# Patient Record
Sex: MALE | Race: WHITE | NOT HISPANIC OR LATINO | Employment: OTHER | URBAN - METROPOLITAN AREA
[De-identification: names, ages, dates, MRNs, and addresses within clinical notes are randomized per-mention and may not be internally consistent; named-entity substitution may affect disease eponyms.]

---

## 2017-01-03 ENCOUNTER — HOSPITAL ENCOUNTER (OUTPATIENT)
Dept: NON INVASIVE DIAGNOSTICS | Facility: CLINIC | Age: 67
Discharge: HOME | End: 2017-01-03
Payer: MEDICARE

## 2017-01-03 DIAGNOSIS — Z13.6 ENCOUNTER FOR SCREENING FOR CARDIOVASCULAR DISORDERS: ICD-10-CM

## 2017-01-03 PROCEDURE — 76706 US ABDL AORTA SCREEN AAA: CPT

## 2017-01-11 ENCOUNTER — ALLSCRIPTS OFFICE VISIT (OUTPATIENT)
Dept: OTHER | Facility: OTHER | Age: 67
End: 2017-01-11

## 2017-02-02 ENCOUNTER — ALLSCRIPTS OFFICE VISIT (OUTPATIENT)
Dept: OTHER | Facility: OTHER | Age: 67
End: 2017-02-02

## 2017-02-02 DIAGNOSIS — J06.9 ACUTE UPPER RESPIRATORY INFECTION: ICD-10-CM

## 2017-02-03 ENCOUNTER — TRANSCRIBE ORDERS (OUTPATIENT)
Dept: ADMINISTRATIVE | Age: 67
End: 2017-02-03

## 2017-02-03 ENCOUNTER — HOSPITAL ENCOUNTER (OUTPATIENT)
Dept: RADIOLOGY | Age: 67
Discharge: HOME | End: 2017-02-03
Payer: MEDICARE

## 2017-02-03 DIAGNOSIS — J06.9 ACUTE UPPER RESPIRATORY INFECTION: ICD-10-CM

## 2017-02-03 PROCEDURE — 71020 HB CHEST X-RAY 2VW FRONTAL&LATL: CPT

## 2017-02-24 ENCOUNTER — ALLSCRIPTS OFFICE VISIT (OUTPATIENT)
Dept: OTHER | Facility: OTHER | Age: 67
End: 2017-02-24

## 2017-04-22 ENCOUNTER — APPOINTMENT (OUTPATIENT)
Dept: LAB | Age: 67
End: 2017-04-22
Payer: MEDICARE

## 2017-04-22 DIAGNOSIS — E78.5 HYPERLIPIDEMIA: ICD-10-CM

## 2017-04-22 DIAGNOSIS — R73.01 IMPAIRED FASTING GLUCOSE: ICD-10-CM

## 2017-04-22 LAB
ALBUMIN SERPL BCP-MCNC: 3.8 G/DL (ref 3.5–5)
ALP SERPL-CCNC: 68 U/L (ref 46–116)
ALT SERPL W P-5'-P-CCNC: 24 U/L (ref 12–78)
ANION GAP SERPL CALCULATED.3IONS-SCNC: 9 MMOL/L (ref 4–13)
AST SERPL W P-5'-P-CCNC: 16 U/L (ref 5–45)
BILIRUB SERPL-MCNC: 0.45 MG/DL (ref 0.2–1)
BUN SERPL-MCNC: 16 MG/DL (ref 5–25)
CALCIUM SERPL-MCNC: 8.6 MG/DL (ref 8.3–10.1)
CHLORIDE SERPL-SCNC: 106 MMOL/L (ref 100–108)
CHOLEST SERPL-MCNC: 255 MG/DL (ref 50–200)
CO2 SERPL-SCNC: 26 MMOL/L (ref 21–32)
CREAT SERPL-MCNC: 1.11 MG/DL (ref 0.6–1.3)
EST. AVERAGE GLUCOSE BLD GHB EST-MCNC: 117 MG/DL
GFR SERPL CREATININE-BSD FRML MDRD: >60 ML/MIN/1.73SQ M
GLUCOSE P FAST SERPL-MCNC: 110 MG/DL (ref 65–99)
HBA1C MFR BLD: 5.7 % (ref 4.2–6.3)
HDLC SERPL-MCNC: 51 MG/DL (ref 40–60)
LDLC SERPL CALC-MCNC: 180 MG/DL (ref 0–100)
POTASSIUM SERPL-SCNC: 3.9 MMOL/L (ref 3.5–5.3)
PROT SERPL-MCNC: 6.8 G/DL (ref 6.4–8.2)
SODIUM SERPL-SCNC: 141 MMOL/L (ref 136–145)
TRIGL SERPL-MCNC: 122 MG/DL

## 2017-04-22 PROCEDURE — 80053 COMPREHEN METABOLIC PANEL: CPT

## 2017-04-22 PROCEDURE — 36415 COLL VENOUS BLD VENIPUNCTURE: CPT

## 2017-04-22 PROCEDURE — 83036 HEMOGLOBIN GLYCOSYLATED A1C: CPT

## 2017-04-22 PROCEDURE — 80061 LIPID PANEL: CPT

## 2017-04-24 ENCOUNTER — ALLSCRIPTS OFFICE VISIT (OUTPATIENT)
Dept: OTHER | Facility: OTHER | Age: 67
End: 2017-04-24

## 2017-04-24 DIAGNOSIS — E78.5 HYPERLIPIDEMIA: ICD-10-CM

## 2017-04-24 DIAGNOSIS — R91.1 SOLITARY PULMONARY NODULE: ICD-10-CM

## 2017-04-24 DIAGNOSIS — J44.9 CHRONIC OBSTRUCTIVE PULMONARY DISEASE (HCC): ICD-10-CM

## 2017-08-11 ENCOUNTER — LAB REQUISITION (OUTPATIENT)
Dept: LAB | Facility: HOSPITAL | Age: 67
End: 2017-08-11
Payer: MEDICARE

## 2017-08-11 DIAGNOSIS — N40.1 ENLARGED PROSTATE WITH LOWER URINARY TRACT SYMPTOMS (LUTS): ICD-10-CM

## 2017-08-11 LAB — PSA SERPL-MCNC: 1 NG/ML (ref 0–4)

## 2017-08-11 PROCEDURE — G0103 PSA SCREENING: HCPCS | Performed by: UROLOGY

## 2017-08-25 ENCOUNTER — ALLSCRIPTS OFFICE VISIT (OUTPATIENT)
Dept: OTHER | Facility: OTHER | Age: 67
End: 2017-08-25

## 2017-08-25 ENCOUNTER — GENERIC CONVERSION - ENCOUNTER (OUTPATIENT)
Dept: OTHER | Facility: OTHER | Age: 67
End: 2017-08-25

## 2017-08-25 LAB
BILIRUB UR QL STRIP: NORMAL
CLARITY UR: NORMAL
COLOR UR: YELLOW
GLUCOSE (HISTORICAL): NORMAL
HGB UR QL STRIP.AUTO: NORMAL
KETONES UR STRIP-MCNC: NORMAL MG/DL
LEUKOCYTE ESTERASE UR QL STRIP: NORMAL
NITRITE UR QL STRIP: NORMAL
PH UR STRIP.AUTO: 6.5 [PH]
PROT UR STRIP-MCNC: NORMAL MG/DL
SP GR UR STRIP.AUTO: 1.01
UROBILINOGEN UR QL STRIP.AUTO: 0.2

## 2017-08-28 ENCOUNTER — HOSPITAL ENCOUNTER (OUTPATIENT)
Dept: RADIOLOGY | Age: 67
Discharge: HOME | End: 2017-08-28
Payer: MEDICARE

## 2017-08-28 DIAGNOSIS — F17.200 NICOTINE DEPENDENCE, UNCOMPLICATED: ICD-10-CM

## 2017-08-28 DIAGNOSIS — R91.1 SOLITARY PULMONARY NODULE: ICD-10-CM

## 2017-08-28 PROCEDURE — 71250 CT THORAX DX C-: CPT

## 2017-09-01 ENCOUNTER — GENERIC CONVERSION - ENCOUNTER (OUTPATIENT)
Dept: OTHER | Facility: OTHER | Age: 67
End: 2017-09-01

## 2017-10-19 ENCOUNTER — APPOINTMENT (OUTPATIENT)
Dept: LAB | Facility: HOSPITAL | Age: 67
End: 2017-10-19
Payer: MEDICARE

## 2017-10-19 DIAGNOSIS — J44.9 CHRONIC OBSTRUCTIVE PULMONARY DISEASE (HCC): ICD-10-CM

## 2017-10-19 DIAGNOSIS — R91.1 SOLITARY PULMONARY NODULE: ICD-10-CM

## 2017-10-19 DIAGNOSIS — E78.5 HYPERLIPIDEMIA: ICD-10-CM

## 2017-10-19 LAB
ALBUMIN SERPL BCP-MCNC: 4.1 G/DL (ref 3.5–5)
ALP SERPL-CCNC: 77 U/L (ref 46–116)
ALT SERPL W P-5'-P-CCNC: 24 U/L (ref 12–78)
ANION GAP SERPL CALCULATED.3IONS-SCNC: 4 MMOL/L (ref 4–13)
AST SERPL W P-5'-P-CCNC: 19 U/L (ref 5–45)
BILIRUB SERPL-MCNC: 0.52 MG/DL (ref 0.2–1)
BUN SERPL-MCNC: 14 MG/DL (ref 5–25)
CALCIUM SERPL-MCNC: 9.1 MG/DL (ref 8.3–10.1)
CHLORIDE SERPL-SCNC: 105 MMOL/L (ref 100–108)
CHOLEST SERPL-MCNC: 201 MG/DL (ref 50–200)
CO2 SERPL-SCNC: 31 MMOL/L (ref 21–32)
CREAT SERPL-MCNC: 1.21 MG/DL (ref 0.6–1.3)
GFR SERPL CREATININE-BSD FRML MDRD: 62 ML/MIN/1.73SQ M
GLUCOSE P FAST SERPL-MCNC: 109 MG/DL (ref 65–99)
HDLC SERPL-MCNC: 53 MG/DL (ref 40–60)
LDLC SERPL CALC-MCNC: 122 MG/DL (ref 0–100)
POTASSIUM SERPL-SCNC: 4.3 MMOL/L (ref 3.5–5.3)
PROT SERPL-MCNC: 7 G/DL (ref 6.4–8.2)
SODIUM SERPL-SCNC: 140 MMOL/L (ref 136–145)
TRIGL SERPL-MCNC: 128 MG/DL

## 2017-10-19 PROCEDURE — 36415 COLL VENOUS BLD VENIPUNCTURE: CPT

## 2017-10-19 PROCEDURE — 80061 LIPID PANEL: CPT

## 2017-10-19 PROCEDURE — 80053 COMPREHEN METABOLIC PANEL: CPT

## 2017-10-23 ENCOUNTER — GENERIC CONVERSION - ENCOUNTER (OUTPATIENT)
Dept: OTHER | Facility: OTHER | Age: 67
End: 2017-10-23

## 2017-10-23 DIAGNOSIS — I72.3 ANEURYSM OF ILIAC ARTERY (HCC): ICD-10-CM

## 2017-10-23 DIAGNOSIS — R73.01 IMPAIRED FASTING GLUCOSE: ICD-10-CM

## 2017-10-23 DIAGNOSIS — E78.5 HYPERLIPIDEMIA: ICD-10-CM

## 2017-12-29 ENCOUNTER — ALLSCRIPTS OFFICE VISIT (OUTPATIENT)
Dept: OTHER | Facility: OTHER | Age: 67
End: 2017-12-29

## 2017-12-29 DIAGNOSIS — Z00.00 ENCOUNTER FOR GENERAL ADULT MEDICAL EXAMINATION WITHOUT ABNORMAL FINDINGS: ICD-10-CM

## 2017-12-29 DIAGNOSIS — R73.01 IMPAIRED FASTING GLUCOSE: ICD-10-CM

## 2017-12-29 DIAGNOSIS — E78.5 HYPERLIPIDEMIA: ICD-10-CM

## 2017-12-29 DIAGNOSIS — Z11.59 ENCOUNTER FOR SCREENING FOR OTHER VIRAL DISEASES (CODE): ICD-10-CM

## 2018-01-11 DIAGNOSIS — I77.9 DISORDER OF ARTERY OR ARTERIOLE (HCC): ICD-10-CM

## 2018-01-11 DIAGNOSIS — I72.3 ANEURYSM OF ILIAC ARTERY (HCC): ICD-10-CM

## 2018-01-12 VITALS
BODY MASS INDEX: 24.22 KG/M2 | WEIGHT: 163.5 LBS | TEMPERATURE: 96.5 F | SYSTOLIC BLOOD PRESSURE: 118 MMHG | DIASTOLIC BLOOD PRESSURE: 74 MMHG | HEART RATE: 76 BPM | HEIGHT: 69 IN | OXYGEN SATURATION: 97 %

## 2018-01-12 VITALS
BODY MASS INDEX: 23.88 KG/M2 | SYSTOLIC BLOOD PRESSURE: 100 MMHG | HEART RATE: 104 BPM | DIASTOLIC BLOOD PRESSURE: 70 MMHG | WEIGHT: 161.25 LBS | HEIGHT: 69 IN | TEMPERATURE: 98.2 F | OXYGEN SATURATION: 94 %

## 2018-01-14 VITALS
HEART RATE: 96 BPM | DIASTOLIC BLOOD PRESSURE: 88 MMHG | BODY MASS INDEX: 24.29 KG/M2 | SYSTOLIC BLOOD PRESSURE: 130 MMHG | HEIGHT: 69 IN | WEIGHT: 164 LBS | RESPIRATION RATE: 16 BRPM

## 2018-01-14 VITALS
HEIGHT: 69 IN | SYSTOLIC BLOOD PRESSURE: 128 MMHG | BODY MASS INDEX: 23.99 KG/M2 | WEIGHT: 162 LBS | DIASTOLIC BLOOD PRESSURE: 86 MMHG

## 2018-01-15 VITALS
SYSTOLIC BLOOD PRESSURE: 126 MMHG | HEIGHT: 69 IN | HEART RATE: 100 BPM | WEIGHT: 160.5 LBS | TEMPERATURE: 98.1 F | BODY MASS INDEX: 23.77 KG/M2 | DIASTOLIC BLOOD PRESSURE: 88 MMHG | OXYGEN SATURATION: 95 %

## 2018-01-17 NOTE — RESULT NOTES
Verified Results  * CT CHEST WO CONTRAST [id number] 07:40AM Debbie Herrera   TW Order Number: [id number]    - Patient Instructions: To schedule this appointment, please contact Central Scheduling at [phone]  TW Order Number: [id number]    - Patient Instructions: N[address] Order Number: [id number]    - Patient Instructions: [facility]     Test Name Result Flag Reference   CT CHEST WO CONTRAST (Report)     CT CHEST WITHOUT IV CONTRAST     INDICATION: F17 200: Nicotine dependence, unspecified, uncomplicated   B20 3: Solitary pulmonary nodule  History taken directly from the electronic ordering system  COMPARISON: CT chest dated August 29, 2016  TECHNIQUE: CT examination of the chest was performed without intravenous contrast  Reformatted images were created in axial, sagittal, and coronal planes  Radiation dose length product (DLP) for this visit: 101 mGy-cm   This examination, like all CT scans performed in the [facility], was performed utilizing techniques to minimize radiation dose exposure, including the use of iterative    reconstruction and automated exposure control  FINDINGS:     LUNGS: There is no acute infiltrate or pleural effusion  Moderate diffuse emphysematous lung changes are again noted  There is a stable 2 mm left lower lobe pulmonary nodule (series 3, image 42)  There is a stable 1 to 2 mm left lower lobe pulmonary    nodule (series 602, image 92)  There is a stable 2 mm perifissural nodule at the right upper lobe (series 603, image 29)  No new pulmonary nodules are identified  There is no tracheal or endobronchial lesion  PLEURA: Unremarkable  HEART/GREAT VESSELS: Unremarkable for patient's age  MEDIASTINUM AND JOSE JUAN: Unremarkable  CHEST WALL AND LOWER NECK:    Normal      VISUALIZED STRUCTURES IN THE UPPER ABDOMEN: Unremarkable  OSSEOUS STRUCTURES: No acute fracture  No destructive osseous lesion  IMPRESSION:     Stable scattered tiny bilateral pulmonary nodules measuring up to 2 mm when compared to a CT chest dated August 29, 2016  No new lung nodules are identified  Based on current Fleischner society 2017 guidelines, no further follow-up of these tiny    pulmonary nodules is required  Stable moderate diffuse emphysematous lung changes         Workstation performed: [id number]     Signed by:   Shayy Dunaway MD   8/29/17

## 2018-01-22 ENCOUNTER — HOSPITAL ENCOUNTER (OUTPATIENT)
Dept: NON INVASIVE DIAGNOSTICS | Facility: CLINIC | Age: 68
Discharge: HOME | End: 2018-01-22
Payer: MEDICARE

## 2018-01-22 VITALS
BODY MASS INDEX: 24.03 KG/M2 | TEMPERATURE: 97.1 F | DIASTOLIC BLOOD PRESSURE: 80 MMHG | WEIGHT: 162.25 LBS | HEIGHT: 69 IN | HEART RATE: 95 BPM | OXYGEN SATURATION: 95 % | SYSTOLIC BLOOD PRESSURE: 122 MMHG

## 2018-01-22 DIAGNOSIS — I72.3 ANEURYSM OF ILIAC ARTERY (HCC): ICD-10-CM

## 2018-01-22 DIAGNOSIS — I77.9 DISORDER OF ARTERY OR ARTERIOLE (HCC): ICD-10-CM

## 2018-01-22 PROCEDURE — 93978 VASCULAR STUDY: CPT

## 2018-01-23 VITALS
WEIGHT: 164.38 LBS | HEART RATE: 94 BPM | HEIGHT: 68 IN | DIASTOLIC BLOOD PRESSURE: 74 MMHG | TEMPERATURE: 97.3 F | BODY MASS INDEX: 24.91 KG/M2 | OXYGEN SATURATION: 95 % | SYSTOLIC BLOOD PRESSURE: 126 MMHG

## 2018-01-23 NOTE — PROGRESS NOTES
Assessment    1  Medicare welcome visit (V70 0) (Z00 00)   2  Depression screen (V79 0) (Z13 89)   3  Screening for genitourinary condition (V81 6) (Z13 89)   4  Screening for neurological condition (V80 09) (Z13 89)   5  Need for hepatitis C screening test (V73 89) (Z11 59)    Plan  Depression screening    · *VB-Depression Screening; Status:Complete;   Done: 17GYU1945 10:01AM   · *VB-Depression Screening ; every 1 year; Last 97UTJ2755; Next 77GJG0771;  [address] Maintenance    · Medicare Annual Wellness Visit; Status:Complete;   Done: 46FLD7749 10:00AM   · Medicare Annual Wellness Visit ; every 1 year; Last 09QYK1763; Next 12KBL1285;  Status:Active  Hyperlipidemia    · (1) COMPREHENSIVE METABOLIC PANEL; Status:Active; Requested for:95Bvr5348;    · (1) LIPID PANEL, FASTING; Status:Active; Requested for:82Uwd3764;   Impaired fasting glucose    · (1) HEMOGLOBIN A1C; Status:Active; Requested for:57Erk2507;   Medicare welcome visit    · (1) CBC/ PLT (NO DIFF); Status:Active; Requested for:41Qpi8301;   Need for hepatitis C screening test    · (1) HEP C ANTIBODY; Status:Active; Requested for:44Nsl5031;   Screening for other and unspecified genitourinary condition    · *VB - Urinary Incontinence Screen (Dx Z13 89 Screen for UI); Status:Complete;   Done:  79NGU2742 09:59AM   · *VB - Urinary Incontinence Screen (Dx Z13 89 Screen for UI) ; every 1 year; Last  10LII1533; Next 50MKE7578; Status:Active  Special screening for other neurological conditions    · *VB - Fall Risk Assessment  (Dx Z13 89 Screen for Neurologic Disorder);  Status:Complete;   Done: 90VNI7779 09:58AM   · *VB - Fall Risk Assessment  (Dx Z13 89 Screen for Neurologic Disorder) ; every 1 year; Last 43MVQ7888; Next 53CJU3843; Status:Active    Discussion/Summary    Encouraged shingles shot  Continue inhalers  Yearly chest CT to follow lung nodules 8/2018  Vascular follow up u/s ordered  Labs before 5/2018 follow up   Keep vascular and urology follow up  Commended on being tobacco free  EKG recent, obtained tracing  Impression: Subsequent Annual Wellness Visit  Cardiovascular screening and counseling: screening is current, EKG recommended and EKG today  Diabetes screening and counseling: screening is current  Colorectal cancer screening and counseling: Dr Bull Uribe  Prostate cancer screening and counseling: the risks and benefits of screening were discussed and Follows with urology  Abdominal aortic aneurysm screening and counseling: Follows with vascular, stopped smoking 1 yr ago!     Glaucoma screening and counseling: the risks and benefits of screening were discussed, screening is current and yearly eye exam    Advance Directive Planning: paperwork and instructions were given to the patient, he was encouraged to follow-up with me to discuss his questions and/or decisions  Patient Discussion: plan discussed with the patient, chronic conditon follow up next year with labs prior as ordered  Addd hep C AB due to baby boomer  Possible side effects of new medications were reviewed with the patient/guardian today  The treatment plan was reviewed with the patient/guardian  The patient/guardian understands and agrees with the treatment plan      Chief Complaint  PT [place] Directives  Advance Directive [facility]:   The patient is in agreement to receive the Advance Care Planning service    YES - Patient has an advance health care directive  The patient has a living will located  a scanned copy is in the patient's chart  History of Present Illness  HPI: 80 yo male for followup up AWV  Notes he does not need refills of medications as directed  Breathing has been stable  Using inhalers as needed  Notes that cold weather is a trigger  Continues to be active, does work out at home gym in basement  Has vascular follow up  Has regular CT scan  No recent EKG  Welcome to [employer] and Wellness Visits:  The patient is being seen for the subsequent annual wellness visit  Medicare Screening and Risk Factors   Hospitalizations: no previous hospitalizations  Medicare Screening Tests Risk Questions   Abdominal aortic aneurysm risk assessment: tobacco use and over 72years of age  Osteoporosis risk assessment:  and over 48years of age  HIV risk assessment: none indicated  Drug and Alcohol Use: The patient is a former cigarette smoker and quit smoking 2016  He has smoked for 48 YEARS year(s)  He is quitting tobacco use  The patient reports rare alcohol use  Alcohol concern:   The patient has no concerns about alcohol abuse  He has declined alcohol treatment  He has never used illicit drugs  Diet and Physical Activity: Current diet includes well balanced meals, 1 servings of fruit per day, 1 servings of vegetables per day, 1 servings of meat per day, 1 servings of whole grains per day, 1 servings of dairy products per day, 4 cups of coffee per day, 0 cups of tea per day, 1 cans of regular soda per day and 0 cans of diet soda per day  He exercises infrequently and exercises 2 times per week  Exercise: walking, strength training 3 hours per week  Mood Disorder and Cognitive Impairment Screening:   Depression screening score was 0     negative for symptoms  He denies feeling down, depressed, or hopeless over the past two weeks  He denies feeling little interest or pleasure in doing things over the past two weeks  Cognitive impairment screening: denies difficulty learning/retaining new information, denies difficulty handling complex tasks, denies difficulty with reasoning, denies difficulty with spatial ability and orientation, denies difficulty with language and denies difficulty with behavior  Functional Ability/Level of Safety: Hearing is slightly decreased, normal in the right ear and slightly decreased in the left ear  The patient is currently able to do activities of daily living with limitations   Activities of daily living details: does not need help using the phone, no transportation help needed, does not need help shopping, no meal preparation help needed, does not need help doing housework, does not need help doing laundry and does not need help managing medications  Fall risk factors: The patient fell 0 times in the past 12 months  Injury History: no antidepressant use, no postural hypotension, no sedative use, no visual impairment, no urinary incontinence, no antihypertensive use, no cognitive impairment and no previous fall  Home safety risk factors:  no unfamiliar surroundings, no loose rugs, no poor household lighting, no uneven floors, no household clutter, grab bars in the bathroom and handrails on the stairs  Advance Directives: Advance directives: living will  Co-Managers and Medical Equipment/Suppliers: See Patient Care Team   Preventive Quality Program 65 and Older: Falls Risk: The patient fell 0 times in the past 12 months  The patient is currently asymptomatic Symptoms Include: The patient currently has no urinary incontinence symptoms  Patient Care Team    Care Team Member Role Specialty Office Number   Kay DE PAZ  Specialist Pulmonary Medicine [phone]   Yeimy Ribera MD Referring Internal Medicine [phone]   [address]  Nurse Practitioner [phone]     Review of Systems    Constitutional: no fever  Eyes: glasses sees eye doctor  Cardiovascular: EKG before prior surgery, but no chest pain and no palpitations  Respiratory: No recent flareup, but no shortness of breath and no dry cough    The patient presents with complaints of cough  Gastrointestinal: Had colonscopy, but no abdominal pain, no nausea, no vomiting and no diarrhea  Genitourinary: Following with urology  , but no dysuria  Over the past 2 weeks, how often have you been bothered by the following problems? 1 ) Little interest or pleasure in doing things?  Not at all    2 ) Feeling down, depressed or hopeless? Not at all    3 ) Trouble falling asleep or sleeping too much? Not at all    4 ) Feeling tired or having little energy? Not at all    5 ) Poor appetite or overeating? Not at all    6 ) Feeling bad about yourself, or that you are a failure, or have let yourself or your family down? Not at all    7 ) Trouble concentrating on things, such as reading a newspaper or watching television? Not at all    8 ) Moving or speaking so slowly that other people could have noticed, or the opposite, moving or speaking faster than usual? Not at all    9 ) Thoughts that you would be better off dead or of hurting yourself in some way? 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)   2  Arterial ectasia (447 8) (I77 9)   3  Bee sting allergy (V15 06) (Z91 038)   4  BPH without obstruction/lower urinary tract symptoms (600 00) (N40 0)   5  COPD (chronic obstructive pulmonary disease) (496) (J44 9)   6  Diverticulosis, sigmoid (562 10) (K57 30)   7  Dyspnea on exertion (786 09) (R06 09)   8  Enlarged prostate (600 00) (N40 0)   9  Hearing loss (389 9) (H91 90)   10  Hemorrhoids (455 6) (K64 9)   11  Hyperlipidemia (272 4) (E78 5)   12  Iliac artery aneurysm (442 2) (I72 3)   13  Impaired fasting glucose (790 21) (R73 01)   14  Lung nodule (793 11) (R91 1)   15  Skin disorder (709 9) (L98 9)   16   Urinary problem (V47 4) (R39 89)    Past Medical History    · History of COPD exacerbation (491 21) (J44 1)   · History of Cough (786 2) (R05)   · History of Depression screening (V79 0) (Z13 89)   · History of Diverticulosis (562 10) (K57 90)   · History of Enlarged prostate with lower urinary tract symptoms (LUTS) (600 01) (N40 1)   · History of abnormal weight loss (V13 89) (M83 180)   · History of acute bronchitis (V12 69) (Z87 09)   · History of backache (V13 59) (Z87 39)   · History of elevated prostate specific antigen (PSA) (V13 89) (J91 122)   · History of nicotine dependence (V15 82) (Z87 891)   · History of sebaceous cyst (V13 3) (Z87 2)   · History of sinusitis (V12 69) (Z87 09)   · History of upper respiratory infection (V12 09) (Z87 09)   · History of Lipid screening (V77 91) (Z13 220)   · History of Medicare annual wellness visit, initial (V70 0) (Z00 00)   · History of Medicare annual wellness visit, initial (V70 0) (Z00 00)   · History of Screening for genitourinary condition (V81 6) (Z13 89)   · History of Screening for neurological condition (V80 09) (Z13 89)   · History of Viral upper respiratory tract infection (465 9) (J06 9,B97 89)    The active problems and past medical history were reviewed and updated today  Surgical History    · History of Arthroscopy Knee   · History of Biopsy Of The Prostate Incisional   · History of Complete Colonoscopy   · History of Hand Surgery   · History of Hernia Repair   · History of Inguinal Hernia Repair   · History of Oral Surgery Tooth Extraction    The surgical history was reviewed and updated today  Family History  Mother    · Family history of hypertension (V17 49) (Z82 49)  Family History    · Denied: Family history of Aneurysm    The family history was reviewed and updated today  Social History    · Former smoker (O55 93) (P49 085)   · Quit 11/11/2016   ·    · No drug use   · Social alcohol use (Z78 9)  The social history was reviewed and updated today  Current Meds   1  Anoro Ellipta 62 5-25 MCG/INH Inhalation Aerosol Powder Breath Activated; INHALE 1   PUFFS Daily  Requested for: 94QCU0141; Last Rx:23Oct2017 Ordered   2  Atorvastatin Calcium 40 MG Oral Tablet; TAKE 1 TABLET DAILY; Therapy: 27Igl9031 to (Evaluate:21Apr2018); Last Rx:23Oct2017 Ordered   3  EpiPen 2-Damon 0 3 MG/0 3ML Injection Solution Auto-injector; INJECT 0 3ML   INTRAMUSCULARLY AS DIRECTED; Therapy: 84CZR0688 to (Last Rx:18Suk2458)  Requested for: 45Qqs9599 Ordered   4   Finasteride 5 MG Oral Tablet; TAKE 1 TABLET DAILY; Last Rx:46Rae6157 Ordered   5  Flovent  MCG/ACT Inhalation Aerosol; INHALE 2 PUFFS TWICE DAILY; Therapy: 80UXS3397 to (Last Rx:23Oct2017)  Requested for: 23Oct2017 Ordered   6  Ipratropium-Albuterol 0 5-2 5 (3) MG/3ML Inhalation Solution; use 1 vial 2-3 times per   day for COPD; Therapy: 92BUU6326 to (Last Rx:23Oct2017)  Requested for: 23Oct2017 Ordered   7  ProAir  (90 Base) MCG/ACT Inhalation Aerosol Solution; INHALE 1 TO 2 PUFFS   EVERY 4 TO 6 HOURS AS NEEDED; Therapy: 97GSQ7297 to (Last ZN:82ECI5832)  Requested for: 23Oct2017 Ordered   8  Tamsulosin HCl - 0 4 MG Oral Capsule; 1 CAPSULE PO 1/2 HOUR AFTER DINNER;   Last Rx:42Yra1138 Ordered   9  Tamsulosin HCl - 0 4 MG Oral Capsule; Therapy: (Ozzie Portillo) to Recorded    The medication list was reviewed and updated today  Allergies    1  Penicillins    2  Other   3  Bee sting    Immunizations   1 2    Influenza  01-Nov-2016 10-Oct-2017    PCV  11-Jul-2016     PPSV  10-Oct-2017      Vitals  Signs    Temperature: 97 3 F, Tympanic  Heart Rate: 94  Systolic: 197, LUE  Diastolic: 74, LUE  Height: 5 ft 8 in  Weight: 164 lb 6 oz  BMI Calculated: 24 99  BSA Calculated: 1 88  O2 Saturation: 95, RA    Physical Exam    Constitutional   General appearance: No acute distress, well appearing and well nourished  Alert, seated in NAD  Eyes   Pupils and irises: Equal, round, reactive to light  Glasses, reactive  Ears, Nose, Mouth, and Throat   Otoscopic examination: Tympanic membranes translucent with normal light reflex  Canals patent without erythema  TM normal    Oropharynx: Normal with no erythema, edema, exudate or lesions  Normal pharynx  Neck supple  Neck   Neck: Supple, symmetric, trachea midline, no masses  Pulmonary   Respiratory effort: Abnormal   decreased breath sounds in bases  No crackles, no wheeze, no resp distress  Auscultation of lungs: Abnormal   no rales or crackles were heard bilaterally   no rhonchi  no wheezing  diminished breath sounds over the right base and diminished breath sounds over the left base  no bronchial breath sounds  Cardiovascular   Auscultation of heart: Normal rate and rhythm, normal S1 and S2, no murmurs  RRR  No LE edema  Abdomen   Abdomen: Non-tender, no masses  Soft, NT/ND  +BS  Lymphatic   Palpation of lymph nodes in neck: No lymphadenopathy  Musculoskeletal   Gait and station: Normal   gait stable  Neurologic No gross focal neuro deficits     Psychiatric   Mood and affect: Normal        Results/Data  *VB-Depression Screening 17RYJ5886 10:01AM Martin Krabbe     Test Name Result Flag Reference   Depression Scale Result      Depression Screen - Negative For Symptoms     Medicare Annual Wellness Visit 33RWQ8461 10:00AM Sarah Krabbe     Test Name Result [facility] Reference   MEDICARE [place] VISIT [id number]       *VB - Urinary Incontinence Screen (Dx Z13 89 Screen for UI) 82OQT9229 09:59AM Sarah Krabbe     Test Name Result Flag Reference   Urinary Incontinence Assessment 42OJT7423       *VB - Fall Risk Assessment  (Dx Z13 89 Screen for Neurologic Disorder) 66ADU9444 09:58AM Martin Krabbe     Test Name Result Flag Reference   Falls Risk      No falls in the past year       Future Appointments    Date/Time Provider Specialty Site   05/29/2018 10:15 AM Edgardo Logan MD Internal Medicine [facility]   08/28/2018 09:15 AM Michelle Hernandez MD Urology [address]     Signatures   Electronically signed by : Joellen Luke [facility]; Dec 29 2017 12:40PM EST                       (Author)    Electronically signed by : Jeane Hinson DO; Dec 29 2017  4:44PM EST

## 2018-05-25 ENCOUNTER — CLINICAL SUPPORT (OUTPATIENT)
Dept: INTERNAL MEDICINE CLINIC | Facility: CLINIC | Age: 68
End: 2018-05-25
Payer: MEDICARE

## 2018-05-25 DIAGNOSIS — R73.01 IMPAIRED FASTING GLUCOSE: ICD-10-CM

## 2018-05-25 DIAGNOSIS — Z11.59 NEED FOR HEPATITIS C SCREENING TEST: Primary | ICD-10-CM

## 2018-05-25 DIAGNOSIS — Z00.00 GENERAL MEDICAL EXAM: ICD-10-CM

## 2018-05-25 DIAGNOSIS — E78.5 HYPERLIPIDEMIA, UNSPECIFIED HYPERLIPIDEMIA TYPE: ICD-10-CM

## 2018-05-25 LAB
ALBUMIN SERPL BCP-MCNC: 4.3 G/DL (ref 3.5–5)
ALP SERPL-CCNC: 87 U/L (ref 46–116)
ALT SERPL W P-5'-P-CCNC: 27 U/L (ref 12–78)
ANION GAP SERPL CALCULATED.3IONS-SCNC: 6 MMOL/L (ref 4–13)
AST SERPL W P-5'-P-CCNC: 25 U/L (ref 5–45)
BILIRUB SERPL-MCNC: 0.7 MG/DL (ref 0.2–1)
BUN SERPL-MCNC: 13 MG/DL (ref 5–25)
CALCIUM SERPL-MCNC: 9.3 MG/DL (ref 8.3–10.1)
CHLORIDE SERPL-SCNC: 104 MMOL/L (ref 100–108)
CHOLEST SERPL-MCNC: 207 MG/DL (ref 50–200)
CO2 SERPL-SCNC: 30 MMOL/L (ref 21–32)
CREAT SERPL-MCNC: 1.23 MG/DL (ref 0.6–1.3)
ERYTHROCYTE [DISTWIDTH] IN BLOOD BY AUTOMATED COUNT: 13.6 % (ref 11.6–15.1)
EST. AVERAGE GLUCOSE BLD GHB EST-MCNC: 120 MG/DL
GFR SERPL CREATININE-BSD FRML MDRD: 60 ML/MIN/1.73SQ M
GLUCOSE P FAST SERPL-MCNC: 106 MG/DL (ref 65–99)
HBA1C MFR BLD: 5.8 % (ref 4.2–6.3)
HCT VFR BLD AUTO: 48.7 % (ref 36.5–49.3)
HCV AB SER QL: NORMAL
HDLC SERPL-MCNC: 55 MG/DL (ref 40–60)
HGB BLD-MCNC: 16.1 G/DL (ref 12–17)
LDLC SERPL CALC-MCNC: 122 MG/DL (ref 0–100)
MCH RBC QN AUTO: 29.7 PG (ref 26.8–34.3)
MCHC RBC AUTO-ENTMCNC: 33.1 G/DL (ref 31.4–37.4)
MCV RBC AUTO: 90 FL (ref 82–98)
NONHDLC SERPL-MCNC: 152 MG/DL
PLATELET # BLD AUTO: 288 THOUSANDS/UL (ref 149–390)
PMV BLD AUTO: 11.3 FL (ref 8.9–12.7)
POTASSIUM SERPL-SCNC: 4.6 MMOL/L (ref 3.5–5.3)
PROT SERPL-MCNC: 7.2 G/DL (ref 6.4–8.2)
RBC # BLD AUTO: 5.42 MILLION/UL (ref 3.88–5.62)
SODIUM SERPL-SCNC: 140 MMOL/L (ref 136–145)
TRIGL SERPL-MCNC: 152 MG/DL
WBC # BLD AUTO: 8.72 THOUSAND/UL (ref 4.31–10.16)

## 2018-05-25 PROCEDURE — 86803 HEPATITIS C AB TEST: CPT | Performed by: PHYSICIAN ASSISTANT

## 2018-05-25 PROCEDURE — 80061 LIPID PANEL: CPT | Performed by: PHYSICIAN ASSISTANT

## 2018-05-25 PROCEDURE — 83036 HEMOGLOBIN GLYCOSYLATED A1C: CPT | Performed by: PHYSICIAN ASSISTANT

## 2018-05-25 PROCEDURE — 85027 COMPLETE CBC AUTOMATED: CPT | Performed by: PHYSICIAN ASSISTANT

## 2018-05-25 PROCEDURE — 36415 COLL VENOUS BLD VENIPUNCTURE: CPT

## 2018-05-25 PROCEDURE — 80053 COMPREHEN METABOLIC PANEL: CPT | Performed by: PHYSICIAN ASSISTANT

## 2018-06-01 ENCOUNTER — OFFICE VISIT (OUTPATIENT)
Dept: INTERNAL MEDICINE CLINIC | Age: 68
End: 2018-06-01
Payer: MEDICARE

## 2018-06-01 VITALS
HEART RATE: 94 BPM | OXYGEN SATURATION: 98 % | WEIGHT: 164 LBS | TEMPERATURE: 96.9 F | DIASTOLIC BLOOD PRESSURE: 78 MMHG | SYSTOLIC BLOOD PRESSURE: 122 MMHG | HEIGHT: 68 IN | BODY MASS INDEX: 24.86 KG/M2

## 2018-06-01 DIAGNOSIS — J43.9 PULMONARY EMPHYSEMA, UNSPECIFIED EMPHYSEMA TYPE (HCC): ICD-10-CM

## 2018-06-01 DIAGNOSIS — Z12.11 COLON CANCER SCREENING: Primary | ICD-10-CM

## 2018-06-01 DIAGNOSIS — E78.2 MIXED HYPERLIPIDEMIA: ICD-10-CM

## 2018-06-01 DIAGNOSIS — J44.9 CHRONIC OBSTRUCTIVE PULMONARY DISEASE, UNSPECIFIED COPD TYPE (HCC): ICD-10-CM

## 2018-06-01 PROCEDURE — 99214 OFFICE O/P EST MOD 30 MIN: CPT | Performed by: INTERNAL MEDICINE

## 2018-06-01 RX ORDER — SIMVASTATIN 20 MG
20 TABLET ORAL
Qty: 90 TABLET | Refills: 3 | Status: SHIPPED | OUTPATIENT
Start: 2018-06-01 | End: 2019-12-13 | Stop reason: ALTCHOICE

## 2018-06-01 RX ORDER — ALBUTEROL SULFATE 90 UG/1
2 AEROSOL, METERED RESPIRATORY (INHALATION) 2 TIMES DAILY PRN
COMMUNITY
Start: 2016-07-11 | End: 2018-06-01 | Stop reason: SDUPTHER

## 2018-06-01 RX ORDER — ALBUTEROL SULFATE 90 UG/1
2 AEROSOL, METERED RESPIRATORY (INHALATION) EVERY 6 HOURS PRN
Qty: 3 INHALER | Refills: 1 | Status: SHIPPED | OUTPATIENT
Start: 2018-06-01 | End: 2018-10-19 | Stop reason: SDUPTHER

## 2018-06-01 RX ORDER — ALBUTEROL SULFATE 90 UG/1
2 AEROSOL, METERED RESPIRATORY (INHALATION) 2 TIMES DAILY PRN
Qty: 3 INHALER | Refills: 1 | Status: SHIPPED | OUTPATIENT
Start: 2018-06-01 | End: 2018-08-27 | Stop reason: SDUPTHER

## 2018-06-01 NOTE — PROGRESS NOTES
Assessment/Plan:      1  Hyperlipidemia   LDL level is 122  Will increase simvastatin 20 mg daily along with diet and exercise      Will check lipid profile before next visit     2  COPD   continue with present inhalers  patient quit smoking about 2 years ago     3  Hyperglycemia   hemoglobin A1c is 5 8  Advised for low sugars/carbohydrate diet     Diagnoses and all orders for this visit:    Colon cancer screening  -     Ambulatory referral to Gastroenterology; Future    Pulmonary emphysema, unspecified emphysema type (HCC)    Chronic obstructive pulmonary disease, unspecified COPD type ([facility] 75 )  -     Umeclidinium-Vilanterol (ANORO ELLIPTA) 62 5-25 MCG/INH AEPB; Inhale 1 puff daily  -     albuterol (PROVENTIL HFA,VENTOLIN HFA) 90 mcg/act inhaler; Inhale 2 puffs every 6 (six) hours as needed for wheezing  -     albuterol (PROAIR HFA) 90 mcg/act inhaler; Inhale 2 puffs 2 (two) times a day as needed (prn)  -     simvastatin (ZOCOR) 20 mg tablet; Take 1 tablet (20 mg total) by mouth daily at bedtime    Mixed hyperlipidemia  -     Lipid Panel with Direct LDL reflex; Future    Other orders  -     Discontinue: albuterol (PROAIR HFA) 90 mcg/act inhaler; Inhale 2 puffs 2 (two) times a day as needed          Subjective:          Patient ID: Nicolas Koch is a 79 y o  male  Patient is here for regular follow-up to the office  Denied any new complaints  Shortness of breath is stable and improved with inhalers  The following portions of the patient's history were reviewed and updated as appropriate: allergies, current medications, past family history, past medical history, past social history, past surgical history and problem list     Review of Systems   Constitutional: Negative for fatigue and fever  HENT: Negative for congestion, ear discharge, ear pain, postnasal drip, sinus pressure, sore throat, tinnitus and trouble swallowing  Eyes: Negative for discharge, itching and visual disturbance     Respiratory: Negative for cough and shortness of breath  Cardiovascular: Negative for chest pain and palpitations  Gastrointestinal: Negative for abdominal pain, diarrhea, nausea and vomiting  Endocrine: Negative for cold intolerance and polyuria  Genitourinary: Negative for difficulty urinating, dysuria and urgency  Musculoskeletal: Negative for arthralgias and neck pain  Skin: Negative for rash  Allergic/Immunologic: Negative for environmental allergies  Neurological: Negative for dizziness, weakness and headaches  Psychiatric/Behavioral: Negative  The patient is not nervous/anxious  Past Medical History:   Diagnosis Date    BPH (benign prostatic hyperplasia)     COPD (chronic obstructive pulmonary disease) ([facility])     Last Assessed:11/14/2016    Diverticulosis     Elevated prostate specific antigen (PSA)     Emphysema (subcutaneous) (surgical) resulting from a procedure     Enlarged prostate with lower urinary tract symptoms (LUTS)     Resolved:8/22/2017    Hernia, inguinal, right          Current Outpatient Prescriptions:     albuterol (PROAIR HFA) 90 mcg/act inhaler, Inhale 2 puffs 2 (two) times a day as needed (prn), Disp: 3 Inhaler, Rfl: 1    albuterol (PROVENTIL HFA,VENTOLIN HFA) 90 mcg/act inhaler, Inhale 2 puffs every 6 (six) hours as needed for wheezing, Disp: 3 Inhaler, Rfl: 1    finasteride (PROSCAR) 5 mg tablet, Take 5 mg by mouth daily  , Disp: , Rfl:     loratadine (CLARITIN REDITABS) 10 MG dissolvable tablet, Take 10 mg by mouth daily as needed for allergies  , Disp: , Rfl:     Multiple Vitamins-Minerals (CENTRUM ADULTS PO), Take 1 tablet by mouth daily  , Disp: , Rfl:     simvastatin (ZOCOR) 20 mg tablet, Take 1 tablet (20 mg total) by mouth daily at bedtime, Disp: 90 tablet, Rfl: 3    tamsulosin (FLOMAX) 0 4 mg, Take 0 4 mg by mouth daily with dinner , Disp: , Rfl:     Umeclidinium-Vilanterol (ANORO ELLIPTA) 62 5-25 MCG/INH AEPB, Inhale 1 puff daily, Disp: 3 each, Rfl: 1    Allergies   Allergen Reactions    Bee Venom Facial Swelling    Penicillins Rash       Social History   Past Surgical History:   Procedure Laterality Date    COLONOSCOPY      HAND SURGERY      Last Assessed:7/11/2016    HERNIA REPAIR      Last Assessed:7/11/2016    KNEE ARTHROSCOPY      SD REPAIR ING HERNIA,5+Y/O,REDUCIBL Right 2/23/2016    Procedure: REPAIR HERNIA INGUINAL with mesh;  Surgeon: Arturo Harrell DO;  Location: [facility];  Service: General    PROSTATE BIOPSY      WISDOM TOOTH EXTRACTION       Family History   Problem Relation Age of Onset    Hypertension Mother        Objective:  /78 (BP Location: Left arm, Patient Position: Sitting, Cuff Size: Standard)   Pulse 94   Temp (!) 96 9 °F (36 1 °C) (Tympanic)   Ht 5' 7 82" (1 723 m)   Wt 74 4 kg (164 lb)   SpO2 98%   BMI 25 07 kg/m²   Body mass index is 25 07 kg/m²  Physical Exam   Constitutional: He appears well-developed  HENT:   Head: Normocephalic  Right Ear: External ear normal    Left Ear: External ear normal    Mouth/Throat: Oropharynx is clear and moist    Eyes: Pupils are equal, round, and reactive to light  No scleral icterus  Neck: Normal range of motion  Neck supple  No tracheal deviation present  No thyromegaly present  Cardiovascular: Normal rate, regular rhythm and normal heart sounds  No murmur heard  Pulmonary/Chest: Effort normal and breath sounds normal  No respiratory distress  He exhibits no tenderness  Decreased breath sounds throughout noted   Abdominal: Soft  Bowel sounds are normal  He exhibits no mass  There is no tenderness  Musculoskeletal: Normal range of motion  Lymphadenopathy:     He has no cervical adenopathy  Neurological: He is alert  No cranial nerve deficit  Skin: Skin is warm  Psychiatric: He has a normal mood and affect   His behavior is normal

## 2018-08-20 ENCOUNTER — CLINICAL SUPPORT (OUTPATIENT)
Dept: INTERNAL MEDICINE CLINIC | Facility: CLINIC | Age: 68
End: 2018-08-20
Payer: MEDICARE

## 2018-08-20 DIAGNOSIS — N40.0 ENLARGED PROSTATE: Primary | ICD-10-CM

## 2018-08-20 LAB — PSA SERPL-MCNC: 1.2 NG/ML (ref 0–4)

## 2018-08-20 PROCEDURE — 84153 ASSAY OF PSA TOTAL: CPT | Performed by: UROLOGY

## 2018-08-20 PROCEDURE — 36415 COLL VENOUS BLD VENIPUNCTURE: CPT

## 2018-08-25 DIAGNOSIS — N40.0 ENLARGED PROSTATE WITHOUT LOWER URINARY TRACT SYMPTOMS (LUTS): ICD-10-CM

## 2018-08-28 ENCOUNTER — OFFICE VISIT (OUTPATIENT)
Dept: UROLOGY | Facility: MEDICAL CENTER | Age: 68
End: 2018-08-28
Payer: MEDICARE

## 2018-08-28 VITALS
BODY MASS INDEX: 24.4 KG/M2 | DIASTOLIC BLOOD PRESSURE: 80 MMHG | SYSTOLIC BLOOD PRESSURE: 124 MMHG | WEIGHT: 161 LBS | HEIGHT: 68 IN

## 2018-08-28 DIAGNOSIS — N13.8 BPH WITH OBSTRUCTION/LOWER URINARY TRACT SYMPTOMS: Primary | ICD-10-CM

## 2018-08-28 DIAGNOSIS — N40.1 BPH WITH OBSTRUCTION/LOWER URINARY TRACT SYMPTOMS: Primary | ICD-10-CM

## 2018-08-28 PROBLEM — I77.89 ARTERIAL ECTASIA (HCC): Status: ACTIVE | Noted: 2017-01-11

## 2018-08-28 PROBLEM — I72.3 ANEURYSM OF RIGHT ILIAC ARTERY (HCC): Status: ACTIVE | Noted: 2017-01-11

## 2018-08-28 LAB
SL AMB  POCT GLUCOSE, UA: NORMAL
SL AMB LEUKOCYTE ESTERASE,UA: NORMAL
SL AMB POCT BILIRUBIN,UA: NORMAL
SL AMB POCT BLOOD,UA: NORMAL
SL AMB POCT CLARITY,UA: CLEAR
SL AMB POCT COLOR,UA: YELLOW
SL AMB POCT KETONES,UA: NORMAL
SL AMB POCT NITRITE,UA: NORMAL
SL AMB POCT PH,UA: 7
SL AMB POCT SPECIFIC GRAVITY,UA: 1.01
SL AMB POCT URINE PROTEIN: NORMAL
SL AMB POCT UROBILINOGEN: 0.2

## 2018-08-28 PROCEDURE — 99214 OFFICE O/P EST MOD 30 MIN: CPT | Performed by: UROLOGY

## 2018-08-28 PROCEDURE — 81003 URINALYSIS AUTO W/O SCOPE: CPT | Performed by: UROLOGY

## 2018-08-28 RX ORDER — FINASTERIDE 5 MG/1
5 TABLET, FILM COATED ORAL DAILY
Qty: 90 TABLET | Refills: 3 | Status: SHIPPED | OUTPATIENT
Start: 2018-08-28 | End: 2019-08-30 | Stop reason: SDUPTHER

## 2018-08-28 RX ORDER — TAMSULOSIN HYDROCHLORIDE 0.4 MG/1
0.4 CAPSULE ORAL
Qty: 90 CAPSULE | Refills: 3 | Status: SHIPPED | OUTPATIENT
Start: 2018-08-28 | End: 2019-08-30 | Stop reason: SDUPTHER

## 2018-08-28 NOTE — PATIENT INSTRUCTIONS
Benign Prostatic Hypertrophy   WHAT YOU NEED TO KNOW:   Benign prostatic hypertrophy (BPH) is a condition that causes your prostate gland to grow larger than normal  The prostate gland is the male sex gland that produces a fluid that is part of semen  It is about the size of a walnut and it is located under the bladder  As the prostate grows, it can squeeze the urethra  This can block urine flow and cause urinary problems  DISCHARGE INSTRUCTIONS:   Medicines:   · Alpha blockers: This medicine relaxes the muscles in your prostate and bladder  It may help you urinate more easily  · 5 alpha reductase inhibitors: These medicines block the production of a hormone that causes the prostate to get larger  It may help slow the growth of the prostate or shrink the prostate  · Take your medicine as directed  Contact your healthcare provider if you think your medicine is not helping or if you have side effects  Tell him or her if you are allergic to any medicine  Keep a list of the medicines, vitamins, and herbs you take  Include the amounts, and when and why you take them  Bring the list or the pill bottles to follow-up visits  Carry your medicine list with you in case of an emergency  Follow up with your healthcare provider as directed:  Write down your questions so you remember to ask them during your visits  Manage BPH:   · Do not let your bladder get too full before you empty it  Urinate when you feel the urge  · Limit alcohol  Do not drink large amounts of any liquid at one time  · Decrease the amount of salt you eat  Examples of salty foods are chips, cured meats, and canned soups  Do not use table salt  · Healthcare providers may tell you not to eat spicy foods such as chilli peppers  This may help you find out if spicy food makes your BPH symptoms worse  · You may have sex if you feel well  Contact your healthcare provider if:   · There is a large amount of blood in your urine  · Your signs and symptoms get worse  · You have a fever  · You have questions or concerns about your condition or care  Seek care immediately if:   · You are unable to urinate  · Your bladder feels very full and painful  © 2017 [employer] Information is for End User's use only and may not be sold, redistributed or otherwise used for commercial purposes  All illustrations and images included in CareNotes® are the copyrighted property of A D A M [employer] Inc  or [employer]  The above information is an  only  It is not intended as medical advice for individual conditions or treatments  Talk to your doctor, nurse or pharmacist before following any medical regimen to see if it is safe and effective for you

## 2018-08-28 NOTE — ASSESSMENT & PLAN NOTE
AUA symptom score is 1 on combined medical therapy  PSA was 1 2 on August 20, 2018  This is stable  Urinalysis today is negative  He is pleased with his voiding pattern  We will continue present therapy  His prescriptions were refilled  He will return in 1 year and we will recheck PSA prior to that visit

## 2018-08-28 NOTE — PROGRESS NOTES
Assessment/Plan:    BPH with obstruction/lower urinary tract symptoms  AUA symptom score is 1 on combined medical therapy  PSA was 1 2 on August 20, 2018  This is stable  Urinalysis today is negative  He is pleased with his voiding pattern  We will continue present therapy  His prescriptions were refilled  He will return in 1 year and we will recheck PSA prior to that visit  Diagnoses and all orders for this visit:    BPH with obstruction/lower urinary tract symptoms  -     POCT urine dip auto non-scope  -     finasteride (PROSCAR) 5 mg tablet; Take 1 tablet (5 mg total) by mouth daily  -     tamsulosin (FLOMAX) 0 4 mg; Take 1 capsule (0 4 mg total) by mouth daily with dinner  -     PSA Total, Diagnostic; Future          Subjective:      Patient ID: Joanna Zepeda is a 79 y o  male  CC: Lower urinary tract symptoms      Benign Prostatic Hypertrophy   This is a chronic problem  The current episode started more than 1 year ago  The problem is unchanged  Irritative symptoms do not include frequency, nocturia or urgency  Obstructive symptoms include an intermittent stream  Obstructive symptoms do not include dribbling, incomplete emptying, a slower stream, straining or a weak stream  Pertinent negatives include no chills, dysuria, genital pain, hematuria, hesitancy, nausea or vomiting  AUA score is 0-7  He is sexually active  Nothing aggravates the symptoms  Past treatments include finasteride and tamsulosin  The treatment provided significant relief  He has been using treatment for 2 or more years  The following portions of the patient's history were reviewed and updated as appropriate: allergies, current medications, past family history, past medical history, past social history, past surgical history and problem list     Review of Systems   Constitutional: Negative for chills, diaphoresis, fatigue and fever  HENT: Negative  Eyes: Negative  Respiratory: Positive for shortness of breath  Cardiovascular: Negative  Gastrointestinal: Negative  Negative for nausea and vomiting  Endocrine: Negative  Genitourinary: Negative for dysuria, frequency, hematuria, hesitancy, incomplete emptying, nocturia and urgency  Musculoskeletal: Negative  Skin: Negative  Allergic/Immunologic: Negative  Neurological: Negative  Hematological: Negative  Psychiatric/Behavioral: Negative  Objective:      /80 (BP Location: Left arm, Patient Position: Sitting)   Ht 5' 7 82" (1 723 m)   Wt 73 kg (161 lb)   BMI 24 61 kg/m²          Physical Exam   Constitutional: He is oriented to person, place, and time  He appears well-developed and well-nourished  HENT:   Head: Normocephalic and atraumatic  Eyes: Conjunctivae are normal    Neck: Neck supple  Cardiovascular: Normal rate  Pulmonary/Chest: Effort normal    Abdominal: Soft  Bowel sounds are normal  He exhibits no distension and no mass  There is no tenderness  There is no rebound, no guarding and no CVA tenderness  No hernia   Genitourinary: Rectum normal, testes normal and penis normal  Right testis shows no mass  Left testis shows no mass  No phimosis or hypospadias  Genitourinary Comments: Prostate 2 times enlarged  The prostate is firm, smooth, symmetric and palpably benign  No nodule was palpable  Musculoskeletal: He exhibits no edema  Neurological: He is alert and oriented to person, place, and time  Skin: Skin is warm and dry  Psychiatric: He has a normal mood and affect  His behavior is normal  Judgment and thought content normal    Vitals reviewed

## 2018-08-28 NOTE — LETTER
August 28, 2018     Ashly Patton MD  [address] [place] [address]    Patient: Sruthi Hawkins Held   YOB: 1950   Date of Visit: 8/28/2018       Dear Dr Adina Garcia: Thank you for referring Goran Whittington to me for evaluation  Below are my notes for this consultation  If you have questions, please do not hesitate to call me  I look forward to following your patient along with you  Sincerely,        Marina Goodman MD        CC: No Recipients  Marina Goodman MD  8/28/2018  9:29 AM  Sign at close encounter  Assessment/Plan:    BPH with obstruction/lower urinary tract symptoms  AUA symptom score is 1 on combined medical therapy  PSA was 1 2 on August 20, 2018  This is stable  Urinalysis today is negative  He is pleased with his voiding pattern  We will continue present therapy  His prescriptions were refilled  He will return in 1 year and we will recheck PSA prior to that visit  Diagnoses and all orders for this visit:    BPH with obstruction/lower urinary tract symptoms  -     POCT urine dip auto non-scope  -     finasteride (PROSCAR) 5 mg tablet; Take 1 tablet (5 mg total) by mouth daily  -     tamsulosin (FLOMAX) 0 4 mg; Take 1 capsule (0 4 mg total) by mouth daily with dinner  -     PSA Total, Diagnostic; Future          Subjective:      Patient ID: Nicholas Giullory is a 79 y o  male  CC: Lower urinary tract symptoms      Benign Prostatic Hypertrophy   This is a chronic problem  The current episode started more than 1 year ago  The problem is unchanged  Irritative symptoms do not include frequency, nocturia or urgency  Obstructive symptoms include an intermittent stream  Obstructive symptoms do not include dribbling, incomplete emptying, a slower stream, straining or a weak stream  Pertinent negatives include no chills, dysuria, genital pain, hematuria, hesitancy, nausea or vomiting  AUA score is 0-7  He is sexually active  Nothing aggravates the symptoms   Past treatments include finasteride and tamsulosin  The treatment provided significant relief  He has been using treatment for 2 or more years  The following portions of the patient's history were reviewed and updated as appropriate: allergies, current medications, past family history, past medical history, past social history, past surgical history and problem list     Review of Systems   Constitutional: Negative for chills, diaphoresis, fatigue and fever  HENT: Negative  Eyes: Negative  Respiratory: Positive for shortness of breath  Cardiovascular: Negative  Gastrointestinal: Negative  Negative for nausea and vomiting  Endocrine: Negative  Genitourinary: Negative for dysuria, frequency, hematuria, hesitancy, incomplete emptying, nocturia and urgency  Musculoskeletal: Negative  Skin: Negative  Allergic/Immunologic: Negative  Neurological: Negative  Hematological: Negative  Psychiatric/Behavioral: Negative  Objective:      /80 (BP Location: Left arm, Patient Position: Sitting)   Ht 5' 7 82" (1 723 m)   Wt 73 kg (161 lb)   BMI 24 61 kg/m²           Physical Exam   Constitutional: He is oriented to person, place, and time  He appears well-developed and well-nourished  HENT:   Head: Normocephalic and atraumatic  Eyes: Conjunctivae are normal    Neck: Neck supple  Cardiovascular: Normal rate  Pulmonary/Chest: Effort normal    Abdominal: Soft  Bowel sounds are normal  He exhibits no distension and no mass  There is no tenderness  There is no rebound, no guarding and no CVA tenderness  No hernia   Genitourinary: Rectum normal, testes normal and penis normal  Right testis shows no mass  Left testis shows no mass  No phimosis or hypospadias  Genitourinary Comments: Prostate 2 times enlarged  The prostate is firm, smooth, symmetric and palpably benign  No nodule was palpable  Musculoskeletal: He exhibits no edema     Neurological: He is alert and oriented to person, place, and time  Skin: Skin is warm and dry  Psychiatric: He has a normal mood and affect  His behavior is normal  Judgment and thought content normal    Vitals reviewed

## 2018-10-16 ENCOUNTER — CLINICAL SUPPORT (OUTPATIENT)
Dept: INTERNAL MEDICINE CLINIC | Facility: CLINIC | Age: 68
End: 2018-10-16
Payer: MEDICARE

## 2018-10-16 DIAGNOSIS — E78.2 MIXED HYPERLIPIDEMIA: Primary | ICD-10-CM

## 2018-10-16 LAB
CHOLEST SERPL-MCNC: 203 MG/DL (ref 50–200)
HDLC SERPL-MCNC: 44 MG/DL (ref 40–60)
LDLC SERPL CALC-MCNC: 117 MG/DL (ref 0–100)
TRIGL SERPL-MCNC: 209 MG/DL

## 2018-10-16 PROCEDURE — 80061 LIPID PANEL: CPT | Performed by: INTERNAL MEDICINE

## 2018-10-16 PROCEDURE — 36415 COLL VENOUS BLD VENIPUNCTURE: CPT

## 2018-10-19 ENCOUNTER — OFFICE VISIT (OUTPATIENT)
Dept: INTERNAL MEDICINE CLINIC | Facility: CLINIC | Age: 68
End: 2018-10-19
Payer: MEDICARE

## 2018-10-19 VITALS
TEMPERATURE: 97.7 F | HEART RATE: 83 BPM | BODY MASS INDEX: 24.77 KG/M2 | WEIGHT: 163.4 LBS | DIASTOLIC BLOOD PRESSURE: 80 MMHG | OXYGEN SATURATION: 97 % | HEIGHT: 68 IN | SYSTOLIC BLOOD PRESSURE: 128 MMHG

## 2018-10-19 DIAGNOSIS — E78.2 MIXED HYPERLIPIDEMIA: ICD-10-CM

## 2018-10-19 DIAGNOSIS — Z23 NEED FOR INFLUENZA VACCINATION: Primary | ICD-10-CM

## 2018-10-19 DIAGNOSIS — J44.9 CHRONIC OBSTRUCTIVE PULMONARY DISEASE, UNSPECIFIED COPD TYPE (HCC): ICD-10-CM

## 2018-10-19 DIAGNOSIS — J43.9 PULMONARY EMPHYSEMA, UNSPECIFIED EMPHYSEMA TYPE (HCC): ICD-10-CM

## 2018-10-19 PROCEDURE — 99214 OFFICE O/P EST MOD 30 MIN: CPT | Performed by: INTERNAL MEDICINE

## 2018-10-19 PROCEDURE — 90662 IIV NO PRSV INCREASED AG IM: CPT

## 2018-10-19 PROCEDURE — G0008 ADMIN INFLUENZA VIRUS VAC: HCPCS

## 2018-10-19 RX ORDER — ALBUTEROL SULFATE 90 UG/1
2 AEROSOL, METERED RESPIRATORY (INHALATION) EVERY 6 HOURS PRN
Qty: 3 INHALER | Refills: 1 | Status: SHIPPED | OUTPATIENT
Start: 2018-10-19 | End: 2019-06-28 | Stop reason: SDUPTHER

## 2018-10-19 NOTE — PATIENT INSTRUCTIONS

## 2018-10-19 NOTE — PROGRESS NOTES
Assessment/Plan:    Emphysema  - stable  - will refill albuterol inhaler and Umeclidimium-vilanterol inhalers  - patient needs a flu shot and will get 1 today   - follow-up in 4 months    Mixed hyperlipidemia  - lipid panel done on October 16th, 2018 showed improved total cholesterol and LDL  - continue with simvastatin and diet and exercise    Benign prostatic hypertrophy  - stable  - continue with finasteride and tamsulosin       Diagnoses and all orders for this visit:    Need for influenza vaccination  -     influenza vaccine, [id number], high-dose, PF 0 5 mL, for patients 72 yr+ (FLUZONE HIGH-DOSE)    Pulmonary emphysema, unspecified emphysema type ([facility] [address] )    Chronic [facility] pulmonary disease, [facility]  -     umeclidinium-vilanterol (ANORO ELLIPTA) 62 5-25 MCG/INH inhaler; Inhale 1 puff daily  -     albuterol (PROVENTIL HFA,VENTOLIN HFA) 90 mcg/act inhaler; Inhale 2 puffs every 6 (six) hours as needed for wheezing    Mixed hyperlipidemia            Subjective:      Patient ID: Bonny Canseco is a 79 y o  male  HPI  Pt is here for a follow up visit regarding his COPD, hyperlipidemia, and needs a refill of some of his medications  He has no compliants today but admits to SOB on exertion and occasional wheezing  He denies cough, chest pain, fever, chills, headache, nausea, vomiting, diarrhea, constipation, myalgias or arthralgias  He also wants to review the results of his lab work done on October 16th, 2018  The following portions of the patient's history were reviewed and updated as appropriate: allergies, current medications, past family history, past medical history, past social history, past surgical history and problem list     Review of Systems   Constitutional: Negative for activity change, chills, fatigue, fever and unexpected weight change  HENT: Negative for ear pain, postnasal drip, rhinorrhea, sinus pressure and sore throat  Eyes: Negative for pain     Respiratory: Positive for shortness of breath (on exertion)  Negative for cough, choking, chest tightness and wheezing  Cardiovascular: Negative for chest pain, palpitations and leg swelling  Gastrointestinal: Negative for abdominal pain, constipation, diarrhea, nausea and vomiting  Genitourinary: Negative for dysuria and hematuria  Musculoskeletal: Negative for arthralgias, back pain, gait problem, joint swelling, myalgias and neck stiffness  Skin: Negative for pallor and rash  Neurological: Negative for dizziness, tremors, seizures, syncope, light-headedness and headaches  Hematological: Negative for adenopathy  Psychiatric/Behavioral: Negative for behavioral problems  Past Medical History:   Diagnosis Date    BPH (benign prostatic hyperplasia)     COPD [facility]ch[facility] pulmonary disease) ([facility])     Last Assessed:11/14/2016    Diverticulosis     Elevated prostate specific antigen (PSA)     Emphysema (subcutaneous) (surgical) resulting from a procedure     Enlarged prostate with lower urinary tract symptoms (LUTS)     Resolved:8/22/2017    Hernia, inguinal, right          Current Outpatient Prescriptions:     albuterol (PROVENTIL HFA,VENTOLIN HFA) 90 mcg/act inhaler, Inhale 2 puffs every 6 (six) hours as needed for wheezing, Disp: 3 Inhaler, Rfl: 1    finasteride (PROSCAR) 5 mg tablet, Take 1 tablet (5 mg total) by mouth daily, Disp: 90 tablet, Rfl: 3    loratadine (CLARITIN REDITABS) 10 MG dissolvable tablet, Take 10 mg by mouth daily as needed for allergies  , Disp: , Rfl:     Multiple Vitamins-Minerals (CENTRUM ADULTS PO), Take 1 tablet by mouth daily  , Disp: , Rfl:     simvastatin (ZOCOR) 20 mg tablet, Take 1 tablet (20 mg total) by mouth daily at bedtime, Disp: 90 tablet, Rfl: 3    tamsulosin (FLOMAX) 0 4 mg, Take 1 capsule (0 4 mg total) by mouth daily with dinner, Disp: 90 capsule, Rfl: 3    umeclidinium-vilanterol (ANORO ELLIPTA) 62 5-25 MCG/INH inhaler, Inhale 1 puff daily, Disp: 3 each, Rfl: 1    Allergies   Allergen Reactions    Bee Venom Facial Swelling    Other Rash     [id number]: mushrooms    Penicillins Rash       Social History   Past Surgical History:   Procedure Laterality Date    COLONOSCOPY      HAND SURGERY      Last Assessed:7/11/2016    HERNIA REPAIR      Last Assessed:7/11/2016    KNEE ARTHROSCOPY      [place] REPAIR ING HERNIA,5+Y/O,REDUCIBL Right 2/23/2016    Procedure: REPAIR HERNIA INGUINAL with mesh;  Surgeon: Licha De La Cruz DO;  Location: [facility];  Service: General    PROSTATE BIOPSY      WISDOM TOOTH EXTRACTION       Family History   Problem Relation Age of Onset    Hypertension Mother        Objective:  /80 (BP Location: Left arm, Patient Position: Sitting, Cuff Size: Adult)   Pulse 83   Temp 97 7 °F (36 5 °C) (Oral)   Ht 5' 8" (1 727 m)   Wt 74 1 kg (163 lb 6 4 oz)   SpO2 97%   BMI 24 84 kg/m²        Physical Exam   Constitutional: He is oriented to person, place, and time  He appears well-developed and well-nourished  No distress  HENT:   Head: Normocephalic and atraumatic  Right Ear: External ear normal    Left Ear: External ear normal    Nose: Nose normal    Mouth/Throat: Oropharynx is clear and moist  No oropharyngeal exudate  Eyes: Pupils are equal, round, and reactive to light  Conjunctivae and EOM are normal  Right eye exhibits no discharge  Left eye exhibits no discharge  No scleral icterus  Neck: Normal range of motion  Neck supple  No JVD present  No tracheal deviation present  No thyromegaly present  Cardiovascular: Normal rate, regular rhythm, normal heart sounds and intact distal pulses  Exam reveals no gallop and no friction rub  No murmur heard  Pulmonary/Chest: Effort normal and breath sounds normal  No respiratory distress  He has no wheezes  He has no rales  He exhibits no tenderness  Abdominal: Soft  Bowel sounds are normal  He exhibits no distension and no mass  There is no tenderness   There is no rebound and no guarding  Musculoskeletal: Normal range of motion  He exhibits no edema, tenderness or deformity  Lymphadenopathy:     He has cervical adenopathy  Neurological: He is alert and oriented to person, place, and time  He has normal reflexes  No cranial nerve deficit  He exhibits normal muscle tone  Coordination normal    Skin: Skin is warm and dry  No rash noted  He is not diaphoretic  No erythema  No pallor  Psychiatric: He has a normal mood and affect   His behavior is normal

## 2018-11-09 ENCOUNTER — TELEPHONE (OUTPATIENT)
Dept: INTERNAL MEDICINE CLINIC | Facility: CLINIC | Age: 68
End: 2018-11-09

## 2018-11-09 DIAGNOSIS — J43.9 PULMONARY EMPHYSEMA, UNSPECIFIED EMPHYSEMA TYPE (HCC): Primary | ICD-10-CM

## 2018-11-09 RX ORDER — IPRATROPIUM BROMIDE AND ALBUTEROL SULFATE 2.5; .5 MG/3ML; MG/3ML
3 SOLUTION RESPIRATORY (INHALATION)
Qty: 1 ML | Refills: 0
Start: 2018-11-09 | End: 2020-02-14 | Stop reason: SDUPTHER

## 2018-11-09 RX ORDER — FLUTICASONE PROPIONATE 110 UG/1
2 AEROSOL, METERED RESPIRATORY (INHALATION) 2 TIMES DAILY
Qty: 1 INHALER | Refills: 3 | Status: SHIPPED | OUTPATIENT
Start: 2018-11-09 | End: 2019-01-03 | Stop reason: CLARIF

## 2018-11-09 NOTE — TELEPHONE ENCOUNTER
Patient's wife called and would like to discuss concerns on her 's medication list     She would like a call from [employer] directly today       Thank you!!

## 2018-11-09 NOTE — PROGRESS NOTES
Spoke with patient's wife  Patient did not get Flovent refilled at last visit and needs a refill sent to pharmacy  He also has duoneb solution which he has in case of a flareup and noted when he got home this was not on his med list   I updated his med list and sent Flovent to pharmacy  He will start flovent along with anoro which he had been using  He has pulmonary follow up scheduled January of 2019  He will keep his regular scheduled [facility] follow up

## 2019-01-03 ENCOUNTER — OFFICE VISIT (OUTPATIENT)
Dept: PULMONOLOGY | Facility: CLINIC | Age: 69
End: 2019-01-03
Payer: MEDICARE

## 2019-01-03 ENCOUNTER — DOCUMENTATION (OUTPATIENT)
Dept: PULMONOLOGY | Facility: CLINIC | Age: 69
End: 2019-01-03

## 2019-01-03 VITALS
WEIGHT: 166 LBS | TEMPERATURE: 97.7 F | BODY MASS INDEX: 24.59 KG/M2 | SYSTOLIC BLOOD PRESSURE: 124 MMHG | HEIGHT: 69 IN | OXYGEN SATURATION: 95 % | HEART RATE: 87 BPM | DIASTOLIC BLOOD PRESSURE: 82 MMHG | RESPIRATION RATE: 18 BRPM

## 2019-01-03 DIAGNOSIS — J43.9 PULMONARY EMPHYSEMA, UNSPECIFIED EMPHYSEMA TYPE (HCC): ICD-10-CM

## 2019-01-03 DIAGNOSIS — R06.00 DYSPNEA ON EXERTION: Primary | ICD-10-CM

## 2019-01-03 DIAGNOSIS — R91.1 LUNG NODULE: ICD-10-CM

## 2019-01-03 PROCEDURE — 99214 OFFICE O/P EST MOD 30 MIN: CPT | Performed by: INTERNAL MEDICINE

## 2019-01-03 PROCEDURE — 94010 BREATHING CAPACITY TEST: CPT | Performed by: INTERNAL MEDICINE

## 2019-01-03 RX ORDER — GUAIFENESIN 600 MG
1200 TABLET, EXTENDED RELEASE 12 HR ORAL 2 TIMES DAILY PRN
COMMUNITY

## 2019-01-03 NOTE — LETTER
January 3, 2019     Vanessa Luz MD  [address] [place] [address]    Patient: Maren Aguila Held   YOB: 1950   Date of Visit: 1/3/2019       Dear Dr Ron Rawls: Thank you for referring Taylor Duval to me for evaluation  Below are my notes for this consultation  If you have questions, please do not hesitate to call me  I look forward to following your patient along with you  Sincerely,        Sarmad Rojas MD        CC: No Recipients  Sarmad Rojas MD  1/3/2019  9:15 PM  Sign at close encounter    Progress note - Pulmonary Medicine   Maren Aguila Held 76 y o  male MRN: [id number]       Impression & Plan:     COPD with emphysema  · At this point he is on a very good pulmonary regimen  · We did decide to simplify to 1 inhaler using Trelegy Ellipta  At I am not convinced that the inhaled corticosteroid component is doing much for his symptoms  · I recommended pulmonary rehabilitation since he has been fairly inactive and has an element of cardiovascular deconditioning  He is agreeable  · Additionally, we did discuss oxygen therapy  We did not perform 6 minutes walk testing today since he will have evaluation prior to initiating pulmonary rehab  · He will continue with DuoNeb nebulizer use when he has worsening congestion and increased shortness of breath  Dyspnea on exertion  · Multifactorial due to severe emphysema and cardiovascular deconditioning      Lung nodule  · Two lung nodules stable from August 2016 to August 2017  Largest nodule 2 mm in size  One nodule seems to be more consistent with fissural lymph node  · Recommended CT lung screening given his age and risk factors with a significant tobacco abuse history and having only quit approximately 10 years ago    This will be done August 2019 which will be 2 years from the prior        ______________________________________________________________________    HPI:    Juen Perez presents today for follow-up of severe COPD/emphysema  He was previously seen by Dr Jennifer Felder but has not been seen in quite some time  He has had significant emphysema and has been on inhalers  He is using Anoro Ellipta which she does find to be helpful  He also has Flovent and is unclear whether he is using 1 puff or 2 puffs twice daily  More recently however he has noted some increasing symptoms  He does have some cough and mucus production and occasionally takes Mucinex to help clear this  He does have DuoNeb nebulizer solution which he uses when he is really congested  The phlegm is predominantly clear however  His exertional shortness of breath has slightly increased  He is more sedentary according to his wife as well as his own report  He has not had any cardiac symptoms  He last had a stress test many years ago in 2009  He has not had leg swelling, claudication, or other cardiovascular symptoms however  Weight has been stable at a healthy weight  No significant appetite changes  He denies any lightheadedness or dizziness  No syncopal symptoms  No headache or visual change  No sore throat  No abdominal pain  No arthralgias or myalgias    Review of Systems:  Review of Systems   Constitutional: Negative for fatigue  Gastrointestinal: Negative for abdominal distention and abdominal pain  Musculoskeletal: Positive for arthralgias ( he does get shoulder discomfort with prolonged exertion)  Allergic/Immunologic: Positive for environmental allergies (Seasonally takes Claritin and Flonase)  All other systems reviewed and are negative          Past medical history, surgical history, and family history were reviewed and updated as appropriate    Social history updates:  History   Smoking Status    Former Smoker    Packs/day: 1 50    Years: 49 00    Types: Cigarettes    Quit date: 11/11/2015   Smokeless Tobacco    Never Used     Comment: Former smoker as per [employer]       PhysicalExamination:  Vitals:   /82 (BP Location: Right arm, Patient Position: Sitting, Cuff Size: Standard)   Pulse 87   Temp 97 7 °F (36 5 °C) (Tympanic)   Resp 18   Ht 5' 9" (1 753 m)   Wt 75 3 kg (166 lb)   SpO2 95% Comment: room air  BMI 24 51 kg/m²      Gen:  Pleasant, no distress  HEENT: PERRL  Oropharynx is clear, moist  Neck: Supple  There is no JVD, lymphadenopathy or thyromegaly  Trachea is midline  Chest:  Chest excursion symmetric  Lungs are hyperinflated  Breath sounds are distant bilaterally but otherwise clear  Hyper-resonant to percussion  Cardiac:  Regular  There are no murmurs  Abdomen: Soft and nontender  Benign  Extremities: No clubbing, cyanosis or edema  Neurologic: No focal deficits  Normal affect  Skin: No appreciable rashes  Diagnostic Data:  Labs: I personally reviewed the most recent laboratory data pertinent to today's visit    Lab Results   Component Value Date    WBC 8 72 05/25/2018    HGB 16 1 05/25/2018    HCT 48 7 05/25/2018    MCV 90 05/25/2018     05/25/2018     Lab Results   Component Value Date    SODIUM 140 05/25/2018    K 4 6 05/25/2018    CO2 30 05/25/2018     05/25/2018    BUN 13 05/25/2018    CREATININE 1 23 05/25/2018    CALCIUM 9 3 05/25/2018     PFT results:  Spirometry was performed in the office today and shows very severe obstruction with an FEV1 of 0 72 L which is 23% predicted  This is consistent with prior pulmonary function testing in 2016 which also showed very severe obstruction, hyperinflation and air trapping with a normal total lung capacity and severe diffusing capacity impairment    Imaging:    Last CT scan August 28, 2017 was viewed on the [facility] system and shows severe emphysema which is slightly apical predominant    There are very tiny nodules, the largest 2 mm in size      Pedro Mancilla MD

## 2019-01-04 NOTE — PROGRESS NOTES
Progress note - Pulmonary Medicine   No Reyes Held 76 y o  male MRN: [id number]       Impression & Plan:     COPD with emphysema  · At this point he is on a very good pulmonary regimen  · We did decide to simplify to 1 inhaler using Trelegy Ellipta  At I am not convinced that the inhaled corticosteroid component is doing much for his symptoms  · I recommended pulmonary rehabilitation since he has been fairly inactive and has an element of cardiovascular deconditioning  He is agreeable  · Additionally, we did discuss oxygen therapy  We did not perform 6 minutes walk testing today since he will have evaluation prior to initiating pulmonary rehab  · He will continue with DuoNeb nebulizer use when he has worsening congestion and increased shortness of breath  Dyspnea on exertion  · Multifactorial due to severe emphysema and cardiovascular deconditioning      Lung nodule  · Two lung nodules stable from August 2016 to August 2017  Largest nodule 2 mm in size  One nodule seems to be more consistent with fissural lymph node  · Recommended CT lung screening given his age and risk factors with a significant tobacco abuse history and having only quit approximately 10 years ago  This will be done August 2019 which will be 2 years from the prior        ______________________________________________________________________    HPI:    Simón Horan presents today for follow-up of severe COPD/emphysema  He was previously seen by Dr Geri Fowler but has not been seen in quite some time  He has had significant emphysema and has been on inhalers  He is using Anoro Ellipta which she does find to be helpful  He also has Flovent and is unclear whether he is using 1 puff or 2 puffs twice daily  More recently however he has noted some increasing symptoms  He does have some cough and mucus production and occasionally takes Mucinex to help clear this  He does have DuoNeb nebulizer solution which he uses when he is really congested  The phlegm is predominantly clear however  His exertional shortness of breath has slightly increased  He is more sedentary according to his wife as well as his own report  He has not had any cardiac symptoms  He last had a stress test many years ago in 2009  He has not had leg swelling, claudication, or other cardiovascular symptoms however  Weight has been stable at a healthy weight  No significant appetite changes  He denies any lightheadedness or dizziness  No syncopal symptoms  No headache or visual change  No sore throat  No abdominal pain  No arthralgias or myalgias    Review of Systems:  Review of Systems   Constitutional: Negative for fatigue  Gastrointestinal: Negative for abdominal distention and abdominal pain  Musculoskeletal: Positive for arthralgias ( he does get shoulder discomfort with prolonged exertion)  Allergic/Immunologic: Positive for environmental allergies (Seasonally takes Claritin and Flonase)  All other systems reviewed and are negative  Past medical history, surgical history, and family history were reviewed and updated as appropriate    Social history updates:  History   Smoking Status    Former Smoker    Packs/day: 1 50    Years: 49 00    Types: Cigarettes    Quit date: 11/11/2015   Smokeless Tobacco    Never Used     Comment: Former smoker as per [employer]       PhysicalExamination:  Vitals:   /82 (BP Location: Right arm, Patient Position: Sitting, Cuff Size: Standard)   Pulse 87   Temp 97 7 °F (36 5 °C) (Tympanic)   Resp 18   Ht 5' 9" (1 753 m)   Wt 75 3 kg (166 lb)   SpO2 95% Comment: room air  BMI 24 51 kg/m²     Gen:  Pleasant, no distress  HEENT: PERRL  Oropharynx is clear, moist  Neck: Supple  There is no JVD, lymphadenopathy or thyromegaly  Trachea is midline  Chest:  Chest excursion symmetric  Lungs are hyperinflated  Breath sounds are distant bilaterally but otherwise clear  Hyper-resonant to percussion  Cardiac:  Regular  There are no murmurs  Abdomen: Soft and nontender  Benign  Extremities: No clubbing, cyanosis or edema  Neurologic: No focal deficits  Normal affect  Skin: No appreciable rashes  Diagnostic Data:  Labs: I personally reviewed the most recent laboratory data pertinent to today's visit    Lab Results   Component Value Date    WBC 8 72 05/25/2018    HGB 16 1 05/25/2018    HCT 48 7 05/25/2018    MCV 90 05/25/2018     05/25/2018     Lab Results   Component Value Date    SODIUM 140 05/25/2018    K 4 6 05/25/2018    CO2 30 05/25/2018     05/25/2018    BUN 13 05/25/2018    CREATININE 1 23 05/25/2018    CALCIUM 9 3 05/25/2018     PFT results:  Spirometry was performed in the office today and shows very severe obstruction with an FEV1 of 0 72 L which is 23% predicted  This is consistent with prior pulmonary function testing in 2016 which also showed very severe obstruction, hyperinflation and air trapping with a normal total lung capacity and severe diffusing capacity impairment    Imaging:    Last CT scan August 28, 2017 was viewed on the [facility] system and shows severe emphysema which is slightly apical predominant    There are very tiny nodules, the largest 2 mm in size      Angela Hills MD

## 2019-01-04 NOTE — ASSESSMENT & PLAN NOTE
· Two lung nodules stable from August 2016 to August 2017  Largest nodule 2 mm in size  One nodule seems to be more consistent with fissural lymph node  · Recommended CT lung screening given his age and risk factors with a significant tobacco abuse history and having only quit approximately 10 years ago    This will be done August 2019 which will be 2 years from the prior

## 2019-01-04 NOTE — ASSESSMENT & PLAN NOTE
· At this point he is on a very good pulmonary regimen  · We did decide to simplify to 1 inhaler using Trelegy Ellipta  At I am not convinced that the inhaled corticosteroid component is doing much for his symptoms  · I recommended pulmonary rehabilitation since he has been fairly inactive and has an element of cardiovascular deconditioning  He is agreeable  · Additionally, we did discuss oxygen therapy  We did not perform 6 minutes walk testing today since he will have evaluation prior to initiating pulmonary rehab  · He will continue with DuoNeb nebulizer use when he has worsening congestion and increased shortness of breath

## 2019-01-23 DIAGNOSIS — I72.3 ANEURYSM OF ILIAC ARTERY (HCC): Primary | ICD-10-CM

## 2019-01-24 ENCOUNTER — CLINICAL SUPPORT (OUTPATIENT)
Dept: PULMONOLOGY | Age: 69
End: 2019-01-24
Payer: MEDICARE

## 2019-01-24 VITALS — BODY MASS INDEX: 24.71 KG/M2 | WEIGHT: 163 LBS | HEIGHT: 68 IN

## 2019-01-24 DIAGNOSIS — J43.9 PULMONARY EMPHYSEMA, UNSPECIFIED EMPHYSEMA TYPE (HCC): ICD-10-CM

## 2019-01-24 NOTE — PROGRESS NOTES
Pulmonary Rehabilitation Plan of Care   Care Plan           Today's date: 2019   Total visits to date: first visit today for eval  Patient name: Farzana Gorman      : 1950  Age: 76 y o  MRN: [id number]  Referring Physician: Keyla Max MD  Provider: Cady Meehan  Clinician: Deb Palm    Dx:   Encounter Diagnosis   Name Primary?  Pulmonary emphysema, unspecified emphysema type [facility])      Date of onset:     COMMENTS:  Patient had a face to face appt with Dr Jon Burkitt on 1/3 which will qualify as his initial Pulm Rehab review  He will begin rehab on   He desires to feel healthy, lessen his SOB during activity and effectiviely manage his Pulmonary disease  His 6 min walk was stopped early due to dizziness  We will repeat the test in the near future  He quit smoking 2 years ago and currently does not need O2      Medication compliance: Yes   Comments: none  Fall Risk: Moderate   Comments: dizziness during 6 min Walk    EXERCISE/ACTIVITY    Cardiopulmonary Goals:   Min: 30-50   HR:    RPE: 5-7 moderate to somewhat hard exercise   O2 sat: >90%    Modalities: Treadmill, UBE, NuStep and Recumbent bike  Strength trainin-3 days / week, 12-15 repitations  and 1-2 sets per modality    Modalities: Leg press, Chest press, Lateral raise, Arm curl and Seated Row    Exercise Progression: will begin next week      RPE N/A  Home activity: walking  Goals: 10% improvement in functional capacity, home exercise days opposite [place] and >150 mins of exercise/wk  Education: Benefit of exercise, home exercise, pursed lip breathing and O2 saturation monitoring   Plan:home exercise target 30 mins, 2 days opposite [place] and exercise education video  Readiness to change: Preparation    NUTRITION    Weight control:    Starting weight: 163 lbs     Diabetes: N/A  Goals:Improved Rate Your Plate score  Education:More frequent meals, smaller portions  hydration  healthy choices while dining out  Plan: Education Video- Diet and Nutrition  Readiness to change: Action    PSYCHOSOCIAL    Emotional: depression and chronic disease  Social support: good  Goals: Physical Fitness in [facility] Score < 3, Daily Activity in [facility] Score < 3 and Overall Health in [facility] Score < 3  Education: Mental Health Education  Plan: Anxiety and Lung disease  Readiness to change: Preparation    OTHER CORE COMPONENTS     Tobacco:   History   Smoking Status    Former Smoker    Packs/day: 1 50    Years: 49 00    Types: Cigarettes    Quit date: 11/11/2015   Smokeless Tobacco    Never Used     Comment: Former smoker as per [employer]     Oxygen: N/A  Blood pressure:    Resting:     Exercise:    Goals: consistent exercise >150 mins/wk  Education: Pulmonary Disease, physiology, Exercise, strength, flexibility, Conservation of energy, oxygen, breathing techniques, Mental Health, Diet,nutrition, Medication and Avoiding Infections  Plan: Exercise benefits  Readiness to change: Preparation and Action

## 2019-01-24 NOTE — PROGRESS NOTES
Suzie Perez Held   1950     Risk: moderate     Pre % Change Goal   Date:    1/24/19     Physical      Sub Max ETT (mets)   10% increase   6MWT (feet)  incomplete  10% increase   UCSD Dyspnea Score  20 83  5 pt decrease   Supplemental O2 use (L)  No     [facility] (est peak O2)  CAT 19     Peak exercise CR/CT (mets)   40% increase   Emotional      PHQ9 (> 10 refer to MD) PHQ-9 Total Score: 1    4 pt decrease   [place] (lower score = improvement)      Total Total: 21  < 27   Feelings Feelings: Not at all  < 3   Physical Fitness Physical Fitness: Light  < 3   Social Support Social Support: Yes, as much as I needed  < 3   Daily Activities Daily Activity: Some difficulty  < 3   Social Activities Social Activities: Slightly  < 3   Pain Pain: Very mild pain  < 3   Overall Health Overall Health: Good  < 3   Quality of Life Quality of Life: Pretty good  < 3   Change in Health Change in Health: about the same  < 3   Dietary      Rate your plate    > 58   Measurements      Weight Weight - Scale: 73 9 kg (163 lb)    2 5 - 5%   BMI Body mass index is 24 78 kg/m²  19 - 25   Waist Circ      < 36 M / < 35 F   % Body fat    < 25 M / < 33 F   BP left arm               (systolic) 299  < 971   (diastolic) 90  < 90   Smoking #/day  (if applicable) N/A  0

## 2019-01-24 NOTE — PROGRESS NOTES
CARDIAC/PULMONARY REHAB ASSESSMENT    Today's date: 2019   Patient name: Usman Woodson      : 1950       MRN: [id number]  PCP: José Alcantar MD  Pulmonologist: Gilda Redmond  Dx:   Encounter Diagnosis   Name Primary?  Pulmonary emphysema, unspecified emphysema type ([facility] [address] )      Date of onset:   Cultural needs: none    Height: Height: 5' 8" (172 7 cm)   Weight:  Weight - Scale: 73 9 kg (163 lb)   Medical History:   Past Medical History:   Diagnosis Date    BPH (benign prostatic hyperplasia)     COPD (chronic obstructive pulmonary disease) (HCC)     Last Assessed:2016    Diverticulosis     Elevated prostate specific antigen (PSA)     Emphysema (subcutaneous) (surgical) resulting from a procedure     Enlarged prostate with lower urinary tract symptoms (LUTS)     Resolved:2017    Hernia, inguinal, right        Physical Limitations: shortness of breath due to COPD    Risk Factors   Smoking: N/A  HTN: yes today, not normally  DM: no  Obesity: no   Inactivity: no  Family History:   Family History   Problem Relation Age of Onset    Hypertension Mother      Allergies: Allergies   Allergen Reactions    Bee Venom Facial Swelling    Other Rash     Annotation - [id number]: mushrooms    Penicillins Rash     Other: N/A    Current Medications:   Current Outpatient Prescriptions   Medication Sig Dispense Refill    albuterol (PROVENTIL HFA,VENTOLIN HFA) 90 mcg/act inhaler Inhale 2 puffs every 6 (six) hours as needed for wheezing 3 Inhaler 1    finasteride (PROSCAR) 5 mg tablet Take 1 tablet (5 mg total) by mouth daily 90 tablet 3    fluticasone-umeclidinium-vilanterol (TRELEGY ELLIPTA) 100-62 5-25 MCG/INH inhaler Inhale 1 puff daily Rinse mouth after use   3 Inhaler 3    guaiFENesin (MUCINEX) 600 mg 12 hr tablet Take 1,200 mg by mouth 2 (two) times a day as needed      ipratropium-albuterol (DUO-NEB) 0 5-2 5 mg/3 mL nebulizer solution Take 1 vial (3 mL total) by nebulization every 6 (six) hours 1 mL 0    loratadine (CLARITIN REDITABS) 10 MG dissolvable tablet Take 10 mg by mouth daily as needed for allergies   Multiple Vitamins-Minerals (CENTRUM ADULTS PO) Take 1 tablet by mouth daily   simvastatin (ZOCOR) 20 mg tablet Take 1 tablet (20 mg total) by mouth daily at bedtime 90 tablet 3    tamsulosin (FLOMAX) 0 4 mg Take 1 capsule (0 4 mg total) by mouth daily with dinner 90 capsule 3     No current facility-administered medications for this visit  Functional Status Prior to Diagnosis for Treatment   Occupation: retired   Recreation: walking, teaching classes  ADLs: none  [place]: yes  Exercise: little  Other: none    Short Term Program Goals: Decrease shortness of breath, improve stamina, increase strength   Long Term Goals: Be able to participate in favorite activity, sport, hobby (golf, hunt, sew, play games, cook) and enjoy family, friends without breathing difficulties    Comments:Patient will begin Pulm rehab next week  He saw Dr Rahcel Jackson who recommended rehab for his increased SOB  Patient has very severe COPD due to cigarette smoking (quit 2 years)  His FEV1 is 34%, and no O2 required  His 6 MW was not completed today due to dizziness  Another attempt will be made in 2 weeks  Dr Candido Tim visit on 1/3 is acceptable for his initial ME face to face review  Ability to reach goals/rehabilitation potential: high    Projected return to function: Full    Emotional/Social    Marital status: , retired  Works as     Life Stressors: shortness of breath    Goals: Short term: to lessen SOB during activity   Long term: to lead an active, healthy lifestyle, continue activities with grandchildren, attend their graduations     Comments: N/A

## 2019-01-25 ENCOUNTER — HOSPITAL ENCOUNTER (OUTPATIENT)
Dept: NON INVASIVE DIAGNOSTICS | Facility: CLINIC | Age: 69
Discharge: HOME | End: 2019-01-25
Payer: MEDICARE

## 2019-01-25 ENCOUNTER — TELEPHONE (OUTPATIENT)
Dept: VASCULAR SURGERY | Facility: CLINIC | Age: 69
End: 2019-01-25

## 2019-01-25 DIAGNOSIS — I72.3 ANEURYSM OF ILIAC ARTERY (HCC): Primary | ICD-10-CM

## 2019-01-25 DIAGNOSIS — I72.3 ANEURYSM OF ILIAC ARTERY (HCC): ICD-10-CM

## 2019-01-25 PROCEDURE — 93978 VASCULAR STUDY: CPT

## 2019-01-28 PROCEDURE — 93978 VASCULAR STUDY: CPT | Performed by: SURGERY

## 2019-01-29 ENCOUNTER — CLINICAL SUPPORT (OUTPATIENT)
Dept: PULMONOLOGY | Age: 69
End: 2019-01-29
Payer: MEDICARE

## 2019-01-29 DIAGNOSIS — J43.9 PULMONARY EMPHYSEMA, UNSPECIFIED EMPHYSEMA TYPE (HCC): Primary | ICD-10-CM

## 2019-01-29 DIAGNOSIS — J43.9 PULMONARY EMPHYSEMA, UNSPECIFIED EMPHYSEMA TYPE (HCC): ICD-10-CM

## 2019-01-29 PROCEDURE — G0424 PULMONARY REHAB W EXER: HCPCS

## 2019-01-31 ENCOUNTER — APPOINTMENT (OUTPATIENT)
Dept: PULMONOLOGY | Age: 69
End: 2019-01-31
Payer: MEDICARE

## 2019-02-05 ENCOUNTER — CLINICAL SUPPORT (OUTPATIENT)
Dept: PULMONOLOGY | Age: 69
End: 2019-02-05
Payer: MEDICARE

## 2019-02-05 DIAGNOSIS — J44.9 CHRONIC OBSTRUCTIVE PULMONARY DISEASE, UNSPECIFIED COPD TYPE (HCC): ICD-10-CM

## 2019-02-05 PROCEDURE — G0424 PULMONARY REHAB W EXER: HCPCS

## 2019-02-07 ENCOUNTER — CLINICAL SUPPORT (OUTPATIENT)
Dept: PULMONOLOGY | Age: 69
End: 2019-02-07
Payer: MEDICARE

## 2019-02-07 DIAGNOSIS — J44.9 CHRONIC OBSTRUCTIVE PULMONARY DISEASE, UNSPECIFIED COPD TYPE (HCC): ICD-10-CM

## 2019-02-07 PROCEDURE — G0424 PULMONARY REHAB W EXER: HCPCS

## 2019-02-12 ENCOUNTER — APPOINTMENT (OUTPATIENT)
Dept: PULMONOLOGY | Age: 69
End: 2019-02-12
Payer: MEDICARE

## 2019-02-14 ENCOUNTER — APPOINTMENT (OUTPATIENT)
Dept: PULMONOLOGY | Age: 69
End: 2019-02-14
Payer: MEDICARE

## 2019-02-19 ENCOUNTER — CLINICAL SUPPORT (OUTPATIENT)
Dept: PULMONOLOGY | Age: 69
End: 2019-02-19
Payer: MEDICARE

## 2019-02-19 DIAGNOSIS — J44.9 CHRONIC OBSTRUCTIVE PULMONARY DISEASE, UNSPECIFIED COPD TYPE (HCC): ICD-10-CM

## 2019-02-19 PROCEDURE — G0424 PULMONARY REHAB W EXER: HCPCS

## 2019-02-21 ENCOUNTER — APPOINTMENT (OUTPATIENT)
Dept: PULMONOLOGY | Age: 69
End: 2019-02-21
Payer: MEDICARE

## 2019-02-21 NOTE — PROGRESS NOTES
Pulmonary Rehabilitation Plan of Care   30 day review          Today's date: 2019   Total visits to date: 4  Patient name: Saad Phan      : 1950  Age: 76 y o  MRN: [id number]  Referring Physician: Ayden Irwin MD  Provider: Cady Meehan  Clinician: Danelle Redmond    Dx:   Encounter Diagnosis   Name Primary?  Chronic obstructive pulmonary disease, unspecified COPD type ([facility] )      Date of onset:     COMMENTS:  Patient is doing well in [place] and making progress  He attended only for sessions to date due to conflicts and weather challenges  He is currently using the treadmill for 10 min at 1 3 speed and 0 5 incline  The arm ergometer at level 4 for 10 min and the Nustep for 10 min on level 5  He is using the [employer] machines for strengthening      Medication compliance: Yes   Comments: none  Fall Risk: Moderate   Comments: dizziness during 6 min Walk    EXERCISE/ACTIVITY    Cardiopulmonary Goals:   Min: 30-50   HR:    RPE: 5-7 moderate to somewhat hard exercise   O2 sat: >90%    Modalities: Treadmill, UBE, NuStep and Recumbent bike  Strength trainin-3 days / week, 12-15 repitations  and 1-2 sets per modality    Modalities: Leg press, Chest press, Lateral raise, Arm curl and Seated Row    Exercise Progression: will begin next week      RPE 5  Home activity: walking  Goals: 10% improvement in functional capacity, home exercise days opposite [place] and >150 mins of exercise/wk  Education: Benefit of exercise, home exercise, pursed lip breathing and O2 saturation monitoring   Plan:home exercise target 30 mins, 2 days opposite [place] and exercise education video  Readiness to change: Preparation    NUTRITION    Weight control:    Starting weight: 163 lbs     Diabetes: N/A  Goals:Improved Rate Your Plate score  Education:More frequent meals, smaller portions  hydration  healthy choices while dining out  Plan: Education Video- Diet and Nutrition  Readiness to change: Action    PSYCHOSOCIAL    Emotional: depression and chronic disease  Social support: good  Goals: Physical Fitness in [facility] Score < 3, Daily Activity in [facility] Score < 3 and Overall Health in [facility] Score < 3  Education: Mental Health Education  Plan: Anxiety and Lung disease  Readiness to change: Preparation    OTHER CORE COMPONENTS     Tobacco:   Social History     Tobacco Use   Smoking Status Former Smoker    Packs/day: 1 50    Years: 49 00    Pack years: 73 50    Types: Cigarettes    Last attempt to quit: 11/11/2015    Years since quitting: 3 2   Smokeless Tobacco Never Used   Tobacco Comment    Former smoker as per [employer]scripts     Oxygen: N/A  Blood pressure:    Resting: Resting BP: 147/94   Exercise: Recovery BP: 155/90  Goals: consistent exercise >150 mins/wk  Education: Pulmonary Disease, physiology, Exercise, strength, flexibility, Conservation of energy, oxygen, breathing techniques, Mental Health, Diet,nutrition, Medication and Avoiding Infections  Plan: Exercise benefits  Readiness to change: Preparation and Action

## 2019-02-22 ENCOUNTER — OFFICE VISIT (OUTPATIENT)
Dept: INTERNAL MEDICINE CLINIC | Facility: CLINIC | Age: 69
End: 2019-02-22
Payer: MEDICARE

## 2019-02-22 VITALS
OXYGEN SATURATION: 97 % | WEIGHT: 169 LBS | HEIGHT: 68 IN | TEMPERATURE: 97.9 F | BODY MASS INDEX: 25.61 KG/M2 | SYSTOLIC BLOOD PRESSURE: 130 MMHG | HEART RATE: 57 BPM | DIASTOLIC BLOOD PRESSURE: 88 MMHG

## 2019-02-22 DIAGNOSIS — I72.3 ANEURYSM OF RIGHT ILIAC ARTERY (HCC): ICD-10-CM

## 2019-02-22 DIAGNOSIS — R73.01 IMPAIRED FASTING GLUCOSE: ICD-10-CM

## 2019-02-22 DIAGNOSIS — E78.2 MIXED HYPERLIPIDEMIA: ICD-10-CM

## 2019-02-22 DIAGNOSIS — J44.9 CHRONIC OBSTRUCTIVE PULMONARY DISEASE, UNSPECIFIED COPD TYPE (HCC): Primary | ICD-10-CM

## 2019-02-22 PROCEDURE — 99214 OFFICE O/P EST MOD 30 MIN: CPT | Performed by: PHYSICIAN ASSISTANT

## 2019-02-22 NOTE — ASSESSMENT & PLAN NOTE
Currently doing pulm rehab  Following with pulmonology  Stable on telergy, doing well  Gave encouragement

## 2019-02-22 NOTE — PATIENT INSTRUCTIONS
A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 [facility])  Following with vascular, had follow up imaging in January  Has scheduled regular screenings  Impaired fasting glucose  Will check A1c with next labs set  A1c labs done in 56/2018 slightly elevated  COPD with emphysema  Currently doing pulm rehab  Following with pulmonology  Stable on telergy, doing well  Gave encouragement  Hyperlipidemia  Stable on lipid therapy  Will check CMP and lipids    Will continue to follow

## 2019-02-26 ENCOUNTER — CLINICAL SUPPORT (OUTPATIENT)
Dept: PULMONOLOGY | Age: 69
End: 2019-02-26
Payer: MEDICARE

## 2019-02-26 DIAGNOSIS — J44.9 CHRONIC OBSTRUCTIVE PULMONARY DISEASE, UNSPECIFIED COPD TYPE (HCC): ICD-10-CM

## 2019-02-26 PROCEDURE — G0424 PULMONARY REHAB W EXER: HCPCS

## 2019-02-28 ENCOUNTER — CLINICAL SUPPORT (OUTPATIENT)
Dept: PULMONOLOGY | Age: 69
End: 2019-02-28
Payer: MEDICARE

## 2019-02-28 ENCOUNTER — OFFICE VISIT (OUTPATIENT)
Dept: PULMONOLOGY | Facility: CLINIC | Age: 69
End: 2019-02-28

## 2019-02-28 DIAGNOSIS — J44.9 CHRONIC OBSTRUCTIVE PULMONARY DISEASE, UNSPECIFIED COPD TYPE (HCC): Primary | ICD-10-CM

## 2019-02-28 DIAGNOSIS — J44.9 CHRONIC OBSTRUCTIVE PULMONARY DISEASE, UNSPECIFIED COPD TYPE (HCC): ICD-10-CM

## 2019-02-28 PROCEDURE — G0424 PULMONARY REHAB W EXER: HCPCS

## 2019-02-28 PROCEDURE — RECHECK: Performed by: INTERNAL MEDICINE

## 2019-02-28 NOTE — PROGRESS NOTES
Medical Director 30 day Pulmonary Rehabilitation Review    I certify that I have met with Kadi marshall to provide a 30 day progress review of his/her pulmonary rehabilitation program at [facility]  I have reviewed the most recent individualized treatment plan (ITP), outcomes assessment, and provided opportunity for discussion with the patient    Comments: Patient is doing well, enjoying rehab and would like to continue as planned  Continue with current treatment plan yes    Please provide the following modifications to the current treatment plan: No modifications        Stu Lai MD

## 2019-03-05 ENCOUNTER — CLINICAL SUPPORT (OUTPATIENT)
Dept: PULMONOLOGY | Age: 69
End: 2019-03-05
Payer: MEDICARE

## 2019-03-05 DIAGNOSIS — J44.9 CHRONIC OBSTRUCTIVE PULMONARY DISEASE, UNSPECIFIED COPD TYPE (HCC): ICD-10-CM

## 2019-03-05 PROCEDURE — G0424 PULMONARY REHAB W EXER: HCPCS

## 2019-03-07 ENCOUNTER — CLINICAL SUPPORT (OUTPATIENT)
Dept: PULMONOLOGY | Age: 69
End: 2019-03-07
Payer: MEDICARE

## 2019-03-07 DIAGNOSIS — J44.9 CHRONIC OBSTRUCTIVE PULMONARY DISEASE, UNSPECIFIED COPD TYPE (HCC): ICD-10-CM

## 2019-03-07 PROCEDURE — G0424 PULMONARY REHAB W EXER: HCPCS

## 2019-03-12 ENCOUNTER — CLINICAL SUPPORT (OUTPATIENT)
Dept: PULMONOLOGY | Age: 69
End: 2019-03-12
Payer: MEDICARE

## 2019-03-12 DIAGNOSIS — J44.9 CHRONIC OBSTRUCTIVE PULMONARY DISEASE, UNSPECIFIED COPD TYPE (HCC): ICD-10-CM

## 2019-03-12 PROCEDURE — G0424 PULMONARY REHAB W EXER: HCPCS

## 2019-03-14 ENCOUNTER — CLINICAL SUPPORT (OUTPATIENT)
Dept: PULMONOLOGY | Age: 69
End: 2019-03-14
Payer: MEDICARE

## 2019-03-14 DIAGNOSIS — J44.9 CHRONIC OBSTRUCTIVE PULMONARY DISEASE, UNSPECIFIED COPD TYPE (HCC): ICD-10-CM

## 2019-03-14 PROCEDURE — G0424 PULMONARY REHAB W EXER: HCPCS

## 2019-03-15 ENCOUNTER — LAB (OUTPATIENT)
Dept: LAB | Facility: OTHER | Age: 69
End: 2019-03-15
Payer: MEDICARE

## 2019-03-15 ENCOUNTER — TRANSCRIBE ORDERS (OUTPATIENT)
Dept: LAB | Facility: OTHER | Age: 69
End: 2019-03-15

## 2019-03-15 DIAGNOSIS — J43.9 PULMONARY EMPHYSEMA, UNSPECIFIED EMPHYSEMA TYPE (HCC): ICD-10-CM

## 2019-03-15 PROCEDURE — 36415 COLL VENOUS BLD VENIPUNCTURE: CPT

## 2019-03-15 PROCEDURE — 82103 ALPHA-1-ANTITRYPSIN TOTAL: CPT

## 2019-03-15 PROCEDURE — 82104 ALPHA-1-ANTITRYPSIN PHENO: CPT

## 2019-03-18 LAB
A1AT PHENOTYP SERPL IFE: NORMAL
A1AT SERPL-MCNC: 160 MG/DL (ref 90–200)

## 2019-03-19 ENCOUNTER — CLINICAL SUPPORT (OUTPATIENT)
Dept: PULMONOLOGY | Age: 69
End: 2019-03-19
Payer: MEDICARE

## 2019-03-19 DIAGNOSIS — J44.9 CHRONIC OBSTRUCTIVE PULMONARY DISEASE, UNSPECIFIED COPD TYPE (HCC): ICD-10-CM

## 2019-03-19 PROCEDURE — G0424 PULMONARY REHAB W EXER: HCPCS

## 2019-03-21 ENCOUNTER — CLINICAL SUPPORT (OUTPATIENT)
Dept: PULMONOLOGY | Age: 69
End: 2019-03-21
Payer: MEDICARE

## 2019-03-21 DIAGNOSIS — J44.9 CHRONIC OBSTRUCTIVE PULMONARY DISEASE, UNSPECIFIED COPD TYPE (HCC): ICD-10-CM

## 2019-03-21 PROCEDURE — G0424 PULMONARY REHAB W EXER: HCPCS

## 2019-03-22 NOTE — PROGRESS NOTES
Pulmonary Rehabilitation Plan of Care   60 day review          Today's date: 3/22/2019   Total visits to date: 12  Patient name: Jermain Frank      : 1950  Age: 76 y o  MRN: [id number]  Referring Physician: Concepción Moon MD  Provider: Yosvany Mares  Clinician: Toñito Salinas    Dx:   Encounter Diagnosis   Name Primary?  Chronic obstructive pulmonary disease, unspecified [facility] )      Date of onset:     COMMENTS:  [place] is doing [place] well in [place] and making progress  He works independently and does not require O2  He is currently using the treadmill for 15 min at 1 3 speed and 1 5 incline  The arm ergometer at level 6 for 10 min and the Nustep for 15 min on level 6  He continues to increase weights on the [employer] machines for strengthening      Medication compliance: Yes   Comments: none  Fall Risk: Moderate   Comments: dizziness during 6 min Walk    EXERCISE/ACTIVITY    Cardiopulmonary Goals:   Min: 30-50   HR:    RPE: 5-7 moderate to somewhat hard exercise   O2 sat: >90%    Modalities: Treadmill, UBE, NuStep and Recumbent bike  Strength trainin-3 days / week, 12-15 repitations  and 1-2 sets per modality    Modalities: Leg press, Chest press, Lateral raise, Arm curl and Seated Row    Exercise Progression: will begin next week      RPE 5  Home activity: walking  Goals: 10% improvement in functional capacity, home exercise days opposite [place] and >150 mins of exercise/wk  Education: Benefit of exercise, home exercise, pursed lip breathing and O2 saturation monitoring   Plan:home exercise target 30 mins, 2 days opposite [place] and exercise education video  Readiness to change: Action    NUTRITION    Weight control:    Starting weight: 163 lbs     Diabetes: N/A  Goals:Improved Rate Your Plate score  Education:More frequent meals, smaller portions  hydration  healthy choices while dining out  Plan: Education Video- Diet and Nutrition  Readiness to change: Action    PSYCHOSOCIAL    Emotional: depression and chronic disease  Social support: good  Goals: Physical Fitness in [facility] Score < 3, Daily Activity in [facility] Score < 3 and Overall Health in [facility] Score < 3  Education: Mental Health Education  Plan: Anxiety and Lung disease  Readiness to change: Action    OTHER CORE COMPONENTS     Tobacco:   Social History     Tobacco Use   Smoking Status Former Smoker    Packs/day: 1 50    Years: 49 00    Pack years: 73 50    Types: Cigarettes    Last attempt to quit: 11/11/2015    Years since quitting: 3 3   Smokeless Tobacco Never Used   Tobacco Comment    Former smoker as per [employer]     Oxygen: N/A  Blood pressure:    Resting: Resting BP: 125/94   Exercise: Recovery BP: 130/86  Goals: consistent exercise >150 mins/wk  Education: Pulmonary Disease, physiology, Exercise, strength, flexibility, Conservation of energy, oxygen, breathing techniques, Mental Health, Diet,nutrition, Medication and Avoiding Infections  Plan: Exercise benefits  Readiness to change: Action

## 2019-03-26 ENCOUNTER — CLINICAL SUPPORT (OUTPATIENT)
Dept: PULMONOLOGY | Age: 69
End: 2019-03-26
Payer: MEDICARE

## 2019-03-26 DIAGNOSIS — J44.9 CHRONIC OBSTRUCTIVE PULMONARY DISEASE, UNSPECIFIED COPD TYPE (HCC): ICD-10-CM

## 2019-03-26 PROCEDURE — G0424 PULMONARY REHAB W EXER: HCPCS

## 2019-03-28 ENCOUNTER — CLINICAL SUPPORT (OUTPATIENT)
Dept: PULMONOLOGY | Age: 69
End: 2019-03-28
Payer: MEDICARE

## 2019-03-28 DIAGNOSIS — J44.9 CHRONIC OBSTRUCTIVE PULMONARY DISEASE, UNSPECIFIED COPD TYPE (HCC): ICD-10-CM

## 2019-03-28 PROCEDURE — G0424 PULMONARY REHAB W EXER: HCPCS

## 2019-04-02 ENCOUNTER — CLINICAL SUPPORT (OUTPATIENT)
Dept: PULMONOLOGY | Age: 69
End: 2019-04-02
Payer: MEDICARE

## 2019-04-02 DIAGNOSIS — J44.9 CHRONIC OBSTRUCTIVE PULMONARY DISEASE, UNSPECIFIED COPD TYPE (HCC): ICD-10-CM

## 2019-04-02 PROCEDURE — G0424 PULMONARY REHAB W EXER: HCPCS

## 2019-04-04 ENCOUNTER — CLINICAL SUPPORT (OUTPATIENT)
Dept: PULMONOLOGY | Age: 69
End: 2019-04-04
Payer: MEDICARE

## 2019-04-04 DIAGNOSIS — J44.9 CHRONIC OBSTRUCTIVE PULMONARY DISEASE, UNSPECIFIED COPD TYPE (HCC): ICD-10-CM

## 2019-04-04 PROCEDURE — G0424 PULMONARY REHAB W EXER: HCPCS

## 2019-04-09 ENCOUNTER — APPOINTMENT (OUTPATIENT)
Dept: PULMONOLOGY | Age: 69
End: 2019-04-09
Payer: MEDICARE

## 2019-04-09 ENCOUNTER — OFFICE VISIT (OUTPATIENT)
Dept: PULMONOLOGY | Facility: CLINIC | Age: 69
End: 2019-04-09
Payer: MEDICARE

## 2019-04-09 VITALS
HEART RATE: 85 BPM | BODY MASS INDEX: 25.13 KG/M2 | HEIGHT: 68 IN | WEIGHT: 165.8 LBS | DIASTOLIC BLOOD PRESSURE: 80 MMHG | OXYGEN SATURATION: 94 % | TEMPERATURE: 97.1 F | SYSTOLIC BLOOD PRESSURE: 126 MMHG

## 2019-04-09 DIAGNOSIS — J44.9 CHRONIC OBSTRUCTIVE PULMONARY DISEASE, UNSPECIFIED COPD TYPE (HCC): Primary | ICD-10-CM

## 2019-04-09 DIAGNOSIS — R91.1 LUNG NODULE: ICD-10-CM

## 2019-04-09 DIAGNOSIS — J43.9 PULMONARY EMPHYSEMA, UNSPECIFIED EMPHYSEMA TYPE (HCC): ICD-10-CM

## 2019-04-09 PROCEDURE — 99213 OFFICE O/P EST LOW 20 MIN: CPT | Performed by: INTERNAL MEDICINE

## 2019-04-11 ENCOUNTER — CLINICAL SUPPORT (OUTPATIENT)
Dept: PULMONOLOGY | Age: 69
End: 2019-04-11
Payer: MEDICARE

## 2019-04-11 DIAGNOSIS — J44.9 CHRONIC OBSTRUCTIVE PULMONARY DISEASE, UNSPECIFIED COPD TYPE (HCC): ICD-10-CM

## 2019-04-11 PROCEDURE — G0424 PULMONARY REHAB W EXER: HCPCS

## 2019-04-16 ENCOUNTER — CLINICAL SUPPORT (OUTPATIENT)
Dept: PULMONOLOGY | Age: 69
End: 2019-04-16
Payer: MEDICARE

## 2019-04-16 DIAGNOSIS — J44.9 CHRONIC OBSTRUCTIVE PULMONARY DISEASE, UNSPECIFIED COPD TYPE (HCC): ICD-10-CM

## 2019-04-16 PROCEDURE — G0424 PULMONARY REHAB W EXER: HCPCS

## 2019-04-18 ENCOUNTER — APPOINTMENT (OUTPATIENT)
Dept: PULMONOLOGY | Age: 69
End: 2019-04-18
Payer: MEDICARE

## 2019-04-23 ENCOUNTER — CLINICAL SUPPORT (OUTPATIENT)
Dept: PULMONOLOGY | Age: 69
End: 2019-04-23
Payer: MEDICARE

## 2019-04-23 DIAGNOSIS — J44.9 CHRONIC OBSTRUCTIVE PULMONARY DISEASE, UNSPECIFIED COPD TYPE (HCC): ICD-10-CM

## 2019-04-23 PROCEDURE — G0424 PULMONARY REHAB W EXER: HCPCS

## 2019-04-25 ENCOUNTER — CLINICAL SUPPORT (OUTPATIENT)
Dept: PULMONOLOGY | Age: 69
End: 2019-04-25
Payer: MEDICARE

## 2019-04-25 DIAGNOSIS — J44.9 CHRONIC OBSTRUCTIVE PULMONARY DISEASE, UNSPECIFIED COPD TYPE (HCC): ICD-10-CM

## 2019-04-25 PROCEDURE — G0424 PULMONARY REHAB W EXER: HCPCS

## 2019-04-30 ENCOUNTER — APPOINTMENT (OUTPATIENT)
Dept: PULMONOLOGY | Age: 69
End: 2019-04-30
Payer: MEDICARE

## 2019-05-02 ENCOUNTER — CLINICAL SUPPORT (OUTPATIENT)
Dept: PULMONOLOGY | Age: 69
End: 2019-05-02
Payer: MEDICARE

## 2019-05-02 DIAGNOSIS — J44.9 CHRONIC OBSTRUCTIVE PULMONARY DISEASE, UNSPECIFIED COPD TYPE (HCC): ICD-10-CM

## 2019-05-02 DIAGNOSIS — Z91.030 ALLERGY TO BEE STING: Primary | ICD-10-CM

## 2019-05-02 PROCEDURE — G0424 PULMONARY REHAB W EXER: HCPCS

## 2019-05-02 RX ORDER — EPINEPHRINE 0.3 MG/.3ML
0.3 INJECTION SUBCUTANEOUS ONCE
Qty: 0.6 ML | Refills: 0 | Status: ON HOLD | OUTPATIENT
Start: 2019-05-02 | End: 2021-03-29

## 2019-05-07 ENCOUNTER — CLINICAL SUPPORT (OUTPATIENT)
Dept: PULMONOLOGY | Age: 69
End: 2019-05-07
Payer: MEDICARE

## 2019-05-07 DIAGNOSIS — J44.9 CHRONIC OBSTRUCTIVE PULMONARY DISEASE, UNSPECIFIED COPD TYPE (HCC): ICD-10-CM

## 2019-05-07 PROCEDURE — G0424 PULMONARY REHAB W EXER: HCPCS

## 2019-05-09 ENCOUNTER — CLINICAL SUPPORT (OUTPATIENT)
Dept: PULMONOLOGY | Age: 69
End: 2019-05-09
Payer: MEDICARE

## 2019-05-09 DIAGNOSIS — J44.9 CHRONIC OBSTRUCTIVE PULMONARY DISEASE, UNSPECIFIED COPD TYPE (HCC): ICD-10-CM

## 2019-05-09 PROCEDURE — G0424 PULMONARY REHAB W EXER: HCPCS

## 2019-05-14 ENCOUNTER — CLINICAL SUPPORT (OUTPATIENT)
Dept: PULMONOLOGY | Age: 69
End: 2019-05-14
Payer: MEDICARE

## 2019-05-14 ENCOUNTER — OFFICE VISIT (OUTPATIENT)
Dept: PULMONOLOGY | Facility: CLINIC | Age: 69
End: 2019-05-14

## 2019-05-14 VITALS — HEIGHT: 68 IN | WEIGHT: 160 LBS | BODY MASS INDEX: 24.25 KG/M2

## 2019-05-14 DIAGNOSIS — J44.9 CHRONIC OBSTRUCTIVE PULMONARY DISEASE, UNSPECIFIED COPD TYPE (HCC): ICD-10-CM

## 2019-05-14 DIAGNOSIS — J44.9 COPD (CHRONIC OBSTRUCTIVE PULMONARY DISEASE) (HCC): Primary | ICD-10-CM

## 2019-05-14 PROCEDURE — G0424 PULMONARY REHAB W EXER: HCPCS

## 2019-05-14 PROCEDURE — RECHECK: Performed by: INTERNAL MEDICINE

## 2019-06-21 ENCOUNTER — CLINICAL SUPPORT (OUTPATIENT)
Dept: INTERNAL MEDICINE CLINIC | Facility: CLINIC | Age: 69
End: 2019-06-21
Payer: MEDICARE

## 2019-06-21 DIAGNOSIS — R73.01 IMPAIRED FASTING GLUCOSE: Primary | ICD-10-CM

## 2019-06-21 DIAGNOSIS — I72.3 ANEURYSM OF RIGHT ILIAC ARTERY (HCC): ICD-10-CM

## 2019-06-21 DIAGNOSIS — E78.2 MIXED HYPERLIPIDEMIA: ICD-10-CM

## 2019-06-21 DIAGNOSIS — J44.9 CHRONIC OBSTRUCTIVE PULMONARY DISEASE, UNSPECIFIED COPD TYPE (HCC): ICD-10-CM

## 2019-06-21 LAB
ALBUMIN SERPL BCP-MCNC: 4.2 G/DL (ref 3.5–5)
ALP SERPL-CCNC: 92 U/L (ref 46–116)
ALT SERPL W P-5'-P-CCNC: 27 U/L (ref 12–78)
ANION GAP SERPL CALCULATED.3IONS-SCNC: 6 MMOL/L (ref 4–13)
AST SERPL W P-5'-P-CCNC: 19 U/L (ref 5–45)
BASOPHILS # BLD AUTO: 0.06 THOUSANDS/ΜL (ref 0–0.1)
BASOPHILS NFR BLD AUTO: 1 % (ref 0–1)
BILIRUB SERPL-MCNC: 0.43 MG/DL (ref 0.2–1)
BUN SERPL-MCNC: 13 MG/DL (ref 5–25)
CALCIUM SERPL-MCNC: 8.9 MG/DL (ref 8.3–10.1)
CHLORIDE SERPL-SCNC: 109 MMOL/L (ref 100–108)
CHOLEST SERPL-MCNC: 167 MG/DL (ref 50–200)
CO2 SERPL-SCNC: 30 MMOL/L (ref 21–32)
CREAT SERPL-MCNC: 1.12 MG/DL (ref 0.6–1.3)
EOSINOPHIL # BLD AUTO: 0.24 THOUSAND/ΜL (ref 0–0.61)
EOSINOPHIL NFR BLD AUTO: 3 % (ref 0–6)
ERYTHROCYTE [DISTWIDTH] IN BLOOD BY AUTOMATED COUNT: 13.1 % (ref 11.6–15.1)
EST. AVERAGE GLUCOSE BLD GHB EST-MCNC: 123 MG/DL
GFR SERPL CREATININE-BSD FRML MDRD: 67 ML/MIN/1.73SQ M
GLUCOSE P FAST SERPL-MCNC: 90 MG/DL (ref 65–99)
HBA1C MFR BLD: 5.9 % (ref 4.2–6.3)
HCT VFR BLD AUTO: 48.1 % (ref 36.5–49.3)
HDLC SERPL-MCNC: 52 MG/DL (ref 40–60)
HGB BLD-MCNC: 14.9 G/DL (ref 12–17)
IMM GRANULOCYTES # BLD AUTO: 0.04 THOUSAND/UL (ref 0–0.2)
IMM GRANULOCYTES NFR BLD AUTO: 1 % (ref 0–2)
LDLC SERPL CALC-MCNC: 98 MG/DL (ref 0–100)
LYMPHOCYTES # BLD AUTO: 1.8 THOUSANDS/ΜL (ref 0.6–4.47)
LYMPHOCYTES NFR BLD AUTO: 22 % (ref 14–44)
MCH RBC QN AUTO: 28.8 PG (ref 26.8–34.3)
MCHC RBC AUTO-ENTMCNC: 31 G/DL (ref 31.4–37.4)
MCV RBC AUTO: 93 FL (ref 82–98)
MONOCYTES # BLD AUTO: 0.58 THOUSAND/ΜL (ref 0.17–1.22)
MONOCYTES NFR BLD AUTO: 7 % (ref 4–12)
NEUTROPHILS # BLD AUTO: 5.32 THOUSANDS/ΜL (ref 1.85–7.62)
NEUTS SEG NFR BLD AUTO: 66 % (ref 43–75)
NRBC BLD AUTO-RTO: 0 /100 WBCS
PLATELET # BLD AUTO: 313 THOUSANDS/UL (ref 149–390)
PMV BLD AUTO: 10.6 FL (ref 8.9–12.7)
POTASSIUM SERPL-SCNC: 4.7 MMOL/L (ref 3.5–5.3)
PROT SERPL-MCNC: 6.6 G/DL (ref 6.4–8.2)
RBC # BLD AUTO: 5.17 MILLION/UL (ref 3.88–5.62)
SODIUM SERPL-SCNC: 145 MMOL/L (ref 136–145)
TRIGL SERPL-MCNC: 87 MG/DL
WBC # BLD AUTO: 8.04 THOUSAND/UL (ref 4.31–10.16)

## 2019-06-21 PROCEDURE — 36415 COLL VENOUS BLD VENIPUNCTURE: CPT

## 2019-06-21 PROCEDURE — 80053 COMPREHEN METABOLIC PANEL: CPT | Performed by: PHYSICIAN ASSISTANT

## 2019-06-21 PROCEDURE — 85025 COMPLETE CBC W/AUTO DIFF WBC: CPT | Performed by: PHYSICIAN ASSISTANT

## 2019-06-21 PROCEDURE — 83036 HEMOGLOBIN GLYCOSYLATED A1C: CPT | Performed by: PHYSICIAN ASSISTANT

## 2019-06-21 PROCEDURE — 80061 LIPID PANEL: CPT | Performed by: PHYSICIAN ASSISTANT

## 2019-06-28 ENCOUNTER — OFFICE VISIT (OUTPATIENT)
Dept: INTERNAL MEDICINE CLINIC | Facility: CLINIC | Age: 69
End: 2019-06-28
Payer: MEDICARE

## 2019-06-28 VITALS
WEIGHT: 159 LBS | SYSTOLIC BLOOD PRESSURE: 136 MMHG | HEART RATE: 88 BPM | BODY MASS INDEX: 24.1 KG/M2 | DIASTOLIC BLOOD PRESSURE: 86 MMHG | TEMPERATURE: 97.6 F | HEIGHT: 68 IN | OXYGEN SATURATION: 96 %

## 2019-06-28 DIAGNOSIS — E78.2 MIXED HYPERLIPIDEMIA: ICD-10-CM

## 2019-06-28 DIAGNOSIS — J43.9 PULMONARY EMPHYSEMA, UNSPECIFIED EMPHYSEMA TYPE (HCC): ICD-10-CM

## 2019-06-28 DIAGNOSIS — R03.0 BLOOD PRESSURE ELEVATED WITHOUT HISTORY OF HTN: Primary | ICD-10-CM

## 2019-06-28 DIAGNOSIS — R73.01 IMPAIRED FASTING GLUCOSE: ICD-10-CM

## 2019-06-28 DIAGNOSIS — I72.3 ANEURYSM OF RIGHT ILIAC ARTERY (HCC): ICD-10-CM

## 2019-06-28 DIAGNOSIS — J44.9 CHRONIC OBSTRUCTIVE PULMONARY DISEASE, UNSPECIFIED COPD TYPE (HCC): ICD-10-CM

## 2019-06-28 DIAGNOSIS — J30.2 SEASONAL ALLERGIES: ICD-10-CM

## 2019-06-28 PROCEDURE — 99214 OFFICE O/P EST MOD 30 MIN: CPT | Performed by: PHYSICIAN ASSISTANT

## 2019-06-28 RX ORDER — ALBUTEROL SULFATE 90 UG/1
2 AEROSOL, METERED RESPIRATORY (INHALATION) EVERY 6 HOURS PRN
Qty: 3 INHALER | Refills: 5 | Status: SHIPPED | OUTPATIENT
Start: 2019-06-28 | End: 2019-10-07 | Stop reason: SDUPTHER

## 2019-08-07 ENCOUNTER — HOSPITAL ENCOUNTER (OUTPATIENT)
Dept: RADIOLOGY | Age: 69
Discharge: HOME | End: 2019-08-07
Payer: COMMERCIAL

## 2019-08-07 DIAGNOSIS — J43.9 PULMONARY EMPHYSEMA, UNSPECIFIED EMPHYSEMA TYPE (HCC): ICD-10-CM

## 2019-08-07 PROCEDURE — G0297 LDCT FOR LUNG CA SCREEN: HCPCS

## 2019-08-08 ENCOUNTER — TELEPHONE (OUTPATIENT)
Dept: INTERNAL MEDICINE CLINIC | Facility: CLINIC | Age: 69
End: 2019-08-08

## 2019-08-09 ENCOUNTER — OFFICE VISIT (OUTPATIENT)
Dept: INTERNAL MEDICINE CLINIC | Facility: CLINIC | Age: 69
End: 2019-08-09
Payer: MEDICARE

## 2019-08-09 VITALS
WEIGHT: 162.4 LBS | DIASTOLIC BLOOD PRESSURE: 90 MMHG | TEMPERATURE: 97.8 F | HEIGHT: 68 IN | SYSTOLIC BLOOD PRESSURE: 146 MMHG | OXYGEN SATURATION: 96 % | HEART RATE: 82 BPM | BODY MASS INDEX: 24.61 KG/M2

## 2019-08-09 DIAGNOSIS — N13.8 BPH WITH OBSTRUCTION/LOWER URINARY TRACT SYMPTOMS: ICD-10-CM

## 2019-08-09 DIAGNOSIS — J44.9 CHRONIC OBSTRUCTIVE PULMONARY DISEASE, UNSPECIFIED COPD TYPE (HCC): ICD-10-CM

## 2019-08-09 DIAGNOSIS — I10 HYPERTENSION, ESSENTIAL: Primary | ICD-10-CM

## 2019-08-09 DIAGNOSIS — R73.01 IMPAIRED FASTING GLUCOSE: ICD-10-CM

## 2019-08-09 DIAGNOSIS — N40.1 BPH WITH OBSTRUCTION/LOWER URINARY TRACT SYMPTOMS: ICD-10-CM

## 2019-08-09 DIAGNOSIS — R03.0 BLOOD PRESSURE ELEVATED WITHOUT HISTORY OF HTN: ICD-10-CM

## 2019-08-09 DIAGNOSIS — E78.2 MIXED HYPERLIPIDEMIA: ICD-10-CM

## 2019-08-09 DIAGNOSIS — Z00.00 MEDICARE ANNUAL WELLNESS VISIT, SUBSEQUENT: ICD-10-CM

## 2019-08-09 PROCEDURE — G0439 PPPS, SUBSEQ VISIT: HCPCS | Performed by: PHYSICIAN ASSISTANT

## 2019-08-09 PROCEDURE — 99213 OFFICE O/P EST LOW 20 MIN: CPT | Performed by: PHYSICIAN ASSISTANT

## 2019-08-09 RX ORDER — HYDROCHLOROTHIAZIDE 12.5 MG/1
12.5 TABLET ORAL DAILY
Qty: 30 TABLET | Refills: 2 | Status: SHIPPED | OUTPATIENT
Start: 2019-08-09 | End: 2019-08-23 | Stop reason: SDUPTHER

## 2019-08-09 NOTE — PROGRESS NOTES
[facility] [place]  Standard Office Visit  Patient ID: Polly Palencia Held    : 1950  Age/Gender: 76 y o  male     DATE: 2019      Assessment/Plan:    Essential hypertension  Home blood pressure mood readings as well as serial blood pressures in the office have been beyond goal   Due to underlying comorbidities will start hydrochlorothiazide 12 5mg PO daily  Continue low salt diet and daily activity  Follow-up in 6 weeks to review BMP and review home blood pressure logs    Hyperlipidemia  Good control of cholesterol on statin  Continue simvastatin 20 mg daily  Impaired fasting glucose  A1c 5 9  Do is low carbohydrate meal of avoiding pastas breads rice is starchy vegetables and of course sweaks    COPD with emphysema  Well controlled at this time  Following with pulm  Has not needed rescue inhaler  Continue to avoid extremes of temperature  Recent CT scan of chest with pulmonology  Results pending  Patient has pulmonology follow-up scheduled to discuss this test results and COPD    BPH with obstruction/lower urinary tract symptoms  Following regularly with urology  Continue proscar and flomax       Diagnoses and all orders for this visit:    Hypertension, essential  -     hydrochlorothiazide (HYDRODIURIL) 12 5 mg tablet; Take 1 tablet (12 5 mg total) by mouth daily  -     Basic metabolic panel; Future    Impaired fasting glucose    Blood pressure elevated without history of HTN    Mixed hyperlipidemia    Chronic obstructive pulmonary disease, unspecified COPD type ([address] )    BPH with obstruction/lower urinary tract symptoms          Subjective:   Chief Complaint   Patient presents with    Medicare Wellness Visit     Sub AWV    Follow-up     1mo f/u - [facility] 2019, no refills         Arcelia Kyle is a 76 y o  male who presents to the office on 2019 for     For follow up  Competed pulm rehab  Just had recent labs    Had CT Chest done for pulm  Has urology follow-up scheduled  Has vascular follow up scheduled (Has follow up ultrasounds scheduled)  Has been doing well stopped smoking  Has not needed rescue inhalers  Weight stable  Continues to be active, does exercise and weight training  No complaints or concerns at this time  He does follow his blood pressure at home  Systolic number runs 935M to 140s  Diastolic runs upper 37B to low 90s consistently with home readings  Patient does have healthy diet  He remains in side during hot days due to his COPD  No recent flare up  Stopped smoking >3 years ago  Hyperlipidemia   This is a chronic problem  The current episode started more than 1 month ago  The problem is controlled  Recent lipid tests were reviewed and are normal  He has no history of chronic renal disease, diabetes or obesity  Pertinent negatives include no chest pain or myalgias  Shortness of breath: Breathing sx improved with pulm rehab  There are no compliance problems  Risk factors for coronary artery disease include hypertension, obesity and dyslipidemia  Hypertension   This is a new problem  The current episode started more than 1 year ago  The problem has been waxing and waning since onset  The problem is uncontrolled  Pertinent negatives include no blurred vision (has eye doctor apt scheduled ), chest pain, malaise/fatigue, palpitations or peripheral edema  Shortness of breath: Breathing sx improved with pulm rehab  There are no associated agents to hypertension  Past treatments include nothing  The current treatment provides mild improvement  There are no compliance problems  There is no history of chronic renal disease         The following portions of the patient's history were reviewed and updated as appropriate: allergies, current medications, past family history, past medical history, past social history, past surgical history and problem list     Review of Systems   Constitutional: Negative for fever and malaise/fatigue  Eyes: Negative for blurred vision (has eye doctor apt scheduled  )  Respiratory: Negative for cough and wheezing  Shortness of breath: Breathing sx improved with pulm rehab  Cardiovascular: Negative for chest pain and palpitations  Gastrointestinal: Negative for abdominal pain, diarrhea, nausea and vomiting  Genitourinary: Negative for dysuria and frequency  Musculoskeletal: Negative for myalgias  Neurological: Negative for dizziness and light-headedness           Patient Active Problem List   Diagnosis    Hyperlipidemia    COPD with emphysema    A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 (HCC)    Arterial ectasia (HCC)    Diverticulosis, sigmoid    Hearing loss    Hemorrhoids    Lung nodule    Impaired fasting glucose    Skin disorder    BPH with obstruction/lower urinary tract symptoms    Essential hypertension    Seasonal allergies       Past Medical History:   Diagnosis Date    BPH (benign prostatic hyperplasia)     COPD (chronic obstructive pulmonary disease) (HCC)     Last Assessed:11/14/2016    Diverticulosis     Elevated prostate specific antigen (PSA)     Emphysema (subcutaneous) (surgical) resulting from a procedure     Enlarged prostate with lower urinary tract symptoms (LUTS)     Resolved:8/22/2017    Hernia, inguinal, right        Past Surgical History:   Procedure Laterality Date    BICEPS TENDON REPAIR  1999    COLONOSCOPY      HAND SURGERY      Last Assessed:7/11/2016    HERNIA REPAIR      Last Assessed:7/11/2016    KNEE ARTHROSCOPY      VT REPAIR ING HERNIA,5+Y/O,REDUCIBL Right 2/23/2016    Procedure: REPAIR HERNIA INGUINAL with mesh;  Surgeon: Susan Harris DO;  Location: [facility];  Service: General    PROSTATE BIOPSY      WISDOM TOOTH EXTRACTION           Current Outpatient Medications:     albuterol (PROVENTIL HFA,VENTOLIN HFA) 90 mcg/act inhaler, Inhale 2 puffs every 6 (six) hours as needed for wheezing, Disp: 3 Inhaler, Rfl: 5   EPINEPHrine (EPIPEN) 0 3 mg/0 3 mL SOAJ, Inject 0 3 mL (0 3 mg total) into a muscle once for 1 dose, Disp: 0 6 mL, Rfl: 0    finasteride (PROSCAR) 5 mg tablet, Take 1 tablet (5 mg total) by mouth daily, Disp: 90 tablet, Rfl: 3    fluticasone-umeclidinium-vilanterol (TRELEGY ELLIPTA) 100-62 5-25 MCG/INH inhaler, Inhale 1 puff daily Rinse mouth after use , Disp: 3 Inhaler, Rfl: 5    guaiFENesin (MUCINEX) 600 mg 12 hr tablet, Take 1,200 mg by mouth 2 (two) times a day as needed, Disp: , Rfl:     ipratropium-albuterol (DUO-NEB) 0 5-2 5 mg/3 mL nebulizer solution, Take 1 vial (3 mL total) by nebulization every 6 (six) hours, Disp: 1 mL, Rfl: 0    loratadine (CLARITIN REDITABS) 10 MG dissolvable tablet, Take 10 mg by mouth daily as needed for allergies  , Disp: , Rfl:     Multiple Vitamins-Minerals (CENTRUM ADULTS PO), Take 1 tablet by mouth daily  , Disp: , Rfl:     simvastatin (ZOCOR) 20 mg tablet, Take 1 tablet (20 mg total) by mouth daily at bedtime, Disp: 90 tablet, Rfl: 3    tamsulosin (FLOMAX) 0 4 mg, Take 1 capsule (0 4 mg total) by mouth daily with dinner, Disp: 90 capsule, Rfl: 3    hydrochlorothiazide (HYDRODIURIL) 12 5 mg tablet, Take 1 tablet (12 5 mg total) by mouth daily, Disp: 30 tablet, Rfl: 2    Allergies   Allergen Reactions    Bee Venom Facial Swelling    Other Rash     Annotation - [id number]: mushrooms    Penicillins Rash       Social History     Socioeconomic History    Marital status: /Civil Union     Spouse name: None    Number of children: None    Years of education: None    Highest education level: None   Occupational History    None   Social Needs    Financial resource strain: None    Food insecurity:     Worry: None     Inability: None    Transportation needs:     Medical: None     Non-medical: None   Tobacco Use    Smoking status: Former Smoker     Packs/day: 1 50     Years: 49 00     Pack years: 73 50     Types: Cigarettes     Last attempt to quit: 11/11/2015 Years since quitting: 3 7    Smokeless tobacco: Never Used   Substance and Sexual Activity    Alcohol use: Yes     Comment: rare    Drug use: No    Sexual activity: None   Lifestyle    Physical activity:     Days per week: None     Minutes per session: None    Stress: None   Relationships    Social connections:     Talks on phone: None     Gets together: None     Attends [employer] service: None     Active member of club or organization: None     Attends meetings of clubs or organizations: None     Relationship status: None    Intimate partner violence:     Fear of current or ex partner: None     Emotionally abused: None     Physically abused: None     Forced sexual activity: None   Other Topics Concern    None   Social History Narrative    Advance directive on file       Family History   Problem Relation Age of Onset    Hypertension Mother        PHQ-9 Depression Screening    PHQ-9:    Frequency of the following problems over the past two weeks:              Health Maintenance   Topic Date Due   [employer] Annual Wellness Visit (AWV)  1950    SLP PLAN OF CARE  1950    INFLUENZA VACCINE  09/08/2019 (Originally 7/1/2019)    Fall Risk  02/22/2020    Depression Screening PHQ  05/09/2020    BMI: Adult  06/28/2020    CRC Screening: Colonoscopy  07/25/2026    DTaP,Tdap,and Td Vaccines (2 - Td) 06/01/2028    Hepatitis C Screening  Completed    Pneumococcal Vaccine: 65+ Years  Completed    Pneumococcal Vaccine: Pediatrics (0 to 5 Years) and At-Risk Patients (6 to 59 Years)  Aged Out    HEPATITIS B VACCINES  Aged [employer] History   Administered Date(s) Administered    INFLUENZA 11/06/2006, 09/28/2007, 11/01/2016, 10/10/2017    Influenza Split High Dose Preservative Free IM 11/01/2016, 10/10/2017    Influenza, high dose seasonal 0 5 mL 10/19/2018    Pneumococcal Conjugate 13-Valent 07/11/2016, 06/12/2019    Pneumococcal Polysaccharide PPV23 10/10/2017    Zoster Vaccine Recombinant 03/28/2019, 06/12/2019        Objective:  Vitals:    08/09/19 1115 08/09/19 1151   BP: 144/82 146/90   BP Location: Left arm Left arm   Patient Position: Sitting Sitting   Cuff Size: Adult Standard   Pulse: 82    Temp: 97 8 °F (36 6 °C)    TempSrc: Oral    SpO2: 96%    Weight: 73 7 kg (162 lb 6 4 oz)    Height: 5' 8" (1 727 m)      Wt Readings from Last 3 Encounters:   08/09/19 73 7 kg (162 lb 6 4 oz)   06/28/19 72 1 kg (159 lb)   05/14/19 72 6 kg (160 lb)     Body mass index is 24 69 kg/m²  No exam data present       Physical Exam   Constitutional: He is oriented to person, place, and time  He appears well-developed and well-nourished  No distress  Alert pleasant cooperative  Seated in room in no acute distress   HENT:   Head: Normocephalic and atraumatic  Mouth/Throat: Oropharynx is clear and moist  No oropharyngeal exudate  Moist mucous membranes  Normal posterior pharynx   Eyes: Pupils are equal, round, and reactive to light  Right eye exhibits no discharge  Left eye exhibits no discharge  No scleral icterus  Neck: Neck supple  Cardiovascular: Normal rate, regular rhythm and normal heart sounds  No murmur heard  Regular rate and rhythm   Pulmonary/Chest: Effort normal and breath sounds normal  No respiratory distress  He has no wheezes  He has no rales  Decreased breath sounds however symmetric  No rhonchi  No wheeze   Abdominal: Soft  Bowel sounds are normal  He exhibits no distension  There is no tenderness  There is no guarding  Positive bowel sounds  Soft nontender nondistended   Musculoskeletal: He exhibits no edema  Neurological: He is alert and oriented to person, place, and time  Skin: Skin is warm and dry  He is not diaphoretic  Psychiatric: He has a normal mood and affect  His behavior is normal  Thought content normal    Nursing note and vitals reviewed            Future Appointments   Date Time Provider Isacc Capellan   8/30/2019  9:15 AM Joyce Perez MD [facility]   9/6/2019 10:30 AM Gerson Salgado PA-C [facility]   10/7/2019  9:00 AM [facility] Casillas [facility]       Gerson Salgado PA-C  [address] CARE [address] [place] [address]    Patient Care Team:  Gerson Salgado PA-C as PCP - General (Internal Medicine)  MD Marj Crow MD Rogerio Cooter, MD (Urology)

## 2019-08-09 NOTE — PATIENT INSTRUCTIONS
Obesity   AMBULATORY CARE:   Obesity  is when your body mass index (BMI) is greater than 30  Your healthcare provider will use your height and weight to measure your BMI  The risks of obesity include  many health problems, such as injuries or physical disability  You may need tests to check for the following:  · Diabetes     · High blood pressure or high cholesterol     · Heart disease     · Gallbladder or liver disease     · Cancer of the colon, breast, prostate, liver, or kidney     · Sleep apnea     · Arthritis or gout  Seek care immediately if:   · You have a severe headache, confusion, or difficulty speaking  · You have weakness on one side of your body  · You have chest pain, sweating, or shortness of breath  Contact your healthcare provider if:   · You have symptoms of gallbladder or liver disease, such as pain in your upper abdomen  · You have knee or hip pain and discomfort while walking  · You have symptoms of diabetes, such as intense hunger and thirst, and frequent urination  · You have symptoms of sleep apnea, such as snoring or daytime sleepiness  · You have questions or concerns about your condition or care  Treatment for obesity  focuses on helping you lose weight to improve your health  Even a small decrease in BMI can reduce the risk for many health problems  Your healthcare provider will help you set a weight-loss goal   · Lifestyle changes  are the first step in treating obesity  These include making healthy food choices and getting regular physical activity  Your healthcare provider may suggest a weight-loss program that involves coaching, education, and therapy  · Medicine  may help you lose weight when it is used with a healthy diet and physical activity  · Surgery  can help you lose weight if you are very obese and have other health problems  There are several types of weight-loss surgery  Ask your healthcare provider for more information    Be successful losing weight:   · Set small, realistic goals  An example of a small goal is to walk for 20 minutes 5 days a week  Anther goal is to lose 5% of your body weight  · Tell friends, family members, and coworkers about your goals  and ask for their support  Ask a friend to lose weight with you, or join a weight-loss support group  · Identify foods or triggers that may cause you to overeat , and find ways to avoid them  Remove tempting high-calorie foods from your home and workplace  Place a bowl of fresh fruit on your kitchen counter  If stress causes you to eat, then find other ways to cope with stress  · Keep a diary to track what you eat and drink  Also write down how many minutes of physical activity you do each day  Weigh yourself once a week and record it in your diary  Eating changes: You will need to eat 500 to 1,000 fewer calories each day than you currently eat to lose 1 to 2 pounds a week  The following changes will help you cut calories:  · Eat smaller portions  Use small plates, no larger than 9 inches in diameter  Fill your plate half full of fruits and vegetables  Measure your food using measuring cups until you know what a serving size looks like  · Eat 3 meals and 1 or 2 snacks each day  Plan your meals in advance  [place]ce [employer] and eat at home most of the time  Eat slowly  · Eat fruits and vegetables at every meal   They are low in calories and high in fiber, which makes you feel full  Do not add butter, margarine, or cream sauce to vegetables  Use herbs to season steamed vegetables  · Eat less fat and fewer fried foods  Eat more baked or grilled chicken and fish  These protein sources are lower in calories and fat than red meat  Limit fast food  Dress your salads with olive oil and vinegar instead of bottled dressing  · Limit the amount of sugar you eat  Do not drink sugary beverages  Limit alcohol  Activity changes:  Physical activity is good for your body in many ways   It helps you burn calories and build strong muscles  It decreases stress and depression, and improves your mood  It can also help you sleep better  Talk to your healthcare provider before you begin an exercise program   · Exercise for at least 30 minutes 5 days a week  Start slowly  Set aside time each day for physical activity that you enjoy and that is convenient for you  It is best to do both weight training and an activity that increases your heart rate, such as walking, bicycling, or swimming  · Find ways to be more active  Do yard work and housecleaning  Walk up the stairs instead of using elevators  Spend your leisure time going to events that require walking, such as outdoor festivals or fairs  This extra physical activity can help you lose weight and keep it off  Follow up with your healthcare provider as directed: You may need to meet with a dietitian  Write down your questions so you remember to ask them during your visits  © 2017 [facility] Information is for End User's use only and may not be sold, redistributed or otherwise used for commercial purposes  All illustrations and images included in CareNotes® are the copyrighted property of [employer]  or [employer]  The above information is an  only  It is not intended as medical advice for individual conditions or treatments  Talk to your doctor, nurse or pharmacist before following any medical regimen to see if it is safe and effective for you  Urinary Incontinence   WHAT YOU NEED TO KNOW:   What is urinary incontinence? Urinary incontinence (UI) is when you lose control of your bladder  What causes UI? UI occurs because your bladder cannot store or empty urine properly  The following are the most common types of UI:  · Stress incontinence  is when you leak urine due to increased bladder pressure  This may happen when you cough, sneeze, or exercise       · Urge incontinence  is when you feel the need to urinate right away and leak urine accidentally  · Mixed incontinence  is when you have both stress and urge UI  What are the signs and symptoms of UI?   · You feel like your bladder does not empty completely when you urinate  · You urinate often and need to urinate immediately  · You leak urine when you sleep, or you wake up with the urge to urinate  · You leak urine when you cough, sneeze, exercise, or laugh  How is UI diagnosed? Your healthcare provider will ask how often you leak urine and whether you have stress or urge symptoms  Tell him which medicines you take, how often you urinate, and how much liquid you drink each day  You may need any of the following tests:  · Urine tests  may show infection or kidney function  · A pelvic exam  may be done to check for blockages  A pelvic exam will also show if your bladder, uterus, or other organs have moved out of place  · An x-ray, ultrasound, or CT  may show problems with parts of your urinary system  You may be given contrast liquid to help your organs show up better in the pictures  Tell the healthcare provider if you have ever had an allergic reaction to contrast liquid  Do not enter the MRI room with anything metal  Metal can cause serious injury  Tell the healthcare provider if you have any metal in or on your body  · A bladder scan  will show how much urine is left in your bladder after you urinate  You will be asked to urinate and then healthcare providers will use a small ultrasound machine to check the urine left in your bladder  · Cystometry  is used to check the function of your urinary system  Your healthcare provider checks the pressure in your bladder while filling it with fluid  Your bladder pressure may also be tested when your bladder is full and while you urinate  How is UI treated? · Medicines  can help strengthen your bladder control      · Electrical stimulation  is used to send a small amount of electrical energy to your pelvic floor muscles  This helps control your bladder function  Electrodes may be placed outside your body or in your rectum  For women, the electrodes may be placed in the vagina  · A bulking agent  may be injected into the wall of your urethra to make it thicker  This helps keep your urethra closed and decreases urine leakage  · Devices  such as a clamp, pessary, or tampon may help stop urine leaks  Ask your healthcare provider for more information about these and other devices  · Surgery  may be needed if other treatments do not work  Several types of surgery can help improve your bladder control  Ask your healthcare provider for more information about the surgery you may need  How can I manage my symptoms? · Do pelvic muscle exercises often  Your pelvic muscles help you stop urinating  Squeeze these muscles tight for 5 seconds, then relax for 5 seconds  Gradually work up to squeezing for 10 seconds  Do 3 sets of 15 repetitions a day, or as directed  This will help strengthen your pelvic muscles and improve bladder control  · A catheter  may be used to help empty your bladder  A catheter is a tiny, plastic tube that is put into your bladder to drain your urine  Your healthcare provider may tell you to use a catheter to prevent your bladder from getting too full and leaking urine  · Keep a UI record  Write down how often you leak urine and how much you leak  Make a note of what you were doing when you leaked urine  · Train your bladder  Go to the bathroom at set times, such as every 2 hours, even if you do not feel the urge to go  You can also try to hold your urine when you feel the urge to go  For example, hold your urine for 5 minutes when you feel the urge to go  As that becomes easier, hold your urine for 10 minutes  · Drink liquids as directed  Ask your healthcare provider how much liquid to drink each day and which liquids are best for you   You may need to limit the amount of liquid you drink to help control your urine leakage  Limit or do not have drinks that contain caffeine or alcohol  Do not drink any liquid right before you go to bed  · Prevent constipation  Eat a variety of high-fiber foods  Good examples are high-fiber cereals, beans, vegetables, and whole-grain breads  Prune juice may help make your bowel movement softer  Walking is the best way to trigger your intestines to have a bowel movement  · Exercise regularly and maintain a healthy weight  Ask your healthcare provider how much you should weigh and about the best exercise plan for you  Weight loss and exercise will decrease pressure on your bladder and help you control your leakage  Ask him to help you create a weight loss plan if you are overweight  When should I seek immediate care? · You have severe pain  · You are confused or cannot think clearly  When should I contact my healthcare provider? · You have a fever  · You see blood in your urine  · You have pain when you urinate  · You have new or worse pain, even after treatment  · Your mouth feels dry or you have vision changes  · Your urine is cloudy or smells bad  · You have questions or concerns about your condition or care  CARE AGREEMENT:   You have the right to help plan your care  Learn about your health condition and how it may be treated  Discuss treatment options with your caregivers to decide what care you want to receive  You always have the right to refuse treatment  The above information is an  only  It is not intended as medical advice for individual conditions or treatments  Talk to your doctor, nurse or pharmacist before following any medical regimen to see if it is safe and effective for you  © 2017 [address] [employer] Information is for End User's use only and may not be sold, redistributed or otherwise used for commercial purposes   All illustrations and images included in CareNotes® are the copyrighted property of [employer]  or Emerson Stuart  Cigarette Smoking and Your Health   AMBULATORY CARE:   Risks to your health if you smoke:  Nicotine and other chemicals found in tobacco damage every cell in your body  Even if you are a light smoker, you have an increased risk for cancer, heart disease, and lung disease  If you are pregnant or have diabetes, smoking increases your risk for complications  Benefits to your health if you stop smoking:   · You decrease respiratory symptoms such as coughing, wheezing, and shortness of breath  · You reduce your risk for cancers of the lung, mouth, throat, kidney, bladder, pancreas, stomach, and cervix  If you already have cancer, you increase the benefits of chemotherapy  You also reduce your risk for cancer returning or a second cancer from developing  · You reduce your risk for heart disease, blood clots, heart attack, and stroke  · You reduce your risk for lung infections, and diseases such as pneumonia, asthma, chronic bronchitis, and emphysema  · Your circulation improves  More oxygen can be delivered to your body  If you have diabetes, you lower your risk for complications, such as kidney, artery, and eye diseases  You also lower your risk for nerve damage  Nerve damage can lead to amputations, poor vision, and blindness  · You improve your body's ability to heal and to fight infections  Benefits to the health of others if you stop smoking:  Tobacco is harmful to nonsmokers who breathe in your secondhand smoke  The following are ways the health of others around you may improve when you stop smoking:  · You lower the risks for lung cancer and heart disease in nonsmoking adults  · If you are pregnant, you lower the risk for miscarriage, early delivery, low birth weight, and stillbirth  You also lower your baby's risk for SIDS, obesity, developmental delay, and neurobehavioral problems, such as ADHD  · If you have children, you lower their risk for ear infections, colds, pneumonia, bronchitis, and asthma  For more information and support to stop smoking:   · Smokefree  gov  Phone: 7- 771 - 915-9189  Web Address: www smokefrPicturk  Follow up with your healthcare provider as directed:  Write down your questions so you remember to ask them during your visits  © 2017 [employer] Information is for End User's use only and may not be sold, redistributed or otherwise used for commercial purposes  All illustrations and images included in CareNotes® are the copyrighted property of [employer]  or Emerson Stuart  The above information is an  only  It is not intended as medical advice for individual conditions or treatments  Talk to your doctor, nurse or pharmacist before following any medical regimen to see if it is safe and effective for you  Fall Prevention   WHAT YOU NEED TO KNOW:   What is fall prevention? Fall prevention includes ways to make your home and other areas safer  It also includes ways you can move more carefully to prevent a fall  What increases my risk for falls? · Lack of vitamin D    · Not getting enough sleep each night    · Trouble walking or keeping your balance, or foot problems    · Health conditions that cause changes in your blood pressure, vision, or muscle strength and coordination    · Medicines that make you dizzy, weak, or sleepy    · Problems seeing clearly    · Shoes that have high heels or are not supportive    · Tripping hazards, such as items left on the floor, no handrails on the stairs, or broken steps  How can I help protect myself from falls? · Stand or sit up slowly  This may help you keep your balance and prevent falls  If you need to get up during the night, sit up first  Be sure you are fully awake before you stand  Turn on the light before you start walking  Go slowly in case you are still sleepy   Make sure you will not trip over any pets sleeping in the bedroom  · Use assistive devices as directed  Your healthcare provider may suggest that you use a cane or walker to help you keep your balance  You may need to have grab bars put in your bathroom near the toilet or in the shower  · Wear shoes that fit well and have soles that   Wear shoes both inside and outside  Use slippers with good   Do not wear shoes with high heels  · Wear a personal alarm  This is a device that allows you to call 911 if you fall and need help  Ask your healthcare provider for more information  · Stay active  Exercise can help strengthen your muscles and improve your balance  Your healthcare provider may recommend water aerobics or walking  He or she may also recommend physical therapy to improve your coordination  Never start an exercise program without talking to your healthcare provider first      · Manage medical conditions  Keep all appointments with your healthcare providers  Visit your eye doctor as directed  How can I make my home safer? · Add items to prevent falls in the bathroom  Put nonslip strips on your bath or shower floor to prevent you from slipping  Use a bath mat if you do not have carpet in the bathroom  This will prevent you from falling when you step out of the bath or shower  Use a shower seat so you do not need to stand while you shower  Sit on the toilet or a chair in your bathroom to dry yourself and put on clothing  This will prevent you from losing your balance from drying or dressing yourself while you are standing  · Keep paths clear  Remove books, shoes, and other objects from walkways and stairs  Place cords for telephones and lamps out of the way so that you do not need to walk over them  Tape them down if you cannot move them  Remove small rugs  If you cannot remove a rug, secure it with double-sided tape  This will prevent you from tripping  · Install bright lights in your home  Use night lights to help light paths to the bathroom or kitchen  Always turn on the light before you start walking  · Keep items you use often on shelves within reach  Do not use a step stool to help you reach an item  · Paint or place reflective tape on the edges of your stairs  This will help you see the stairs better  Call 911 or have someone else call if:   · You have fallen and are unconscious  · You have fallen and cannot move part of your body  Contact your healthcare provider if:   · You have fallen and have pain or a headache  · You have questions or concerns about your condition or care  CARE AGREEMENT:   You have the right to help plan your care  Learn about your health condition and how it may be treated  Discuss treatment options with your caregivers to decide what care you want to receive  You always have the right to refuse treatment  The above information is an  only  It is not intended as medical advice for individual conditions or treatments  Talk to your doctor, nurse or pharmacist before following any medical regimen to see if it is safe and effective for you  © 2017 [facility] [facility] Information is for End User's use only and may not be sold, redistributed or otherwise used for commercial purposes  All illustrations and images included in CareNotes® are the copyrighted property of [employer]  or Emerson Stuart  Advance Directives   WHAT YOU NEED TO KNOW:   What are advance directives? Advance directives are legal documents that state your wishes and plans for medical care  These plans are made ahead of time in case you lose your ability to make decisions for yourself  Advance directives can apply to any medical decision, such as the treatments you want, and if you want to donate organs  What are the types of advance directives? There are many types of advance directives, and each state has rules about how to use them   You may choose a combination of any of the following:  · Living will: This is a written record of the treatment you want  You can also choose which treatments you do not want, which to limit, and which to stop at a certain time  This includes surgery, medicine, IV fluid, and tube feedings  · Durable power of  for healthcare [facility]): This is a written record that states who you want to make healthcare choices for you when you are unable to make them for yourself  This person, called a proxy, is usually a family member or a friend  You may choose more than 1 proxy  · Do not resuscitate (DNR) order:  A DNR order is used in case your heart stops beating or you stop breathing  It is a request not to have certain forms of treatment, such as CPR  A DNR order may be included in other types of advance directives  · Medical directive: This covers the care that you want if you are in a coma, near death, or unable to make decisions for yourself  You can list the treatments you want for each condition  Treatment may include pain medicine, surgery, blood transfusions, dialysis, IV or tube feedings, and a ventilator (breathing machine)  · Values history: This document has questions about your views, beliefs, and how you feel and think about life  This information can help others choose the care that you would choose  Why are advance directives important? An advance directive helps you control your care  Although spoken wishes may be used, it is better to have your wishes written down  Spoken wishes can be misunderstood, or not followed  Treatments may be given even if you do not want them  An advance directive may make it easier for your family to make difficult choices about your care  How do I decide what to put in my advance directives? · Make informed decisions:  Make sure you fully understand treatments or care you may receive   Think about the benefits and problems your decisions could cause for you or your family  Talk to healthcare providers if you have concerns or questions before you write down your wishes  You may also want to talk with your Sabian[employer] or , or a   Check your state laws to make sure that what you put in your advance directive is legal      · Sign all forms:  Sign and date your advance directive when you have finished  You may also need 2 witnesses to sign the forms  Witnesses cannot be your doctor or his staff, your spouse, heirs or beneficiaries, people you owe money to, or your chosen proxy  Talk to your family, proxy, and healthcare providers about your advance directive  Give each person a copy, and keep one for yourself in a place you can get to easily  Do not keep it hidden or locked away  · Review and revise your plans: You can revise your advance directive at any time, as long as you are able to make decisions  Review your plan every year, and when there are changes in your life, or your health  When you make changes, let your family, proxy, and healthcare providers know  Give each a new copy  Where can I find more information? · [employer]  [address] , [address]  Phone: 7- 119 - [phone]  Phone: 4- 182 - 022-2686  Web Address: http://www  aafp org  · [address] [facility])  [address] [facility] [place], [address]  Phone: 7- 325 - [phone]  Phone: [phone]  Web Address: Justa huff  [facility] AGREEMENT:   You have the right to help plan your care  To help with this plan, you must learn about your health condition and treatment options  You must also learn about advance directives and how they are used  Work with your healthcare providers to decide what care will be used to treat you  You always have the right to refuse treatment  The above information is an  only   It is not intended as medical advice for individual conditions or treatments  Talk to your doctor, nurse or pharmacist before following any medical regimen to see if it is safe and effective for you  © 2017 [address] [employer]  Information is for End User's use only and may not be sold, redistributed or otherwise used for commercial purposes  All illustrations and images included in CareNotes® are the copyrighted property of [employer] M [employer]  or Emerson Stuart  Essential hypertension  Start hydrochlorothiazide 12 5mg PO daily  Continue low salt diet and daily activity  Hyperlipidemia  Good control of cholesterol on statin  Continue simvastatin 20 mg daily  Impaired fasting glucose  A1c 5 9  Do is low carbohydrate meal of avoiding pastas breads rice is starchy vegetables and of course sweaks    COPD with emphysema  Well controlled at this time  Following with pulm  Has not needed rescue inhaler  Continue to avoid extremes of temperature  BPH with obstruction/lower urinary tract symptoms  Following regularly with urology    Continue proscar and flomax

## 2019-08-09 NOTE — ASSESSMENT & PLAN NOTE
Well controlled at this time  Following with pulm  Has not needed rescue inhaler  Continue to avoid extremes of temperature  Recent CT scan of chest with pulmonology  Results pending    Patient has pulmonology follow-up scheduled to discuss this test results and COPD

## 2019-08-09 NOTE — ASSESSMENT & PLAN NOTE
A1c 5 9    Do is low carbohydrate meal of avoiding pastas breads rice is starchy vegetables and of course sweaks

## 2019-08-09 NOTE — PROGRESS NOTES
Assessment and Plan:     Problem List Items Addressed This Visit        Endocrine    Impaired fasting glucose     A1c 5 9  Do is low carbohydrate meal of avoiding pastas breads rice is starchy vegetables and of course sweaks            Respiratory    COPD with emphysema     Well controlled at this time  Following with pulm  Has not needed rescue inhaler  Continue to avoid extremes of temperature  Recent CT scan of chest with pulmonology  Results pending  Patient has pulmonology follow-up scheduled to discuss this test results and COPD            Cardiovascular and Mediastinum    Essential hypertension     Home blood pressure mood readings as well as serial blood pressures in the office have been beyond goal   Due to underlying comorbidities will start hydrochlorothiazide 12 5mg PO daily  Continue low salt diet and daily activity  Follow-up in 6 weeks to review BMP and review home blood pressure logs         Relevant Medications    hydrochlorothiazide (HYDRODIURIL) 12 5 mg tablet       Genitourinary    BPH with obstruction/lower urinary tract symptoms     Following regularly with urology  Continue proscar and flomax            Other    Hyperlipidemia     Good control of cholesterol on statin  Continue simvastatin 20 mg daily  Medicare annual wellness visit, subsequent     Medicare wellness questionnaire completed  See same day note for additional details  Other Visit Diagnoses     Hypertension, essential    -  Primary    Relevant Medications    hydrochlorothiazide (HYDRODIURIL) 12 5 mg tablet    Other Relevant Orders    Basic metabolic panel         History of Present Illness:     Patient presents for Medicare Annual Wellness visit  Patient also seen for same-day note to review lab studies from earlier this year as well as address chronic conditions    See same-day note for additional details other than Medicare screening questionnaire    Patient Care Team:  Nicole Scheuermann, PA-C as PCP - General (Internal Medicine)  MD Alyssa Retana MD Parnell Lurie, MD (Urology)     Problem List:     Patient Active Problem List   Diagnosis    Hyperlipidemia    COPD with emphysema    [facility]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 [facility]    Arterial ectasia ([facility] )    Diverticulosis, sigmoid    Hearing loss    Hemorrhoids    Lung nodule    Impaired fasting glucose    Skin disorder    BPH with obstruction/lower urinary tract symptoms    Essential hypertension    Seasonal allergies    Medicare annual wellness visit, subsequent      Past Medical and Surgical History:     Past Medical History:   Diagnosis Date    BPH (benign prostatic hyperplasia)     [facility]     Last Assessed:11/14/2016    Diverticulosis     Elevated prostate specific antigen (PSA)     Emphysema (subcutaneous) (surgical) resulting from a procedure     Enlarged prostate with lower urinary tract symptoms (LUTS)     Resolved:8/22/2017    Hernia, inguinal, right      Past Surgical History:   Procedure Laterality Date    BICEPS TENDON REPAIR  1999    COLONOSCOPY      HAND SURGERY      Last Assessed:7/11/2016    HERNIA REPAIR      Last Assessed:7/11/2016    KNEE ARTHROSCOPY      VA REPAIR ING HERNIA,5+Y/O,REDUCIBL Right 2/23/2016    Procedure: REPAIR HERNIA INGUINAL with mesh;  Surgeon: Jong Shaw DO;  Location: [facility];  Service: General    PROSTATE BIOPSY      WISDOM TOOTH EXTRACTION        Family History:     Family History   Problem Relation Age of Onset    Hypertension Mother       Social History:     Social History     Tobacco Use   Smoking Status Former Smoker    Packs/day: 1 50    Years: 49 00    Pack years: 73 50    Types: Cigarettes    Last attempt to quit: 11/11/2015    Years since quitting: 3 7   Smokeless Tobacco Never Used     Social History     Substance and Sexual Activity   Alcohol Use Yes    Comment: rare     Social History Substance and Sexual Activity   Drug Use No      Medications and Allergies:     Current Outpatient Medications   Medication Sig Dispense Refill    albuterol (PROVENTIL HFA,VENTOLIN HFA) 90 mcg/act inhaler Inhale 2 puffs every 6 (six) hours as needed for wheezing 3 Inhaler 5    EPINEPHrine (EPIPEN) 0 3 mg/0 3 mL SOAJ Inject 0 3 mL (0 3 mg total) into a muscle once for 1 dose 0 6 mL 0    finasteride (PROSCAR) 5 mg tablet Take 1 tablet (5 mg total) by mouth daily 90 tablet 3    fluticasone-umeclidinium-vilanterol (TRELEGY ELLIPTA) 100-62 5-25 MCG/INH inhaler Inhale 1 puff daily Rinse mouth after use  3 Inhaler 5    guaiFENesin (MUCINEX) 600 mg 12 hr tablet Take 1,200 mg by mouth 2 (two) times a day as needed      ipratropium-albuterol (DUO-NEB) 0 5-2 5 mg/3 mL nebulizer solution Take 1 vial (3 mL total) by nebulization every 6 (six) hours 1 mL 0    loratadine (CLARITIN REDITABS) 10 MG dissolvable tablet Take 10 mg by mouth daily as needed for allergies   Multiple Vitamins-Minerals (CENTRUM ADULTS PO) Take 1 tablet by mouth daily   simvastatin (ZOCOR) 20 mg tablet Take 1 tablet (20 mg total) by mouth daily at bedtime 90 tablet 3    tamsulosin (FLOMAX) 0 4 mg Take 1 capsule (0 4 mg total) by mouth daily with dinner 90 capsule 3    hydrochlorothiazide (HYDRODIURIL) 12 5 mg tablet Take 1 tablet (12 5 mg total) by mouth daily 30 tablet 2     No current facility-administered medications for this visit        Allergies   Allergen Reactions    Bee Venom Facial Swelling    Other Rash     [facility] - [id number]: mushrooms    Penicillins Rash      Immunizations:     Immunization History   Administered Date(s) Administered    INFLUENZA 11/06/2006, 09/28/2007, 11/01/2016, 10/10/2017    Influenza Split High Dose Preservative Free IM 11/01/2016, 10/10/2017    Influenza, high dose seasonal 0 5 mL 10/19/2018    Pneumococcal Conjugate 13-Valent 07/11/2016, 06/12/2019    Pneumococcal Polysaccharide PPV23 10/10/2017    Zoster Vaccine Recombinant 03/28/2019, 06/12/2019      Medicare Screening Tests and Risk Assessments:     Shira Pritchett is here for his Subsequent Wellness visit  Last Medicare Wellness visit information reviewed, patient interviewed, no change since last AWV  Health Risk Assessment:  Patient rates overall health as good  Patient feels that their physical health rating is Same  Eyesight was rated as Same  Hearing was rated as Same  Patient feels that their emotional and mental health rating is Same  Pain experienced by patient in the last 7 days has been None  Patient states that he has experienced no weight loss or gain in last 6 months  Emotional/Mental Health:  Patient has not been feeling nervous/anxious  PHQ-9 Depression Screening:    Frequency of the following problems over the past two weeks:      1  Little interest or pleasure in doing things: 0 - not at all      2  Feeling down, depressed, or hopeless: 0 - not at all  PHQ-2 Score: 0          Broken Bones/Falls: Fall Risk Assessment:    In the past year, patient has experienced: No history of falling in past year          Bladder/Bowel:  Patient has not leaked urine accidently in the last six months  Patient reports no loss of bowel control  Immunizations:  Patient has had a flu vaccination within the last year  Patient has received a pneumonia shot  Patient has received a shingles shot  Patient has received tetanus/diphtheria shot  Home Safety:  Patient does not have trouble with stairs inside or outside of their home  Patient currently reports that there are no safety hazards present in home, working smoke alarms, working carbon monoxide detectors  (Additional Comments: Got all new smoke detectors with CO2 monitoring in the last few days    All are hard-wired )    Preventative Screenings:   prostate cancer screen performed, colon cancer screen completed, cholesterol screen completed, glaucoma eye exam completed, Nutrition:  Current diet: Regular and No Added Salt with servings of the following:  (Additional Comments: He is working with his wife and daughter to eat healthier )    Medications:  Patient is not currently taking any over-the-counter supplements  Patient is able to manage medications  Lifestyle Choices:  Patient reports no tobacco use  Patient has smoked or used tobacco in the past   Patient has stopped his tobacco use  Tobacco use quit date: 11/11/2016  Patient reports alcohol use  Alcohol use per week: maybe 1 drink every 2months or so  Patient drives a vehicle  Patient wears seat belt  Current level of exercise of physical activity described by patient as: some walking, gym 2x per wk, some fishing  Activities of Daily Living:  Can get out of bed by his or her self, able to dress self, able to make own meals, able to do own shopping, able to bathe self, can do own laundry/housekeeping, can manage own money, pay bills and track expenses    Previous Hospitalizations:  No hospitalization or ED visit in past 12 months        Advanced Directives:  Patient has decided on a power of   Patient has spoken to designated power of   Patient has not completed advanced directive  Preventative Screening/Counseling:      Cardiovascular:      General: Screening Current and Risks and Benefits Discussed      Counseling: Healthy Diet, Improve Cholesterol, Healthy Weight and Improve Blood Pressure      Comments: Continue sandra        Diabetes:      General: Risks and Benefits Discussed and Screening Current      Comments: Ac1 noted        Colorectal Cancer:      General: Risks and Benefits Discussed and Screening Current      Comments: Continue with colorectal follow up Dr Thu Elder  Prostate Cancer:      General: Screening Current      Comments: Follows with urology          Osteoporosis:      Counseling: Regular Weightbearing Exercise and Calcium and Vitamin D Intake          AAA:      General: Screening Current      Comments: Follows with vascular  Glaucoma:      General: Risks and Benefits Discussed      Comments: Patient has apt scheduled with eye doctor  Hepatitis C:      General: Screening Current      Additional Comments: Completed 5/2018    Advanced Directives:   Patient has no living will for healthcare, does not have durable POA for healthcare, patient does not have an advanced directive  Information on ACP and/or AD provided  5 wishes given  Additional Comments: Recommended yearly flu shot in late September / early October  Immunizations:  Patient reviewed and up to date      Influenza: Risks & Benefits Discussed      Pneumococcal: Lifetime Vaccine Completed      Shingrix: Risks & Benefits Discussed      TDAP: Tdap Vaccine UTD        Referrals: Additional Comments: Continue with pulm, vascular, urology  Keep your eye doctor follow up  [facility] follow up 4-6 weeks to review BP and BMP after starting BP medication today

## 2019-08-09 NOTE — ASSESSMENT & PLAN NOTE
Home blood pressure mood readings as well as serial blood pressures in the office have been beyond goal   Due to underlying comorbidities will start hydrochlorothiazide 12 5mg PO daily  Continue low salt diet and daily activity    Follow-up in 6 weeks to review BMP and review home blood pressure logs

## 2019-08-12 ENCOUNTER — TELEPHONE (OUTPATIENT)
Dept: INTERNAL MEDICINE CLINIC | Facility: CLINIC | Age: 69
End: 2019-08-12

## 2019-08-12 PROBLEM — Z00.00 MEDICARE ANNUAL WELLNESS VISIT, SUBSEQUENT: Status: ACTIVE | Noted: 2019-08-12

## 2019-08-12 NOTE — TELEPHONE ENCOUNTER
Patient calling because [facility] will not fill the hydrochlorothiazide 12 5 mg without more information from us  [employer] can be reached at [phone]  Patient would like a call back when this has been taken care of  If they do not answer, they said you could leave a message

## 2019-08-23 DIAGNOSIS — I10 HYPERTENSION, ESSENTIAL: ICD-10-CM

## 2019-08-23 RX ORDER — HYDROCHLOROTHIAZIDE 12.5 MG/1
12.5 TABLET ORAL DAILY
Qty: 90 TABLET | Refills: 1 | Status: SHIPPED | OUTPATIENT
Start: 2019-08-23 | End: 2019-09-13 | Stop reason: SDUPTHER

## 2019-08-24 ENCOUNTER — APPOINTMENT (OUTPATIENT)
Dept: LAB | Age: 69
End: 2019-08-24
Payer: MEDICARE

## 2019-08-24 DIAGNOSIS — N13.8 BPH WITH OBSTRUCTION/LOWER URINARY TRACT SYMPTOMS: ICD-10-CM

## 2019-08-24 DIAGNOSIS — I10 HYPERTENSION, ESSENTIAL: ICD-10-CM

## 2019-08-24 DIAGNOSIS — N40.1 BPH WITH OBSTRUCTION/LOWER URINARY TRACT SYMPTOMS: ICD-10-CM

## 2019-08-24 LAB
ANION GAP SERPL CALCULATED.3IONS-SCNC: 5 MMOL/L (ref 4–13)
BUN SERPL-MCNC: 16 MG/DL (ref 5–25)
CALCIUM SERPL-MCNC: 9 MG/DL (ref 8.3–10.1)
CHLORIDE SERPL-SCNC: 102 MMOL/L (ref 100–108)
CO2 SERPL-SCNC: 30 MMOL/L (ref 21–32)
CREAT SERPL-MCNC: 1.2 MG/DL (ref 0.6–1.3)
GFR SERPL CREATININE-BSD FRML MDRD: 62 ML/MIN/1.73SQ M
GLUCOSE P FAST SERPL-MCNC: 114 MG/DL (ref 65–99)
POTASSIUM SERPL-SCNC: 3.7 MMOL/L (ref 3.5–5.3)
PSA SERPL-MCNC: 0.7 NG/ML (ref 0–4)
SODIUM SERPL-SCNC: 137 MMOL/L (ref 136–145)

## 2019-08-24 PROCEDURE — 36415 COLL VENOUS BLD VENIPUNCTURE: CPT

## 2019-08-24 PROCEDURE — 84153 ASSAY OF PSA TOTAL: CPT

## 2019-08-24 PROCEDURE — 80048 BASIC METABOLIC PNL TOTAL CA: CPT

## 2019-08-26 ENCOUNTER — CLINICAL SUPPORT (OUTPATIENT)
Dept: INTERNAL MEDICINE CLINIC | Facility: CLINIC | Age: 69
End: 2019-08-26
Payer: MEDICARE

## 2019-08-26 DIAGNOSIS — I10 ESSENTIAL HYPERTENSION: Primary | ICD-10-CM

## 2019-08-26 LAB
ANION GAP SERPL CALCULATED.3IONS-SCNC: 6 MMOL/L (ref 4–13)
BUN SERPL-MCNC: 15 MG/DL (ref 5–25)
CALCIUM SERPL-MCNC: 8.9 MG/DL (ref 8.3–10.1)
CHLORIDE SERPL-SCNC: 101 MMOL/L (ref 100–108)
CO2 SERPL-SCNC: 30 MMOL/L (ref 21–32)
CREAT SERPL-MCNC: 1.14 MG/DL (ref 0.6–1.3)
GFR SERPL CREATININE-BSD FRML MDRD: 66 ML/MIN/1.73SQ M
GLUCOSE P FAST SERPL-MCNC: 95 MG/DL (ref 65–99)
POTASSIUM SERPL-SCNC: 4.2 MMOL/L (ref 3.5–5.3)
SODIUM SERPL-SCNC: 137 MMOL/L (ref 136–145)

## 2019-08-26 PROCEDURE — 36415 COLL VENOUS BLD VENIPUNCTURE: CPT

## 2019-08-26 PROCEDURE — 80048 BASIC METABOLIC PNL TOTAL CA: CPT | Performed by: PHYSICIAN ASSISTANT

## 2019-08-30 ENCOUNTER — OFFICE VISIT (OUTPATIENT)
Dept: UROLOGY | Facility: MEDICAL CENTER | Age: 69
End: 2019-08-30
Payer: MEDICARE

## 2019-08-30 VITALS
HEART RATE: 92 BPM | DIASTOLIC BLOOD PRESSURE: 82 MMHG | HEIGHT: 68 IN | BODY MASS INDEX: 24.71 KG/M2 | SYSTOLIC BLOOD PRESSURE: 138 MMHG | WEIGHT: 163 LBS

## 2019-08-30 DIAGNOSIS — R31.29 OTHER MICROSCOPIC HEMATURIA: ICD-10-CM

## 2019-08-30 DIAGNOSIS — N40.1 BPH WITH OBSTRUCTION/LOWER URINARY TRACT SYMPTOMS: Primary | ICD-10-CM

## 2019-08-30 DIAGNOSIS — N13.8 BPH WITH OBSTRUCTION/LOWER URINARY TRACT SYMPTOMS: Primary | ICD-10-CM

## 2019-08-30 LAB
BACTERIA UR QL AUTO: NORMAL /HPF
BILIRUB UR QL STRIP: NEGATIVE
CLARITY UR: CLEAR
COLOR UR: YELLOW
GLUCOSE UR STRIP-MCNC: NEGATIVE MG/DL
HGB UR QL STRIP.AUTO: NEGATIVE
HYALINE CASTS #/AREA URNS LPF: NORMAL /LPF
KETONES UR STRIP-MCNC: NEGATIVE MG/DL
LEUKOCYTE ESTERASE UR QL STRIP: NEGATIVE
NITRITE UR QL STRIP: NEGATIVE
NON-SQ EPI CELLS URNS QL MICRO: NORMAL /HPF
PH UR STRIP.AUTO: 6 [PH]
PROT UR STRIP-MCNC: NEGATIVE MG/DL
RBC #/AREA URNS AUTO: NORMAL /HPF
SL AMB  POCT GLUCOSE, UA: ABNORMAL
SL AMB LEUKOCYTE ESTERASE,UA: ABNORMAL
SL AMB POCT BILIRUBIN,UA: ABNORMAL
SL AMB POCT BLOOD,UA: ABNORMAL
SL AMB POCT CLARITY,UA: CLEAR
SL AMB POCT COLOR,UA: YELLOW
SL AMB POCT KETONES,UA: ABNORMAL
SL AMB POCT NITRITE,UA: ABNORMAL
SL AMB POCT PH,UA: 6
SL AMB POCT SPECIFIC GRAVITY,UA: 1.02
SL AMB POCT URINE PROTEIN: ABNORMAL
SL AMB POCT UROBILINOGEN: 0.2
SP GR UR STRIP.AUTO: 1.02 (ref 1–1.03)
UROBILINOGEN UR QL STRIP.AUTO: 0.2 E.U./DL
WBC #/AREA URNS AUTO: NORMAL /HPF

## 2019-08-30 PROCEDURE — 81003 URINALYSIS AUTO W/O SCOPE: CPT | Performed by: UROLOGY

## 2019-08-30 PROCEDURE — 81001 URINALYSIS AUTO W/SCOPE: CPT | Performed by: UROLOGY

## 2019-08-30 PROCEDURE — 99214 OFFICE O/P EST MOD 30 MIN: CPT | Performed by: UROLOGY

## 2019-08-30 RX ORDER — TAMSULOSIN HYDROCHLORIDE 0.4 MG/1
0.4 CAPSULE ORAL
Qty: 90 CAPSULE | Refills: 3 | Status: SHIPPED | OUTPATIENT
Start: 2019-08-30 | End: 2020-09-04 | Stop reason: SDUPTHER

## 2019-08-30 RX ORDER — FINASTERIDE 5 MG/1
5 TABLET, FILM COATED ORAL DAILY
Qty: 90 TABLET | Refills: 3 | Status: SHIPPED | OUTPATIENT
Start: 2019-08-30 | End: 2020-09-04 | Stop reason: SDUPTHER

## 2019-08-30 NOTE — PROGRESS NOTES
Assessment/Plan:    BPH with obstruction/lower urinary tract symptoms  AUA symptom score is 1  He is pleased with his voiding pattern on combined therapy  PSA was 0 7 on August 24, 2019  His prescriptions were refilled  He will return in 1 year and we will recheck a PSA prior to that visit  Other microscopic hematuria  Trace blood is noted on dipstick  Urine will be sent for microscopic analysis to help assess significance  Workup will be undertaken depending on findings  Diagnoses and all orders for this visit:    BPH with obstruction/lower urinary tract symptoms  -     POCT urine dip auto non-scope  -     tamsulosin (FLOMAX) 0 4 mg; Take 1 capsule (0 4 mg total) by mouth daily with dinner  -     finasteride (PROSCAR) 5 mg tablet; Take 1 tablet (5 mg total) by mouth daily  -     PSA Total, Diagnostic; Future    Other microscopic hematuria  -     Urinalysis with microscopic          Subjective:      Patient ID: Onur Wise is a 76 y o  male  Benign Prostatic Hypertrophy   This is a chronic problem  The current episode started more than 1 year ago  The problem is unchanged  Irritative symptoms do not include frequency, nocturia or urgency  Obstructive symptoms include a slower stream  Obstructive symptoms do not include dribbling, incomplete emptying, an intermittent stream, straining or a weak stream  Pertinent negatives include no chills, dysuria, genital pain, hematuria, hesitancy, nausea or vomiting  AUA score is 0-7  His sexual activity is non-contributory to the current illness  The symptoms are aggravated by diuretics  Past treatments include finasteride and tamsulosin  The treatment provided moderate relief  He has been using treatment for 2 or more years         The following portions of the patient's history were reviewed and updated as appropriate: allergies, current medications, past family history, past medical history, past social history, past surgical history and problem list     Review of Systems   Constitutional: Negative for chills, diaphoresis, fatigue and fever  HENT: Negative  Eyes: Negative  Respiratory: Positive for cough  Cardiovascular: Negative  Gastrointestinal: Negative  Negative for nausea and vomiting  Endocrine: Negative  Genitourinary: Negative for dysuria, frequency, hematuria, hesitancy, incomplete emptying, nocturia and urgency  See HPI   Musculoskeletal: Negative  Skin: Negative  Allergic/Immunologic: Negative  Neurological: Negative  Hematological: Negative  Psychiatric/Behavioral: Negative  AUA SYMPTOM SCORE      Most Recent Value   AUA SYMPTOM SCORE   How often have you had a sensation of not emptying your bladder completely after you finished urinating? 0   How often have you had to urinate again less than two hours after you finished urinating? 0   How often have you found you stopped and started again several times when you urinate?  0   How often have you found it difficult to postpone urination? 0   How often have you had a weak urinary stream?  0   How often have you had to push or strain to begin urination? 0   How many times did you most typically get up to urinate from the time you went to bed at night until the time you got up in the morning? 1   Quality of Life: If you were to spend the rest of your life with your urinary condition just the way it is now, how would you feel about that?  1   AUA SYMPTOM SCORE  1        Objective:      /82 (BP Location: Left arm, Patient Position: Sitting, Cuff Size: Adult)   Pulse 92   Ht 5' 8" (1 727 m)   Wt 73 9 kg (163 lb)   BMI 24 78 kg/m²          Physical Exam   Constitutional: He is oriented to person, place, and time  He appears well-developed and well-nourished  HENT:   Head: Normocephalic and atraumatic  Eyes: Conjunctivae are normal    Neck: Neck supple  Cardiovascular: Normal rate  Pulmonary/Chest: Effort normal    Abdominal: Soft  Bowel sounds are normal  He exhibits no distension and no mass  There is no tenderness  There is no rebound, no guarding and no CVA tenderness  No hernia  Genitourinary: Rectum normal, testes normal and penis normal  Right testis shows no mass  Left testis shows no mass  No phimosis or hypospadias  Genitourinary Comments: Prostate 1 5 X enlarged  The prostate is firm, smooth, symmetric  Musculoskeletal: He exhibits no edema  Neurological: He is alert and oriented to person, place, and time  Skin: Skin is warm and dry  Psychiatric: He has a normal mood and affect  His behavior is normal  Judgment and thought content normal    Vitals reviewed

## 2019-08-30 NOTE — ASSESSMENT & PLAN NOTE
Trace blood is noted on dipstick  Urine will be sent for microscopic analysis to help assess significance  Workup will be undertaken depending on findings

## 2019-08-30 NOTE — ASSESSMENT & PLAN NOTE
AUA symptom score is 1  He is pleased with his voiding pattern on combined therapy  PSA was 0 7 on August 24, 2019  His prescriptions were refilled  He will return in 1 year and we will recheck a PSA prior to that visit

## 2019-08-30 NOTE — LETTER
August 30, 2019     Ebenezer Lovelace PA-C  [address] [place] [address]    Patient: Nory Hutchinson Held   YOB: 1950   Date of Visit: 8/30/2019       Dear Dr Bonilla Griffiths:    Thank you for referring Bull Reeves to me for evaluation  Below are my notes for this consultation  If you have questions, please do not hesitate to call me  I look forward to following your patient along with you  Sincerely,        Rock Ponce MD        CC: ALBAN Marshall MD  8/30/2019  9:40 AM  Sign at close encounter  Assessment/Plan:    BPH with obstruction/lower urinary tract symptoms  AUA symptom score is 1  He is pleased with his voiding pattern on combined therapy  PSA was 0 7 on August 24, 2019  His prescriptions were refilled  He will return in 1 year and we will recheck a PSA prior to that visit  Other microscopic hematuria  Trace blood is noted on dipstick  Urine will be sent for microscopic analysis to help assess significance  Workup will be undertaken depending on findings  Diagnoses and all orders for this visit:    BPH with obstruction/lower urinary tract symptoms  -     POCT urine dip auto non-scope  -     tamsulosin (FLOMAX) 0 4 mg; Take 1 capsule (0 4 mg total) by mouth daily with dinner  -     finasteride (PROSCAR) 5 mg tablet; Take 1 tablet (5 mg total) by mouth daily  -     PSA Total, Diagnostic; Future    Other microscopic hematuria  -     Urinalysis with microscopic          Subjective:      Patient ID: Nguyen Tanner is a 76 y o  male  Benign Prostatic Hypertrophy   This is a chronic problem  The current episode started more than 1 year ago  The problem is unchanged  Irritative symptoms do not include frequency, nocturia or urgency   Obstructive symptoms include a slower stream  Obstructive symptoms do not include dribbling, incomplete emptying, an intermittent stream, straining or a weak stream  Pertinent negatives include no chills, dysuria, genital pain, hematuria, hesitancy, nausea or vomiting  AUA score is 0-7  His sexual activity is non-contributory to the current illness  The symptoms are aggravated by diuretics  Past treatments include finasteride and tamsulosin  The treatment provided moderate relief  He has been using treatment for 2 or more years  The following portions of the patient's history were reviewed and updated as appropriate: allergies, current medications, past family history, past medical history, past social history, past surgical history and problem list     Review of Systems   Constitutional: Negative for chills, diaphoresis, fatigue and fever  HENT: Negative  Eyes: Negative  Respiratory: Positive for cough  Cardiovascular: Negative  Gastrointestinal: Negative  Negative for nausea and vomiting  Endocrine: Negative  Genitourinary: Negative for dysuria, frequency, hematuria, hesitancy, incomplete emptying, nocturia and urgency  See HPI   Musculoskeletal: Negative  Skin: Negative  Allergic/Immunologic: Negative  Neurological: Negative  Hematological: Negative  Psychiatric/Behavioral: Negative  AUA SYMPTOM SCORE      Most Recent Value   AUA SYMPTOM SCORE   How often have you had a sensation of not emptying your bladder completely after you finished urinating? 0   How often have you had to urinate again less than two hours after you finished urinating? 0   How often have you found you stopped and started again several times when you urinate?  0   How often have you found it difficult to postpone urination? 0   How often have you had a weak urinary stream?  0   How often have you had to push or strain to begin urination? 0   How many times did you most typically get up to urinate from the time you went to bed at night until the time you got up in the morning?   1   Quality of Life: If you were to spend the rest of your life with your urinary condition just the way it is now, how would you feel about that?  1   AUA SYMPTOM SCORE  1        Objective:      /82 (BP Location: Left arm, Patient Position: Sitting, Cuff Size: Adult)   Pulse 92   Ht 5' 8" (1 727 m)   Wt 73 9 kg (163 lb)   BMI 24 78 kg/m²           Physical Exam   Constitutional: He is oriented to person, place, and time  He appears well-developed and well-nourished  HENT:   Head: Normocephalic and atraumatic  Eyes: Conjunctivae are normal    Neck: Neck supple  Cardiovascular: Normal rate  Pulmonary/Chest: Effort normal    Abdominal: Soft  Bowel sounds are normal  He exhibits no distension and no mass  There is no tenderness  There is no rebound, no guarding and no CVA tenderness  No hernia  Genitourinary: Rectum normal, testes normal and penis normal  Right testis shows no mass  Left testis shows no mass  No phimosis or hypospadias  Genitourinary Comments: Prostate 1 5 X enlarged  The prostate is firm, smooth, symmetric  Musculoskeletal: He exhibits no edema  Neurological: He is alert and oriented to person, place, and time  Skin: Skin is warm and dry  Psychiatric: He has a normal mood and affect  His behavior is normal  Judgment and thought content normal    Vitals reviewed

## 2019-08-30 NOTE — PATIENT INSTRUCTIONS
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 [facility])        Relevant Orders    Vitamin D 25 hydroxy    Vitamin D deficiency         Relevant Orders    Vitamin D 25 hydroxy        Health Maintenance Due   Topic Date Due    Medicare Annual Wellness Visit (AWV)  1951    DM Eye Exam  01/10/1961         HPI:  Cyrena Merlin is a 79 y o  male here for his IPPE(Welcome to Medicare Visit )    Patient Active Problem List   Diagnosis    Swelling of joint, wrist, right    Rising PSA level    Prostate cancer ([facility] )    Neoplasm of prostate, distant metastasis staging category M1b: metastasis to bone ([facility] )    Glaucoma    Benign essential hypertension    Colon cancer screening    Type 2 diabetes mellitus without complication ([facility] )     Past Medical History:   Diagnosis Date    Diabetes mellitus ([facility] )     Hx of radiation therapy     Hypertension     Malignant neoplasm of prostate ([facility] )     Shingles 02/22/2018     Past Surgical History:   Procedure Laterality Date    COLONOSCOPY      9 years ago    SD COLONOSCOPY FLX DX W/COLLJ SPEC WHEN PFRMD N/A 6/25/2018    Procedure: COLONOSCOPY;  Surgeon: Yeyo Crandall MD;  Location: [facility];   Service: Gastroenterology     Family History   Problem Relation Age of Onset    Diabetes Mother     Hypertension Mother     Heart disease Father         cardiac disorder    Diabetes Father     Hypertension Father     Prostate cancer Father     Diabetes Sister     Hypertension Sister     Multiple myeloma Brother      History Benign Prostatic Hypertrophy   WHAT YOU NEED TO KNOW:   Benign prostatic hypertrophy (BPH) is a condition that causes your prostate gland to grow larger than normal  The prostate gland is the male sex gland that produces a fluid that is part of semen  It is about the size of a walnut and it is located under the bladder  As the prostate grows, it can squeeze the urethra  This can block urine flow and cause urinary problems  DISCHARGE INSTRUCTIONS:   Medicines:   · Alpha blockers: This medicine relaxes the muscles in your prostate and bladder  It may help you urinate more easily  · 5 alpha reductase inhibitors: These medicines block the production of a hormone that causes the prostate to get larger  It may help slow the growth of the prostate or shrink the prostate  · Take your medicine as directed  Contact your healthcare provider if you think your medicine is not helping or if you have side effects  Tell him or her if you are allergic to any medicine  Keep a list of the medicines, vitamins, and herbs you take  Include the amounts, and when and why you take them  Bring the list or the pill bottles to follow-up visits  Carry your medicine list with you in case of an emergency  Follow up with your healthcare provider as directed:  Write down your questions so you remember to ask them during your visits  Manage BPH:   · Do not let your bladder get too full before you empty it  Urinate when you feel the urge  · Limit alcohol  Do not drink large amounts of any liquid at one time  · Decrease the amount of salt you eat  Examples of salty foods are chips, cured meats, and canned soups  Do not use table salt  · Healthcare providers may tell you not to eat spicy foods such as chilli peppers  This may help you find out if spicy food makes your BPH symptoms worse  · You may have sex if you feel well  Contact your healthcare provider if:   · There is a large amount of blood in your urine  Smoking Status    Former Smoker    Quit date: 1969   Smokeless Tobacco    Never Used     History   Alcohol Use No     Comment: socially      History   Drug Use No       Current Outpatient Prescriptions   Medication Sig Dispense Refill    amLODIPine (NORVASC) 10 mg tablet TAKE 1 TABLET (10 MG TOTAL) BY MOUTH DAILY 90 tablet 1    Coenzyme Q10 (COQ10) 100 MG CAPS Take by mouth      latanoprost (XALATAN) 0 005 % ophthalmic solution INSTILL 1 DROP INTO BOTH EYES AT BEDTIME  FILL ON 01-09-18  0    MEGARED OMEGA-3 KRILL  MG CAPS Take by mouth      metFORMIN (GLUCOPHAGE) 850 mg tablet Take 1 tablet (850 mg total) by mouth 2 (two) times a day 180 tablet 1    Saw Palmetto, Serenoa repens, 450 MG CAPS Take by mouth      multivitamin (THERAGRAN) TABS Take 1 tablet by mouth daily      Na Sulfate-K Sulfate-Mg Sulf 17 5-3 13-1 6 [id number] SOLN Take 1 applicator by mouth once for 1 dose 1 Bottle 0     No current facility-administered medications for this visit  Allergies   Allergen Reactions    Lactate Diarrhea       There is no immunization history on file for this patient  Patient Care Team:  Raiza Goodwin MD as PCP - [address] (Nutrition)    Medicare Screening Tests and Risk Assessments:  Jose L Norman is here for his Welcome to [facility] and Initial Wellness visit  Health Risk Assessment:  Patient rates overall health as very good  Patient feels that their physical health rating is Same  Eyesight was rated as Same  Hearing was rated as Same  Patient feels that their emotional and mental health rating is Much better  Pain experienced by patient in the last 7 days has been Some  Patient's pain rating has been 2/10  Patient states that he has experienced no weight loss or gain in last 6 months  Emotional/Mental Health:  Patient has been feeling nervous/anxious  PHQ-9 Depression Screening:    Frequency of the following problems over the past two weeks:      1  · Your signs and symptoms get worse  · You have a fever  · You have questions or concerns about your condition or care  Seek care immediately if:   · You are unable to urinate  · Your bladder feels very full and painful  © 2017 [employer] Information is for End User's use only and may not be sold, redistributed or otherwise used for commercial purposes  All illustrations and images included in CareNotes® are the copyrighted property of A D A M , Inc  or [employer]  The above information is an  only  It is not intended as medical advice for individual conditions or treatments  Talk to your doctor, nurse or pharmacist before following any medical regimen to see if it is safe and effective for you  Little interest or pleasure in doing things: 0 - not at all      2  Feeling down, depressed, or hopeless: 0 - not at all  PHQ-2 Score: 0          Broken Bones/Falls: Fall Risk Assessment:    In the past year, patient has experienced: No history of falling in past year          Bladder/Bowel:  Patient has not leaked urine accidently in the last six months  Patient reports no loss of bowel control  Immunizations:  Patient has not had a flu vaccination within the last year  Patient has not received a pneumonia shot  Patient has not received a shingles shot  Patient has not received tetanus/diphtheria shot  Home Safety:  Patient does not have trouble with stairs inside or outside of their home  Patient currently reports that there are no safety hazards present in home, working smoke alarms, no working carbon monoxide detectors  Preventative Screenings:   prostate cancer screen performed, 11/8/2018  colon cancer screen completed, cholesterol screen completed, 11/8/2018  glaucoma eye exam completed,     Nutrition:  Current diet: Diabetic and No Added Salt with servings of the following:    Medications:  Patient is currently taking over-the-counter supplements  Patient is able to manage medications  Lifestyle Choices:  Patient reports no tobacco use  Patient has not smoked or used tobacco in the past   Patient reports no alcohol use  Patient drives a vehicle  Patient wears seat belt  Current level of exercise of physical activity described by patient as: very active  Activities of Daily Living:  Can get out of bed by his or her self, able to dress self, able to make own meals, able to do own shopping, able to bathe self, can do own laundry/housekeeping, can manage own money, pay bills and track expenses    Advanced Directives:  Patient has decided on a power of   Patient has spoken to designated power of   Patient has completed advanced directive          Preventative Screening/Counseling:      Cardiovascular:      General: Screening Not Indicated      Counseling: Healthy Diet          Diabetes:      General: Screening Current      Counseling: Healthy Diet          Colorectal Cancer:      General: Screening Current      Counseling: high fiber diet          Prostate Cancer:      General: Screening Current      Comments: He has prostate cancer with mets to the bones and he is stable        Osteoporosis:      General: Screening Not Indicated      Counseling: Regular Weightbearing Exercise          AAA:      General: Screening Not Indicated          Glaucoma:      General: Screening Current      Comments: He will see ophthalmologist this year again        HIV:      General: Screening Not Indicated          Hepatitis C:      General: Screening Not Indicated        Advanced Directives:   Patient has living will for healthcare, patient has an advanced directive  Information on ACP and/or AD provided  Immunizations:      Influenza: Patient Declines      Pneumococcal: Patient Declines      Other Preventative Counseling (Non-Medicare):   Fall Prevention, Nutrition Counseling and Dietary education for weight gain

## 2019-09-04 ENCOUNTER — TELEPHONE (OUTPATIENT)
Dept: UROLOGY | Facility: MEDICAL CENTER | Age: 69
End: 2019-09-04

## 2019-09-04 NOTE — TELEPHONE ENCOUNTER
Spoke with the patient's wife; His microscopic urinalysis came back normal  She will relay this news to the patient and has no questions or concerns to be addressed at this time

## 2019-09-04 NOTE — TELEPHONE ENCOUNTER
----- Message from Carrington Guadalupe MD sent at 9/3/2019  5:14 PM EDT -----  Inform pt that the  test was normal  Keep usual follow up appt

## 2019-09-13 ENCOUNTER — OFFICE VISIT (OUTPATIENT)
Dept: INTERNAL MEDICINE CLINIC | Age: 69
End: 2019-09-13
Payer: MEDICARE

## 2019-09-13 VITALS
BODY MASS INDEX: 24.88 KG/M2 | SYSTOLIC BLOOD PRESSURE: 132 MMHG | WEIGHT: 163.6 LBS | DIASTOLIC BLOOD PRESSURE: 80 MMHG | HEART RATE: 94 BPM | TEMPERATURE: 97.4 F | OXYGEN SATURATION: 98 %

## 2019-09-13 DIAGNOSIS — J44.9 CHRONIC OBSTRUCTIVE PULMONARY DISEASE, UNSPECIFIED COPD TYPE (HCC): Primary | ICD-10-CM

## 2019-09-13 DIAGNOSIS — E78.2 MIXED HYPERLIPIDEMIA: ICD-10-CM

## 2019-09-13 DIAGNOSIS — I10 ESSENTIAL HYPERTENSION: ICD-10-CM

## 2019-09-13 DIAGNOSIS — I10 HYPERTENSION, ESSENTIAL: ICD-10-CM

## 2019-09-13 DIAGNOSIS — J30.2 SEASONAL ALLERGIES: ICD-10-CM

## 2019-09-13 PROCEDURE — 99213 OFFICE O/P EST LOW 20 MIN: CPT | Performed by: PHYSICIAN ASSISTANT

## 2019-09-13 RX ORDER — FLUTICASONE PROPIONATE 50 MCG
1 SPRAY, SUSPENSION (ML) NASAL DAILY
Qty: 1 BOTTLE | Refills: 0 | Status: ON HOLD | OUTPATIENT
Start: 2019-09-13 | End: 2021-03-29

## 2019-09-13 RX ORDER — HYDROCHLOROTHIAZIDE 25 MG/1
25 TABLET ORAL DAILY
Qty: 90 TABLET | Refills: 1 | Status: SHIPPED | OUTPATIENT
Start: 2019-09-13 | End: 2020-02-11

## 2019-09-13 NOTE — ASSESSMENT & PLAN NOTE
Symptoms appear to be improving  Likely secondary to seasonal allergies  Continue with Claritin  Start Flonase 1 spray to each nostril once daily

## 2019-09-13 NOTE — ASSESSMENT & PLAN NOTE
Stable at this time on statin  Will obtain lipid panel with next lab set along with CMP to check liver function as well as renal function

## 2019-09-13 NOTE — ASSESSMENT & PLAN NOTE
Continue to follow with pulm  No change in pulm meds at this time  Continue with trilogy and albuterol as needed  Continue to monitor and report if any increased albuterol use  Patient has not recently needed his rescue inhaler or nebulizer treatments

## 2019-09-13 NOTE — PROGRESS NOTES
[facility]  Standard Office Visit  Patient ID: Osiris Santana Held    : 1950  Age/Gender: 76 y o  male     DATE: 2019      Assessment/Plan:    Essential hypertension  Blood pressure does show some improved response with the addition of low-dose hydrochlorothiazide however blood pressure is not at goal   Reviewed patient's blood pressure log  Recommend he increase hydrochlorothiazide to 25 mg daily  New scripts sent to pharmacy to indicate new dose change  Continue with diet lifestyle modifications patient has been doing to improve blood pressure  Continue to check BP and keep BP log  Discussed BP target goal     COPD with emphysema  Continue to follow with pulm  No change in pulm meds at this time  Continue with trilogy and albuterol as needed  Continue to monitor and report if any increased albuterol use  Patient has not recently needed his rescue inhaler or nebulizer treatments  Hyperlipidemia  Stable at this time on statin  Will obtain lipid panel with next lab set along with CMP to check liver function as well as renal function  Seasonal allergies  Symptoms appear to be improving  Likely secondary to seasonal allergies  Continue with Claritin  Start Flonase 1 spray to each nostril once daily  Diagnoses and all orders for this visit:    Chronic obstructive pulmonary disease, unspecified COPD type ([facility] )    Essential hypertension  -     Comprehensive metabolic panel; Future    Seasonal allergies  -     fluticasone (FLONASE) 50 mcg/act nasal spray; 1 spray into each nostril daily    Hypertension, essential  -     hydrochlorothiazide (HYDRODIURIL) 25 mg tablet; Take 1 tablet (25 mg total) by mouth daily    Mixed hyperlipidemia  -     Lipid Panel with Direct LDL reflex; Future          Subjective:   Chief Complaint   Patient presents with    Follow-up     pt  presents for follow up for HTN    Review labs 19         Osiris Santana Reshma is a 76 y o  male who presents to the office on 9/13/2019 for     COPD and HTN present to the office today for follow up for BP  No current complaints at this time other than chest congestion and night time cough  He is bring up sputum that is clear to yellow in color  He has a dry throat, using OTC med for dry mouth  Has helped  No fevers, no chills, No CP no SOB or chest tightness, no palpitations not lightheaded or dizzy  BP has been checked at home  Brought BP log  Urinary sx controlled  No longer with urinary frequency (slightlyincreased when he started HCTZ and not has returned to normal)  No leg swelling  No calf pain  Throat has been dry for last week  Normal congestion at night  Feels he is improving  Last 2 nights he has not woke with cough and is feeling better  No cough or wheeze at this time  + nasal congestion with clear nasal drainage and post nasal drip  Notes he is not on nasal spray  He does take antihistamine  He notes he often gets allergies this same time of year  No sinus pain or pressure  Sleeps with 2 pillows at night  Has not needed albuterol  Using his controller inhalers as directed  Serial blood pressure logs for the last few days:  132/82, 132/82, 136/82, 130/79, 132/82        Hypertension   This is a chronic problem  The current episode started more than 1 month ago  The problem has been gradually improving since onset  Pertinent negatives include no anxiety, chest pain, headaches, orthopnea (sleeps with 2 pillows and has done so for some time  No SOB laying flat), palpitations, peripheral edema or shortness of breath  There are no associated agents to hypertension  Risk factors for coronary artery disease include male gender  Past treatments include diuretics  The current treatment provides mild improvement  There are no compliance problems (completed pulm rehab and notes he continues to be active  )          The following portions of the patient's history were reviewed and updated as appropriate: allergies, current medications, past family history, past medical history, past social history, past surgical history and problem list     Review of Systems   Constitutional: Negative for chills, fever and unexpected weight change  Respiratory: Positive for cough  Negative for shortness of breath and wheezing  As noted in HPI   Cardiovascular: Negative for chest pain, palpitations and orthopnea (sleeps with 2 pillows and has done so for some time  No SOB laying flat)  Gastrointestinal: Negative for abdominal pain, diarrhea, nausea and vomiting  Genitourinary: Negative for dysuria  Skin: Negative for rash  Neurological: Negative for dizziness, light-headedness and headaches           Patient Active Problem List   Diagnosis    Hyperlipidemia    COPD with emphysema    A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 (HCC)    Arterial ectasia (HCC)    Diverticulosis, sigmoid    Hearing loss    Hemorrhoids    Lung nodule    Impaired fasting glucose    Skin disorder    BPH with obstruction/lower urinary tract symptoms    Essential hypertension    Seasonal allergies    Medicare annual wellness visit, subsequent    Other microscopic hematuria       Past Medical History:   Diagnosis Date    BPH (benign prostatic hyperplasia)     COPD (chronic obstructive pulmonary disease) (HCC)     Last Assessed:11/14/2016    Diverticulosis     Elevated prostate specific antigen (PSA)     Emphysema (subcutaneous) (surgical) resulting from a procedure     Enlarged prostate with lower urinary tract symptoms (LUTS)     Resolved:8/22/2017    Hernia, inguinal, right        Past Surgical History:   Procedure Laterality Date    BICEPS TENDON REPAIR  1999    COLONOSCOPY      HAND SURGERY      Last Assessed:7/11/2016    HERNIA REPAIR      Last Assessed:7/11/2016    KNEE ARTHROSCOPY      TX REPAIR ING HERNIA,5+Y/O,REDUCIBL Right 2/23/2016    Procedure: REPAIR HERNIA INGUINAL with mesh;  Surgeon: Humera Bridges DO;  Location: [facility];  Service: General    PROSTATE BIOPSY      WISDOM TOOTH EXTRACTION           Current Outpatient Medications:     albuterol (PROVENTIL HFA,VENTOLIN HFA) 90 mcg/act inhaler, Inhale 2 puffs every 6 (six) hours as needed for wheezing, Disp: 3 Inhaler, Rfl: 5    EPINEPHrine (EPIPEN) 0 3 mg/0 3 mL SOAJ, Inject 0 3 mL (0 3 mg total) into a muscle once for 1 dose, Disp: 0 6 mL, Rfl: 0    finasteride (PROSCAR) 5 mg tablet, Take 1 tablet (5 mg total) by mouth daily, Disp: 90 tablet, Rfl: 3    fluticasone-umeclidinium-vilanterol (TRELEGY ELLIPTA) 100-62 5-25 MCG/INH inhaler, Inhale 1 puff daily Rinse mouth after use , Disp: 3 Inhaler, Rfl: 5    guaiFENesin (MUCINEX) 600 mg 12 hr tablet, Take 1,200 mg by mouth 2 (two) times a day as needed, Disp: , Rfl:     hydrochlorothiazide (HYDRODIURIL) 25 mg tablet, Take 1 tablet (25 mg total) by mouth daily, Disp: 90 tablet, Rfl: 1    ipratropium-albuterol (DUO-NEB) 0 5-2 5 mg/3 mL nebulizer solution, Take 1 vial (3 mL total) by nebulization every 6 (six) hours, Disp: 1 mL, Rfl: 0    loratadine (CLARITIN REDITABS) 10 MG dissolvable tablet, Take 10 mg by mouth daily as needed for allergies  , Disp: , Rfl:     Multiple Vitamins-Minerals (CENTRUM ADULTS PO), Take 1 tablet by mouth daily  , Disp: , Rfl:     simvastatin (ZOCOR) 20 mg tablet, Take 1 tablet (20 mg total) by mouth daily at bedtime, Disp: 90 tablet, Rfl: 3    tamsulosin (FLOMAX) 0 4 mg, Take 1 capsule (0 4 mg total) by mouth daily with dinner, Disp: 90 capsule, Rfl: 3    fluticasone (FLONASE) 50 mcg/act nasal spray, 1 spray into each nostril daily, Disp: 1 Bottle, Rfl: 0    Allergies   Allergen Reactions    Bee Venom Facial Swelling    Other Rash     Annotation - [id number]: mushrooms    Penicillins Rash       Social History     Socioeconomic History    Marital status: /Civil Union     Spouse name: None    Number of children: None    Years of education: None    Highest education level: None   Occupational History    None   Social Needs    Financial resource strain: None    Food insecurity:     Worry: None     Inability: None    Transportation needs:     Medical: None     Non-medical: None   Tobacco Use    Smoking status: Former Smoker     Packs/day: 1 50     Years: 49 00     Pack years: 73 50     Types: Cigarettes     Last attempt to quit: 11/11/2015     Years since quitting: 3 8    Smokeless tobacco: Never Used   Substance and Sexual Activity    Alcohol use: Yes     Comment: rare    Drug use: No    Sexual activity: None   Lifestyle    Physical activity:     Days per week: None     Minutes per session: None    Stress: None   Relationships    Social connections:     Talks on phone: None     Gets together: None     Attends Anabaptism service: None     Active member of club or organization: None     Attends meetings of clubs or organizations: None     Relationship status: None    Intimate partner violence:     Fear of current or ex partner: None     Emotionally abused: None     Physically abused: None     Forced sexual activity: None   Other Topics Concern    None   Social History Narrative    Advance directive on file       Family History   Problem Relation Age of Onset    Hypertension Mother        PHQ-9 Depression Screening    PHQ-9:    Frequency of the following problems over the past two weeks:              Health Maintenance   Topic Date Due    SLP PLAN OF CARE  1950    INFLUENZA VACCINE  07/01/2019    Fall Risk  08/09/2020    Depression Screening PHQ  08/09/2020    Medicare Annual Wellness Visit (AWV)  08/09/2020    BMI: Adult  08/30/2020    CRC Screening: Colonoscopy  07/25/2026    DTaP,Tdap,and Td Vaccines (2 - Td) 06/01/2028    Hepatitis C Screening  Completed    Pneumococcal Vaccine: 65+ Years  Completed    Pneumococcal Vaccine: Pediatrics (0 to 5 Years) and At-Risk Patients (6 to 59 Years)  [place] HEPATITIS B VACCINES  Aged Out       Immunization History   Administered Date(s) Administered    INFLUENZA 11/06/2006, 09/28/2007, 11/01/2016, 10/10/2017    Influenza Split High Dose Preservative Free IM 11/01/2016, 10/10/2017    Influenza, high dose seasonal 0 5 mL 10/19/2018    Pneumococcal Conjugate 13-Valent 07/11/2016, 06/12/2019    Pneumococcal Polysaccharide PPV23 10/10/2017    Zoster Vaccine Recombinant 03/28/2019, 06/12/2019        Objective:  Vitals:    09/13/19 1141 09/13/19 1210   BP: 148/90 132/80   BP Location: Left arm    Patient Position: Sitting    Cuff Size: Standard    Pulse: 94    Temp: (!) 97 4 °F (36 3 °C)    TempSrc: Tympanic    SpO2: 98%    Weight: 74 2 kg (163 lb 9 6 oz)      Wt Readings from Last 3 Encounters:   09/13/19 74 2 kg (163 lb 9 6 oz)   08/30/19 73 9 kg (163 lb)   08/09/19 73 7 kg (162 lb 6 4 oz)     Body mass index is 24 88 kg/m²  No exam data present       Physical Exam   Constitutional: He appears well-developed and well-nourished  No distress  Alert pleasant cooperative  Seated in room in no acute distress   HENT:   Head: Normocephalic and atraumatic  Mouth/Throat: No oropharyngeal exudate  Moist mucous membranes  Normal posterior pharynx   Eyes: Pupils are equal, round, and reactive to light  Right eye exhibits no discharge  Left eye exhibits no discharge  No scleral icterus  Neck: Neck supple  Cardiovascular: Normal rate, regular rhythm and normal heart sounds  No murmur heard  Pulmonary/Chest: Effort normal  No accessory muscle usage or stridor  No tachypnea  No respiratory distress  He has decreased breath sounds  He has no wheezes  He has no rales  He exhibits no tenderness  Abdominal: Soft  Bowel sounds are normal  He exhibits no distension  There is no tenderness  There is no guarding  Positive bowel sounds  Soft nontender nondistended   Musculoskeletal: He exhibits no edema  Lymphadenopathy:     He has no cervical adenopathy  Neurological: He is alert  No gross focal neurologic deficits on exam   Skin: Skin is warm and dry  He is not diaphoretic  Psychiatric: He has a normal mood and affect  His behavior is normal  Thought content normal    Nursing note and vitals reviewed            Future Appointments   Date Time Provider Isacc Capellan   10/7/2019  9:00 AM Carlton Bowman MD PULM [facility]   12/13/2019  9:00 AM Harish Lopez PA-C [address] [place] [address]   9/4/2020 10:30 AM Daria Batista MD [facility]  [facility]       [facility]-C  [address] [facility]    Patient Care Team:  Harish Lopez PA-C as PCP - General (Internal Medicine)  MD Carlos Enrique Llamas MD Youlanda Curd, MD (Urology)

## 2019-10-03 ENCOUNTER — CLINICAL SUPPORT (OUTPATIENT)
Dept: INTERNAL MEDICINE CLINIC | Facility: CLINIC | Age: 69
End: 2019-10-03
Payer: MEDICARE

## 2019-10-03 DIAGNOSIS — Z23 NEED FOR INFLUENZA VACCINATION: Primary | ICD-10-CM

## 2019-10-03 PROCEDURE — 90662 IIV NO PRSV INCREASED AG IM: CPT

## 2019-10-03 PROCEDURE — G0008 ADMIN INFLUENZA VIRUS VAC: HCPCS

## 2019-10-07 ENCOUNTER — OFFICE VISIT (OUTPATIENT)
Dept: PULMONOLOGY | Facility: CLINIC | Age: 69
End: 2019-10-07
Payer: MEDICARE

## 2019-10-07 VITALS
WEIGHT: 164 LBS | TEMPERATURE: 98.3 F | SYSTOLIC BLOOD PRESSURE: 120 MMHG | DIASTOLIC BLOOD PRESSURE: 60 MMHG | HEART RATE: 108 BPM | BODY MASS INDEX: 24.29 KG/M2 | HEIGHT: 69 IN | OXYGEN SATURATION: 93 % | RESPIRATION RATE: 14 BRPM

## 2019-10-07 DIAGNOSIS — J44.9 CHRONIC OBSTRUCTIVE PULMONARY DISEASE, UNSPECIFIED COPD TYPE (HCC): Primary | ICD-10-CM

## 2019-10-07 DIAGNOSIS — Z12.2 ENCOUNTER FOR SCREENING FOR LUNG CANCER: ICD-10-CM

## 2019-10-07 DIAGNOSIS — R91.1 LUNG NODULE: ICD-10-CM

## 2019-10-07 PROCEDURE — 99214 OFFICE O/P EST MOD 30 MIN: CPT | Performed by: INTERNAL MEDICINE

## 2019-10-07 RX ORDER — ALBUTEROL SULFATE 90 UG/1
2 AEROSOL, METERED RESPIRATORY (INHALATION) EVERY 6 HOURS PRN
Qty: 3 INHALER | Refills: 5 | Status: ON HOLD | OUTPATIENT
Start: 2019-10-07 | End: 2021-03-29

## 2019-10-07 NOTE — ASSESSMENT & PLAN NOTE
- severe COPD  - PFT in 2016 showed severe obstruction, with hyperinflation and air trapping, as well as severe diffusion defect  - later spirometry in January 2019 again showed severe obstruction with FEV1 23% predicted  - patient's symptoms appear relatively well controlled  - Will c/w Treligy and prn albuterol (both refilled)  - completed pulmonary rehab and continues to remain active with exercise, going to [employer] 2-3x/week  - up to date with flu and pneumonia vaccines  - most recent CT lung cancer screening in August 2019, showed stable benign 2 mm left lower lobe pulmonary nodule with moderate to severe emphysema    Will have repeat CT for lung cancer screening in 1 year  - may return for follow up in 1 year, will call if any issues/symptoms prior to this

## 2019-10-07 NOTE — PROGRESS NOTES
Pulmonary Follow Up Note   Ellie Carter Held 76 y o  male MRN: [id number]  10/7/2019      Assessment:    COPD with emphysema  Assessment & Plan  - severe COPD  - PFT in 2016 showed severe obstruction, with hyperinflation and air trapping, as well as severe diffusion defect  - later spirometry in January 2019 again showed severe obstruction with FEV1 23% predicted  - patient's symptoms appear relatively well controlled  - Will c/w Treligy and prn albuterol (both refilled)  - completed pulmonary rehab and continues to remain active with exercise, going to [employer] 2-[facility]  - up to date with flu and pneumonia vaccines  - most recent CT lung cancer screening in August 2019, showed stable benign 2 mm left lower lobe pulmonary nodule with moderate to severe emphysema  Will have repeat CT for lung cancer screening in 1 year  - may return for follow up in 1 year, will call if any issues/symptoms prior to this     Lung nodule  Assessment & Plan  - most recent CT chest for lung cancer screening showed a stable benign 2 mm left lower lobe pulmonary nodule and benign nodule in the fissure  - repeat routine 1 year follow-up for lung cancer screening in August 2020    Plan:    Diagnoses and all orders for this visit:    Chronic obstructive pulmonary disease, unspecified COPD type (HCC)  -     albuterol (PROVENTIL HFA,VENTOLIN HFA) 90 mcg/act inhaler; Inhale 2 puffs every 6 (six) hours as needed for wheezing  -     fluticasone-umeclidinium-vilanterol (TRELEGY) 100-62 5-25 MCG/INH inhaler; Inhale 1 puff daily Rinse mouth after use  Lung nodule    Encounter for screening for lung cancer  -     CT lung screening program; Future        Return in about 6 months (around 4/7/2020) for Next scheduled follow up  History of Present Illness     HPI:  Cristobal Newman is a 76 y o  male with past medical history of hypertension, severe COPD, hyperlipidemia, seasonal allergies who presents for follow-up of COPD      Patient was last seen in the office in April 2019  He was completing pulmonary rehab  He was continued on Trelogy Ellipta, rarely requiring duoneb nebulizer p r n   Occasional use of rescue albuterol inhaler  Patient now states that he is feeling well  Does have rare, intermittent episodes of shortness of off, especially if he exerts himself too much  Finished pulmonary rehab over the summer  Is now able to go to [employer] with his family to keep up with his exercise  Was going three times a week and using same machines as rehab  Using rescue only if exerts himself too much, probably 2-3x/week  Last used nebulizer about 3 months ago  Does cough in the morning, clear  Has post nasal drip  Denies any recent illnesses, no fever, chills, nausea, vomiting, chest pain  Patient did have lung cancer screening exam in August 2019, which showed stable 2 mm benign pulmonary nodule and nodule in the fissure  Routine annual screening recommended  Received flu shot already this season  Review of Systems   Constitutional: Negative for chills, fever and unexpected weight change  HENT: Positive for postnasal drip  Negative for congestion, rhinorrhea, sneezing and sore throat  Respiratory: Positive for cough and shortness of breath  Negative for wheezing  Cardiovascular: Negative for chest pain, palpitations and leg swelling  Gastrointestinal: Negative for abdominal pain, constipation, diarrhea, nausea and vomiting  Endocrine: Negative for cold intolerance and heat intolerance  Genitourinary: Negative for dysuria  Musculoskeletal: Negative for arthralgias  Allergic/Immunologic: Negative for immunocompromised state  Neurological: Negative for dizziness and numbness         Historical Information   Past Medical History:   Diagnosis Date    BPH (benign prostatic hyperplasia)     COPD (chronic obstructive pulmonary disease) ([facility])     Last Assessed:11/14/2016    Diverticulosis     Elevated prostate specific antigen (PSA)     Emphysema (subcutaneous) (surgical) resulting from a procedure     Enlarged prostate with lower urinary tract symptoms (LUTS)     Resolved:8/22/2017    Hernia, inguinal, right      Past Surgical History:   Procedure Laterality Date    BICEPS TENDON REPAIR  1999    COLONOSCOPY      HAND SURGERY      Last Assessed:7/11/2016    HERNIA REPAIR      Last Assessed:7/11/2016    KNEE ARTHROSCOPY      WI REPAIR ING HERNIA,5+Y/O,REDUCIBL Right 2/23/2016    Procedure: REPAIR HERNIA INGUINAL with mesh;  Surgeon: Sobia France DO;  Location: [facility];  Service: General    PROSTATE BIOPSY      WISDOM TOOTH EXTRACTION       Family History   Problem Relation Age of Onset    Hypertension Mother          Meds/Allergies     Current Outpatient Medications:     albuterol (PROVENTIL HFA,VENTOLIN HFA) 90 mcg/act inhaler, Inhale 2 puffs every 6 (six) hours as needed for wheezing, Disp: 3 Inhaler, Rfl: 5    EPINEPHrine (EPIPEN) 0 3 mg/0 3 mL SOAJ, Inject 0 3 mL (0 3 mg total) into a muscle once for 1 dose, Disp: 0 6 mL, Rfl: 0    finasteride (PROSCAR) 5 mg tablet, Take 1 tablet (5 mg total) by mouth daily, Disp: 90 tablet, Rfl: 3    fluticasone (FLONASE) 50 mcg/act nasal spray, 1 spray into each nostril daily, Disp: 1 Bottle, Rfl: 0    fluticasone-umeclidinium-vilanterol (TRELEGY) 100-62 5-25 MCG/INH inhaler, Inhale 1 puff daily Rinse mouth after use , Disp: 3 Inhaler, Rfl: 5    guaiFENesin (MUCINEX) 600 mg 12 hr tablet, Take 1,200 mg by mouth 2 (two) times a day as needed, Disp: , Rfl:     hydrochlorothiazide (HYDRODIURIL) 25 mg tablet, Take 1 tablet (25 mg total) by mouth daily, Disp: 90 tablet, Rfl: 1    ipratropium-albuterol (DUO-NEB) 0 5-2 5 mg/3 mL nebulizer solution, Take 1 vial (3 mL total) by nebulization every 6 (six) hours, Disp: 1 mL, Rfl: 0    loratadine (CLARITIN REDITABS) 10 MG dissolvable tablet, Take 10 mg by mouth daily as needed for allergies  , Disp: , Rfl:     Multiple Vitamins-Minerals (CENTRUM ADULTS PO), Take 1 tablet by mouth daily  , Disp: , Rfl:     simvastatin (ZOCOR) 20 mg tablet, Take 1 tablet (20 mg total) by mouth daily at bedtime, Disp: 90 tablet, Rfl: 3    tamsulosin (FLOMAX) 0 4 mg, Take 1 capsule (0 4 mg total) by mouth daily with dinner, Disp: 90 capsule, Rfl: 3  Allergies   Allergen Reactions    Bee Venom Facial Swelling    Other Rash     [facility] - [id number]: mushrooms    Penicillins Rash       Vitals: Blood pressure 120/60, pulse (!) 108, temperature 98 3 °F (36 8 °C), resp  rate 14, height 5' 9" (1 753 m), weight 74 4 kg (164 lb), SpO2 93 %  Body mass index is 24 22 kg/m²  Oxygen Therapy  SpO2: 93 %  Oxygen Therapy: None (Room air)    Physical Exam   Constitutional: He is oriented to person, place, and time  He appears well-developed and well-nourished  No distress  HENT:   Head: Normocephalic and atraumatic  Mouth/Throat: Oropharynx is clear and moist  No oropharyngeal exudate  Eyes: EOM are normal  No scleral icterus  Cardiovascular: Normal rate, regular rhythm and normal heart sounds  No murmur heard  Pulmonary/Chest: Effort normal  No respiratory distress  He has no wheezes  Diminished breath sounds throughout    Abdominal: Soft  Bowel sounds are normal  He exhibits no distension  There is no tenderness  Musculoskeletal: He exhibits no edema  Neurological: He is alert and oriented to person, place, and time  Skin: He is not diaphoretic  Labs: I have personally reviewed pertinent lab results    Lab Results   Component Value Date    WBC 8 04 06/21/2019    HGB 14 9 06/21/2019    HCT 48 1 06/21/2019    MCV 93 06/21/2019     06/21/2019     Lab Results   Component Value Date    CALCIUM 8 9 08/26/2019    K 4 2 08/26/2019    CO2 30 08/26/2019     08/26/2019    BUN 15 08/26/2019    CREATININE 1 14 08/26/2019     No results found for: IGE  Lab Results   Component Value Date    ALT 27 06/21/2019    AST 19 06/21/2019    ALKPHOS 92 06/21/2019 Imaging and other studies: I have personally reviewed pertinent reports      CT lung cancer screening 8/7/19  Stable benign 2 mm left lower lobe pulmonary nodule  Benign fissural nodule  Moderate to severe emphysema  Recommend repeat annual screening    Pulmonary function testing:   Spirometry 01/03/2019  FEV1/FVC 34%  FEV1 23% predicted  FVC 49% predicted  Consistent with severe obstructive airflow limitation        Shakila Shea MD  Pulmonary & Critical Care Fellow, [facility]

## 2019-10-07 NOTE — ASSESSMENT & PLAN NOTE
- most recent CT chest for lung cancer screening showed a stable benign 2 mm left lower lobe pulmonary nodule and benign nodule in the fissure  - repeat routine 1 year follow-up for lung cancer screening in August 2020

## 2019-11-29 ENCOUNTER — LAB (OUTPATIENT)
Dept: LAB | Facility: OTHER | Age: 69
End: 2019-11-29
Payer: MEDICARE

## 2019-11-29 ENCOUNTER — TRANSCRIBE ORDERS (OUTPATIENT)
Dept: LAB | Facility: OTHER | Age: 69
End: 2019-11-29

## 2019-11-29 DIAGNOSIS — E78.2 MIXED HYPERLIPIDEMIA: ICD-10-CM

## 2019-11-29 DIAGNOSIS — I10 ESSENTIAL HYPERTENSION: ICD-10-CM

## 2019-11-29 DIAGNOSIS — N13.8 BPH WITH OBSTRUCTION/LOWER URINARY TRACT SYMPTOMS: ICD-10-CM

## 2019-11-29 DIAGNOSIS — N40.1 BPH WITH OBSTRUCTION/LOWER URINARY TRACT SYMPTOMS: ICD-10-CM

## 2019-11-29 LAB
ALBUMIN SERPL BCP-MCNC: 4.5 G/DL (ref 3.5–5)
ALP SERPL-CCNC: 86 U/L (ref 46–116)
ALT SERPL W P-5'-P-CCNC: 23 U/L (ref 12–78)
ANION GAP SERPL CALCULATED.3IONS-SCNC: 6 MMOL/L (ref 4–13)
AST SERPL W P-5'-P-CCNC: 20 U/L (ref 5–45)
BILIRUB SERPL-MCNC: 0.7 MG/DL (ref 0.2–1)
BUN SERPL-MCNC: 12 MG/DL (ref 5–25)
CALCIUM SERPL-MCNC: 9.6 MG/DL (ref 8.3–10.1)
CHLORIDE SERPL-SCNC: 103 MMOL/L (ref 100–108)
CHOLEST SERPL-MCNC: 213 MG/DL (ref 50–200)
CO2 SERPL-SCNC: 31 MMOL/L (ref 21–32)
CREAT SERPL-MCNC: 1.24 MG/DL (ref 0.6–1.3)
GFR SERPL CREATININE-BSD FRML MDRD: 59 ML/MIN/1.73SQ M
GLUCOSE P FAST SERPL-MCNC: 108 MG/DL (ref 65–99)
HDLC SERPL-MCNC: 56 MG/DL
LDLC SERPL CALC-MCNC: 133 MG/DL (ref 0–100)
POTASSIUM SERPL-SCNC: 4.1 MMOL/L (ref 3.5–5.3)
PROT SERPL-MCNC: 7.7 G/DL (ref 6.4–8.2)
PSA SERPL-MCNC: 0.9 NG/ML (ref 0–4)
SODIUM SERPL-SCNC: 140 MMOL/L (ref 136–145)
TRIGL SERPL-MCNC: 121 MG/DL

## 2019-11-29 PROCEDURE — 80053 COMPREHEN METABOLIC PANEL: CPT

## 2019-11-29 PROCEDURE — 80061 LIPID PANEL: CPT

## 2019-11-29 PROCEDURE — 36415 COLL VENOUS BLD VENIPUNCTURE: CPT

## 2019-11-29 PROCEDURE — 84153 ASSAY OF PSA TOTAL: CPT

## 2019-12-13 ENCOUNTER — TRANSCRIBE ORDERS (OUTPATIENT)
Dept: LAB | Facility: OTHER | Age: 69
End: 2019-12-13

## 2019-12-13 ENCOUNTER — APPOINTMENT (OUTPATIENT)
Dept: LAB | Facility: OTHER | Age: 69
End: 2019-12-13
Payer: MEDICARE

## 2019-12-13 ENCOUNTER — OFFICE VISIT (OUTPATIENT)
Dept: INTERNAL MEDICINE CLINIC | Facility: CLINIC | Age: 69
End: 2019-12-13
Payer: MEDICARE

## 2019-12-13 VITALS
HEIGHT: 68 IN | BODY MASS INDEX: 25.07 KG/M2 | OXYGEN SATURATION: 97 % | SYSTOLIC BLOOD PRESSURE: 124 MMHG | HEART RATE: 101 BPM | TEMPERATURE: 97.8 F | WEIGHT: 165.4 LBS | DIASTOLIC BLOOD PRESSURE: 86 MMHG

## 2019-12-13 DIAGNOSIS — R06.00 DOE (DYSPNEA ON EXERTION): ICD-10-CM

## 2019-12-13 DIAGNOSIS — R53.83 OTHER FATIGUE: ICD-10-CM

## 2019-12-13 DIAGNOSIS — J44.9 CHRONIC OBSTRUCTIVE PULMONARY DISEASE, UNSPECIFIED COPD TYPE (HCC): ICD-10-CM

## 2019-12-13 DIAGNOSIS — I10 ESSENTIAL HYPERTENSION: ICD-10-CM

## 2019-12-13 DIAGNOSIS — R73.01 IMPAIRED FASTING GLUCOSE: ICD-10-CM

## 2019-12-13 DIAGNOSIS — R00.0 INCREASED HEART RATE: ICD-10-CM

## 2019-12-13 DIAGNOSIS — E78.2 MIXED HYPERLIPIDEMIA: ICD-10-CM

## 2019-12-13 DIAGNOSIS — R73.01 IMPAIRED FASTING GLUCOSE: Primary | ICD-10-CM

## 2019-12-13 LAB
BASOPHILS # BLD AUTO: 0.06 THOUSANDS/ΜL (ref 0–0.1)
BASOPHILS NFR BLD AUTO: 1 % (ref 0–1)
EOSINOPHIL # BLD AUTO: 0.14 THOUSAND/ΜL (ref 0–0.61)
EOSINOPHIL NFR BLD AUTO: 2 % (ref 0–6)
ERYTHROCYTE [DISTWIDTH] IN BLOOD BY AUTOMATED COUNT: 13.1 % (ref 11.6–15.1)
EST. AVERAGE GLUCOSE BLD GHB EST-MCNC: 114 MG/DL
HBA1C MFR BLD: 5.6 % (ref 4.2–6.3)
HCT VFR BLD AUTO: 51.4 % (ref 36.5–49.3)
HGB BLD-MCNC: 16.3 G/DL (ref 12–17)
IMM GRANULOCYTES # BLD AUTO: 0.05 THOUSAND/UL (ref 0–0.2)
IMM GRANULOCYTES NFR BLD AUTO: 1 % (ref 0–2)
LYMPHOCYTES # BLD AUTO: 1.54 THOUSANDS/ΜL (ref 0.6–4.47)
LYMPHOCYTES NFR BLD AUTO: 17 % (ref 14–44)
MCH RBC QN AUTO: 28.8 PG (ref 26.8–34.3)
MCHC RBC AUTO-ENTMCNC: 31.7 G/DL (ref 31.4–37.4)
MCV RBC AUTO: 91 FL (ref 82–98)
MONOCYTES # BLD AUTO: 0.72 THOUSAND/ΜL (ref 0.17–1.22)
MONOCYTES NFR BLD AUTO: 8 % (ref 4–12)
NEUTROPHILS # BLD AUTO: 6.65 THOUSANDS/ΜL (ref 1.85–7.62)
NEUTS SEG NFR BLD AUTO: 71 % (ref 43–75)
NRBC BLD AUTO-RTO: 0 /100 WBCS
PLATELET # BLD AUTO: 293 THOUSANDS/UL (ref 149–390)
PMV BLD AUTO: 11 FL (ref 8.9–12.7)
RBC # BLD AUTO: 5.66 MILLION/UL (ref 3.88–5.62)
TSH SERPL DL<=0.05 MIU/L-ACNC: 3.25 UIU/ML (ref 0.36–3.74)
WBC # BLD AUTO: 9.16 THOUSAND/UL (ref 4.31–10.16)

## 2019-12-13 PROCEDURE — 83036 HEMOGLOBIN GLYCOSYLATED A1C: CPT

## 2019-12-13 PROCEDURE — 85025 COMPLETE CBC W/AUTO DIFF WBC: CPT

## 2019-12-13 PROCEDURE — 84443 ASSAY THYROID STIM HORMONE: CPT

## 2019-12-13 PROCEDURE — 99214 OFFICE O/P EST MOD 30 MIN: CPT | Performed by: PHYSICIAN ASSISTANT

## 2019-12-13 PROCEDURE — 93000 ELECTROCARDIOGRAM COMPLETE: CPT | Performed by: PHYSICIAN ASSISTANT

## 2019-12-13 PROCEDURE — 36415 COLL VENOUS BLD VENIPUNCTURE: CPT

## 2019-12-13 RX ORDER — ATORVASTATIN CALCIUM 20 MG/1
20 TABLET, FILM COATED ORAL DAILY
Qty: 90 TABLET | Refills: 3 | Status: SHIPPED | OUTPATIENT
Start: 2019-12-13 | End: 2020-02-12

## 2019-12-13 NOTE — ASSESSMENT & PLAN NOTE
EKG performed today in the office  EKG showing normal sinus rhythm with incomplete right bundle branch block  Flattened T-waves  Sent for exercise stress test to further evaluate

## 2019-12-13 NOTE — ASSESSMENT & PLAN NOTE
Patient with nonspecific fatigue that has been intermittent for the last several weeks  Will check CBC TSH A1c as these were not recently checked  Reviewed CBC and lipids  See plan as discussed above

## 2019-12-13 NOTE — ASSESSMENT & PLAN NOTE
Blood pressure well controlled at this time on hydrochlorothiazide  No dose change    Will continue to follow

## 2019-12-13 NOTE — PROGRESS NOTES
[facility] [place]  Patient ID: Branhamseda Alfred Held    : 1950  Age/Gender: 71 y o  male     DATE: 12/15/2019      Assessment/Plan:    Impaired fasting glucose  Reviewed patient's recent labs  Fasting glucose was at 1 0 weight  Mildly elevated  Will check hemoglobin A1c to further evaluate for any hyper glycemia versus diabetes    COPD with emphysema  Patient follows regularly with pulmonology  Last visit with pulmonologist was in October  He completed pulmonary rehab approximately 1 year ago  Not currently on any supple in all oxygen  Pulse ox stable today in office  Will start by checking exercise stress test   Discussed may need ambulatory oxygen study  Will hold on this at this time    Essential hypertension  Blood pressure well controlled at this time on hydrochlorothiazide  No dose change  Will continue to follow    Hyperlipidemia  Patient having some muscular aches in his shoulder  Unlikely attributed to statin due to symptoms history however due to current cholesterol levels will switch simvastatin 20 mg daily to Lipitor 20 mg daily  Discussed this can help with cholesterol levels  Continue with low-fat low-cholesterol diet  Patient is going to try to work on reducing soda intake    Other fatigue  Patient with nonspecific fatigue that has been intermittent for the last several weeks  Will check CBC TSH A1c as these were not recently checked  Reviewed CBC and lipids  See plan as discussed above  SANTIAGO (dyspnea on exertion)  EKG performed today in the office  EKG showing normal sinus rhythm with incomplete right bundle branch block  Flattened T-waves  Sent for exercise stress test to further evaluate  Diagnoses and all orders for this visit:    Impaired fasting glucose  -     HEMOGLOBIN A1C W/ EAG ESTIMATION;  Future    Chronic obstructive pulmonary disease, unspecified COPD type ([address] )    Essential hypertension    Other fatigue  -     TSH, 3rd generation with Free T4 reflex; Future  -     CBC and differential; Future  -     Creatine Kinase, Total; Future  -     POCT ECG  -     Stress test only, exercise; Future    Mixed hyperlipidemia  -     atorvastatin (LIPITOR) 20 mg tablet; Take 1 tablet (20 mg total) by mouth daily    Increased heart rate  -     POCT ECG    SANTIAGO (dyspnea on exertion)  -     Stress test only, exercise; Future          BMI Counseling: Body mass index is 25 15 kg/m²  The BMI is above normal  Nutrition recommendations include decreasing portion sizes, encouraging healthy choices of fruits and vegetables, limiting drinks that contain sugar and reducing intake of cholesterol  Exercise recommendations include exercising 3-5 times per week  No pharmacotherapy was ordered  Patient referred to PCP due to patient being overweight  Subjective:   Chief Complaint   Patient presents with    Follow-up     [facility] 11/29/2019, no refills    Muscle Pain     Pt c/o muscle pain across shoulders x2 5-3wks         Whitney Reed is a 71 y o  male who presents to the office on 12/15/2019 for     For follow up for chronic conditions  He notes with daylight savings time he notes he is trying to get used to the time change  He has been taking up at 7 AM and is having some problems getting motivated in the morning  He notes that he feels well but notes he is a little more tired with the change in time  He notes his breathing has not been too bad  With cool weather he has been trying to take his time more  He notes he had a cough and congestion which has since passed  Notes that he has been trying to drink more and feels his inhalers dry out his mouth  No production to cough  No fever or chills  Here to review labs  Notes he has some tightness in the muscles in his shoulders at his trapezius muscles  No trauma  No other muscles aches or pains  Muscle aches worse after a hockey game    Has to sick with his neck turned to one side based on the room he is in to watch the game  Has not done any new lifting or straining  Muscle aches in shoulders has been for 3 weeks  Has had discomfort on and off for the last few years in his shoulders and upper back  Now occurring more frequently  No recent med changes  Follows with pulm, had recent visit in October  Pulm rehab about 1 year ago  He notes fatigue effects him very little  Goes to dinners, hockey games enjoys doing things  Feels he does worry if he would get a coughing spell or COPD attack when he is out of the home  Has his inhalers which he knows how to use and uses when he needs them  He notes his shoulders and upper back are worse after attending his grandsons hockey games when he has to keep his head and neck turned to one side the entire time  Muscle Pain   This is a recurrent problem  The current episode started 1 to 4 weeks ago  The problem occurs every several days  The problem has been waxing and waning since onset  The pain is present in the neck (bilateral shoulder)  The pain is mild  Associated symptoms include fatigue, shortness of breath and wheezing  Pertinent negatives include no abdominal pain, chest pain, constipation, diarrhea, dysuria, fever, headaches, joint swelling, nausea, rash, stiffness, vomiting or weakness  Past treatments include nothing  The treatment provided mild relief  There is no swelling present  Breathing Problem   He complains of difficulty breathing, shortness of breath and wheezing  There is no chest tightness, cough, frequent throat clearing, hoarse voice or sputum production  This is a chronic problem  The problem occurs intermittently  The problem has been waxing and waning  Associated symptoms include dyspnea on exertion (chronic unchanged) and myalgias  Pertinent negatives include no chest pain, fever, headaches, heartburn, postnasal drip, sore throat or weight loss   His symptoms are aggravated by emotional stress, exercise, URI and strenuous activity (cold air)  His symptoms are not alleviated by ipratropium, steroid inhaler and beta-agonist  His past medical history is significant for COPD  Fatigue   The current episode started more than 1 month ago  The problem has been waxing and waning  Associated symptoms include fatigue, myalgias and neck pain  Pertinent negatives include no abdominal pain, chest pain, chills, coughing, fever, headaches, joint swelling, nausea, rash, sore throat, vomiting or weakness  He has tried nothing for the symptoms  The treatment provided mild relief  The following portions of the patient's history were reviewed and updated as appropriate: allergies, current medications, past family history, past medical history, past social history, past surgical history and problem list     Review of Systems   Constitutional: Positive for fatigue  Negative for chills, fever, unexpected weight change and weight loss  HENT: Negative for hoarse voice, postnasal drip, sinus pressure, sinus pain and sore throat  Eyes: Negative for visual disturbance  Respiratory: Positive for shortness of breath and wheezing  Negative for cough, sputum production and chest tightness  With exertion  He has been careful not to "over-do it"   Cardiovascular: Positive for dyspnea on exertion (chronic unchanged)  Negative for chest pain, palpitations and leg swelling  Gastrointestinal: Negative for abdominal pain, constipation, diarrhea, heartburn, nausea and vomiting  Genitourinary: Negative for dysuria and frequency  Musculoskeletal: Positive for myalgias, neck pain and neck stiffness  Negative for joint swelling and stiffness  Skin: Negative for rash  Neurological: Negative for dizziness, syncope, weakness, light-headedness and headaches  Psychiatric/Behavioral: Negative for agitation          Denies depression         Patient Active Problem List   Diagnosis    Hyperlipidemia    COPD with emphysema    A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 (HCC)    Arterial ectasia (HCC)    Diverticulosis, sigmoid    SANTIAGO (dyspnea on exertion)    Hearing loss    Hemorrhoids    Lung nodule    Impaired fasting glucose    Skin disorder    BPH with obstruction/lower urinary tract symptoms    Essential hypertension    Seasonal allergies    Medicare annual wellness visit, subsequent    Other microscopic hematuria    Encounter for screening for lung cancer    Other fatigue       Past Medical History:   Diagnosis Date    BPH (benign prostatic hyperplasia)     COPD (chronic obstructive pulmonary disease) (HCC)     Last Assessed:11/14/2016    Diverticulosis     Elevated prostate specific antigen (PSA)     Emphysema (subcutaneous) (surgical) resulting from a procedure     Enlarged prostate with lower urinary tract symptoms (LUTS)     Resolved:8/22/2017    Hernia, inguinal, right        Past Surgical History:   Procedure Laterality Date    BICEPS TENDON REPAIR  1999    COLONOSCOPY      HAND SURGERY      Last Assessed:7/11/2016    HERNIA REPAIR      Last Assessed:7/11/2016    KNEE ARTHROSCOPY      VA REPAIR ING HERNIA,5+Y/O,REDUCIBL Right 2/23/2016    Procedure: REPAIR HERNIA INGUINAL with mesh;  Surgeon: Regulo Plummer DO;  [facility]: [facility];  Service: General    PROSTATE BIOPSY      WISDOM TOOTH EXTRACTION           Current Outpatient Medications:     albuterol (PROVENTIL HFA,VENTOLIN HFA) 90 mcg/act inhaler, Inhale 2 puffs every 6 (six) hours as needed for wheezing, Disp: 3 Inhaler, Rfl: 5    EPINEPHrine (EPIPEN) 0 3 mg/0 3 mL SOAJ, Inject 0 3 mL (0 3 mg total) into a muscle once for 1 dose, Disp: 0 6 mL, Rfl: 0    finasteride (PROSCAR) 5 mg tablet, Take 1 tablet (5 mg total) by mouth daily, Disp: 90 tablet, Rfl: 3    fluticasone (FLONASE) 50 mcg/act nasal spray, 1 spray into each nostril daily, Disp: 1 Bottle, Rfl: 0    fluticasone-umeclidinium-vilanterol (TRELEGY) 100-62 5-25 MCG/INH inhaler, Inhale 1 puff daily Rinse mouth after use , Disp: 3 Inhaler, Rfl: 5    guaiFENesin (MUCINEX) 600 mg 12 hr tablet, Take 1,200 mg by mouth 2 (two) times a day as needed, Disp: , Rfl:     hydrochlorothiazide (HYDRODIURIL) 25 mg tablet, Take 1 tablet (25 mg total) by mouth daily, Disp: 90 tablet, Rfl: 1    ipratropium-albuterol (DUO-NEB) 0 5-2 5 mg/3 mL nebulizer solution, Take 1 vial (3 mL total) by nebulization every 6 (six) hours, Disp: 1 mL, Rfl: 0    loratadine (CLARITIN REDITABS) 10 MG dissolvable tablet, Take 10 mg by mouth daily as needed for allergies  , Disp: , Rfl:     Multiple Vitamins-Minerals (CENTRUM ADULTS PO), Take 1 tablet by mouth daily  , Disp: , Rfl:     tamsulosin (FLOMAX) 0 4 mg, Take 1 capsule (0 4 mg total) by mouth daily with dinner, Disp: 90 capsule, Rfl: 3    atorvastatin (LIPITOR) 20 mg tablet, Take 1 tablet (20 mg total) by mouth daily, Disp: 90 tablet, Rfl: 3    Allergies   Allergen Reactions    Bee Venom Facial Swelling    Other Rash     Annotation - [id number]: mushrooms    Penicillins Rash       Social History     Socioeconomic History    Marital status: /Civil Union     Spouse name: None    Number of children: None    Years of education: None    Highest education level: None   Occupational History    None   Social Needs    Financial resource strain: None    Food insecurity:     Worry: None     Inability: None    Transportation needs:     Medical: None     Non-medical: None   Tobacco Use    Smoking status: Former Smoker     Packs/day: 1 50     Years: 49 00     Pack years: 73 50     Types: Cigarettes     Last attempt to quit: 2015     Years since quittin 0    Smokeless tobacco: Never Used   Substance and Sexual Activity    Alcohol use: Yes     Frequency: Monthly or less     Drinks per session: 1 or 2     Binge frequency: Never     Comment: rare    Drug use: No    Sexual activity: Yes   Lifestyle    Physical activity:     Days per week: None Minutes per session: None    Stress: None   Relationships    Social connections:     Talks on phone: None     Gets together: None     Attends Protestant service: None     Active member of club or organization: None     Attends meetings of clubs or organizations: None     Relationship status: None    Intimate partner violence:     Fear of current or ex partner: None     Emotionally abused: None     Physically abused: None     Forced sexual activity: None   Other Topics Concern    None   Social History Narrative    Advance directive on file       Family History   Problem Relation Age of Onset    Hypertension Mother        PHQ-9 Depression Screening    PHQ-9:    Frequency of the following problems over the past two weeks:       Little interest or pleasure in doing things:  0 - not at all  Feeling down, depressed, or hopeless:  0 - not at all  PHQ-2 Score:  0         Health Maintenance   Topic Date Due    SLP PLAN OF CARE  1950    DTaP,Tdap,and Td Vaccines (1 - Tdap) 11/21/1961    Fall Risk  08/09/2020    Medicare Annual Wellness Visit (AWV)  08/09/2020    Depression Screening PHQ  12/13/2020    BMI: Followup Plan  12/13/2020    BMI: Adult  12/13/2020    CRC Screening: Colonoscopy  07/25/2026    Hepatitis C Screening  Completed    Influenza Vaccine  Completed    Pneumococcal Vaccine: 65+ Years  Completed    Pneumococcal Vaccine: Pediatrics (0 to 5 Years) and At-Risk Patients (6 to 59 Years)  Aged Out    HIB Vaccine  Aged Out    Hepatitis B Vaccine  Aged Out    IPV Vaccine  Aged Out    Hepatitis A Vaccine  Aged Out    Meningococcal ACWY Vaccine  Aged Out    HPV Vaccine  Aged [employer] History   Administered Date(s) Administered    INFLUENZA 11/06/2006, 09/28/2007, 11/01/2016, 10/10/2017    Influenza Split High Dose Preservative Free IM 11/01/2016, 10/10/2017    Influenza, high dose seasonal 0 5 mL 10/19/2018, 10/03/2019    Pneumococcal Conjugate 13-Valent 07/11/2016, 06/12/2019    Pneumococcal Polysaccharide PPV23 10/10/2017    Zoster Vaccine Recombinant 03/28/2019, 06/12/2019        Objective:  Vitals:    12/13/19 0855   BP: 124/86   BP Location: Left arm   Patient Position: Sitting   Cuff Size: Adult   Pulse: 101   Temp: 97 8 °F (36 6 °C)   TempSrc: Oral   SpO2: 97%   Weight: 75 kg (165 lb 6 4 oz)   Height: 5' 8" (1 727 m)     Wt Readings from Last 3 Encounters:   12/13/19 75 kg (165 lb 6 4 oz)   10/07/19 74 4 kg (164 lb)   09/13/19 74 2 kg (163 lb 9 6 oz)     Body mass index is 25 15 kg/m²  No exam data present       Physical Exam   Constitutional: He appears well-developed and well-nourished  No distress  Alert, pleasant cooperative  Seated in room in NAD   HENT:   Head: Normocephalic and atraumatic  Mouth/Throat: Oropharynx is clear and moist  No oropharyngeal exudate  MMM, normal pharynx  Eyes: Pupils are equal, round, and reactive to light  Right eye exhibits no discharge  Left eye exhibits no discharge  No scleral icterus  Neck: Neck supple  No JVD present  No tracheal deviation present  Cardiovascular: Normal rate, regular rhythm and normal heart sounds  No murmur heard  Pulmonary/Chest: Effort normal  No accessory muscle usage or stridor  No tachypnea  No respiratory distress  He has decreased breath sounds (throughout)  He has no wheezes  He has no rhonchi  He has no rales  Abdominal: Soft  Bowel sounds are normal  He exhibits no distension  There is no tenderness  There is no guarding  Soft, NT/ND   +BS   Musculoskeletal: He exhibits no edema  Cervical back: He exhibits tenderness  He exhibits normal range of motion, no bony tenderness, no swelling and no edema  Back:    Lymphadenopathy:     He has no cervical adenopathy  Neurological: He is alert  Alert and cooperative   Skin: Skin is warm and dry  He is not diaphoretic  Psychiatric: He has a normal mood and affect   His behavior is normal  Thought content normal    Nursing note and vitals reviewed  Future Appointments   Date Time Provider Isacc Capellan   12/19/2019 11:00 AM [facility] STRESS 2 BE [facility] Car [address]   1/10/2020  9:30 AM Uriel Posada PA-C [facility] [place]   8/17/2020 10:30 AM BE CT SLN 1 BE SLN [facility]   9/4/2020 10:30 AM Rajeev Ferguson MD [facility]       Uriel Posada PA-C  [address]    Patient Care Team:  Uriel Posada PA-C as PCP - General (Internal Medicine)  MD Brenda Oneal MD Jeoffrey Heather, MD (Urology)    This note was completed in part utilizing M-Modal Fluency Direct Software  Grammatical errors, random word insertions, spelling mistakes, and incomplete sentences can be an occasional consequence of this system secondary to software limitations, ambient noise, and hardware issues  If you have any questions or concerns about the content, text, or information contained within the body of this dictation, please contact the provider for clarification

## 2019-12-13 NOTE — ASSESSMENT & PLAN NOTE
Patient follows regularly with pulmonology  Last visit with pulmonologist was in October  He completed pulmonary rehab approximately 1 year ago  Not currently on any supple in all oxygen  Pulse ox stable today in office  Will start by checking exercise stress test   Discussed may need ambulatory oxygen study    Will hold on this at this time Principal Discharge DX:	Nausea and vomiting

## 2019-12-13 NOTE — ASSESSMENT & PLAN NOTE
Patient having some muscular aches in his shoulder  Unlikely attributed to statin due to symptoms history however due to current cholesterol levels will switch simvastatin 20 mg daily to Lipitor 20 mg daily  Discussed this can help with cholesterol levels  Continue with low-fat low-cholesterol diet    Patient is going to try to work on reducing soda intake

## 2019-12-13 NOTE — PATIENT INSTRUCTIONS
Impaired fasting glucose  Reviewed patient's recent labs  Fasting glucose was at 1 0 weight  Mildly elevated  Will check hemoglobin A1c to further evaluate for any hyper glycemia versus diabetes    COPD with emphysema  Patient follows regularly with pulmonology  Last visit with pulmonologist was in October  He completed pulmonary rehab approximately 1 year ago  Not currently on any supple in all oxygen  Pulse ox stable today in office  Will start by checking exercise stress test   Discussed may need ambulatory oxygen study  Will hold on this at this time    Essential hypertension  Blood pressure well controlled at this time on hydrochlorothiazide  No dose change  Will continue to follow    Hyperlipidemia  Patient having some muscular aches in his shoulder  Unlikely attributed to statin due to symptoms history however due to current cholesterol levels will switch simvastatin 20 mg daily to Lipitor 20 mg daily  Discussed this can help with cholesterol levels  Continue with low-fat low-cholesterol diet  Patient is going to try to work on reducing soda intake    Other fatigue  Patient with nonspecific fatigue that has been intermittent for the last several weeks  Will check CBC TSH A1c as these were not recently checked  Reviewed CBC and lipids  See plan as discussed above

## 2019-12-13 NOTE — ASSESSMENT & PLAN NOTE
Reviewed patient's recent labs  Fasting glucose was at 1 0 weight  Mildly elevated    Will check hemoglobin A1c to further evaluate for any hyper glycemia versus diabetes

## 2019-12-16 DIAGNOSIS — I71.4 ABDOMINAL AORTIC ANEURYSM WITHOUT RUPTURE (HCC): Primary | ICD-10-CM

## 2019-12-19 ENCOUNTER — HOSPITAL ENCOUNTER (OUTPATIENT)
Dept: NON INVASIVE DIAGNOSTICS | Facility: CLINIC | Age: 69
Discharge: HOME | End: 2019-12-19
Payer: MEDICARE

## 2019-12-19 DIAGNOSIS — R06.00 DOE (DYSPNEA ON EXERTION): ICD-10-CM

## 2019-12-19 DIAGNOSIS — R53.83 OTHER FATIGUE: ICD-10-CM

## 2019-12-19 LAB
CHEST PAIN STATEMENT: NORMAL
MAX DIASTOLIC BP: 108 MMHG
MAX HEART RATE: 117 BPM
MAX PREDICTED HEART RATE: 151 BPM
MAX. SYSTOLIC BP: 176 MMHG
PROTOCOL NAME: NORMAL
REASON FOR TERMINATION: NORMAL
TARGET HR FORMULA: NORMAL
TEST INDICATION: NORMAL
TIME IN EXERCISE PHASE: NORMAL

## 2019-12-19 PROCEDURE — 93017 CV STRESS TEST TRACING ONLY: CPT

## 2019-12-19 PROCEDURE — 93018 CV STRESS TEST I&R ONLY: CPT | Performed by: INTERNAL MEDICINE

## 2019-12-19 PROCEDURE — 93016 CV STRESS TEST SUPVJ ONLY: CPT | Performed by: INTERNAL MEDICINE

## 2019-12-20 DIAGNOSIS — J44.9 CHRONIC OBSTRUCTIVE PULMONARY DISEASE, UNSPECIFIED COPD TYPE (HCC): ICD-10-CM

## 2019-12-20 DIAGNOSIS — I47.2 NSVT (NONSUSTAINED VENTRICULAR TACHYCARDIA) (HCC): Primary | ICD-10-CM

## 2019-12-20 DIAGNOSIS — R94.31 ABNORMAL ECG DURING EXERCISE STRESS TEST: ICD-10-CM

## 2019-12-20 DIAGNOSIS — I77.89 ARTERIAL ECTASIA (HCC): ICD-10-CM

## 2019-12-20 DIAGNOSIS — I10 ESSENTIAL HYPERTENSION: ICD-10-CM

## 2019-12-20 PROBLEM — I47.29 NSVT (NONSUSTAINED VENTRICULAR TACHYCARDIA): Status: ACTIVE | Noted: 2019-12-20

## 2019-12-20 NOTE — RESULT ENCOUNTER NOTE
Called with patient and discussed stress test results  Exercise stress test equivocal study  Nondiagnostic EKG for ischemia however patient spent short amount of time on the treadmill  EKG during stress test did show 2 episodes of nonsustained V-tach of 4 beats each  Discussed with patient treatment options including nuclear stress test versus further evaluation with Cardiology before proceeding for additional cardiac imaging  Patient verbalized understanding and is interested in 1st meeting with Cardiology as his wife also currently follows with [facility]  Referral placed today for [facility]    Discussed red flag symptoms and ER precautions which need immediate evaluation and treatment should they develop

## 2019-12-27 ENCOUNTER — OFFICE VISIT (OUTPATIENT)
Dept: INTERNAL MEDICINE CLINIC | Facility: CLINIC | Age: 69
End: 2019-12-27
Payer: MEDICARE

## 2019-12-27 VITALS
HEART RATE: 98 BPM | WEIGHT: 164 LBS | BODY MASS INDEX: 24.86 KG/M2 | OXYGEN SATURATION: 94 % | SYSTOLIC BLOOD PRESSURE: 136 MMHG | DIASTOLIC BLOOD PRESSURE: 100 MMHG | TEMPERATURE: 98.4 F | HEIGHT: 68 IN

## 2019-12-27 DIAGNOSIS — J44.9 CHRONIC OBSTRUCTIVE PULMONARY DISEASE, UNSPECIFIED COPD TYPE (HCC): ICD-10-CM

## 2019-12-27 DIAGNOSIS — J00 COMMON COLD: Primary | ICD-10-CM

## 2019-12-27 PROCEDURE — 99213 OFFICE O/P EST LOW 20 MIN: CPT | Performed by: FAMILY MEDICINE

## 2019-12-27 NOTE — PROGRESS NOTES
Assessment/Plan:    No problem-specific Assessment & Plan notes found for this encounter  Problem List Items Addressed This Visit        Respiratory    COPD with emphysema    Common cold - Primary              Supportive care, no signs of bacterial infection  Okay to continue with Coricidin HBP and add Mucinex or antihistamine  Keep up with water intake  Call if any problems  Lungs are clear today  Subjective:      Patient ID: Vinod Mcgowan is a 71 y o  male  HPI  Nasal Congestion (Patient is here c/o nasal and chest congestion, cough with green discharge  SX are going on for 3 days )  No sick contacts and no flu contacts  Using Coricidin HBP over-the-counter and feels that he is getting better today  Eating and drinking okay  Previous smoking history of approximately 40-50 years at 1 and half packs per day  The following portions of the patient's history were reviewed and updated as appropriate: allergies, current medications, past family history, past medical history, past social history, past surgical history and problem list     Review of Systems      Constitutional:  Denies fever or chills   Eyes:  Denies double or blurry vision  HENT:  Denies  sore throat   Respiratory:  Denies  shortness of breath or wheezing  Cardiovascular:  Denies palpitations or chest pain  GI:  Denies abdominal pain, nausea, or vomiting  Integument:  Denies rash , no open areas  Neurologic:  Denies headache or focal weakness        Objective:      /100 (BP Location: Left arm, Patient Position: Sitting, Cuff Size: Standard)   Pulse 98   Temp 98 4 °F (36 9 °C) (Oral)   Ht 5' 8" (1 727 m)   Wt 74 4 kg (164 lb)   SpO2 94%   BMI 24 94 kg/m²          Physical Exam      Constitutional:  Well developed, well nourished, no acute distress, non-toxic appearance   Eyes:  PERRL, conjunctiva normal , non icteric sclera  HENT:  Atraumatic, oropharynx moist  Neck-  supple no sinus tenderness to palpation    Mild postnasal drip noted in posterior pharynx  Respiratory:  CTA b/l, normal breath sounds, no rales, no wheezing   Cardiovascular:  RRR, no murmurs, no LE edema b/l  GI:  Soft, nondistended, normal bowel sounds x 4, nontender, no organomegaly, no mass, no rebound, no guarding   Neurologic:  no focal deficits noted   Psychiatric:  Speech and behavior appropriate , AAO x 3

## 2020-01-10 ENCOUNTER — OFFICE VISIT (OUTPATIENT)
Dept: INTERNAL MEDICINE CLINIC | Age: 70
End: 2020-01-10
Payer: MEDICARE

## 2020-01-10 VITALS
SYSTOLIC BLOOD PRESSURE: 124 MMHG | HEART RATE: 77 BPM | TEMPERATURE: 97.6 F | BODY MASS INDEX: 25.46 KG/M2 | WEIGHT: 168 LBS | DIASTOLIC BLOOD PRESSURE: 80 MMHG | HEIGHT: 68 IN | OXYGEN SATURATION: 97 %

## 2020-01-10 DIAGNOSIS — R73.01 IMPAIRED FASTING GLUCOSE: Primary | ICD-10-CM

## 2020-01-10 DIAGNOSIS — R94.31 ABNORMAL ECG DURING EXERCISE STRESS TEST: ICD-10-CM

## 2020-01-10 DIAGNOSIS — I10 ESSENTIAL HYPERTENSION: ICD-10-CM

## 2020-01-10 DIAGNOSIS — E78.2 MIXED HYPERLIPIDEMIA: ICD-10-CM

## 2020-01-10 PROCEDURE — 99213 OFFICE O/P EST LOW 20 MIN: CPT | Performed by: PHYSICIAN ASSISTANT

## 2020-01-10 NOTE — ASSESSMENT & PLAN NOTE
Reviewed prior stress test results  Patient has cardiology ap scheduled (see prior phone message)  Discussed red flag sx and ER precautions which need immediate eval and tx

## 2020-01-10 NOTE — PATIENT INSTRUCTIONS
Impaired fasting glucose  A1c  was 5 6  Encouraged continued daily activate to hep with BS control      Essential hypertension  Pressure well controlled at this time on hydrochlorothiazide 25 mg daily  Continue to monitor blood pressure at home  Discussed with patient blood pressure goal of less than 120 over less than 80  Continue with low-salt diet and daily activity to help with blood pressure control  Report back if BP is not within target range  F/U 3 month  Abnormal ECG during exercise stress test  Reviewed prior stress test results  Patient has cardiology ap scheduled (see prior phone message)  Discussed red flag sx and ER precautions which need immediate eval and tx  Hyperlipidemia  Tolerating change to lipitor    Will check CMP and lipids with next lab set

## 2020-01-10 NOTE — PROGRESS NOTES
[facility] [place]  Standard Office Visit  Patient ID: Branham Aubrie Held    : 1950  Age/Gender: 71 y o  male     DATE: 1/10/2020      Assessment/Plan:    Impaired fasting glucose  A1c  was 5 6  Encouraged continued daily activate to hep with BS control      Essential hypertension  Pressure well controlled at this time on hydrochlorothiazide 25 mg daily  Continue to monitor blood pressure at home  Discussed with patient blood pressure goal of less than 120 over less than 80  Continue with low-salt diet and daily activity to help with blood pressure control  Report back if BP is not within target range  F/U 3 month  Abnormal ECG during exercise stress test  Reviewed prior stress test results  Patient has cardiology ap scheduled (see prior phone message)  Discussed red flag sx and ER precautions which need immediate eval and tx  Hyperlipidemia  Tolerating change to lipitor  Will check CMP and lipids with next lab set       Diagnoses and all orders for this visit:    Impaired fasting glucose  -     Comprehensive metabolic panel; Future  -     Lipid Panel with Direct LDL reflex; Future    Essential hypertension  -     Comprehensive metabolic panel; Future  -     Lipid Panel with Direct LDL reflex; Future    Abnormal ECG during exercise stress test    Mixed hyperlipidemia  -     Comprehensive metabolic panel; Future  -     Lipid Panel with Direct LDL reflex; Future          BMI Counseling: Body mass index is 25 54 kg/m²  The BMI is above normal  Nutrition recommendations include consuming healthier snacks and reducing intake of cholesterol  Exercise recommendations include exercising 3-5 times per week  Subjective:   Chief Complaint   Patient presents with    Follow-up     review labs 19         Whitney Reed is a 71 y o  male who presents to the office on 1/10/2020 for     For follow up    Seen in the last couple weeks for cold symptoms which have since fully resolved  No current complaints or concerns at this time  Since our last visit he did go for follow-up laboratory studies as well as had a stress test completed  We reviewed the results of the stress test any elected to follow with cardiology for for getting a chemical stress test   He notes that his pulmonary symptoms have been well controlled  No chest pain or palpitations with activity  His exertional shortness of breath is relatively unchanged but has been doing better with the weather recently  He notes that his recent cold was treated without antibiotics and he no longer is having cold symptoms  Feeling much better  He has cardiology appointment scheduled  He does follows blood pressure at home  Systolic blood pressure ranges 120s to 130s  Diastolic blood pressure is in the low 80s  Patient notes neck spasms resolved  He did switch to Lipitor and is tolerating this medication without any difficulty  Shortness of Breath   This is a chronic problem  The problem occurs intermittently  The problem has been gradually improving  Pertinent negatives include no abdominal pain, chest pain, fever, leg swelling, sputum production, vomiting or wheezing  His past medical history is significant for COPD  Hypertension   This is a chronic problem  The problem is unchanged  The problem is controlled  Associated symptoms include shortness of breath (Chronic unchanged)  Pertinent negatives include no chest pain or palpitations  Hyperlipidemia   This is a chronic problem  The problem is uncontrolled  Associated symptoms include shortness of breath (Chronic unchanged)  Pertinent negatives include no chest pain  There are no compliance problems          The following portions of the patient's history were reviewed and updated as appropriate: allergies, current medications, past family history, past medical history, past social history, past surgical history and problem list     Review of Systems   Constitutional: Negative for chills and fever  Eyes: Negative for visual disturbance  Respiratory: Positive for shortness of breath (Chronic unchanged)  Negative for cough, sputum production and wheezing  Cardiovascular: Negative for chest pain, palpitations and leg swelling  Gastrointestinal: Negative for abdominal pain, diarrhea, nausea and vomiting  Neurological: Negative for syncope, weakness and light-headedness  Psychiatric/Behavioral: Negative for agitation           Patient Active Problem List   Diagnosis    Hyperlipidemia    COPD with emphysema    A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 (HCC)    Arterial ectasia (HCC)    Diverticulosis, sigmoid    SANTIAGO (dyspnea on exertion)    Hearing loss    Hemorrhoids    Lung nodule    Impaired fasting glucose    Skin disorder    BPH with obstruction/lower urinary tract symptoms    Essential hypertension    Seasonal allergies    Medicare annual wellness visit, subsequent    Other microscopic hematuria    Encounter for screening for lung cancer    Other fatigue    NSVT (nonsustained ventricular tachycardia) (HCC)    Abnormal ECG during exercise stress test    Common cold       Past Medical History:   Diagnosis Date    BPH (benign prostatic hyperplasia)     COPD (chronic obstructive pulmonary disease) (HCC)     Last Assessed:11/14/2016    Diverticulosis     Elevated prostate specific antigen (PSA)     Emphysema (subcutaneous) (surgical) resulting from a procedure     Enlarged prostate with lower urinary tract symptoms (LUTS)     Resolved:8/22/2017    Hernia, inguinal, right        Past Surgical History:   Procedure Laterality Date    BICEPS TENDON REPAIR  1999    COLONOSCOPY      HAND SURGERY      Last Assessed:7/11/2016    HERNIA REPAIR      Last Assessed:7/11/2016    KNEE ARTHROSCOPY      ME REPAIR ING HERNIA,5+Y/O,REDUCIBL Right 2/23/2016    Procedure: REPAIR HERNIA INGUINAL with mesh;  Surgeon: Sharda Prakash DO Zaki;  Location: [facility];  Service: General    PROSTATE BIOPSY      WISDOM TOOTH EXTRACTION           Current Outpatient Medications:     albuterol (PROVENTIL HFA,VENTOLIN HFA) 90 mcg/act inhaler, Inhale 2 puffs every 6 (six) hours as needed for wheezing, Disp: 3 Inhaler, Rfl: 5    atorvastatin (LIPITOR) 20 mg tablet, Take 1 tablet (20 mg total) by mouth daily, Disp: 90 tablet, Rfl: 3    EPINEPHrine (EPIPEN) 0 3 mg/0 3 mL SOAJ, Inject 0 3 mL (0 3 mg total) into a muscle once for 1 dose, Disp: 0 6 mL, Rfl: 0    finasteride (PROSCAR) 5 mg tablet, Take 1 tablet (5 mg total) by mouth daily, Disp: 90 tablet, Rfl: 3    fluticasone (FLONASE) 50 mcg/act nasal spray, 1 spray into each nostril daily, Disp: 1 Bottle, Rfl: 0    fluticasone-umeclidinium-vilanterol (TRELEGY) 100-62 5-25 MCG/INH inhaler, Inhale 1 puff daily Rinse mouth after use , Disp: 3 Inhaler, Rfl: 5    guaiFENesin (MUCINEX) 600 mg 12 hr tablet, Take 1,200 mg by mouth 2 (two) times a day as needed, Disp: , Rfl:     hydrochlorothiazide (HYDRODIURIL) 25 mg tablet, Take 1 tablet (25 mg total) by mouth daily, Disp: 90 tablet, Rfl: 1    ipratropium-albuterol (DUO-NEB) 0 5-2 5 mg/3 mL nebulizer solution, Take 1 vial (3 mL total) by nebulization every 6 (six) hours, Disp: 1 mL, Rfl: 0    loratadine (CLARITIN REDITABS) 10 MG dissolvable tablet, Take 10 mg by mouth daily as needed for allergies  , Disp: , Rfl:     Multiple Vitamins-Minerals (CENTRUM ADULTS PO), Take 1 tablet by mouth daily  , Disp: , Rfl:     tamsulosin (FLOMAX) 0 4 mg, Take 1 capsule (0 4 mg total) by mouth daily with dinner, Disp: 90 capsule, Rfl: 3    Allergies   Allergen Reactions    Bee Venom Facial Swelling    Other Rash     Annotation - [id number]: mushrooms    Penicillins Rash       Social History     Socioeconomic History    Marital status: /Civil Union     Spouse name: None    Number of children: None    Years of education: None    Highest education level: None   Occupational History    None   Social Needs    Financial resource strain: None    Food insecurity:     Worry: None     Inability: None    Transportation needs:     Medical: None     Non-medical: None   Tobacco Use    Smoking status: Former Smoker     Packs/day: 1 50     Years: 49 00     Pack years: 73 50     Types: Cigarettes     Last attempt to quit: 2015     Years since quittin 1    Smokeless tobacco: Never Used   Substance and Sexual Activity    Alcohol use: Yes     Frequency: Monthly or less     Drinks per session: 1 or 2     Binge frequency: Never     Comment: rare    Drug use: No    Sexual activity: Yes   Lifestyle    Physical activity:     Days per week: None     Minutes per session: None    Stress: None   Relationships    Social connections:     Talks on phone: None     Gets together: None     Attends Faith service: None     Active member of club or organization: None     Attends meetings of clubs or organizations: None     Relationship status: None    Intimate partner violence:     Fear of current or ex partner: None     Emotionally abused: None     Physically abused: None     Forced sexual activity: None   Other Topics Concern    None   Social History Narrative    Advance directive on file       Family History   Problem Relation Age of Onset    Hypertension Mother        PHQ-9 Depression Screening    PHQ-9:    Frequency of the following problems over the past two weeks:              Health Maintenance   Topic Date Due    SLP PLAN OF CARE  1950    DTaP,Tdap,and Td Vaccines (1 - Tdap) 1961    Fall Risk  2020    Medicare Annual Wellness Visit (AWV)  2020    Depression Screening PHQ  2020    BMI: Followup Plan  2020    BMI: Adult  01/10/2021    CRC Screening: Colonoscopy  2026    Hepatitis C Screening  Completed    Influenza Vaccine  Completed    Pneumococcal Vaccine: 65+ Years  Completed    Pneumococcal Vaccine: Pediatrics (0 to 5 Years) and At-Risk Patients (6 to 59 Years)  Aged Out    HIB Vaccine  Aged Out    Hepatitis B Vaccine  Aged Out    IPV Vaccine  Aged Out    Hepatitis A Vaccine  Aged Out    Meningococcal ACWY Vaccine  Aged Out    HPV Vaccine  Aged [employer] History   Administered Date(s) Administered    INFLUENZA 11/06/2006, 09/28/2007, 11/01/2016, 10/10/2017    Influenza Split High Dose Preservative Free IM 11/01/2016, 10/10/2017    Influenza, high dose seasonal 0 5 mL 10/19/2018, 10/03/2019    Pneumococcal Conjugate 13-Valent 07/11/2016, 06/12/2019    Pneumococcal Polysaccharide PPV23 10/10/2017    Zoster Vaccine Recombinant 03/28/2019, 06/12/2019        Objective:  Vitals:    01/10/20 0939 01/10/20 1026   BP: 138/82 124/80   BP Location: Left arm    Patient Position: Sitting    Cuff Size: Adult Standard   Pulse: 77    Temp: 97 6 °F (36 4 °C)    TempSrc: Tympanic    SpO2: 97%    Weight: 76 2 kg (168 lb)    Height: 5' 8" (1 727 m)      Wt Readings from Last 3 Encounters:   01/10/20 76 2 kg (168 lb)   12/27/19 74 4 kg (164 lb)   12/13/19 75 kg (165 lb 6 4 oz)     Body mass index is 25 54 kg/m²  No exam data present       Physical Exam   Constitutional: He appears well-developed and well-nourished  No distress  Alert thin build 70-year-old male seated in room in no acute distress   HENT:   Head: Normocephalic and atraumatic  Mouth/Throat: Oropharynx is clear and moist  No oropharyngeal exudate  Moist mucous membranes normal posterior pharynx   Eyes: Pupils are equal, round, and reactive to light  Right eye exhibits no discharge  Left eye exhibits no discharge  No scleral icterus  Neck: Neck supple  Cardiovascular: Normal rate, regular rhythm and normal heart sounds  No murmur heard  RRR no murmurs   Pulmonary/Chest: Effort normal  No respiratory distress  He has no wheezes  He has no rales  He exhibits no tenderness  Decreased breath sounds heard throughout but symmetric  No crackles no rhonchi no wheeze  No respiratory distress   Abdominal: Soft  Bowel sounds are normal  He exhibits no distension  There is no tenderness  There is no guarding  Positive bowel sounds  Soft nontender nondistended   Musculoskeletal: He exhibits no edema  Neurological: He is alert  Skin: Skin is warm and dry  No rash noted  He is not diaphoretic  Psychiatric: He has a normal mood and affect  His behavior is normal  Thought content normal    Nursing note and vitals reviewed  Future Appointments   Date Time Provider Isacc Capellan   1/27/2020  9:00 AM AN HV VASCULAR 2 AN HV Car NI [facility]   2/10/2020 11:00 AM EVAN Dempsey VAS CTR E[facility]   2/20/2020  8:00 AM Ban Peters DO CARD [facility]   4/10/2020 10:30 AM Kei Brito PA-C [facility]   8/17/2020 10:30 AM BE CT SLN 1 BE SLN CT [facility] [place]   9/4/2020 10:30 AM Surinder Huston MD URO AL LV [facility]       Kei Brito PA-C  [address]    Patient Care Team:  Kei Brito PA-C as PCP - General (Internal Medicine)  MD Vanessa Taylor MD Cameron Register, MD (Urology)    This note was completed in part utilizing M-Modal Fluency Direct Software  Grammatical errors, random word insertions, spelling mistakes, and incomplete sentences can be an occasional consequence of this system secondary to software limitations, ambient noise, and hardware issues  If you have any questions or concerns about the content, text, or information contained within the body of this dictation, please contact the provider for clarification

## 2020-01-10 NOTE — ASSESSMENT & PLAN NOTE
Pressure well controlled at this time on hydrochlorothiazide 25 mg daily  Continue to monitor blood pressure at home  Discussed with patient blood pressure goal of less than 120 over less than 80  Continue with low-salt diet and daily activity to help with blood pressure control  Report back if BP is not within target range  F/U 3 month

## 2020-01-29 ENCOUNTER — HOSPITAL ENCOUNTER (OUTPATIENT)
Dept: NON INVASIVE DIAGNOSTICS | Facility: CLINIC | Age: 70
Discharge: HOME | End: 2020-01-29
Payer: MEDICARE

## 2020-01-29 DIAGNOSIS — I71.4 ABDOMINAL AORTIC ANEURYSM WITHOUT RUPTURE (HCC): ICD-10-CM

## 2020-01-29 PROCEDURE — 93978 VASCULAR STUDY: CPT | Performed by: SURGERY

## 2020-01-29 PROCEDURE — 93978 VASCULAR STUDY: CPT

## 2020-02-11 ENCOUNTER — APPOINTMENT (EMERGENCY)
Dept: RADIOLOGY | Facility: HOSPITAL | Age: 70
End: 2020-02-11
Payer: MEDICARE

## 2020-02-11 ENCOUNTER — HOSPITAL ENCOUNTER (OUTPATIENT)
Facility: HOSPITAL | Age: 70
Setting detail: OBSERVATION
Discharge: HOME | End: 2020-02-12
Attending: EMERGENCY MEDICINE | Admitting: INTERNAL MEDICINE
Payer: MEDICARE

## 2020-02-11 DIAGNOSIS — I10 HYPERTENSION, ESSENTIAL: ICD-10-CM

## 2020-02-11 DIAGNOSIS — J44.1 COPD EXACERBATION (HCC): Primary | ICD-10-CM

## 2020-02-11 DIAGNOSIS — J96.21 ACUTE ON CHRONIC RESPIRATORY FAILURE WITH HYPOXIA (HCC): ICD-10-CM

## 2020-02-11 DIAGNOSIS — R06.00 DYSPNEA ON EXERTION: ICD-10-CM

## 2020-02-11 LAB
ALBUMIN SERPL BCP-MCNC: 3.9 G/DL (ref 3.5–5)
ALP SERPL-CCNC: 94 U/L (ref 46–116)
ALT SERPL W P-5'-P-CCNC: 21 U/L (ref 12–78)
ANION GAP SERPL CALCULATED.3IONS-SCNC: 9 MMOL/L (ref 4–13)
AST SERPL W P-5'-P-CCNC: 18 U/L (ref 5–45)
BASOPHILS # BLD AUTO: 0.03 THOUSANDS/ΜL (ref 0–0.1)
BASOPHILS NFR BLD AUTO: 0 % (ref 0–1)
BILIRUB SERPL-MCNC: 0.71 MG/DL (ref 0.2–1)
BUN SERPL-MCNC: 15 MG/DL (ref 5–25)
CALCIUM SERPL-MCNC: 9.1 MG/DL (ref 8.3–10.1)
CHLORIDE SERPL-SCNC: 101 MMOL/L (ref 100–108)
CO2 SERPL-SCNC: 28 MMOL/L (ref 21–32)
CREAT SERPL-MCNC: 1.11 MG/DL (ref 0.6–1.3)
EOSINOPHIL # BLD AUTO: 0.16 THOUSAND/ΜL (ref 0–0.61)
EOSINOPHIL NFR BLD AUTO: 2 % (ref 0–6)
ERYTHROCYTE [DISTWIDTH] IN BLOOD BY AUTOMATED COUNT: 12.7 % (ref 11.6–15.1)
GFR SERPL CREATININE-BSD FRML MDRD: 67 ML/MIN/1.73SQ M
GLUCOSE SERPL-MCNC: 122 MG/DL (ref 65–140)
HCT VFR BLD AUTO: 44.9 % (ref 36.5–49.3)
HGB BLD-MCNC: 15 G/DL (ref 12–17)
IMM GRANULOCYTES # BLD AUTO: 0.02 THOUSAND/UL (ref 0–0.2)
IMM GRANULOCYTES NFR BLD AUTO: 0 % (ref 0–2)
LYMPHOCYTES # BLD AUTO: 0.63 THOUSANDS/ΜL (ref 0.6–4.47)
LYMPHOCYTES NFR BLD AUTO: 9 % (ref 14–44)
MCH RBC QN AUTO: 29.1 PG (ref 26.8–34.3)
MCHC RBC AUTO-ENTMCNC: 33.4 G/DL (ref 31.4–37.4)
MCV RBC AUTO: 87 FL (ref 82–98)
MONOCYTES # BLD AUTO: 0.94 THOUSAND/ΜL (ref 0.17–1.22)
MONOCYTES NFR BLD AUTO: 13 % (ref 4–12)
NEUTROPHILS # BLD AUTO: 5.54 THOUSANDS/ΜL (ref 1.85–7.62)
NEUTS SEG NFR BLD AUTO: 76 % (ref 43–75)
NRBC BLD AUTO-RTO: 0 /100 WBCS
PLATELET # BLD AUTO: 242 THOUSANDS/UL (ref 149–390)
PMV BLD AUTO: 10.3 FL (ref 8.9–12.7)
POTASSIUM SERPL-SCNC: 3.4 MMOL/L (ref 3.5–5.3)
PROT SERPL-MCNC: 7.7 G/DL (ref 6.4–8.2)
RBC # BLD AUTO: 5.16 MILLION/UL (ref 3.88–5.62)
SODIUM SERPL-SCNC: 138 MMOL/L (ref 136–145)
TROPONIN I SERPL-MCNC: <0.02 NG/ML
WBC # BLD AUTO: 7.32 THOUSAND/UL (ref 4.31–10.16)

## 2020-02-11 PROCEDURE — 85025 COMPLETE CBC W/AUTO DIFF WBC: CPT | Performed by: EMERGENCY MEDICINE

## 2020-02-11 PROCEDURE — 96361 HYDRATE IV INFUSION ADD-ON: CPT

## 2020-02-11 PROCEDURE — 71046 X-RAY EXAM CHEST 2 VIEWS: CPT

## 2020-02-11 PROCEDURE — 93005 ELECTROCARDIOGRAM TRACING: CPT

## 2020-02-11 PROCEDURE — 80053 COMPREHEN METABOLIC PANEL: CPT | Performed by: EMERGENCY MEDICINE

## 2020-02-11 PROCEDURE — 96374 THER/PROPH/DIAG INJ IV PUSH: CPT

## 2020-02-11 PROCEDURE — 36415 COLL VENOUS BLD VENIPUNCTURE: CPT

## 2020-02-11 PROCEDURE — 1124F ACP DISCUSS-NO DSCNMKR DOCD: CPT | Performed by: INTERNAL MEDICINE

## 2020-02-11 PROCEDURE — 99285 EMERGENCY DEPT VISIT HI MDM: CPT

## 2020-02-11 PROCEDURE — 99285 EMERGENCY DEPT VISIT HI MDM: CPT | Performed by: EMERGENCY MEDICINE

## 2020-02-11 PROCEDURE — 84484 ASSAY OF TROPONIN QUANT: CPT | Performed by: EMERGENCY MEDICINE

## 2020-02-11 PROCEDURE — 94640 AIRWAY INHALATION TREATMENT: CPT

## 2020-02-11 RX ORDER — METHYLPREDNISOLONE SODIUM SUCCINATE 125 MG/2ML
125 INJECTION, POWDER, LYOPHILIZED, FOR SOLUTION INTRAMUSCULAR; INTRAVENOUS ONCE
Status: COMPLETED | OUTPATIENT
Start: 2020-02-11 | End: 2020-02-11

## 2020-02-11 RX ORDER — ALBUTEROL SULFATE 2.5 MG/3ML
5 SOLUTION RESPIRATORY (INHALATION) ONCE
Status: COMPLETED | OUTPATIENT
Start: 2020-02-11 | End: 2020-02-11

## 2020-02-11 RX ORDER — PREDNISONE 20 MG/1
60 TABLET ORAL ONCE
Status: DISCONTINUED | OUTPATIENT
Start: 2020-02-11 | End: 2020-02-11

## 2020-02-11 RX ORDER — HYDROCHLOROTHIAZIDE 25 MG/1
TABLET ORAL
Qty: 90 TABLET | Refills: 4 | Status: SHIPPED | OUTPATIENT
Start: 2020-02-11 | End: 2021-04-16 | Stop reason: SDUPTHER

## 2020-02-11 RX ORDER — FLUTICASONE PROPIONATE 50 MCG
1 SPRAY, SUSPENSION (ML) NASAL ONCE
Status: COMPLETED | OUTPATIENT
Start: 2020-02-11 | End: 2020-02-11

## 2020-02-11 RX ADMIN — ALBUTEROL SULFATE 5 MG: 2.5 SOLUTION RESPIRATORY (INHALATION) at 21:18

## 2020-02-11 RX ADMIN — SODIUM CHLORIDE 1000 ML: 0.9 INJECTION, SOLUTION INTRAVENOUS at 22:28

## 2020-02-11 RX ADMIN — METHYLPREDNISOLONE SODIUM SUCCINATE 125 MG: 125 INJECTION, POWDER, FOR SOLUTION INTRAMUSCULAR; INTRAVENOUS at 21:20

## 2020-02-11 RX ADMIN — FLUTICASONE PROPIONATE 1 SPRAY: 50 SPRAY, METERED NASAL at 22:07

## 2020-02-11 RX ADMIN — IPRATROPIUM BROMIDE 0.5 MG: 0.5 SOLUTION RESPIRATORY (INHALATION) at 21:18

## 2020-02-12 VITALS
SYSTOLIC BLOOD PRESSURE: 136 MMHG | HEIGHT: 68 IN | DIASTOLIC BLOOD PRESSURE: 94 MMHG | HEART RATE: 96 BPM | TEMPERATURE: 97.5 F | RESPIRATION RATE: 20 BRPM | OXYGEN SATURATION: 91 % | BODY MASS INDEX: 24.31 KG/M2 | WEIGHT: 160.4 LBS

## 2020-02-12 LAB
ANION GAP SERPL CALCULATED.3IONS-SCNC: 8 MMOL/L (ref 4–13)
BUN SERPL-MCNC: 15 MG/DL (ref 5–25)
CALCIUM SERPL-MCNC: 8.4 MG/DL (ref 8.3–10.1)
CHLORIDE SERPL-SCNC: 102 MMOL/L (ref 100–108)
CO2 SERPL-SCNC: 28 MMOL/L (ref 21–32)
CREAT SERPL-MCNC: 0.98 MG/DL (ref 0.6–1.3)
ERYTHROCYTE [DISTWIDTH] IN BLOOD BY AUTOMATED COUNT: 12.8 % (ref 11.6–15.1)
GFR SERPL CREATININE-BSD FRML MDRD: 78 ML/MIN/1.73SQ M
GLUCOSE SERPL-MCNC: 216 MG/DL (ref 65–140)
HCT VFR BLD AUTO: 41.3 % (ref 36.5–49.3)
HGB BLD-MCNC: 13.8 G/DL (ref 12–17)
MCH RBC QN AUTO: 29.3 PG (ref 26.8–34.3)
MCHC RBC AUTO-ENTMCNC: 33.4 G/DL (ref 31.4–37.4)
MCV RBC AUTO: 88 FL (ref 82–98)
PLATELET # BLD AUTO: 220 THOUSANDS/UL (ref 149–390)
PMV BLD AUTO: 10.6 FL (ref 8.9–12.7)
POTASSIUM SERPL-SCNC: 3.5 MMOL/L (ref 3.5–5.3)
PROCALCITONIN SERPL-MCNC: 0.06 NG/ML
RBC # BLD AUTO: 4.71 MILLION/UL (ref 3.88–5.62)
SODIUM SERPL-SCNC: 138 MMOL/L (ref 136–145)
WBC # BLD AUTO: 4.16 THOUSAND/UL (ref 4.31–10.16)

## 2020-02-12 PROCEDURE — 80048 BASIC METABOLIC PNL TOTAL CA: CPT | Performed by: INTERNAL MEDICINE

## 2020-02-12 PROCEDURE — 94761 N-INVAS EAR/PLS OXIMETRY MLT: CPT

## 2020-02-12 PROCEDURE — NC001 PR NO CHARGE: Performed by: INTERNAL MEDICINE

## 2020-02-12 PROCEDURE — 94760 N-INVAS EAR/PLS OXIMETRY 1: CPT

## 2020-02-12 PROCEDURE — 36415 COLL VENOUS BLD VENIPUNCTURE: CPT | Performed by: INTERNAL MEDICINE

## 2020-02-12 PROCEDURE — 84145 PROCALCITONIN (PCT): CPT | Performed by: STUDENT IN AN ORGANIZED HEALTH CARE EDUCATION/TRAINING PROGRAM

## 2020-02-12 PROCEDURE — 85027 COMPLETE CBC AUTOMATED: CPT | Performed by: INTERNAL MEDICINE

## 2020-02-12 PROCEDURE — 94640 AIRWAY INHALATION TREATMENT: CPT

## 2020-02-12 PROCEDURE — 94664 DEMO&/EVAL PT USE INHALER: CPT

## 2020-02-12 PROCEDURE — 99220 PR INITIAL OBSERVATION CARE/DAY 70 MINUTES: CPT | Performed by: INTERNAL MEDICINE

## 2020-02-12 RX ORDER — PREDNISONE 20 MG/1
40 TABLET ORAL DAILY
Qty: 8 TABLET | Refills: 0 | Status: SHIPPED | OUTPATIENT
Start: 2020-02-13 | End: 2020-02-14 | Stop reason: ALTCHOICE

## 2020-02-12 RX ORDER — ALBUTEROL SULFATE 90 UG/1
2 AEROSOL, METERED RESPIRATORY (INHALATION) EVERY 6 HOURS PRN
Status: DISCONTINUED | OUTPATIENT
Start: 2020-02-12 | End: 2020-02-12 | Stop reason: HOSPADM

## 2020-02-12 RX ORDER — POTASSIUM CHLORIDE 20 MEQ/1
40 TABLET, EXTENDED RELEASE ORAL ONCE
Status: COMPLETED | OUTPATIENT
Start: 2020-02-12 | End: 2020-02-12

## 2020-02-12 RX ORDER — TAMSULOSIN HYDROCHLORIDE 0.4 MG/1
0.4 CAPSULE ORAL
Status: DISCONTINUED | OUTPATIENT
Start: 2020-02-12 | End: 2020-02-12 | Stop reason: HOSPADM

## 2020-02-12 RX ORDER — PRAVASTATIN SODIUM 40 MG
40 TABLET ORAL
Status: DISCONTINUED | OUTPATIENT
Start: 2020-02-12 | End: 2020-02-12 | Stop reason: HOSPADM

## 2020-02-12 RX ORDER — HYDROCHLOROTHIAZIDE 25 MG/1
25 TABLET ORAL DAILY
Status: DISCONTINUED | OUTPATIENT
Start: 2020-02-12 | End: 2020-02-12 | Stop reason: HOSPADM

## 2020-02-12 RX ORDER — GUAIFENESIN 600 MG
1200 TABLET, EXTENDED RELEASE 12 HR ORAL 2 TIMES DAILY PRN
Status: DISCONTINUED | OUTPATIENT
Start: 2020-02-12 | End: 2020-02-12 | Stop reason: HOSPADM

## 2020-02-12 RX ORDER — LORATADINE 10 MG/1
10 TABLET ORAL DAILY
Status: DISCONTINUED | OUTPATIENT
Start: 2020-02-12 | End: 2020-02-12 | Stop reason: HOSPADM

## 2020-02-12 RX ORDER — FLUTICASONE PROPIONATE 50 MCG
1 SPRAY, SUSPENSION (ML) NASAL DAILY
Status: DISCONTINUED | OUTPATIENT
Start: 2020-02-12 | End: 2020-02-12 | Stop reason: HOSPADM

## 2020-02-12 RX ORDER — SIMVASTATIN 20 MG
20 TABLET ORAL
COMMUNITY
End: 2020-04-02 | Stop reason: SDUPTHER

## 2020-02-12 RX ORDER — LEVALBUTEROL 1.25 MG/.5ML
1.25 SOLUTION, CONCENTRATE RESPIRATORY (INHALATION)
Status: DISCONTINUED | OUTPATIENT
Start: 2020-02-12 | End: 2020-02-12 | Stop reason: HOSPADM

## 2020-02-12 RX ORDER — FINASTERIDE 5 MG/1
5 TABLET, FILM COATED ORAL DAILY
Status: DISCONTINUED | OUTPATIENT
Start: 2020-02-12 | End: 2020-02-12 | Stop reason: HOSPADM

## 2020-02-12 RX ORDER — ALBUTEROL SULFATE 2.5 MG/3ML
2.5 SOLUTION RESPIRATORY (INHALATION)
Status: DISCONTINUED | OUTPATIENT
Start: 2020-02-12 | End: 2020-02-12

## 2020-02-12 RX ORDER — AZITHROMYCIN 250 MG/1
500 TABLET, FILM COATED ORAL EVERY 24 HOURS
Status: DISCONTINUED | OUTPATIENT
Start: 2020-02-12 | End: 2020-02-12

## 2020-02-12 RX ORDER — AZITHROMYCIN 500 MG/1
500 TABLET, FILM COATED ORAL EVERY 24 HOURS
Qty: 2 TABLET | Refills: 0 | Status: SHIPPED | OUTPATIENT
Start: 2020-02-13 | End: 2020-02-12

## 2020-02-12 RX ORDER — AZITHROMYCIN 500 MG/1
500 TABLET, FILM COATED ORAL EVERY 24 HOURS
Qty: 2 TABLET | Refills: 0 | Status: SHIPPED | OUTPATIENT
Start: 2020-02-13 | End: 2020-02-14 | Stop reason: ALTCHOICE

## 2020-02-12 RX ORDER — METHYLPREDNISOLONE SODIUM SUCCINATE 40 MG/ML
40 INJECTION, POWDER, LYOPHILIZED, FOR SOLUTION INTRAMUSCULAR; INTRAVENOUS EVERY 8 HOURS
Status: DISCONTINUED | OUTPATIENT
Start: 2020-02-12 | End: 2020-02-12

## 2020-02-12 RX ORDER — AZITHROMYCIN 250 MG/1
500 TABLET, FILM COATED ORAL EVERY 24 HOURS
Status: DISCONTINUED | OUTPATIENT
Start: 2020-02-12 | End: 2020-02-12 | Stop reason: HOSPADM

## 2020-02-12 RX ORDER — LEVALBUTEROL 1.25 MG/.5ML
SOLUTION, CONCENTRATE RESPIRATORY (INHALATION)
Status: COMPLETED
Start: 2020-02-12 | End: 2020-02-12

## 2020-02-12 RX ORDER — METHYLPREDNISOLONE SODIUM SUCCINATE 40 MG/ML
40 INJECTION, POWDER, LYOPHILIZED, FOR SOLUTION INTRAMUSCULAR; INTRAVENOUS EVERY 8 HOURS
Status: DISCONTINUED | OUTPATIENT
Start: 2020-02-12 | End: 2020-02-12 | Stop reason: HOSPADM

## 2020-02-12 RX ORDER — PREDNISONE 20 MG/1
40 TABLET ORAL DAILY
Qty: 4 TABLET | Refills: 0 | Status: SHIPPED | OUTPATIENT
Start: 2020-02-12 | End: 2020-02-14 | Stop reason: SDUPTHER

## 2020-02-12 RX ADMIN — IPRATROPIUM BROMIDE 0.5 MG: 0.5 SOLUTION RESPIRATORY (INHALATION) at 08:36

## 2020-02-12 RX ADMIN — LEVALBUTEROL HYDROCHLORIDE 1.25 MG: 1.25 SOLUTION, CONCENTRATE RESPIRATORY (INHALATION) at 08:36

## 2020-02-12 RX ADMIN — AZITHROMYCIN 500 MG: 250 TABLET, FILM COATED ORAL at 11:45

## 2020-02-12 RX ADMIN — HYDROCHLOROTHIAZIDE 25 MG: 25 TABLET ORAL at 08:33

## 2020-02-12 RX ADMIN — ENOXAPARIN SODIUM 40 MG: 40 INJECTION SUBCUTANEOUS at 08:35

## 2020-02-12 RX ADMIN — LORATADINE 10 MG: 10 TABLET ORAL at 08:33

## 2020-02-12 RX ADMIN — POTASSIUM CHLORIDE 40 MEQ: 1500 TABLET, EXTENDED RELEASE ORAL at 01:30

## 2020-02-12 RX ADMIN — METHYLPREDNISOLONE SODIUM SUCCINATE 40 MG: 40 INJECTION, POWDER, FOR SOLUTION INTRAMUSCULAR; INTRAVENOUS at 13:56

## 2020-02-12 RX ADMIN — METHYLPREDNISOLONE SODIUM SUCCINATE 40 MG: 40 INJECTION, POWDER, FOR SOLUTION INTRAMUSCULAR; INTRAVENOUS at 04:50

## 2020-02-12 RX ADMIN — IPRATROPIUM BROMIDE 0.5 MG: 0.5 SOLUTION RESPIRATORY (INHALATION) at 13:48

## 2020-02-12 RX ADMIN — Medication 1 TABLET: at 08:33

## 2020-02-12 RX ADMIN — FLUTICASONE PROPIONATE 1 SPRAY: 50 SPRAY, METERED NASAL at 08:34

## 2020-02-12 RX ADMIN — FINASTERIDE 5 MG: 5 TABLET, FILM COATED ORAL at 08:33

## 2020-02-12 RX ADMIN — LEVALBUTEROL HYDROCHLORIDE 1.25 MG: 1.25 SOLUTION, CONCENTRATE RESPIRATORY (INHALATION) at 13:48

## 2020-02-12 NOTE — RESPIRATORY THERAPY NOTE
RT Protocol Note  Blanca Sanchez Held 71 y o  male MRN: [id number]  Unit/Bed#: ED 13 Encounter: [id number]    Assessment    Principal Problem:    COPD (chronic obstructive pulmonary disease) ([address] )  Active Problems:    Hyperlipidemia    BPH with obstruction/lower urinary tract symptoms    Essential hypertension    Seasonal allergies      Home Pulmonary Medications:  Trelegy inhaler  Home Devices/Therapy: (None)    Past Medical History:   Diagnosis Date    BPH (benign prostatic hyperplasia)     COPD (chronic obstructive pulmonary disease) ([facility])     Last Assessed:2016    Diverticulosis     Elevated prostate specific antigen (PSA)     Emphysema (subcutaneous) (surgical) resulting from a procedure     Enlarged prostate with lower urinary tract symptoms (LUTS)     Resolved:2017    Hernia, inguinal, right      Social History     Socioeconomic History    Marital status: /Civil Union     Spouse name: None    Number of children: None    Years of education: None    Highest education level: None   Occupational History    None   Social Needs    Financial resource strain: None    Food insecurity:     Worry: None     Inability: None    Transportation needs:     Medical: None     Non-medical: None   Tobacco Use    Smoking status: Former Smoker     Packs/day: 1 50     Years: 49 00     Pack years: 73 50     Types: Cigarettes     Last attempt to quit: 2015     Years since quittin 2    Smokeless tobacco: Never Used   Substance and Sexual Activity    Alcohol use: Yes     Frequency: Monthly or less     Drinks per session: 1 or 2     Binge frequency: Never     Comment: rare    Drug use: No    Sexual activity: Yes   Lifestyle    Physical activity:     Days per week: None     Minutes per session: None    Stress: None   Relationships    Social connections:     Talks on phone: None     Gets together: None     Attends [employer] service: None     Active member of club or organization: None     Attends meetings of clubs or organizations: None     Relationship status: None    Intimate partner violence:     Fear of current or ex partner: None     Emotionally abused: None     Physically abused: None     Forced sexual activity: None   Other Topics Concern    None   Social History Narrative    Advance directive on file       Subjective         Objective    Physical Exam:   Assessment Type: Assess only  General Appearance: Alert, Awake  Respiratory Pattern: Normal, Symmetrical, Spontaneous  Chest Assessment: Chest expansion symmetrical, Trachea midline  Bilateral Breath Sounds: Clear  Cough: None  O2 Device: Room Air    Vitals:  Blood pressure 122/76, pulse 70, temperature 98 4 °F (36 9 °C), temperature source Oral, resp  rate 18, SpO2 93 %  Imaging and other studies: I have personally reviewed pertinent reports  O2 Device: Room Air     Plan    Respiratory Plan: No distress/Pulmonary history        Resp Comments: pt came into ED for increased SOB  Pt has HX of COPD  only stated he takes Trelegy inhaler at home  No distress or SOB at this time  No cough stated pt  No 02 or Cpap use at home  BS=clear

## 2020-02-12 NOTE — ED NOTES
Per Dr Sherron Macedo, after pt recieves fluids, pt to be ambulated with walking pulse ox        Artist Led, RN  02/11/20 [id number]

## 2020-02-12 NOTE — PROGRESS NOTES
Received a call from radiology reading room, patient's chest xray is positive for significant findings  SOD paged to make aware, also TT to make aware as well  Will await response

## 2020-02-12 NOTE — RESPIRATORY THERAPY NOTE
Home Oxygen Qualifying Test       Patient name: Kaylin Monzon        : 1950   Date of Test:  2020  Diagnosis:      Home Oxygen Test:    **Medicare Guidelines require item(s) 1-5 on all ambulatory patients or 1 and 2 on non-ambulatory patients  1   Baseline SPO2 on Room Air at rest 92 %  2   SPO2 during exercise on Room Air 87 %  During exercise monitor SpO2  If SPO2 increases >=89% with ambulation do not add supplemental             oxygen  If <= 88% on room air add O2 via NC and titrate patient  Patient must be ambulated with O2 and titrated to > 88% with exertion  3   SPO2 on Oxygen at rest 94 % 2 lpm     4   SPO2 during exercise on Oxygen  91% a liter flow of 2 lpm     5   Exercise performed:          walking, duration 3 5 (min), distance 140 (feet)          [x]  Supplemental Home Oxygen is indicated  []  Client does not qualify for home oxygen  Respiratory Additional Notes- Pt only able to walk for 3 5 minutes before he became sob and tired   Pt qualifies for Home O2  Edward Singh, RT

## 2020-02-12 NOTE — H&P
INTERNAL MEDICINE HISTORY AND PHYSICAL  ED 13 SOD Team C     NAME: Mildred Justice  AGE: 71 y o  SEX: male  : 1950   MRN: [id number]  ENCOUNTER: [id number]    DATE: 2020  TIME: 12:50 AM    Primary Care Physician: Natalie Marr PA-C  Admitting Provider: Valeriano Beach MD    Chief complaint: Cough    History of Present illness  Christie Mirza is a 71 y o  male with PMH significant for HLD, nonsustained ventricular tachycardia noted on stress test, HTN, seasonal allergies, arterial ectasia, BPH who presented on 2019 secondary to a cough and upper respiratory congestion  Per patient approximately 2 days ago he started noticing that he was having congestion of his upper respiratory system, associated with rhinorrhea, he also had a associated cough that he describes as similar to his normal cough productive of yellow sputum not any worse or any change in sputum  He also has dyspnea on exertion which he states is similar to his baseline dyspnea on exertion  He denies any recent fevers or chills  Denies any chest pain  Denies any hemoptysis  No history of blood clots  No current SOB  No leg swelling  Denies any orthopnea or paroxysmal nocturnal dyspnea  No known sick contacts    On presentation to the emergency department patient Had a CBC CMP and troponin drawn which were essentially unremarkable  A chest x-ray was performed which was negative for any acute cardiopulmonary disease  Patient's vitals on presentation were pulse 106, respirations 22, blood pressure 132/84 and O2 92% on room air  Patient was ambulated after receiving treatments and his oxygen saturation was noted to drop to 82% with ambulation around the emergency department  Although patient has never had a formal eval for oxygen, he does tell me that shortness of breath he experienced on exertion today was similar to his baseline shortness of breath      Review of Systems        Review of Systems   Constitutional: Negative for activity change  HENT: Positive for congestion  Eyes: Negative for discharge  Respiratory: Negative for apnea  Cardiovascular: Negative for chest pain, palpitations and leg swelling  Gastrointestinal: Negative for abdominal distention  Endocrine: Negative for cold intolerance  Genitourinary: Negative for difficulty urinating  Musculoskeletal: Negative for arthralgias  Allergic/Immunologic: Positive for environmental allergies  Neurological: Negative for dizziness and headaches  Hematological: Negative for adenopathy  Psychiatric/Behavioral: Negative for agitation         Past Medical History     Past Medical History:   Diagnosis Date    BPH (benign prostatic hyperplasia)     COPD (chronic obstructive pulmonary disease) (HCC)     Last Assessed:11/14/2016    Diverticulosis     Elevated prostate specific antigen (PSA)     Emphysema (subcutaneous) (surgical) resulting from a procedure     Enlarged prostate with lower urinary tract symptoms (LUTS)     Resolved:8/22/2017    Hernia, inguinal, right        Past Surgical History     Past Surgical History:   Procedure Laterality Date    BICEPS TENDON REPAIR  1999    COLONOSCOPY      HAND SURGERY      Last Assessed:7/11/2016    HERNIA REPAIR      Last Assessed:7/11/2016    KNEE ARTHROSCOPY      WA REPAIR ING HERNIA,5+Y/O,REDUCIBL Right 2/23/2016    Procedure: REPAIR HERNIA INGUINAL with mesh;  Surgeon: Clee Guaman DO;  Location: [facility];  Service: General    PROSTATE BIOPSY      WISDOM TOOTH EXTRACTION         Social History     Social History     Substance and Sexual Activity   Alcohol Use Yes    Frequency: Monthly or less    Drinks per session: 1 or 2    Binge frequency: Never    Comment: rare     Social History     Substance and Sexual Activity   Drug Use No     Social History     Tobacco Use   Smoking Status Former Smoker    Packs/day: 1 50    Years: 49 00    Pack years: 73 50    Types: Cigarettes    Last attempt to quit: 2015    Years since quittin 2   Smokeless Tobacco Never Used       Family History     Family History   Problem Relation Age of Onset    Hypertension Mother        Medications Prior to Admission     Prior to Admission medications    Medication Sig Start Date End Date Taking? Authorizing Provider   albuterol (PROVENTIL HFA,VENTOLIN HFA) 90 mcg/act inhaler Inhale 2 puffs every 6 (six) hours as needed for wheezing 10/7/19  Yes Sally Bradford MD   finasteride (PROSCAR) 5 mg tablet Take 1 tablet (5 mg total) by mouth daily 19  Yes Latisha Hernandez MD   fluticasone [facility]) 50 mcg/act nasal spray 1 spray into each nostril daily 19  Yes Ayla Lara PA-C   fluticasone-umeclidinium-vilanterol (TRELEGY) 100-62 5-25 MCG/INH inhaler Inhale 1 puff daily Rinse mouth after use  10/7/19  Yes Sally Bradford MD   guaiFENesin (MUCINEX) 600 mg 12 hr tablet Take 1,200 mg by mouth 2 (two) times a day as needed   Yes Historical Provider, MD   hydrochlorothiazide (HYDRODIURIL) 25 mg tablet TAKE 1 TABLET DAILY 20  Yes Ayla Lara PA-C   ipratropium-albuterol (DUO-NEB) 0 5-2 5 mg/3 mL nebulizer solution Take 1 vial (3 mL total) by nebulization every 6 (six) hours 18  Yes Ayla Lara PA-C   loratadine (CLARITIN REDITABS) 10 MG dissolvable tablet Take 10 mg by mouth daily as needed for allergies  Yes Historical Provider, MD   Multiple Vitamins-Minerals (CENTRUM ADULTS PO) Take 1 tablet by mouth daily     Yes Historical Provider, MD   simvastatin (ZOCOR) 20 mg tablet Take 20 mg by mouth daily at bedtime   Yes Historical Provider, MD   tamsulosin (FLOMAX) 0 4 mg Take 1 capsule (0 4 mg total) by mouth daily with dinner 19  Yes Latisha Hernandez MD   EPINEPHrine (EPIPEN) 0 3 mg/0 3 mL SOAJ Inject 0 3 mL (0 3 mg total) into a muscle once for 1 dose 19   Melania Mendieta MD   atorvastatin (LIPITOR) 20 mg tablet Take 1 tablet (20 mg total) by mouth daily 19  Ben Kim James Patel PA-C       Allergies     Allergies   Allergen Reactions    Bee Venom Facial Swelling    Other Rash     Annotation - [id number]: mushrooms    Penicillins Rash       Objective     Vitals:    02/11/20 2130 02/11/20 2200 02/11/20 2230 02/12/20 0020   BP: 148/96 148/85 129/63 138/79   BP Location: Right arm Right arm Right arm Right arm   Pulse: 104 (!) 118 (!) 116 91   Resp: 22 20 20 18   Temp:       TempSrc:       SpO2: 98% 95% 92% 92%     There is no height or weight on file to calculate BMI  No intake or output data in the 24 hours ending 02/12/20 0050  Invasive Devices     Peripheral Intravenous Line            Peripheral IV 02/11/20 Left less than 1 day                Physical Exam  GENERAL: Appears well-developed and well-nourished  Appears in no acute distress   HEENT: Normocephalic and atraumatic  No scleral icterus  EOMI B/L  No oropharyngeal edema  MM moist    NECK: Neck supple with no lymphadenopathy  Trachea midline  No JVD  CARDIOVASCULAR: S1 and S2 are present  Regular rate and rhythm  No murmurs, rubs, or gallops  RESPIRATORY: Diminished lung sounds bilaterally, no rhonchi or rales noted   ABDOMINAL: Abdomen soft, non tender, non distended   EXTREMITIES: ROM intact  No edema noted  MUSCULOSKELETAL: No joint tenderness, deformity or swelling, full range of motion without pain  NEUROLOGIC: Patient is alert and oriented to person, place, and time  No sensory or motor deficits  CN 2-12 intact  Speech fluent  SKIN: Skin is warm and dry  No rashes noted on exposed skin      PSYCHIATRIC: Normal mood and affect     Lab Results:   Results from last 7 days   Lab Units 02/11/20 2029   WBC Thousand/uL 7 32   HEMOGLOBIN g/dL 15 0   HEMATOCRIT % 44 9   PLATELETS Thousands/uL 242   NEUTROS PCT % 76*   MONOS PCT % 13*      Results from last 7 days   Lab Units 02/11/20 2029   SODIUM mmol/L 138   POTASSIUM mmol/L 3 4*   CHLORIDE mmol/L 101   CO2 mmol/L 28   BUN mg/dL 15   CREATININE mg/dL 1 11 CALCIUM mg/dL 9 1   ALK PHOS U/L 94   ALT U/L 21   AST U/L 18   TROPONIN I ng/mL <0 02   EGFR ml/min/1 73sq m 67             Results from last 7 days   Lab Units 02/11/20 2029   TROPONIN I ng/mL <0 02     No results found for: PHART, AHP1HVN, PO2ART, JBX7UIK, R6XCHBGV, BEART, SOURCE  No components found for: HIV1X2  Lab Results   Component Value Date    HEPCAB Non-reactive 05/25/2018     No results found for: SPEP, UPEP   Lab Results   Component Value Date    HGBA1C 5 6 12/13/2019    HGBA1C 5 9 06/21/2019    HGBA1C 5 8 05/25/2018     No results found for: CHOL   Lab Results   Component Value Date    HDL 56 11/29/2019    HDL 52 06/21/2019    HDL 44 10/16/2018      Lab Results   Component Value Date    LDLCALC 133 (H) 11/29/2019    LDLCALC 98 06/21/2019    LDLCALC 117 (H) 10/16/2018      Lab Results   Component Value Date    TRIG 121 11/29/2019    TRIG 87 06/21/2019    TRIG 209 (H) 10/16/2018     Lab Results   Component Value Date    TEA6JCOZUXYJ 3 250 12/13/2019    JBV5ONHNXLVH 2 180 07/13/2016       Imaging:   XR chest 2 views   ED Interpretation by Rachel Abreu MD (02/11 2230)   Copd  No acute findings          Microbiology:      Urinalysis:      No results found for: AMPHETUR, BDZUR, COCAINEUR, OPIATEUR, PCPUR, THCUR, ETOH, ACTMNPHEN, SALICYLATE    Urine Micro:        EKG, Pathology, and Other Studies: I have personally reviewed pertinent reports        Medications Given in Emergency Department     Medication Administration - last 24 hours from 02/11/2020 0050 to 02/12/2020 0050       Date/Time Order Dose Route Action Action by     02/11/2020 2207 fluticasone (FLONASE) 50 mcg/act nasal spray 1 spray 1 spray Each Nare Given Eva Mitchell RN     02/11/2020 2118 albuterol inhalation solution 5 mg 5 mg Nebulization Given Eva Mitchell RN     02/11/2020 2118 ipratropium (ATROVENT) 0 02 % inhalation solution 0 5 mg 0 5 mg Nebulization Given Eva Mitchell RN     02/11/2020 2120 methylPREDNISolone sodium succinate (Solu-MEDROL) injection 125 mg 125 mg Intravenous Given Castro Jones RN     02/11/2020 2228 sodium chloride 0 9 % bolus 1,000 mL 1,000 mL Intravenous New Bag Castro Jones RN          Assessment and Plan     Problem List     * (Principal) Chronic obstructive pulmonary disease with acute exacerbation [facility])    Overview Addendum 1/3/2019  9:06 PM by Ash Romero MD     Complete pulmonary function testing from 2016 shows severe obstruction with a post bronchodilator FEV1 of 0 71 L  There is hyperinflation and air trapping and is severely reduced diffusing capacity    Spirometry performed 01/03/2019 shows very severe obstruction with FEV1 of 0 72 L which is 23% predicted  Hyperlipidemia    A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 (HCC)    Overview Signed 8/28/2018  9:21 AM by Chaya Munroe MD     Transitioned From: Screening for AAA (abdominal aortic aneurysm)         Arterial ectasia ([facility] [address] )    Overview Signed 8/28/2018  9:21 AM by Chaya Munroe MD     Description: L ALHAJI         Diverticulosis, sigmoid    SANTIAGO (dyspnea on exertion)    Hearing loss    Hemorrhoids    Lung nodule    Overview Addendum 1/3/2019  9:10 PM by Ash Romero MD     · Two lung nodules stable from August 2016 to August 2017  Largest nodule 2 mm in size  One nodule seems to be more consistent with fissural lymph node         Impaired fasting glucose    Skin disorder    BPH with obstruction/lower urinary tract symptoms    Essential hypertension    Seasonal allergies    Medicare annual wellness visit, subsequent    Other microscopic hematuria    Encounter for screening for lung cancer    Other fatigue    NSVT (nonsustained ventricular tachycardia) ([facility])    Abnormal ECG during exercise stress test    Common cold            1   COPD - patient presents with a 2 day history of upper respiratory infection associated with rhinorrhea    Patient received treatment the emergency department and was going to be discharged for presumptive upper respiratory infection when he was noted to desat on pulse ox  Unclear if this was new or is simply his baseline as patient states that he does not feel any shortness of breath more than his baseline currently  No fevers no chills  No sick contacts  No myalgias  Unlikely to be influenza  Lungs are diminished lung sounds bilaterally  No orthopnea or paroxysmal nocturnal dyspnea  No leg swelling noted  No hemoptysis, no history of blood clots  Patient's heart rate stabilized 91, feel pulmonary embolism is unlikely given the fact patient has no chest pain, no shortness of breath and vital signs are stabilized now  Patient does follow with pulmonology outpatient, last spirometry done January 2019:Fev1/FVC:  34% FEV1:  0 72 L which is 23% predicted FVC:  2 13 L which is 49% predicted  · Will provide methylprednisolone 40 mg q 8 hours  · DuoNeb  · Incentive spirometry  · Will hold off on azithromycin as patient has no change in his character or quantity of his sputum  · Continue trelegy  2  HLD   ·  Continue home statin  3  BPH  ·  Cont  Proscar + Flomax  4  Seasonal allergies  ·  Cont  Clairtin  5  HTN  ·  Cont  HCTZ  ·   Code Status: Level 1 - Full Code  VTE Pharmacologic Prophylaxis: Enoxaparin (Lovenox)   VTE Mechanical Prophylaxis: sequential compression device  Admission Status: OBSERVATION  Expected length of stay: less than 2 midnights  Admission Time  I spent 30 minutes admitting the patient  This involved direct patient contact where I performed a full history and physical, reviewing previous records, and reviewing laboratory and other diagnostic studies  PLEASE NOTE:  This encounter was completed utilizing the [employer] Direct Speech Voice Recognition Software   Grammatical errors, random word insertions, pronoun errors and incomplete sentences are occasional consequences of the system due to software limitations, ambient noise and hardware issues  These may be missed by proof reading prior to affixing electronic signature  Any questions or concerns about the content, text or information contained within the body of this dictation should be directly addressed to the physician for clarification  Please do not hesitate to call me directly if you have any any questions or concerns    Barbara Smoker, DO

## 2020-02-12 NOTE — ED ATTENDING ATTESTATION
2/11/2020  IMaxwell MD, saw and evaluated the patient  I have discussed the patient with the resident/non-physician practitioner and agree with the resident's/non-physician practitioner's findings, Plan of Care, and MDM as documented in the resident's/non-physician practitioner's note, except where noted  All available labs and Radiology studies were reviewed  I was present for key portions of any procedure(s) performed by the resident/non-physician practitioner and I was immediately available to provide assistance  At this point I agree with the current assessment done in the Emergency Department  I have conducted an independent evaluation of this patient a history and physical is as follows:  Pt has 2 days of congestion and sob + SANTIAGO no chest pain   + copd Pt used nebs at home with some limited relief PE: alert wheeze noted on r no retractions ext nad MDM: will treat copd check labs and cxr   ED Course         Critical Care Time  Procedures

## 2020-02-12 NOTE — ED PROVIDER NOTES
History  Chief Complaint   Patient presents with    Shortness of Breath     pt [facility]/ URI s/s and sent to ED for eval  denies CP     HPI    71 y o  M hx of COPD, two day of congestion and cold-like symptoms, now having SOB  Last steroid use unknown, several years possibly  No prior hospitalizations or intubations for COPD  No chest pain  Tried nebs at home with some relief  No sob at rest, only with exertion  Denying any fevers  Denying any other symptoms on ROS including abdominal pain nausea vomiting chest pain weight gain  Prior to Admission Medications   Prescriptions Last Dose Informant Patient Reported? Taking? EPINEPHrine (EPIPEN) 0 3 mg/0 3 mL SOAJ Unknown at Unknown time Self No No   Sig: Inject 0 3 mL (0 3 mg total) into a muscle once for 1 dose   Multiple Vitamins-Minerals (CENTRUM ADULTS PO) 2020 at Unknown time Self Yes Yes   Sig: Take 1 tablet by mouth daily  albuterol (PROVENTIL HFA,VENTOLIN HFA) 90 mcg/act inhaler 2020 at Unknown time Self No Yes   Sig: Inhale 2 puffs every 6 (six) hours as needed for wheezing   finasteride (PROSCAR) 5 mg tablet 2020 at Unknown time Self No Yes   Sig: Take 1 tablet (5 mg total) by mouth daily   fluticasone (FLONASE) 50 mcg/act nasal spray 2020 at Unknown time Self No Yes   Si spray into each nostril daily   fluticasone-umeclidinium-vilanterol (TRELEGY) 100-62 5-25 MCG/INH inhaler 2020 at Unknown time Self No Yes   Sig: Inhale 1 puff daily Rinse mouth after use     guaiFENesin (MUCINEX) 600 mg 12 hr tablet 2020 at Unknown time Self Yes Yes   Sig: Take 1,200 mg by mouth 2 (two) times a day as needed   hydrochlorothiazide (HYDRODIURIL) 25 mg tablet 2020 at Unknown time  No Yes   Sig: TAKE 1 TABLET DAILY   ipratropium-albuterol (DUO-NEB) 0 5-2 5 mg/3 mL nebulizer solution Past Week at Unknown time Self No Yes   Sig: Take 1 vial (3 mL total) by nebulization every 6 (six) hours   loratadine (CLARITIN REDITABS) 10 MG dissolvable tablet Past Month at Unknown time Self Yes Yes   Sig: Take 10 mg by mouth daily as needed for allergies  simvastatin (ZOCOR) 20 mg tablet 2020 at Unknown time  Yes Yes   Sig: Take 20 mg by mouth daily at bedtime   tamsulosin (FLOMAX) 0 4 mg 2020 at Unknown time Self No Yes   Sig: Take 1 capsule (0 4 mg total) by mouth daily with dinner      Facility-Administered Medications: None       Past Medical History:   Diagnosis Date    BPH (benign prostatic hyperplasia)     COPD (chronic obstructive pulmonary disease) (HCC)     Last Assessed:2016    Diverticulosis     Elevated prostate specific antigen (PSA)     Emphysema (subcutaneous) (surgical) resulting from a procedure     Enlarged prostate with lower urinary tract symptoms (LUTS)     Resolved:2017    Hernia, inguinal, right        Past Surgical History:   Procedure Laterality Date    BICEPS TENDON REPAIR      COLONOSCOPY      HAND SURGERY      Last Assessed:2016    HERNIA REPAIR      Last Assessed:2016    KNEE ARTHROSCOPY      NJ REPAIR ING HERNIA,5+Y/O,REDUCIBL Right 2016    Procedure: REPAIR HERNIA INGUINAL with mesh;  Surgeon: Regulo Plummer DO;  Location: [facility];  Service: General    PROSTATE BIOPSY      WISDOM TOOTH EXTRACTION         Family History   Problem Relation Age of Onset    Hypertension Mother      I have reviewed and agree with the history as documented      Social History     Tobacco Use    Smoking status: Former Smoker     Packs/day: 1 50     Years: 49 00     Pack years: 73 50     Types: Cigarettes     Last attempt to quit: 2015     Years since quittin 2    Smokeless tobacco: Never Used   Substance Use Topics    Alcohol use: Yes     Frequency: Monthly or less     Drinks per session: 1 or 2     Binge frequency: Never     Comment: rare    Drug use: No        Review of Systems   Constitutional: Negative for chills, fatigue and fever  HENT: Negative for nosebleeds and sore throat  Eyes: Negative for redness and visual disturbance  Respiratory: Positive for shortness of breath  Negative for wheezing  Cardiovascular: Negative for chest pain and palpitations  Gastrointestinal: Negative for abdominal pain and diarrhea  Endocrine: Negative for polyuria  Genitourinary: Negative for difficulty urinating and testicular pain  Musculoskeletal: Negative for back pain and neck stiffness  Skin: Negative for rash and wound  Neurological: Negative for seizures, speech difficulty and headaches  Psychiatric/Behavioral: Negative for dysphoric mood and hallucinations  All other systems reviewed and are negative  Physical Exam  ED Triage Vitals   Temperature Pulse Respirations Blood Pressure SpO2   02/11/20 2024 02/11/20 2100 02/11/20 2024 02/11/20 2024 02/11/20 2024   98 4 °F (36 9 °C) (!) 106 16 163/95 92 %      Temp Source Heart Rate Source Patient Position - Orthostatic VS BP Location FiO2 (%)   02/11/20 2024 02/11/20 2024 02/11/20 2024 02/11/20 2024 --   Oral Monitor Lying Right arm       Pain Score       02/11/20 2024       No Pain             Orthostatic Vital Signs  Vitals:    02/11/20 2200 02/11/20 2230 02/12/20 0020 02/12/20 0130   BP: 148/85 129/63 138/79 125/79   Pulse: (!) 118 (!) 116 91 90   Patient Position - Orthostatic VS: Lying Lying Sitting Lying       Physical Exam   Constitutional: He is oriented to person, place, and time  He appears well-developed and well-nourished  HENT:   Head: Normocephalic and atraumatic  Right Ear: External ear normal    Left Ear: External ear normal    Mouth/Throat: Oropharynx is clear and moist    Eyes: Conjunctivae and EOM are normal    Neck: Normal range of motion  Cardiovascular: Normal rate, regular rhythm, normal heart sounds and intact distal pulses  Pulmonary/Chest: Effort normal  He has wheezes  He exhibits no tenderness     Expiratory Wheezing noted right worse than left   Abdominal: Soft  Bowel sounds are normal  There is no tenderness  There is no guarding  Musculoskeletal: Normal range of motion  Neurological: He is alert and oriented to person, place, and time  No cranial nerve deficit or sensory deficit  He exhibits normal muscle tone  Coordination normal    Skin: Skin is warm and dry  No rash noted  Psychiatric: He has a normal mood and affect  Nursing note and vitals reviewed        ED Medications  Medications   ipratropium (ATROVENT) 0 02 % inhalation solution 0 5 mg (has no administration in time range)   albuterol inhalation solution 2 5 mg (has no administration in time range)   enoxaparin (LOVENOX) subcutaneous injection 40 mg (has no administration in time range)   albuterol (PROVENTIL HFA,VENTOLIN HFA) inhaler 2 puff (has no administration in time range)   finasteride (PROSCAR) tablet 5 mg (has no administration in time range)   fluticasone (FLONASE) 50 mcg/act nasal spray 1 spray (has no administration in time range)   fluticasone-umeclidinium-vilanterol (TRELEGY) 100-62 5-25 MCG/INH inhaler 1 puff (has no administration in time range)   guaiFENesin (MUCINEX) 12 hr tablet 1,200 mg (has no administration in time range)   hydrochlorothiazide (HYDRODIURIL) tablet 25 mg (has no administration in time range)   loratadine (CLARITIN) tablet 10 mg (has no administration in time range)   multivitamin-minerals (CENTRUM) tablet 1 tablet (has no administration in time range)   pravastatin (PRAVACHOL) tablet 40 mg (has no administration in time range)   tamsulosin (FLOMAX) capsule 0 4 mg (has no administration in time range)   methylPREDNISolone sodium succinate (Solu-MEDROL) injection 40 mg (has no administration in time range)   potassium chloride (K-DUR,KLOR-CON) CR tablet 40 mEq (has no administration in time range)   fluticasone (FLONASE) 50 mcg/act nasal spray 1 spray (1 spray Each Nare Given 2/11/20 2207)   albuterol inhalation solution 5 mg (5 mg Nebulization Given 2/11/20 2118)   ipratropium (ATROVENT) 0 02 % inhalation solution 0 5 mg (0 5 mg Nebulization Given 2/11/20 2118)   methylPREDNISolone sodium succinate (Solu-MEDROL) injection 125 mg (125 mg Intravenous Given 2/11/20 2120)   sodium chloride 0 9 % bolus 1,000 mL (0 mL Intravenous Stopped 2/12/20 0108)       Diagnostic Studies  Results Reviewed     Procedure Component Value Units Date/Time    Comprehensive metabolic panel [[id number]]  (Abnormal) Collected:  02/11/20 2029    Lab Status:  Final result Specimen:  Blood from Arm, Left Updated:  02/11/20 2055     Sodium 138 mmol/L      Potassium 3 4 mmol/L      Chloride 101 mmol/L      CO2 28 mmol/L      ANION GAP 9 mmol/L      BUN 15 mg/dL      Creatinine 1 11 mg/dL      Glucose 122 mg/dL      Calcium 9 1 mg/dL      AST 18 U/L      ALT 21 U/L      Alkaline Phosphatase 94 U/L      Total Protein 7 7 g/dL      Albumin 3 9 g/dL      Total Bilirubin 0 71 mg/dL      eGFR 67 ml/min/1 73sq m     Narrative:       [place] guidelines for Chronic Kidney Disease (CKD):     Stage 1 with normal or high GFR (GFR > 90 mL/min/1 73 square meters)    Stage 2 Mild CKD (GFR = 60-89 mL/min/1 73 square meters)    Stage 3A Moderate CKD (GFR = 45-59 mL/min/1 73 square meters)    Stage 3B Moderate CKD (GFR = 30-44 mL/min/1 73 square meters)    Stage 4 Severe CKD (GFR = 15-29 mL/min/1 73 square meters)    Stage 5 End Stage CKD (GFR <15 mL/min/1 73 square meters)  Note: GFR calculation is accurate only with a steady state creatinine    Troponin I [[id number]]  (Normal) Collected:  02/11/20 2029    Lab Status:  Final result Specimen:  Blood from Arm, Left Updated:  02/11/20 2055     Troponin I <0 02 ng/mL     CBC and differential [[id number]]  (Abnormal) Collected:  02/11/20 2029    Lab Status:  Final result Specimen:  Blood from Arm, Left Updated:  02/11/20 2038     WBC 7 32 Thousand/uL      RBC 5 16 Million/uL      Hemoglobin 15 0 g/dL Hematocrit 44 9 %      MCV 87 fL      MCH 29 1 pg      MCHC 33 4 g/dL      RDW 12 7 %      MPV 10 3 fL      Platelets 114 Thousands/uL      nRBC 0 /100 WBCs      Neutrophils Relative 76 %      Immat GRANS % 0 %      Lymphocytes Relative 9 %      Monocytes Relative 13 %      Eosinophils Relative 2 %      Basophils Relative 0 %      Neutrophils Absolute 5 54 Thousands/µL      Immature Grans Absolute 0 02 Thousand/uL      Lymphocytes Absolute 0 63 Thousands/µL      Monocytes Absolute 0 94 Thousand/µL      Eosinophils Absolute 0 16 Thousand/µL      Basophils Absolute 0 03 Thousands/µL                  XR chest 2 views   ED Interpretation by Wilfred Anders MD (02/11 2230)   Copd  No acute findings            Procedures  Procedures      ED Course  ED Course as of Feb 12 0207   Tue Feb 11, 2020   2251 Nurse to ambulate patient              Identification of Seniors at Risk      Most Recent Value   (ISAR) Identification of Seniors at Risk   Before the illness or injury that brought you to the Emergency, did you need someone to help you on a regular basis? 0 Filed at: 02/11/2020 2026   In the last 24 hours, have you needed more help than usual?  0 Filed at: 02/11/2020 2026   Have you been hospitalized for one or more nights during the past 6 months? 0 Filed at: 02/11/2020 2026   In general, do you see well?  0 Filed at: 02/11/2020 2026   In general, do you have serious problems with your memory? 0 Filed at: 02/11/2020 2026   Do you take more than three different medications every day?  0 Filed at: 02/11/2020 2026   ISAR Score  0 Filed at: 02/11/2020 2026              MDM  58-year-old male who presents to the ED for evaluation shortness of breath  Likely a COPD exacerbation  The patient was given a DuoNeb here, and re-evaluated  The patient did feel better, with ambulation, his pulse ox showed that the patient drop in O2 sat to the low 80s  The patient had increased work of breathing with exertion    The patient was admitted for COPD exacerbate to medicine  Disposition  Final diagnoses:   Dyspnea on exertion   COPD exacerbation ([facility] [address] )     Time reflects when diagnosis was documented in both MDM as applicable and the Disposition within this note     Time User Action Codes Description Comment    2/12/2020 12:08 AM Yadira Prasad Add [R06 09] Dyspnea on exertion     2/12/2020 12:08 AM Yadira Prasad Add [J44 1] COPD exacerbation ([facility] [address] )     2/12/2020 12:08 AM Yadiravangie Prasad Modify [R06 09] Dyspnea on exertion     2/12/2020 12:08 AM Yadira Prasad Modify [J44 1] COPD exacerbation [facility])       ED Disposition     ED Disposition Condition Date/Time Comment    Admit Stable Wed Feb 12, 2020 12:08 AM Case was discussed with SOD and the patient's admission status was agreed to be Admission Status: observation status to the service of Dr Almaraz Eye  Follow-up Information    None         Patient's Medications   Discharge Prescriptions    No medications on file     No discharge procedures on file  ED Provider  Attending physically available and evaluated Chavez MALONE managed the patient along with the ED Attending      Electronically Signed by         Magy Freed MD  02/12/20 3522

## 2020-02-12 NOTE — UTILIZATION REVIEW
Initial Clinical Review    Admission: Date/Time/Statement: Admission Orders (From admission, onward)     Ordered        02/12/20 0010  Place in Observation  Once                   Orders Placed This Encounter   Procedures    Place in Observation     Standing Status:   Standing     Number of Occurrences:   1     Order Specific Question:   Admitting Physician     Answer:   Gino Hardwick     Order Specific Question:   Level of Care     Answer:   Med Surg [16]     ED Arrival Information     Expected Arrival Acuity Means of Arrival Escorted By Service Admission Type    2/11/2020 2/11/2020 20:20 [facility] Medicine Emergency    Arrival Complaint    hypoxemia, dyspnea, abnormal ekg        Chief Complaint   Patient presents with    Shortness of Breath     [facility]/ URI s/s and sent to ED for eval  denies CP     Assessment/Plan:   Mr Nia Camp is a 70 yo male who presents to the ED via EMS from home with c/o cough and upper respiratory congestion x 2 days with rhinorrhea, SANTIAGO  CXR unremarkable  Drop in oxygen sat to 82% with ambulation post neb treatment  He is admitted to OBSERVATION status with COPD - IV steroids, duonebs, continue Trelegy, HTN - HCTZ, PMH:  HLD, nonsustained ventricular tachycardia noted on stress test, HTN, seasonal allergies, arterial ectasia, BPH, COPD        ED Triage Vitals   Temperature Pulse Respirations Blood Pressure SpO2   02/11/20 2024 02/11/20 2100 02/11/20 2024 02/11/20 2024 02/11/20 2024   98 4 °F (36 9 °C) (!) 106 16 163/95 92 %      Temp Source Heart Rate Source Patient Position - Orthostatic VS BP Location FiO2 (%)   02/11/20 2024 02/11/20 2024 02/11/20 2024 02/11/20 2024 --   Oral Monitor Lying Right arm       Pain Score       02/11/20 2024       No Pain        Wt Readings from Last 1 Encounters:   01/10/20 76 2 kg (168 lb)     Additional Vital Signs:   02/12/20 0730  --  88  17  151/74  106 93 %  --  Sitting   02/12/20 0600  --  70  18  122/76  94  93 %  None (Room air)  Lying   02/12/20 0500  --  76  18  126/76  96  92 %  None (Room air)  Lying   02/12/20 0400  --  76  18  133/80  101  92 %  None (Room air)  Lying   02/12/20 0300  --  90  18  127/69  92  92 %  None (Room air)  Lying   02/12/20 0200  --  94  18  131/87  103  93 %  None (Room air)  Lying   02/12/20 0130  --  90  18  125/79  96  93 %  None (Room air)  Lying   02/12/20 0020  --  91  18  138/79  --  92 %  --  Sitting   02/11/20 2230  --  116Abnormal   20  129/63  88  92 %  None (Room air)  Lying   02/11/20 2200  --  118Abnormal   20  148/85  108  95 %  None (Room air)  Lying   02/11/20 2130  --  104  22  148/96  112  98 %  None (Room air)  Lying   02/11/20 2100  --  106Abnormal   22  132/84  103  92 %  None (Room air)  --     Pertinent Labs/Diagnostic Test Results:     2/11 CXR - 1  No focal consolidation to suggest pneumonia  8 mm nodular density at the left lung base may represent a true nodule versus nipple shadow  Recommend repeat frontal views with nipple markers for confirmation  2  Emphysematous changes are noted  No focal consolidation, pleural effusion, or pneumothorax      Results from last 7 days   Lab Units 02/12/20  0555 02/11/20 2029   WBC Thousand/uL 4 16* 7 32   HEMOGLOBIN g/dL 13 8 15 0   HEMATOCRIT % 41 3 44 9   PLATELETS Thousands/uL 220 242   NEUTROS ABS Thousands/µL  --  5 54     Results from last 7 days   Lab Units 02/12/20  0607 02/11/20 2029   SODIUM mmol/L 138 138   POTASSIUM mmol/L 3 5 3 4*   CHLORIDE mmol/L 102 101   CO2 mmol/L 28 28   ANION GAP mmol/L 8 9   BUN mg/dL 15 15   CREATININE mg/dL 0 98 1 11   EGFR ml/min/1 73sq m 78 67   CALCIUM mg/dL 8 4 9 1     Results from last 7 days   Lab Units 02/11/20 2029   AST U/L 18   ALT U/L 21   ALK PHOS U/L 94   TOTAL PROTEIN g/dL 7 7   ALBUMIN g/dL 3 9   TOTAL BILIRUBIN mg/dL 0 71     Results from last 7 days   Lab Units 02/12/20  0607 02/11/20 2029   GLUCOSE RANDOM mg/dL 216* 122     Results from last 7 days   Lab Units 02/11/20  2029   TROPONIN I ng/mL <0 02     Results from last 7 days   Lab Units 02/12/20  0615   PROCALCITONIN ng/ml 0 06     ED Treatment:   Medication Administration from 02/11/2020 2006 to 02/12/2020 0855    Date/Time Order Dose Route Action   02/11/2020 2207 fluticasone (FLONASE) 50 mcg/act nasal spray 1 spray 1 spray Each Nare Given   02/11/2020 2118 albuterol inhalation solution 5 mg 5 mg Nebulization Given   02/11/2020 2118 ipratropium (ATROVENT) 0 02 % inhalation solution 0 5 mg 0 5 mg Nebulization Given   02/11/2020 2120 methylPREDNISolone sodium succinate (Solu-MEDROL) injection 125 mg 125 mg Intravenous Given   02/11/2020 2228 sodium chloride 0 9 % bolus 1,000 mL 1,000 mL Intravenous New Bag   02/12/2020 0836 ipratropium (ATROVENT) 0 02 % inhalation solution 0 5 mg 0 5 mg Nebulization Given   02/12/2020 0835 enoxaparin (LOVENOX) subcutaneous injection 40 mg 40 mg Subcutaneous Given   02/12/2020 0833 finasteride (PROSCAR) tablet 5 mg 5 mg Oral Given   02/12/2020 0834 fluticasone (FLONASE) 50 mcg/act nasal spray 1 spray 1 spray Nasal Given   02/12/2020 0833 hydrochlorothiazide (HYDRODIURIL) tablet 25 mg 25 mg Oral Given   02/12/2020 0833 loratadine (CLARITIN) tablet 10 mg 10 mg Oral Given   02/12/2020 4907 multivitamin-minerals (CENTRUM) tablet 1 tablet 1 tablet Oral Given   02/12/2020 0450 methylPREDNISolone sodium succinate (Solu-MEDROL) injection 40 mg 40 mg Intravenous Given During Downtime   02/12/2020 0130 potassium chloride (K-DUR,KLOR-CON) CR tablet 40 mEq 40 mEq Oral Given   02/12/2020 0836 levalbuterol (XOPENEX) inhalation solution 1 25 mg 1 25 mg Nebulization Given        Past Medical History:   Diagnosis Date    BPH (benign prostatic hyperplasia)     COPD (chronic obstructive pulmonary disease) (HCC)     Last Assessed:11/14/2016    Diverticulosis     Elevated prostate specific antigen (PSA)     Emphysema (subcutaneous) (surgical) resulting from a procedure     Enlarged prostate with lower urinary tract symptoms (LUTS)     Resolved:8/22/2017    Hernia, inguinal, right      Present on Admission:   Hyperlipidemia   Essential hypertension   Seasonal allergies   BPH with obstruction/lower urinary tract symptoms    Admitting Diagnosis: Shortness of breath [R06 02]  Dyspnea on exertion [R06 09]  COPD exacerbation (HCC) [J44 1]     Age/Sex: 71 y o  male     Admission Orders:  Scheduled Medications:    Medications:  enoxaparin 40 mg Subcutaneous Daily   finasteride 5 mg Oral Daily   fluticasone 1 spray Nasal Daily   fluticasone-umeclidinium-vilanterol 1 puff Inhalation Daily   hydrochlorothiazide 25 mg Oral Daily   ipratropium 0 5 mg Nebulization TID   levalbuterol 1 25 mg Nebulization TID   loratadine 10 mg Oral Daily   methylPREDNISolone sodium succinate 40 mg Intravenous Q8H   multivitamin-minerals 1 tablet Oral Daily   pravastatin 40 mg Oral Daily With Dinner   tamsulosin 0 4 mg Oral Daily With Dinner     Continuous IV Infusions:     PRN Meds:    albuterol 2 puff Inhalation Q6H PRN   guaiFENesin 1,200 mg Oral BID PRN     SCDs  oob as marques   HOURL[employer] INCENTIVE SPIROMETRY  Regular diet   IP CONSULT TO CASE MANAGEMENT    Network Utilization Review Department  [email or web address]  ATTENTION: Please call with any questions or concerns to [phone] and carefully listen to the prompts so that you are directed to the right person  All voicemails are confidential   Iza Sanchez all requests for admission clinical reviews, approved or denied determinations and any other requests to dedicated fax number below belonging to the campus where the patient is receiving treatment   List of dedicated fax numbers for the Facilities:  [address] [place] [address] DENIALS (Administrative/Medical Necessity) [phone]   [address] [place] [address] (Maternity/NICU/Pediatrics) [phone]   [facility] [phone] [facility] [phone]   Bhavna Tucker [phone]   [address] [phone]   [facility]  [phone]   [address] [facility] [address]  [address] [phone]

## 2020-02-12 NOTE — DISCHARGE INSTRUCTIONS
COPD Exacerbation, Ambulatory Care   GENERAL INFORMATION:   A COPD (chronic obstructive pulmonary disease ) exacerbation  is a flare up or worsening of COPD  Common symptoms include the following:   · Shortness of breath     · A dry cough     · Coughing fits that bring up mucus from your lungs     · Wheezing and chest tightness  Seek immediate care for the following symptoms:   · Confusion, dizziness, or lightheadedness    · Red, swollen, warm arm or leg    · Shortness of breath or chest pain    · Coughing up blood  Treatment for a COPD exacerbation  may include medicines to help decrease swelling and inflammation in your lungs  Medicines may also help open your airways or treat and infection  You may need pulmonary rehabilitation to help you manage your symptoms and improve your quality of life  You may need extra oxygen to help you breathe easier  Prevent another exacerbation:   · Do not smoke, and avoid others who smoke  If you smoke, it is never too late to quit  You may have fewer exacerbations  Ask for information about medicines and support programs that can help you quit  · Avoid triggers that make your symptoms worse  Cold weather and sudden temperature changes can trigger an exacerbation  Fumes from cars and chemicals, air pollution, and perfume can also increase your symptoms  · Use pursed-lip breathing when you feel short of breath  Take a deep breath in through your nose  Slowly breathe out through your mouth with your lips pursed for twice as long as you inhaled  You can also practice this breathing pattern while you bend, lift, climb stairs, or exercise  Pursed-lip breathing slows down your breathing and helps move more air in and out of your lungs  · Exercise for at least 20 minutes each day  Exercise can help increase your energy and decrease shortness of breath  Ask about the best exercise plan for you  · Prevent infections that can be dangerous when you have COPD    Get a flu vaccine every year as soon as it becomes available  Ask if you should also get other vaccines, such as those given to prevent pneumonia and tetanus  Avoid people who are sick, and wash your hands often  Follow up with your healthcare provider as directed:  Write down your questions so you remember to ask them during your visits  CARE AGREEMENT:   You have the right to help plan your care  Learn about your health condition and how it may be treated  Discuss treatment options with your caregivers to decide what care you want to receive  You always have the right to refuse treatment  The above information is an  only  It is not intended as medical advice for individual conditions or treatments  Talk to your doctor, nurse or pharmacist before following any medical regimen to see if it is safe and effective for you  © 2014 [address] is for End User's use only and may not be sold, redistributed or otherwise used for commercial purposes  All illustrations and images included in CareNotes® are the copyrighted property of [employer]  or Emerson Stuart

## 2020-02-12 NOTE — SOCIAL WORK
CM had respiratory paged  [address] for O2 from Dr Walls Signs    0079  Script received  Pt wants [facility]  Script, facesheet & Home O2 Protocol information uploaded to [employer]  6174  Informed [facility]  Uploaded H & P to [employer]    3483  Spoke to [facility] Rep  who is delivering home O2 portable now   Informed Thom Boles RN CC

## 2020-02-12 NOTE — DISCHARGE SUMMARY
INTERNAL MEDICINE RESIDENCY DISCHARGE SUMMARY     Kole Justice   71 y o  male  MRN: [id number]  Room/Bed: 2 215/2 215-02     [address] [address]   Encounter: [id number]    Principal Problem:    COPD (chronic obstructive pulmonary disease) ([address] )  Active Problems:    Hyperlipidemia    BPH with obstruction/lower urinary tract symptoms    Essential hypertension    Seasonal allergies      No new Assessment & Plan notes have been filed under this hospital service since the last note was generated  Service: Internal Medicine      [address] COURSE     Patient is a 43-year-old male with a past medical history of COPD, hyperlipidemia, hypertension, BPH who presented to the hospital on 02/12/2019 with worsening cough, purulent sputum production, rhinorrhea, and dyspnea on exertion  On arrival to the ED, the patient was afebrile and hemodynamically stable, with adequate O2 saturation on room air  It was felt that his symptoms were secondary to mild COPD exacerbation in the setting of likely viral URI  Patient was given breathing treatment with albuterol, Atrovent, and IV Solu-Medrol  Patient was noted to have O2 saturation to the mid to the low 80s with ambulation in the ED  He was admitted for further treatment of acute COPD exacerbation with ambulatory O2 desaturation  The patient remained afebrile and hemodynamically stable throughout his stay  He improved symptomatic Devi with breathing treatments and steroids  He had a home O2 evaluation and was noted to desaturate to the mid 80s and qualified for home O2  He was discharged home on 02/12/2020 with instructions to complete a 3 day course of azithromycin and five-day course of prednisone and to continue his home inhaler regimen as prescribed  It was recommended that he follow-up with his PCP and pulmonology    DISCHARGE INFORMATION     PCP at Discharge: Sujtaha Townsend PA-C    Admitting Provider:  Romy Aparicio MD  Admission Date: 2/11/2020    Discharge Provider: Mariana Holter, MD  Discharge Date:  02/12/2020    Discharge Disposition: Home/Self Care  Discharge Condition: stable  Discharge with Lines: no    Discharge Diet: regular diet  Activity Restrictions: none  Test Results Pending at Discharge: None    Discharge Diagnoses:  Principal Problem:    COPD (chronic obstructive pulmonary disease) ([address] [place] [address] )  Active Problems:    Hyperlipidemia    BPH with obstruction/lower urinary tract symptoms    Essential hypertension    Seasonal allergies  Resolved Problems:    * No resolved hospital problems  *      Consulting Providers:      Diagnostic & Therapeutic Procedures Performed:  Xr Chest 2 Views    Result Date: 2/12/2020  Impression: 1  No focal consolidation to suggest pneumonia  8 mm nodular density at the left lung base may represent a true nodule versus nipple shadow  Recommend repeat frontal views with nipple markers for confirmation  2  Emphysematous changes are noted  No focal consolidation, pleural effusion, or pneumothorax  The study was marked in EPIC for significant notification   Workstation performed: [id number]       Code Status: Level 1 - Full Code  Advance Directive & Living Will: Received  Power of :    POLST:      Medications:  Current Discharge Medication List        Current Discharge Medication List      START taking these medications    Details   azithromycin (ZITHROMAX) 500 MG tablet Take 1 tablet (500 mg total) by mouth every 24 hours for 2 doses  Qty: 2 tablet, Refills: 0    Associated Diagnoses: COPD exacerbation ([facility])      predniSONE 20 mg tablet Take 2 tablets (40 mg total) by mouth daily for 4 days  Qty: 4 tablet, Refills: 0    Associated Diagnoses: COPD exacerbation ([address] [address] )           Current Discharge Medication List      CONTINUE these medications which have NOT CHANGED    Details   albuterol (PROVENTIL HFA,VENTOLIN HFA) 90 mcg/act inhaler Inhale 2 puffs every 6 (six) hours as needed for wheezing  Qty: 3 Inhaler, Refills: 5    Comments: Substitution to a formulary equivalent within the same pharmaceutical class is authorized  Associated Diagnoses: Chronic obstructive pulmonary disease, unspecified COPD type ([facility])      finasteride (PROSCAR) 5 mg tablet Take 1 tablet (5 mg total) by mouth daily  Qty: 90 tablet, Refills: 3    Associated Diagnoses: BPH with obstruction/lower urinary tract symptoms      fluticasone (FLONASE) 50 mcg/act nasal spray 1 spray into each nostril daily  Qty: 1 Bottle, Refills: 0    Associated Diagnoses: Seasonal allergies      fluticasone-umeclidinium-vilanterol (TRELEGY) 100-62 5-25 MCG/INH inhaler Inhale 1 puff daily Rinse mouth after use  Qty: 3 Inhaler, Refills: 5    Associated Diagnoses: Chronic obstructive pulmonary disease, unspecified COPD type ([facility])      guaiFENesin (MUCINEX) 600 mg 12 hr tablet Take 1,200 mg by mouth 2 (two) times a day as needed      hydrochlorothiazide (HYDRODIURIL) 25 mg tablet TAKE 1 TABLET DAILY  Qty: 90 tablet, Refills: 4    Associated Diagnoses: Hypertension, essential      ipratropium-albuterol (DUO-NEB) 0 5-2 5 mg/3 mL nebulizer solution Take 1 vial (3 mL total) by nebulization every 6 (six) hours  Qty: 1 mL, Refills: 0    Associated Diagnoses: Pulmonary emphysema, unspecified emphysema type ([facility])      loratadine (CLARITIN REDITABS) 10 MG dissolvable tablet Take 10 mg by mouth daily as needed for allergies  Multiple Vitamins-Minerals (CENTRUM ADULTS PO) Take 1 tablet by mouth daily  simvastatin (ZOCOR) 20 mg tablet Take 20 mg by mouth daily at bedtime      tamsulosin (FLOMAX) 0 4 mg Take 1 capsule (0 4 mg total) by mouth daily with dinner  Qty: 90 capsule, Refills: 3    Associated Diagnoses: BPH with obstruction/lower urinary tract symptoms      EPINEPHrine (EPIPEN) 0 3 mg/0 3 mL SOAJ Inject 0 3 mL (0 3 mg total) into a muscle once for 1 dose  Qty: 0 6 mL, Refills: 0    Associated Diagnoses:  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 sting Allergies: Allergies   Allergen Reactions    Bee Venom Facial Swelling    Other Rash     Annotation - [id number]: mushrooms    Penicillins Rash       FOLLOW-UP     PCP Outpatient Follow-up:    Dejan Araujo PA-C    Consulting Providers Follow-up:    Dr Bridger Morocho Requiring Follow-up:   none    Discharge Statement:   I spent 30 minutes minutes discharging the patient  This time was spent on the day of discharge  I had direct contact with the patient on the day of discharge  Additional documentation is required if more than 30 minutes were spent on discharge  Portions of the record may have been created with voice recognition software  Occasional wrong word or "sound a like" substitutions may have occurred due to the inherent limitations of voice recognition software    Read the chart carefully and recognize, using context, where substitutions have occurred     ==  Melvin Leon, [facility]  Internal Medicine Resident PGY-2

## 2020-02-12 NOTE — SOCIAL WORK
CM discussed pt in care coordination rounds & spoke to dr Janie Michelle who states pt needs Home oxygen protocol to eval for O2 at home    CM met w/ pt for d/c planning assessment & explained CM role  PTA Pt lives w/ wife Carter Wynn [phone] in 2 story home/ w Powder room on 1st floor and 1 step to enter home  Pt is independent w/ all ADLS & Ambulation without assistive device  Pt has [facility] and [facility] in [place], [place] is PCP  Rayshawn Guerrero No POA  Retired  Drives  No VNA  No short term rehab, psych or D & A Admission history  CM reviewed d/c planning process including the following: identifying help at home, patient preference for d/c planning needs, Discharge Lounge, [facility] Meds to Bed program, availability of treatment team to discuss questions or concerns patient and/or family may have regarding understanding medications and recognizing signs and symptoms once discharged  CM also encouraged patient to follow up with all recommended appointments after discharge  Patient advised of importance for patient and family to participate in managing patients medical well being

## 2020-02-13 ENCOUNTER — TRANSITIONAL CARE MANAGEMENT (OUTPATIENT)
Dept: INTERNAL MEDICINE CLINIC | Facility: CLINIC | Age: 70
End: 2020-02-13

## 2020-02-13 LAB
ATRIAL RATE: 110 BPM
P AXIS: 81 DEGREES
PR INTERVAL: 132 MS
QRS AXIS: 65 DEGREES
QRSD INTERVAL: 92 MS
QT INTERVAL: 320 MS
QTC INTERVAL: 433 MS
T WAVE AXIS: 82 DEGREES
VENTRICULAR RATE: 110 BPM

## 2020-02-13 PROCEDURE — 93010 ELECTROCARDIOGRAM REPORT: CPT | Performed by: INTERNAL MEDICINE

## 2020-02-14 ENCOUNTER — TELEPHONE (OUTPATIENT)
Dept: INTERNAL MEDICINE CLINIC | Facility: CLINIC | Age: 70
End: 2020-02-14

## 2020-02-14 ENCOUNTER — OFFICE VISIT (OUTPATIENT)
Dept: INTERNAL MEDICINE CLINIC | Facility: CLINIC | Age: 70
End: 2020-02-14
Payer: MEDICARE

## 2020-02-14 VITALS
SYSTOLIC BLOOD PRESSURE: 124 MMHG | OXYGEN SATURATION: 96 % | WEIGHT: 159.2 LBS | BODY MASS INDEX: 24.13 KG/M2 | HEART RATE: 103 BPM | TEMPERATURE: 97.8 F | DIASTOLIC BLOOD PRESSURE: 80 MMHG | HEIGHT: 68 IN

## 2020-02-14 DIAGNOSIS — R94.31 ABNORMAL ECG DURING EXERCISE STRESS TEST: ICD-10-CM

## 2020-02-14 DIAGNOSIS — J44.1 ACUTE EXACERBATION OF CHRONIC OBSTRUCTIVE PULMONARY DISEASE (COPD) (HCC): ICD-10-CM

## 2020-02-14 DIAGNOSIS — IMO0001 TRANSITION OF CARE PERFORMED WITH SHARING OF CLINICAL SUMMARY: Primary | ICD-10-CM

## 2020-02-14 DIAGNOSIS — E87.6 HYPOKALEMIA: ICD-10-CM

## 2020-02-14 DIAGNOSIS — F43.20 ADJUSTMENT DISORDER, UNSPECIFIED TYPE: ICD-10-CM

## 2020-02-14 DIAGNOSIS — N40.1 BPH WITH OBSTRUCTION/LOWER URINARY TRACT SYMPTOMS: ICD-10-CM

## 2020-02-14 DIAGNOSIS — R06.00 DOE (DYSPNEA ON EXERTION): ICD-10-CM

## 2020-02-14 DIAGNOSIS — I47.2 NSVT (NONSUSTAINED VENTRICULAR TACHYCARDIA) (HCC): ICD-10-CM

## 2020-02-14 DIAGNOSIS — E78.2 MIXED HYPERLIPIDEMIA: ICD-10-CM

## 2020-02-14 DIAGNOSIS — J43.9 PULMONARY EMPHYSEMA, UNSPECIFIED EMPHYSEMA TYPE (HCC): ICD-10-CM

## 2020-02-14 DIAGNOSIS — N13.8 BPH WITH OBSTRUCTION/LOWER URINARY TRACT SYMPTOMS: ICD-10-CM

## 2020-02-14 DIAGNOSIS — I10 ESSENTIAL HYPERTENSION: ICD-10-CM

## 2020-02-14 PROBLEM — Z78.9 TRANSITION OF CARE PERFORMED WITH SHARING OF CLINICAL SUMMARY: Status: ACTIVE | Noted: 2020-02-14

## 2020-02-14 PROBLEM — J00 COMMON COLD: Status: RESOLVED | Noted: 2019-12-27 | Resolved: 2020-02-14

## 2020-02-14 PROCEDURE — 99495 TRANSJ CARE MGMT MOD F2F 14D: CPT | Performed by: PHYSICIAN ASSISTANT

## 2020-02-14 RX ORDER — IPRATROPIUM BROMIDE AND ALBUTEROL SULFATE 2.5; .5 MG/3ML; MG/3ML
3 SOLUTION RESPIRATORY (INHALATION)
Qty: 50 VIAL | Refills: 1
Start: 2020-02-14 | End: 2020-02-28 | Stop reason: SDUPTHER

## 2020-02-14 NOTE — ASSESSMENT & PLAN NOTE
Patient doing well since hospital   Our office staff called and reached out directly to pulmonologist's office  Patient schedule for pulmonology follow-up  Continue Mucinex  Continue inhalers as directed    No further medications changes at this time

## 2020-02-14 NOTE — ASSESSMENT & PLAN NOTE
Discussed treatment options with patient and wife going forward  At this time patient feels he is coping and does not he medications however I discussed treatment options  I also recommended support groups and therapy which can be beneficial to patient's with similar conditions  Patient verbalized understanding and is willing and will consider  Close office follow-up scheduled in our office

## 2020-02-14 NOTE — TELEPHONE ENCOUNTER
I spoke with [facility] who made me aware that patient is supplemental oxygen would likely be denied by Medicare because it was given during hospitalization with the acute diagnosis of acute COPD exacerbation  They assured me that this is common and following a hospital more documentation may need to be submitted in order to see that this is covered  They assured me that his O2 will not be taken from him  He has pulm follow up scheduled to further discuss O2 therapy as well as follow up with myself in the office  Patient's wife was notified

## 2020-02-14 NOTE — TELEPHONE ENCOUNTER
Patients wife called in regards to her husbands oxygen that was given to him when he left the hospital   There were concerns that his insurance was not going to cover it because of a comment made by [facility] when it was delivered to their house  I contacted [facility] and they stated that yes it will most likely be denied by medicare because of the diagnosis of acute COPD exacerbation  I spoke with [facility] for a little and then Wang Buitrago got on the phone with them to clarify what they need in order to get it approved  Basically it is going to be denied as submitted by the hospital, but as we go forward and the patient is seen by our office and pulmonology, we will be able to resubmit everything to Medicare and hopefully be able to get it approved    This information was passed along to the wife

## 2020-02-14 NOTE — PATIENT INSTRUCTIONS
Acute exacerbation of chronic obstructive pulmonary disease (COPD) ([facility] [address] )  Patient doing well today following his hospitalization  Patient completed a Zithromax and and prednisone upon returning to home  Recommended he use regular Mucinex 2 tablets by mouth twice daily  Use Ocean nasal spray to help with nasal congestion  Ocean nasal spray can be used 3 times daily  Continue with Flonase 1 spray to each nostril daily  Continue with inhaled medications as directed  Continue with supplemental oxygen 2 L  Currently patient seated comfortably on 2 L during exam   Follows with pulmonology  Will have our office check with oxygen supplier with regards to coverage for patient's portable oxygen  At this time I feel oxygen is warranted for his acute on chronic COPD exacerbation  Will give pulse ox for patient/wife to monitor PO2 at home  Discussed red flag sx and ER precautions which need immediate eval and treat

## 2020-02-14 NOTE — ASSESSMENT & PLAN NOTE
Improving since hospitalization  Patient was started on supplemental oxygen on the hospital which she has been using 2 L daily  Since starting oxygen feeling much better  Cough and cold symptoms improving  Patient is pulmonology follow-up  Prescription for shower chair and rolling walker printed today to make it easier for patient now that he is currently on oxygen

## 2020-02-14 NOTE — ASSESSMENT & PLAN NOTE
Patient doing well today following his hospitalization  Patient completed a Zithromax and and prednisone upon returning to home  Recommended he use regular Mucinex 2 tablets by mouth twice daily  Use Ocean nasal spray to help with nasal congestion  Ocean nasal spray can be used 3 times daily  Continue with Flonase 1 spray to each nostril daily  Continue with inhaled medications as directed  Continue with supplemental oxygen 2 L  Currently patient seated comfortably on 2 L during exam   Follows with pulmonology  Will have our office check with oxygen supplier with regards to coverage for patient's portable oxygen  At this time I feel oxygen is warranted for his acute on chronic COPD exacerbation  Will give pulse ox for patient/wife to monitor PO2 at home  Discussed red flag sx and ER precautions which need immediate eval and treat

## 2020-02-14 NOTE — PROGRESS NOTES
[facility] - [facility] PRIMARY CARE [facility]  Transition of Care Visit  Patient ID: Wendy Santos Held    : 1950  Age/Gender: 71 y o  male     DATE: 2020      Assessment/Plan:    Acute exacerbation of chronic obstructive pulmonary disease (COPD) ([address] )  Patient doing well today following his hospitalization  Patient completed a Zithromax and and prednisone upon returning to home  Recommended he use regular Mucinex 2 tablets by mouth twice daily  Use Ocean nasal spray to help with nasal congestion  Ocean nasal spray can be used 3 times daily  Continue with Flonase 1 spray to each nostril daily  Continue with inhaled medications as directed  Continue with supplemental oxygen 2 L  Currently patient seated comfortably on 2 L during exam   Follows with pulmonology  Will have our office check with oxygen supplier with regards to coverage for patient's portable oxygen  At this time I feel oxygen is warranted for his acute on chronic COPD exacerbation  Will give pulse ox for patient/wife to monitor PO2 at home  Discussed red flag sx and ER precautions which need immediate eval and treat  COPD (chronic obstructive pulmonary disease) [facility])  Patient doing well since hospital   Our office staff called and reached out directly to pulmonologist's office  Patient schedule for pulmonology follow-up  Continue Mucinex  Continue inhalers as directed  No further medications changes at this time    Essential hypertension  Stable at this time on hydrochlorothiazide  No dose change  Will continue to follow    NSVT (nonsustained ventricular tachycardia) [facility])  Patient has cardiology appointment scheduled next week due to previous stress test     BPH with obstruction/lower urinary tract symptoms  Patient follows with Urology  No urinary symptoms at this time    Taking medications as directed    Abnormal ECG during exercise stress test  Cardiology appointment scheduled    Adjustment reaction  Discussed treatment options with patient and wife going forward  At this time patient feels he is coping and does not he medications however I discussed treatment options  I also recommended support groups and therapy which can be beneficial to patient's with similar conditions  Patient verbalized understanding and is willing and will consider  Close office follow-up scheduled in our office  SANTIAGO (dyspnea on exertion)  Improving since hospitalization  Patient was started on supplemental oxygen on the hospital which she has been using 2 L daily  Since starting oxygen feeling much better  Cough and cold symptoms improving  Patient is pulmonology follow-up  Prescription for shower chair and rolling walker printed today to make it easier for patient now that he is currently on oxygen  Hyperlipidemia  Stable at this time on Zocor  No dose change  Will continue to follow    Transition of care performed with sharing of clinical summary  Reviewed hospital summary  Patient clinically improving since hospital and currently stable on supplemental oxygen  Recommended patient follow with pulmonology  Our office called and scheduled pulmonologist follow-up  Our office additionally reached out to [facility] to see what documentation as needed for patient supplemental oxygen therapy  We were advised that patient's oxygen may not be covered because he was coded as an acute exacerbation however if pulmonology feels that oxygen will be needed long-term following acute exacerbation this could be continued  Will follow pulmonology is recommendations           Diagnoses and all orders for this visit:    Transition of care performed with sharing of clinical summary  -     Pulse Oximeter    Mixed hyperlipidemia    SANTIAGO (dyspnea on exertion)    Abnormal ECG during exercise stress test    BPH with obstruction/lower urinary tract symptoms    Acute exacerbation of chronic obstructive pulmonary disease (COPD) ([facility] )  -     Pulse Oximeter  -     Shower chair  -     Walker    Pulmonary emphysema, unspecified emphysema type (HCC)  -     ipratropium-albuterol (DUO-NEB) 0 5-2 5 mg/3 mL nebulizer solution; Take 1 vial (3 mL total) by nebulization every 6 (six) hours  -     Shower chair  -     Walker    NSVT (nonsustained ventricular tachycardia) (HCC)    Adjustment disorder, unspecified type    Hypokalemia  -     Basic metabolic panel; Future    Essential hypertension          Subjective:       Audrey Frey is a 71 y o  male who is here for TCM follow up on 2/14/2020  During the TCM phone call the patient stated:    TCM Call (since 1/14/2020)     Date and time call was made  2/13/2020  8:11 9 Shanda Fairchild [facility] reviewed  Records reviewed    Patient was hospitialized at  [facility]    Date of Admission  02/11/20    Date of discharge  02/12/20    Diagnosis  COPD    Disposition  Home      TCM Call (since 1/14/2020)     Should patient be enrolled in anticoag monitoring? No    Scheduled for follow up? Yes    Patients specialists  Pulmonlolgist; Cardiologist; Other (comment)    Cardiologist name  Kunal Nazario DO 2/20/20    Cardiologist contact #  [phone]    Pulmonologist name  Melania Mendieta MD needs f/u    Pulmonologist contact #  [phone]    Endocrinologist name  [phone]    Other specialists names  Kamila GORDON 2/21/20 [address]    Other specialists contcat #  [phone]    I have advised the patient to call PCP with any new or worsening symptoms  Fang Grace MA          Patient presents today for hospital follow-up  He notes that on 2/11/2020 he developed a worsening of cough and congestion with postnasal drip  He went to an urgent care and was evaluated    He had an x-ray which was unremarkable but he notes that because his heart rate was elevated and he was having frequent abnormal beats he was recommended to go to the emergency room for further evaluation  He went [place] [address] [place] [facility] [place] he was admitted to the hospital for acute COPD exacerbation  He was started on steroids and antibiotics  He was discharged with prednisone and a Zithromax and  He additionally was discharged with home oxygen which she has been using on 2 L since the time of his discharge  He feels much better with continuous home oxygen but notes that he was told insurance may not cover this medication secondary to coding which he asked me to look into  Since leaving the hospital he has coughing much less frequently  Cough very little last night but continues to have nasal congestion and postnasal drip which is his predominant symptom at the time  Since hospital feeling "a lot better "  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 and pressure on him  He does not have a shower chair at home or rolling walker and is interested in possibly having once a that he could take a seat when he walks long distances  He somewhat gets anxious when he is out in public due to the time restraint that is on his current oxygen tank and if he has to walk long distances without having a place to sit if needed  He notes that his shortness of breath on exertion has been chronic but had an acute worsening prior to his hospitalization  It has improved since the hospital but still persists with activity  He has some increased stress as 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 do the majority of the work  He feels that he is a perfectionist and left a little things bother him which she is trying to work on  He and his wife note that he no longer will be teaching at the school and is going to focus more on his own health  Needs refill of his nebulizer medication    He notes that he did not experience any palpitations or chest pain when in the hospital   He previously had a stress test and has a cardiology appointment scheduled next week  He has not yet reschedule his pulmonology appointment  Hyperlipidemia   This is a chronic problem  The problem is controlled  He has no history of obesity  Associated symptoms include shortness of breath ( improving since hospital)  Pertinent negatives include no chest pain or leg pain  Current antihyperlipidemic treatment includes statins  There are no compliance problems  Shortness of Breath   This is a chronic problem  The current episode started more than 1 month ago  The problem occurs daily  The problem has been gradually worsening  Pertinent negatives include no abdominal pain, chest pain, claudication, fever, leg pain, leg swelling, rhinorrhea, sore throat, vomiting or wheezing  The symptoms are aggravated by emotional upset, exercise, weather changes and URIs  He has tried steroid inhalers, ipratropium inhalers and beta agonist inhalers for the symptoms  The treatment provided moderate relief  His past medical history is significant for COPD  Hypertension   This is a chronic problem  The current episode started more than 1 year ago  The problem is unchanged  The problem is controlled  Associated symptoms include shortness of breath ( improving since hospital)  Pertinent negatives include no chest pain, palpitations or peripheral edema  The following portions of the patient's history were reviewed and updated as appropriate: allergies, current medications, past family history, past medical history, past social history, past surgical history and problem list     Review of Systems   Constitutional: Negative for chills and fever  HENT: Negative for rhinorrhea and sore throat  Respiratory: Positive for cough (Improving since hospital) and shortness of breath ( improving since hospital)  Negative for wheezing  Chest tightness:  improved since hospital     Cardiovascular: Negative for chest pain, palpitations, claudication and leg swelling     Gastrointestinal: Negative for abdominal pain, constipation, diarrhea, nausea and vomiting  Musculoskeletal:        Feels he would benefit from a shower chair and rolling walker   Neurological: Negative for dizziness and light-headedness  Psychiatric/Behavioral: Negative for agitation  Somewhat anxious when he is in public and feels his oxygen tank may be running low  Notes that he is always concerned that he might developed pulmonary symptoms and pelvic which has affected his social life           Patient Active Problem List   Diagnosis    Hyperlipidemia    [facility]) [facility]    A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 (HCC)    [facility]terial ectasia ([facility])    Diverticulosis, sigmoid    SANTIAGO (dyspnea on exertion)    Hearing loss    Hemorrhoids    Lung nodule    Impaired fasting glucose    Skin disorder    BPH with obstruction/lower urinary tract symptoms    Essential hypertension    Seasonal allergies    Medicare annual wellness visit, subsequent    Other microscopic hematuria    Encounter for screening for lung cancer    Other fatigue    NSVT (nonsustained ventricular tachycardia) ([facility])    Abnormal ECG during exercise stress test    Transition of care performed with sharing of clinical summary    Acute exacerbation of chronic obstructive pulmonary disease (COPD) ([facility] )    Adjustment reaction       Past Medical History:   Diagnosis Date    BPH (benign prostatic hyperplasia)     COPD (chronic obstructive pulmonary disease) ([facility])     Last Assessed:11/14/2016    Diverticulosis     Elevated prostate specific antigen (PSA)     Emphysema (subcutaneous) (surgical) resulting from a procedure     Enlarged prostate with lower urinary tract symptoms (LUTS)     Resolved:8/22/2017    Hernia, inguinal, right        Past Surgical History:   Procedure Laterality Date    BICEPS TENDON REPAIR  1999    COLONOSCOPY      HAND SURGERY      Last Assessed:7/11/2016    HERNIA REPAIR      Last Assessed:7/11/2016    KNEE ARTHROSCOPY      NH REPAIR ING HERNIA,5+Y/O,REDUCIBL Right 2/23/2016    Procedure: REPAIR HERNIA INGUINAL with mesh;  Surgeon: Kenneth Valente DO;  Location: [facility];  Service: General    PROSTATE BIOPSY      WISDOM TOOTH EXTRACTION           Current Outpatient Medications:     albuterol (PROVENTIL HFA,VENTOLIN HFA) 90 mcg/act inhaler, Inhale 2 puffs every 6 (six) hours as needed for wheezing, Disp: 3 Inhaler, Rfl: 5    EPINEPHrine (EPIPEN) 0 3 mg/0 3 mL SOAJ, Inject 0 3 mL (0 3 mg total) into a muscle once for 1 dose, Disp: 0 6 mL, Rfl: 0    finasteride (PROSCAR) 5 mg tablet, Take 1 tablet (5 mg total) by mouth daily, Disp: 90 tablet, Rfl: 3    fluticasone (FLONASE) 50 mcg/act nasal spray, 1 spray into each nostril daily, Disp: 1 Bottle, Rfl: 0    fluticasone-umeclidinium-vilanterol (TRELEGY) 100-62 5-25 MCG/INH inhaler, Inhale 1 puff daily Rinse mouth after use , Disp: 3 Inhaler, Rfl: 5    guaiFENesin (MUCINEX) 600 mg 12 hr tablet, Take 1,200 mg by mouth 2 (two) times a day as needed, Disp: , Rfl:     hydrochlorothiazide (HYDRODIURIL) 25 mg tablet, TAKE 1 TABLET DAILY, Disp: 90 tablet, Rfl: 4    ipratropium-albuterol (DUO-NEB) 0 5-2 5 mg/3 mL nebulizer solution, Take 1 vial (3 mL total) by nebulization every 6 (six) hours, Disp: 50 vial, Rfl: 1    loratadine (CLARITIN REDITABS) 10 MG dissolvable tablet, Take 10 mg by mouth daily as needed for allergies  , Disp: , Rfl:     Multiple Vitamins-Minerals (CENTRUM ADULTS PO), Take 1 tablet by mouth daily  , Disp: , Rfl:     simvastatin (ZOCOR) 20 mg tablet, Take 20 mg by mouth daily at bedtime, Disp: , Rfl:     tamsulosin (FLOMAX) 0 4 mg, Take 1 capsule (0 4 mg total) by mouth daily with dinner, Disp: 90 capsule, Rfl: 3    Allergies   Allergen Reactions    Bee Venom Facial Swelling    Other Rash     Annotation - [id number]: mushrooms    Penicillins Rash       Social History     Socioeconomic History    Marital status: /Civil Union     Spouse name: None    Number of children: None    Years of education: None    Highest education level: None   Occupational History    None   Social Needs    Financial resource strain: None    Food insecurity:     Worry: None     Inability: None    Transportation needs:     Medical: None     Non-medical: None   Tobacco Use    Smoking status: Former Smoker     Packs/day: 1 50     Years: 49 00     Pack years: 73 50     Types: Cigarettes     Last attempt to quit: 2015     Years since quittin 2    Smokeless tobacco: Never Used   Substance and Sexual Activity    Alcohol use: Yes     Frequency: Monthly or less     Drinks per session: 1 or 2     Binge frequency: Never     Comment: rare    Drug use: No    Sexual activity: Yes   Lifestyle    Physical activity:     Days per week: None     Minutes per session: None    Stress: None   Relationships    Social connections:     Talks on phone: None     Gets together: None     Attends Yazdanism service: None     Active member of club or organization: None     Attends meetings of clubs or organizations: None     Relationship status: None    Intimate partner violence:     Fear of current or ex partner: None     Emotionally abused: None     Physically abused: None     Forced sexual activity: None   Other Topics Concern    None   Social History Narrative    Advance directive on file       Family History   Problem Relation Age of Onset    Hypertension Mother        PHQ-9 Depression Screening    PHQ-9:    Frequency of the following problems over the past two weeks:       Little interest or pleasure in doing things:  0 - not at all  Feeling down, depressed, or hopeless:  0 - not at all  PHQ-2 Score:  0         Health Maintenance   Topic Date Due    SLP PLAN OF CARE  1950    DTaP,Tdap,and Td Vaccines (1 - Tdap) 1961    Fall Risk  2020    Medicare Annual Wellness Visit (AWV)  2020    Depression Screening PHQ  2021    BMI: Adult 02/14/2021    CRC Screening: Colonoscopy  07/25/2026    Hepatitis C Screening  Completed    Influenza Vaccine  Completed    Pneumococcal Vaccine: 65+ Years  Completed    Pneumococcal Vaccine: Pediatrics (0 to 5 Years) and At-Risk Patients (6 to 59 Years)  Aged Out    HIB Vaccine  Aged Out    Hepatitis B Vaccine  Aged Out    IPV Vaccine  Aged Out    Hepatitis A Vaccine  Aged Out    Meningococcal ACWY Vaccine  Aged Out    HPV Vaccine  Aged [employer] History   Administered Date(s) Administered    INFLUENZA 11/06/2006, 09/28/2007, 11/01/2016, 10/10/2017    Influenza Split High Dose Preservative Free IM 11/01/2016, 10/10/2017    Influenza, high dose seasonal 0 5 mL 10/19/2018, 10/03/2019    Pneumococcal Conjugate 13-Valent 07/11/2016, 06/12/2019    Pneumococcal Polysaccharide PPV23 10/10/2017    Zoster Vaccine Recombinant 03/28/2019, 06/12/2019        Objective:  Vitals:    02/14/20 0937   BP: 124/80   BP Location: Left arm   Patient Position: Sitting   Cuff Size: Adult   Pulse: 103   Temp: 97 8 °F (36 6 °C)   TempSrc: Oral   SpO2: 96%   Weight: 72 2 kg (159 lb 3 2 oz)   Height: 5' 8" (1 727 m)     Wt Readings from Last 3 Encounters:   02/14/20 72 2 kg (159 lb 3 2 oz)   02/12/20 72 8 kg (160 lb 6 4 oz)   01/10/20 76 2 kg (168 lb)     Body mass index is 24 21 kg/m²  No exam data present       Physical Exam   Constitutional: He appears well-developed and well-nourished  No distress  Alert thin build male seated in room in no acute distress on supplemental oxygen   HENT:   Head: Normocephalic and atraumatic  Right Ear: External ear normal    Left Ear: External ear normal    Mouth/Throat: Oropharynx is clear and moist  No oropharyngeal exudate  Oxygen by nasal cannula   Eyes: Pupils are equal, round, and reactive to light  Right eye exhibits no discharge  Left eye exhibits no discharge  No scleral icterus  Neck: Neck supple     Cardiovascular: Normal rate, regular rhythm and normal heart sounds  No murmur heard  Regular rate and rhythm  No murmurs   Pulmonary/Chest: Effort normal  No respiratory distress  He has no wheezes  He has no rales  He exhibits no tenderness  Abdominal: Soft  Bowel sounds are normal  He exhibits no distension  There is no tenderness  There is no guarding  Positive bowel sounds soft nontender   Musculoskeletal: He exhibits no edema  Neurological: He is alert  No gross focal neurologic deficit   Skin: Skin is warm and dry  He is not diaphoretic  Psychiatric: His behavior is normal  Thought content normal  His mood appears anxious  His affect is not angry and not blunt  His speech is not rapid and/or pressured  He is not agitated and not aggressive  He does not exhibit a depressed mood  Nursing note and vitals reviewed  Future Appointments   Date Time Provider Isacc Capellan   2/21/2020  1:00 PM EVAN Vargas VAS [address]   2/27/2020 11:20 AM Elkin Andino MD PULJUNAID RENEE [facility]   3/4/2020  2:40 PM DO NAKUL Hardin [facility]   3/6/2020 11:00 AM Shay Wick PA-C [address] [place] [address]   4/10/2020 10:30 AM Shay Wick PA-C [address] [place] [address]   8/17/2020 10:30 AM BE CT SLN 1 BE SLN CT BE [place]   9/4/2020 10:30 AM Jasmina Perez MD URO AL LV [facility]       Shay Wick PA-C  [address]    Patient Care Team:  Shay Wick PA-C as PCP - General (Internal Medicine)  Glori Coon, MD Therisa Dubin, MD Maida Philips, MD (Urology)     This note was completed in part utilizing M-Modal Fluency Direct Software  Grammatical errors, random word insertions, spelling mistakes, and incomplete sentences can be an occasional consequence of this system secondary to software limitations, ambient noise, and hardware issues    If you have any questions or concerns about the content, text, or information contained within the body of this dictation, please contact the provider for clarification

## 2020-02-14 NOTE — ASSESSMENT & PLAN NOTE
Reviewed hospital summary  Patient clinically improving since hospital and currently stable on supplemental oxygen  Recommended patient follow with pulmonology  Our office called and scheduled pulmonologist follow-up  Our office additionally reached out to [address]  to see what documentation as needed for patient supplemental oxygen therapy  We were advised that patient's oxygen may not be covered because he was coded as an acute exacerbation however if pulmonology feels that oxygen will be needed long-term following acute exacerbation this could be continued  Will follow pulmonology is recommendations

## 2020-02-21 ENCOUNTER — OFFICE VISIT (OUTPATIENT)
Dept: VASCULAR SURGERY | Facility: CLINIC | Age: 70
End: 2020-02-21
Payer: MEDICARE

## 2020-02-21 VITALS
SYSTOLIC BLOOD PRESSURE: 136 MMHG | HEIGHT: 64 IN | RESPIRATION RATE: 18 BRPM | BODY MASS INDEX: 27.33 KG/M2 | TEMPERATURE: 97.9 F | DIASTOLIC BLOOD PRESSURE: 86 MMHG | HEART RATE: 118 BPM

## 2020-02-21 DIAGNOSIS — I10 ESSENTIAL HYPERTENSION: ICD-10-CM

## 2020-02-21 DIAGNOSIS — I72.3 BILATERAL ILIAC ARTERY ANEURYSM (HCC): Primary | ICD-10-CM

## 2020-02-21 DIAGNOSIS — R09.89 PROMINENT POPLITEAL PULSE: ICD-10-CM

## 2020-02-21 PROCEDURE — 99204 OFFICE O/P NEW MOD 45 MIN: CPT | Performed by: NURSE PRACTITIONER

## 2020-02-21 PROCEDURE — 3079F DIAST BP 80-89 MM HG: CPT | Performed by: NURSE PRACTITIONER

## 2020-02-21 PROCEDURE — 4040F PNEUMOC VAC/ADMIN/RCVD: CPT | Performed by: NURSE PRACTITIONER

## 2020-02-21 PROCEDURE — 1160F RVW MEDS BY RX/DR IN RCRD: CPT | Performed by: NURSE PRACTITIONER

## 2020-02-21 PROCEDURE — 1036F TOBACCO NON-USER: CPT | Performed by: NURSE PRACTITIONER

## 2020-02-21 PROCEDURE — 3075F SYST BP GE 130 - 139MM HG: CPT | Performed by: NURSE PRACTITIONER

## 2020-02-21 NOTE — ASSESSMENT & PLAN NOTE
-AOIL- aorta normal 2 28cm, bilateral ALHAJI aneurysms at 1 9cm  -Non-smoker, quit 3yrs ago  Long 40+ year smoking hx  -Blood pressure is well controlled  Recently initiated on HCTZ  -No family hx aneurysmal disease  Discussed the hereditary component of aneurysmal disease and advised that his children be screened after age 54  -He has prominent popliteal pulses  Will screen for popliteal aneurysm  -Followup in 1 year    Notify the office prior with any questions/concerns

## 2020-02-21 NOTE — PATIENT INSTRUCTIONS
Bilateral iliac artery aneurysms  -I reviewed the results of your recent aortoiliac duplex  Your aorta is normal   You have bilateral iliac artery aneurysms measuring 1 9 cm on both sides  -we will continue to monitor for any aneurysm growth with an aortoiliac duplex once per year  -you have minimized your risk factors for aneurysm growth by maintaining your nonsmoking status and having good blood pressure control  -there is about a 20% hereditary component to aneurysmal disease  Let your children know that they should be screened for aneurysm after age 54  -prominent popliteal pulses  We will screen for popliteal aneurysm with your next aortoiliac duplex  -followup in office in 1 year after repeat testing    Notify the office prior with any questions/concerns  -you can have your duplex testing done at the [address] [place] [address] location and can follow up at the [facility], the office is on the third floor

## 2020-02-21 NOTE — ASSESSMENT & PLAN NOTE
-Newly dx HTN, recently started on HCTZ  -BP within normal limits at today's visit  -Continue current antihypertensive regimen  -Management/adjustments per PCP

## 2020-02-21 NOTE — PROGRESS NOTES
Assessment/Plan:    70 y/o M with HTN, HLD, COPD with new oxygen dependence, and bilateral iliac artery aneurysms, seen for annual risk factor modification visit  Bilateral iliac artery aneurysm (HCC)  -AOIL- aorta normal 2 28cm, bilateral ALHAJI aneurysms at 1 9cm  -Non-smoker, quit 3yrs ago  Long 40+ year smoking hx  -Blood pressure is well controlled  Recently initiated on HCTZ  -No family hx aneurysmal disease  Discussed the hereditary component of aneurysmal disease and advised that his children be screened after age 54  -He has prominent popliteal pulses  Will screen for popliteal aneurysm  -Followup in 1 year  Notify the office prior with any questions/concerns    Essential hypertension  -Newly dx HTN, recently started on HCTZ  -BP within normal limits at today's visit  -Continue current antihypertensive regimen  -Management/adjustments per PCP    Prominent popliteal pulse  -Prominent pulses to bilateral popliteal arteries  -Will screen for popliteal aneurysm       Diagnoses and all orders for this visit:    Bilateral iliac artery aneurysm (HCC)  -     VAS abdominal aorta/iliacs; complete study; Future    Prominent popliteal pulse  -     VAS lower limb arterial duplex, complete bilateral; Future    Essential hypertension          Subjective:      Patient ID: Oanh Greenwood is a 71 y o  male  Patient seen for risk factor modification visit  He has known bilateral iliac artery aneurysms  He had an aortoiliac duplex done 1/29/2020  He denies any abdominal pain or back pain  He denies any family history of aneurysmal disease  Prominent bilateral popliteal pulses  Denies claudication symptoms  His blood pressure is within normal limits today  He states that it had been running high and he was recently diagnosed with hypertension; so he was initiated on hydrochlorothiazide a few months ago by his PCP  He also has a new oxygen requirement  He is on 2 liters nasal cannula at today's visit    He denies chest pain or resting shortness of breath  Reports dyspnea on exertion relieved with rest and improved since being on oxygen  Pt denies change in appettite, pain afyer eating or lower back pain  Pt had done AOIL on 01/29/20  Pt taking Simvastatin  The following portions of the patient's history were reviewed and updated as appropriate: allergies, current medications, past family history, past medical history, past social history, past surgical history and problem list     Review of Systems   Constitutional: Positive for activity change and fatigue  HENT: Positive for congestion and postnasal drip  Eyes: Negative  Respiratory: Positive for shortness of breath  Cardiovascular: Negative  Gastrointestinal: Negative  Endocrine: Negative  Genitourinary: Negative  Musculoskeletal: Negative  Skin: Negative  Allergic/Immunologic: Negative  Neurological: Negative  Hematological: Negative  Psychiatric/Behavioral: Negative  Objective:       Physical Exam   Constitutional: He is oriented to person, place, and time  He appears well-nourished  No distress  HENT:   Head: Normocephalic and atraumatic  Eyes: No scleral icterus  Neck: Neck supple  No JVD present  Cardiovascular: Normal rate and regular rhythm  Pulses:       Popliteal pulses are 3+ on the right side, and 3+ on the left side  Dorsalis pedis pulses are 1+ on the right side, and 1+ on the left side  Pulmonary/Chest: Effort normal  No respiratory distress  Clear, diminished at bilateral bases, no rhonchi or wheezing   Abdominal: Soft  He exhibits no distension and no mass  There is no tenderness  No abdominal bruits   Musculoskeletal: He exhibits no edema  Neurological: He is alert and oriented to person, place, and time  Skin: Skin is warm and dry  Psychiatric: He has a normal mood and affect           I have reviewed and made appropriate changes to the review of systems input by the medical assistant      Vitals:    02/21/20 1255 02/21/20 1256   BP: 132/84 136/86   BP Location: Left arm Right arm   Patient Position: Sitting Sitting   Pulse: (!) 118    Resp: 18    Temp: 97 9 °F (36 6 °C)    Height: 5' 4" (1 626 m)        Patient Active Problem List   Diagnosis    Hyperlipidemia    COPD (chronic obstructive pulmonary disease) ([facility] [address] [facility]    Bilateral iliac artery aneurysm (HCC)    Arterial ectasia (HCC)    Diverticulosis, sigmoid    SANTIAGO (dyspnea on exertion)    Hearing loss    Hemorrhoids    Lung nodule    Impaired fasting glucose    Skin disorder    BPH with obstruction/lower urinary tract symptoms    Essential hypertension    Seasonal allergies    Medicare annual wellness visit, subsequent    Other microscopic hematuria    Encounter for screening for lung cancer    Other fatigue    [facility]onsustained [facility]cardia) [facility]    Abnormal ECG during exercise stress test    Transition of care performed with sharing of clinical summary    [facility] of [facility]    Adjustment reaction    Prominent popliteal pulse       Past Surgical History:   Procedure Laterality Date    BICEPS TENDON REPAIR  1999    COLONOSCOPY      HAND SURGERY      Last Assessed:7/11/2016    HERNIA REPAIR      Last Assessed:7/11/2016    KNEE ARTHROSCOPY      AR REPAIR ING HERNIA,5+Y/O,REDUCIBL Right 2/23/2016    Procedure: REPAIR HERNIA INGUINAL with mesh;  Surgeon: Jia Lowe DO;  Location: [facility];  Service: General    PROSTATE BIOPSY      WISDOM TOOTH EXTRACTION         Family History   Problem Relation Age of Onset    Hypertension Mother        Social History     Socioeconomic History    Marital status: /Civil Union     Spouse name: Not on file    Number of children: Not on file    Years of education: Not on file    Highest education level: Not on file   Occupational History    Not on file   Social Needs    Financial resource strain: Not on file    Food insecurity:     Worry: Not on file     Inability: Not on file    Transportation needs:     Medical: Not on file     Non-medical: Not on file   Tobacco Use    Smoking status: Former Smoker     Packs/day: 1 50     Years: 49 00     Pack years: 73 50     Types: Cigarettes     Last attempt to quit: 2015     Years since quittin 2    Smokeless tobacco: Never Used   Substance and Sexual Activity    Alcohol use: Yes     Frequency: Monthly or less     Drinks per session: 1 or 2     Binge frequency: Never     Comment: rare    Drug use: No    Sexual activity: Yes   Lifestyle    Physical activity:     Days per week: Not on file     Minutes per session: Not on file    Stress: Not on file   Relationships    Social connections:     Talks on phone: Not on file     Gets together: Not on file     Attends Yazidi service: Not on file     Active member of club or organization: Not on file     Attends meetings of clubs or organizations: Not on file     Relationship status: Not on file    Intimate partner violence:     Fear of current or ex partner: Not on file     Emotionally abused: Not on file     Physically abused: Not on file     Forced sexual activity: Not on file   Other Topics Concern    Not on file   Social History Narrative    Advance directive on file       Allergies   Allergen Reactions    Bee Venom Facial Swelling    Other Rash     Annotation - [id number]: mushrooms    Penicillins Rash         Current Outpatient Medications:     albuterol (PROVENTIL HFA,VENTOLIN HFA) 90 mcg/act inhaler, Inhale 2 puffs every 6 (six) hours as needed for wheezing, Disp: 3 Inhaler, Rfl: 5    EPINEPHrine (EPIPEN) 0 3 mg/0 3 mL SOAJ, Inject 0 3 mL (0 3 mg total) into a muscle once for 1 dose, Disp: 0 6 mL, Rfl: 0    finasteride (PROSCAR) 5 mg tablet, Take 1 tablet (5 mg total) by mouth daily, Disp: 90 tablet, Rfl: 3    fluticasone (FLONASE) 50 mcg/act nasal spray, 1 spray into each nostril daily, Disp: 1 Bottle, Rfl: 0    fluticasone-umeclidinium-vilanterol (TRELEGY) 100-62 5-25 MCG/INH inhaler, Inhale 1 puff daily Rinse mouth after use , Disp: 3 Inhaler, Rfl: 5    guaiFENesin (MUCINEX) 600 mg 12 hr tablet, Take 1,200 mg by mouth 2 (two) times a day as needed, Disp: , Rfl:     hydrochlorothiazide (HYDRODIURIL) 25 mg tablet, TAKE 1 TABLET DAILY, Disp: 90 tablet, Rfl: 4    ipratropium-albuterol (DUO-NEB) 0 5-2 5 mg/3 mL nebulizer solution, Take 1 vial (3 mL total) by nebulization every 6 (six) hours, Disp: 50 vial, Rfl: 1    loratadine (CLARITIN REDITABS) 10 MG dissolvable tablet, Take 10 mg by mouth daily as needed for allergies  , Disp: , Rfl:     Multiple Vitamins-Minerals (CENTRUM ADULTS PO), Take 1 tablet by mouth daily  , Disp: , Rfl:     simvastatin (ZOCOR) 20 mg tablet, Take 20 mg by mouth daily at bedtime, Disp: , Rfl:     tamsulosin (FLOMAX) 0 4 mg, Take 1 capsule (0 4 mg total) by mouth daily with dinner, Disp: 90 capsule, Rfl: 3

## 2020-02-27 ENCOUNTER — OFFICE VISIT (OUTPATIENT)
Dept: PULMONOLOGY | Facility: CLINIC | Age: 70
End: 2020-02-27
Payer: MEDICARE

## 2020-02-27 VITALS
DIASTOLIC BLOOD PRESSURE: 84 MMHG | SYSTOLIC BLOOD PRESSURE: 132 MMHG | OXYGEN SATURATION: 97 % | RESPIRATION RATE: 18 BRPM | TEMPERATURE: 97.5 F | HEART RATE: 108 BPM | BODY MASS INDEX: 27.26 KG/M2 | WEIGHT: 158.8 LBS

## 2020-02-27 DIAGNOSIS — R91.1 LUNG NODULE: ICD-10-CM

## 2020-02-27 DIAGNOSIS — J44.9 CHRONIC OBSTRUCTIVE PULMONARY DISEASE, UNSPECIFIED COPD TYPE (HCC): ICD-10-CM

## 2020-02-27 DIAGNOSIS — J43.2 CENTRILOBULAR EMPHYSEMA (HCC): ICD-10-CM

## 2020-02-27 DIAGNOSIS — J96.11 CHRONIC HYPOXEMIC RESPIRATORY FAILURE (HCC): Primary | ICD-10-CM

## 2020-02-27 PROBLEM — J44.1 ACUTE EXACERBATION OF CHRONIC OBSTRUCTIVE PULMONARY DISEASE (COPD) (HCC): Status: RESOLVED | Noted: 2020-02-14 | Resolved: 2020-02-27

## 2020-02-27 PROCEDURE — 3079F DIAST BP 80-89 MM HG: CPT | Performed by: INTERNAL MEDICINE

## 2020-02-27 PROCEDURE — 94618 PULMONARY STRESS TESTING: CPT | Performed by: INTERNAL MEDICINE

## 2020-02-27 PROCEDURE — 1160F RVW MEDS BY RX/DR IN RCRD: CPT | Performed by: INTERNAL MEDICINE

## 2020-02-27 PROCEDURE — 1036F TOBACCO NON-USER: CPT | Performed by: INTERNAL MEDICINE

## 2020-02-27 PROCEDURE — 3075F SYST BP GE 130 - 139MM HG: CPT | Performed by: INTERNAL MEDICINE

## 2020-02-27 PROCEDURE — 99215 OFFICE O/P EST HI 40 MIN: CPT | Performed by: INTERNAL MEDICINE

## 2020-02-27 PROCEDURE — 4040F PNEUMOC VAC/ADMIN/RCVD: CPT | Performed by: INTERNAL MEDICINE

## 2020-02-28 ENCOUNTER — DOCUMENTATION (OUTPATIENT)
Dept: PULMONOLOGY | Facility: CLINIC | Age: 70
End: 2020-02-28

## 2020-02-28 ENCOUNTER — TELEPHONE (OUTPATIENT)
Dept: INTERNAL MEDICINE CLINIC | Facility: CLINIC | Age: 70
End: 2020-02-28

## 2020-02-28 ENCOUNTER — TELEPHONE (OUTPATIENT)
Dept: PULMONOLOGY | Facility: CLINIC | Age: 70
End: 2020-02-28

## 2020-02-28 DIAGNOSIS — J96.21 ACUTE ON CHRONIC RESPIRATORY FAILURE WITH HYPOXIA (HCC): Primary | ICD-10-CM

## 2020-02-28 DIAGNOSIS — J43.9 PULMONARY EMPHYSEMA, UNSPECIFIED EMPHYSEMA TYPE (HCC): ICD-10-CM

## 2020-02-28 RX ORDER — IPRATROPIUM BROMIDE AND ALBUTEROL SULFATE 2.5; .5 MG/3ML; MG/3ML
3 SOLUTION RESPIRATORY (INHALATION)
Qty: 180 VIAL | Refills: 1 | Status: SHIPPED | OUTPATIENT
Start: 2020-02-28 | End: 2021-06-08 | Stop reason: SDUPTHER

## 2020-02-28 NOTE — ASSESSMENT & PLAN NOTE
· Continue current bronchodilators  · He feels that Rosie Gaudier works well for him  He does have nebulized bronchodilators which he is using a few times a day  · Seems to have improved after the current hospital stay but still has a lot of anxiety, exertional shortness of breath  · We discussed returning to pulmonary rehab  He last completed pulmonary rehab in January 2019  · I did also discussed with him potentially re-evaluating and considering workup for possible endobronchial valve therapy given his emphysema    Information given to him and his wife and they will discuss we will revisit at the next office follow-up in 2 months

## 2020-02-28 NOTE — TELEPHONE ENCOUNTER
Spoke with patient  He has plenty of Duoneb  Refill sent to mail order pharmacy  Patient recently was seen and evaluated by pulm and is being sent for additional oxygen testing  Patient will keep our office informed of any change in tx plan

## 2020-02-28 NOTE — PROGRESS NOTES
Faxed overnight pulse ox order along with office notes to [facility] [phone] on 2/28/20

## 2020-02-28 NOTE — TELEPHONE ENCOUNTER
Spoke with patients wife manjit explained we should wait for overnight results to see what will be done next  Received information from Jeff Barksdale at [facility] she had spoken with wife told her that we would need an evaluation for conserving device to see if patient would qualify but to also wait for the results of overnight  They agreed if he does not qualify they will pay out of pocket  I told manjit that once results are sent to us I will directly contact her myself

## 2020-02-28 NOTE — ASSESSMENT & PLAN NOTE
· Nodules are tiny  · Needs lung cancer screening criteria and should have repeat low-dose CT scan in August 2020

## 2020-02-28 NOTE — TELEPHONE ENCOUNTER
Patient did not qualify with walk test done in office  Dr Shaquille Wang ordered an overnight pulse oximetry that was sent this morning to [facility]  He will be evaluated and upon results decision for oxygen will be made

## 2020-02-28 NOTE — PROGRESS NOTES
Progress note - Pulmonary Medicine   Jeannette Curran Held 71 y o  male MRN: [id number]       Impression & Plan:     Centrilobular emphysema ([facility] [address] ) - severe  · Continue current bronchodilators  · He feels that Orvel Dieter works well for him  He does have nebulized bronchodilators which he is using a few times a day  · Seems to have improved after the current hospital stay but still has a lot of anxiety, exertional shortness of breath  · We discussed returning to pulmonary rehab  He last completed pulmonary rehab in January 2019  · I did also discussed with him potentially re-evaluating and considering workup for possible endobronchial valve therapy given his emphysema  Information given to him and his wife and they will discuss we will revisit at the next office follow-up in 2 months    Chronic hypoxemic respiratory failure ([facility] [address] )  · Patient qualified for supplemental oxygen at the time of hospital discharge with oxygen saturation of 87%  · Due to incorrect ordering, repeat testing was required in the pulmonary office  · Patient has extremely poor exercise tolerance and was only able to ambulate for 3 minutes  In that time he became quite short of breath and felt like he had to stop  His heart rate increased to 130 beats per minute but his oxygen level did not go below 92%  · I have recommended that we obtain a nocturnal oximetry  The  · The patient feels that he cannot survive without supplemental oxygen but also feels he may not be able to afford to continue to pay out of pocket to rent oxygen long-term  · We did discuss potentially purchasing a portable oxygen concentrator himself  Lung nodule  · Nodules are tiny  · Needs lung cancer screening criteria and should have repeat low-dose CT scan in August 2020    Follow-up in 2 months   ______________________________________________________________________    HPI:    Piper Dawson presents today for follow-up of emphysema  He was recently hospitalized    He had significant exacerbation of COPD  He was treated with steroids and had some improvement in his breathing  He did meet criteria for supplemental oxygen and was discharged with home oxygen  He has felt the oxygen has been very helpful  He feels that he cannot do most activities without the oxygen  He does admit that he gets anxious with certain activities and situations  His anxiety definitely worsened his symptoms  He does not have chest pain or pleurisy  He denies palpitations or cardiac complaints  No abdominal pain or GERD symptoms  He is fairly thin but denies any change in appetite or weight  He has previously participated in pulmonary rehab  He completed the therapy in January 2019  He did have some benefit from participating in the program   He is currently using trilogy Ellipta which she has found helpful  He also has a nebulizer which he has been using intermittently and a rescue inhaler as well      Current Medications:    Current Outpatient Medications:     albuterol (PROVENTIL HFA,VENTOLIN HFA) 90 mcg/act inhaler, Inhale 2 puffs every 6 (six) hours as needed for wheezing, Disp: 3 Inhaler, Rfl: 5    EPINEPHrine (EPIPEN) 0 3 mg/0 3 mL SOAJ, Inject 0 3 mL (0 3 mg total) into a muscle once for 1 dose, Disp: 0 6 mL, Rfl: 0    finasteride (PROSCAR) 5 mg tablet, Take 1 tablet (5 mg total) by mouth daily, Disp: 90 tablet, Rfl: 3    fluticasone (FLONASE) 50 mcg/act nasal spray, 1 spray into each nostril daily, Disp: 1 Bottle, Rfl: 0    fluticasone-umeclidinium-vilanterol (TRELEGY) 100-62 5-25 MCG/INH inhaler, Inhale 1 puff daily Rinse mouth after use , Disp: 3 Inhaler, Rfl: 5    guaiFENesin (MUCINEX) 600 mg 12 hr tablet, Take 1,200 mg by mouth 2 (two) times a day as needed, Disp: , Rfl:     hydrochlorothiazide (HYDRODIURIL) 25 mg tablet, TAKE 1 TABLET DAILY, Disp: 90 tablet, Rfl: 4    ipratropium-albuterol (DUO-NEB) 0 5-2 5 mg/3 mL nebulizer solution, Take 1 vial (3 mL total) by nebulization every 6 (six) hours, Disp: 50 vial, Rfl: 1    loratadine (CLARITIN REDITABS) 10 MG dissolvable tablet, Take 10 mg by mouth daily as needed for allergies  , Disp: , Rfl:     Multiple Vitamins-Minerals (CENTRUM ADULTS PO), Take 1 tablet by mouth daily  , Disp: , Rfl:     simvastatin (ZOCOR) 20 mg tablet, Take 20 mg by mouth daily at bedtime, Disp: , Rfl:     tamsulosin (FLOMAX) 0 4 mg, Take 1 capsule (0 4 mg total) by mouth daily with dinner, Disp: 90 capsule, Rfl: 3    Review of Systems:  Review of Systems   Constitutional: Positive for activity change (Activity has declined particularly since the hospital stay)  Negative for appetite change, fatigue, fever and unexpected weight change  HENT: Negative  Respiratory: Positive for cough, chest tightness and shortness of breath  Cardiovascular: Negative  Gastrointestinal: Negative  Musculoskeletal: Negative  Allergic/Immunologic: Negative for environmental allergies  Neurological: Negative for dizziness and weakness  Psychiatric/Behavioral: The patient is nervous/anxious  All other systems reviewed and are negative  Past medical history, surgical history, and family history were reviewed and updated as appropriate    Social history updates:  Social History     Tobacco Use   Smoking Status Former Smoker    Packs/day: 1 50    Years: 49 00    Pack years: 73 50    Types: Cigarettes    Last attempt to quit: 2015    Years since quittin 2   Smokeless Tobacco Never Used       PhysicalExamination:  Vitals:   /84   Pulse (!) 108   Temp 97 5 °F (36 4 °C)   Resp 18   Wt 72 kg (158 lb 12 8 oz)   SpO2 97%   BMI 27 26 kg/m²     Gen:  Mildly anxious today  Very concerned about supplemental oxygen  HEENT: PERRL  Oropharynx is clear, moist  Neck: Supple  There is no JVD, lymphadenopathy or thyromegaly  Trachea is midline  Chest:  Chest excursion symmetric  Lungs are hyperinflated   Breath sounds are distant bilaterally but otherwise clear  Hyper-resonant to percussion  Cardiac:  Distant heart tones, tachycardic but regular  There are no murmurs  Abdomen: Soft and nontender  Benign  Extremities: No clubbing, cyanosis or edema  Neurologic: No focal deficits  Normal affect  Skin: No appreciable rashes  Diagnostic Data:  Labs: I personally reviewed the most recent laboratory data pertinent to today's visit    Lab Results   Component Value Date    WBC 4 16 (L) 02/12/2020    HGB 13 8 02/12/2020    HCT 41 3 02/12/2020    MCV 88 02/12/2020     02/12/2020     Lab Results   Component Value Date    SODIUM 138 02/12/2020    K 3 5 02/12/2020    CO2 28 02/12/2020     02/12/2020    BUN 15 02/12/2020    CREATININE 0 98 02/12/2020    CALCIUM 8 4 02/12/2020       PFT results: The most recent pulmonary function tests were reviewed  Most recent spirogram was January 2019  FEV1 severely reduced at 0 72 L  This is severe obstruction  Previous complete pulmonary function test in September 2016 showed significant hyperinflation and air trapping as well as severe impairment in diffusing capacity  6 minutes walk test was attempted in the office today  He was only able to walk for 3 minutes  Resting oxygen saturation is normal but he also has a resting mild tachycardia  With ambulation heart rate did increase quite substantially up to 130 beats per minute  Lowest oxygen saturation was 92%  He had to stop due to dyspnea      Imaging:  I personally reviewed the images on the [facility] system pertinent to today's visit  Lung cancer screening CT scan from August shows 2 tiny pulmonary nodules of 2 mm in size    He has diffuse severe emphysema change    Eulalia Maria MD

## 2020-02-28 NOTE — TELEPHONE ENCOUNTER
Per yesterday's note, patient does not qualify for oxygen-placed order, although patient will have to pay out of pocket

## 2020-02-28 NOTE — ASSESSMENT & PLAN NOTE
· Patient qualified for supplemental oxygen at the time of hospital discharge with oxygen saturation of 87%  · Due to incorrect ordering, repeat testing was required in the pulmonary office  · Patient has extremely poor exercise tolerance and was only able to ambulate for 3 minutes  In that time he became quite short of breath and felt like he had to stop  His heart rate increased to 130 beats per minute but his oxygen level did not go below 92%  · I have recommended that we obtain a nocturnal oximetry  The  · The patient feels that he cannot survive without supplemental oxygen but also feels he may not be able to afford to continue to pay out of pocket to rent oxygen long-term  · We did discuss potentially purchasing a portable oxygen concentrator himself

## 2020-02-28 NOTE — TELEPHONE ENCOUNTER
Needs Duo-Neb 0 5-2 5 mg/3ml nebulizer solution sent to [facility] for a 90 day supply  Patient never picked up the medication that was sent to [facility] in [place]  She is also requesting for Jay Hutchinson to give her a call at the home number at [phone] when he gets a chance

## 2020-02-28 NOTE — TELEPHONE ENCOUNTER
Wife LM saying Kingsley Nayak is on oxygen at home  She spoke with [facility]  Patient is looking for the [employer]  Also needs a script to evaluate for a conserving device

## 2020-03-03 ENCOUNTER — CLINICAL SUPPORT (OUTPATIENT)
Dept: INTERNAL MEDICINE CLINIC | Facility: CLINIC | Age: 70
End: 2020-03-03
Payer: MEDICARE

## 2020-03-03 DIAGNOSIS — E87.6 HYPOKALEMIA: Primary | ICD-10-CM

## 2020-03-03 LAB
ANION GAP SERPL CALCULATED.3IONS-SCNC: 4 MMOL/L (ref 4–13)
BUN SERPL-MCNC: 15 MG/DL (ref 5–25)
CALCIUM SERPL-MCNC: 9 MG/DL (ref 8.3–10.1)
CHLORIDE SERPL-SCNC: 104 MMOL/L (ref 100–108)
CO2 SERPL-SCNC: 33 MMOL/L (ref 21–32)
CREAT SERPL-MCNC: 1.18 MG/DL (ref 0.6–1.3)
GFR SERPL CREATININE-BSD FRML MDRD: 63 ML/MIN/1.73SQ M
GLUCOSE P FAST SERPL-MCNC: 113 MG/DL (ref 65–99)
POTASSIUM SERPL-SCNC: 3.9 MMOL/L (ref 3.5–5.3)
SODIUM SERPL-SCNC: 141 MMOL/L (ref 136–145)

## 2020-03-03 PROCEDURE — 80048 BASIC METABOLIC PNL TOTAL CA: CPT | Performed by: PHYSICIAN ASSISTANT

## 2020-03-03 PROCEDURE — 36415 COLL VENOUS BLD VENIPUNCTURE: CPT

## 2020-03-04 ENCOUNTER — TELEPHONE (OUTPATIENT)
Dept: CARDIOLOGY CLINIC | Facility: CLINIC | Age: 70
End: 2020-03-04

## 2020-03-04 ENCOUNTER — CONSULT (OUTPATIENT)
Dept: CARDIOLOGY CLINIC | Facility: CLINIC | Age: 70
End: 2020-03-04
Payer: MEDICARE

## 2020-03-04 VITALS
DIASTOLIC BLOOD PRESSURE: 88 MMHG | OXYGEN SATURATION: 99 % | SYSTOLIC BLOOD PRESSURE: 146 MMHG | BODY MASS INDEX: 27.49 KG/M2 | HEART RATE: 115 BPM | HEIGHT: 64 IN | WEIGHT: 161 LBS

## 2020-03-04 DIAGNOSIS — I10 ESSENTIAL HYPERTENSION: ICD-10-CM

## 2020-03-04 DIAGNOSIS — R94.31 ABNORMAL ECG DURING EXERCISE STRESS TEST: ICD-10-CM

## 2020-03-04 DIAGNOSIS — I47.2 NSVT (NONSUSTAINED VENTRICULAR TACHYCARDIA) (HCC): ICD-10-CM

## 2020-03-04 DIAGNOSIS — J44.9 CHRONIC OBSTRUCTIVE PULMONARY DISEASE, UNSPECIFIED COPD TYPE (HCC): ICD-10-CM

## 2020-03-04 DIAGNOSIS — I77.89 ARTERIAL ECTASIA (HCC): ICD-10-CM

## 2020-03-04 PROCEDURE — 99204 OFFICE O/P NEW MOD 45 MIN: CPT | Performed by: INTERNAL MEDICINE

## 2020-03-04 PROCEDURE — 93000 ELECTROCARDIOGRAM COMPLETE: CPT | Performed by: INTERNAL MEDICINE

## 2020-03-04 NOTE — PROGRESS NOTES
Cardiology Follow Up Visit    Marisol Tamez  1950  [id number]  [facility] [address] [id number]  [phone]    1  NSVT (nonsustained ventricular tachycardia) ([place] [address] )  Ambulatory referral to Cardiology   2  Essential hypertension  Ambulatory referral to Cardiology   3  Chronic obstructive pulmonary disease, unspecified COPD type ([place] [address] )  Ambulatory referral to Cardiology   4  Arterial ectasia ([place] [address] )  Ambulatory referral to Cardiology   5  Abnormal ECG during exercise stress test  Ambulatory referral to Cardiology         Discussion/Summary:    1  No shortness of breath, NYHA 3, chronic with severe O2 dependent COPD, recent inpatient exacerbations COPD  With poor exercise tolerance due to advanced COPD    2  Frequent PVCs, short 4 beat NSVT with exercise, asymptomatic    3  Essential hypertension    3  Hyperlipidemia on simva 20    4  Resting sinus tachycardia, lung/copd related    Plan: patient with essentially O2 dependent COPD, asymptomatic PVCs  Unfortunately, with his advanced copd/emphysema  further cardiac testing for  Assessment of atherosclerotic epicardial coronary artery disease is not possible  A regular stress test was ordered by his family doctor he was only  Able to exercise min and a half on a treadmill due lung disease/  Shortness of breath  Pharmacologic stress testing with adenosine poses small risk to his lung status in terms of not being able to tolerate a short adenosine infusion  CTA of  Of his coronary arteries not possible due to elevated heart rate  Will perform cardiac catheterization as a result, I anticipate him to have low risk findings but given his smoking history is add risk for vascular disease  Will perform right left heart catheterization coronary angiography, discussed with patient  Further recs to follow           Interval History:     77-year-old male with advanced COPD/emphysema, O2 dependent  Recent inpatient hospitalization for same  Referred after family doctor ordered a stress test for his only able to exercise a minute and a half test was terminated due to excessive shortness of breath had frequent PVCs and a short run of nonsustained VT 4 beats  As result, was referred here  Patient has no prior cardiac history  He has a history of essential hypertension which is been overall controlled per his report  He is on primary prevention simvastatin for hyperlipidemia, simvastatin 20 mg      due to the above, patient asked to see cardiology  Patient has longstanding smoker,  He quit a few years ago  Smoked pack and half a day for close to 50 years            Patient Active Problem List   Diagnosis    Hyperlipidemia    Centrilobular emphysema (HCC) - severe    Bilateral iliac artery aneurysm (HCC)    Arterial ectasia (HCC)    Diverticulosis, sigmoid    Hearing loss    Hemorrhoids    Lung nodule    Impaired fasting glucose    Skin disorder    BPH with obstruction/lower urinary tract symptoms    Essential hypertension    Seasonal allergies    Medicare annual wellness visit, subsequent    Other microscopic hematuria    Encounter for screening for lung cancer    Other fatigue    [facility]ust[facility]    Abnormal ECG during exercise stress test    Transition of care performed with sharing of clinical summary    Adjustment reaction    Prominent popliteal pulse    Chronic hypoxemic respiratory failure (HCC)     Past Medical History:   Diagnosis Date    BPH (benign prostatic hyperplasia)     COPD (chronic obstructive pulmonary disease) (HCC)     Last Assessed:11/14/2016    Diverticulosis     Elevated prostate specific antigen (PSA)     Emphysema (subcutaneous) (surgical) resulting from a procedure     Enlarged prostate with lower urinary tract symptoms (LUTS)     Resolved:8/22/2017    Hernia, inguinal, right      Social History     Socioeconomic History    Marital status: /Civil Union     Spouse name: Not on file    Number of children: Not on file    Years of education: Not on file    Highest education level: Not on file   Occupational History    Not on file   Social Needs    Financial resource strain: Not on file    Food insecurity:     Worry: Not on file     Inability: Not on file    Transportation needs:     Medical: Not on file     Non-medical: Not on file   Tobacco Use    Smoking status: Former Smoker     Packs/day: 1 50     Years: 49 00     Pack years: 73 50     Types: Cigarettes     Last attempt to quit: 2015     Years since quittin 3    Smokeless tobacco: Never Used   Substance and Sexual Activity    Alcohol use: Yes     Frequency: Monthly or less     Drinks per session: 1 or 2     Binge frequency: Never     Comment: rare    Drug use: No    Sexual activity: Yes   Lifestyle    Physical activity:     Days per week: Not on file     Minutes per session: Not on file    Stress: Not on file   Relationships    Social connections:     Talks on phone: Not on file     Gets together: Not on file     Attends [employer]ism service: Not on file     Active member of club or organization: Not on file     Attends meetings of clubs or organizations: Not on file     Relationship status: Not on file    Intimate partner violence:     Fear of current or ex partner: Not on file     Emotionally abused: Not on file     Physically abused: Not on file     Forced sexual activity: Not on file   Other Topics Concern    Not on file   Social History Narrative    Advance directive on file      Family History   Problem Relation Age of Onset    Hypertension Mother      Past Surgical History:   Procedure Laterality Date    BICEPS TENDON REPAIR      COLONOSCOPY      HAND SURGERY      Last Assessed:2016    HERNIA REPAIR      Last Assessed:2016    KNEE ARTHROSCOPY      NM REPAIR ING HERNIA,5+Y/O,REDUCIBL Right 2/23/2016    Procedure: REPAIR HERNIA INGUINAL with mesh;  Surgeon: Monika Fuentes DO;  Location: [facility];  Service: General    PROSTATE BIOPSY      WISDOM TOOTH EXTRACTION         Current Outpatient Medications:     albuterol (PROVENTIL HFA,VENTOLIN HFA) 90 mcg/act inhaler, Inhale 2 puffs every 6 (six) hours as needed for wheezing, Disp: 3 Inhaler, Rfl: 5    EPINEPHrine (EPIPEN) 0 3 mg/0 3 mL SOAJ, Inject 0 3 mL (0 3 mg total) into a muscle once for 1 dose, Disp: 0 6 mL, Rfl: 0    finasteride (PROSCAR) 5 mg tablet, Take 1 tablet (5 mg total) by mouth daily, Disp: 90 tablet, Rfl: 3    fluticasone (FLONASE) 50 mcg/act nasal spray, 1 spray into each nostril daily, Disp: 1 Bottle, Rfl: 0    fluticasone-umeclidinium-vilanterol (TRELEGY) 100-62 5-25 MCG/INH inhaler, Inhale 1 puff daily Rinse mouth after use , Disp: 3 Inhaler, Rfl: 5    guaiFENesin (MUCINEX) 600 mg 12 hr tablet, Take 1,200 mg by mouth 2 (two) times a day as needed, Disp: , Rfl:     hydrochlorothiazide (HYDRODIURIL) 25 mg tablet, TAKE 1 TABLET DAILY, Disp: 90 tablet, Rfl: 4    ipratropium-albuterol (DUO-NEB) 0 5-2 5 mg/3 mL nebulizer solution, Take 1 vial (3 mL total) by nebulization every 6 (six) hours, Disp: 180 vial, Rfl: 1    loratadine (CLARITIN REDITABS) 10 MG dissolvable tablet, Take 10 mg by mouth daily as needed for allergies  , Disp: , Rfl:     Multiple Vitamins-Minerals (CENTRUM ADULTS PO), Take 1 tablet by mouth daily  , Disp: , Rfl:     simvastatin (ZOCOR) 20 mg tablet, Take 20 mg by mouth daily at bedtime, Disp: , Rfl:     tamsulosin (FLOMAX) 0 4 mg, Take 1 capsule (0 4 mg total) by mouth daily with dinner, Disp: 90 capsule, Rfl: 3  Allergies   Allergen Reactions    Bee Venom Facial Swelling    Other Rash     [facility] - [id number]: mushrooms    Penicillins Rash         Social, Family, Medication history reviewed and updated as necessary      Labs:     Lab Results   Component Value Date    K 3 9 03/03/2020     03/03/2020    CO2 33 (H) 03/03/2020    BUN 15 03/03/2020    CREATININE 1 18 03/03/2020    CREATININE 0 98 02/12/2020    CALCIUM 9 0 03/03/2020       Lab Results   Component Value Date    WBC 4 16 (L) 02/12/2020    HGB 13 8 02/12/2020    HGB 15 0 02/11/2020    HCT 41 3 02/12/2020    HCT 44 9 02/11/2020     02/12/2020     02/11/2020       No results found for: CHOL  Lab Results   Component Value Date    HDL 56 11/29/2019    HDL 52 06/21/2019     Lab Results   Component Value Date    LDLCALC 133 (H) 11/29/2019    LDLCALC 98 06/21/2019     No results found for: LDLDIRECT          Lab Results   Component Value Date    TRIG 121 11/29/2019    TRIG 87 06/21/2019       Lab Results   Component Value Date    ALT 21 02/11/2020    ALT 23 11/29/2019    AST 18 02/11/2020    AST 20 11/29/2019       No results found for: INR    No results found for: NTBNP    Lab Results   Component Value Date    HGBA1C 5 6 12/13/2019    HGBA1C 5 9 06/21/2019    HGBA1C 5 8 05/25/2018           Imaging: Reviewed in epic        Review of Systems:  14 systems reviewed and negative with exception of the above        PHYSICAL EXAM:      Vitals:    03/04/20 1419   BP: 146/88   Pulse: (!) 115   SpO2: 99%     Body mass index is 27 64 kg/m²  Weight (last 2 days)     Date/Time   Weight    03/04/20 1419   73 (161)                Gen: No acute distress  HEENT: anicteric, mucous membranes moist  Neck: supple, no jugular venous distention, or carotid bruit  Heart: regular, normal s1 and s2, no murmur/rub or gallop  Lungs :clear to auscultation bilaterally, no rales/rhonchi or wheeze  Abdomen: soft nontender, normoactive bowel sounds, no organomegaly  Ext: warm and perfused, normal femoral pulses, no edema, or clubbing  Skin: warm, no rashes  Neuro: AAO x 3, no focal findings  Psychiatric: normal affect  Musculoskeletal: no obvious joint deformities

## 2020-03-04 NOTE — TELEPHONE ENCOUNTER
Patient schedule for LHC at [facility] on 03/19/20 will be perform by Dr Uma Quinn  Patient aware of general instructions and medications holds (HCTZ)      Patient confirmed to be cover under medicare A+B /

## 2020-03-04 NOTE — H&P (VIEW-ONLY)
Cardiology Follow Up Visit    Jovanni Damian  1950  [id number]  [place] [address] [id number]  [phone]    1  NSVT (nonsustained ventricular tachycardia) ([place] [address] )  Ambulatory referral to Cardiology   2  Essential hypertension  Ambulatory referral to Cardiology   3  Chronic obstructive pulmonary disease, unspecified COPD type ([facility] )  Ambulatory referral to Cardiology   4  Arterial ectasia ([place] [address] )  Ambulatory referral to Cardiology   5  Abnormal ECG during exercise stress test  Ambulatory referral to Cardiology         Discussion/Summary:    1  No shortness of breath, NYHA 3, chronic with severe O2 dependent COPD, recent inpatient exacerbations COPD  With poor exercise tolerance due to advanced COPD    2  Frequent PVCs, short 4 beat NSVT with exercise, asymptomatic    3  Essential hypertension    3  Hyperlipidemia on simva 20    4  Resting sinus tachycardia, lung/copd related    Plan: patient with essentially O2 dependent COPD, asymptomatic PVCs  Unfortunately, with his advanced copd/emphysema  further cardiac testing for  Assessment of atherosclerotic epicardial coronary artery disease is not possible  A regular stress test was ordered by his family doctor he was only  Able to exercise min and a half on a treadmill due lung disease/  Shortness of breath  Pharmacologic stress testing with adenosine poses small risk to his lung status in terms of not being able to tolerate a short adenosine infusion  CTA of  Of his coronary arteries not possible due to elevated heart rate  Will perform cardiac catheterization as a result, I anticipate him to have low risk findings but given his smoking history is add risk for vascular disease  Will perform right left heart catheterization coronary angiography, discussed with patient  Further recs to follow           Interval History:     66-year-old male with advanced COPD/emphysema, O2 dependent  Recent inpatient hospitalization for same  Referred after family doctor ordered a stress test for his only able to exercise a minute and a half test was terminated due to excessive shortness of breath had frequent PVCs and a short run of nonsustained VT 4 beats  As result, was referred here  Patient has no prior cardiac history  He has a history of essential hypertension which is been overall controlled per his report  He is on primary prevention simvastatin for hyperlipidemia, simvastatin 20 mg      due to the above, patient asked to see cardiology  Patient has longstanding smoker,  He quit a few years ago  Smoked pack and half a day for close to 50 years            Patient Active Problem List   Diagnosis    Hyperlipidemia    Centrilobular emphysema (HCC) - severe    Bilateral iliac artery aneurysm (HCC)    Arterial ectasia (HCC)    Diverticulosis, sigmoid    Hearing loss    Hemorrhoids    Lung nodule    Impaired fasting glucose    Skin disorder    BPH with obstruction/lower urinary tract symptoms    Essential hypertension    Seasonal allergies    Medicare annual wellness visit, subsequent    Other microscopic hematuria    Encounter for screening for lung cancer    Other fatigue    [facility] ([facility])    Abnormal ECG during exercise stress test    Transition of care performed with sharing of clinical summary    Adjustment reaction    Prominent popliteal pulse    Chronic hypoxemic respiratory failure (HCC)     Past Medical History:   Diagnosis Date    BPH (benign prostatic hyperplasia)     COPD (chronic obstructive pulmonary disease) (HCC)     Last Assessed:11/14/2016    Diverticulosis     Elevated prostate specific antigen (PSA)     Emphysema (subcutaneous) (surgical) resulting from a procedure     Enlarged prostate with lower urinary tract symptoms (LUTS)     Resolved:8/22/2017    Hernia, inguinal, right      Social History     Socioeconomic History    Marital status: /Civil Union     Spouse name: Not on file    Number of children: Not on file    Years of education: Not on file    Highest education level: Not on file   Occupational History    Not on file   Social Needs    Financial resource strain: Not on file    Food insecurity:     Worry: Not on file     Inability: Not on file    Transportation needs:     Medical: Not on file     Non-medical: Not on file   Tobacco Use    Smoking status: Former Smoker     Packs/day: 1 50     Years: 49 00     Pack years: 73 50     Types: Cigarettes     Last attempt to quit: 2015     Years since quittin 3    Smokeless tobacco: Never Used   Substance and Sexual Activity    Alcohol use: Yes     Frequency: Monthly or less     Drinks per session: 1 or 2     Binge frequency: Never     Comment: rare    Drug use: No    Sexual activity: Yes   Lifestyle    Physical activity:     Days per week: Not on file     Minutes per session: Not on file    Stress: Not on file   Relationships    Social connections:     Talks on phone: Not on file     Gets together: Not on file     Attends Sabianism service: Not on file     Active member of club or organization: Not on file     Attends meetings of clubs or organizations: Not on file     Relationship status: Not on file    Intimate partner violence:     Fear of current or ex partner: Not on file     Emotionally abused: Not on file     Physically abused: Not on file     Forced sexual activity: Not on file   Other Topics Concern    Not on file   Social History Narrative    Advance directive on file      Family History   Problem Relation Age of Onset    Hypertension Mother      Past Surgical History:   Procedure Laterality Date    BICEPS TENDON REPAIR      COLONOSCOPY      HAND SURGERY      Last Assessed:2016    HERNIA REPAIR      Last Assessed:2016    KNEE ARTHROSCOPY      [place] REPAIR ING HERNIA,5+Y/O,REDUCIBL Right 2/23/2016    Procedure: REPAIR HERNIA INGUINAL with mesh;  Surgeon: Shelton Dominguez DO;  Location: [facility];  Service: General    PROSTATE BIOPSY      WISDOM TOOTH EXTRACTION         Current Outpatient Medications:     albuterol (PROVENTIL HFA,VENTOLIN HFA) 90 mcg/act inhaler, Inhale 2 puffs every 6 (six) hours as needed for wheezing, Disp: 3 Inhaler, Rfl: 5    EPINEPHrine (EPIPEN) 0 3 mg/0 3 mL SOAJ, Inject 0 3 mL (0 3 mg total) into a muscle once for 1 dose, Disp: 0 6 mL, Rfl: 0    finasteride (PROSCAR) 5 mg tablet, Take 1 tablet (5 mg total) by mouth daily, Disp: 90 tablet, Rfl: 3    fluticasone (FLONASE) 50 mcg/act nasal spray, 1 spray into each nostril daily, Disp: 1 Bottle, Rfl: 0    fluticasone-umeclidinium-vilanterol (TRELEGY) 100-62 5-25 MCG/INH inhaler, Inhale 1 puff daily Rinse mouth after use , Disp: 3 Inhaler, Rfl: 5    guaiFENesin (MUCINEX) 600 mg 12 hr tablet, Take 1,200 mg by mouth 2 (two) times a day as needed, Disp: , Rfl:     hydrochlorothiazide (HYDRODIURIL) 25 mg tablet, TAKE 1 TABLET DAILY, Disp: 90 tablet, Rfl: 4    ipratropium-albuterol (DUO-NEB) 0 5-2 5 mg/3 mL nebulizer solution, Take 1 vial (3 mL total) by nebulization every 6 (six) hours, Disp: 180 vial, Rfl: 1    loratadine (CLARITIN REDITABS) 10 MG dissolvable tablet, Take 10 mg by mouth daily as needed for allergies  , Disp: , Rfl:     Multiple Vitamins-Minerals (CENTRUM ADULTS PO), Take 1 tablet by mouth daily  , Disp: , Rfl:     simvastatin (ZOCOR) 20 mg tablet, Take 20 mg by mouth daily at bedtime, Disp: , Rfl:     tamsulosin (FLOMAX) 0 4 mg, Take 1 capsule (0 4 mg total) by mouth daily with dinner, Disp: 90 capsule, Rfl: 3  Allergies   Allergen Reactions    Bee Venom Facial Swelling    Other Rash     [facility] - [id number]: mushrooms    Penicillins Rash         Social, Family, Medication history reviewed and updated as necessary      Labs:     Lab Results   Component Value Date    K 3 9 03/03/2020     03/03/2020    CO2 33 (H) 03/03/2020    BUN 15 03/03/2020    CREATININE 1 18 03/03/2020    CREATININE 0 98 02/12/2020    CALCIUM 9 0 03/03/2020       Lab Results   Component Value Date    WBC 4 16 (L) 02/12/2020    HGB 13 8 02/12/2020    HGB 15 0 02/11/2020    HCT 41 3 02/12/2020    HCT 44 9 02/11/2020     02/12/2020     02/11/2020       No results found for: CHOL  Lab Results   Component Value Date    HDL 56 11/29/2019    HDL 52 06/21/2019     Lab Results   Component Value Date    LDLCALC 133 (H) 11/29/2019    LDLCALC 98 06/21/2019     No results found for: LDLDIRECT          Lab Results   Component Value Date    TRIG 121 11/29/2019    TRIG 87 06/21/2019       Lab Results   Component Value Date    ALT 21 02/11/2020    ALT 23 11/29/2019    AST 18 02/11/2020    AST 20 11/29/2019       No results found for: INR    No results found for: NTBNP    Lab Results   Component Value Date    HGBA1C 5 6 12/13/2019    HGBA1C 5 9 06/21/2019    HGBA1C 5 8 05/25/2018           Imaging: Reviewed in epic        Review of Systems:  14 systems reviewed and negative with exception of the above        PHYSICAL EXAM:      Vitals:    03/04/20 1419   BP: 146/88   Pulse: (!) 115   SpO2: 99%     Body mass index is 27 64 kg/m²  Weight (last 2 days)     Date/Time   Weight    03/04/20 1419   73 (161)                Gen: No acute distress  HEENT: anicteric, mucous membranes moist  Neck: supple, no jugular venous distention, or carotid bruit  Heart: regular, normal s1 and s2, no murmur/rub or gallop  Lungs :clear to auscultation bilaterally, no rales/rhonchi or wheeze  Abdomen: soft nontender, normoactive bowel sounds, no organomegaly  Ext: warm and perfused, normal femoral pulses, no edema, or clubbing  Skin: warm, no rashes  Neuro: AAO x 3, no focal findings  Psychiatric: normal affect  Musculoskeletal: no obvious joint deformities

## 2020-03-06 ENCOUNTER — OFFICE VISIT (OUTPATIENT)
Dept: INTERNAL MEDICINE CLINIC | Facility: CLINIC | Age: 70
End: 2020-03-06
Payer: MEDICARE

## 2020-03-06 VITALS
HEART RATE: 96 BPM | TEMPERATURE: 97.5 F | SYSTOLIC BLOOD PRESSURE: 132 MMHG | OXYGEN SATURATION: 95 % | DIASTOLIC BLOOD PRESSURE: 74 MMHG | WEIGHT: 162 LBS | HEIGHT: 68 IN | BODY MASS INDEX: 24.55 KG/M2 | RESPIRATION RATE: 20 BRPM

## 2020-03-06 DIAGNOSIS — I10 ESSENTIAL HYPERTENSION: ICD-10-CM

## 2020-03-06 DIAGNOSIS — J96.11 CHRONIC HYPOXEMIC RESPIRATORY FAILURE (HCC): Primary | ICD-10-CM

## 2020-03-06 DIAGNOSIS — I47.2 NSVT (NONSUSTAINED VENTRICULAR TACHYCARDIA) (HCC): ICD-10-CM

## 2020-03-06 DIAGNOSIS — F43.20 ADJUSTMENT DISORDER, UNSPECIFIED TYPE: ICD-10-CM

## 2020-03-06 PROCEDURE — 3008F BODY MASS INDEX DOCD: CPT | Performed by: PHYSICIAN ASSISTANT

## 2020-03-06 PROCEDURE — 1160F RVW MEDS BY RX/DR IN RCRD: CPT | Performed by: PHYSICIAN ASSISTANT

## 2020-03-06 PROCEDURE — 3078F DIAST BP <80 MM HG: CPT | Performed by: PHYSICIAN ASSISTANT

## 2020-03-06 PROCEDURE — 1036F TOBACCO NON-USER: CPT | Performed by: PHYSICIAN ASSISTANT

## 2020-03-06 PROCEDURE — 4040F PNEUMOC VAC/ADMIN/RCVD: CPT | Performed by: PHYSICIAN ASSISTANT

## 2020-03-06 PROCEDURE — 99213 OFFICE O/P EST LOW 20 MIN: CPT | Performed by: PHYSICIAN ASSISTANT

## 2020-03-06 PROCEDURE — 3075F SYST BP GE 130 - 139MM HG: CPT | Performed by: PHYSICIAN ASSISTANT

## 2020-03-06 NOTE — ASSESSMENT & PLAN NOTE
Reviewed no with Cardiology   Patient is scheduled for echocardiogram as well as cardiac catheterization    Will continue to follow-up

## 2020-03-06 NOTE — PROGRESS NOTES
[address] [facility]  Standard Office Visit  Patient ID: Sherlyn Espinosa Held    : 1950  Age/Gender: 71 y o  male     DATE: 3/6/2020      Assessment/Plan:    Chronic hypoxemic respiratory failure ([facility] )  Stable at this time following with pulmonology  No change in medications at this time  Continue to keep our office informed of any change in treatment plan    Essential hypertension  Improve blood pressure control with the addition of hydrochlorothiazide  Continue to monitor blood pressure at home  Discussed systolic goal of less than 364 diastolic goal of less than 80    NSVT (nonsustained ventricular tachycardia) ([facility] )  Reviewed no with Cardiology   Patient is scheduled for echocardiogram as well as cardiac catheterization  Will continue to follow-up    Adjustment reaction  Sx much improved  At this time will d/c this issue  Diagnoses and all orders for this visit:    Chronic hypoxemic respiratory failure ([facility] )    Essential hypertension    NSVT (nonsustained ventricular tachycardia) ([facility])    Adjustment disorder, unspecified type                Subjective:   Chief Complaint   Patient presents with    Follow-up     2 week had blood work done 3/3         Jovanni Damian is a 71 y o  male who presents to the office on 3/6/2020 for     For follow up  Has not needed O2  He went for overnight O2 test and notes he does not feel he has needed his O2  He is working with his pulmonologist regarding possible O2 therapy  Feels he has not needed O2 as frequently  He julio cardio and is scheduled for echo and cardiac cath  He notes that his cardiologist wanted to do the cath jsut to be sure  He overall feels much better  Uses O2 when he is active  He notes 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    Wife notes that once he stopped this he is feeling much much better  Feeling less stressed  Feels he is sleeping better  Does not feel that he needs any medication  No new complaints or concerns at this time  Feels like he is getting better since his hospitalization  Shortness of Breath   This is a recurrent problem  The problem has been waxing and waning  Pertinent negatives include no abdominal pain, chest pain, fever, rhinorrhea or vomiting  Exacerbated by: Activity  The treatment provided mild relief  His past medical history is significant for COPD  Heart Problem   The problem has been resolved  Associated symptoms include coughing (Intermittent cough)  Pertinent negatives include no abdominal pain, chest pain, congestion, fever, myalgias, nausea or vomiting  Hypertension   This is a recurrent problem  The problem has been gradually improving since onset  The problem is controlled  Associated symptoms include shortness of breath  Pertinent negatives include no chest pain or palpitations  Malaise/fatigue:  improving since hospital  Risk factors for coronary artery disease include male gender, obesity and dyslipidemia  Past treatments include diuretics  The current treatment provides significant improvement  The following portions of the patient's history were reviewed and updated as appropriate: allergies, current medications, past family history, past medical history, past social history, past surgical history and problem list     Review of Systems   Constitutional: Negative for fever  Malaise/fatigue:  improving since hospital    HENT: Negative for congestion and rhinorrhea  Respiratory: Positive for cough (Intermittent cough) and shortness of breath  Cardiovascular: Negative for chest pain and palpitations  Gastrointestinal: Negative for abdominal pain, diarrhea, nausea and vomiting  Musculoskeletal: Negative for myalgias  Neurological: Negative for dizziness and light-headedness           Patient Active Problem List   Diagnosis    Hyperlipidemia    Centrilobular emphysema (HCC) - severe    Bilateral iliac artery aneurysm (HCC)    Arterial ectasia (HCC)    Diverticulosis, sigmoid    Hearing loss    Hemorrhoids    Lung nodule    Impaired fasting glucose    Skin disorder    BPH with obstruction/lower urinary tract symptoms    Essential hypertension    Seasonal allergies    Medicare annual wellness visit, subsequent    Other microscopic hematuria    Encounter for screening for lung cancer    Other fatigue    [facility]onsustained [facility]) ([facility])    Abnormal ECG during exercise stress test    Transition of care performed with sharing of clinical summary    Prominent popliteal pulse    Chronic hypoxemic respiratory failure (HCC)       Past Medical History:   Diagnosis Date    BPH (benign prostatic hyperplasia)     COPD (chronic obstructive pulmonary disease) (HCC)     Last Assessed:11/14/2016    Diverticulosis     Elevated prostate specific antigen (PSA)     Emphysema (subcutaneous) (surgical) resulting from a procedure     Enlarged prostate with lower urinary tract symptoms (LUTS)     Resolved:8/22/2017    Hernia, inguinal, right        Past Surgical History:   Procedure Laterality Date    BICEPS TENDON REPAIR  1999    COLONOSCOPY      HAND SURGERY      Last Assessed:7/11/2016    HERNIA REPAIR      Last Assessed:7/11/2016    KNEE ARTHROSCOPY      NJ REPAIR ING HERNIA,5+Y/O,REDUCIBL Right 2/23/2016    Procedure: REPAIR HERNIA INGUINAL with mesh;  Surgeon: Judith Munoz DO;  [facility]: [facility];  Service: General    PROSTATE BIOPSY      WISDOM TOOTH EXTRACTION           Current Outpatient Medications:     albuterol (PROVENTIL HFA,VENTOLIN HFA) 90 mcg/act inhaler, Inhale 2 puffs every 6 (six) hours as needed for wheezing, Disp: 3 Inhaler, Rfl: 5    EPINEPHrine (EPIPEN) 0 3 mg/0 3 mL SOAJ, Inject 0 3 mL (0 3 mg total) into a muscle once for 1 dose, Disp: 0 6 mL, Rfl: 0   finasteride (PROSCAR) 5 mg tablet, Take 1 tablet (5 mg total) by mouth daily, Disp: 90 tablet, Rfl: 3    fluticasone (FLONASE) 50 mcg/act nasal spray, 1 spray into each nostril daily, Disp: 1 Bottle, Rfl: 0    fluticasone-umeclidinium-vilanterol (TRELEGY) 100-62 5-25 MCG/INH inhaler, Inhale 1 puff daily Rinse mouth after use , Disp: 3 Inhaler, Rfl: 5    guaiFENesin (MUCINEX) 600 mg 12 hr tablet, Take 1,200 mg by mouth 2 (two) times a day as needed, Disp: , Rfl:     hydrochlorothiazide (HYDRODIURIL) 25 mg tablet, TAKE 1 TABLET DAILY, Disp: 90 tablet, Rfl: 4    ipratropium-albuterol (DUO-NEB) 0 5-2 5 mg/3 mL nebulizer solution, Take 1 vial (3 mL total) by nebulization every 6 (six) hours, Disp: 180 vial, Rfl: 1    loratadine (CLARITIN REDITABS) 10 MG dissolvable tablet, Take 10 mg by mouth daily as needed for allergies  , Disp: , Rfl:     simvastatin (ZOCOR) 20 mg tablet, Take 20 mg by mouth daily at bedtime, Disp: , Rfl:     tamsulosin (FLOMAX) 0 4 mg, Take 1 capsule (0 4 mg total) by mouth daily with dinner, Disp: 90 capsule, Rfl: 3    Multiple Vitamins-Minerals (CENTRUM ADULTS PO), Take 1 tablet by mouth daily  , Disp: , Rfl:     Allergies   Allergen Reactions    Bee Venom Facial Swelling    Other Rash     Annotation - [id number]: mushrooms    Penicillins Rash       Social History     Socioeconomic History    Marital status: /Civil Union     Spouse name: None    Number of children: None    Years of education: None    Highest education level: None   Occupational History    None   Social Needs    Financial resource strain: None    Food insecurity:     Worry: None     Inability: None    Transportation needs:     Medical: None     Non-medical: None   Tobacco Use    Smoking status: Former Smoker     Packs/day: 1 50     Years: 49 00     Pack years: 73 50     Types: Cigarettes     Last attempt to quit: 2015     Years since quittin 3    Smokeless tobacco: Never Used   Substance and Sexual Activity    Alcohol use: Yes     Frequency: Monthly or less     Drinks per session: 1 or 2     Binge frequency: Never     Comment: rare    Drug use: No    Sexual activity: Yes   Lifestyle    Physical activity:     Days per week: None     Minutes per session: None    Stress: None   Relationships    Social connections:     Talks on phone: None     Gets together: None     Attends Hinduism service: None     Active member of club or organization: None     Attends meetings of clubs or organizations: None     Relationship status: None    Intimate partner violence:     Fear of current or ex partner: None     Emotionally abused: None     Physically abused: None     Forced sexual activity: None   Other Topics Concern    None   Social History Narrative    Advance directive on file       Family History   Problem Relation Age of Onset    Hypertension Mother        PHQ-9 Depression Screening    PHQ-9:    Frequency of the following problems over the past two weeks:       Little interest or pleasure in doing things:  0 - not at all  Feeling down, depressed, or hopeless:  0 - not at all  PHQ-2 Score:  0         Health Maintenance   Topic Date Due    SLP PLAN OF CARE  1950    DTaP,Tdap,and Td Vaccines (1 - Tdap) 11/21/1961    Medicare Annual Wellness Visit (AWV)  08/09/2020    Fall Risk  03/06/2021    Depression Screening PHQ  03/06/2021    BMI: Adult  03/06/2021    CRC Screening: Colonoscopy  07/25/2026    Hepatitis C Screening  Completed    Influenza Vaccine  Completed    Pneumococcal Vaccine: 65+ Years  Completed    Pneumococcal Vaccine: Pediatrics (0 to 5 Years) and At-Risk Patients (6 to 59 Years)  Aged Out    HIB Vaccine  Aged Out    Hepatitis B Vaccine  Aged Out    IPV Vaccine  Aged Out    Hepatitis A Vaccine  Aged Out    Meningococcal ACWY Vaccine  Aged Out    HPV Vaccine  Aged [employer] History   Administered Date(s) Administered    INFLUENZA 11/06/2006, 09/28/2007, 11/01/2016, 10/10/2017    Influenza Split High Dose Preservative Free IM 11/01/2016, 10/10/2017    Influenza, high dose seasonal 0 5 mL 10/19/2018, 10/03/2019    Pneumococcal Conjugate 13-Valent 07/11/2016, 06/12/2019    Pneumococcal Polysaccharide PPV23 10/10/2017    Zoster Vaccine Recombinant 03/28/2019, 06/12/2019        Objective:  Vitals:    03/06/20 1107   BP: 132/74   BP Location: Left arm   Patient Position: Sitting   Cuff Size: Standard   Pulse: 96   Resp: 20   Temp: 97 5 °F (36 4 °C)   TempSrc: Oral   SpO2: 95%   Weight: 73 5 kg (162 lb)   Height: 5' 8" (1 727 m)     Wt Readings from Last 3 Encounters:   03/06/20 73 5 kg (162 lb)   03/04/20 73 kg (161 lb)   02/27/20 72 kg (158 lb 12 8 oz)     Body mass index is 24 63 kg/m²  No exam data present       Physical Exam   Constitutional: He is oriented to person, place, and time  He appears well-developed and well-nourished  No distress  Alert pleasant cooperative  40-year-old male seated in room in no acute distress  Wife present   HENT:   Head: Normocephalic and atraumatic  Mouth/Throat: Oropharynx is clear and moist  No oropharyngeal exudate  Moist mucous membranes  Normal posterior pharynx   Eyes: Pupils are equal, round, and reactive to light  Right eye exhibits no discharge  Left eye exhibits no discharge  No scleral icterus  Neck: Neck supple  Cardiovascular: Normal rate, regular rhythm and normal heart sounds  No murmur heard  Regular rate and rhythm   Pulmonary/Chest: Effort normal  No respiratory distress  He has no wheezes  He has no rales  Decreased breath sounds bilaterally  Symmetric but decreased throughout  No crackles no rhonchi no wheeze   Abdominal: Soft  Bowel sounds are normal  He exhibits no distension  There is no tenderness  There is no guarding  Soft nontender nondistended positive bowel sounds   Musculoskeletal: He exhibits no edema  Neurological: He is alert and oriented to person, place, and time     Skin: Skin is warm and dry  He is not diaphoretic  Psychiatric: He has a normal mood and affect  His behavior is normal  Thought content normal    Nursing note and vitals reviewed  Future Appointments   Date Time Provider Isacc Capellan   3/16/2020 11:00 AM AL HV ECHO 1 AL HV Car NI AL HV [facility]   5/5/2020 11:20 AM Freddy Hashimoto, MD PULM [facility]   6/12/2020 11:00 AM Emely Mike PA-C [address] [place] [address]   8/17/2020 10:30 AM BE CT SLN 1 BE SLN CT [facility]   9/4/2020 10:30 AM Katherin Shone, MD URO AL [facility]   2/22/2021  1:00 PM BE HV VASCULAR 1 BE HV Car NI [address]   2/22/2021  2:30 PM BE HV VASCULAR 2 BE HV Car NI [address]       Emely Mike PA-C   [address]    Patient Care Team:  Emely Mike PA-C as PCP - General (Internal Medicine)  Epifania Emperor, MD Clydia Seller, MD Katherin Shone, MD (Urology)    This note was completed in part utilizing M-Modal Fluency Direct Software  Grammatical errors, random word insertions, spelling mistakes, and incomplete sentences can be an occasional consequence of this system secondary to software limitations, ambient noise, and hardware issues  If you have any questions or concerns about the content, text, or information contained within the body of this dictation, please contact the provider for clarification

## 2020-03-06 NOTE — ASSESSMENT & PLAN NOTE
Stable at this time following with pulmonology  No change in medications at this time    Continue to keep our office informed of any change in treatment plan

## 2020-03-06 NOTE — ASSESSMENT & PLAN NOTE
Improve blood pressure control with the addition of hydrochlorothiazide  Continue to monitor blood pressure at home    Discussed systolic goal of less than 795 diastolic goal of less than 80

## 2020-03-06 NOTE — PATIENT INSTRUCTIONS
Chronic hypoxemic respiratory failure (HCC)  Stable at this time following with pulmonology  No change in medications at this time  Continue to keep our office informed of any change in treatment plan    Essential hypertension  Improve blood pressure control with the addition of hydrochlorothiazide  Continue to monitor blood pressure at home  Discussed systolic goal of less than 591 diastolic goal of less than 80    NSVT (nonsustained ventricular tachycardia) ([facility] )  Reviewed no with Cardiology   Patient is scheduled for echocardiogram as well as cardiac catheterization  Will continue to follow-up    Adjustment reaction  Sx much improved  At this time will d/c this issue

## 2020-03-16 ENCOUNTER — HOSPITAL ENCOUNTER (OUTPATIENT)
Dept: NON INVASIVE DIAGNOSTICS | Facility: CLINIC | Age: 70
Discharge: HOME | End: 2020-03-16
Payer: MEDICARE

## 2020-03-16 ENCOUNTER — TELEPHONE (OUTPATIENT)
Dept: CARDIOLOGY CLINIC | Facility: CLINIC | Age: 70
End: 2020-03-16

## 2020-03-16 DIAGNOSIS — I47.2 NSVT (NONSUSTAINED VENTRICULAR TACHYCARDIA) (HCC): ICD-10-CM

## 2020-03-16 DIAGNOSIS — I10 ESSENTIAL HYPERTENSION: ICD-10-CM

## 2020-03-16 PROCEDURE — 93306 TTE W/DOPPLER COMPLETE: CPT | Performed by: INTERNAL MEDICINE

## 2020-03-16 PROCEDURE — 93306 TTE W/DOPPLER COMPLETE: CPT

## 2020-03-16 NOTE — TELEPHONE ENCOUNTER
Delphine Matamoros, DO  [facility]             Please let patient know echo looked fine  Normal heart function, no valve problems        I called & kimberly/Chuck & his wife, advised of results

## 2020-03-18 ENCOUNTER — TELEPHONE (OUTPATIENT)
Dept: DISCHARGE UNIT | Facility: HOSPITAL | Age: 70
End: 2020-03-18

## 2020-03-18 ENCOUNTER — DOCUMENTATION (OUTPATIENT)
Dept: PULMONOLOGY | Facility: CLINIC | Age: 70
End: 2020-03-18

## 2020-03-18 DIAGNOSIS — J43.2 CENTRILOBULAR EMPHYSEMA (HCC): Primary | ICD-10-CM

## 2020-03-18 RX ORDER — SODIUM CHLORIDE 9 MG/ML
125 INJECTION, SOLUTION INTRAVENOUS CONTINUOUS
Status: CANCELLED | OUTPATIENT
Start: 2020-03-18

## 2020-03-18 RX ORDER — ASPIRIN 81 MG/1
324 TABLET, CHEWABLE ORAL ONCE
Status: CANCELLED | OUTPATIENT
Start: 2020-03-18 | End: 2020-03-18

## 2020-03-18 NOTE — PROGRESS NOTES
Order to discontinue oxygen faxed to [facility] [phone] on 3/18/20  Patient will contact [employer] for self pay tanks  If anyhting is needed from provider they will contact us  Patient is aware

## 2020-03-18 NOTE — PROGRESS NOTES
Order to discontinue oxygen faxed to [facility] [phone] on 3/18/20  Patient will contact [employer] for self pay tanks  If anyhting is needed from provider they will contact us  Patient is aware

## 2020-03-19 ENCOUNTER — HOSPITAL ENCOUNTER (OUTPATIENT)
Dept: NON INVASIVE DIAGNOSTICS | Facility: HOSPITAL | Age: 70
Discharge: HOME | End: 2020-03-19
Attending: INTERNAL MEDICINE | Admitting: INTERNAL MEDICINE
Payer: MEDICARE

## 2020-03-19 VITALS
DIASTOLIC BLOOD PRESSURE: 61 MMHG | HEIGHT: 68 IN | BODY MASS INDEX: 24.25 KG/M2 | WEIGHT: 160 LBS | HEART RATE: 84 BPM | SYSTOLIC BLOOD PRESSURE: 119 MMHG | RESPIRATION RATE: 18 BRPM | TEMPERATURE: 97.9 F | OXYGEN SATURATION: 96 %

## 2020-03-19 DIAGNOSIS — I47.2 NSVT (NONSUSTAINED VENTRICULAR TACHYCARDIA) (HCC): ICD-10-CM

## 2020-03-19 DIAGNOSIS — R94.31 ABNORMAL ECG DURING EXERCISE STRESS TEST: ICD-10-CM

## 2020-03-19 LAB
ANION GAP SERPL CALCULATED.3IONS-SCNC: 3 MMOL/L (ref 4–13)
ATRIAL RATE: 89 BPM
BASOPHILS # BLD AUTO: 0.06 THOUSANDS/ΜL (ref 0–0.1)
BASOPHILS NFR BLD AUTO: 1 % (ref 0–1)
BUN SERPL-MCNC: 11 MG/DL (ref 5–25)
CALCIUM SERPL-MCNC: 9.2 MG/DL (ref 8.3–10.1)
CHLORIDE SERPL-SCNC: 104 MMOL/L (ref 100–108)
CO2 SERPL-SCNC: 33 MMOL/L (ref 21–32)
CREAT SERPL-MCNC: 1.18 MG/DL (ref 0.6–1.3)
EOSINOPHIL # BLD AUTO: 0.16 THOUSAND/ΜL (ref 0–0.61)
EOSINOPHIL NFR BLD AUTO: 2 % (ref 0–6)
ERYTHROCYTE [DISTWIDTH] IN BLOOD BY AUTOMATED COUNT: 13.3 % (ref 11.6–15.1)
GFR SERPL CREATININE-BSD FRML MDRD: 63 ML/MIN/1.73SQ M
GLUCOSE P FAST SERPL-MCNC: 115 MG/DL (ref 65–99)
GLUCOSE SERPL-MCNC: 115 MG/DL (ref 65–140)
HCT VFR BLD AUTO: 48.7 % (ref 36.5–49.3)
HGB BLD-MCNC: 16.1 G/DL (ref 12–17)
IMM GRANULOCYTES # BLD AUTO: 0.06 THOUSAND/UL (ref 0–0.2)
IMM GRANULOCYTES NFR BLD AUTO: 1 % (ref 0–2)
LYMPHOCYTES # BLD AUTO: 1.76 THOUSANDS/ΜL (ref 0.6–4.47)
LYMPHOCYTES NFR BLD AUTO: 19 % (ref 14–44)
MCH RBC QN AUTO: 29.5 PG (ref 26.8–34.3)
MCHC RBC AUTO-ENTMCNC: 33.1 G/DL (ref 31.4–37.4)
MCV RBC AUTO: 89 FL (ref 82–98)
MONOCYTES # BLD AUTO: 0.74 THOUSAND/ΜL (ref 0.17–1.22)
MONOCYTES NFR BLD AUTO: 8 % (ref 4–12)
NEUTROPHILS # BLD AUTO: 6.49 THOUSANDS/ΜL (ref 1.85–7.62)
NEUTS SEG NFR BLD AUTO: 69 % (ref 43–75)
NRBC BLD AUTO-RTO: 0 /100 WBCS
P AXIS: 82 DEGREES
PLATELET # BLD AUTO: 319 THOUSANDS/UL (ref 149–390)
PMV BLD AUTO: 10.3 FL (ref 8.9–12.7)
POTASSIUM SERPL-SCNC: 3.2 MMOL/L (ref 3.5–5.3)
PR INTERVAL: 140 MS
QRS AXIS: 47 DEGREES
QRSD INTERVAL: 94 MS
QT INTERVAL: 378 MS
QTC INTERVAL: 459 MS
RBC # BLD AUTO: 5.45 MILLION/UL (ref 3.88–5.62)
SODIUM SERPL-SCNC: 140 MMOL/L (ref 136–145)
T WAVE AXIS: 79 DEGREES
VENTRICULAR RATE: 89 BPM
WBC # BLD AUTO: 9.27 THOUSAND/UL (ref 4.31–10.16)

## 2020-03-19 PROCEDURE — 93005 ELECTROCARDIOGRAM TRACING: CPT

## 2020-03-19 PROCEDURE — 93460 R&L HRT ART/VENTRICLE ANGIO: CPT | Performed by: INTERNAL MEDICINE

## 2020-03-19 PROCEDURE — 82810 BLOOD GASES O2 SAT ONLY: CPT | Performed by: INTERNAL MEDICINE

## 2020-03-19 PROCEDURE — C1769 GUIDE WIRE: HCPCS | Performed by: INTERNAL MEDICINE

## 2020-03-19 PROCEDURE — 85025 COMPLETE CBC W/AUTO DIFF WBC: CPT | Performed by: INTERNAL MEDICINE

## 2020-03-19 PROCEDURE — C1894 INTRO/SHEATH, NON-LASER: HCPCS | Performed by: INTERNAL MEDICINE

## 2020-03-19 PROCEDURE — 80048 BASIC METABOLIC PNL TOTAL CA: CPT | Performed by: INTERNAL MEDICINE

## 2020-03-19 PROCEDURE — 93010 ELECTROCARDIOGRAM REPORT: CPT | Performed by: INTERNAL MEDICINE

## 2020-03-19 RX ORDER — LIDOCAINE HYDROCHLORIDE 10 MG/ML
INJECTION, SOLUTION EPIDURAL; INFILTRATION; INTRACAUDAL; PERINEURAL CODE/TRAUMA/SEDATION MEDICATION
Status: COMPLETED | OUTPATIENT
Start: 2020-03-19 | End: 2020-03-19

## 2020-03-19 RX ORDER — VERAPAMIL HYDROCHLORIDE 2.5 MG/ML
INJECTION, SOLUTION INTRAVENOUS CODE/TRAUMA/SEDATION MEDICATION
Status: COMPLETED | OUTPATIENT
Start: 2020-03-19 | End: 2020-03-19

## 2020-03-19 RX ORDER — ASPIRIN 81 MG/1
324 TABLET, CHEWABLE ORAL ONCE
Status: COMPLETED | OUTPATIENT
Start: 2020-03-19 | End: 2020-03-19

## 2020-03-19 RX ORDER — SODIUM CHLORIDE 9 MG/ML
125 INJECTION, SOLUTION INTRAVENOUS CONTINUOUS
Status: DISCONTINUED | OUTPATIENT
Start: 2020-03-19 | End: 2020-03-19 | Stop reason: HOSPADM

## 2020-03-19 RX ORDER — FENTANYL CITRATE 50 UG/ML
INJECTION, SOLUTION INTRAMUSCULAR; INTRAVENOUS CODE/TRAUMA/SEDATION MEDICATION
Status: COMPLETED | OUTPATIENT
Start: 2020-03-19 | End: 2020-03-19

## 2020-03-19 RX ORDER — NITROGLYCERIN 20 MG/100ML
INJECTION INTRAVENOUS CODE/TRAUMA/SEDATION MEDICATION
Status: COMPLETED | OUTPATIENT
Start: 2020-03-19 | End: 2020-03-19

## 2020-03-19 RX ORDER — MIDAZOLAM HYDROCHLORIDE 2 MG/2ML
INJECTION, SOLUTION INTRAMUSCULAR; INTRAVENOUS CODE/TRAUMA/SEDATION MEDICATION
Status: COMPLETED | OUTPATIENT
Start: 2020-03-19 | End: 2020-03-19

## 2020-03-19 RX ORDER — HEPARIN SODIUM 1000 [USP'U]/ML
INJECTION, SOLUTION INTRAVENOUS; SUBCUTANEOUS CODE/TRAUMA/SEDATION MEDICATION
Status: COMPLETED | OUTPATIENT
Start: 2020-03-19 | End: 2020-03-19

## 2020-03-19 RX ORDER — SODIUM CHLORIDE 9 MG/ML
125 INJECTION, SOLUTION INTRAVENOUS CONTINUOUS
Status: DISPENSED | OUTPATIENT
Start: 2020-03-19 | End: 2020-03-19

## 2020-03-19 RX ADMIN — Medication 200 MCG: at 10:05

## 2020-03-19 RX ADMIN — IOHEXOL 45 ML: 350 INJECTION, SOLUTION INTRAVENOUS at 10:19

## 2020-03-19 RX ADMIN — MIDAZOLAM 2 MG: 1 INJECTION INTRAMUSCULAR; INTRAVENOUS at 09:49

## 2020-03-19 RX ADMIN — HEPARIN SODIUM 4000 UNITS: 1000 INJECTION INTRAVENOUS; SUBCUTANEOUS at 10:05

## 2020-03-19 RX ADMIN — SODIUM CHLORIDE 125 ML/HR: 0.9 INJECTION, SOLUTION INTRAVENOUS at 09:06

## 2020-03-19 RX ADMIN — LIDOCAINE HYDROCHLORIDE 2 ML: 10 INJECTION, SOLUTION EPIDURAL; INFILTRATION; INTRACAUDAL; PERINEURAL at 09:56

## 2020-03-19 RX ADMIN — FENTANYL CITRATE 50 MCG: 50 INJECTION, SOLUTION INTRAMUSCULAR; INTRAVENOUS at 09:49

## 2020-03-19 RX ADMIN — VERAPAMIL HYDROCHLORIDE 2.5 MG: 2.5 INJECTION INTRAVENOUS at 10:05

## 2020-03-19 RX ADMIN — ASPIRIN 81 MG 324 MG: 81 TABLET ORAL at 09:05

## 2020-03-19 NOTE — DISCHARGE INSTRUCTIONS
1  Please see the post cardiac catheterization dishcarge instructions  No heavy lifting, greater than 10 lbs  or strenuous  activity for 48 hrs  2 Remove band aid tomorrow  Shower and wash area- wrist gently with soap and water- beginning tomorrow  Rinse and pat dry  Apply new water seal band aid  Repeat this process for 5 days  No powders, creams lotions or antibiotic ointments  for 5 days  No tub baths, hot tubs or swimming for 5 days  3  Please call our office ([phone]) if you have any fever, redness, swelling, discharge from your wrist access site  4 No driving for 1 day      Left Heart Catheterization   WHAT YOU NEED TO KNOW:   A left heart catheterization is a procedure to look at your heart and its arteries  You may need this procedure if you have chest pain, heart disease, or your heart is not working as it should  DISCHARGE INSTRUCTIONS:   Follow up with your healthcare provider as directed:  Write down your questions so you remember to ask them during your visits  Limit activity as directed:   · Avoid unnecessary stair climbing for 48 hours, if a catheter was put in your groin  · Do not place pressure on your arm, hand, or wrist, if the catheter was placed in your wrist  Avoid pushing, pulling, or heavy lifting with that arm  · If you need to cough, support the area where the catheter was inserted with your hand  · Ask your healthcare provider how long you need to limit movement and avoid certain activities  · You may feel like resting more after your procedure  Slowly start to do more each day  Rest when you feel it is needed  Drink liquids as directed:  Liquids help flush the dye used for your procedure out of your body  Ask your healthcare provider how much liquid to drink each day, and which liquids to drink  Some foods, such as soup and fruit, also provide liquid  Wound care:  Ask your healthcare provider about how to care for your incision wound   Ask when you can get into a tub, shower, or pool  Contact your healthcare provider if:   · You have a fever  · The skin around your wound is red, swollen, or has pus coming from it  · You have trouble breathing, or your skin is itchy, swollen, or has a rash  · You have questions or concerns about your condition or care  Seek care immediately or call 911 if:   · The area where the catheter was placed is swollen and filled with blood or is bleeding  · The leg or arm used for the procedure becomes numb or turns white or blue  · You feel lightheaded, short of breath, and have chest pain  · You cough up blood  · You have any of the following signs of a heart attack:      ¨ Squeezing, pressure, or pain in your chest that lasts longer than 5 minutes or returns    ¨ Discomfort or pain in your back, neck, jaw, stomach, or arm     ¨ Trouble breathing    ¨ Nausea or vomiting    ¨ Lightheadedness or a sudden cold sweat, especially with chest pain or trouble breathing    · Your arm or leg feels warm, tender, and painful  It may look swollen and red  · You have any of the following signs of a stroke:     ¨ Part of your face droops or is numb    ¨ Weakness in an arm or leg    ¨ Confusion or difficulty speaking    ¨ Dizziness, a severe headache, or vision loss  © 2017 [employer] Information is for End User's use only and may not be sold, redistributed or otherwise used for commercial purposes  All illustrations and images included in CareNotes® are the copyrighted property of A D [employer] M [employer]  or [employer]  The above information is an  only  It is not intended as medical advice for individual conditions or treatments  Talk to your doctor, nurse or pharmacist before following any medical regimen to see if it is safe and effective for you

## 2020-03-19 NOTE — INTERVAL H&P NOTE
Update: (This section must be completed if the H&P was completed greater than 24 hrs to procedure or admission)    H&P reviewed  After examining the patient, I find no changed to the H&P since it had been written  /88 (BP Location: Right arm)   Pulse 93   Temp 97 9 °F (36 6 °C) (Oral)   Resp 18   Ht 5' 8" (1 727 m)   Wt 72 6 kg (160 lb)   SpO2 96%   BMI 24 33 kg/m²     Patient re-evaluated   Accept as history and physical     Millie Bryan, DO/March 19, 2020/9:13 AM

## 2020-03-23 ENCOUNTER — TELEPHONE (OUTPATIENT)
Dept: PULMONOLOGY | Facility: CLINIC | Age: 70
End: 2020-03-23

## 2020-03-23 DIAGNOSIS — J44.9 CHRONIC OBSTRUCTIVE PULMONARY DISEASE, UNSPECIFIED COPD TYPE (HCC): Primary | ICD-10-CM

## 2020-04-02 DIAGNOSIS — E78.2 MIXED HYPERLIPIDEMIA: Primary | ICD-10-CM

## 2020-04-03 RX ORDER — SIMVASTATIN 20 MG
20 TABLET ORAL
Qty: 90 TABLET | Refills: 1 | Status: SHIPPED | OUTPATIENT
Start: 2020-04-03 | End: 2020-06-12 | Stop reason: SDUPTHER

## 2020-04-20 ENCOUNTER — TELEPHONE (OUTPATIENT)
Dept: PULMONOLOGY | Facility: CLINIC | Age: 70
End: 2020-04-20

## 2020-04-23 ENCOUNTER — TELEMEDICINE (OUTPATIENT)
Dept: PULMONOLOGY | Facility: CLINIC | Age: 70
End: 2020-04-23
Payer: MEDICARE

## 2020-04-23 VITALS
BODY MASS INDEX: 24.25 KG/M2 | HEIGHT: 68 IN | TEMPERATURE: 98.2 F | SYSTOLIC BLOOD PRESSURE: 132 MMHG | WEIGHT: 160 LBS | DIASTOLIC BLOOD PRESSURE: 82 MMHG | HEART RATE: 84 BPM | OXYGEN SATURATION: 96 %

## 2020-04-23 DIAGNOSIS — J43.2 CENTRILOBULAR EMPHYSEMA (HCC): ICD-10-CM

## 2020-04-23 DIAGNOSIS — R91.1 LUNG NODULE: ICD-10-CM

## 2020-04-23 DIAGNOSIS — J96.11 CHRONIC HYPOXEMIC RESPIRATORY FAILURE (HCC): Primary | ICD-10-CM

## 2020-04-23 PROCEDURE — 99214 OFFICE O/P EST MOD 30 MIN: CPT | Performed by: NURSE PRACTITIONER

## 2020-06-08 ENCOUNTER — CLINICAL SUPPORT (OUTPATIENT)
Dept: INTERNAL MEDICINE CLINIC | Facility: CLINIC | Age: 70
End: 2020-06-08
Payer: MEDICARE

## 2020-06-08 DIAGNOSIS — E78.2 MIXED HYPERLIPIDEMIA: ICD-10-CM

## 2020-06-08 DIAGNOSIS — R73.01 IMPAIRED FASTING GLUCOSE: Primary | ICD-10-CM

## 2020-06-08 DIAGNOSIS — I10 ESSENTIAL HYPERTENSION: ICD-10-CM

## 2020-06-08 LAB
ALBUMIN SERPL BCP-MCNC: 4.3 G/DL (ref 3.5–5)
ALP SERPL-CCNC: 83 U/L (ref 46–116)
ALT SERPL W P-5'-P-CCNC: 29 U/L (ref 12–78)
ANION GAP SERPL CALCULATED.3IONS-SCNC: 8 MMOL/L (ref 4–13)
AST SERPL W P-5'-P-CCNC: 21 U/L (ref 5–45)
BILIRUB SERPL-MCNC: 0.41 MG/DL (ref 0.2–1)
BUN SERPL-MCNC: 12 MG/DL (ref 5–25)
CALCIUM SERPL-MCNC: 9.4 MG/DL (ref 8.3–10.1)
CHLORIDE SERPL-SCNC: 101 MMOL/L (ref 100–108)
CHOLEST SERPL-MCNC: 199 MG/DL (ref 50–200)
CO2 SERPL-SCNC: 31 MMOL/L (ref 21–32)
CREAT SERPL-MCNC: 1.14 MG/DL (ref 0.6–1.3)
GFR SERPL CREATININE-BSD FRML MDRD: 65 ML/MIN/1.73SQ M
GLUCOSE P FAST SERPL-MCNC: 112 MG/DL (ref 65–99)
HDLC SERPL-MCNC: 61 MG/DL
LDLC SERPL CALC-MCNC: 119 MG/DL (ref 0–100)
POTASSIUM SERPL-SCNC: 3.9 MMOL/L (ref 3.5–5.3)
PROT SERPL-MCNC: 7.3 G/DL (ref 6.4–8.2)
SODIUM SERPL-SCNC: 140 MMOL/L (ref 136–145)
TRIGL SERPL-MCNC: 93 MG/DL

## 2020-06-08 PROCEDURE — 80061 LIPID PANEL: CPT | Performed by: PHYSICIAN ASSISTANT

## 2020-06-08 PROCEDURE — 80053 COMPREHEN METABOLIC PANEL: CPT | Performed by: PHYSICIAN ASSISTANT

## 2020-06-08 PROCEDURE — 36415 COLL VENOUS BLD VENIPUNCTURE: CPT

## 2020-06-12 ENCOUNTER — OFFICE VISIT (OUTPATIENT)
Dept: INTERNAL MEDICINE CLINIC | Facility: CLINIC | Age: 70
End: 2020-06-12
Payer: MEDICARE

## 2020-06-12 VITALS
DIASTOLIC BLOOD PRESSURE: 84 MMHG | TEMPERATURE: 97.7 F | HEART RATE: 92 BPM | SYSTOLIC BLOOD PRESSURE: 126 MMHG | BODY MASS INDEX: 24.92 KG/M2 | WEIGHT: 164.4 LBS | HEIGHT: 68 IN | OXYGEN SATURATION: 96 %

## 2020-06-12 DIAGNOSIS — I72.3 BILATERAL ILIAC ARTERY ANEURYSM (HCC): ICD-10-CM

## 2020-06-12 DIAGNOSIS — E78.2 MIXED HYPERLIPIDEMIA: ICD-10-CM

## 2020-06-12 DIAGNOSIS — N13.8 BPH WITH OBSTRUCTION/LOWER URINARY TRACT SYMPTOMS: ICD-10-CM

## 2020-06-12 DIAGNOSIS — N40.1 BPH WITH OBSTRUCTION/LOWER URINARY TRACT SYMPTOMS: ICD-10-CM

## 2020-06-12 DIAGNOSIS — J96.11 CHRONIC HYPOXEMIC RESPIRATORY FAILURE (HCC): ICD-10-CM

## 2020-06-12 DIAGNOSIS — I10 ESSENTIAL HYPERTENSION: ICD-10-CM

## 2020-06-12 DIAGNOSIS — R73.01 IMPAIRED FASTING GLUCOSE: Primary | ICD-10-CM

## 2020-06-12 PROCEDURE — 1036F TOBACCO NON-USER: CPT | Performed by: PHYSICIAN ASSISTANT

## 2020-06-12 PROCEDURE — 3079F DIAST BP 80-89 MM HG: CPT | Performed by: PHYSICIAN ASSISTANT

## 2020-06-12 PROCEDURE — 99214 OFFICE O/P EST MOD 30 MIN: CPT | Performed by: PHYSICIAN ASSISTANT

## 2020-06-12 PROCEDURE — 1160F RVW MEDS BY RX/DR IN RCRD: CPT | Performed by: PHYSICIAN ASSISTANT

## 2020-06-12 PROCEDURE — 4040F PNEUMOC VAC/ADMIN/RCVD: CPT | Performed by: PHYSICIAN ASSISTANT

## 2020-06-12 PROCEDURE — 3074F SYST BP LT 130 MM HG: CPT | Performed by: PHYSICIAN ASSISTANT

## 2020-06-12 PROCEDURE — 3008F BODY MASS INDEX DOCD: CPT | Performed by: PHYSICIAN ASSISTANT

## 2020-06-12 RX ORDER — SIMVASTATIN 20 MG
20 TABLET ORAL
Qty: 90 TABLET | Refills: 1 | Status: SHIPPED | OUTPATIENT
Start: 2020-06-12 | End: 2020-11-13 | Stop reason: ALTCHOICE

## 2020-08-17 ENCOUNTER — HOSPITAL ENCOUNTER (OUTPATIENT)
Dept: RADIOLOGY | Age: 70
Discharge: HOME | End: 2020-08-17
Payer: COMMERCIAL

## 2020-08-17 DIAGNOSIS — Z12.2 ENCOUNTER FOR SCREENING FOR LUNG CANCER: ICD-10-CM

## 2020-08-17 PROCEDURE — G0297 LDCT FOR LUNG CA SCREEN: HCPCS

## 2020-08-25 ENCOUNTER — APPOINTMENT (OUTPATIENT)
Dept: LAB | Age: 70
End: 2020-08-25
Payer: MEDICARE

## 2020-08-25 ENCOUNTER — TRANSCRIBE ORDERS (OUTPATIENT)
Dept: ADMINISTRATIVE | Facility: HOSPITAL | Age: 70
End: 2020-08-25

## 2020-08-25 DIAGNOSIS — N40.1 BPH WITH OBSTRUCTION/LOWER URINARY TRACT SYMPTOMS: ICD-10-CM

## 2020-08-25 DIAGNOSIS — N13.8 BPH WITH OBSTRUCTION/LOWER URINARY TRACT SYMPTOMS: Primary | ICD-10-CM

## 2020-08-25 DIAGNOSIS — N40.1 BPH WITH OBSTRUCTION/LOWER URINARY TRACT SYMPTOMS: Primary | ICD-10-CM

## 2020-08-25 DIAGNOSIS — N13.8 BPH WITH OBSTRUCTION/LOWER URINARY TRACT SYMPTOMS: ICD-10-CM

## 2020-08-25 LAB — PSA SERPL-MCNC: 0.6 NG/ML (ref 0–4)

## 2020-08-25 PROCEDURE — 84153 ASSAY OF PSA TOTAL: CPT

## 2020-09-04 ENCOUNTER — OFFICE VISIT (OUTPATIENT)
Dept: UROLOGY | Facility: MEDICAL CENTER | Age: 70
End: 2020-09-04
Payer: MEDICARE

## 2020-09-04 VITALS
BODY MASS INDEX: 24.4 KG/M2 | SYSTOLIC BLOOD PRESSURE: 124 MMHG | DIASTOLIC BLOOD PRESSURE: 80 MMHG | HEIGHT: 68 IN | WEIGHT: 161 LBS | TEMPERATURE: 97.3 F

## 2020-09-04 DIAGNOSIS — N40.1 BPH WITH OBSTRUCTION/LOWER URINARY TRACT SYMPTOMS: ICD-10-CM

## 2020-09-04 DIAGNOSIS — N13.8 BPH WITH OBSTRUCTION/LOWER URINARY TRACT SYMPTOMS: ICD-10-CM

## 2020-09-04 DIAGNOSIS — R31.29 OTHER MICROSCOPIC HEMATURIA: Primary | ICD-10-CM

## 2020-09-04 PROCEDURE — 99214 OFFICE O/P EST MOD 30 MIN: CPT | Performed by: UROLOGY

## 2020-09-04 RX ORDER — FINASTERIDE 5 MG/1
5 TABLET, FILM COATED ORAL DAILY
Qty: 90 TABLET | Refills: 3 | Status: ON HOLD | OUTPATIENT
Start: 2020-09-04 | End: 2021-03-29

## 2020-09-04 RX ORDER — TAMSULOSIN HYDROCHLORIDE 0.4 MG/1
0.4 CAPSULE ORAL
Qty: 90 CAPSULE | Refills: 3 | Status: SHIPPED | OUTPATIENT
Start: 2020-09-04 | End: 2021-09-09 | Stop reason: SDUPTHER

## 2020-09-04 NOTE — PROGRESS NOTES
Assessment/Plan:    BPH with obstruction/lower urinary tract symptoms  AUA symptom score is 3  PSA was 0 6 on August 25, 2020  He is pleased with his voiding pattern at this time on combined medical therapy  We will continue present therapy  His prescriptions were refilled  Should his nighttime symptoms worsen he will call and we will consider further evaluation  He will otherwise return in 1 year and we will plan to recheck a PSA prior to that visit  Other microscopic hematuria  Urinalysis last year did not reveal significant blood on dipstick  We will plan to recheck a urinalysis with microscopic in 1 year  Diagnoses and all orders for this visit:    Other microscopic hematuria  -     Urinalysis with microscopic; Future    BPH with obstruction/lower urinary tract symptoms  -     tamsulosin (Flomax) 0 4 mg; Take 1 capsule (0 4 mg total) by mouth daily with dinner  -     finasteride (PROSCAR) 5 mg tablet; Take 1 tablet (5 mg total) by mouth daily  -     PSA Total, Diagnostic; Future          Subjective:      Patient ID: Sonu Reyna is a 71 y o  male  Benign Prostatic Hypertrophy   This is a chronic problem  The current episode started more than 1 year ago  The problem is unchanged  Irritative symptoms do not include frequency, nocturia or urgency  Obstructive symptoms include a slower stream  Obstructive symptoms do not include dribbling, incomplete emptying, an intermittent stream, straining or a weak stream  Pertinent negatives include no chills, dysuria, genital pain, hematuria, hesitancy, nausea or vomiting  AUA score is 0-7  His sexual activity is non-contributory to the current illness  The symptoms are aggravated by diuretics  Past treatments include finasteride and tamsulosin  The treatment provided moderate relief  He has been using treatment for 2 or more years  He notes occasional dampness in his underwear at night when he is sleeping deeply      The following portions of the patient's history were reviewed and updated as appropriate: allergies, current medications, past family history, past medical history, past social history, past surgical history and problem list     Review of Systems   Constitutional: Negative for chills, diaphoresis, fatigue and fever  HENT: Negative  Eyes: Negative  Respiratory: Positive for cough and shortness of breath (with long distance walking)  Cardiovascular: Negative  Gastrointestinal: Negative  Negative for nausea and vomiting  Endocrine: Negative  Genitourinary: Negative for dysuria, frequency, hematuria, hesitancy, incomplete emptying, nocturia and urgency  See HPI   Musculoskeletal: Negative  Skin: Negative  Allergic/Immunologic: Negative  Neurological: Negative  Hematological: Negative  Psychiatric/Behavioral: Negative  AUA SYMPTOM SCORE      Most Recent Value   AUA SYMPTOM SCORE   How often have you had a sensation of not emptying your bladder completely after you finished urinating? 1   How often have you had to urinate again less than two hours after you finished urinating? 0   How often have you found you stopped and started again several times when you urinate?  0   How often have you found it difficult to postpone urination? 0   How often have you had a weak urinary stream?  1   How often have you had to push or strain to begin urination? 0   How many times did you most typically get up to urinate from the time you went to bed at night until the time you got up in the morning? 1   Quality of Life: If you were to spend the rest of your life with your urinary condition just the way it is now, how would you feel about that?  1   AUA SYMPTOM SCORE  3          Objective:      /80 (BP Location: Left arm, Patient Position: Sitting, Cuff Size: Adult)   Temp (!) 97 3 °F (36 3 °C)   Ht 5' 8" (1 727 m)   Wt 73 kg (161 lb)   BMI 24 48 kg/m²          Physical Exam  Vitals signs reviewed  Constitutional:       Appearance: He is well-developed  HENT:      Head: Normocephalic and atraumatic  Eyes:      Conjunctiva/sclera: Conjunctivae normal    Neck:      Musculoskeletal: Neck supple  Cardiovascular:      Rate and Rhythm: Normal rate  Pulmonary:      Effort: Pulmonary effort is normal    Abdominal:      General: Bowel sounds are normal  There is no distension  Palpations: Abdomen is soft  There is no mass  Tenderness: There is no abdominal tenderness  There is no right CVA tenderness, left CVA tenderness, guarding or rebound  Hernia: No hernia is present  Genitourinary:     Penis: Normal  No phimosis or hypospadias  Scrotum/Testes: Normal          Right: Mass not present  Left: Mass not present  Rectum: Normal       Comments: Prostate 1 5 X enlarged  The prostate is firm, smooth, symmetric  Skin:     General: Skin is warm and dry  Neurological:      Mental Status: He is alert and oriented to person, place, and time  Psychiatric:         Behavior: Behavior normal          Thought Content:  Thought content normal          Judgment: Judgment normal

## 2020-09-04 NOTE — ASSESSMENT & PLAN NOTE
AUA symptom score is 3  PSA was 0 6 on August 25, 2020  He is pleased with his voiding pattern at this time on combined medical therapy  We will continue present therapy  His prescriptions were refilled  Should his nighttime symptoms worsen he will call and we will consider further evaluation  He will otherwise return in 1 year and we will plan to recheck a PSA prior to that visit

## 2020-09-04 NOTE — ASSESSMENT & PLAN NOTE
Urinalysis last year did not reveal significant blood on dipstick  We will plan to recheck a urinalysis with microscopic in 1 year

## 2020-09-10 ENCOUNTER — OFFICE VISIT (OUTPATIENT)
Dept: PULMONOLOGY | Facility: CLINIC | Age: 70
End: 2020-09-10
Payer: MEDICARE

## 2020-09-10 VITALS
WEIGHT: 161.8 LBS | DIASTOLIC BLOOD PRESSURE: 70 MMHG | HEIGHT: 68 IN | OXYGEN SATURATION: 97 % | HEART RATE: 96 BPM | BODY MASS INDEX: 24.52 KG/M2 | SYSTOLIC BLOOD PRESSURE: 120 MMHG | RESPIRATION RATE: 18 BRPM | TEMPERATURE: 96.3 F

## 2020-09-10 DIAGNOSIS — J43.2 CENTRILOBULAR EMPHYSEMA (HCC): Primary | ICD-10-CM

## 2020-09-10 DIAGNOSIS — J96.11 CHRONIC HYPOXEMIC RESPIRATORY FAILURE (HCC): ICD-10-CM

## 2020-09-10 DIAGNOSIS — J44.9 CHRONIC OBSTRUCTIVE PULMONARY DISEASE, UNSPECIFIED COPD TYPE (HCC): ICD-10-CM

## 2020-09-10 DIAGNOSIS — R91.1 LUNG NODULE: ICD-10-CM

## 2020-09-10 PROCEDURE — 99213 OFFICE O/P EST LOW 20 MIN: CPT | Performed by: INTERNAL MEDICINE

## 2020-09-10 NOTE — ASSESSMENT & PLAN NOTE
· Did not meet criteria for supplemental oxygen  · Currently rents his own portable supply which he does use on occasion  This is fairly infrequent however

## 2020-09-10 NOTE — ASSESSMENT & PLAN NOTE
· Tiny nodules and intrapulmonary lymph node    Unchanged on most recent CT scan  · Continue lung cancer screening  · Approaching 5 years of tobacco cessation

## 2020-09-10 NOTE — ASSESSMENT & PLAN NOTE
· Doing well onTrelegy Ellipta, prescription renewed  · Has rescue albuterol which she uses very infrequently  · Also has nebulizer which he has not needed to use  · Once again discussed the possibility of bronchoscopic lung volume reduction    At this time he is not interested in pursuing further

## 2020-09-10 NOTE — PROGRESS NOTES
Progress note - Pulmonary Medicine   Karime Canales Held 71 y o  male MRN: [id number]       Impression & Plan:     Centrilobular emphysema ([facility] [address] ) - severe  · Doing well onTrelegy Ellipta, prescription renewed  · Has rescue albuterol which she uses very infrequently  · Also has nebulizer which he has not needed to use  · Once again discussed the possibility of bronchoscopic lung volume reduction  At this time he is not interested in pursuing further    Chronic hypoxemic respiratory failure ([facility] [address] )  · Did not meet criteria for supplemental oxygen  · Currently rents his own portable supply which he does use on occasion  This is fairly infrequent however  Lung nodule  · Tiny nodules and intrapulmonary lymph node  Unchanged on most recent CT scan  · Continue lung cancer screening  · Approaching 5 years of tobacco cessation      I will see him back in the office in 6 months  He will call if any new or worsening symptoms in the interim  ______________________________________________________________________    HPI:    Bonny Harleen presents today for follow-up of emphysema  He has been doing well  He has not felt that he has needed the supplemental oxygen as much as on the prior visit  He did decide to rent his own equipment  He currently rents tanks and regulator  He is using pulse oxygen on occasion  He has not needed it frequently but previously was using oxygen continuously  He does try to maintain his activity level and only notices need for the oxygen and extremes of humidity or with more strenuous exertion  He has been on trilogy Ellipta with good response to therapy  He does have an albuterol nebulizer and albuterol rescue inhaler  He has needed these only very sporadically  He did last use the nebulizer yesterday but has not needed it today and had not used it for several weeks prior  He denies cough or phlegm production  No chest pain  He does occasionally wheeze    No sore throat or upper respiratory complaints  No weight or appetite changes  No headache or visual change  Denies chest pain or cardiac complaints but did have a cardiac catheterization  He was unable to complete a stress test successfully with EKG changes and therefore was sent for cardiac catheterization  This did not reveal any obstructive coronary lesions  Current Medications:    Current Outpatient Medications:     albuterol (PROVENTIL HFA,VENTOLIN HFA) 90 mcg/act inhaler, Inhale 2 puffs every 6 (six) hours as needed for wheezing, Disp: 3 Inhaler, Rfl: 5    EPINEPHrine (EPIPEN) 0 3 mg/0 3 mL SOAJ, Inject 0 3 mL (0 3 mg total) into a muscle once for 1 dose, Disp: 0 6 mL, Rfl: 0    finasteride (PROSCAR) 5 mg tablet, Take 1 tablet (5 mg total) by mouth daily, Disp: 90 tablet, Rfl: 3    fluticasone (FLONASE) 50 mcg/act nasal spray, 1 spray into each nostril daily, Disp: 1 Bottle, Rfl: 0    fluticasone-umeclidinium-vilanterol (TRELEGY) 100-62 5-25 MCG/INH inhaler, Inhale 1 puff daily Rinse mouth after use , Disp: 3 Inhaler, Rfl: 3    guaiFENesin (MUCINEX) 600 mg 12 hr tablet, Take 1,200 mg by mouth 2 (two) times a day as needed, Disp: , Rfl:     hydrochlorothiazide (HYDRODIURIL) 25 mg tablet, TAKE 1 TABLET DAILY, Disp: 90 tablet, Rfl: 4    ipratropium-albuterol (DUO-NEB) 0 5-2 5 mg/3 mL nebulizer solution, Take 1 vial (3 mL total) by nebulization every 6 (six) hours, Disp: 180 vial, Rfl: 1    loratadine (CLARITIN REDITABS) 10 MG dissolvable tablet, Take 10 mg by mouth daily as needed for allergies  , Disp: , Rfl:     Multiple Vitamins-Minerals (CENTRUM ADULTS PO), Take 1 tablet by mouth daily  , Disp: , Rfl:     simvastatin (ZOCOR) 20 mg tablet, Take 1 tablet (20 mg total) by mouth daily at bedtime, Disp: 90 tablet, Rfl: 1    tamsulosin (Flomax) 0 4 mg, Take 1 capsule (0 4 mg total) by mouth daily with dinner, Disp: 90 capsule, Rfl: 3    Review of Systems:  Aside from what is mentioned in the HPI, the review of systems is otherwise negative    Past medical history, surgical history, and family history were reviewed and updated as appropriate    Social history updates:  Social History     Tobacco Use   Smoking Status Former Smoker    Packs/day: 1 50    Years: 48 00    Pack years: 72 00    Types: Cigarettes    Start date: 5    Last attempt to quit: 2015    Years since quittin 8   Smokeless Tobacco Never Used       PhysicalExamination:  Vitals:   /70   Pulse 96   Temp (!) 96 3 °F (35 7 °C)   Resp 18   Ht 5' 8" (1 727 m)   Wt 73 4 kg (161 lb 12 8 oz)   SpO2 97%   BMI 24 60 kg/m²     Gen:  Appears well  No respiratory distress  Comfortable on room air  HEENT: PERRL  Oropharynx is clear, moist  Neck: Supple  There is no JVD, lymphadenopathy or thyromegaly  Trachea is midline  Chest:  Chest excursion symmetric  Lungs are hyperinflated  Breath sounds are distant bilaterally but otherwise clear  Hyper-resonant to percussion  Cardiac:  Distant heart tones, regular  There are no murmurs  Abdomen: Soft and nontender  Benign  Extremities: No clubbing, cyanosis or edema  Diagnostic Data:    Imaging:  I personally reviewed the images on the [facility] system pertinent to today's visit  CT scan of the chest from  shows stable tiny nodules    He has extensive diffuse bilateral emphysema change        Pedro Mancilla MD

## 2020-11-10 ENCOUNTER — CLINICAL SUPPORT (OUTPATIENT)
Dept: INTERNAL MEDICINE CLINIC | Facility: CLINIC | Age: 70
End: 2020-11-10
Payer: MEDICARE

## 2020-11-10 DIAGNOSIS — I10 ESSENTIAL HYPERTENSION: ICD-10-CM

## 2020-11-10 DIAGNOSIS — E78.2 MIXED HYPERLIPIDEMIA: ICD-10-CM

## 2020-11-10 DIAGNOSIS — R31.29 OTHER MICROSCOPIC HEMATURIA: Primary | ICD-10-CM

## 2020-11-10 DIAGNOSIS — J96.11 CHRONIC HYPOXEMIC RESPIRATORY FAILURE (HCC): ICD-10-CM

## 2020-11-10 LAB
ALBUMIN SERPL BCP-MCNC: 4.2 G/DL (ref 3.5–5)
ALP SERPL-CCNC: 83 U/L (ref 46–116)
ALT SERPL W P-5'-P-CCNC: 26 U/L (ref 12–78)
ANION GAP SERPL CALCULATED.3IONS-SCNC: 4 MMOL/L (ref 4–13)
AST SERPL W P-5'-P-CCNC: 19 U/L (ref 5–45)
BASOPHILS # BLD AUTO: 0.07 THOUSANDS/ΜL (ref 0–0.1)
BASOPHILS NFR BLD AUTO: 1 % (ref 0–1)
BILIRUB SERPL-MCNC: 0.39 MG/DL (ref 0.2–1)
BUN SERPL-MCNC: 11 MG/DL (ref 5–25)
CALCIUM SERPL-MCNC: 9.5 MG/DL (ref 8.3–10.1)
CHLORIDE SERPL-SCNC: 105 MMOL/L (ref 100–108)
CHOLEST SERPL-MCNC: 189 MG/DL (ref 50–200)
CO2 SERPL-SCNC: 32 MMOL/L (ref 21–32)
CREAT SERPL-MCNC: 1.13 MG/DL (ref 0.6–1.3)
EOSINOPHIL # BLD AUTO: 0.24 THOUSAND/ΜL (ref 0–0.61)
EOSINOPHIL NFR BLD AUTO: 3 % (ref 0–6)
ERYTHROCYTE [DISTWIDTH] IN BLOOD BY AUTOMATED COUNT: 13.3 % (ref 11.6–15.1)
GFR SERPL CREATININE-BSD FRML MDRD: 66 ML/MIN/1.73SQ M
GLUCOSE P FAST SERPL-MCNC: 103 MG/DL (ref 65–99)
HCT VFR BLD AUTO: 50.1 % (ref 36.5–49.3)
HDLC SERPL-MCNC: 56 MG/DL
HGB BLD-MCNC: 15.3 G/DL (ref 12–17)
IMM GRANULOCYTES # BLD AUTO: 0.04 THOUSAND/UL (ref 0–0.2)
IMM GRANULOCYTES NFR BLD AUTO: 1 % (ref 0–2)
LDLC SERPL CALC-MCNC: 114 MG/DL (ref 0–100)
LYMPHOCYTES # BLD AUTO: 1.93 THOUSANDS/ΜL (ref 0.6–4.47)
LYMPHOCYTES NFR BLD AUTO: 23 % (ref 14–44)
MCH RBC QN AUTO: 28.5 PG (ref 26.8–34.3)
MCHC RBC AUTO-ENTMCNC: 30.5 G/DL (ref 31.4–37.4)
MCV RBC AUTO: 93 FL (ref 82–98)
MONOCYTES # BLD AUTO: 0.69 THOUSAND/ΜL (ref 0.17–1.22)
MONOCYTES NFR BLD AUTO: 8 % (ref 4–12)
NEUTROPHILS # BLD AUTO: 5.55 THOUSANDS/ΜL (ref 1.85–7.62)
NEUTS SEG NFR BLD AUTO: 64 % (ref 43–75)
NRBC BLD AUTO-RTO: 0 /100 WBCS
PLATELET # BLD AUTO: 325 THOUSANDS/UL (ref 149–390)
PMV BLD AUTO: 11.2 FL (ref 8.9–12.7)
POTASSIUM SERPL-SCNC: 4.2 MMOL/L (ref 3.5–5.3)
PROT SERPL-MCNC: 6.9 G/DL (ref 6.4–8.2)
RBC # BLD AUTO: 5.37 MILLION/UL (ref 3.88–5.62)
SODIUM SERPL-SCNC: 141 MMOL/L (ref 136–145)
TRIGL SERPL-MCNC: 93 MG/DL
WBC # BLD AUTO: 8.52 THOUSAND/UL (ref 4.31–10.16)

## 2020-11-10 PROCEDURE — 80053 COMPREHEN METABOLIC PANEL: CPT | Performed by: PHYSICIAN ASSISTANT

## 2020-11-10 PROCEDURE — 36415 COLL VENOUS BLD VENIPUNCTURE: CPT

## 2020-11-10 PROCEDURE — 85025 COMPLETE CBC W/AUTO DIFF WBC: CPT | Performed by: PHYSICIAN ASSISTANT

## 2020-11-10 PROCEDURE — 80061 LIPID PANEL: CPT | Performed by: PHYSICIAN ASSISTANT

## 2020-11-13 ENCOUNTER — OFFICE VISIT (OUTPATIENT)
Dept: INTERNAL MEDICINE CLINIC | Facility: CLINIC | Age: 70
End: 2020-11-13
Payer: MEDICARE

## 2020-11-13 VITALS
WEIGHT: 162 LBS | SYSTOLIC BLOOD PRESSURE: 118 MMHG | DIASTOLIC BLOOD PRESSURE: 80 MMHG | HEIGHT: 68 IN | BODY MASS INDEX: 24.55 KG/M2 | TEMPERATURE: 96.9 F | HEART RATE: 89 BPM | OXYGEN SATURATION: 99 %

## 2020-11-13 DIAGNOSIS — R73.03 PREDIABETES: ICD-10-CM

## 2020-11-13 DIAGNOSIS — J43.2 CENTRILOBULAR EMPHYSEMA (HCC): ICD-10-CM

## 2020-11-13 DIAGNOSIS — Z00.00 MEDICARE ANNUAL WELLNESS VISIT, SUBSEQUENT: ICD-10-CM

## 2020-11-13 DIAGNOSIS — I10 ESSENTIAL HYPERTENSION: ICD-10-CM

## 2020-11-13 DIAGNOSIS — E78.2 MIXED HYPERLIPIDEMIA: Primary | ICD-10-CM

## 2020-11-13 DIAGNOSIS — J96.11 CHRONIC HYPOXEMIC RESPIRATORY FAILURE (HCC): ICD-10-CM

## 2020-11-13 PROCEDURE — 99213 OFFICE O/P EST LOW 20 MIN: CPT | Performed by: PHYSICIAN ASSISTANT

## 2020-11-13 PROCEDURE — G0438 PPPS, INITIAL VISIT: HCPCS | Performed by: PHYSICIAN ASSISTANT

## 2020-11-13 RX ORDER — ATORVASTATIN CALCIUM 20 MG/1
20 TABLET, FILM COATED ORAL DAILY
Qty: 90 TABLET | Refills: 3 | Status: SHIPPED | OUTPATIENT
Start: 2020-11-13 | End: 2020-11-20 | Stop reason: SDUPTHER

## 2020-11-20 DIAGNOSIS — E78.2 MIXED HYPERLIPIDEMIA: ICD-10-CM

## 2020-11-20 RX ORDER — ATORVASTATIN CALCIUM 20 MG/1
20 TABLET, FILM COATED ORAL DAILY
Qty: 90 TABLET | Refills: 3 | Status: SHIPPED | OUTPATIENT
Start: 2020-11-20 | End: 2020-11-20 | Stop reason: SDUPTHER

## 2020-11-20 RX ORDER — ATORVASTATIN CALCIUM 20 MG/1
20 TABLET, FILM COATED ORAL DAILY
Qty: 90 TABLET | Refills: 3 | Status: ON HOLD | OUTPATIENT
Start: 2020-11-20 | End: 2021-03-29

## 2021-02-22 ENCOUNTER — HOSPITAL ENCOUNTER (OUTPATIENT)
Dept: NON INVASIVE DIAGNOSTICS | Facility: CLINIC | Age: 71
Discharge: HOME | End: 2021-02-22
Payer: MEDICARE

## 2021-02-22 DIAGNOSIS — I72.3 BILATERAL ILIAC ARTERY ANEURYSM (HCC): ICD-10-CM

## 2021-02-22 DIAGNOSIS — R09.89 PROMINENT POPLITEAL PULSE: ICD-10-CM

## 2021-02-22 PROCEDURE — 93978 VASCULAR STUDY: CPT

## 2021-02-22 PROCEDURE — 93925 LOWER EXTREMITY STUDY: CPT | Performed by: SURGERY

## 2021-02-22 PROCEDURE — 93978 VASCULAR STUDY: CPT | Performed by: SURGERY

## 2021-02-22 PROCEDURE — 93923 UPR/LXTR ART STDY 3+ LVLS: CPT

## 2021-02-22 PROCEDURE — 93925 LOWER EXTREMITY STUDY: CPT

## 2021-02-22 PROCEDURE — 93922 UPR/L XTREMITY ART 2 LEVELS: CPT | Performed by: SURGERY

## 2021-02-26 DIAGNOSIS — Z23 ENCOUNTER FOR IMMUNIZATION: ICD-10-CM

## 2021-03-02 ENCOUNTER — OFFICE VISIT (OUTPATIENT)
Dept: VASCULAR SURGERY | Facility: CLINIC | Age: 71
End: 2021-03-02
Payer: MEDICARE

## 2021-03-02 VITALS
WEIGHT: 164 LBS | BODY MASS INDEX: 24.86 KG/M2 | SYSTOLIC BLOOD PRESSURE: 118 MMHG | TEMPERATURE: 98.1 F | HEART RATE: 90 BPM | HEIGHT: 68 IN | DIASTOLIC BLOOD PRESSURE: 82 MMHG | RESPIRATION RATE: 18 BRPM

## 2021-03-02 DIAGNOSIS — I72.3 BILATERAL ILIAC ARTERY ANEURYSM (HCC): Primary | ICD-10-CM

## 2021-03-02 PROCEDURE — 99213 OFFICE O/P EST LOW 20 MIN: CPT | Performed by: NURSE PRACTITIONER

## 2021-03-02 NOTE — PROGRESS NOTES
Assessment/Plan:    Bilateral iliac artery aneurysm [facility])    77-year-old male with HTN, HLD, COPD, abdominal aortic ectasia, bilateral common iliac artery aneurysms who returns to the office for yearly visit to review aortoiliac and lower extremity arterial studies 2/22/21  -AOIL Duplex was limited due to bowel gas  Prior iliac artery measured 1 point 9 cm bilaterally  Aortic ectasia 2 7 cm  -LEAD showed  Diffuse disease with no significant stenosis ROMINA 1 13/1 1 (R/L)  - blood pressure well controlled  -Will repeat it duplex in 1 year  Fasting  Limit gassy foods to 3 days prior to study  Take Beano prior to study  -Follow up in the office in 1 year       Diagnoses and all orders for this visit:    Bilateral iliac artery aneurysm ([address] )  -     VAS abdominal aorta/iliacs; complete study; Future          Subjective:      Patient ID: Christie Mirza is a 79 y o  male  Patient presents in office to review the results of his MARY and AOIL done on 2/22/2021  Patient denies any side, back or abdominal pain  Patient denies any change in appetite or discomfort after eating  Patient denies any leg pain with resting or walking  Patient denies any wounds or ulcers  Patient is taking atorvastatin  HPI    The following portions of the patient's history were reviewed and updated as appropriate: allergies, current medications, past family history, past medical history, past social history, past surgical history and problem list   ROS reviewed     Review of Systems   Constitutional: Negative  Negative for appetite change, chills and fever  HENT: Negative  Negative for sinus pain, sneezing and sore throat  Eyes: Negative  Respiratory: Positive for shortness of breath (COPD)  Negative for cough and chest tightness  Cardiovascular: Negative  Negative for leg swelling  Gastrointestinal: Negative  Negative for abdominal pain, diarrhea, nausea and vomiting  Endocrine: Negative  Genitourinary: Negative  Negative for flank pain  Musculoskeletal: Negative  Negative for back pain  Skin: Negative  Negative for wound  Allergic/Immunologic: Negative  Neurological: Negative  Negative for dizziness, speech difficulty, weakness, numbness and headaches  Hematological: Bruises/bleeds easily  Psychiatric/Behavioral: Negative  Negative for self-injury and suicidal ideas  Objective:  I have reviewed and made appropriate changes to the review of systems input by the medical assistant      Vitals:    03/02/21 1355   BP: 118/82   BP Location: Left arm   Patient Position: Sitting   Cuff Size: Adult   Pulse: 90   Resp: 18   Temp: 98 1 °F (36 7 °C)   TempSrc: Tympanic   Weight: 74 4 kg (164 lb)   Height: 5' 8" (1 727 m)       Patient Active Problem List   Diagnosis    Hyperlipidemia    Centrilobular emphysema (HCC) - severe    Bilateral iliac artery aneurysm (HCC)    Arterial ectasia (HCC)    Diverticulosis, sigmoid    Hearing loss    Hemorrhoids    Lung nodule    Prediabetes    Skin disorder    BPH with obstruction/lower urinary tract symptoms    Essential hypertension    Seasonal allergies    Medicare annual wellness visit, subsequent    Other microscopic hematuria    Encounter for screening for lung cancer    Other fatigue    NSVT (nonsustained ventricular tachycardia) (HCC)    Abnormal ECG during exercise stress test    Transition of care performed with sharing of clinical summary    Prominent popliteal pulse    Chronic hypoxemic respiratory failure (HCC)       Past Surgical History:   Procedure Laterality Date    BICEPS TENDON REPAIR  1999    COLONOSCOPY      HAND SURGERY      Last Assessed:7/11/2016    HERNIA REPAIR      Last Assessed:7/11/2016    KNEE ARTHROSCOPY      VA REPAIR ING HERNIA,5+Y/O,REDUCIBL Right 2/23/2016    Procedure: REPAIR HERNIA INGUINAL with mesh;  Surgeon: Sylvia Morales DO;  Location: [facility];  Service: General    PROSTATE BIOPSY      WISDOM TOOTH EXTRACTION         Family History   Problem Relation Age of Onset    Hypertension Mother        Social History     Socioeconomic History    Marital status: /Civil Union     Spouse name: Not on file    Number of children: Not on file    Years of education: Not on file    Highest education level: Not on file   Occupational History    Not on file   Social Needs    Financial resource strain: Not on file    Food insecurity     Worry: Not on file     Inability: Not on file   [employer] needs     Medical: Not on file     Non-medical: Not on file   Tobacco Use    Smoking status: Former Smoker     Packs/day: 1 50     Years: 48 00     Pack years: 72 00     Types: Cigarettes     Start date: 5     Quit date: 2015     Years since quittin 3    Smokeless tobacco: Never Used   Substance and Sexual Activity    Alcohol use: Yes     Frequency: Monthly or less     Drinks per session: 1 or 2     Binge frequency: Never     Comment: rare    Drug use: No    Sexual activity: Yes   Lifestyle    Physical activity     Days per week: Not on file     Minutes per session: Not on file    Stress: Not on file   Relationships    Social connections     Talks on phone: Not on file     Gets together: Not on file     Attends Shinto service: Not on file     Active member of club or organization: Not on file     Attends meetings of clubs or organizations: Not on file     Relationship status: Not on file    Intimate partner violence     Fear of current or ex partner: Not on file     Emotionally abused: Not on file     Physically abused: Not on file     Forced sexual activity: Not on file   Other Topics Concern    Not on file   Social History Narrative    Advance directive on file       Allergies   Allergen Reactions    Bee Venom Facial Swelling    Other Rash     Annotation - [id number]: mushrooms    Penicillins Rash         Current Outpatient Medications:     albuterol (PROVENTIL HFA,VENTOLIN HFA) 90 mcg/act inhaler, Inhale 2 puffs every 6 (six) hours as needed for wheezing, Disp: 3 Inhaler, Rfl: 5    atorvastatin (LIPITOR) 20 mg tablet, Take 1 tablet (20 mg total) by mouth daily, Disp: 90 tablet, Rfl: 3    EPINEPHrine (EPIPEN) 0 3 mg/0 3 mL SOAJ, Inject 0 3 mL (0 3 mg total) into a muscle once for 1 dose, Disp: 0 6 mL, Rfl: 0    finasteride (PROSCAR) 5 mg tablet, Take 1 tablet (5 mg total) by mouth daily, Disp: 90 tablet, Rfl: 3    fluticasone (FLONASE) 50 mcg/act nasal spray, 1 spray into each nostril daily, Disp: 1 Bottle, Rfl: 0    fluticasone-umeclidinium-vilanterol (TRELEGY) 100-62 5-25 MCG/INH inhaler, Inhale 1 puff daily Rinse mouth after use , Disp: 3 Inhaler, Rfl: 3    guaiFENesin (MUCINEX) 600 mg 12 hr tablet, Take 1,200 mg by mouth 2 (two) times a day as needed, Disp: , Rfl:     hydrochlorothiazide (HYDRODIURIL) 25 mg tablet, TAKE 1 TABLET DAILY, Disp: 90 tablet, Rfl: 4    ipratropium-albuterol (DUO-NEB) 0 5-2 5 mg/3 mL nebulizer solution, Take 1 vial (3 mL total) by nebulization every 6 (six) hours, Disp: 180 vial, Rfl: 1    loratadine (CLARITIN REDITABS) 10 MG dissolvable tablet, Take 10 mg by mouth daily as needed for allergies  , Disp: , Rfl:     Multiple Vitamins-Minerals (CENTRUM ADULTS PO), Take 1 tablet by mouth daily  , Disp: , Rfl:     tamsulosin (Flomax) 0 4 mg, Take 1 capsule (0 4 mg total) by mouth daily with dinner, Disp: 90 capsule, Rfl: 3      /82 (BP Location: Left arm, Patient Position: Sitting, Cuff Size: Adult)   Pulse 90   Temp 98 1 °F (36 7 °C) (Tympanic)   Resp 18   Ht 5' 8" (1 727 m)   Wt 74 4 kg (164 lb)   BMI 24 94 kg/m²          Physical Exam  Vitals signs and nursing note reviewed  Constitutional:       Appearance: Normal appearance  HENT:      Head: Normocephalic and atraumatic  Eyes:      Extraocular Movements: Extraocular movements intact  Cardiovascular:      Pulses: Normal pulses             Radial pulses are 2+ on the right side and 2+ on the left side         Femoral pulses are 2+ on the right side and 2+ on the left side  Popliteal pulses are 2+ on the right side and 2+ on the left side  Posterior tibial pulses are 2+ on the right side and 2+ on the left side  Heart sounds: Normal heart sounds  Pulmonary:      Effort: Pulmonary effort is normal       Breath sounds: Normal breath sounds  Abdominal:      General: Bowel sounds are normal       Palpations: Abdomen is soft  Musculoskeletal: Normal range of motion  General: No swelling  Skin:     General: Skin is warm  Neurological:      General: No focal deficit present  Mental Status: He is alert and oriented to person, place, and time

## 2021-03-02 NOTE — ASSESSMENT & PLAN NOTE
80-year-old male with HTN, HLD, COPD, abdominal aortic ectasia, bilateral common iliac artery aneurysms who returns to the office for yearly visit to review aortoiliac and lower extremity arterial studies 2/22/21  -AOIL Duplex was limited due to bowel gas  Prior iliac artery measured 1 point 9 cm bilaterally  Aortic ectasia 2 7 cm  -LEAD showed  Diffuse disease with no significant stenosis ROMINA 1 13/1 1 (R/L)  - blood pressure well controlled  -Will repeat it duplex in 1 year  Fasting  Limit gassy foods to 3 days prior to study    Take Beano prior to study  -Follow up in the office in 1 year

## 2021-03-02 NOTE — PATIENT INSTRUCTIONS
Nonruptured Abdominal Aortic Aneurysm   WHAT YOU NEED TO KNOW:   What is an abdominal aortic aneurysm (AAA)? An AAA is a bulging or weak area in your abdominal aorta  Over time, the bulge may grow and is at risk for tearing or rupturing  The aorta is a large blood vessel that extends from your heart to your abdomen  The part of the aorta that extends into your abdomen is called your abdominal aorta  Your abdominal aorta brings blood to your stomach, pelvis, and legs  An AAA that ruptures is a life-threatening emergency  What increases my risk for an AAA? · Smoking    · High blood pressure or atherosclerosis (the buildup of fat and cholesterol in your arteries)    · Being overweight or obese    · A family or personal history of a AAA    · Age older than 61    · Being male    · A connective tissue disease such as Marfan syndrome    What are the signs and symptoms of an AAA? An AAA usually does not have signs or symptoms if it has not ruptured  If the AAA starts to leak or ruptures, you may have any of the following:  · Sudden pain in your abdomen, groin, back, legs, or buttocks    · Nausea and vomiting    · A lump or swelling in your abdomen    · Stiff abdominal muscles    · Numbness or tingling in your legs    · Pale, sweaty, or clammy skin    · Dizziness, fainting or loss of consciousness    What do I need to know about screening for a nonruptured AAA? Screening means you are checked 1 time based on AAA risk factors  Ultrasound pictures are commonly used  Screening is offered to adults 72to 76years old  · If you are a man:      ? Screening is recommended if you are a current or past smoker  ? Screening may be  recommended if you never smoked but have AAA risk factors  · If you are a woman:      ? Screening may be  recommended if you are a current or past smoker or have AAA risk factors  Your healthcare provider will recommend screening only if the benefits outweigh the risks for you     ?  Screening is not recommended if you never smoked  · Your provider will help you understand the benefits and risks of screening:     ? The benefit  is that your provider can monitor a small AAA or treat a large AAA sooner  ? The risk  is that surgery may be used to treat an AAA that would not have ruptured  Surgery for a nonruptured AAA has risks, but it is less risky than an AAA rupture  How is a nonruptured AAA diagnosed? An AAA is often found when you have a test or exam for another condition  Ultrasound, CT scan, MRI, or angiography pictures may be used to measure the size and location of your AAA  How is an AAA treated? Your AAA may not need treatment  Your healthcare provider may monitor the size of your AAA with tests, such as an ultrasound  You may be given medicines to prevent the AAA from growing  Examples include medicines to lower your blood pressure or cholesterol level  If your AAA gets bigger, starts to leak, or ruptures, you may need any of the following:  · Endovascular repair  is a procedure that uses a graft to repair your AAA  The graft stops blood flow to the aneurysm and protects your abdominal aorta  You may need to have more than 1 endovascular repair  · Open repair  is surgery to repair or remove an AAA  What can I do to manage a nonruptured AAA? You can help prevent your AAA from growing or rupturing by doing the following:  · Do not smoke  Nicotine and other chemicals in cigarettes and cigars can increase your blood pressure  It can also damage your aorta and increase the size of your AAA  Ask your healthcare provider for information if you currently smoke and need help to quit  E-cigarettes or smokeless tobacco still contain nicotine  Talk to your healthcare provider before you use these products  · Exercise as directed  Exercise can help control your blood pressure and cholesterol level   Ask your healthcare provider how much exercise you need each day and which exercises are best for you  · Follow the meal plan recommended by your healthcare provider  Talk to your dietitian about a heart-healthy or low-sodium eating plan  Meal plans will help you lower your cholesterol and blood pressure  They will also help you reach a healthy weight  · Do not lift anything heavier than 10 pounds  Heavy lifting can increase pressure in your abdominal aorta  This can increase your risk for a ruptured AAA  Call your local emergency number (911 in the [facility] [facility]), or have someone else call if:   · You faint or lose consciousness  · You cannot be woken  When should I seek immediate care? The following signs or symptoms may mean the AAA is at risk of rupturing:  · You have sudden sharp pain in your abdomen, groin, back, legs, or buttocks  · You have nausea and are vomiting  · You feel dizzy  · You have stiffness or swelling in your abdomen, or a lump in your abdomen  · You have numbness or tingling in your legs  · Your skin is pale, sweaty, or clammy  When should I call my doctor? · You have questions or concerns about your condition or care  CARE AGREEMENT:   You have the right to help plan your care  Learn about your health condition and how it may be treated  Discuss treatment options with your healthcare providers to decide what care you want to receive  You always have the right to refuse treatment  The above information is an  only  It is not intended as medical advice for individual conditions or treatments  Talk to your doctor, nurse or pharmacist before following any medical regimen to see if it is safe and effective for you  © Copyright [address] Information is for End User's use only and may not be sold, redistributed or otherwise used for commercial purposes   All illustrations and images included in CareNotes® are the copyrighted property of [employer]  or [facility] [employer]

## 2021-03-12 ENCOUNTER — TELEMEDICINE (OUTPATIENT)
Dept: INTERNAL MEDICINE CLINIC | Facility: CLINIC | Age: 71
End: 2021-03-12
Payer: MEDICARE

## 2021-03-12 DIAGNOSIS — Z03.818 ENCOUNTER FOR OBSERVATION FOR SUSPECTED EXPOSURE TO OTHER BIOLOGICAL AGENTS RULED OUT: ICD-10-CM

## 2021-03-12 DIAGNOSIS — B34.9 VIRAL INFECTION, UNSPECIFIED: ICD-10-CM

## 2021-03-12 PROCEDURE — U0003 INFECTIOUS AGENT DETECTION BY NUCLEIC ACID (DNA OR RNA); SEVERE ACUTE RESPIRATORY SYNDROME CORONAVIRUS 2 (SARS-COV-2) (CORONAVIRUS DISEASE [COVID-19]), AMPLIFIED PROBE TECHNIQUE, MAKING USE OF HIGH THROUGHPUT TECHNOLOGIES AS DESCRIBED BY CMS-2020-01-R: HCPCS | Performed by: PHYSICIAN ASSISTANT

## 2021-03-12 PROCEDURE — U0005 INFEC AGEN DETEC AMPLI PROBE: HCPCS | Performed by: PHYSICIAN ASSISTANT

## 2021-03-12 PROCEDURE — 99442 PR PHYS/QHP TELEPHONE EVALUATION 11-20 MIN: CPT | Performed by: PHYSICIAN ASSISTANT

## 2021-03-12 NOTE — PROGRESS NOTES
COVID-19 Virtual Visit     Assessment/Plan:    Problem List Items Addressed This Visit        Other    Viral infection, unspecified    Relevant Orders    Novel Coronavirus (Covid-19),PCR [facility] - Collected at Mobile Vans or [facility]    Encounter for observation for suspected exposure to other biological agents ruled out     Due to patient's symptoms I recommend she go for COVID testing at one of our central test sites  Order for COVID testing place  Discussed importance of rest fluids hydration and supportive care  Okay to use Tylenol as needed for pain  Discussed importance of avoiding exposure to others while we are waiting on your test results  Okay to start vitamin-C 500 mg by mouth twice daily  Zinc 50 mg by mouth once daily x 7 days  Continue with vitamin-D supplementation  Patient with underlying lung conditions  Discussed importance of continued monitoring of pulse ox  Report back immediately if pulse ox <94%  Discussed red flag sx and ER precautions which need immediate eval and tx should they develop  Follow up next week with our office for test results  Relevant Orders    Novel Coronavirus (Covid-19),PCR [facility] - Collected at Mobile Vans or [facility]         Disposition:     I referred patient to one of our centralized sites for a COVID-19 swab  Due to patient's symptoms I recommend she go for COVID testing at one of our central test sites  Order for COVID testing place  Discussed importance of rest fluids hydration and supportive care  Okay to use Tylenol as needed for pain  Discussed importance of avoiding exposure to others while we are waiting on your test results  Okay to start vitamin-C 500 mg by mouth twice daily  Zinc 50 mg by mouth once daily x 7 days  Continue with vitamin-D supplementation  Patient with underlying lung conditions  Discussed importance of continued monitoring of pulse ox  Continue mucinex PRN  Report back immediately if pulse ox <94%    Discussed red flag sx and ER precautions which need immediate eval and tx should they develop  Follow up next week with our office for test results  I have spent 15 minutes directly with the patient  Greater than 50% of this time was spent in counseling/coordination of care regarding: prognosis, patient and family education, importance of treatment compliance, risk factor reductions and impressions  Encounter provider Gauri Whitten PA-C    Provider located at [address] [place] [address] [address] [facility] [id number]    Recent Visits  No visits were found meeting these conditions  Showing recent visits within past 7 days and meeting all other requirements     Today's Visits  Date Type Provider Dept   03/12/21 Telemedicine Gauri Whitten PA-C [facility]   Showing today's visits and meeting all other requirements     Future Appointments  No visits were found meeting these conditions  Showing future appointments within next 150 days and meeting all other requirements        Patient agrees to participate in a virtual check in via telephone or video visit instead of presenting to the office to address urgent/immediate medical needs  Patient is aware this is a billable service  After connecting through Telephone, the patient was identified by name and date of birth  Reba Alegria was informed that this was a telemedicine visit and that the exam was being conducted confidentially over secure lines  My office door was closed  No one else was in the room  Reba Alegria acknowledged consent and understanding of privacy and security of the telemedicine visit  I informed the patient that I have reviewed his record in Epic and presented the opportunity for him to ask any questions regarding the visit today  The patient agreed to participate      It was my intent to perform this visit via video technology but the patient was not able to do a video connection so the visit was completed via audio telephone only  Subjective:   Ed Grajeda is a 79 y o  male who is concerned about COVID-19  Patient's symptoms include fever, chills, rhinorrhea, sore throat, cough (from scratchy throat ) and shortness of breath (chronic, unchanged, not worsened at all since cold sx   )  Patient denies fatigue, malaise, congestion, anosmia, loss of taste, chest tightness, abdominal pain, nausea, vomiting, diarrhea, myalgias and headaches  Exposure:   Contact with a person who is under investigation (PUI) for or who is positive for COVID-19 within the last 14 days?: No    Hospitalized recently for fever and/or lower respiratory symptoms?: No      Currently a healthcare worker that is involved in direct patient care?: No      Works in a special setting where the risk of COVID-19 transmission may be high? (this may include long-term care, correctional and [facility] facilities; homeless shelters; assisted-living facilities and group homes ): No      Resident in a special setting where the risk of COVID-19 transmission may be high? (this may include long-term care, correctional and [facility] facilities; homeless shelters; assisted-living facilities and group homes ): No      For eval of cold sx   2 days of feeling warm and elevated temps  Using mucinex PRN for congestion in his throat which improved  + nasal drainage  He notes his throat was scratchy and he had a hoarse voice  He notes that "he does not feel too bad "  He is monitoring his temps at home  Notes he uses motrin PRN and his temps are staying down  Breathing has been "ok "  He has chronic, unchanged exertional SOB  Monitor pulse ox at home, 97% on RA, pulse 84  Has not had COVID vaccine        No results found for: [place] Gene, SARSCORONAVI, [address]  Past Medical History:   Diagnosis Date    BPH (benign prostatic hyperplasia)     COPD (chronic obstructive pulmonary disease) (HCC)     Last Assessed:11/14/2016    Diverticulosis     Elevated prostate specific antigen (PSA)     Emphysema (subcutaneous) (surgical) resulting from a procedure     Enlarged prostate with lower urinary tract symptoms (LUTS)     Resolved:8/22/2017    Hernia, inguinal, right      Past Surgical History:   Procedure Laterality Date    BICEPS TENDON REPAIR  1999    COLONOSCOPY      HAND SURGERY      Last Assessed:7/11/2016    HERNIA REPAIR      Last Assessed:7/11/2016    KNEE ARTHROSCOPY      RI REPAIR ING HERNIA,5+Y/O,REDUCIBL Right 2/23/2016    Procedure: REPAIR HERNIA INGUINAL with mesh;  Surgeon: Gigi Fonseca DO;  Location: [facility];  Service: General    PROSTATE BIOPSY      WISDOM TOOTH EXTRACTION       Current Outpatient Medications   Medication Sig Dispense Refill    albuterol (PROVENTIL HFA,VENTOLIN HFA) 90 mcg/act inhaler Inhale 2 puffs every 6 (six) hours as needed for wheezing 3 Inhaler 5    atorvastatin (LIPITOR) 20 mg tablet Take 1 tablet (20 mg total) by mouth daily 90 tablet 3    EPINEPHrine (EPIPEN) 0 3 mg/0 3 mL SOAJ Inject 0 3 mL (0 3 mg total) into a muscle once for 1 dose 0 6 mL 0    finasteride (PROSCAR) 5 mg tablet Take 1 tablet (5 mg total) by mouth daily 90 tablet 3    fluticasone (FLONASE) 50 mcg/act nasal spray 1 spray into each nostril daily 1 Bottle 0    fluticasone-umeclidinium-vilanterol (TRELEGY) 100-62 5-25 MCG/INH inhaler Inhale 1 puff daily Rinse mouth after use  3 Inhaler 3    guaiFENesin (MUCINEX) 600 mg 12 hr tablet Take 1,200 mg by mouth 2 (two) times a day as needed      hydrochlorothiazide (HYDRODIURIL) 25 mg tablet TAKE 1 TABLET DAILY 90 tablet 4    ipratropium-albuterol (DUO-NEB) 0 5-2 5 mg/3 mL nebulizer solution Take 1 vial (3 mL total) by nebulization every 6 (six) hours 180 vial 1    loratadine (CLARITIN REDITABS) 10 MG dissolvable tablet Take 10 mg by mouth daily as needed for allergies        Multiple Vitamins-Minerals (CENTRUM ADULTS PO) Take 1 tablet by mouth daily   tamsulosin (Flomax) 0 4 mg Take 1 capsule (0 4 mg total) by mouth daily with dinner 90 capsule 3     No current facility-administered medications for this visit  Allergies   Allergen Reactions    Bee Venom Facial Swelling    Other Rash     Annotation - [id number]: mushrooms    Penicillins Rash       Review of Systems   Constitutional: Positive for chills and fever  Negative for fatigue  HENT: Positive for rhinorrhea and sore throat  Negative for congestion  Respiratory: Positive for cough (from scratchy throat ) and shortness of breath (chronic, unchanged, not worsened at all since cold sx   )  Negative for chest tightness and wheezing  Cardiovascular: Negative for chest pain, palpitations and leg swelling  Gastrointestinal: Negative for abdominal pain, diarrhea, nausea and vomiting  Musculoskeletal: Negative for myalgias  Skin: Negative for rash  Neurological: Negative for syncope and headaches  Objective: There were no vitals filed for this visit  Physical Exam  PE unable to be completed  Patient does not have virtual capabilities    VIRTUAL VISIT DISCLAIMER    Janes Borjas acknowledges that he has consented to an online visit or consultation  He understands that the online visit is based solely on information provided by him, and that, in the absence of a face-to-face physical evaluation by the physician, the diagnosis he receives is both limited and provisional in terms of accuracy and completeness  This is not intended to replace a full medical face-to-face evaluation by the physician  Janes Borjas understands and accepts these terms

## 2021-03-12 NOTE — PATIENT INSTRUCTIONS
Encounter for observation for suspected exposure to other biological agents ruled out  Due to patient's symptoms I recommend she go for COVID testing at one of our central test sites  Order for COVID testing place  Discussed importance of rest fluids hydration and supportive care  Okay to use Tylenol as needed for pain  Discussed importance of avoiding exposure to others while we are waiting on your test results  Okay to start vitamin-C 500 mg by mouth twice daily  Zinc 50 mg by mouth once daily x 7 days  Continue with vitamin-D supplementation  Patient with underlying lung conditions  Discussed importance of continued monitoring of pulse ox  Report back immediately if pulse ox <94%  Discussed red flag sx and ER precautions which need immediate eval and tx should they develop  Follow up next week with our office for test results

## 2021-03-14 LAB — SARS-COV-2 RNA RESP QL NAA+PROBE: POSITIVE

## 2021-03-15 ENCOUNTER — HOSPITAL ENCOUNTER (INPATIENT)
Facility: HOSPITAL | Age: 71
LOS: 7 days | Discharge: HOME | DRG: 177 | End: 2021-03-22
Attending: EMERGENCY MEDICINE | Admitting: INTERNAL MEDICINE
Payer: MEDICARE

## 2021-03-15 ENCOUNTER — TELEPHONE (OUTPATIENT)
Dept: INTERNAL MEDICINE CLINIC | Facility: CLINIC | Age: 71
End: 2021-03-15

## 2021-03-15 ENCOUNTER — APPOINTMENT (EMERGENCY)
Dept: RADIOLOGY | Facility: HOSPITAL | Age: 71
DRG: 177 | End: 2021-03-15
Payer: MEDICARE

## 2021-03-15 DIAGNOSIS — J44.9 COPD (CHRONIC OBSTRUCTIVE PULMONARY DISEASE) (HCC): ICD-10-CM

## 2021-03-15 DIAGNOSIS — R09.02 HYPOXIA: Primary | ICD-10-CM

## 2021-03-15 DIAGNOSIS — U07.1 COVID-19: ICD-10-CM

## 2021-03-15 DIAGNOSIS — R19.7 DIARRHEA, UNSPECIFIED TYPE: ICD-10-CM

## 2021-03-15 PROBLEM — E87.6 HYPOKALEMIA: Status: ACTIVE | Noted: 2021-03-15

## 2021-03-15 PROBLEM — N17.9 AKI (ACUTE KIDNEY INJURY) (HCC): Status: ACTIVE | Noted: 2021-03-15

## 2021-03-15 LAB
ABO GROUP BLD: NORMAL
ALBUMIN SERPL BCP-MCNC: 3.4 G/DL (ref 3.5–5)
ALP SERPL-CCNC: 69 U/L (ref 46–116)
ALT SERPL W P-5'-P-CCNC: 26 U/L (ref 12–78)
ANION GAP SERPL CALCULATED.3IONS-SCNC: 5 MMOL/L (ref 4–13)
ANION GAP SERPL CALCULATED.3IONS-SCNC: 8 MMOL/L (ref 4–13)
AST SERPL W P-5'-P-CCNC: 40 U/L (ref 5–45)
BASOPHILS # BLD AUTO: 0.01 THOUSANDS/ΜL (ref 0–0.1)
BASOPHILS NFR BLD AUTO: 0 % (ref 0–1)
BILIRUB SERPL-MCNC: 0.57 MG/DL (ref 0.2–1)
BLD GP AB SCN SERPL QL: NEGATIVE
BUN SERPL-MCNC: 30 MG/DL (ref 5–25)
BUN SERPL-MCNC: 35 MG/DL (ref 5–25)
CALCIUM ALBUM COR SERPL-MCNC: 8.7 MG/DL (ref 8.3–10.1)
CALCIUM SERPL-MCNC: 8.2 MG/DL (ref 8.3–10.1)
CALCIUM SERPL-MCNC: 8.2 MG/DL (ref 8.3–10.1)
CHLORIDE SERPL-SCNC: 100 MMOL/L (ref 100–108)
CHLORIDE SERPL-SCNC: 101 MMOL/L (ref 100–108)
CK MB SERPL-MCNC: 1.7 NG/ML (ref 0–5)
CK MB SERPL-MCNC: <1 % (ref 0–2.5)
CK SERPL-CCNC: 386 U/L (ref 39–308)
CO2 SERPL-SCNC: 30 MMOL/L (ref 21–32)
CO2 SERPL-SCNC: 30 MMOL/L (ref 21–32)
CREAT SERPL-MCNC: 1.61 MG/DL (ref 0.6–1.3)
CREAT SERPL-MCNC: 1.79 MG/DL (ref 0.6–1.3)
CRP SERPL QL: 43.3 MG/L
D DIMER PPP FEU-MCNC: 0.72 UG/ML FEU
EOSINOPHIL # BLD AUTO: 0 THOUSAND/ΜL (ref 0–0.61)
EOSINOPHIL NFR BLD AUTO: 0 % (ref 0–6)
ERYTHROCYTE [DISTWIDTH] IN BLOOD BY AUTOMATED COUNT: 13.4 % (ref 11.6–15.1)
FERRITIN SERPL-MCNC: 829 NG/ML (ref 8–388)
GFR SERPL CREATININE-BSD FRML MDRD: 38 ML/MIN/1.73SQ M
GFR SERPL CREATININE-BSD FRML MDRD: 43 ML/MIN/1.73SQ M
GLUCOSE SERPL-MCNC: 113 MG/DL (ref 65–140)
GLUCOSE SERPL-MCNC: 118 MG/DL (ref 65–140)
HCT VFR BLD AUTO: 43.8 % (ref 36.5–49.3)
HGB BLD-MCNC: 14.4 G/DL (ref 12–17)
IMM GRANULOCYTES # BLD AUTO: 0.04 THOUSAND/UL (ref 0–0.2)
IMM GRANULOCYTES NFR BLD AUTO: 1 % (ref 0–2)
LYMPHOCYTES # BLD AUTO: 0.54 THOUSANDS/ΜL (ref 0.6–4.47)
LYMPHOCYTES NFR BLD AUTO: 8 % (ref 14–44)
MAGNESIUM SERPL-MCNC: 2.3 MG/DL (ref 1.6–2.6)
MCH RBC QN AUTO: 29.1 PG (ref 26.8–34.3)
MCHC RBC AUTO-ENTMCNC: 32.9 G/DL (ref 31.4–37.4)
MCV RBC AUTO: 89 FL (ref 82–98)
MONOCYTES # BLD AUTO: 0.66 THOUSAND/ΜL (ref 0.17–1.22)
MONOCYTES NFR BLD AUTO: 10 % (ref 4–12)
NEUTROPHILS # BLD AUTO: 5.34 THOUSANDS/ΜL (ref 1.85–7.62)
NEUTS SEG NFR BLD AUTO: 81 % (ref 43–75)
NRBC BLD AUTO-RTO: 0 /100 WBCS
NT-PROBNP SERPL-MCNC: 117 PG/ML
PLATELET # BLD AUTO: 177 THOUSANDS/UL (ref 149–390)
PMV BLD AUTO: 10.4 FL (ref 8.9–12.7)
POTASSIUM SERPL-SCNC: 3.1 MMOL/L (ref 3.5–5.3)
POTASSIUM SERPL-SCNC: 3.5 MMOL/L (ref 3.5–5.3)
PROT SERPL-MCNC: 6.6 G/DL (ref 6.4–8.2)
RBC # BLD AUTO: 4.94 MILLION/UL (ref 3.88–5.62)
RH BLD: NEGATIVE
SODIUM SERPL-SCNC: 136 MMOL/L (ref 136–145)
SODIUM SERPL-SCNC: 138 MMOL/L (ref 136–145)
SPECIMEN EXPIRATION DATE: NORMAL
TROPONIN I SERPL-MCNC: 0.03 NG/ML
WBC # BLD AUTO: 6.59 THOUSAND/UL (ref 4.31–10.16)

## 2021-03-15 PROCEDURE — 99285 EMERGENCY DEPT VISIT HI MDM: CPT

## 2021-03-15 PROCEDURE — 82728 ASSAY OF FERRITIN: CPT | Performed by: EMERGENCY MEDICINE

## 2021-03-15 PROCEDURE — 82553 CREATINE MB FRACTION: CPT | Performed by: EMERGENCY MEDICINE

## 2021-03-15 PROCEDURE — 80048 BASIC METABOLIC PNL TOTAL CA: CPT | Performed by: STUDENT IN AN ORGANIZED HEALTH CARE EDUCATION/TRAINING PROGRAM

## 2021-03-15 PROCEDURE — 83735 ASSAY OF MAGNESIUM: CPT | Performed by: STUDENT IN AN ORGANIZED HEALTH CARE EDUCATION/TRAINING PROGRAM

## 2021-03-15 PROCEDURE — 36415 COLL VENOUS BLD VENIPUNCTURE: CPT | Performed by: EMERGENCY MEDICINE

## 2021-03-15 PROCEDURE — 82550 ASSAY OF CK (CPK): CPT | Performed by: EMERGENCY MEDICINE

## 2021-03-15 PROCEDURE — 85025 COMPLETE CBC W/AUTO DIFF WBC: CPT | Performed by: EMERGENCY MEDICINE

## 2021-03-15 PROCEDURE — 94760 N-INVAS EAR/PLS OXIMETRY 1: CPT

## 2021-03-15 PROCEDURE — 86140 C-REACTIVE PROTEIN: CPT | Performed by: EMERGENCY MEDICINE

## 2021-03-15 PROCEDURE — 80053 COMPREHEN METABOLIC PANEL: CPT | Performed by: EMERGENCY MEDICINE

## 2021-03-15 PROCEDURE — 85379 FIBRIN DEGRADATION QUANT: CPT | Performed by: EMERGENCY MEDICINE

## 2021-03-15 PROCEDURE — 99285 EMERGENCY DEPT VISIT HI MDM: CPT | Performed by: EMERGENCY MEDICINE

## 2021-03-15 PROCEDURE — 71045 X-RAY EXAM CHEST 1 VIEW: CPT

## 2021-03-15 PROCEDURE — 93005 ELECTROCARDIOGRAM TRACING: CPT

## 2021-03-15 PROCEDURE — XW033E5 INTRODUCTION OF REMDESIVIR ANTI-INFECTIVE INTO PERIPHERAL VEIN, PERCUTANEOUS APPROACH, NEW TECHNOLOGY GROUP 5: ICD-10-PCS | Performed by: STUDENT IN AN ORGANIZED HEALTH CARE EDUCATION/TRAINING PROGRAM

## 2021-03-15 PROCEDURE — 99223 1ST HOSP IP/OBS HIGH 75: CPT | Performed by: INTERNAL MEDICINE

## 2021-03-15 PROCEDURE — 86901 BLOOD TYPING SEROLOGIC RH(D): CPT | Performed by: EMERGENCY MEDICINE

## 2021-03-15 PROCEDURE — 86900 BLOOD TYPING SEROLOGIC ABO: CPT | Performed by: EMERGENCY MEDICINE

## 2021-03-15 PROCEDURE — 94640 AIRWAY INHALATION TREATMENT: CPT

## 2021-03-15 PROCEDURE — 84484 ASSAY OF TROPONIN QUANT: CPT | Performed by: EMERGENCY MEDICINE

## 2021-03-15 PROCEDURE — 83880 ASSAY OF NATRIURETIC PEPTIDE: CPT | Performed by: EMERGENCY MEDICINE

## 2021-03-15 PROCEDURE — 86850 RBC ANTIBODY SCREEN: CPT | Performed by: EMERGENCY MEDICINE

## 2021-03-15 PROCEDURE — 1124F ACP DISCUSS-NO DSCNMKR DOCD: CPT | Performed by: EMERGENCY MEDICINE

## 2021-03-15 RX ORDER — LEVALBUTEROL 1.25 MG/.5ML
1.25 SOLUTION, CONCENTRATE RESPIRATORY (INHALATION) EVERY 6 HOURS PRN
Status: DISCONTINUED | OUTPATIENT
Start: 2021-03-15 | End: 2021-03-15

## 2021-03-15 RX ORDER — POTASSIUM CHLORIDE 20 MEQ/1
40 TABLET, EXTENDED RELEASE ORAL EVERY 4 HOURS
Status: COMPLETED | OUTPATIENT
Start: 2021-03-15 | End: 2021-03-15

## 2021-03-15 RX ORDER — DEXTROSE, SODIUM CHLORIDE, AND POTASSIUM CHLORIDE 5; .45; .15 G/100ML; G/100ML; G/100ML
100 INJECTION INTRAVENOUS CONTINUOUS
Status: DISCONTINUED | OUTPATIENT
Start: 2021-03-15 | End: 2021-03-15

## 2021-03-15 RX ORDER — MELATONIN
2000 DAILY
Status: DISCONTINUED | OUTPATIENT
Start: 2021-03-15 | End: 2021-03-22 | Stop reason: HOSPADM

## 2021-03-15 RX ORDER — GUAIFENESIN 600 MG
1200 TABLET, EXTENDED RELEASE 12 HR ORAL EVERY 12 HOURS SCHEDULED
Status: DISCONTINUED | OUTPATIENT
Start: 2021-03-15 | End: 2021-03-22 | Stop reason: HOSPADM

## 2021-03-15 RX ORDER — LEVALBUTEROL 1.25 MG/.5ML
1.25 SOLUTION, CONCENTRATE RESPIRATORY (INHALATION)
Status: DISCONTINUED | OUTPATIENT
Start: 2021-03-15 | End: 2021-03-16

## 2021-03-15 RX ORDER — FLUTICASONE FUROATE AND VILANTEROL 100; 25 UG/1; UG/1
1 POWDER RESPIRATORY (INHALATION) DAILY
Status: DISCONTINUED | OUTPATIENT
Start: 2021-03-15 | End: 2021-03-22 | Stop reason: HOSPADM

## 2021-03-15 RX ORDER — FINASTERIDE 5 MG/1
5 TABLET, FILM COATED ORAL DAILY
Status: DISCONTINUED | OUTPATIENT
Start: 2021-03-15 | End: 2021-03-22 | Stop reason: HOSPADM

## 2021-03-15 RX ORDER — TAMSULOSIN HYDROCHLORIDE 0.4 MG/1
0.4 CAPSULE ORAL
Status: DISCONTINUED | OUTPATIENT
Start: 2021-03-15 | End: 2021-03-22 | Stop reason: HOSPADM

## 2021-03-15 RX ORDER — DEXTROSE MONOHYDRATE, SODIUM CHLORIDE, SODIUM LACTATE, POTASSIUM CHLORIDE, CALCIUM CHLORIDE 5; 600; 310; 179; 20 G/100ML; MG/100ML; MG/100ML; MG/100ML; MG/100ML
100 INJECTION, SOLUTION INTRAVENOUS CONTINUOUS
Status: DISCONTINUED | OUTPATIENT
Start: 2021-03-15 | End: 2021-03-15 | Stop reason: CLARIF

## 2021-03-15 RX ORDER — ASCORBIC ACID 500 MG
1000 TABLET ORAL 2 TIMES DAILY
Status: DISCONTINUED | OUTPATIENT
Start: 2021-03-15 | End: 2021-03-22 | Stop reason: HOSPADM

## 2021-03-15 RX ORDER — POTASSIUM CHLORIDE 20 MEQ/1
40 TABLET, EXTENDED RELEASE ORAL ONCE
Status: DISCONTINUED | OUTPATIENT
Start: 2021-03-15 | End: 2021-03-15

## 2021-03-15 RX ORDER — ZINC SULFATE 50(220)MG
220 CAPSULE ORAL DAILY
Status: DISCONTINUED | OUTPATIENT
Start: 2021-03-15 | End: 2021-03-22 | Stop reason: HOSPADM

## 2021-03-15 RX ORDER — ATORVASTATIN CALCIUM 20 MG/1
20 TABLET, FILM COATED ORAL
Status: DISCONTINUED | OUTPATIENT
Start: 2021-03-15 | End: 2021-03-22 | Stop reason: HOSPADM

## 2021-03-15 RX ORDER — ALBUTEROL SULFATE 90 UG/1
2 AEROSOL, METERED RESPIRATORY (INHALATION) EVERY 6 HOURS PRN
Status: DISCONTINUED | OUTPATIENT
Start: 2021-03-15 | End: 2021-03-22 | Stop reason: HOSPADM

## 2021-03-15 RX ORDER — IPRATROPIUM BROMIDE AND ALBUTEROL SULFATE 2.5; .5 MG/3ML; MG/3ML
3 SOLUTION RESPIRATORY (INHALATION)
Status: DISCONTINUED | OUTPATIENT
Start: 2021-03-15 | End: 2021-03-15

## 2021-03-15 RX ADMIN — LEVALBUTEROL HYDROCHLORIDE 1.25 MG: 1.25 SOLUTION, CONCENTRATE RESPIRATORY (INHALATION) at 20:23

## 2021-03-15 RX ADMIN — POTASSIUM CHLORIDE: 2 INJECTION, SOLUTION, CONCENTRATE INTRAVENOUS at 18:38

## 2021-03-15 RX ADMIN — ENOXAPARIN SODIUM 30 MG: 30 INJECTION SUBCUTANEOUS at 17:48

## 2021-03-15 RX ADMIN — GUAIFENESIN 1200 MG: 600 TABLET, EXTENDED RELEASE ORAL at 22:46

## 2021-03-15 RX ADMIN — OXYCODONE HYDROCHLORIDE AND ACETAMINOPHEN 1000 MG: 500 TABLET ORAL at 15:23

## 2021-03-15 RX ADMIN — IPRATROPIUM BROMIDE AND ALBUTEROL SULFATE 3 ML: 2.5; .5 SOLUTION RESPIRATORY (INHALATION) at 14:33

## 2021-03-15 RX ADMIN — REMDESIVIR 200 MG: 100 INJECTION, POWDER, LYOPHILIZED, FOR SOLUTION INTRAVENOUS at 17:47

## 2021-03-15 RX ADMIN — Medication 2000 UNITS: at 15:25

## 2021-03-15 RX ADMIN — TAMSULOSIN HYDROCHLORIDE 0.4 MG: 0.4 CAPSULE ORAL at 17:47

## 2021-03-15 RX ADMIN — POTASSIUM CHLORIDE 40 MEQ: 1500 TABLET, EXTENDED RELEASE ORAL at 15:26

## 2021-03-15 RX ADMIN — FINASTERIDE 5 MG: 5 TABLET, FILM COATED ORAL at 15:22

## 2021-03-15 RX ADMIN — ATORVASTATIN CALCIUM 20 MG: 20 TABLET, FILM COATED ORAL at 17:47

## 2021-03-15 RX ADMIN — POTASSIUM CHLORIDE 40 MEQ: 1500 TABLET, EXTENDED RELEASE ORAL at 18:24

## 2021-03-15 RX ADMIN — IPRATROPIUM BROMIDE 0.5 MG: 0.5 SOLUTION RESPIRATORY (INHALATION) at 20:23

## 2021-03-15 RX ADMIN — GUAIFENESIN 1200 MG: 600 TABLET, EXTENDED RELEASE ORAL at 15:23

## 2021-03-15 RX ADMIN — OXYCODONE HYDROCHLORIDE AND ACETAMINOPHEN 1000 MG: 500 TABLET ORAL at 17:48

## 2021-03-15 RX ADMIN — ZINC SULFATE 220 MG (50 MG) CAPSULE 220 MG: CAPSULE at 15:22

## 2021-03-15 NOTE — TELEPHONE ENCOUNTER
Wife called patient has covid his pulse is 103, temp is 102 and oxygen is 90%  I did put on to do virtual but wanted to know recommendation  No shortness of breath, not feeling well since last Tuesday

## 2021-03-15 NOTE — CASE MANAGEMENT
Called pt in room covid +, explained cm program/cm role: Info obtained from patient  [address] [facility]    LOS: 4 hours  Bundle:  no           Unplanned Readmission Color: Green  30 Day Readmission:   No                        Resides with: Wife                    Type Home: 2SH                   ASTON: 1 15 to second fl    Do they have rails? yes  Bedroom Located:  Second fl                  Do you have a bathroom set up on the same floor  yes  Primary Caregiver: Self  ADLs/Ambulations: independent no devices  Drive:  Yes     PCP: Polo Shoemaker       Preferred Pharmacy: [address] for [place] meds: Yes  Afford Medication: yes  HHC: no hx  DME: none  IP Rehab: no hx  Did you ever receive Dialysis: denies       MH Illness: Denies meds?  n/a  IP/OP Care: denies  Drug Abuse:Denies  Alcohol Abuse: Denies  Employed: Retired  Primary Contact: [address] spouse [phone]  POA: No   If No would you like paperwork?  no  LW thinks he has one advised to bring to pcp office and upload in to [address]: family to transport home    CM reviewed d/c planning process including the following: identifying help at home, patient preference for d/c planning needs, Discharge Lounge, [facility] Meds to Bed program, availability of treatment team to discuss questions or concerns patient and/or family may have regarding understanding medications and recognizing signs and symptoms once discharged  CM also encouraged patient to follow up with all recommended appointments after discharge  Patient advised of importance for patient and family to participate in managing patients medical well being  Patient/caregiver received discharge checklist   Content reviewed  Patient/caregiver encouraged to participate in discharge plan of care prior to discharge home

## 2021-03-15 NOTE — ED PROVIDER NOTES
History  Chief Complaint   Patient presents with    Decreased Oxygen Level     pt stated that he tested COVID + on friday, today he woke and had a low O2 stat of 90, called doctor and they sent him to ED for eval  pt has O2 at home but does not use it  his normal O2 stat is between 93-97%, pt is currently 92-94% on RA     78 yo M with h/o COPD, with p r n  Oxygen at home he never wears presenting for evaluation of hypoxia at home to 90% on room air  Patient tested positive for COVID-19 on 2021, has been experiencing symptoms now for 7 days  States that initially his symptoms were a dry cough, some SANTIAGO, no SOB at rest  He has not had any chest pain  Has had some myalgias and some diarrhea  He called his family doctor today who told him to come in and be evaluated due to the hypoxia  Has not had any syncope or near syncope  His wife is also sick at home  Is not covid-19 vaccinated at this time  ROS otherwise neg as below  History provided by:  Patient   used: No        Prior to Admission Medications   Prescriptions Last Dose Informant Patient Reported? Taking? EPINEPHrine (EPIPEN) 0 3 mg/0 3 mL SOAJ  Self No No   Sig: Inject 0 3 mL (0 3 mg total) into a muscle once for 1 dose   Multiple Vitamins-Minerals (CENTRUM ADULTS PO)  Self Yes No   Sig: Take 1 tablet by mouth daily  albuterol (PROVENTIL HFA,VENTOLIN HFA) 90 mcg/act inhaler  Self No No   Sig: Inhale 2 puffs every 6 (six) hours as needed for wheezing   atorvastatin (LIPITOR) 20 mg tablet  Self No No   Sig: Take 1 tablet (20 mg total) by mouth daily   finasteride (PROSCAR) 5 mg tablet  Self No No   Sig: Take 1 tablet (5 mg total) by mouth daily   fluticasone (FLONASE) 50 mcg/act nasal spray  Self No No   Si spray into each nostril daily   fluticasone-umeclidinium-vilanterol (TRELEGY) 100-62 5-25 MCG/INH inhaler 3/15/2021 at Unknown time Self No Yes   Sig: Inhale 1 puff daily Rinse mouth after use     guaiFENesin ([place] [address]) 600 mg 12 hr tablet  Self Yes No   Sig: Take 1,200 mg by mouth 2 (two) times a day as needed   hydrochlorothiazide (HYDRODIURIL) 25 mg tablet  Self No No   Sig: TAKE 1 TABLET DAILY   ipratropium-albuterol (DUO-NEB) 0 5-2 5 mg/3 mL nebulizer solution  Self No No   Sig: Take 1 vial (3 mL total) by nebulization every 6 (six) hours   loratadine (CLARITIN REDITABS) 10 MG dissolvable tablet  Self Yes No   Sig: Take 10 mg by mouth daily as needed for allergies  tamsulosin (Flomax) 0 4 mg  Self No No   Sig: Take 1 capsule (0 4 mg total) by mouth daily with dinner      Facility-Administered Medications: None       Past Medical History:   Diagnosis Date    BPH (benign prostatic hyperplasia)     COPD [facility]chronic obstructive pulmonary disease) ([facility])     Last Assessed:11/14/2016    Diverticulosis     Elevated prostate specific antigen (PSA)     Emphysema (subcutaneous) (surgical) resulting from a procedure     Enlarged prostate with lower urinary tract symptoms (LUTS)     Resolved:8/22/2017    Hernia, inguinal, right        Past Surgical History:   Procedure Laterality Date    BICEPS TENDON REPAIR  1999    COLONOSCOPY      HAND SURGERY      Last Assessed:7/11/2016    HERNIA REPAIR      Last Assessed:7/11/2016    KNEE ARTHROSCOPY      MN REPAIR ING HERNIA,5+Y/O,REDUCIBL Right 2/23/2016    Procedure: REPAIR HERNIA INGUINAL with mesh;  Surgeon: Monika Fuentes DO;  Location: [facility];  Service: General    PROSTATE BIOPSY      WISDOM TOOTH EXTRACTION         Family History   Problem Relation Age of Onset    Hypertension Mother      I have reviewed and agree with the history as documented      E-Cigarette/Vaping    E-Cigarette Use Never User      E-Cigarette/Vaping Substances    Nicotine No     THC No     CBD No     Flavoring No     Other No     Unknown No      Social History     Tobacco Use    Smoking status: Former Smoker     Packs/day: 1 50     Years: 48 00     Pack years: 72 00     Types: Cigarettes Start date: 5     Quit date: 2015     Years since quittin 3    Smokeless tobacco: Never Used   Substance Use Topics    Alcohol use: Yes     Frequency: Monthly or less     Drinks per session: 1 or 2     Binge frequency: Never     Comment: rare    Drug use: No        Review of Systems   Constitutional: Positive for chills, fatigue and fever  HENT: Negative for ear pain and sore throat  Eyes: Negative for pain and visual disturbance  Respiratory: Positive for shortness of breath  Negative for cough and wheezing  Cardiovascular: Negative for chest pain and palpitations  Gastrointestinal: Positive for diarrhea and nausea  Negative for abdominal pain and vomiting  Genitourinary: Negative for dysuria and hematuria  Musculoskeletal: Positive for myalgias  Negative for arthralgias and back pain  Skin: Negative for color change and rash  Neurological: Negative for dizziness, seizures, syncope and light-headedness  Psychiatric/Behavioral: Negative for confusion  The patient is not nervous/anxious  All other systems reviewed and are negative  Physical Exam  ED Triage Vitals   Temperature Pulse Respirations Blood Pressure SpO2   03/15/21 1130 03/15/21 1052 03/15/21 1052 03/15/21 1052 03/15/21 1052   98 7 °F (37 1 °C) 92 18 119/66 93 %      Temp Source Heart Rate Source Patient Position - Orthostatic VS BP Location FiO2 (%)   21 0738 -- 21 0738 21 0738 --   Oral  Lying Left arm       Pain Score       03/15/21 1330       No Pain             Orthostatic Vital Signs  Vitals:    03/15/21 1535 03/15/21 2157 21 0738 21 0738   BP: 131/64 124/66 121/68 121/68   Pulse: 89  (!) 106 104   Patient Position - Orthostatic VS:    Lying       Physical Exam  Vitals signs and nursing note reviewed  Constitutional:       Appearance: Normal appearance  He is well-developed  He is not ill-appearing or diaphoretic  HENT:      Head: Normocephalic and atraumatic        Nose: Nose normal       Mouth/Throat:      Mouth: Mucous membranes are moist       Pharynx: Oropharynx is clear  Eyes:      Conjunctiva/sclera: Conjunctivae normal       Pupils: Pupils are equal, round, and reactive to light  Neck:      Musculoskeletal: Neck supple  Cardiovascular:      Rate and Rhythm: Normal rate and regular rhythm  Heart sounds: No murmur  Pulmonary:      Effort: No respiratory distress  Breath sounds: Rales (b/l ) present  No wheezing  Abdominal:      General: Abdomen is flat  There is no distension  Palpations: Abdomen is soft  Tenderness: There is no abdominal tenderness  Musculoskeletal:      Right lower leg: No edema  Left lower leg: No edema  Skin:     General: Skin is warm and dry  Findings: No rash  Neurological:      General: No focal deficit present  Mental Status: He is alert     Psychiatric:         Mood and Affect: Mood normal          ED Medications  Medications   remdesivir (Veklury) 200 mg in sodium chloride 0 9 % 250 mL IVPB (0 mg Intravenous Stopped 3/16/21 0510)     Followed by   remdesivir Chiquis Matt) 100 mg in sodium chloride 0 9 % 250 mL IVPB (100 mg Intravenous New Bag 3/16/21 1450)   cholecalciferol (VITAMIN D3) tablet 2,000 Units (2,000 Units Oral Given 3/16/21 0940)   ascorbic acid (VITAMIN C) tablet 1,000 mg (1,000 mg Oral Given 3/16/21 0940)   zinc sulfate (ZINCATE) capsule 220 mg (220 mg Oral Given 3/16/21 0939)   albuterol (PROVENTIL HFA,VENTOLIN HFA) inhaler 2 puff (has no administration in time range)   fluticasone-vilanterol (BREO ELLIPTA) 100-25 mcg/inh inhaler 1 puff (1 puff Inhalation Given 3/16/21 0941)   atorvastatin (LIPITOR) tablet 20 mg (20 mg Oral Given 3/15/21 1747)   guaiFENesin (MUCINEX) 12 hr tablet 1,200 mg (1,200 mg Oral Given 3/16/21 0940)   finasteride (PROSCAR) tablet 5 mg (5 mg Oral Given 3/16/21 0939)   tamsulosin (FLOMAX) capsule 0 4 mg (0 4 mg Oral Given 3/15/21 1747)   enoxaparin (LOVENOX) subcutaneous injection 30 mg (30 mg Subcutaneous Given 3/16/21 0509)   potassium chloride 20 mEq in dextrose 5% lactated ringer's 1,000 mL infusion ( Intravenous New Bag 3/16/21 0509)   ipratropium (ATROVENT) 0 02 % inhalation solution 0 5 mg (0 5 mg Nebulization Given 3/16/21 1425)   levalbuterol (XOPENEX) inhalation solution 1 25 mg (1 25 mg Nebulization Given 3/16/21 1425)   loperamide (IMODIUM) capsule 2 mg (2 mg Oral Given 3/16/21 1449)   potassium chloride (K-DUR,KLOR-CON) CR tablet 40 mEq (40 mEq Oral Given 3/15/21 1824)   potassium chloride (K-DUR,KLOR-CON) CR tablet 20 mEq (20 mEq Oral Given 3/16/21 0941)       Diagnostic Studies  Results Reviewed     Procedure Component Value Units Date/Time    Procalcitonin with AM Reflex [[id number]]  (Normal) Collected: 03/16/21 0509    Lab Status: Final result Specimen: Blood from Arm, Right Updated: 03/16/21 0838     Procalcitonin 0 09 ng/ml     Hepatic function panel [[id number]]  (Abnormal) Collected: 03/16/21 0509    Lab Status: Final result Specimen: Blood from Arm, Right Updated: 03/16/21 0720     Total Bilirubin 0 55 mg/dL      Bilirubin, Direct 0 12 mg/dL      Alkaline Phosphatase 68 U/L      AST 61 U/L      ALT 28 U/L      Total Protein 6 7 g/dL      Albumin 3 3 g/dL     CK (with reflex to MB) [[id number]]  (Abnormal) Collected: 03/16/21 0509    Lab Status: Final result Specimen: Blood from Arm, Right Updated: 03/16/21 0720     Total CK 1,119 U/L     NT-BNP PRO [[id number]]  (Normal) Collected: 03/16/21 0509    Lab Status: Final result Specimen: Blood from Arm, Right Updated: 03/16/21 0720     NT-proBNP 111 pg/mL     Basic metabolic panel [[id number]]  (Abnormal) Collected: 03/16/21 0509    Lab Status: Final result Specimen: Blood from Arm, Right Updated: 03/16/21 0700     Sodium 137 mmol/L      Potassium 3 7 mmol/L      Chloride 102 mmol/L      CO2 27 mmol/L      ANION GAP 8 mmol/L      BUN 31 mg/dL      Creatinine 1 41 mg/dL      Glucose 151 mg/dL      Calcium 8 4 mg/dL      eGFR 50 ml/min/1 73sq m     Narrative:      National Kid[employer] Disease Foundation guidelines for Chronic Kidney Disease (CKD):     Stage 1 with normal or high GFR (GFR > 90 mL/min/1 73 square meters)    Stage 2 Mild CKD (GFR = 60-89 mL/min/1 73 square meters)    Stage 3A Moderate CKD (GFR = 45-59 mL/min/1 73 square meters)    Stage 3B Moderate CKD (GFR = 30-44 mL/min/1 73 square meters)    Stage 4 Severe CKD (GFR = 15-29 mL/min/1 73 square meters)    Stage 5 End Stage CKD (GFR <15 mL/min/1 73 square meters)  Note: GFR calculation is accurate only with a steady state creatinine    Ferritin [[id number]]  (Abnormal) Collected: 03/16/21 0509    Lab Status: Final result Specimen: Blood from Arm, Right Updated: 03/16/21 0700     Ferritin 1,112 ng/mL     C-reactive protein [[id number]]  (Abnormal) Collected: 03/16/21 0509    Lab Status: Final result Specimen: Blood from Arm, Right Updated: 03/16/21 0700     CRP 65 7 mg/L     Troponin I [[id number]]  (Abnormal) Collected: 03/16/21 0509    Lab Status: Final result Specimen: Blood from Arm, Right Updated: 03/16/21 0700     Troponin I 0 05 ng/mL     D-dimer, quantitative [[id number]]  (Abnormal) Collected: 03/16/21 0509    Lab Status: Final result Specimen: Blood from Arm, Right Updated: 03/16/21 0629     D-Dimer, Quant 0 77 ug/ml FEU     CBC and differential [[id number]]  (Abnormal) Collected: 03/16/21 0509    Lab Status: Final result Specimen: Blood from Arm, Right Updated: 03/16/21 0613     WBC 7 98 Thousand/uL      RBC 4 86 Million/uL      Hemoglobin 13 9 g/dL      Hematocrit 43 5 %      MCV 90 fL      MCH 28 6 pg      MCHC 32 0 g/dL      RDW 13 6 %      MPV 11 2 fL      Platelets 670 Thousands/uL      nRBC 0 /100 WBCs      Neutrophils Relative 81 %      Immat GRANS % 1 %      Lymphocytes Relative 8 %      Monocytes Relative 10 %      Eosinophils Relative 0 %      Basophils Relative 0 %      Neutrophils Absolute 6 47 Thousands/µL      Immature Grans Absolute 0 05 Thousand/uL      Lymphocytes Absolute 0 67 Thousands/µL      Monocytes Absolute 0 78 Thousand/µL      Eosinophils Absolute 0 00 Thousand/µL      Basophils Absolute 0 01 Thousands/µL     Magnesium [[id number]]  (Normal) Collected: 03/15/21 1142    Lab Status: Final result Specimen: Blood from Arm, Left Updated: 03/15/21 1635     Magnesium 2 3 mg/dL     D-dimer, quantitative [[id number]]  (Abnormal) Collected: 03/15/21 1142    Lab Status: Final result Specimen: Blood from Arm, Left Updated: 03/15/21 1236     D-Dimer, Quant 0 72 ug/ml FEU     CKMB [[id number]]  (Normal) Collected: 03/15/21 1142    Lab Status: Final result Specimen: Blood from Arm, Left Updated: 03/15/21 1231     CK-MB Index <1 0 %      CK-MB 1 7 ng/mL     Comprehensive metabolic panel [[id number]]  (Abnormal) Collected: 03/15/21 1142    Lab Status: Final result Specimen: Blood from Arm, Left Updated: 03/15/21 1216     Sodium 136 mmol/L      Potassium 3 1 mmol/L      Chloride 101 mmol/L      CO2 30 mmol/L      ANION GAP 5 mmol/L      BUN 30 mg/dL      Creatinine 1 61 mg/dL      Glucose 113 mg/dL      Calcium 8 2 mg/dL      Corrected Calcium 8 7 mg/dL      AST 40 U/L      ALT 26 U/L      Alkaline Phosphatase 69 U/L      Total Protein 6 6 g/dL      Albumin 3 4 g/dL      Total Bilirubin 0 57 mg/dL      eGFR 43 ml/min/1 73sq m     Narrative:      Joseph guidelines for Chronic Kidney Disease (CKD):     Stage 1 with normal or high GFR (GFR > 90 mL/min/1 73 square meters)    Stage 2 Mild CKD (GFR = 60-89 mL/min/1 73 square meters)    Stage 3A Moderate CKD (GFR = 45-59 mL/min/1 73 square meters)    Stage 3B Moderate CKD (GFR = 30-44 mL/min/1 73 square meters)    Stage 4 Severe CKD (GFR = 15-29 mL/min/1 73 square meters)    Stage 5 End Stage CKD (GFR <15 mL/min/1 73 square meters)  Note: GFR calculation is accurate only with a steady state creatinine    NT-BNP PRO [[id number]]  (Normal) Collected: 03/15/21 1142    Lab Status: Final result Specimen: Blood from Arm, Left Updated: 03/15/21 1216     NT-proBNP 117 pg/mL     Ferritin [[id number]]  (Abnormal) Collected: 03/15/21 1142    Lab Status: Final result Specimen: Blood from Arm, Left Updated: 03/15/21 1216     Ferritin 829 ng/mL     CK (with reflex to MB) [[id number]]  (Abnormal) Collected: 03/15/21 1142    Lab Status: Final result Specimen: Blood from Arm, Left Updated: 03/15/21 1214     Total  U/L     C-reactive protein [[id number]]  (Abnormal) Collected: 03/15/21 1142    Lab Status: Final result Specimen: Blood from Arm, Left Updated: 03/15/21 1214     CRP 43 3 mg/L     Troponin I [[id number]]  (Normal) Collected: 03/15/21 1142    Lab Status: Final result Specimen: Blood from Arm, Left Updated: 03/15/21 1214     Troponin I 0 03 ng/mL     CBC and differential [[id number]]  (Abnormal) Collected: 03/15/21 1142    Lab Status: Final result Specimen: Blood from Arm, Left Updated: 03/15/21 1153     WBC 6 59 Thousand/uL      RBC 4 94 Million/uL      Hemoglobin 14 4 g/dL      Hematocrit 43 8 %      MCV 89 fL      MCH 29 1 pg      MCHC 32 9 g/dL      RDW 13 4 %      MPV 10 4 fL      Platelets 929 Thousands/uL      nRBC 0 /100 WBCs      Neutrophils Relative 81 %      Immat GRANS % 1 %      Lymphocytes Relative 8 %      Monocytes Relative 10 %      Eosinophils Relative 0 %      Basophils Relative 0 %      Neutrophils Absolute 5 34 Thousands/µL      Immature Grans Absolute 0 04 Thousand/uL      Lymphocytes Absolute 0 54 Thousands/µL      Monocytes Absolute 0 66 Thousand/µL      Eosinophils Absolute 0 00 Thousand/µL      Basophils Absolute 0 01 Thousands/µL                  CT chest wo contrast   Final Result by Yung Dey MD (03/16 4604)      Small subpleural consolidations and groundglass opacities in the bilateral lower lobes and right middle lobe    In the setting of clinically suspected/proven COVID-19, the above lung parenchymal findings on CT indicate intermediate confidence level for COVID-19  Unchanged severe emphysema  Workstation performed: [id number]         XR chest portable   Final Result by Janelle Felix MD (03/15 1232)   No acute cardiopulmonary disease  Findings are stable            Workstation performed: [id number]               Procedures  Procedures      ED Course  ED Course as of Mar 16 1623   Mon Mar 15, 2021   1233 KIRK   Creatinine(!): 1 61   1237 D-Dimer, Quant(!): 0 72   1256 Texted SOD for admission                  Identification of Seniors at Risk      Most Recent Value   (ISAR) Identification of Seniors at Risk   Before the illness or injury that brought you to the Emergency, did you need someone to help you on a regular basis? 0 Filed at: 03/15/2021 1054   In the last 24 hours, have you needed more help than usual?  0 Filed at: 03/15/2021 1054   Have you been hospitalized for one or more nights during the past 6 months? 0 Filed at: 03/15/2021 1054   In general, do you see well?  0 Filed at: 03/15/2021 1054   In general, do you have serious problems with your memory? 0 Filed at: 03/15/2021 1054   Do you take more than three different medications every day? 1 Filed at: 03/15/2021 1054   ISAR Score  1 Filed at: 03/15/2021 1054                              MDM  Number of Diagnoses or Management Options  [facility] ):   COVID-19:   Hypoxia:   Diagnosis management comments: 78 yo M with h/o COPD presenting, covid+ with mild hypoxia to 90% at home  Here saturating from 91-93% on RA and then dropping to 89% with exertion  Patient is high risk and therefore will be admitted  Ordered for remdesevir  Will follow mild covid pathway  Admitted in stable condition on RA         Disposition  Final diagnoses:   Hypoxia   COVID-19   COPD (chronic obstructive pulmonary disease) ([facility] )     Time reflects when diagnosis was documented in both MDM as applicable and the Disposition within this note     Time User Action Codes Description Comment    3/15/2021  1:00 PM JayantSage roman Haily Add [R09 02] Hypoxia     3/15/2021  1:00 PM JayantCrissy romanmychal Johansen Add [U07 1] COVID-19     3/15/2021  1:00 PM Sage Saba Haily Add [J44 9] COPD (chronic obstructive pulmonary disease) [facility])       ED Disposition     ED Disposition Condition Date/Time Comment    Admit Stable Mon Mar 15, 2021  1:00 PM Case was discussed with Dr Get Charles and the patient's admission status was agreed to be Admission Status: inpatient status to the service of Dr Get Charles   Follow-up Information    None         Current Discharge Medication List      CONTINUE these medications which have NOT CHANGED    Details   fluticasone-umeclidinium-vilanterol (TRELEGY) 100-62 5-25 MCG/INH inhaler Inhale 1 puff daily Rinse mouth after use  Qty: 3 Inhaler, Refills: 3    Associated Diagnoses: Chronic obstructive pulmonary disease, unspecified COPD type ([facility])      albuterol (PROVENTIL HFA,VENTOLIN HFA) 90 mcg/act inhaler Inhale 2 puffs every 6 (six) hours as needed for wheezing  Qty: 3 Inhaler, Refills: 5    Comments: Substitution to a formulary equivalent within the same pharmaceutical class is authorized  Associated Diagnoses: Chronic obstructive pulmonary disease, unspecified COPD type ([facility] )      atorvastatin (LIPITOR) 20 mg tablet Take 1 tablet (20 mg total) by mouth daily  Qty: 90 tablet, Refills: 3    Associated Diagnoses: Mixed hyperlipidemia      EPINEPHrine (EPIPEN) 0 3 mg/0 3 mL SOAJ Inject 0 3 mL (0 3 mg total) into a muscle once for 1 dose  Qty: 0 6 mL, Refills: 0    Associated Diagnoses:  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 sting      finasteride (PROSCAR) 5 mg tablet Take 1 tablet (5 mg total) by mouth daily  Qty: 90 tablet, Refills: 3    Associated Diagnoses: BPH with obstruction/lower urinary tract symptoms      fluticasone (FLONASE) 50 mcg/act nasal spray 1 spray into each nostril daily  Qty: 1 Bottle, Refills: 0    Associated Diagnoses: Seasonal allergies      guaiFENesin (MUCINEX) 600 mg 12 hr tablet Take 1,200 mg by mouth 2 (two) times a day as needed      hydrochlorothiazide (HYDRODIURIL) 25 mg tablet TAKE 1 TABLET DAILY  Qty: 90 tablet, Refills: 4    Associated Diagnoses: Hypertension, essential      ipratropium-albuterol (DUO-NEB) 0 5-2 5 mg/3 mL nebulizer solution Take 1 vial (3 mL total) by nebulization every 6 (six) hours  Qty: 180 vial, Refills: 1    Associated Diagnoses: Pulmonary emphysema, unspecified emphysema type ([facility])      loratadine (CLARITIN REDITABS) 10 MG dissolvable tablet Take 10 mg by mouth daily as needed for allergies  Multiple Vitamins-Minerals (CENTRUM ADULTS PO) Take 1 tablet by mouth daily  tamsulosin (Flomax) 0 4 mg Take 1 capsule (0 4 mg total) by mouth daily with dinner  Qty: 90 capsule, Refills: 3    Associated Diagnoses: BPH with obstruction/lower urinary tract symptoms           No discharge procedures on file  PDMP Review     None           ED Provider  Attending physically available and evaluated Chavez  KIRA managed the patient along with the ED Attending      Electronically Signed by         Essie Ortega MD  03/16/21 6588

## 2021-03-15 NOTE — ASSESSMENT & PLAN NOTE
Likely secondary to COVID-19 and contributing to volume depletion/low blood pressure, hypokalemia, KIRK  Normal electrolytes this a mirna Medina Denies diarrhea      Plan:  · Continue Imodium prn  · Continue IV fluids   · Will monitor respiratory status closely given COVID and fluids

## 2021-03-15 NOTE — RESULT ENCOUNTER NOTE
Reviewed chart  Patient went to ER for COVID positive result with SOB  Please see that he has ROSIE scheduled upon discharge    Thanks Milton Briceno

## 2021-03-15 NOTE — ASSESSMENT & PLAN NOTE
Noted on outpatient testing 3/12/2021  Symptoms began approximately 03/09/2021 with diarrhea, dyspnea on exertion, cough, fever, and nausea  Mild COVID pathway  Remained on 2 L nasal cannula  CT chest: Small subpleural consolidations and groundglass opacities in the bilateral lower lobes and right middle lobe consistent with COVID pneumonia  Patient had elevated trop and CK on covid protocol labs  Expect both are 2/2 covid and dehydration       Plan:  · Troponin plateaued at 2 62    · Stop trending  · CK downtrending at  257 < 688 < 1437 < 1568 < 1746 < 1860 < 1400 <1900    · Resolved  · D-dimer 0 49 < 0 69 < 0 71 < 0 87 < 0 77  · Ferritin  536 <498 < 570 < 826 < 1070 < 1112  · CRP 23 4 < 58 5 < 94 8 < 82 9 < 65 7  · Procalcitonin 0 17 < 0 39 < 0 43 <0 09  · Continue ceftriaxone 1 g Q 24 hours (day 6)  · Continue doxycycline 100 mg Q 12 hours (day 6)  · Completed remdesivir  · Continue dexamethasone 6 mg daily (day 5)

## 2021-03-15 NOTE — ED ATTENDING ATTESTATION
3/15/2021  IAshley MD, saw and evaluated the patient  I have discussed the patient with the resident/non-physician practitioner and agree with the resident's/non-physician practitioner's findings, Plan of Care, and MDM as documented in the resident's/non-physician practitioner's note, except where noted  All available labs and Radiology studies were reviewed  I was present for key portions of any procedure(s) performed by the resident/non-physician practitioner and I was immediately available to provide assistance  At this point I agree with the current assessment done in the Emergency Department  I have conducted an independent evaluation of this patient a history and physical is as follows:    Symptoms for 1 week + covid Friday Pt has prn O2 at home today checked oxygen at home and sat was 90  Pt sats lower + dry cough some padilla  Ambulatory sat was as low as 89%   PE: heart reg lungs coarse bs bilat abd soft nontneder ext nad MDM: will do cxr and devlin covid  ED Course         Critical Care Time  Procedures

## 2021-03-15 NOTE — ASSESSMENT & PLAN NOTE
Plan:  -Will place on [facility] in place of [facility] at this time   -Patient did not qualify for home oxygen after completing outpatient testing  However, he does by supplemental oxygen from an outside company  He states that he rarely uses this   -Continue albuterol and Atrovent as needed   -Continue Mucinex 1200 mg b i d   -Continue Xopenex as needed

## 2021-03-15 NOTE — ASSESSMENT & PLAN NOTE
Likely secondary to volume depletion from diarrhea  KIRK resolved on 03/17  Will replete with IV fluids at this time  Continue to monitor vital signs and BMP  Avoid nephrotoxic agents  Avoid large fluctuations in blood pressure

## 2021-03-15 NOTE — ASSESSMENT & PLAN NOTE
Likely secondary to diarrhea  Improved after starting Imodium  K 4 2 this morning  Repleted while in the ED  Continue to monitor

## 2021-03-15 NOTE — H&P
INTERNAL MEDICINE RESIDENCY ADMISSION H&P     Name: Ezra Wu   Age & Sex: 79 y o  male   MRN: [id number]  Unit/Bed#: JING   Encounter: [id number]  Primary Care Provider: Wang Buitrago PA-C    Code Status: Level 1 - Full Code  Admission Status: INPATIENT   Disposition: Patient requires Med/Surg    Admit to team: SOD Team C     ASSESSMENT/PLAN     Principal Problem:    COVID-19  Active Problems:    Hyperlipidemia    Centrilobular emphysema ([facility] [address] ) - severe    Bilateral iliac artery aneurysm (HCC)    Prediabetes    BPH with obstruction/lower urinary tract symptoms    Essential hypertension    Diarrhea    Hypokalemia    KIRK (acute kidney injury) ([facility] [address] )      KIRK (acute kidney injury) ([facility] [address] )  Assessment & Plan  Likely secondary to volume depletion from diarrhea  Will replete with IV fluids at this time  Continue to monitor vital signs and BMP  Avoid nephrotoxic agents  Avoid large fluctuations in blood pressure  Hypokalemia  Assessment & Plan  Likely secondary to diarrhea  Repleted while in the ED  Continue to monitor  Diarrhea  Assessment & Plan  Likely secondary to COVID-19 and contributing to volume depletion/low blood pressure, hypokalemia, KIRK  Will start on IV fluids while patient is having diarrhea and is unable to maintain oral hydration  Essential hypertension  Assessment & Plan  Will hold on hydrochlorothiazide 25 mg daily secondary to relatively low blood pressure from volume depletion  SBP noted to be  while in the ED  Continue to monitor vital signs  BPH with obstruction/lower urinary tract symptoms  Assessment & Plan  Continue finasteride and tamsulosin  Centrilobular emphysema (HCC) - severe  Assessment & Plan  Will place on [place] in place of trilogy at this time  Patient did not qualify for home oxygen after completing outpatient testing  However, he does by supplemental oxygen from an outside company  He states that he rarely uses this    Continue albuterol and Atrovent as needed  Continue Mucinex 1200 mg b i d  Continue Xopenex as needed  Hyperlipidemia  Assessment & Plan  Continue atorvastatin 20 mg daily  * COVID-19  Assessment & Plan  Noted on outpatient testing 3/12/2021  Symptoms began approximately 03/09/2021 with diarrhea, dyspnea on exertion, cough, fever, and nausea  Will start on mild COVID pathway  Will not start on steroids secondary to not requiring oxygen at this time  Remdisivir started as patient is considered high risk  VTE Pharmacologic Prophylaxis: Enoxaparin (Lovenox)  VTE Mechanical Prophylaxis: sequential compression device    CHIEF COMPLAINT     Chief Complaint   Patient presents with    Decreased Oxygen Level     pt stated that he tested COVID + on friday, today he woke and had a low O2 stat of 90, called doctor and they sent him to ED for eval  pt has O2 at home but does not use it  his normal O2 stat is between 93-97%, pt is currently 92-94% on RA      HISTORY OF PRESENT ILLNESS     Patient is a 66-year-old male with past medical history significant for prediabetes, COPD/centrilobular emphysema not on oxygen, hyperlipidemia, hypertension, BPH, bilateral iliac artery aneurysm, arterial ectasia who presented to [facility] secondary to fever of 103 at home  On 03/09/2021 patient began experiencing nausea, cough, and diarrhea  He was later tested on 03/12/2021 for COVID-19 and was found to be positive  Patient also states that he has mild sore throat secondary to coughing  Patient states he has some dyspnea on exertion, however he is not short of breath with conversation or at rest  Diarrhea occurs about 2-3 times a day - noted to be brown in color with no blood  In the ED, SpO2 noted to be 92-94% at rest while I was in the room  It was reported that he had has been to of 89% with ambulation    Otherwise, vital signs are within normal limits with a temperature of 98 7°, pulse is 92, respiratory rate 18, blood pressure 119/66  REVIEW OF SYSTEMS     Review of Systems   Constitutional: Positive for fever  Negative for appetite change  HENT: Negative  Eyes: Negative  Respiratory: Positive for cough and shortness of breath  Negative for wheezing and stridor  Cardiovascular: Negative  Gastrointestinal: Positive for diarrhea and nausea  Negative for abdominal pain, blood in stool and vomiting  Genitourinary: Negative  Musculoskeletal: Negative  Skin: Negative  Neurological: Negative  Psychiatric/Behavioral: Negative  OBJECTIVE     Vitals:    03/15/21 1052 03/15/21 1130 03/15/21 1300   BP: 119/66 (!) 86/58 97/56   Pulse: 92 86 84   Resp: 18 18 13   Temp:  98 7 °F (37 1 °C)    SpO2: 93% 92% 92%   Weight: 72 6 kg (160 lb)     Height: 5' 8" (1 727 m)        Temperature:   Temp (24hrs), Av 7 °F (37 1 °C), Min:98 7 °F (37 1 °C), Max:98 7 °F (37 1 °C)    Temperature: 98 7 °F (37 1 °C)  Intake & Output:  I/O     None        Weights:   IBW: 68 4 kg    Body mass index is 24 33 kg/m²  Weight (last 2 days)     Date/Time   Weight    03/15/21 1052   72 6 (160)            Physical Exam  Constitutional:       General: He is not in acute distress  Appearance: Normal appearance  He is not ill-appearing, toxic-appearing or diaphoretic  HENT:      Head: Normocephalic and atraumatic  Nose: Nose normal       Mouth/Throat:      Mouth: Mucous membranes are moist       Pharynx: Oropharynx is clear  Eyes:      Extraocular Movements: Extraocular movements intact  Conjunctiva/sclera: Conjunctivae normal    Neck:      Musculoskeletal: Normal range of motion  Cardiovascular:      Rate and Rhythm: Normal rate and regular rhythm  Heart sounds: No murmur  No friction rub  No gallop  Pulmonary:      Effort: Pulmonary effort is normal  No respiratory distress  Breath sounds: Normal breath sounds  No stridor  No wheezing, rhonchi or rales  Chest:      Chest wall: No tenderness  Abdominal:      General: Bowel sounds are normal  There is no distension  Palpations: Abdomen is soft  There is no mass  Tenderness: There is no abdominal tenderness  There is no guarding or rebound  Hernia: No hernia is present  Skin:     General: Skin is warm and dry  Neurological:      General: No focal deficit present  Mental Status: He is alert and oriented to person, place, and time     Psychiatric:         Mood and Affect: Mood normal        PAST MEDICAL HISTORY     Past Medical History:   Diagnosis Date    BPH (benign prostatic hyperplasia)     COPD (chronic obstructive pulmonary disease) ([facility])     Last Assessed:2016    Diverticulosis     Elevated prostate specific antigen (PSA)     Emphysema (subcutaneous) (surgical) resulting from a procedure     Enlarged prostate with lower urinary tract symptoms (LUTS)     Resolved:2017    Hernia, inguinal, right      PAST SURGICAL HISTORY     Past Surgical History:   Procedure Laterality Date    BICEPS TENDON REPAIR      COLONOSCOPY      HAND SURGERY      Last Assessed:2016    HERNIA REPAIR      Last Assessed:2016    KNEE ARTHROSCOPY      AK REPAIR ING HERNIA,5+Y/O,REDUCIBL Right 2016    Procedure: REPAIR HERNIA INGUINAL with mesh;  Surgeon: Jia Lowe DO;  Location: [facility];  Service: General    PROSTATE BIOPSY      WISDOM TOOTH EXTRACTION       SOCIAL & FAMILY HISTORY     Social History     Substance and Sexual Activity   Alcohol Use Yes    Frequency: Monthly or less    Drinks per session: 1 or 2    Binge frequency: Never    Comment: rare     Social History     Substance and Sexual Activity   Drug Use No     Social History     Tobacco Use   Smoking Status Former Smoker    Packs/day: 1 50    Years: 48 00    Pack years: 72 00    Types: Cigarettes    Start date: 5    Quit date: 2015    Years since quittin 3   Smokeless Tobacco Never Used     Family History   Problem Relation Age of Onset    Hypertension Mother      LABORATORY DATA     Labs: I have personally reviewed pertinent reports  Results from last 7 days   Lab Units 03/15/21  1142   WBC Thousand/uL 6 59   HEMOGLOBIN g/dL 14 4   HEMATOCRIT % 43 8   PLATELETS Thousands/uL 177   NEUTROS PCT % 81*   MONOS PCT % 10      Results from last 7 days   Lab Units 03/15/21  1142   POTASSIUM mmol/L 3 1*   CHLORIDE mmol/L 101   CO2 mmol/L 30   BUN mg/dL 30*   CREATININE mg/dL 1 61*   CALCIUM mg/dL 8 2*   ALK PHOS U/L 69   ALT U/L 26   AST U/L 40                      Results from last 7 days   Lab Units 03/15/21  1142   TROPONIN I ng/mL 0 03     Micro:  No results found for: [place], WOUNDCULT, SPUTUMCULTUR  IMAGING & DIAGNOSTIC TESTS     Imaging: I have personally reviewed pertinent reports  Xr Chest Portable    Result Date: 3/15/2021  Impression: No acute cardiopulmonary disease  Findings are stable Workstation performed: [id number]     EKG, Pathology, and Other Studies: I have personally reviewed pertinent reports  ALLERGIES     Allergies   Allergen Reactions    Bee Venom Facial Swelling    Other Rash     Annotation - [id number]: mushrooms    Penicillins Rash     MEDICATIONS PRIOR TO ARRIVAL     Prior to Admission medications    Medication Sig Start Date End Date Taking?  Authorizing Provider   albuterol (PROVENTIL HFA,VENTOLIN HFA) 90 mcg/act inhaler Inhale 2 puffs every 6 (six) hours as needed for wheezing 10/7/19   Josh Boykin MD   atorvastatin (LIPITOR) 20 mg tablet Take 1 tablet (20 mg total) by mouth daily 11/20/20   Erick Zhang PA-C   EPINEPHrine (EPIPEN) 0 3 mg/0 3 mL SOAJ Inject 0 3 mL (0 3 mg total) into a muscle once for 1 dose 5/2/19   Deonte Reese MD   finasteride (PROSCAR) 5 mg tablet Take 1 tablet (5 mg total) by mouth daily 9/4/20   Mehran Gamez MD   fluticasone [facility]) 50 mcg/act nasal spray 1 spray into each nostril daily 9/13/19   Erick Zhang PA-C fluticasone-umeclidinium-vilanterol (TRELEGY) 100-62 5-25 MCG/INH inhaler Inhale 1 puff daily Rinse mouth after use  9/10/20   Freddy Hashimoto, MD   guaiFENesin (MUCINEX) 600 mg 12 hr tablet Take 1,200 mg by mouth 2 (two) times a day as needed    Historical Provider, MD   hydrochlorothiazide (HYDRODIURIL) 25 mg tablet TAKE 1 TABLET DAILY 2/11/20   Emely Mike PA-C   ipratropium-albuterol (DUO-NEB) 0 5-2 5 mg/3 mL nebulizer solution Take 1 vial (3 mL total) by nebulization every 6 (six) hours 2/28/20   Emely Mike PA-C   loratadine (CLARITIN REDITABS) 10 MG dissolvable tablet Take 10 mg by mouth daily as needed for allergies  Historical Provider, MD   Multiple Vitamins-Minerals (CENTRUM ADULTS PO) Take 1 tablet by mouth daily      Historical Provider, MD   tamsulosin (Flomax) 0 4 mg Take 1 capsule (0 4 mg total) by mouth daily with dinner 9/4/20   Katherin Shone, MD     MEDICATIONS ADMINISTERED IN LAST 24 HOURS     CURRENT MEDICATIONS     Current Facility-Administered Medications   Medication Dose Route Frequency Provider Last Rate    albuterol  2 puff Inhalation Q6H PRN Andi Kaur MD      ascorbic acid  1,000 mg Oral BID Andi Kaur MD      atorvastatin  20 mg Oral Daily Andi Kaur MD      cholecalciferol  2,000 Units Oral Daily Andi Kaur MD      dextrose 5% lactated Ringer's with KCl 20 mEq/L  100 mL/hr Intravenous Continuous Andi Kaur MD      enoxaparin  40 mg Subcutaneous Daily Andi Kaur MD      finasteride  5 mg Oral Daily Andi Kaur MD      fluticasone-vilanterol  1 puff Inhalation Daily Andi Kaur MD      guaiFENesin  1,200 mg Oral Q12H Jesse Gorman MD      ipratropium-albuterol  3 mL Nebulization Q6H Andi Kaur MD      potassium chloride  40 mEq Oral Once Andi Kaur MD      remdesivir  200 mg Intravenous Q24H Andi Kaur MD      Followed by   Brennan Ugarte ON 3/16/2021] remdesivir  100 mg Intravenous Q24H Andi Kaur MD      tamsulosin  0 4 mg Oral Daily With Daisy Rodríguez MD      zinc sulfate  220 mg Oral Daily Baldemar Martinez MD       dextrose 5% lactated Ringer's with KCl 20 mEq/L, 100 mL/hr      albuterol, 2 puff, Q6H PRN        Admission Time  I spent 30 minutes admitting the patient  This involved direct patient contact where I performed a full history and physical, reviewing previous records, and reviewing laboratory and other diagnostic studies  Portions of the record may have been created with voice recognition software  Occasional wrong word or "sound a like" substitutions may have occurred due to the inherent limitations of voice recognition software    Read the chart carefully and recognize, using context, where substitutions have occurred     ==  Baldemar Martinez MD  [address]  Internal Medicine Residency PGY-2

## 2021-03-16 ENCOUNTER — APPOINTMENT (INPATIENT)
Dept: RADIOLOGY | Facility: HOSPITAL | Age: 71
DRG: 177 | End: 2021-03-16
Payer: MEDICARE

## 2021-03-16 PROBLEM — J12.82 PNEUMONIA DUE TO COVID-19 VIRUS: Status: ACTIVE | Noted: 2021-03-16

## 2021-03-16 PROBLEM — U07.1 PNEUMONIA DUE TO COVID-19 VIRUS: Status: ACTIVE | Noted: 2021-03-16

## 2021-03-16 LAB
ALBUMIN SERPL BCP-MCNC: 3.3 G/DL (ref 3.5–5)
ALP SERPL-CCNC: 68 U/L (ref 46–116)
ALT SERPL W P-5'-P-CCNC: 28 U/L (ref 12–78)
ANION GAP SERPL CALCULATED.3IONS-SCNC: 8 MMOL/L (ref 4–13)
AST SERPL W P-5'-P-CCNC: 61 U/L (ref 5–45)
ATRIAL RATE: 81 BPM
ATRIAL RATE: 91 BPM
BASOPHILS # BLD AUTO: 0.01 THOUSANDS/ΜL (ref 0–0.1)
BASOPHILS NFR BLD AUTO: 0 % (ref 0–1)
BILIRUB DIRECT SERPL-MCNC: 0.12 MG/DL (ref 0–0.2)
BILIRUB SERPL-MCNC: 0.55 MG/DL (ref 0.2–1)
BUN SERPL-MCNC: 31 MG/DL (ref 5–25)
CALCIUM SERPL-MCNC: 8.4 MG/DL (ref 8.3–10.1)
CHLORIDE SERPL-SCNC: 102 MMOL/L (ref 100–108)
CK MB SERPL-MCNC: 4.6 NG/ML (ref 0–5)
CK MB SERPL-MCNC: 7.9 NG/ML (ref 0–5)
CK MB SERPL-MCNC: 8 NG/ML (ref 0–5)
CK MB SERPL-MCNC: <1 % (ref 0–2.5)
CK SERPL-CCNC: 1119 U/L (ref 39–308)
CK SERPL-CCNC: 1906 U/L (ref 39–308)
CK SERPL-CCNC: 1932 U/L (ref 39–308)
CO2 SERPL-SCNC: 27 MMOL/L (ref 21–32)
CREAT SERPL-MCNC: 1.41 MG/DL (ref 0.6–1.3)
CRP SERPL QL: 65.7 MG/L
D DIMER PPP FEU-MCNC: 0.77 UG/ML FEU
EOSINOPHIL # BLD AUTO: 0 THOUSAND/ΜL (ref 0–0.61)
EOSINOPHIL NFR BLD AUTO: 0 % (ref 0–6)
ERYTHROCYTE [DISTWIDTH] IN BLOOD BY AUTOMATED COUNT: 13.6 % (ref 11.6–15.1)
FERRITIN SERPL-MCNC: 1112 NG/ML (ref 8–388)
GFR SERPL CREATININE-BSD FRML MDRD: 50 ML/MIN/1.73SQ M
GLUCOSE SERPL-MCNC: 151 MG/DL (ref 65–140)
HCT VFR BLD AUTO: 43.5 % (ref 36.5–49.3)
HGB BLD-MCNC: 13.9 G/DL (ref 12–17)
IMM GRANULOCYTES # BLD AUTO: 0.05 THOUSAND/UL (ref 0–0.2)
IMM GRANULOCYTES NFR BLD AUTO: 1 % (ref 0–2)
LYMPHOCYTES # BLD AUTO: 0.67 THOUSANDS/ΜL (ref 0.6–4.47)
LYMPHOCYTES NFR BLD AUTO: 8 % (ref 14–44)
MCH RBC QN AUTO: 28.6 PG (ref 26.8–34.3)
MCHC RBC AUTO-ENTMCNC: 32 G/DL (ref 31.4–37.4)
MCV RBC AUTO: 90 FL (ref 82–98)
MONOCYTES # BLD AUTO: 0.78 THOUSAND/ΜL (ref 0.17–1.22)
MONOCYTES NFR BLD AUTO: 10 % (ref 4–12)
NEUTROPHILS # BLD AUTO: 6.47 THOUSANDS/ΜL (ref 1.85–7.62)
NEUTS SEG NFR BLD AUTO: 81 % (ref 43–75)
NRBC BLD AUTO-RTO: 0 /100 WBCS
NT-PROBNP SERPL-MCNC: 111 PG/ML
P AXIS: 78 DEGREES
P AXIS: 79 DEGREES
PLATELET # BLD AUTO: 196 THOUSANDS/UL (ref 149–390)
PMV BLD AUTO: 11.2 FL (ref 8.9–12.7)
POTASSIUM SERPL-SCNC: 3.7 MMOL/L (ref 3.5–5.3)
PR INTERVAL: 130 MS
PR INTERVAL: 138 MS
PROCALCITONIN SERPL-MCNC: 0.09 NG/ML
PROT SERPL-MCNC: 6.7 G/DL (ref 6.4–8.2)
QRS AXIS: 56 DEGREES
QRS AXIS: 66 DEGREES
QRSD INTERVAL: 82 MS
QRSD INTERVAL: 96 MS
QT INTERVAL: 342 MS
QT INTERVAL: 346 MS
QTC INTERVAL: 401 MS
QTC INTERVAL: 420 MS
RBC # BLD AUTO: 4.86 MILLION/UL (ref 3.88–5.62)
SODIUM SERPL-SCNC: 137 MMOL/L (ref 136–145)
T WAVE AXIS: 76 DEGREES
T WAVE AXIS: 89 DEGREES
TROPONIN I SERPL-MCNC: 0.05 NG/ML
TROPONIN I SERPL-MCNC: 0.1 NG/ML
TROPONIN I SERPL-MCNC: 0.1 NG/ML
TROPONIN I SERPL-MCNC: 0.11 NG/ML
VENTRICULAR RATE: 81 BPM
VENTRICULAR RATE: 91 BPM
WBC # BLD AUTO: 7.98 THOUSAND/UL (ref 4.31–10.16)

## 2021-03-16 PROCEDURE — 83880 ASSAY OF NATRIURETIC PEPTIDE: CPT | Performed by: STUDENT IN AN ORGANIZED HEALTH CARE EDUCATION/TRAINING PROGRAM

## 2021-03-16 PROCEDURE — 82550 ASSAY OF CK (CPK): CPT | Performed by: STUDENT IN AN ORGANIZED HEALTH CARE EDUCATION/TRAINING PROGRAM

## 2021-03-16 PROCEDURE — 99233 SBSQ HOSP IP/OBS HIGH 50: CPT | Performed by: INTERNAL MEDICINE

## 2021-03-16 PROCEDURE — 93005 ELECTROCARDIOGRAM TRACING: CPT

## 2021-03-16 PROCEDURE — 71250 CT THORAX DX C-: CPT

## 2021-03-16 PROCEDURE — 80076 HEPATIC FUNCTION PANEL: CPT | Performed by: STUDENT IN AN ORGANIZED HEALTH CARE EDUCATION/TRAINING PROGRAM

## 2021-03-16 PROCEDURE — 85379 FIBRIN DEGRADATION QUANT: CPT | Performed by: STUDENT IN AN ORGANIZED HEALTH CARE EDUCATION/TRAINING PROGRAM

## 2021-03-16 PROCEDURE — 82553 CREATINE MB FRACTION: CPT | Performed by: STUDENT IN AN ORGANIZED HEALTH CARE EDUCATION/TRAINING PROGRAM

## 2021-03-16 PROCEDURE — 82728 ASSAY OF FERRITIN: CPT | Performed by: STUDENT IN AN ORGANIZED HEALTH CARE EDUCATION/TRAINING PROGRAM

## 2021-03-16 PROCEDURE — 94640 AIRWAY INHALATION TREATMENT: CPT

## 2021-03-16 PROCEDURE — 84145 PROCALCITONIN (PCT): CPT | Performed by: STUDENT IN AN ORGANIZED HEALTH CARE EDUCATION/TRAINING PROGRAM

## 2021-03-16 PROCEDURE — 85025 COMPLETE CBC W/AUTO DIFF WBC: CPT | Performed by: STUDENT IN AN ORGANIZED HEALTH CARE EDUCATION/TRAINING PROGRAM

## 2021-03-16 PROCEDURE — 80048 BASIC METABOLIC PNL TOTAL CA: CPT | Performed by: STUDENT IN AN ORGANIZED HEALTH CARE EDUCATION/TRAINING PROGRAM

## 2021-03-16 PROCEDURE — 93010 ELECTROCARDIOGRAM REPORT: CPT | Performed by: INTERNAL MEDICINE

## 2021-03-16 PROCEDURE — 94760 N-INVAS EAR/PLS OXIMETRY 1: CPT

## 2021-03-16 PROCEDURE — 84484 ASSAY OF TROPONIN QUANT: CPT | Performed by: STUDENT IN AN ORGANIZED HEALTH CARE EDUCATION/TRAINING PROGRAM

## 2021-03-16 PROCEDURE — G1004 CDSM NDSC: HCPCS

## 2021-03-16 PROCEDURE — 86140 C-REACTIVE PROTEIN: CPT | Performed by: STUDENT IN AN ORGANIZED HEALTH CARE EDUCATION/TRAINING PROGRAM

## 2021-03-16 RX ORDER — LOPERAMIDE HYDROCHLORIDE 2 MG/1
2 CAPSULE ORAL 3 TIMES DAILY PRN
Status: DISCONTINUED | OUTPATIENT
Start: 2021-03-16 | End: 2021-03-22 | Stop reason: HOSPADM

## 2021-03-16 RX ORDER — POTASSIUM CHLORIDE 20 MEQ/1
20 TABLET, EXTENDED RELEASE ORAL ONCE
Status: COMPLETED | OUTPATIENT
Start: 2021-03-16 | End: 2021-03-16

## 2021-03-16 RX ADMIN — GUAIFENESIN 1200 MG: 600 TABLET, EXTENDED RELEASE ORAL at 21:50

## 2021-03-16 RX ADMIN — Medication 2000 UNITS: at 09:40

## 2021-03-16 RX ADMIN — OXYCODONE HYDROCHLORIDE AND ACETAMINOPHEN 1000 MG: 500 TABLET ORAL at 17:00

## 2021-03-16 RX ADMIN — FLUTICASONE FUROATE AND VILANTEROL TRIFENATATE 1 PUFF: 100; 25 POWDER RESPIRATORY (INHALATION) at 09:41

## 2021-03-16 RX ADMIN — ATORVASTATIN CALCIUM 20 MG: 20 TABLET, FILM COATED ORAL at 16:53

## 2021-03-16 RX ADMIN — POTASSIUM CHLORIDE: 2 INJECTION, SOLUTION, CONCENTRATE INTRAVENOUS at 05:09

## 2021-03-16 RX ADMIN — LEVALBUTEROL HYDROCHLORIDE 1.25 MG: 1.25 SOLUTION, CONCENTRATE RESPIRATORY (INHALATION) at 01:54

## 2021-03-16 RX ADMIN — LEVALBUTEROL HYDROCHLORIDE 1.25 MG: 1.25 SOLUTION, CONCENTRATE RESPIRATORY (INHALATION) at 14:25

## 2021-03-16 RX ADMIN — ENOXAPARIN SODIUM 30 MG: 30 INJECTION SUBCUTANEOUS at 17:00

## 2021-03-16 RX ADMIN — ENOXAPARIN SODIUM 30 MG: 30 INJECTION SUBCUTANEOUS at 05:09

## 2021-03-16 RX ADMIN — REMDESIVIR 100 MG: 100 INJECTION, POWDER, LYOPHILIZED, FOR SOLUTION INTRAVENOUS at 17:00

## 2021-03-16 RX ADMIN — POTASSIUM CHLORIDE: 2 INJECTION, SOLUTION, CONCENTRATE INTRAVENOUS at 18:09

## 2021-03-16 RX ADMIN — FINASTERIDE 5 MG: 5 TABLET, FILM COATED ORAL at 09:39

## 2021-03-16 RX ADMIN — GUAIFENESIN 1200 MG: 600 TABLET, EXTENDED RELEASE ORAL at 09:40

## 2021-03-16 RX ADMIN — IPRATROPIUM BROMIDE 0.5 MG: 0.5 SOLUTION RESPIRATORY (INHALATION) at 01:54

## 2021-03-16 RX ADMIN — LOPERAMIDE HYDROCHLORIDE 2 MG: 2 CAPSULE ORAL at 14:49

## 2021-03-16 RX ADMIN — IPRATROPIUM BROMIDE 0.5 MG: 0.5 SOLUTION RESPIRATORY (INHALATION) at 14:25

## 2021-03-16 RX ADMIN — ZINC SULFATE 220 MG (50 MG) CAPSULE 220 MG: CAPSULE at 09:39

## 2021-03-16 RX ADMIN — TAMSULOSIN HYDROCHLORIDE 0.4 MG: 0.4 CAPSULE ORAL at 16:53

## 2021-03-16 RX ADMIN — POTASSIUM CHLORIDE 20 MEQ: 1500 TABLET, EXTENDED RELEASE ORAL at 09:41

## 2021-03-16 RX ADMIN — OXYCODONE HYDROCHLORIDE AND ACETAMINOPHEN 1000 MG: 500 TABLET ORAL at 09:40

## 2021-03-16 NOTE — PLAN OF CARE
Problem: PAIN - ADULT  Goal: Verbalizes/displays adequate comfort level or baseline comfort level  Description: Interventions:  - Encourage patient to monitor pain and request assistance  - Assess pain using appropriate pain scale  - Administer analgesics based on type and severity of pain and evaluate response  - Implement non-pharmacological measures as appropriate and evaluate response  - Consider cultural and social influences on pain and pain management  - Notify physician/advanced practitioner if interventions unsuccessful or patient reports new pain  Outcome: Progressing     Problem: INFECTION - ADULT  Goal: Absence or prevention of progression during hospitalization  Description: INTERVENTIONS:  - Assess and monitor for signs and symptoms of infection  - Monitor lab/diagnostic results  - Monitor all insertion sites, i e  indwelling lines, tubes, and drains  - Monitor endotracheal if appropriate and nasal secretions for changes in amount and color  - [place] appropriate cooling/warming therapies per order  - Administer medications as ordered  - Instruct and encourage patient and family to use good hand hygiene technique  - Identify and instruct in appropriate isolation precautions for identified infection/condition  Outcome: Progressing  Goal: Absence of fever/infection during neutropenic period  Description: INTERVENTIONS:  - Monitor WBC    Outcome: Progressing     Problem: SAFETY ADULT  Goal: Patient will remain free of falls  Description: INTERVENTIONS:  - Assess patient frequently for physical needs  -  Identify cognitive and physical deficits and behaviors that affect risk of falls    -  [place] fall precautions as indicated by assessment   - Educate patient/family on patient safety including physical limitations  - Instruct patient to call for assistance with activity based on assessment  - Modify environment to reduce risk of injury  - Consider OT/PT consult to assist with strengthening/mobility  Outcome: Progressing  Goal: Maintain or return to baseline ADL function  Description: INTERVENTIONS:  -  Assess patient's ability to carry out ADLs; assess patient's baseline for ADL function and identify physical deficits which impact ability to perform ADLs (bathing, care of mouth/teeth, toileting, grooming, dressing, etc )  - Assess/evaluate cause of self-care deficits   - Assess range of motion  - Assess patient's mobility; develop plan if impaired  - Assess patient's need for assistive devices and provide as appropriate  - Encourage maximum independence but intervene and supervise when necessary  - Involve family in performance of ADLs  - Assess for home care needs following discharge   - Consider OT consult to assist with ADL evaluation and planning for discharge  - Provide patient education as appropriate  Outcome: Progressing  Goal: Maintain or return mobility status to optimal level  Description: INTERVENTIONS:  - Assess patient's baseline mobility status (ambulation, transfers, stairs, etc )    - Identify cognitive and physical deficits and behaviors that affect mobility  - Identify mobility aids required to assist with transfers and/or ambulation (gait belt, sit-to-stand, lift, walker, cane, etc )  - [place] fall precautions as indicated by assessment  - Record patient progress and toleration of activity level on Mobility SBAR; progress patient to next Phase/Stage  - Instruct patient to call for assistance with activity based on assessment  - Consider rehabilitation consult to assist with strengthening/weightbearing, etc   Outcome: Progressing     Problem: DISCHARGE PLANNING  Goal: Discharge to home or other facility with appropriate resources  Description: INTERVENTIONS:  - Identify barriers to discharge w/patient and caregiver  - Arrange for needed discharge resources and transportation as appropriate  - Identify discharge learning needs (meds, wound care, etc )  - Arrange for interpretive services to assist at discharge as needed  - Refer to Case Management Department for coordinating discharge planning if the patient needs post-hospital services based on physician/advanced practitioner order or complex needs related to functional status, cognitive ability, or social support system  Outcome: Progressing     Problem: Knowledge Deficit  Goal: Patient/family/caregiver demonstrates understanding of disease process, treatment plan, medications, and discharge instructions  Description: Complete learning assessment and assess knowledge base    Interventions:  - Provide teaching at level of understanding  - Provide teaching via preferred learning methods  Outcome: Progressing

## 2021-03-16 NOTE — PROGRESS NOTES
INTERNAL MEDICINE RESIDENCY PROGRESS NOTE     Name: Janes Borjas   Age & Sex: 79 y o  male   MRN: [id number]  Unit/Bed#: -01    Encounter: [id number]  Team: SOD Team C     PATIENT INFORMATION     Name: Michelle Justice   Age & Sex: 79 y o  male   MRN: [id number]  Hospital Stay Days: 1    ASSESSMENT/PLAN     Principal Problem:    COVID-19  Active Problems:    Hyperlipidemia    Centrilobular emphysema ([facility] [address] ) - severe    Bilateral iliac artery aneurysm (HCC)    Prediabetes    BPH with obstruction/lower urinary tract symptoms    Essential hypertension    Diarrhea    Hypokalemia    KIRK (acute kidney injury) ([facility] [address] )      KIRK (acute kidney injury) ([facility] [address] )  Assessment & Plan  Likely secondary to volume depletion from diarrhea  Will replete with IV fluids at this time  Continue to monitor vital signs and BMP  Avoid nephrotoxic agents  Avoid large fluctuations in blood pressure  Hypokalemia  Assessment & Plan  Likely secondary to diarrhea  Repleted while in the ED  Continue to monitor  Diarrhea  Assessment & Plan  Likely secondary to COVID-19 and contributing to volume depletion/low blood pressure, hypokalemia, KIRK  Will start on IV fluids while patient is having diarrhea and is unable to maintain oral hydration  Will monitor respiratory status closely given COVID and fluids    Essential hypertension  Assessment & Plan  Will hold on hydrochlorothiazide 25 mg daily secondary to relatively low blood pressure from volume depletion  SBP noted to be  while in the ED  Continue to monitor vital signs  BPH with obstruction/lower urinary tract symptoms  Assessment & Plan  Continue finasteride and tamsulosin  Centrilobular emphysema (HCC) - severe  Assessment & Plan  Will place on Paulina Cardwell in place of trilogy at this time  Patient did not qualify for home oxygen after completing outpatient testing  However, he does by supplemental oxygen from an outside company  He states that he rarely uses this    Continue albuterol and Atrovent as needed  Continue Mucinex 1200 mg b i d  Continue Xopenex as needed  Hyperlipidemia  Assessment & Plan  Continue atorvastatin 20 mg daily  * COVID-19  Assessment & Plan  Noted on outpatient testing 3/12/2021  Symptoms began approximately 2021 with diarrhea, dyspnea on exertion, cough, fever, and nausea  Will start on mild COVID pathway  Will get CT w/o contrast given negative CXR since patient is high risk for progression  May start dexamethasone if patient has evidence of pneumonia on CT  Patient had elevated trop and CK on covid protocol labs  Expect both are 2/2 covid and dehydration  Will repeat trop to confirm it peaked  Will plan on doing q8h CK until elevation resolves  He is receiving fluids for dehydration and CK  Will monitor very closely in setting of COVID      Disposition: Continue on mild covid pathway  Currently not on oxygen, but patient is high risk given co-morbidities      SUBJECTIVE     Patient seen and examined  No acute events overnight  Patient says he feels fairly well this morning  He does still have a productive cough  He says he does not have any SOB when lying in bed, but does complain of SOB when he ambulates to the bathroom  He is still having watery diarrhea, which he said he had about 3x yesterday  Denies chest pain, N/V, abdominal pain, leg pain  Denies fevers, says he has occasional mild chills  Encouraged good PO intake       OBJECTIVE     Vitals:    03/15/21 2245 21 0154 21 0738 21 0738   BP:   121/68 121/68   BP Location:    Left arm   Pulse:   (!) 106 104   Resp:    18   Temp:   100 3 °F (37 9 °C) 100 3 °F (37 9 °C)   TempSrc:    Oral   SpO2: 92% 94% 92% 91%   Weight:       Height:          Temperature:   Temp (24hrs), Av 7 °F (37 6 °C), Min:98 1 °F (36 7 °C), Max:100 3 °F (37 9 °C)    Temperature: 100 3 °F (37 9 °C)  Intake & Output:  I/O        07 - 03/15 0700 03/15 07 -  0700  07 -  0700           Unmeasured Urine Occurrence  4 x     Unmeasured Stool Occurrence  4 x         Weights:   IBW: 68 4 kg    Body mass index is 24 33 kg/m²  Weight (last 2 days)     Date/Time   Weight    03/15/21 1052   72 6 (160)            Physical Exam  Constitutional:       Appearance: Normal appearance  He is obese  He is not ill-appearing  HENT:      Head: Normocephalic and atraumatic  Nose: Nose normal       Mouth/Throat:      Mouth: Mucous membranes are dry  Eyes:      Conjunctiva/sclera: Conjunctivae normal    Cardiovascular:      Rate and Rhythm: Regular rhythm  Tachycardia present  Pulses: Normal pulses  Heart sounds: Normal heart sounds  No murmur  No friction rub  No gallop  Pulmonary:      Effort: No respiratory distress  Breath sounds: Normal breath sounds  No wheezing, rhonchi or rales  Comments: Breathing looked just slightly more labored than normal   Abdominal:      General: Abdomen is flat  There is no distension  Palpations: Abdomen is soft  Tenderness: There is no abdominal tenderness  There is no guarding or rebound  Musculoskeletal:      Right lower leg: No edema  Left lower leg: No edema  Skin:     General: Skin is warm  Neurological:      General: No focal deficit present  Mental Status: He is alert and oriented to person, place, and time  Mental status is at baseline  Psychiatric:         Mood and Affect: Mood normal        LABORATORY DATA     Labs: I have personally reviewed pertinent reports    Results from last 7 days   Lab Units 03/16/21  0509 03/15/21  1142   WBC Thousand/uL 7 98 6 59   HEMOGLOBIN g/dL 13 9 14 4   HEMATOCRIT % 43 5 43 8   PLATELETS Thousands/uL 196 177   NEUTROS PCT % 81* 81*   MONOS PCT % 10 10      Results from last 7 days   Lab Units 03/16/21  0509 03/15/21  1955 03/15/21  1142   POTASSIUM mmol/L 3 7 3 5 3 1*   CHLORIDE mmol/L 102 100 101   CO2 mmol/L 27 30 30   BUN mg/dL 31* 35* 30*   CREATININE mg/dL 1 41* 1 79* 1 61*   CALCIUM mg/dL 8 4 8 2* 8 2*   ALK PHOS U/L 68  --  69   ALT U/L 28  --  26   AST U/L 61*  --  40     Results from last 7 days   Lab Units 03/15/21  1142   MAGNESIUM mg/dL 2 3                  Results from last 7 days   Lab Units 03/16/21  0509 03/15/21  1142   TROPONIN I ng/mL 0 05* 0 03       IMAGING & DIAGNOSTIC TESTING     Radiology Results: I have personally reviewed pertinent reports  Xr Chest Portable    Result Date: 3/15/2021  Impression: No acute cardiopulmonary disease  Findings are stable Workstation performed: [id number]     Other Diagnostic Testing: I have personally reviewed pertinent reports      ACTIVE MEDICATIONS     Current Facility-Administered Medications   Medication Dose Route Frequency    albuterol (PROVENTIL HFA,VENTOLIN HFA) inhaler 2 puff  2 puff Inhalation Q6H PRN    ascorbic acid (VITAMIN C) tablet 1,000 mg  1,000 mg Oral BID    atorvastatin (LIPITOR) tablet 20 mg  20 mg Oral Daily With Dinner    cholecalciferol (VITAMIN D3) tablet 2,000 Units  2,000 Units Oral Daily    enoxaparin (LOVENOX) subcutaneous injection 30 mg  30 mg Subcutaneous Q12H    finasteride (PROSCAR) tablet 5 mg  5 mg Oral Daily    fluticasone-vilanterol (BREO ELLIPTA) 100-25 mcg/inh inhaler 1 puff  1 puff Inhalation Daily    guaiFENesin (MUCINEX) 12 hr tablet 1,200 mg  1,200 mg Oral Q12H CHRISTEN    ipratropium (ATROVENT) 0 02 % inhalation solution 0 5 mg  0 5 mg Nebulization Q6H    levalbuterol (XOPENEX) inhalation solution 1 25 mg  1 25 mg Nebulization Q6H    loperamide (IMODIUM) capsule 2 mg  2 mg Oral TID PRN    potassium chloride 20 mEq in dextrose 5% lactated ringer's 1,000 mL infusion   Intravenous Continuous    remdesivir (Veklury) 100 mg in sodium chloride 0 9 % 250 mL IVPB  100 mg Intravenous Q24H    tamsulosin (FLOMAX) capsule 0 4 mg  0 4 mg Oral Daily With Dinner    zinc sulfate (ZINCATE) capsule 220 mg  220 mg Oral Daily       VTE Pharmacologic Prophylaxis: Enoxaparin (Lovenox)  VTE Mechanical Prophylaxis: sequential compression device    Portions of the record may have been created with voice recognition software  Occasional wrong word or "sound a like" substitutions may have occurred due to the inherent limitations of voice recognition software    Read the chart carefully and recognize, using context, where substitutions have occurred   ==  Madelin Castillo MD  [address]  Internal Medicine Residency PGY-1

## 2021-03-16 NOTE — PLAN OF CARE
Problem: PAIN - ADULT  Goal: Verbalizes/displays adequate comfort level or baseline comfort level  Description: Interventions:  - Encourage patient to monitor pain and request assistance  - Assess pain using appropriate pain scale  - Administer analgesics based on type and severity of pain and evaluate response  - Implement non-pharmacological measures as appropriate and evaluate response  - Consider cultural and social influences on pain and pain management  - Notify physician/advanced practitioner if interventions unsuccessful or patient reports new pain  Outcome: Progressing     Problem: INFECTION - ADULT  Goal: Absence or prevention of progression during hospitalization  Description: INTERVENTIONS:  - Assess and monitor for signs and symptoms of infection  - Monitor lab/diagnostic results  - Monitor all insertion sites, i e  indwelling lines, tubes, and drains  - Monitor endotracheal if appropriate and nasal secretions for changes in amount and color  - [place] appropriate cooling/warming therapies per order  - Administer medications as ordered  - Instruct and encourage patient and family to use good hand hygiene technique  - Identify and instruct in appropriate isolation precautions for identified infection/condition  Outcome: Progressing  Goal: Absence of fever/infection during neutropenic period  Description: INTERVENTIONS:  - Monitor WBC    Outcome: Progressing     Problem: SAFETY ADULT  Goal: Patient will remain free of falls  Description: INTERVENTIONS:  - Assess patient frequently for physical needs  -  Identify cognitive and physical deficits and behaviors that affect risk of falls    -  [place] fall precautions as indicated by assessment   - Educate patient/family on patient safety including physical limitations  - Instruct patient to call for assistance with activity based on assessment  - Modify environment to reduce risk of injury  - Consider OT/PT consult to assist with strengthening/mobility  Outcome: Progressing  Goal: Maintain or return to baseline ADL function  Description: INTERVENTIONS:  -  Assess patient's ability to carry out ADLs; assess patient's baseline for ADL function and identify physical deficits which impact ability to perform ADLs (bathing, care of mouth/teeth, toileting, grooming, dressing, etc )  - Assess/evaluate cause of self-care deficits   - Assess range of motion  - Assess patient's mobility; develop plan if impaired  - Assess patient's need for assistive devices and provide as appropriate  - Encourage maximum independence but intervene and supervise when necessary  - Involve family in performance of ADLs  - Assess for home care needs following discharge   - Consider OT consult to assist with ADL evaluation and planning for discharge  - Provide patient education as appropriate  Outcome: Progressing  Goal: Maintain or return mobility status to optimal level  Description: INTERVENTIONS:  - Assess patient's baseline mobility status (ambulation, transfers, stairs, etc )    - Identify cognitive and physical deficits and behaviors that affect mobility  - Identify mobility aids required to assist with transfers and/or ambulation (gait belt, sit-to-stand, lift, walker, cane, etc )  - [place] fall precautions as indicated by assessment  - Record patient progress and toleration of activity level on Mobility SBAR; progress patient to next Phase/Stage  - Instruct patient to call for assistance with activity based on assessment  - Consider rehabilitation consult to assist with strengthening/weightbearing, etc   Outcome: Progressing     Problem: DISCHARGE PLANNING  Goal: Discharge to home or other facility with appropriate resources  Description: INTERVENTIONS:  - Identify barriers to discharge w/patient and caregiver  - Arrange for needed discharge resources and transportation as appropriate  - Identify discharge learning needs (meds, wound care, etc )  - Arrange for interpretive services to assist at discharge as needed  - Refer to Case Management Department for coordinating discharge planning if the patient needs post-hospital services based on physician/advanced practitioner order or complex needs related to functional status, cognitive ability, or social support system  Outcome: Progressing     Problem: Knowledge Deficit  Goal: Patient/family/caregiver demonstrates understanding of disease process, treatment plan, medications, and discharge instructions  Description: Complete learning assessment and assess knowledge base    Interventions:  - Provide teaching at level of understanding  - Provide teaching via preferred learning methods  Outcome: Progressing

## 2021-03-16 NOTE — RESPIRATORY THERAPY NOTE
RT Protocol Note  Neri Cope Held 79 y o  male MRN: [id number]  Unit/Bed#: -01 Encounter: [id number]    Assessment    Principal Problem:    COVID-19  Active Problems:    Hyperlipidemia    Centrilobular emphysema ([facility] [address] ) - severe    Bilateral iliac artery aneurysm (HCC)    Prediabetes    BPH with obstruction/lower urinary tract symptoms    Essential hypertension    Diarrhea    Hypokalemia    KIRK (acute kidney injury) ([facility] [address] )      Home Pulmonary Medications:  Albuterol  Duoneb  Trelegy       Past Medical History:   Diagnosis Date    BPH (benign prostatic hyperplasia)     COPD (chronic obstructive pulmonary disease) (HCC)     Last Assessed:2016    Diverticulosis     Elevated prostate specific antigen (PSA)     Emphysema (subcutaneous) (surgical) resulting from a procedure     Enlarged prostate with lower urinary tract symptoms (LUTS)     Resolved:2017    Hernia, inguinal, right      Social History     Socioeconomic History    Marital status: /Civil Union     Spouse name: None    Number of children: None    Years of education: None    Highest education level: None   Occupational History    None   Social Needs    Financial resource strain: None    Food insecurity     Worry: None     Inability: None    Transportation needs     Medical: None     Non-medical: None   Tobacco Use    Smoking status: Former Smoker     Packs/day: 1 50     Years: 48 00     Pack years: 72 00     Types: Cigarettes     Start date: 5     Quit date: 2015     Years since quittin 3    Smokeless tobacco: Never Used   Substance and Sexual Activity    Alcohol use: Yes     Frequency: Monthly or less     Drinks per session: 1 or 2     Binge frequency: Never     Comment: rare    Drug use: No    Sexual activity: Yes   Lifestyle    Physical activity     Days per week: None     Minutes per session: None    Stress: None   Relationships    Social connections     Talks on phone: None     Gets together: None Attends Mandaeism service: None     Active member of club or organization: None     Attends meetings of clubs or organizations: None     Relationship status: None    Intimate partner violence     Fear of current or ex partner: None     Emotionally abused: None     Physically abused: None     Forced sexual activity: None   Other Topics Concern    None   Social History Narrative    Advance directive on file       Subjective         Objective    Physical Exam:   Assessment Type: (P) Assess only  General Appearance: (P) Alert, Awake  Respiratory Pattern: (P) Normal  Chest Assessment: (P) Chest expansion symmetrical  Bilateral Breath Sounds: (P) Diminished    Vitals:  Blood pressure 115/68, pulse 104, temperature 98 °F (36 7 °C), resp  rate 18, height 5' 8" (1 727 m), weight 72 6 kg (160 lb), SpO2 91 %  Imaging and other studies: I have personally reviewed pertinent reports  Plan    Respiratory Plan: (P) Discontinue Protocol        Resp Comments: (P) Pt admited with covid-19 and PMX of COPD  Pt takes inhalers at home and has already albuterl [facility] and [place] daily  I will d/c udn aerosol bronchodilatos per protocol

## 2021-03-17 PROBLEM — E87.6 HYPOKALEMIA: Status: RESOLVED | Noted: 2021-03-15 | Resolved: 2021-03-17

## 2021-03-17 PROBLEM — N17.9 AKI (ACUTE KIDNEY INJURY) (HCC): Status: RESOLVED | Noted: 2021-03-15 | Resolved: 2021-03-17

## 2021-03-17 LAB
ALBUMIN SERPL BCP-MCNC: 2.6 G/DL (ref 3.5–5)
ALP SERPL-CCNC: 52 U/L (ref 46–116)
ALT SERPL W P-5'-P-CCNC: 28 U/L (ref 12–78)
ANION GAP SERPL CALCULATED.3IONS-SCNC: 4 MMOL/L (ref 4–13)
AST SERPL W P-5'-P-CCNC: 72 U/L (ref 5–45)
BASOPHILS # BLD AUTO: 0.01 THOUSANDS/ΜL (ref 0–0.1)
BASOPHILS NFR BLD AUTO: 0 % (ref 0–1)
BILIRUB SERPL-MCNC: 0.5 MG/DL (ref 0.2–1)
BUN SERPL-MCNC: 31 MG/DL (ref 5–25)
CALCIUM ALBUM COR SERPL-MCNC: 8.9 MG/DL (ref 8.3–10.1)
CALCIUM SERPL-MCNC: 7.8 MG/DL (ref 8.3–10.1)
CHLORIDE SERPL-SCNC: 110 MMOL/L (ref 100–108)
CK MB SERPL-MCNC: 11.2 NG/ML (ref 0–5)
CK MB SERPL-MCNC: 4.8 NG/ML (ref 0–5)
CK MB SERPL-MCNC: 9.9 NG/ML (ref 0–5)
CK MB SERPL-MCNC: <1 % (ref 0–2.5)
CK SERPL-CCNC: 1398 U/L (ref 39–308)
CK SERPL-CCNC: 1746 U/L (ref 39–308)
CK SERPL-CCNC: 1860 U/L (ref 39–308)
CO2 SERPL-SCNC: 28 MMOL/L (ref 21–32)
CREAT SERPL-MCNC: 1.16 MG/DL (ref 0.6–1.3)
CRP SERPL QL: 82.9 MG/L
D DIMER PPP FEU-MCNC: 0.87 UG/ML FEU
EOSINOPHIL # BLD AUTO: 0 THOUSAND/ΜL (ref 0–0.61)
EOSINOPHIL NFR BLD AUTO: 0 % (ref 0–6)
ERYTHROCYTE [DISTWIDTH] IN BLOOD BY AUTOMATED COUNT: 13.8 % (ref 11.6–15.1)
FERRITIN SERPL-MCNC: 1070 NG/ML (ref 8–388)
GFR SERPL CREATININE-BSD FRML MDRD: 63 ML/MIN/1.73SQ M
GLUCOSE SERPL-MCNC: 259 MG/DL (ref 65–140)
HCT VFR BLD AUTO: 39.6 % (ref 36.5–49.3)
HGB BLD-MCNC: 12.7 G/DL (ref 12–17)
IMM GRANULOCYTES # BLD AUTO: 0.07 THOUSAND/UL (ref 0–0.2)
IMM GRANULOCYTES NFR BLD AUTO: 1 % (ref 0–2)
LYMPHOCYTES # BLD AUTO: 1.27 THOUSANDS/ΜL (ref 0.6–4.47)
LYMPHOCYTES NFR BLD AUTO: 12 % (ref 14–44)
MCH RBC QN AUTO: 28.9 PG (ref 26.8–34.3)
MCHC RBC AUTO-ENTMCNC: 32.1 G/DL (ref 31.4–37.4)
MCV RBC AUTO: 90 FL (ref 82–98)
MONOCYTES # BLD AUTO: 0.95 THOUSAND/ΜL (ref 0.17–1.22)
MONOCYTES NFR BLD AUTO: 9 % (ref 4–12)
NEUTROPHILS # BLD AUTO: 8.41 THOUSANDS/ΜL (ref 1.85–7.62)
NEUTS SEG NFR BLD AUTO: 78 % (ref 43–75)
NRBC BLD AUTO-RTO: 0 /100 WBCS
PLATELET # BLD AUTO: 204 THOUSANDS/UL (ref 149–390)
PMV BLD AUTO: 11.4 FL (ref 8.9–12.7)
POTASSIUM SERPL-SCNC: 4.6 MMOL/L (ref 3.5–5.3)
PROCALCITONIN SERPL-MCNC: 0.43 NG/ML
PROT SERPL-MCNC: 5.4 G/DL (ref 6.4–8.2)
RBC # BLD AUTO: 4.39 MILLION/UL (ref 3.88–5.62)
SODIUM SERPL-SCNC: 142 MMOL/L (ref 136–145)
TROPONIN I SERPL-MCNC: 0.11 NG/ML
WBC # BLD AUTO: 10.71 THOUSAND/UL (ref 4.31–10.16)

## 2021-03-17 PROCEDURE — 84145 PROCALCITONIN (PCT): CPT | Performed by: STUDENT IN AN ORGANIZED HEALTH CARE EDUCATION/TRAINING PROGRAM

## 2021-03-17 PROCEDURE — 82550 ASSAY OF CK (CPK): CPT | Performed by: STUDENT IN AN ORGANIZED HEALTH CARE EDUCATION/TRAINING PROGRAM

## 2021-03-17 PROCEDURE — 80053 COMPREHEN METABOLIC PANEL: CPT | Performed by: STUDENT IN AN ORGANIZED HEALTH CARE EDUCATION/TRAINING PROGRAM

## 2021-03-17 PROCEDURE — 85025 COMPLETE CBC W/AUTO DIFF WBC: CPT | Performed by: STUDENT IN AN ORGANIZED HEALTH CARE EDUCATION/TRAINING PROGRAM

## 2021-03-17 PROCEDURE — 82553 CREATINE MB FRACTION: CPT | Performed by: STUDENT IN AN ORGANIZED HEALTH CARE EDUCATION/TRAINING PROGRAM

## 2021-03-17 PROCEDURE — 84484 ASSAY OF TROPONIN QUANT: CPT | Performed by: STUDENT IN AN ORGANIZED HEALTH CARE EDUCATION/TRAINING PROGRAM

## 2021-03-17 PROCEDURE — 85379 FIBRIN DEGRADATION QUANT: CPT | Performed by: STUDENT IN AN ORGANIZED HEALTH CARE EDUCATION/TRAINING PROGRAM

## 2021-03-17 PROCEDURE — 82728 ASSAY OF FERRITIN: CPT | Performed by: STUDENT IN AN ORGANIZED HEALTH CARE EDUCATION/TRAINING PROGRAM

## 2021-03-17 PROCEDURE — 86140 C-REACTIVE PROTEIN: CPT | Performed by: STUDENT IN AN ORGANIZED HEALTH CARE EDUCATION/TRAINING PROGRAM

## 2021-03-17 PROCEDURE — 99233 SBSQ HOSP IP/OBS HIGH 50: CPT | Performed by: INTERNAL MEDICINE

## 2021-03-17 RX ORDER — DOXYCYCLINE HYCLATE 100 MG/1
100 CAPSULE ORAL EVERY 12 HOURS
Status: DISCONTINUED | OUTPATIENT
Start: 2021-03-17 | End: 2021-03-22 | Stop reason: HOSPADM

## 2021-03-17 RX ADMIN — Medication 2000 UNITS: at 09:19

## 2021-03-17 RX ADMIN — REMDESIVIR 100 MG: 100 INJECTION, POWDER, LYOPHILIZED, FOR SOLUTION INTRAVENOUS at 15:30

## 2021-03-17 RX ADMIN — ZINC SULFATE 220 MG (50 MG) CAPSULE 220 MG: CAPSULE at 09:19

## 2021-03-17 RX ADMIN — ENOXAPARIN SODIUM 30 MG: 30 INJECTION SUBCUTANEOUS at 05:27

## 2021-03-17 RX ADMIN — TAMSULOSIN HYDROCHLORIDE 0.4 MG: 0.4 CAPSULE ORAL at 17:08

## 2021-03-17 RX ADMIN — DOXYCYCLINE 100 MG: 100 CAPSULE ORAL at 22:39

## 2021-03-17 RX ADMIN — DOXYCYCLINE 100 MG: 100 CAPSULE ORAL at 10:59

## 2021-03-17 RX ADMIN — POTASSIUM CHLORIDE: 2 INJECTION, SOLUTION, CONCENTRATE INTRAVENOUS at 19:16

## 2021-03-17 RX ADMIN — GUAIFENESIN 1200 MG: 600 TABLET, EXTENDED RELEASE ORAL at 20:23

## 2021-03-17 RX ADMIN — ATORVASTATIN CALCIUM 20 MG: 20 TABLET, FILM COATED ORAL at 17:08

## 2021-03-17 RX ADMIN — ENOXAPARIN SODIUM 30 MG: 30 INJECTION SUBCUTANEOUS at 17:09

## 2021-03-17 RX ADMIN — GUAIFENESIN 1200 MG: 600 TABLET, EXTENDED RELEASE ORAL at 09:19

## 2021-03-17 RX ADMIN — CEFTRIAXONE 1000 MG: 2 INJECTION, POWDER, FOR SOLUTION INTRAMUSCULAR; INTRAVENOUS at 11:25

## 2021-03-17 RX ADMIN — OXYCODONE HYDROCHLORIDE AND ACETAMINOPHEN 1000 MG: 500 TABLET ORAL at 17:09

## 2021-03-17 RX ADMIN — OXYCODONE HYDROCHLORIDE AND ACETAMINOPHEN 1000 MG: 500 TABLET ORAL at 09:19

## 2021-03-17 RX ADMIN — LOPERAMIDE HYDROCHLORIDE 2 MG: 2 CAPSULE ORAL at 09:19

## 2021-03-17 RX ADMIN — FINASTERIDE 5 MG: 5 TABLET, FILM COATED ORAL at 09:19

## 2021-03-17 RX ADMIN — FLUTICASONE FUROATE AND VILANTEROL TRIFENATATE 1 PUFF: 100; 25 POWDER RESPIRATORY (INHALATION) at 09:23

## 2021-03-17 RX ADMIN — DEXAMETHASONE 6 MG: 2 TABLET ORAL at 09:24

## 2021-03-17 RX ADMIN — POTASSIUM CHLORIDE: 2 INJECTION, SOLUTION, CONCENTRATE INTRAVENOUS at 04:07

## 2021-03-17 NOTE — PLAN OF CARE
Problem: PAIN - ADULT  Goal: Verbalizes/displays adequate comfort level or baseline comfort level  Description: Interventions:  - Encourage patient to monitor pain and request assistance  - Assess pain using appropriate pain scale  - Administer analgesics based on type and severity of pain and evaluate response  - Implement non-pharmacological measures as appropriate and evaluate response  - Consider cultural and social influences on pain and pain management  - Notify physician/advanced practitioner if interventions unsuccessful or patient reports new pain  Outcome: Progressing     Problem: INFECTION - ADULT  Goal: Absence or prevention of progression during hospitalization  Description: INTERVENTIONS:  - Assess and monitor for signs and symptoms of infection  - Monitor lab/diagnostic results  - Monitor all insertion sites, i e  indwelling lines, tubes, and drains  - Monitor endotracheal if appropriate and nasal secretions for changes in amount and color  - [place] appropriate cooling/warming therapies per order  - Administer medications as ordered  - Instruct and encourage patient and family to use good hand hygiene technique  - Identify and instruct in appropriate isolation precautions for identified infection/condition  Outcome: Progressing  Goal: Absence of fever/infection during neutropenic period  Description: INTERVENTIONS:  - Monitor WBC    Outcome: Progressing     Problem: SAFETY ADULT  Goal: Patient will remain free of falls  Description: INTERVENTIONS:  - Assess patient frequently for physical needs  -  Identify cognitive and physical deficits and behaviors that affect risk of falls    -  [place] fall precautions as indicated by assessment   - Educate patient/family on patient safety including physical limitations  - Instruct patient to call for assistance with activity based on assessment  - Modify environment to reduce risk of injury  - Consider OT/PT consult to assist with strengthening/mobility  Outcome: Progressing  Goal: Maintain or return to baseline ADL function  Description: INTERVENTIONS:  -  Assess patient's ability to carry out ADLs; assess patient's baseline for ADL function and identify physical deficits which impact ability to perform ADLs (bathing, care of mouth/teeth, toileting, grooming, dressing, etc )  - Assess/evaluate cause of self-care deficits   - Assess range of motion  - Assess patient's mobility; develop plan if impaired  - Assess patient's need for assistive devices and provide as appropriate  - Encourage maximum independence but intervene and supervise when necessary  - Involve family in performance of ADLs  - Assess for home care needs following discharge   - Consider OT consult to assist with ADL evaluation and planning for discharge  - Provide patient education as appropriate  Outcome: Progressing  Goal: Maintain or return mobility status to optimal level  Description: INTERVENTIONS:  - Assess patient's baseline mobility status (ambulation, transfers, stairs, etc )    - Identify cognitive and physical deficits and behaviors that affect mobility  - Identify mobility aids required to assist with transfers and/or ambulation (gait belt, sit-to-stand, lift, walker, cane, etc )  - [place] fall precautions as indicated by assessment  - Record patient progress and toleration of activity level on Mobility SBAR; progress patient to next Phase/Stage  - Instruct patient to call for assistance with activity based on assessment  - Consider rehabilitation consult to assist with strengthening/weightbearing, etc   Outcome: Progressing     Problem: DISCHARGE PLANNING  Goal: Discharge to home or other facility with appropriate resources  Description: INTERVENTIONS:  - Identify barriers to discharge w/patient and caregiver  - Arrange for needed discharge resources and transportation as appropriate  - Identify discharge learning needs (meds, wound care, etc )  - Arrange for interpretive services to assist at discharge as needed  - Refer to Case Management Department for coordinating discharge planning if the patient needs post-hospital services based on physician/advanced practitioner order or complex needs related to functional status, cognitive ability, or social support system  Outcome: Progressing     Problem: Knowledge Deficit  Goal: Patient/family/caregiver demonstrates understanding of disease process, treatment plan, medications, and discharge instructions  Description: Complete learning assessment and assess knowledge base    Interventions:  - Provide teaching at level of understanding  - Provide teaching via preferred learning methods  Outcome: Progressing

## 2021-03-17 NOTE — PROGRESS NOTES
INTERNAL MEDICINE RESIDENCY PROGRESS NOTE     Name: Enrique Amezcua   Age & Sex: 79 y o  male   MRN: [id number]  Unit/Bed#: -01   Encounter: [id number]  Team: SOD Team C     PATIENT INFORMATION     Name: Rhys Gitelman Held   Age & Sex: 79 y o  male   MRN: [id number]  Hospital Stay Days: 2    ASSESSMENT/PLAN     Principal Problem:    COVID-19  Active Problems:    Hyperlipidemia    Centrilobular emphysema ([address] ) - severe    Bilateral iliac artery aneurysm ([address] )    Prediabetes    BPH with obstruction/lower urinary tract symptoms    Essential hypertension    Diarrhea      * COVID-19  Assessment & Plan  Noted on outpatient testing 3/12/2021  Symptoms began approximately 03/09/2021 with diarrhea, dyspnea on exertion, cough, fever, and nausea  Mild COVID pathway  On 2L NC overnight  CT chest: Small subpleural consolidations and groundglass opacities in the bilateral lower lobes and right middle lobe consistent with COVID pneumonia  Patient had elevated trop and CK on covid protocol labs  Expect both are 2/2 covid and dehydration  Plan:  · Troponin plateaued at 2 61    · Stop trending  · CK downtrending at 1400 <1900   · q8h CK until elevation resolves  · Continue IV hydration  · D-dimer 0 87 < 0 77  · Ferritin 1070 < 1112  · CRP 82 9 < 65 7  · Procalcitonin 0 43 <0 09  · Start ceftriaxone  · Start doxycycline  · PT/OT    Diarrhea  Assessment & Plan  Likely secondary to COVID-19 and contributing to volume depletion/low blood pressure, hypokalemia, KIRK  Normal electrolytes this a m     3/17:  No diarrhea this morning after starting Imodium    Plan:  · Continue Imodium prn  · Continue IV fluids   · Will monitor respiratory status closely given COVID and fluids    Essential hypertension  Assessment & Plan  Will hold on hydrochlorothiazide 25 mg daily secondary to relatively low blood pressure from volume depletion  SBP noted to be  while in the ED  Continue to monitor vital signs      BPH with obstruction/lower urinary tract symptoms  Assessment & Plan  Continue finasteride and tamsulosin  Centrilobular emphysema (HCC) - severe  Assessment & Plan  Will place on [facility] in place of [facility] at this time  Patient did not qualify for home oxygen after completing outpatient testing  However, he does by supplemental oxygen from an outside company  He states that he rarely uses this  Continue albuterol and Atrovent as needed  Continue Mucinex 1200 mg b i d  Continue Xopenex as needed  Hyperlipidemia  Assessment & Plan  Continue atorvastatin 20 mg daily  KIRK (acute kidney injury) (HCC)-resolved as of 3/17/2021  Assessment & Plan  Likely secondary to volume depletion from diarrhea  KIRK resolved on 03/17  Will replete with IV fluids at this time  Continue to monitor vital signs and BMP  Avoid nephrotoxic agents  Avoid large fluctuations in blood pressure  Hypokalemia-resolved as of 3/17/2021  Assessment & Plan  Likely secondary to diarrhea  Improved after starting Imodium  K 4 6 this morning  Repleted while in the ED  Continue to monitor  Disposition:  Currently inpatient  Placed on 2 L overnight as patient desaturated into 83%  SUBJECTIVE     Patient seen and examined  Rounded with nurse  He was placed on 2 L nasal cannula overnight as he desatted into the 83%  No specific complaints this morning other than decreased appetite  Reports productive cough this morning which is not new  Denies any shortness of breath while lying in bed  Diarrhea improved after a receiving Imodium  No diarrhea episodes this morning  Denies any chest pain, nausea, vomiting, abdominal pain, leg pain, lower extremity swelling  No fevers, chills      OBJECTIVE     Vitals:    03/16/21 0738 03/16/21 1630 03/16/21 2142 03/16/21 2152   BP: 121/68 115/68 112/67    BP Location: Left arm      Pulse: 104  92    Resp: 18      Temp: 100 3 °F (37 9 °C) 98 °F (36 7 °C) 99 7 °F (37 6 °C)    TempSrc: Oral      SpO2: 91% 93% 93%   Weight:       Height:          Temperature:   Temp (24hrs), Av 9 °F (37 2 °C), Min:98 °F (36 7 °C), Max:99 7 °F (37 6 °C)    Temperature: 99 7 °F (37 6 °C)  Intake & Output:  I/O       03/15 0701 - /16 0700  0701 -  0700  07 -  0700    P  O   0     Total Intake(mL/kg)  0 (0)     Net  0            Unmeasured Urine Occurrence 4 x 1 x     Unmeasured Stool Occurrence 4 x 0 x         Weights:   IBW: 68 4 kg    Body mass index is 24 33 kg/m²  Weight (last 2 days)     Date/Time   Weight    03/15/21 1052   72 6 (160)            Physical Exam  Vitals signs reviewed  Constitutional:       General: He is not in acute distress  Appearance: Normal appearance  He is normal weight  He is not ill-appearing, toxic-appearing or diaphoretic  HENT:      Head: Normocephalic and atraumatic  Mouth/Throat:      Mouth: Mucous membranes are moist       Pharynx: Oropharynx is clear  No oropharyngeal exudate  Eyes:      General: No scleral icterus  Conjunctiva/sclera: Conjunctivae normal    Neck:      Musculoskeletal: Normal range of motion  Cardiovascular:      Rate and Rhythm: Normal rate and regular rhythm  Pulses: Normal pulses  Heart sounds: Normal heart sounds  No murmur  No friction rub  No gallop  Pulmonary:      Effort: Pulmonary effort is normal  No respiratory distress  Breath sounds: No stridor  Rales (bibasilar) present  No wheezing or rhonchi  Abdominal:      General: Abdomen is flat  Bowel sounds are normal  There is no distension  Palpations: Abdomen is soft  Tenderness: There is no abdominal tenderness  There is no guarding  Musculoskeletal: Normal range of motion  Right lower leg: No edema  Left lower leg: No edema  Skin:     General: Skin is warm and dry  Capillary Refill: Capillary refill takes less than 2 seconds  Neurological:      Mental Status: He is alert and oriented to person, place, and time        Motor: No weakness  Gait: Gait normal    Psychiatric:         Mood and Affect: Mood normal          Thought Content: Thought content normal        LABORATORY DATA     Labs: I have personally reviewed pertinent reports  Results from last 7 days   Lab Units 03/17/21  0551 03/16/21  0509 03/15/21  1142   WBC Thousand/uL 10 71* 7 98 6 59   HEMOGLOBIN g/dL 12 7 13 9 14 4   HEMATOCRIT % 39 6 43 5 43 8   PLATELETS Thousands/uL 204 196 177   NEUTROS PCT % 78* 81* 81*   MONOS PCT % 9 10 10      Results from last 7 days   Lab Units 03/17/21  0551 03/16/21  0509 03/15/21  1955 03/15/21  1142   POTASSIUM mmol/L 4 6 3 7 3 5 3 1*   CHLORIDE mmol/L 110* 102 100 101   CO2 mmol/L 28 27 30 30   BUN mg/dL 31* 31* 35* 30*   CREATININE mg/dL 1 16 1 41* 1 79* 1 61*   CALCIUM mg/dL 7 8* 8 4 8 2* 8 2*   ALK PHOS U/L 52 68  --  69   ALT U/L 28 28  --  26   AST U/L 72* 61*  --  40     Results from last 7 days   Lab Units 03/15/21  1142   MAGNESIUM mg/dL 2 3                  Results from last 7 days   Lab Units 03/17/21  0028 03/16/21  2145 03/16/21  1837   TROPONIN I ng/mL 0 11* 0 11* 0 10*       IMAGING & DIAGNOSTIC TESTING     Radiology Results: I have personally reviewed pertinent reports  Xr Chest Portable    Result Date: 3/15/2021  Impression: No acute cardiopulmonary disease  Findings are stable Workstation performed: [id number]     Ct Chest Wo Contrast    Result Date: 3/16/2021  Impression: Small subpleural consolidations and groundglass opacities in the bilateral lower lobes and right middle lobe  In the setting of clinically suspected/proven COVID-19, the above lung parenchymal findings on CT indicate intermediate confidence level for COVID-19  Unchanged severe emphysema  Workstation performed: [id number]     Other Diagnostic Testing: I have personally reviewed pertinent reports      ACTIVE MEDICATIONS     Current Facility-Administered Medications   Medication Dose Route Frequency    albuterol (PROVENTIL HFA,VENTOLIN HFA) inhaler 2 puff  2 puff Inhalation Q6H PRN    ascorbic acid (VITAMIN C) tablet 1,000 mg  1,000 mg Oral BID    atorvastatin (LIPITOR) tablet 20 mg  20 mg Oral Daily With Dinner    cefTRIAXone (ROCEPHIN) 1,000 mg in dextrose 5 % 50 mL IVPB  1,000 mg Intravenous Q24H    cholecalciferol (VITAMIN D3) tablet 2,000 Units  2,000 Units Oral Daily    dexamethasone (DECADRON) tablet 6 mg  6 mg Oral Daily    doxycycline hyclate (VIBRAMYCIN) capsule 100 mg  100 mg Oral Q12H    enoxaparin (LOVENOX) subcutaneous injection 30 mg  30 mg Subcutaneous Q12H    finasteride (PROSCAR) tablet 5 mg  5 mg Oral Daily    fluticasone-vilanterol (BREO ELLIPTA) 100-25 mcg/inh inhaler 1 puff  1 puff Inhalation Daily    guaiFENesin (MUCINEX) 12 hr tablet 1,200 mg  1,200 mg Oral Q12H CHRISTEN    loperamide (IMODIUM) capsule 2 mg  2 mg Oral TID PRN    potassium chloride 20 mEq in dextrose 5% lactated ringer's 1,000 mL infusion   Intravenous Continuous    remdesivir (Veklury) 100 mg in sodium chloride 0 9 % 250 mL IVPB  100 mg Intravenous Q24H    tamsulosin (FLOMAX) capsule 0 4 mg  0 4 mg Oral Daily With Dinner    zinc sulfate (ZINCATE) capsule 220 mg  220 mg Oral Daily       VTE Pharmacologic Prophylaxis: Enoxaparin (Lovenox)  VTE Mechanical Prophylaxis: sequential compression device    Portions of the record may have been created with voice recognition software  Occasional wrong word or "sound a like" substitutions may have occurred due to the inherent limitations of voice recognition software    Read the chart carefully and recognize, using context, where substitutions have occurred   ==  Kahlil Vaughn, [facility] [employer]  Internal Medicine Residency PGY-1

## 2021-03-17 NOTE — PLAN OF CARE
Problem: PAIN - ADULT  Goal: Verbalizes/displays adequate comfort level or baseline comfort level  Description: Interventions:  - Encourage patient to monitor pain and request assistance  - Assess pain using appropriate pain scale  - Administer analgesics based on type and severity of pain and evaluate response  - Implement non-pharmacological measures as appropriate and evaluate response  - Consider cultural and social influences on pain and pain management  - Notify physician/advanced practitioner if interventions unsuccessful or patient reports new pain  Outcome: Progressing     Problem: INFECTION - ADULT  Goal: Absence or prevention of progression during hospitalization  Description: INTERVENTIONS:  - Assess and monitor for signs and symptoms of infection  - Monitor lab/diagnostic results  - Monitor all insertion sites, i e  indwelling lines, tubes, and drains  - Monitor endotracheal if appropriate and nasal secretions for changes in amount and color  - [place] appropriate cooling/warming therapies per order  - Administer medications as ordered  - Instruct and encourage patient and family to use good hand hygiene technique  - Identify and instruct in appropriate isolation precautions for identified infection/condition  Outcome: Progressing  Goal: Absence of fever/infection during neutropenic period  Description: INTERVENTIONS:  - Monitor WBC    Outcome: Progressing     Problem: SAFETY ADULT  Goal: Patient will remain free of falls  Description: INTERVENTIONS:  - Assess patient frequently for physical needs  -  Identify cognitive and physical deficits and behaviors that affect risk of falls    -  [place] fall precautions as indicated by assessment   - Educate patient/family on patient safety including physical limitations  - Instruct patient to call for assistance with activity based on assessment  - Modify environment to reduce risk of injury  - Consider OT/PT consult to assist with strengthening/mobility  Outcome: Progressing  Goal: Maintain or return to baseline ADL function  Description: INTERVENTIONS:  -  Assess patient's ability to carry out ADLs; assess patient's baseline for ADL function and identify physical deficits which impact ability to perform ADLs (bathing, care of mouth/teeth, toileting, grooming, dressing, etc )  - Assess/evaluate cause of self-care deficits   - Assess range of motion  - Assess patient's mobility; develop plan if impaired  - Assess patient's need for assistive devices and provide as appropriate  - Encourage maximum independence but intervene and supervise when necessary  - Involve family in performance of ADLs  - Assess for home care needs following discharge   - Consider OT consult to assist with ADL evaluation and planning for discharge  - Provide patient education as appropriate  Outcome: Progressing  Goal: Maintain or return mobility status to optimal level  Description: INTERVENTIONS:  - Assess patient's baseline mobility status (ambulation, transfers, stairs, etc )    - Identify cognitive and physical deficits and behaviors that affect mobility  - Identify mobility aids required to assist with transfers and/or ambulation (gait belt, sit-to-stand, lift, walker, cane, etc )  - [place] fall precautions as indicated by assessment  - Record patient progress and toleration of activity level on Mobility SBAR; progress patient to next Phase/Stage  - Instruct patient to call for assistance with activity based on assessment  - Consider rehabilitation consult to assist with strengthening/weightbearing, etc   Outcome: Progressing     Problem: DISCHARGE PLANNING  Goal: Discharge to home or other facility with appropriate resources  Description: INTERVENTIONS:  - Identify barriers to discharge w/patient and caregiver  - Arrange for needed discharge resources and transportation as appropriate  - Identify discharge learning needs (meds, wound care, etc )  - Arrange for interpretive services to assist at discharge as needed  - Refer to Case Management Department for coordinating discharge planning if the patient needs post-hospital services based on physician/advanced practitioner order or complex needs related to functional status, cognitive ability, or social support system  Outcome: Progressing     Problem: Knowledge Deficit  Goal: Patient/family/caregiver demonstrates understanding of disease process, treatment plan, medications, and discharge instructions  Description: Complete learning assessment and assess knowledge base  Interventions:  - Provide teaching at level of understanding  - Provide teaching via preferred learning methods  Outcome: Progressing     Problem: Potential for Falls  Goal: Patient will remain free of falls  Description: INTERVENTIONS:  - Assess patient frequently for physical needs  -  Identify cognitive and physical deficits and behaviors that affect risk of falls    -  [place] fall precautions as indicated by assessment   - Educate patient/family on patient safety including physical limitations  - Instruct patient to call for assistance with activity based on assessment  - Modify environment to reduce risk of injury  - Consider OT/PT consult to assist with strengthening/mobility  Outcome: Progressing

## 2021-03-18 LAB
ALBUMIN SERPL BCP-MCNC: 2.9 G/DL (ref 3.5–5)
ALP SERPL-CCNC: 58 U/L (ref 46–116)
ALT SERPL W P-5'-P-CCNC: 34 U/L (ref 12–78)
ANION GAP SERPL CALCULATED.3IONS-SCNC: 3 MMOL/L (ref 4–13)
AST SERPL W P-5'-P-CCNC: 84 U/L (ref 5–45)
BASOPHILS # BLD AUTO: 0.01 THOUSANDS/ΜL (ref 0–0.1)
BASOPHILS NFR BLD AUTO: 0 % (ref 0–1)
BILIRUB SERPL-MCNC: 0.5 MG/DL (ref 0.2–1)
BUN SERPL-MCNC: 29 MG/DL (ref 5–25)
CALCIUM ALBUM COR SERPL-MCNC: 8.9 MG/DL (ref 8.3–10.1)
CALCIUM SERPL-MCNC: 8 MG/DL (ref 8.3–10.1)
CHLORIDE SERPL-SCNC: 111 MMOL/L (ref 100–108)
CK MB SERPL-MCNC: 1 % (ref 0–2.5)
CK MB SERPL-MCNC: 1.1 % (ref 0–2.5)
CK MB SERPL-MCNC: 10 NG/ML (ref 0–5)
CK MB SERPL-MCNC: 11.4 NG/ML (ref 0–5)
CK MB SERPL-MCNC: 11.6 NG/ML (ref 0–5)
CK MB SERPL-MCNC: 9.5 NG/ML (ref 0–5)
CK MB SERPL-MCNC: <1 % (ref 0–2.5)
CK MB SERPL-MCNC: <1 % (ref 0–2.5)
CK SERPL-CCNC: 1437 U/L (ref 39–308)
CK SERPL-CCNC: 1568 U/L (ref 39–308)
CK SERPL-CCNC: 941 U/L (ref 39–308)
CK SERPL-CCNC: 951 U/L (ref 39–308)
CO2 SERPL-SCNC: 29 MMOL/L (ref 21–32)
CREAT SERPL-MCNC: 1.03 MG/DL (ref 0.6–1.3)
CRP SERPL QL: 94.8 MG/L
D DIMER PPP FEU-MCNC: 0.71 UG/ML FEU
EOSINOPHIL # BLD AUTO: 0 THOUSAND/ΜL (ref 0–0.61)
EOSINOPHIL NFR BLD AUTO: 0 % (ref 0–6)
ERYTHROCYTE [DISTWIDTH] IN BLOOD BY AUTOMATED COUNT: 13.7 % (ref 11.6–15.1)
FERRITIN SERPL-MCNC: 826 NG/ML (ref 8–388)
GFR SERPL CREATININE-BSD FRML MDRD: 73 ML/MIN/1.73SQ M
GLUCOSE SERPL-MCNC: 156 MG/DL (ref 65–140)
HCT VFR BLD AUTO: 41.8 % (ref 36.5–49.3)
HGB BLD-MCNC: 13.3 G/DL (ref 12–17)
IMM GRANULOCYTES # BLD AUTO: 0.05 THOUSAND/UL (ref 0–0.2)
IMM GRANULOCYTES NFR BLD AUTO: 1 % (ref 0–2)
LYMPHOCYTES # BLD AUTO: 0.65 THOUSANDS/ΜL (ref 0.6–4.47)
LYMPHOCYTES NFR BLD AUTO: 7 % (ref 14–44)
MCH RBC QN AUTO: 29.4 PG (ref 26.8–34.3)
MCHC RBC AUTO-ENTMCNC: 31.8 G/DL (ref 31.4–37.4)
MCV RBC AUTO: 93 FL (ref 82–98)
MONOCYTES # BLD AUTO: 0.68 THOUSAND/ΜL (ref 0.17–1.22)
MONOCYTES NFR BLD AUTO: 8 % (ref 4–12)
NEUTROPHILS # BLD AUTO: 7.53 THOUSANDS/ΜL (ref 1.85–7.62)
NEUTS SEG NFR BLD AUTO: 84 % (ref 43–75)
NRBC BLD AUTO-RTO: 0 /100 WBCS
PLATELET # BLD AUTO: 245 THOUSANDS/UL (ref 149–390)
PMV BLD AUTO: 11.1 FL (ref 8.9–12.7)
POTASSIUM SERPL-SCNC: 3.9 MMOL/L (ref 3.5–5.3)
PROCALCITONIN SERPL-MCNC: 0.39 NG/ML
PROT SERPL-MCNC: 5.9 G/DL (ref 6.4–8.2)
RBC # BLD AUTO: 4.52 MILLION/UL (ref 3.88–5.62)
SODIUM SERPL-SCNC: 143 MMOL/L (ref 136–145)
WBC # BLD AUTO: 8.92 THOUSAND/UL (ref 4.31–10.16)

## 2021-03-18 PROCEDURE — 82550 ASSAY OF CK (CPK): CPT | Performed by: STUDENT IN AN ORGANIZED HEALTH CARE EDUCATION/TRAINING PROGRAM

## 2021-03-18 PROCEDURE — 82553 CREATINE MB FRACTION: CPT | Performed by: STUDENT IN AN ORGANIZED HEALTH CARE EDUCATION/TRAINING PROGRAM

## 2021-03-18 PROCEDURE — 86140 C-REACTIVE PROTEIN: CPT | Performed by: STUDENT IN AN ORGANIZED HEALTH CARE EDUCATION/TRAINING PROGRAM

## 2021-03-18 PROCEDURE — 85025 COMPLETE CBC W/AUTO DIFF WBC: CPT | Performed by: STUDENT IN AN ORGANIZED HEALTH CARE EDUCATION/TRAINING PROGRAM

## 2021-03-18 PROCEDURE — 80053 COMPREHEN METABOLIC PANEL: CPT | Performed by: STUDENT IN AN ORGANIZED HEALTH CARE EDUCATION/TRAINING PROGRAM

## 2021-03-18 PROCEDURE — 85379 FIBRIN DEGRADATION QUANT: CPT | Performed by: STUDENT IN AN ORGANIZED HEALTH CARE EDUCATION/TRAINING PROGRAM

## 2021-03-18 PROCEDURE — 99233 SBSQ HOSP IP/OBS HIGH 50: CPT | Performed by: INTERNAL MEDICINE

## 2021-03-18 PROCEDURE — 84145 PROCALCITONIN (PCT): CPT | Performed by: STUDENT IN AN ORGANIZED HEALTH CARE EDUCATION/TRAINING PROGRAM

## 2021-03-18 PROCEDURE — 82728 ASSAY OF FERRITIN: CPT | Performed by: STUDENT IN AN ORGANIZED HEALTH CARE EDUCATION/TRAINING PROGRAM

## 2021-03-18 RX ADMIN — GUAIFENESIN 1200 MG: 600 TABLET, EXTENDED RELEASE ORAL at 20:33

## 2021-03-18 RX ADMIN — CEFTRIAXONE 1000 MG: 2 INJECTION, POWDER, FOR SOLUTION INTRAMUSCULAR; INTRAVENOUS at 10:51

## 2021-03-18 RX ADMIN — FLUTICASONE FUROATE AND VILANTEROL TRIFENATATE 1 PUFF: 100; 25 POWDER RESPIRATORY (INHALATION) at 09:04

## 2021-03-18 RX ADMIN — DOXYCYCLINE 100 MG: 100 CAPSULE ORAL at 11:00

## 2021-03-18 RX ADMIN — GUAIFENESIN 1200 MG: 600 TABLET, EXTENDED RELEASE ORAL at 09:04

## 2021-03-18 RX ADMIN — ZINC SULFATE 220 MG (50 MG) CAPSULE 220 MG: CAPSULE at 09:04

## 2021-03-18 RX ADMIN — POTASSIUM CHLORIDE: 2 INJECTION, SOLUTION, CONCENTRATE INTRAVENOUS at 06:17

## 2021-03-18 RX ADMIN — ATORVASTATIN CALCIUM 20 MG: 20 TABLET, FILM COATED ORAL at 17:03

## 2021-03-18 RX ADMIN — OXYCODONE HYDROCHLORIDE AND ACETAMINOPHEN 1000 MG: 500 TABLET ORAL at 17:03

## 2021-03-18 RX ADMIN — FINASTERIDE 5 MG: 5 TABLET, FILM COATED ORAL at 09:03

## 2021-03-18 RX ADMIN — REMDESIVIR 100 MG: 100 INJECTION, POWDER, LYOPHILIZED, FOR SOLUTION INTRAVENOUS at 14:55

## 2021-03-18 RX ADMIN — DEXAMETHASONE 6 MG: 2 TABLET ORAL at 09:03

## 2021-03-18 RX ADMIN — ENOXAPARIN SODIUM 30 MG: 30 INJECTION SUBCUTANEOUS at 05:27

## 2021-03-18 RX ADMIN — Medication 2000 UNITS: at 09:04

## 2021-03-18 RX ADMIN — OXYCODONE HYDROCHLORIDE AND ACETAMINOPHEN 1000 MG: 500 TABLET ORAL at 09:03

## 2021-03-18 RX ADMIN — POTASSIUM CHLORIDE: 2 INJECTION, SOLUTION, CONCENTRATE INTRAVENOUS at 17:47

## 2021-03-18 RX ADMIN — ENOXAPARIN SODIUM 30 MG: 30 INJECTION SUBCUTANEOUS at 17:03

## 2021-03-18 RX ADMIN — DOXYCYCLINE 100 MG: 100 CAPSULE ORAL at 22:14

## 2021-03-18 RX ADMIN — TAMSULOSIN HYDROCHLORIDE 0.4 MG: 0.4 CAPSULE ORAL at 17:03

## 2021-03-18 NOTE — PROGRESS NOTES
INTERNAL MEDICINE RESIDENCY PROGRESS NOTE     Name: Migue Smith   Age & Sex: 79 y o  male   MRN: [id number]  Unit/Bed#: -01   Encounter: [id number]  Team: SOD Team C     PATIENT INFORMATION     Name: Myra Justice   Age & Sex: 79 y o  male   MRN: [id number]  Hospital Stay Days: 3    ASSESSMENT/PLAN     Principal Problem:    COVID-19  Active Problems:    Hyperlipidemia    Centrilobular emphysema ([address] ) - severe    Bilateral iliac artery aneurysm (HCC)    Prediabetes    BPH with obstruction/lower urinary tract symptoms    Essential hypertension    Diarrhea      * COVID-19  Assessment & Plan  Noted on outpatient testing 3/12/2021  Symptoms began approximately 03/09/2021 with diarrhea, dyspnea on exertion, cough, fever, and nausea  Mild COVID pathway  Remained on 2 L nasal cannula  CT chest: Small subpleural consolidations and groundglass opacities in the bilateral lower lobes and right middle lobe consistent with COVID pneumonia  Patient had elevated trop and CK on covid protocol labs  Expect both are 2/2 covid and dehydration  Plan:  · Troponin plateaued at 9 19    · Stop trending  · CK downtrending at 1437 < 1568 < 1746 < 1860 < 1400 <1900    · q8h CK until elevation resolves  · Continue IV hydration  · D-dimer 0 71 < 0 87 < 0 77  · Ferritin 826 < 1070 < 1112  · CRP 94 8 < 82 9 < 65 7  · Procalcitonin  < 0 43 <0 09  · Continue ceftriaxone 1 g Q 24 hours  · Continue doxycycline 100 mg Q 12 hours  · Continue remdesivir 100 mg Q 24 hours (3/5 complete)  · Continue dexamethasone 6 mg daily (day 2)    Diarrhea  Assessment & Plan  Likely secondary to COVID-19 and contributing to volume depletion/low blood pressure, hypokalemia, KIRK  Normal electrolytes this maryam Bautista Reports somewhat formed stools now      Plan:  · Continue Imodium prn  · Continue IV fluids   · Will monitor respiratory status closely given COVID and fluids    Essential hypertension  Assessment & Plan  Will hold on hydrochlorothiazide 25 mg daily secondary to relatively low blood pressure from volume depletion  SBP noted to be  while in the ED  Continue to monitor vital signs  BPH with obstruction/lower urinary tract symptoms  Assessment & Plan  Continue finasteride and tamsulosin  Centrilobular emphysema (HCC) - severe  Assessment & Plan  Will place on [place] in place of [facility] at this time  Patient did not qualify for home oxygen after completing outpatient testing  However, he does by supplemental oxygen from an outside company  He states that he rarely uses this  Continue albuterol and Atrovent as needed  Continue Mucinex 1200 mg b i d  Continue Xopenex as needed  Hyperlipidemia  Assessment & Plan  Continue atorvastatin 20 mg daily  KIRK (acute kidney injury) (HCC)-resolved as of 3/17/2021  Assessment & Plan  Likely secondary to volume depletion from diarrhea  KIRK resolved on 03/17  Will replete with IV fluids at this time  Continue to monitor vital signs and BMP  Avoid nephrotoxic agents  Avoid large fluctuations in blood pressure  Hypokalemia-resolved as of 3/17/2021  Assessment & Plan  Likely secondary to diarrhea  Improved after starting Imodium  K 4 6 this morning  Repleted while in the ED  Continue to monitor  Disposition:  Continue inpatient, continue IV antibiotics for COVID-19 pneumonia  SUBJECTIVE     Patient seen and examined  No acute events overnight  Rounded with nurse  Patient remained on 2 L nasal cannula  No specific complaints this morning  Patient denies any fevers, chills, chest pain, shortness of breath, abdominal pain, nausea, vomiting, constipation, diarrhea, numbness/tingling  Denies productive cough this morning  Reports improved appetite  Reports stools are more formed now  Patient was started on steroids and antibiotics yesterday  Wants to go home      OBJECTIVE     Vitals:    03/16/21 1630 03/16/21 2142 03/16/21 2152 03/17/21 2216   BP: 115/68 112/67  103/69   BP Location:       Pulse:  92  77   Resp:       Temp: 98 °F (36 7 °C) 99 7 °F (37 6 °C)  97 8 °F (36 6 °C)   TempSrc:       SpO2:  93% 93% 96%   Weight:       Height:          Temperature:   Temp (24hrs), Av 8 °F (36 6 °C), Min:97 8 °F (36 6 °C), Max:97 8 °F (36 6 °C)    Temperature: 97 8 °F (36 6 °C)  Intake & Output:  I/O        0701 - / 0700  07 -  0700  07 -  0700    P  O  0      I V  (mL/kg)  1101 7 (15 2)     Total Intake(mL/kg) 0 (0) 1101 7 (15 2)     Net 0 +1101 7            Unmeasured Urine Occurrence 1 x 3 x     Unmeasured Stool Occurrence 0 x 2 x         Weights:   IBW: 68 4 kg    Body mass index is 24 33 kg/m²  Weight (last 2 days)     None        Physical Exam  Constitutional:       General: He is not in acute distress  Appearance: Normal appearance  He is not ill-appearing, toxic-appearing or diaphoretic  HENT:      Head: Normocephalic and atraumatic  Eyes:      General: No scleral icterus  Extraocular Movements: Extraocular movements intact  Conjunctiva/sclera: Conjunctivae normal    Neck:      Musculoskeletal: Normal range of motion  Cardiovascular:      Rate and Rhythm: Normal rate and regular rhythm  Pulses: Normal pulses  Heart sounds: Normal heart sounds  No murmur  No friction rub  Pulmonary:      Effort: Pulmonary effort is normal  No respiratory distress  Breath sounds: No stridor  Rales (R > L basilar crackles) present  No wheezing or rhonchi  Abdominal:      General: Abdomen is flat  Bowel sounds are normal  There is no distension  Palpations: Abdomen is soft  Tenderness: There is no abdominal tenderness  There is no guarding  Musculoskeletal:      Right lower leg: No edema  Left lower leg: No edema  Skin:     General: Skin is warm and dry  Capillary Refill: Capillary refill takes less than 2 seconds  Neurological:      General: No focal deficit present        Mental Status: He is alert and oriented to person, place, and time  Psychiatric:         Mood and Affect: Mood normal          Thought Content: Thought content normal        LABORATORY DATA     Labs: I have personally reviewed pertinent reports  Results from last 7 days   Lab Units 03/18/21  0557 03/17/21  0551 03/16/21  0509   WBC Thousand/uL 8 92 10 71* 7 98   HEMOGLOBIN g/dL 13 3 12 7 13 9   HEMATOCRIT % 41 8 39 6 43 5   PLATELETS Thousands/uL 245 204 196   NEUTROS PCT % 84* 78* 81*   MONOS PCT % 8 9 10      Results from last 7 days   Lab Units 03/18/21  0557 03/17/21  0551 03/16/21  0509   POTASSIUM mmol/L 3 9 4 6 3 7   CHLORIDE mmol/L 111* 110* 102   CO2 mmol/L 29 28 27   BUN mg/dL 29* 31* 31*   CREATININE mg/dL 1 03 1 16 1 41*   CALCIUM mg/dL 8 0* 7 8* 8 4   ALK PHOS U/L 58 52 68   ALT U/L 34 28 28   AST U/L 84* 72* 61*     Results from last 7 days   Lab Units 03/15/21  1142   MAGNESIUM mg/dL 2 3                  Results from last 7 days   Lab Units 03/17/21  0028 03/16/21  2145 03/16/21  1837   TROPONIN I ng/mL 0 11* 0 11* 0 10*       IMAGING & DIAGNOSTIC TESTING     Radiology Results: I have personally reviewed pertinent reports  Xr Chest Portable    Result Date: 3/15/2021  Impression: No acute cardiopulmonary disease  Findings are stable Workstation performed: [id number]     Ct Chest Wo Contrast    Result Date: 3/16/2021  Impression: Small subpleural consolidations and groundglass opacities in the bilateral lower lobes and right middle lobe  In the setting of clinically suspected/proven COVID-19, the above lung parenchymal findings on CT indicate intermediate confidence level for COVID-19  Unchanged severe emphysema  Workstation performed: [id number]     Other Diagnostic Testing: I have personally reviewed pertinent reports      ACTIVE MEDICATIONS     Current Facility-Administered Medications   Medication Dose Route Frequency    albuterol (PROVENTIL HFA,VENTOLIN HFA) inhaler 2 puff  2 puff Inhalation Q6H PRN    ascorbic acid (VITAMIN C) tablet 1,000 mg  1,000 mg Oral BID    atorvastatin (LIPITOR) tablet 20 mg  20 mg Oral Daily With Dinner    cefTRIAXone (ROCEPHIN) 1,000 mg in dextrose 5 % 50 mL IVPB  1,000 mg Intravenous Q24H    cholecalciferol (VITAMIN D3) tablet 2,000 Units  2,000 Units Oral Daily    dexamethasone (DECADRON) tablet 6 mg  6 mg Oral Daily    doxycycline hyclate (VIBRAMYCIN) capsule 100 mg  100 mg Oral Q12H    enoxaparin (LOVENOX) subcutaneous injection 30 mg  30 mg Subcutaneous Q12H    finasteride (PROSCAR) tablet 5 mg  5 mg Oral Daily    fluticasone-vilanterol (BREO ELLIPTA) 100-25 mcg/inh inhaler 1 puff  1 puff Inhalation Daily    guaiFENesin (MUCINEX) 12 hr tablet 1,200 mg  1,200 mg Oral Q12H CHRISTEN    loperamide (IMODIUM) capsule 2 mg  2 mg Oral TID PRN    potassium chloride 20 mEq in dextrose 5% lactated ringer's 1,000 mL infusion   Intravenous Continuous    remdesivir (Veklury) 100 mg in sodium chloride 0 9 % 250 mL IVPB  100 mg Intravenous Q24H    tamsulosin (FLOMAX) capsule 0 4 mg  0 4 mg Oral Daily With Dinner    zinc sulfate (ZINCATE) capsule 220 mg  220 mg Oral Daily       VTE Pharmacologic Prophylaxis: Enoxaparin (Lovenox)  VTE Mechanical Prophylaxis: sequential compression device    Portions of the record may have been created with voice recognition software  Occasional wrong word or "sound a like" substitutions may have occurred due to the inherent limitations of voice recognition software    Read the chart carefully and recognize, using context, where substitutions have occurred   ==  Zenobia Myrick, [address] [place] [address]  Internal Medicine Residency PGY-1

## 2021-03-18 NOTE — PLAN OF CARE
Problem: PAIN - ADULT  Goal: Verbalizes/displays adequate comfort level or baseline comfort level  Description: Interventions:  - Encourage patient to monitor pain and request assistance  - Assess pain using appropriate pain scale  - Administer analgesics based on type and severity of pain and evaluate response  - Implement non-pharmacological measures as appropriate and evaluate response  - Consider cultural and social influences on pain and pain management  - Notify physician/advanced practitioner if interventions unsuccessful or patient reports new pain  Outcome: Progressing     Problem: INFECTION - ADULT  Goal: Absence or prevention of progression during hospitalization  Description: INTERVENTIONS:  - Assess and monitor for signs and symptoms of infection  - Monitor lab/diagnostic results  - Monitor all insertion sites, i e  indwelling lines, tubes, and drains  - Monitor endotracheal if appropriate and nasal secretions for changes in amount and color  - [place] appropriate cooling/warming therapies per order  - Administer medications as ordered  - Instruct and encourage patient and family to use good hand hygiene technique  - Identify and instruct in appropriate isolation precautions for identified infection/condition  Outcome: Progressing  Goal: Absence of fever/infection during neutropenic period  Description: INTERVENTIONS:  - Monitor WBC    Outcome: Progressing     Problem: SAFETY ADULT  Goal: Patient will remain free of falls  Description: INTERVENTIONS:  - Assess patient frequently for physical needs  -  Identify cognitive and physical deficits and behaviors that affect risk of falls    -  [place] fall precautions as indicated by assessment   - Educate patient/family on patient safety including physical limitations  - Instruct patient to call for assistance with activity based on assessment  - Modify environment to reduce risk of injury  - Consider OT/PT consult to assist with strengthening/mobility  Outcome: Progressing  Goal: Maintain or return to baseline ADL function  Description: INTERVENTIONS:  -  Assess patient's ability to carry out ADLs; assess patient's baseline for ADL function and identify physical deficits which impact ability to perform ADLs (bathing, care of mouth/teeth, toileting, grooming, dressing, etc )  - Assess/evaluate cause of self-care deficits   - Assess range of motion  - Assess patient's mobility; develop plan if impaired  - Assess patient's need for assistive devices and provide as appropriate  - Encourage maximum independence but intervene and supervise when necessary  - Involve family in performance of ADLs  - Assess for home care needs following discharge   - Consider OT consult to assist with ADL evaluation and planning for discharge  - Provide patient education as appropriate  Outcome: Progressing  Goal: Maintain or return mobility status to optimal level  Description: INTERVENTIONS:  - Assess patient's baseline mobility status (ambulation, transfers, stairs, etc )    - Identify cognitive and physical deficits and behaviors that affect mobility  - Identify mobility aids required to assist with transfers and/or ambulation (gait belt, sit-to-stand, lift, walker, cane, etc )  - [place] fall precautions as indicated by assessment  - Record patient progress and toleration of activity level on Mobility SBAR; progress patient to next Phase/Stage  - Instruct patient to call for assistance with activity based on assessment  - Consider rehabilitation consult to assist with strengthening/weightbearing, etc   Outcome: Progressing     Problem: DISCHARGE PLANNING  Goal: Discharge to home or other facility with appropriate resources  Description: INTERVENTIONS:  - Identify barriers to discharge w/patient and caregiver  - Arrange for needed discharge resources and transportation as appropriate  - Identify discharge learning needs (meds, wound care, etc )  - Arrange for interpretive services to assist at discharge as needed  - Refer to Case Management Department for coordinating discharge planning if the patient needs post-hospital services based on physician/advanced practitioner order or complex needs related to functional status, cognitive ability, or social support system  Outcome: Progressing     Problem: Knowledge Deficit  Goal: Patient/family/caregiver demonstrates understanding of disease process, treatment plan, medications, and discharge instructions  Description: Complete learning assessment and assess knowledge base  Interventions:  - Provide teaching at level of understanding  - Provide teaching via preferred learning methods  Outcome: Progressing     Problem: Potential for Falls  Goal: Patient will remain free of falls  Description: INTERVENTIONS:  - Assess patient frequently for physical needs  -  Identify cognitive and physical deficits and behaviors that affect risk of falls    -  [place] fall precautions as indicated by assessment   - Educate patient/family on patient safety including physical limitations  - Instruct patient to call for assistance with activity based on assessment  - Modify environment to reduce risk of injury  - Consider OT/PT consult to assist with strengthening/mobility  Outcome: Progressing

## 2021-03-19 LAB
ALBUMIN SERPL BCP-MCNC: 3 G/DL (ref 3.5–5)
ALP SERPL-CCNC: 62 U/L (ref 46–116)
ALT SERPL W P-5'-P-CCNC: 35 U/L (ref 12–78)
ANION GAP SERPL CALCULATED.3IONS-SCNC: 6 MMOL/L (ref 4–13)
AST SERPL W P-5'-P-CCNC: 63 U/L (ref 5–45)
BASOPHILS # BLD AUTO: 0.01 THOUSANDS/ΜL (ref 0–0.1)
BASOPHILS NFR BLD AUTO: 0 % (ref 0–1)
BILIRUB SERPL-MCNC: 0.7 MG/DL (ref 0.2–1)
BUN SERPL-MCNC: 23 MG/DL (ref 5–25)
CALCIUM ALBUM COR SERPL-MCNC: 8.9 MG/DL (ref 8.3–10.1)
CALCIUM SERPL-MCNC: 8.1 MG/DL (ref 8.3–10.1)
CHLORIDE SERPL-SCNC: 111 MMOL/L (ref 100–108)
CK MB SERPL-MCNC: 1.6 % (ref 0–2.5)
CK MB SERPL-MCNC: 1.8 % (ref 0–2.5)
CK MB SERPL-MCNC: 10.3 NG/ML (ref 0–5)
CK MB SERPL-MCNC: 8.9 NG/ML (ref 0–5)
CK SERPL-CCNC: 550 U/L (ref 39–308)
CK SERPL-CCNC: 567 U/L (ref 39–308)
CK SERPL-CCNC: 688 U/L (ref 39–308)
CO2 SERPL-SCNC: 27 MMOL/L (ref 21–32)
CREAT SERPL-MCNC: 0.93 MG/DL (ref 0.6–1.3)
CRP SERPL QL: 58.5 MG/L
D DIMER PPP FEU-MCNC: 0.69 UG/ML FEU
EOSINOPHIL # BLD AUTO: 0 THOUSAND/ΜL (ref 0–0.61)
EOSINOPHIL NFR BLD AUTO: 0 % (ref 0–6)
ERYTHROCYTE [DISTWIDTH] IN BLOOD BY AUTOMATED COUNT: 13.5 % (ref 11.6–15.1)
FERRITIN SERPL-MCNC: 570 NG/ML (ref 8–388)
GFR SERPL CREATININE-BSD FRML MDRD: 83 ML/MIN/1.73SQ M
GLUCOSE SERPL-MCNC: 181 MG/DL (ref 65–140)
HCT VFR BLD AUTO: 40.3 % (ref 36.5–49.3)
HGB BLD-MCNC: 12.9 G/DL (ref 12–17)
IMM GRANULOCYTES # BLD AUTO: 0.15 THOUSAND/UL (ref 0–0.2)
IMM GRANULOCYTES NFR BLD AUTO: 1 % (ref 0–2)
LYMPHOCYTES # BLD AUTO: 0.47 THOUSANDS/ΜL (ref 0.6–4.47)
LYMPHOCYTES NFR BLD AUTO: 4 % (ref 14–44)
MCH RBC QN AUTO: 28.8 PG (ref 26.8–34.3)
MCHC RBC AUTO-ENTMCNC: 32 G/DL (ref 31.4–37.4)
MCV RBC AUTO: 90 FL (ref 82–98)
MONOCYTES # BLD AUTO: 0.81 THOUSAND/ΜL (ref 0.17–1.22)
MONOCYTES NFR BLD AUTO: 7 % (ref 4–12)
NEUTROPHILS # BLD AUTO: 9.54 THOUSANDS/ΜL (ref 1.85–7.62)
NEUTS SEG NFR BLD AUTO: 88 % (ref 43–75)
NRBC BLD AUTO-RTO: 0 /100 WBCS
PLATELET # BLD AUTO: 274 THOUSANDS/UL (ref 149–390)
PMV BLD AUTO: 10.3 FL (ref 8.9–12.7)
POTASSIUM SERPL-SCNC: 4.2 MMOL/L (ref 3.5–5.3)
PROCALCITONIN SERPL-MCNC: 0.17 NG/ML
PROT SERPL-MCNC: 5.9 G/DL (ref 6.4–8.2)
RBC # BLD AUTO: 4.48 MILLION/UL (ref 3.88–5.62)
SODIUM SERPL-SCNC: 144 MMOL/L (ref 136–145)
WBC # BLD AUTO: 10.98 THOUSAND/UL (ref 4.31–10.16)

## 2021-03-19 PROCEDURE — 82550 ASSAY OF CK (CPK): CPT | Performed by: STUDENT IN AN ORGANIZED HEALTH CARE EDUCATION/TRAINING PROGRAM

## 2021-03-19 PROCEDURE — 84145 PROCALCITONIN (PCT): CPT | Performed by: STUDENT IN AN ORGANIZED HEALTH CARE EDUCATION/TRAINING PROGRAM

## 2021-03-19 PROCEDURE — 99233 SBSQ HOSP IP/OBS HIGH 50: CPT | Performed by: INTERNAL MEDICINE

## 2021-03-19 PROCEDURE — 80053 COMPREHEN METABOLIC PANEL: CPT | Performed by: STUDENT IN AN ORGANIZED HEALTH CARE EDUCATION/TRAINING PROGRAM

## 2021-03-19 PROCEDURE — 86140 C-REACTIVE PROTEIN: CPT | Performed by: STUDENT IN AN ORGANIZED HEALTH CARE EDUCATION/TRAINING PROGRAM

## 2021-03-19 PROCEDURE — 82553 CREATINE MB FRACTION: CPT | Performed by: STUDENT IN AN ORGANIZED HEALTH CARE EDUCATION/TRAINING PROGRAM

## 2021-03-19 PROCEDURE — 82728 ASSAY OF FERRITIN: CPT | Performed by: STUDENT IN AN ORGANIZED HEALTH CARE EDUCATION/TRAINING PROGRAM

## 2021-03-19 PROCEDURE — 85025 COMPLETE CBC W/AUTO DIFF WBC: CPT | Performed by: STUDENT IN AN ORGANIZED HEALTH CARE EDUCATION/TRAINING PROGRAM

## 2021-03-19 PROCEDURE — 85379 FIBRIN DEGRADATION QUANT: CPT | Performed by: STUDENT IN AN ORGANIZED HEALTH CARE EDUCATION/TRAINING PROGRAM

## 2021-03-19 RX ADMIN — CEFTRIAXONE 1000 MG: 2 INJECTION, POWDER, FOR SOLUTION INTRAMUSCULAR; INTRAVENOUS at 11:58

## 2021-03-19 RX ADMIN — FINASTERIDE 5 MG: 5 TABLET, FILM COATED ORAL at 09:24

## 2021-03-19 RX ADMIN — POTASSIUM CHLORIDE: 2 INJECTION, SOLUTION, CONCENTRATE INTRAVENOUS at 14:32

## 2021-03-19 RX ADMIN — DOXYCYCLINE 100 MG: 100 CAPSULE ORAL at 11:58

## 2021-03-19 RX ADMIN — Medication 2000 UNITS: at 09:24

## 2021-03-19 RX ADMIN — LOPERAMIDE HYDROCHLORIDE 2 MG: 2 CAPSULE ORAL at 17:18

## 2021-03-19 RX ADMIN — DEXAMETHASONE 6 MG: 2 TABLET ORAL at 09:25

## 2021-03-19 RX ADMIN — ENOXAPARIN SODIUM 30 MG: 30 INJECTION SUBCUTANEOUS at 17:18

## 2021-03-19 RX ADMIN — OXYCODONE HYDROCHLORIDE AND ACETAMINOPHEN 1000 MG: 500 TABLET ORAL at 09:25

## 2021-03-19 RX ADMIN — OXYCODONE HYDROCHLORIDE AND ACETAMINOPHEN 1000 MG: 500 TABLET ORAL at 17:18

## 2021-03-19 RX ADMIN — GUAIFENESIN 1200 MG: 600 TABLET, EXTENDED RELEASE ORAL at 09:25

## 2021-03-19 RX ADMIN — POTASSIUM CHLORIDE: 2 INJECTION, SOLUTION, CONCENTRATE INTRAVENOUS at 03:40

## 2021-03-19 RX ADMIN — GUAIFENESIN 1200 MG: 600 TABLET, EXTENDED RELEASE ORAL at 21:09

## 2021-03-19 RX ADMIN — DOXYCYCLINE 100 MG: 100 CAPSULE ORAL at 22:09

## 2021-03-19 RX ADMIN — FLUTICASONE FUROATE AND VILANTEROL TRIFENATATE 1 PUFF: 100; 25 POWDER RESPIRATORY (INHALATION) at 09:26

## 2021-03-19 RX ADMIN — ATORVASTATIN CALCIUM 20 MG: 20 TABLET, FILM COATED ORAL at 17:18

## 2021-03-19 RX ADMIN — ZINC SULFATE 220 MG (50 MG) CAPSULE 220 MG: CAPSULE at 09:25

## 2021-03-19 RX ADMIN — TAMSULOSIN HYDROCHLORIDE 0.4 MG: 0.4 CAPSULE ORAL at 17:18

## 2021-03-19 RX ADMIN — REMDESIVIR 100 MG: 100 INJECTION, POWDER, LYOPHILIZED, FOR SOLUTION INTRAVENOUS at 14:29

## 2021-03-19 RX ADMIN — ENOXAPARIN SODIUM 30 MG: 30 INJECTION SUBCUTANEOUS at 05:58

## 2021-03-19 NOTE — PROGRESS NOTES
INTERNAL MEDICINE RESIDENCY PROGRESS NOTE     Name: Contreras Almazan   Age & Sex: 79 y o  male   MRN: [id number]  Unit/Bed#: -01   Encounter: [id number]  Team: SOD Team C     PATIENT INFORMATION     Name: Nery Justice   Age & Sex: 79 y o  male   MRN: [id number]  Hospital Stay Days: 4    ASSESSMENT/PLAN     Principal Problem:    COVID-19  Active Problems:    Hyperlipidemia    Centrilobular emphysema ([address] ) - severe    Bilateral iliac artery aneurysm ([facility] [address] [address] )    Prediabetes    BPH with obstruction/lower urinary tract symptoms    Essential hypertension    Diarrhea      * COVID-19  Assessment & Plan  Noted on outpatient testing 3/12/2021  Symptoms began approximately 03/09/2021 with diarrhea, dyspnea on exertion, cough, fever, and nausea  Mild COVID pathway  Remained on 2 L nasal cannula  CT chest: Small subpleural consolidations and groundglass opacities in the bilateral lower lobes and right middle lobe consistent with COVID pneumonia  Patient had elevated trop and CK on covid protocol labs  Expect both are 2/2 covid and dehydration  Plan:  · Troponin plateaued at 7 88    · Stop trending  · CK downtrending at 688 < 1437 < 1568 < 1746 < 1860 < 1400 <1900    · q8h CK until elevation resolves  · Continue IV hydration  · D-dimer 0 69 < 0 71 < 0 87 < 0 77  · Ferritin 570 < 826 < 1070 < 1112  · CRP 58 5 < 94 8 < 82 9 < 65 7  · Procalcitonin 0 39 < 0 43 <0 09  · Continue ceftriaxone 1 g Q 24 hours  · Continue doxycycline 100 mg Q 12 hours  · Continue remdesivir 100 mg Q 24 hours (4/5 complete)  · Continue dexamethasone 6 mg daily (day 3)    Diarrhea  Assessment & Plan  Likely secondary to COVID-19 and contributing to volume depletion/low blood pressure, hypokalemia, KIRK  Normal electrolytes this maryam Corbett Reports somewhat formed stools now      Plan:  · Continue Imodium prn  · Continue IV fluids   · Will monitor respiratory status closely given COVID and fluids    Essential hypertension  Assessment & Plan  Will hold on hydrochlorothiazide 25 mg daily secondary to relatively low blood pressure from volume depletion  SBP noted to be  while in the ED  Continue to monitor vital signs  BPH with obstruction/lower urinary tract symptoms  Assessment & Plan  Continue finasteride and tamsulosin  Centrilobular emphysema (HCC) - severe  Assessment & Plan  Will place on Ranjana Boncleo in place of trilogy at this time  Patient did not qualify for home oxygen after completing outpatient testing  However, he does by supplemental oxygen from an outside company  He states that he rarely uses this  Continue albuterol and Atrovent as needed  Continue Mucinex 1200 mg b i d  Continue Xopenex as needed  Hyperlipidemia  Assessment & Plan  Continue atorvastatin 20 mg daily  KIRK (acute kidney injury) (HCC)-resolved as of 3/17/2021  Assessment & Plan  Likely secondary to volume depletion from diarrhea  KIRK resolved on   Will replete with IV fluids at this time  Continue to monitor vital signs and BMP  Avoid nephrotoxic agents  Avoid large fluctuations in blood pressure  Hypokalemia-resolved as of 3/17/2021  Assessment & Plan  Likely secondary to diarrhea  Improved after starting Imodium  K 4 2 this morning  Repleted while in the ED  Continue to monitor  Disposition: continue inpatient care    SUBJECTIVE     Patient seen and examined  No acute events overnight  Was placed on 2L NC overnight  No specific complaints  Denies fevers, chills, CP, SOB, N/V, abd pain, constipation, diarrhea  States stools are starting to form  Tolerating po intake well       OBJECTIVE     Vitals:    21 2216 21 0911 21 2044 21 0944   BP: 103/69 113/70 111/71 140/91   Pulse: 77 87 81 100   Resp:       Temp: 97 8 °F (36 6 °C)  97 5 °F (36 4 °C)    TempSrc:       SpO2: 96% 96% 92% 95%   Weight:       Height:          Temperature:   Temp (24hrs), Av 5 °F (36 4 °C), Min:97 5 °F (36 4 °C), Max:97 5 °F (36 4 °C)    Temperature: 97 5 °F (36 4 °C)  Intake & Output:  I/O       03/17 0701 - 03/18 0700 03/18 0701 - 03/19 0700 03/19 0701 - 03/20 0700    P  O        I V  (mL/kg) 1101 7 (15 2) 1000 (13 8)     Total Intake(mL/kg) 1101 7 (15 2) 1000 (13 8)     Net +1101 7 +1000            Unmeasured Urine Occurrence 3 x      Unmeasured Stool Occurrence 2 x          Weights:   IBW: 68 4 kg    Body mass index is 24 33 kg/m²  Weight (last 2 days)     None        Physical Exam  Vitals signs reviewed  Constitutional:       General: He is not in acute distress  Appearance: Normal appearance  He is not ill-appearing, toxic-appearing or diaphoretic  HENT:      Head: Normocephalic and atraumatic  Mouth/Throat:      Mouth: Mucous membranes are moist       Pharynx: Oropharynx is clear  Eyes:      General: No scleral icterus  Extraocular Movements: Extraocular movements intact  Conjunctiva/sclera: Conjunctivae normal    Neck:      Musculoskeletal: Normal range of motion  Cardiovascular:      Rate and Rhythm: Normal rate and regular rhythm  Pulses: Normal pulses  Heart sounds: Normal heart sounds  No murmur  No friction rub  No gallop  Pulmonary:      Effort: Pulmonary effort is normal  No respiratory distress  Breath sounds: No stridor  Rales (bibasilar) present  No wheezing or rhonchi  Abdominal:      General: Abdomen is flat  Bowel sounds are normal  There is no distension  Palpations: Abdomen is soft  Tenderness: There is no abdominal tenderness  There is no guarding  Musculoskeletal: Normal range of motion  Right lower leg: No edema  Left lower leg: No edema  Skin:     General: Skin is warm and dry  Capillary Refill: Capillary refill takes less than 2 seconds  Neurological:      General: No focal deficit present  Mental Status: He is alert and oriented to person, place, and time     Psychiatric:         Mood and Affect: Mood normal          Thought Content: Thought content normal        LABORATORY DATA     Labs: I have personally reviewed pertinent reports  Results from last 7 days   Lab Units 03/19/21  0945 03/18/21  0557 03/17/21  0551   WBC Thousand/uL 10 98* 8 92 10 71*   HEMOGLOBIN g/dL 12 9 13 3 12 7   HEMATOCRIT % 40 3 41 8 39 6   PLATELETS Thousands/uL 274 245 204   NEUTROS PCT % 88* 84* 78*   MONOS PCT % 7 8 9      Results from last 7 days   Lab Units 03/19/21  0945 03/18/21  0557 03/17/21  0551   POTASSIUM mmol/L 4 2 3 9 4 6   CHLORIDE mmol/L 111* 111* 110*   CO2 mmol/L 27 29 28   BUN mg/dL 23 29* 31*   CREATININE mg/dL 0 93 1 03 1 16   CALCIUM mg/dL 8 1* 8 0* 7 8*   ALK PHOS U/L 62 58 52   ALT U/L 35 34 28   AST U/L 63* 84* 72*     Results from last 7 days   Lab Units 03/15/21  1142   MAGNESIUM mg/dL 2 3                  Results from last 7 days   Lab Units 03/17/21  0028 03/16/21  2145 03/16/21  1837   TROPONIN I ng/mL 0 11* 0 11* 0 10*       IMAGING & DIAGNOSTIC TESTING     Radiology Results: I have personally reviewed pertinent reports  Xr Chest Portable    Result Date: 3/15/2021  Impression: No acute cardiopulmonary disease  Findings are stable Workstation performed: [id number]     Ct Chest Wo Contrast    Result Date: 3/16/2021  Impression: Small subpleural consolidations and groundglass opacities in the bilateral lower lobes and right middle lobe  In the setting of clinically suspected/proven COVID-19, the above lung parenchymal findings on CT indicate intermediate confidence level for COVID-19  Unchanged severe emphysema  Workstation performed: [id number]     Other Diagnostic Testing: I have personally reviewed pertinent reports      ACTIVE MEDICATIONS     Current Facility-Administered Medications   Medication Dose Route Frequency    albuterol (PROVENTIL HFA,VENTOLIN HFA) inhaler 2 puff  2 puff Inhalation Q6H PRN    ascorbic acid (VITAMIN C) tablet 1,000 mg  1,000 mg Oral BID    atorvastatin (LIPITOR) tablet 20 mg  20 mg Oral Daily With [employer] cefTRIAXone (ROCEPHIN) 1,000 mg in dextrose 5 % 50 mL IVPB  1,000 mg Intravenous Q24H    cholecalciferol (VITAMIN D3) tablet 2,000 Units  2,000 Units Oral Daily    dexamethasone (DECADRON) tablet 6 mg  6 mg Oral Daily    doxycycline hyclate (VIBRAMYCIN) capsule 100 mg  100 mg Oral Q12H    enoxaparin (LOVENOX) subcutaneous injection 30 mg  30 mg Subcutaneous Q12H    finasteride (PROSCAR) tablet 5 mg  5 mg Oral Daily    fluticasone-vilanterol (BREO ELLIPTA) 100-25 mcg/inh inhaler 1 puff  1 puff Inhalation Daily    guaiFENesin (MUCINEX) 12 hr tablet 1,200 mg  1,200 mg Oral Q12H CHRISTEN    loperamide (IMODIUM) capsule 2 mg  2 mg Oral TID PRN    potassium chloride 20 mEq in dextrose 5% lactated ringer's 1,000 mL infusion   Intravenous Continuous    remdesivir (Veklury) 100 mg in sodium chloride 0 9 % 250 mL IVPB  100 mg Intravenous Q24H    tamsulosin (FLOMAX) capsule 0 4 mg  0 4 mg Oral Daily With Dinner    zinc sulfate (ZINCATE) capsule 220 mg  220 mg Oral Daily       VTE Pharmacologic Prophylaxis: Enoxaparin (Lovenox)  VTE Mechanical Prophylaxis: sequential compression device    Portions of the record may have been created with voice recognition software  Occasional wrong word or "sound a like" substitutions may have occurred due to the inherent limitations of voice recognition software    Read the chart carefully and recognize, using context, where substitutions have occurred   ==  Sandra Kidd, [address] [facility]  Internal Medicine Residency PGY-1

## 2021-03-19 NOTE — PLAN OF CARE
Problem: PAIN - ADULT  Goal: Verbalizes/displays adequate comfort level or baseline comfort level  Description: Interventions:  - Encourage patient to monitor pain and request assistance  - Assess pain using appropriate pain scale  - Administer analgesics based on type and severity of pain and evaluate response  - Implement non-pharmacological measures as appropriate and evaluate response  - Consider cultural and social influences on pain and pain management  - Notify physician/advanced practitioner if interventions unsuccessful or patient reports new pain  Outcome: Progressing     Problem: INFECTION - ADULT  Goal: Absence or prevention of progression during hospitalization  Description: INTERVENTIONS:  - Assess and monitor for signs and symptoms of infection  - Monitor lab/diagnostic results  - Monitor all insertion sites, i e  indwelling lines, tubes, and drains  - Monitor endotracheal if appropriate and nasal secretions for changes in amount and color  - [place] appropriate cooling/warming therapies per order  - Administer medications as ordered  - Instruct and encourage patient and family to use good hand hygiene technique  - Identify and instruct in appropriate isolation precautions for identified infection/condition  Outcome: Progressing     Problem: SAFETY ADULT  Goal: Patient will remain free of falls  Description: INTERVENTIONS:  - Assess patient frequently for physical needs  -  Identify cognitive and physical deficits and behaviors that affect risk of falls    -  [place] fall precautions as indicated by assessment   - Educate patient/family on patient safety including physical limitations  - Instruct patient to call for assistance with activity based on assessment  - Modify environment to reduce risk of injury  - Consider OT/PT consult to assist with strengthening/mobility  Outcome: Progressing  Goal: Maintain or return to baseline ADL function  Description: INTERVENTIONS:  -  Assess patient's ability to carry out ADLs; assess patient's baseline for ADL function and identify physical deficits which impact ability to perform ADLs (bathing, care of mouth/teeth, toileting, grooming, dressing, etc )  - Assess/evaluate cause of self-care deficits   - Assess range of motion  - Assess patient's mobility; develop plan if impaired  - Assess patient's need for assistive devices and provide as appropriate  - Encourage maximum independence but intervene and supervise when necessary  - Involve family in performance of ADLs  - Assess for home care needs following discharge   - Consider OT consult to assist with ADL evaluation and planning for discharge  - Provide patient education as appropriate  Outcome: Progressing  Goal: Maintain or return mobility status to optimal level  Description: INTERVENTIONS:  - Assess patient's baseline mobility status (ambulation, transfers, stairs, etc )    - Identify cognitive and physical deficits and behaviors that affect mobility  - Identify mobility aids required to assist with transfers and/or ambulation (gait belt, sit-to-stand, lift, walker, cane, etc )  - [place] fall precautions as indicated by assessment  - Record patient progress and toleration of activity level on Mobility SBAR; progress patient to next Phase/Stage  - Instruct patient to call for assistance with activity based on assessment  - Consider rehabilitation consult to assist with strengthening/weightbearing, etc   Outcome: Progressing     Problem: DISCHARGE PLANNING  Goal: Discharge to home or other facility with appropriate resources  Description: INTERVENTIONS:  - Identify barriers to discharge w/patient and caregiver  - Arrange for needed discharge resources and transportation as appropriate  - Identify discharge learning needs (meds, wound care, etc )  - Arrange for interpretive services to assist at discharge as needed  - Refer to Case Management Department for coordinating discharge planning if the patient needs post-hospital services based on physician/advanced practitioner order or complex needs related to functional status, cognitive ability, or social support system  Outcome: Progressing     Problem: Knowledge Deficit  Goal: Patient/family/caregiver demonstrates understanding of disease process, treatment plan, medications, and discharge instructions  Description: Complete learning assessment and assess knowledge base  Interventions:  - Provide teaching at level of understanding  - Provide teaching via preferred learning methods  Outcome: Progressing     Problem: Potential for Falls  Goal: Patient will remain free of falls  Description: INTERVENTIONS:  - Assess patient frequently for physical needs  -  Identify cognitive and physical deficits and behaviors that affect risk of falls    -  [place] fall precautions as indicated by assessment   - Educate patient/family on patient safety including physical limitations  - Instruct patient to call for assistance with activity based on assessment  - Modify environment to reduce risk of injury  - Consider OT/PT consult to assist with strengthening/mobility  Outcome: Progressing     Problem: RESPIRATORY - ADULT  Goal: Achieves optimal ventilation and oxygenation  Description: INTERVENTIONS:  - Assess for changes in respiratory status  - Assess for changes in mentation and behavior  - Position to facilitate oxygenation and minimize respiratory effort  - Oxygen administered by appropriate delivery if ordered  - Initiate smoking cessation education as indicated  - Encourage broncho-pulmonary hygiene including cough, deep breathe, Incentive Spirometry  - Assess the need for suctioning and aspirate as needed  - Assess and instruct to report SOB or any respiratory difficulty  - Respiratory Therapy support as indicated  Outcome: Progressing

## 2021-03-20 PROBLEM — R19.7 DIARRHEA: Status: RESOLVED | Noted: 2021-03-15 | Resolved: 2021-03-20

## 2021-03-20 LAB
ALBUMIN SERPL BCP-MCNC: 2.9 G/DL (ref 3.5–5)
ALP SERPL-CCNC: 58 U/L (ref 46–116)
ALT SERPL W P-5'-P-CCNC: 36 U/L (ref 12–78)
ANION GAP SERPL CALCULATED.3IONS-SCNC: 4 MMOL/L (ref 4–13)
AST SERPL W P-5'-P-CCNC: 49 U/L (ref 5–45)
BASOPHILS # BLD AUTO: 0.01 THOUSANDS/ΜL (ref 0–0.1)
BASOPHILS NFR BLD AUTO: 0 % (ref 0–1)
BILIRUB SERPL-MCNC: 0.53 MG/DL (ref 0.2–1)
BUN SERPL-MCNC: 18 MG/DL (ref 5–25)
CALCIUM ALBUM COR SERPL-MCNC: 9.1 MG/DL (ref 8.3–10.1)
CALCIUM SERPL-MCNC: 8.2 MG/DL (ref 8.3–10.1)
CHLORIDE SERPL-SCNC: 109 MMOL/L (ref 100–108)
CK MB SERPL-MCNC: 2.1 % (ref 0–2.5)
CK MB SERPL-MCNC: 2.6 % (ref 0–2.5)
CK MB SERPL-MCNC: 2.8 % (ref 0–2.5)
CK MB SERPL-MCNC: 8.3 NG/ML (ref 0–5)
CK MB SERPL-MCNC: 8.9 NG/ML (ref 0–5)
CK MB SERPL-MCNC: 9.8 NG/ML (ref 0–5)
CK SERPL-CCNC: 292 U/L (ref 39–308)
CK SERPL-CCNC: 370 U/L (ref 39–308)
CK SERPL-CCNC: 417 U/L (ref 39–308)
CO2 SERPL-SCNC: 28 MMOL/L (ref 21–32)
CREAT SERPL-MCNC: 0.77 MG/DL (ref 0.6–1.3)
CRP SERPL QL: 34 MG/L
D DIMER PPP FEU-MCNC: 0.49 UG/ML FEU
EOSINOPHIL # BLD AUTO: 0 THOUSAND/ΜL (ref 0–0.61)
EOSINOPHIL NFR BLD AUTO: 0 % (ref 0–6)
ERYTHROCYTE [DISTWIDTH] IN BLOOD BY AUTOMATED COUNT: 13.5 % (ref 11.6–15.1)
FERRITIN SERPL-MCNC: 498 NG/ML (ref 8–388)
GFR SERPL CREATININE-BSD FRML MDRD: 92 ML/MIN/1.73SQ M
GLUCOSE SERPL-MCNC: 178 MG/DL (ref 65–140)
HCT VFR BLD AUTO: 38.6 % (ref 36.5–49.3)
HGB BLD-MCNC: 12.5 G/DL (ref 12–17)
IMM GRANULOCYTES # BLD AUTO: 0.11 THOUSAND/UL (ref 0–0.2)
IMM GRANULOCYTES NFR BLD AUTO: 1 % (ref 0–2)
LYMPHOCYTES # BLD AUTO: 0.45 THOUSANDS/ΜL (ref 0.6–4.47)
LYMPHOCYTES NFR BLD AUTO: 5 % (ref 14–44)
MCH RBC QN AUTO: 29.2 PG (ref 26.8–34.3)
MCHC RBC AUTO-ENTMCNC: 32.4 G/DL (ref 31.4–37.4)
MCV RBC AUTO: 90 FL (ref 82–98)
MONOCYTES # BLD AUTO: 0.81 THOUSAND/ΜL (ref 0.17–1.22)
MONOCYTES NFR BLD AUTO: 10 % (ref 4–12)
NEUTROPHILS # BLD AUTO: 7.16 THOUSANDS/ΜL (ref 1.85–7.62)
NEUTS SEG NFR BLD AUTO: 84 % (ref 43–75)
NRBC BLD AUTO-RTO: 0 /100 WBCS
PLATELET # BLD AUTO: 294 THOUSANDS/UL (ref 149–390)
PMV BLD AUTO: 10.4 FL (ref 8.9–12.7)
POTASSIUM SERPL-SCNC: 4.4 MMOL/L (ref 3.5–5.3)
PROT SERPL-MCNC: 5.6 G/DL (ref 6.4–8.2)
RBC # BLD AUTO: 4.28 MILLION/UL (ref 3.88–5.62)
SODIUM SERPL-SCNC: 141 MMOL/L (ref 136–145)
WBC # BLD AUTO: 8.54 THOUSAND/UL (ref 4.31–10.16)

## 2021-03-20 PROCEDURE — 85025 COMPLETE CBC W/AUTO DIFF WBC: CPT | Performed by: STUDENT IN AN ORGANIZED HEALTH CARE EDUCATION/TRAINING PROGRAM

## 2021-03-20 PROCEDURE — 80053 COMPREHEN METABOLIC PANEL: CPT | Performed by: STUDENT IN AN ORGANIZED HEALTH CARE EDUCATION/TRAINING PROGRAM

## 2021-03-20 PROCEDURE — 82550 ASSAY OF CK (CPK): CPT | Performed by: STUDENT IN AN ORGANIZED HEALTH CARE EDUCATION/TRAINING PROGRAM

## 2021-03-20 PROCEDURE — 99233 SBSQ HOSP IP/OBS HIGH 50: CPT | Performed by: INTERNAL MEDICINE

## 2021-03-20 PROCEDURE — 82553 CREATINE MB FRACTION: CPT | Performed by: STUDENT IN AN ORGANIZED HEALTH CARE EDUCATION/TRAINING PROGRAM

## 2021-03-20 PROCEDURE — 86140 C-REACTIVE PROTEIN: CPT | Performed by: STUDENT IN AN ORGANIZED HEALTH CARE EDUCATION/TRAINING PROGRAM

## 2021-03-20 PROCEDURE — 85379 FIBRIN DEGRADATION QUANT: CPT | Performed by: STUDENT IN AN ORGANIZED HEALTH CARE EDUCATION/TRAINING PROGRAM

## 2021-03-20 PROCEDURE — 82728 ASSAY OF FERRITIN: CPT | Performed by: STUDENT IN AN ORGANIZED HEALTH CARE EDUCATION/TRAINING PROGRAM

## 2021-03-20 RX ADMIN — POTASSIUM CHLORIDE: 2 INJECTION, SOLUTION, CONCENTRATE INTRAVENOUS at 01:25

## 2021-03-20 RX ADMIN — CEFTRIAXONE 1000 MG: 2 INJECTION, POWDER, FOR SOLUTION INTRAMUSCULAR; INTRAVENOUS at 11:18

## 2021-03-20 RX ADMIN — ATORVASTATIN CALCIUM 20 MG: 20 TABLET, FILM COATED ORAL at 16:34

## 2021-03-20 RX ADMIN — POTASSIUM CHLORIDE: 2 INJECTION, SOLUTION, CONCENTRATE INTRAVENOUS at 11:10

## 2021-03-20 RX ADMIN — GUAIFENESIN 1200 MG: 600 TABLET, EXTENDED RELEASE ORAL at 22:04

## 2021-03-20 RX ADMIN — ENOXAPARIN SODIUM 30 MG: 30 INJECTION SUBCUTANEOUS at 16:35

## 2021-03-20 RX ADMIN — ENOXAPARIN SODIUM 30 MG: 30 INJECTION SUBCUTANEOUS at 05:51

## 2021-03-20 RX ADMIN — DOXYCYCLINE 100 MG: 100 CAPSULE ORAL at 22:04

## 2021-03-20 RX ADMIN — OXYCODONE HYDROCHLORIDE AND ACETAMINOPHEN 1000 MG: 500 TABLET ORAL at 16:34

## 2021-03-20 RX ADMIN — GUAIFENESIN 1200 MG: 600 TABLET, EXTENDED RELEASE ORAL at 07:52

## 2021-03-20 RX ADMIN — FINASTERIDE 5 MG: 5 TABLET, FILM COATED ORAL at 07:52

## 2021-03-20 RX ADMIN — Medication 2000 UNITS: at 07:53

## 2021-03-20 RX ADMIN — ZINC SULFATE 220 MG (50 MG) CAPSULE 220 MG: CAPSULE at 07:51

## 2021-03-20 RX ADMIN — FLUTICASONE FUROATE AND VILANTEROL TRIFENATATE 1 PUFF: 100; 25 POWDER RESPIRATORY (INHALATION) at 07:55

## 2021-03-20 RX ADMIN — DOXYCYCLINE 100 MG: 100 CAPSULE ORAL at 07:56

## 2021-03-20 RX ADMIN — TAMSULOSIN HYDROCHLORIDE 0.4 MG: 0.4 CAPSULE ORAL at 16:34

## 2021-03-20 RX ADMIN — POTASSIUM CHLORIDE: 2 INJECTION, SOLUTION, CONCENTRATE INTRAVENOUS at 21:27

## 2021-03-20 RX ADMIN — DEXAMETHASONE 6 MG: 2 TABLET ORAL at 07:53

## 2021-03-20 RX ADMIN — OXYCODONE HYDROCHLORIDE AND ACETAMINOPHEN 1000 MG: 500 TABLET ORAL at 07:54

## 2021-03-20 NOTE — DISCHARGE INSTRUCTIONS
Follow-up with PCP within 1 week  Continue steroids and antibiotics outpatient  Discharge Anticoagulation Plan:     While hospitalized, patient did not have confirmed or highly suspected VTE/PE, and D-dimer was < 2 5    Patient without confirmed VTE and low risk of developing VTE  Patient will need follow up with primary care provider (or specialist) within 72 hours of hospital discharge, and frequently thereafter to monitor for signs of worsening respiratory function, VTE and/or bleeding  COVID-19 and Chronic Health Conditions   WHAT YOU NEED TO KNOW:   Your chronic condition may increase your risk for COVID-19 or serious problems it can cause, such as pneumonia  Healthcare providers might need to make changes that affect how you usually manage your chronic health condition  Providers may change hours of operation or not have patients come in to be seen  You may not be able to make appointments to get blood drawn or to have tests or procedures  This may continue until the virus that causes COVID-19 is controlled  Until then, you can take steps to manage your condition  The steps will also lower your risk for COVID-19 or the serious problems it causes  DISCHARGE INSTRUCTIONS:   If you think you may be infected with the new coronavirus,  do the following to protect others:  · If emergency care is needed,  tell the  about the possible infection, or call ahead and tell the emergency department  · Call a healthcare provider  for instructions if symptoms are mild  Do not  arrive without calling first  Your provider will need to protect staff members and other patients  · Cover your mouth and nose  while you are getting medical care  This will help lower the risk of infecting others  Call your local emergency number [phone] in the [address] [place] [address],[address]) or emergency department if:   · You have trouble breathing or shortness of breath  · You have chest pain or pressure that lasts longer than 5 minutes      · You become confused or hard to wake  · Your lips or face are blue  · You have a fever of 104°F (40°C) or higher  Call your doctor or healthcare provider if:   · You do not  have symptoms of COVID-19 but had close physical contact within 14 days with someone who tested positive  · You have questions or concerns about your COVID-19 or your chronic condition  The following may increase your risk for serious effects of COVID-19:   · Age 72 years or older    · Obesity, diabetes, cancer, or a liver disease    · Kidney failure, chronic kidney disease, or sickle cell disease    · Lung disease, COPD, moderate-to-severe asthma, cystic fibrosis, or pulmonary fibrosis (scarring in your lungs)    · A severe heart disease, such as coronary artery disease or heart failure    · A brain blood vessel disorder or disease, or a condition such as dementia    · Living in a nursing home or long-term care facility    · Smoking cigarettes    · Hypertension (high blood pressure) or thalassemia    · An immune system problem, HIV or AIDS, or a blood or bone marrow transplant    · Long-term use of certain medicines, such as corticosteroids    · Pregnancy    Manage your chronic health condition during this time:  If you do not have a regular healthcare provider, experts recommend you contact a local [facility] health center or health department  The following can help you manage your condition and prevent COVID-19:  · Get emergency care for your condition if needed  Talk to your healthcare providers about symptoms of your chronic condition that need immediate care  Your providers can help you create a plan or add exacerbation management to your plan  The plan will include when to go to an emergency department and when to call your local emergency number  This will depend on where you live and the services that are available during this time  · Go to dialysis appointments as scheduled  It is important to stay on schedule   You will need to have enough food to be able to follow the emergency diet plan if you must miss a session  The emergency diet needs to be part of the management plan for your condition  · Reschedule any upcoming appointments as needed  Medical facilities may be closed until the new coronavirus is better controlled  This means you may need to reschedule a surgery, procedure, or check-up appointment  If you cannot have a phone or video appointment, you will need to make a new appointment  Some providers may be scheduling appointments several months in advance  Some surgeries and procedures will happen as scheduled  This depends on the medical condition and the reason for the surgery or procedure  You may need to have extra testing for COVID-19 several days before  · Follow any regular management plan you use  Your healthcare provider will tell you if you need to make any changes to your regular management plan  For example, if you have asthma, continue to follow your asthma action plan  If you have diabetes, you may need to check your blood sugar level more often  Stress and illness can make blood sugar levels go up  You may need to adjust medicine such as insulin  If you have a heart condition or high blood pressure, you may need to check your blood pressure more often  Stress and illness can also raise your blood pressure  · Talk to your healthcare providers about your medicines  You may be able to get more than 1 month of medicine at a time  This will lower the number of times you need to go to a pharmacy to get your medicines  Make sure you have enough medicine if you have a condition that can lead to an emergency  Examples include asthma medicines, insulin, or an epinephrine pen  Check the expiration dates on the medicines you currently have  Ask for refills as soon as possible, if needed  If it is not time to refill prescriptions, you may be able to get an emergency supply of some medicines   Medicine plans vary, so ask your healthcare provider or pharmacist for options  · Have supplies available in your home  If possible, get extra supplies you use regularly  Examples include absorbent pads, syringes, and wound cleaning solutions  This will limit the number of trips out of your home to get supplies  · Know the signs and symptoms of COVID-19  Signs and symptoms usually start about 5 days after infection but can take 2 to 14 days  Signs and symptoms range from mild to severe  You may feel like you have the flu or a bad cold  Your chronic health condition may cause some of the same symptoms COVID-19 causes  This can make it hard to know if a symptom is from COVID-19 or your chronic condition  Keep a record of any new or worsening symptom you have  This is especially important if you have a condition that often causes shortness of breath  Your provider can tell you if you should be tested for COVID-19  Information is still being learned  Tell your healthcare provider if you think you were infected but develop signs or symptoms not listed below:    ? A cough    ? Shortness of breath or trouble breathing that may become severe    ? A fever of at least 100 4°F, or 38°C (may be lower in adults 65 or older)    ? Chills that might include shaking    ? Muscle pain, body aches, or a headache    ? A sore throat    ? Suddenly not being able to taste or smell anything    ? Feeling very tired (fatigue)    ? Congestion (stuffy head and nose), or a runny nose    ? Diarrhea, nausea, or vomiting    What you can do to prevent having to go out of your home during this time:   · Ask your healthcare provider for other ways to have appointments  You may be able to have appointments without having to go into the provider's office  Some providers offer phone, video, or other types of appointments  · Have food, medicines, and other supplies delivered  Some pharmacies can send certain medicines to you through the mail   Grocery stores and restaurants may be able to deliver food and other items  If possible, have delivered items placed somewhere  Try not to have someone hand you an item  You will be so close to the person that the virus can spread between you  Wash your hands after you put the delivered items away  · Ask someone to get items you need  The person can get groceries, medicines, or other needed items for you  Choose a person who does not have symptoms of COVID-19 or has tested negative for it  The person should not be waiting for test results  He or she should not have a condition that increases the risk for COVID-19 or serious problems it causes  Lower your risk for COVID-19:  The virus that causes COVID-19 spreads quickly and easily  It mainly spreads through droplets that form when a person talks, coughs, or sneezes  An infected person may also be able to leave the virus on objects and surfaces  Another person can get the virus on his or her hands by touching the object or surface  Infection happens if the person then touches his or her eyes or mouth with unwashed hands  The best way to prevent infection is to avoid anyone who is infected, but this can be hard to do  An infected person can spread the virus before signs or symptoms begin, or even if signs or symptoms never develop  The following are ways to prevent the spread of the virus and lower your risk for COVID-19:      · Wash your hands often throughout the day  Use soap and water  Rub your soapy hands together, lacing your fingers  Wash the front and back of each hand, and in between your fingers  Use the fingers of one hand to scrub under the fingernails of the other hand  Wash for at least 20 seconds  Rinse with warm, running water for several seconds  Then dry your hands with a clean towel or paper towel  Use hand  that contains alcohol if soap and water are not available   Do not touch your eyes, nose, or mouth without washing your hands first  Teach children how to wash their hands  · Cover sneezes and coughs  Turn your face away and cover your mouth and nose with a tissue  Throw the tissue away  Use the bend of your arm if a tissue is not available  Then wash your hands well with soap and water or use hand   Turn your head and cover your face if someone near you is coughing or sneezing  Teach children how to cover a cough or sneeze  Remind them to wash their hands  · Make a habit of not touching your face  If you get the virus on your hands, you can transfer it to your eyes, nose, or mouth and become infected  · Clean and disinfect high-touch surfaces and objects in your home often  Do this regularly, even if you think no one living in or coming to your home is infected with the virus  Surfaces include countertops, cupboard doors, desks, handles, handrails, doorknobs, toilet handles, faucets, chairs, and light switches  Objects include keys, phones, computer keyboards and mice, video games, remote controls, and children's toys  Some surfaces and objects may need to be cleaned several times each day, depending on how often they are used  ? Use disinfecting wipes or a disinfecting solution  You can make a solution by mixing 4 teaspoons of bleach with 1 quart (4 cups) of water  Clean with a sponge or cloth that can be thrown away or washed in hot, soapy water and reused  Clean used dishes and utensils in hot, soapy water or in the   ? Be careful with   Do not use any cleaning or disinfecting products that can trigger an asthma attack or other breathing problems  Open windows or have circulating air as you clean  Do not  mix ammonia with bleach  This will create toxic fumes  Read the labels of all products you use  · Ask about vaccines you may need  No vaccine is available yet for the new coronavirus  It is still a good idea to get other vaccines to help protect your immune system   Get the influenza (flu) vaccine as soon as recommended each year, usually starting in September or October  A flu infection can make COVID-19 worse if you have them at the same time  The flu can also cause signs and symptoms that are similar to COVID-19  Get the pneumonia vaccine if recommended  Your healthcare provider can tell you if you also need other vaccines, and when to get them  Follow social distancing guidelines if you must go out of your home during this time:  Social distancing means people stay far enough apart physically that the virus cannot spread from one person to another  National and local social distancing rules vary  Rules may change over time as restrictions are lifted, but they may return if an outbreak happens where you live  It is important to know and follow all current social distancing rules in your area  The following are general guidelines:  · Limit trips out of your home  Most local guidelines allow leaving the home to buy food or supplies, or to seek medical care if necessary  · Stay at least 6 feet (2 meters) away from anyone who does not live in your home  Do not shake hands with, hug, or kiss a person as a greeting  Stand or walk as far from others as possible, especially around anyone who is sneezing or coughing  If you must use public transportation (such as a bus or subway), try to sit or stand away from others  You can stay safely connected with others through phone calls, e-mail messages, social media websites, and video chats  Check in on anyone who may be having a hard time socially distancing, or who lives alone  Ask administrators at nursing homes or long-term care facilities how you can safely communicate with someone living there  · Wear a cloth face covering (mask) when you must go out  Only do this if your chronic condition does not cause breathing problems  You can make a face covering out of a thick cloth  This will save medical masks for healthcare workers and first responders  Choose fabric that holds its shape after it is washed and dried, such as cotton  Put the top half of a paper coffee filter in the middle of the fabric to create an extra filter for you  Check that you can breathe through the fabric when it is folded into several layers  Fold the fabric into a long band  Put each end through a rubber band or hair tie to create ear loops  Fold the ends of the fabric toward the middle  Hold the covering to your face  Adjust it so it covers your nose and mouth completely  Hook the loops onto your ears to keep the covering in place  The following are important points to remember:     ? Do not use a plastic face shield instead of a cloth covering  A face shield will not protect others from droplets  If a face shield must be used, choose one that wraps around both sides of the face and goes below the chin  Do not use disposable shields more than 1 time  Cruz Rides that can be used again need to be cleaned and disinfected between uses  Do not put a face shield or a cloth covering on a  or infant  These increase the risk for sudden infant death syndrome (SIDS)  ? Continue social distancing while you are out  A face covering does not mean you can have close physical contact with others  You still need to stay at least 6 feet (2 meters) away from others  ? Do not touch your face covering or eyes while you are wearing the covering  Your eyes will not be covered by the cloth  You can be infected if the virus is on your hand and you touch your eyes  Wash your hands with soap and water for 20 seconds or use hand  before you remove your face covering  ? Do not remove your face covering to talk, sneeze, or cough  Your face covering will help protect you and others around you  ? Wash face coverings often  Wash them in a washing machine in the warmest water the fabric allows  Make sure the fabric is completely dry before you use the face covering again   Only wear clean coverings when you go out  Use a new coffee filter each time  ? Certain individuals should not use face coverings  Do not put a face covering on anyone who is younger than 2 years  Dareen Cordial children may not be able to breathe through the covering  Do not put a face covering on anyone who cannot remove it by himself or herself  ? You can use a clear face covering if others need to read your lips  A clear covering may be helpful if you communicate with someone who is deaf or hard of hearing  A clear covering is made of cloth but has plastic over the mouth area  This will help the person see your lips more easily  Do not use a plastic face shield instead  ? Do not use face coverings that have breathing valves or vents  Valves or vents on the sides are designed to allow breathed air to go out  This makes breathing easier, but the virus can travel out of the valve or vent and be spread to others  · Do not have anyone over to your home unless it is necessary  It is okay to have medical workers in to provide care  Wear your face covering, and remind medical workers to wear a mask or face covering  Remind them to wash their hands when they arrive and before they leave  Do not  have family members, friends, or neighbors over, even if they are not sick  A person can pass the new virus to others before symptoms of COVID-19 begin  Some people never even develop symptoms  Children commonly have mild symptoms or no symptoms  It is important that you continue social distancing with everyone, including children  It may be hard to tell a child not to hug or kiss you  Explain that this is how he or she can help you stay healthy  · Do not go to someone else's home unless it is necessary  Do not go over to visit, even if the person is lonely  Only go if you need to help him or her  · Avoid large gatherings and crowds  Gatherings or crowds of 10 or more individuals can cause the virus to spread   Examples of gatherings include parties, sporting events, [employer] services, and conferences  Crowds may form at beaches, spears, and tourist attractions  You can continue to protect yourself by staying away from large gatherings and crowds  · Stay safe if you must go out to work  You may have a job only done outside your home that is considered essential  Keep physical distance between you and other workers as much as possible  Follow your employer's rules so everyone stays safe  Help strengthen your immune system:   · Do not smoke  Nicotine and other chemicals in cigarettes and cigars can cause lung damage and increase your risk for infections such as bronchitis or pneumonia  Ask your healthcare provider for information if you currently smoke and need help to quit  E-cigarettes or smokeless tobacco still contain nicotine  Talk to your healthcare provider before you use these products  · Eat a variety of healthy foods  Examples include vegetables, fruits, whole-grain breads and cereals, lean meats and poultry, fish, low-fat dairy products, and cooked beans  Healthy foods contain nutrients that help keep your immune system strong  · Find ways to manage stress  You may be feeling more stressed than usual because of the COVID-19 outbreak  The situation is very stressful to many people  Talk to your healthcare providers about ways to manage stress during this time  Stress can lead to breathing problems or make the problems worse  Stress can trigger an attack or exacerbation of many health conditions  It is important to do things that help you feel more relaxed, such as the following:     ? Pick 1 or 2 times a day to watch the news  Constant news watching can increase your stress levels  ? Talk to a friend on the phone or through a video chat  ? Take a warm, soothing bath  ? Listen to music  ? Exercise can also help relieve stress   This may be hard if your regular gym or outdoor exercise area is closed  If you do not have exercise equipment at home, try walking inside your home  You can walk quickly or turn on music and dance  Follow up with your doctor or healthcare provider as directed: Your providers will tell you when you can come in for tests, procedures, or check-ups  Bring your symptom record with you to all appointments  Write down your questions so you remember to ask them during your visits  For more information:   · Centers for Disease Control and Prevention  [address] , [address]  Phone: 5- 670 - 2416199  Phone: 0- 595 - 5276182  Web Address: [employer] com br    © Copyright [address] [place] [address] Drive Information is for End User's use only and may not be sold, redistributed or otherwise used for commercial purposes  All illustrations and images included in CareNotes® are the copyrighted property of [employer]  or [facility] [employer]   The above information is an  only  It is not intended as medical advice for individual conditions or treatments  Talk to your doctor, nurse or pharmacist before following any medical regimen to see if it is safe and effective for you

## 2021-03-20 NOTE — PROGRESS NOTES
INTERNAL MEDICINE RESIDENCY PROGRESS NOTE     Name: Whitney Reed   Age & Sex: 79 y o  male   MRN: [id number]  Unit/Bed#: -01   Encounter: [id number]  Team: SOD Team C     PATIENT INFORMATION     Name: Yonas Justice   Age & Sex: 79 y o  male   MRN: [id number]  Hospital Stay Days: 5    ASSESSMENT/PLAN     Principal Problem:    COVID-19  Active Problems:    Hyperlipidemia    Centrilobular emphysema ([address] ) - severe    Bilateral iliac artery aneurysm ([address] )    Prediabetes    BPH with obstruction/lower urinary tract symptoms    Essential hypertension      * COVID-19  Assessment & Plan  Noted on outpatient testing 3/12/2021  Symptoms began approximately 03/09/2021 with diarrhea, dyspnea on exertion, cough, fever, and nausea  Mild COVID pathway  Remained on 2 L nasal cannula  CT chest: Small subpleural consolidations and groundglass opacities in the bilateral lower lobes and right middle lobe consistent with COVID pneumonia  Patient had elevated trop and CK on covid protocol labs  Expect both are 2/2 covid and dehydration  Plan:  · Troponin plateaued at 0 83    · Stop trending  · CK downtrending at 688 < 1437 < 1568 < 1746 < 1860 < 1400 <1900    · q8h CK until elevation resolves  · Continue IV hydration  · D-dimer 0 49 < 0 69 < 0 71 < 0 87 < 0 77  · Ferritin 498 < 570 < 826 < 1070 < 1112  · CRP 58 5 < 94 8 < 82 9 < 65 7  · Procalcitonin 0 17 < 0 39 < 0 43 <0 09  · Continue ceftriaxone 1 g Q 24 hours  · Continue doxycycline 100 mg Q 12 hours  · Completed remdesivir  · Continue dexamethasone 6 mg daily (day 4)    Essential hypertension  Assessment & Plan  Will hold on hydrochlorothiazide 25 mg daily secondary to relatively low blood pressure from volume depletion  SBP noted to be  while in the ED  Continue to monitor vital signs  BPH with obstruction/lower urinary tract symptoms  Assessment & Plan  Continue finasteride and tamsulosin      Centrilobular emphysema (HCC) - severe  Assessment & Plan  Will place on Breo Ellipta in place of trilogy at this time  Patient did not qualify for home oxygen after completing outpatient testing  However, he does by supplemental oxygen from an outside company  He states that he rarely uses this  Continue albuterol and Atrovent as needed  Continue Mucinex 1200 mg b i d  Continue Xopenex as needed  Hyperlipidemia  Assessment & Plan  Continue atorvastatin 20 mg daily  KIRK (acute kidney injury) (HCC)-resolved as of 3/17/2021  Assessment & Plan  Likely secondary to volume depletion from diarrhea  KIRK resolved on 03/17  Will replete with IV fluids at this time  Continue to monitor vital signs and BMP  Avoid nephrotoxic agents  Avoid large fluctuations in blood pressure  Hypokalemia-resolved as of 3/17/2021  Assessment & Plan  Likely secondary to diarrhea  Improved after starting Imodium  K 4 2 this morning  Repleted while in the ED  Continue to monitor  Diarrhea-resolved as of 3/20/2021  Assessment & Plan  Likely secondary to COVID-19 and contributing to volume depletion/low blood pressure, hypokalemia, KIRK  Normal electrolytes this a mirna [place] Denies diarrhea  Plan:  · Continue Imodium prn  · Continue IV fluids   · Will monitor respiratory status closely given COVID and fluids      Disposition:  Continue inpatient care     SUBJECTIVE     Patient seen and examined  No acute events overnight  Rounded with nurse who reports no acute events  Patient remains on 2 L overnight  Vital signs stable  No specific complaints this morning  Patient denies any fevers, chills, chest pain, shortness of breath, abdominal pain, nausea, vomiting, constipation, diarrhea  Reports decreased p o  Intake however states this is chronic issue      OBJECTIVE     Vitals:    03/19/21 0944 03/19/21 2000 03/19/21 2118 03/20/21 0804   BP: 140/91  141/94 145/92   Pulse: 100  (!) 111 97   Resp:       Temp:   (!) 97 4 °F (36 3 °C) 97 8 °F (36 6 °C)   TempSrc:       SpO2: 95% 94% 94% 95% Weight:       Height:          Temperature:   Temp (24hrs), Av 6 °F (36 4 °C), Min:97 4 °F (36 3 °C), Max:97 8 °F (36 6 °C)    Temperature: 97 8 °F (36 6 °C)  Intake & Output:  I/O        07 - / 0700  07 -  0700  07 -  0700    P  O   480     I V  (mL/kg) 1000 (13 8) 2328 3 (32 1)     IV Piggyback  50     Total Intake(mL/kg) 1000 (13 8) 2858 3 (39 4)     Net +1000 +2858  3            Unmeasured Urine Occurrence  2 x     Unmeasured Stool Occurrence  3 x         Weights:   IBW: 68 4 kg    Body mass index is 24 33 kg/m²  Weight (last 2 days)     None        Physical Exam  Constitutional:       General: He is not in acute distress  Appearance: Normal appearance  He is normal weight  He is not ill-appearing, toxic-appearing or diaphoretic  Comments: Sitting upright eating breakfast   HENT:      Head: Normocephalic and atraumatic  Eyes:      General: No scleral icterus  Extraocular Movements: Extraocular movements intact  Conjunctiva/sclera: Conjunctivae normal    Neck:      Musculoskeletal: Normal range of motion  Cardiovascular:      Rate and Rhythm: Normal rate and regular rhythm  Pulses: Normal pulses  Heart sounds: Normal heart sounds  No murmur  No friction rub  No gallop  Pulmonary:      Effort: Pulmonary effort is normal  No respiratory distress  Breath sounds: Normal breath sounds  No stridor  No wheezing, rhonchi or rales  Comments: Decreased breath sounds bilaterally; 2 L nasal cannula  Abdominal:      General: Abdomen is flat  Bowel sounds are normal  There is no distension  Palpations: Abdomen is soft  Tenderness: There is no abdominal tenderness  There is no guarding  Musculoskeletal: Normal range of motion  Right lower leg: No edema  Left lower leg: No edema  Skin:     General: Skin is warm and dry  Capillary Refill: Capillary refill takes less than 2 seconds     Neurological:      General: No focal deficit present  Mental Status: He is alert and oriented to person, place, and time  Psychiatric:         Mood and Affect: Mood normal          Thought Content: Thought content normal        LABORATORY DATA     Labs: I have personally reviewed pertinent reports  Results from last 7 days   Lab Units 03/20/21  0617 03/19/21  0945 03/18/21  0557   WBC Thousand/uL 8 54 10 98* 8 92   HEMOGLOBIN g/dL 12 5 12 9 13 3   HEMATOCRIT % 38 6 40 3 41 8   PLATELETS Thousands/uL 294 274 245   NEUTROS PCT % 84* 88* 84*   MONOS PCT % 10 7 8      Results from last 7 days   Lab Units 03/20/21  0617 03/19/21  0945 03/18/21  0557   POTASSIUM mmol/L 4 4 4 2 3 9   CHLORIDE mmol/L 109* 111* 111*   CO2 mmol/L 28 27 29   BUN mg/dL 18 23 29*   CREATININE mg/dL 0 77 0 93 1 03   CALCIUM mg/dL 8 2* 8 1* 8 0*   ALK PHOS U/L 58 62 58   ALT U/L 36 35 34   AST U/L 49* 63* 84*     Results from last 7 days   Lab Units 03/15/21  1142   MAGNESIUM mg/dL 2 3                  Results from last 7 days   Lab Units 03/17/21  0028 03/16/21  2145 03/16/21  1837   TROPONIN I ng/mL 0 11* 0 11* 0 10*       IMAGING & DIAGNOSTIC TESTING     Radiology Results: I have personally reviewed pertinent reports  Xr Chest Portable    Result Date: 3/15/2021  Impression: No acute cardiopulmonary disease  Findings are stable Workstation performed: [id number]     Ct Chest Wo Contrast    Result Date: 3/16/2021  Impression: Small subpleural consolidations and groundglass opacities in the bilateral lower lobes and right middle lobe  In the setting of clinically suspected/proven COVID-19, the above lung parenchymal findings on CT indicate intermediate confidence level for COVID-19  Unchanged severe emphysema  Workstation performed: [id number]     Other Diagnostic Testing: I have personally reviewed pertinent reports      ACTIVE MEDICATIONS     Current Facility-Administered Medications   Medication Dose Route Frequency    albuterol (PROVENTIL HFA,VENTOLIN HFA) inhaler 2 puff  2 puff Inhalation Q6H PRN    ascorbic acid (VITAMIN C) tablet 1,000 mg  1,000 mg Oral BID    atorvastatin (LIPITOR) tablet 20 mg  20 mg Oral Daily With Dinner    cefTRIAXone (ROCEPHIN) 1,000 mg in dextrose 5 % 50 mL IVPB  1,000 mg Intravenous Q24H    cholecalciferol (VITAMIN D3) tablet 2,000 Units  2,000 Units Oral Daily    dexamethasone (DECADRON) tablet 6 mg  6 mg Oral Daily    doxycycline hyclate (VIBRAMYCIN) capsule 100 mg  100 mg Oral Q12H    enoxaparin (LOVENOX) subcutaneous injection 30 mg  30 mg Subcutaneous Q12H    finasteride (PROSCAR) tablet 5 mg  5 mg Oral Daily    fluticasone-vilanterol (BREO ELLIPTA) 100-25 mcg/inh inhaler 1 puff  1 puff Inhalation Daily    guaiFENesin (MUCINEX) 12 hr tablet 1,200 mg  1,200 mg Oral Q12H CHRISTEN    loperamide (IMODIUM) capsule 2 mg  2 mg Oral TID PRN    potassium chloride 20 mEq in dextrose 5% lactated ringer's 1,000 mL infusion   Intravenous Continuous    tamsulosin (FLOMAX) capsule 0 4 mg  0 4 mg Oral Daily With Dinner    zinc sulfate (ZINCATE) capsule 220 mg  220 mg Oral Daily       VTE Pharmacologic Prophylaxis: Enoxaparin (Lovenox)  VTE Mechanical Prophylaxis: sequential compression device    Portions of the record may have been created with voice recognition software  Occasional wrong word or "sound a like" substitutions may have occurred due to the inherent limitations of voice recognition software    Read the chart carefully and recognize, using context, where substitutions have occurred   ==  Stephy Chavarria, [id number] [facility]  Internal Medicine Residency PGY-1

## 2021-03-21 LAB
ANION GAP SERPL CALCULATED.3IONS-SCNC: 7 MMOL/L (ref 4–13)
BASOPHILS # BLD MANUAL: 0 THOUSAND/UL (ref 0–0.1)
BASOPHILS NFR MAR MANUAL: 0 % (ref 0–1)
BUN SERPL-MCNC: 16 MG/DL (ref 5–25)
CALCIUM SERPL-MCNC: 8.4 MG/DL (ref 8.3–10.1)
CHLORIDE SERPL-SCNC: 106 MMOL/L (ref 100–108)
CK MB SERPL-MCNC: 3.3 % (ref 0–2.5)
CK MB SERPL-MCNC: 8.5 NG/ML (ref 0–5)
CK SERPL-CCNC: 257 U/L (ref 39–308)
CO2 SERPL-SCNC: 28 MMOL/L (ref 21–32)
CREAT SERPL-MCNC: 0.88 MG/DL (ref 0.6–1.3)
CRP SERPL QL: 23.4 MG/L
EOSINOPHIL # BLD MANUAL: 0 THOUSAND/UL (ref 0–0.4)
EOSINOPHIL NFR BLD MANUAL: 0 % (ref 0–6)
ERYTHROCYTE [DISTWIDTH] IN BLOOD BY AUTOMATED COUNT: 13.2 % (ref 11.6–15.1)
FERRITIN SERPL-MCNC: 536 NG/ML (ref 8–388)
GFR SERPL CREATININE-BSD FRML MDRD: 87 ML/MIN/1.73SQ M
GLUCOSE SERPL-MCNC: 147 MG/DL (ref 65–140)
HCT VFR BLD AUTO: 43.7 % (ref 36.5–49.3)
HGB BLD-MCNC: 13.8 G/DL (ref 12–17)
LYMPHOCYTES # BLD AUTO: 0.67 THOUSAND/UL (ref 0.6–4.47)
LYMPHOCYTES # BLD AUTO: 6 % (ref 14–44)
MCH RBC QN AUTO: 28.6 PG (ref 26.8–34.3)
MCHC RBC AUTO-ENTMCNC: 31.6 G/DL (ref 31.4–37.4)
MCV RBC AUTO: 91 FL (ref 82–98)
MONOCYTES # BLD AUTO: 1.23 THOUSAND/UL (ref 0–1.22)
MONOCYTES NFR BLD: 11 % (ref 4–12)
NEUTROPHILS # BLD MANUAL: 9.27 THOUSAND/UL (ref 1.85–7.62)
NEUTS SEG NFR BLD AUTO: 83 % (ref 43–75)
NRBC BLD AUTO-RTO: 0 /100 WBCS
PLATELET # BLD AUTO: 334 THOUSANDS/UL (ref 149–390)
PLATELET BLD QL SMEAR: ADEQUATE
PMV BLD AUTO: 10.3 FL (ref 8.9–12.7)
POTASSIUM SERPL-SCNC: 4.5 MMOL/L (ref 3.5–5.3)
RBC # BLD AUTO: 4.83 MILLION/UL (ref 3.88–5.62)
SODIUM SERPL-SCNC: 141 MMOL/L (ref 136–145)
WBC # BLD AUTO: 11.17 THOUSAND/UL (ref 4.31–10.16)

## 2021-03-21 PROCEDURE — 82550 ASSAY OF CK (CPK): CPT | Performed by: STUDENT IN AN ORGANIZED HEALTH CARE EDUCATION/TRAINING PROGRAM

## 2021-03-21 PROCEDURE — 85027 COMPLETE CBC AUTOMATED: CPT | Performed by: STUDENT IN AN ORGANIZED HEALTH CARE EDUCATION/TRAINING PROGRAM

## 2021-03-21 PROCEDURE — 86140 C-REACTIVE PROTEIN: CPT | Performed by: STUDENT IN AN ORGANIZED HEALTH CARE EDUCATION/TRAINING PROGRAM

## 2021-03-21 PROCEDURE — 82728 ASSAY OF FERRITIN: CPT | Performed by: STUDENT IN AN ORGANIZED HEALTH CARE EDUCATION/TRAINING PROGRAM

## 2021-03-21 PROCEDURE — 80048 BASIC METABOLIC PNL TOTAL CA: CPT | Performed by: STUDENT IN AN ORGANIZED HEALTH CARE EDUCATION/TRAINING PROGRAM

## 2021-03-21 PROCEDURE — 99233 SBSQ HOSP IP/OBS HIGH 50: CPT | Performed by: INTERNAL MEDICINE

## 2021-03-21 PROCEDURE — 82553 CREATINE MB FRACTION: CPT | Performed by: STUDENT IN AN ORGANIZED HEALTH CARE EDUCATION/TRAINING PROGRAM

## 2021-03-21 PROCEDURE — 85007 BL SMEAR W/DIFF WBC COUNT: CPT | Performed by: STUDENT IN AN ORGANIZED HEALTH CARE EDUCATION/TRAINING PROGRAM

## 2021-03-21 RX ADMIN — OXYCODONE HYDROCHLORIDE AND ACETAMINOPHEN 1000 MG: 500 TABLET ORAL at 09:01

## 2021-03-21 RX ADMIN — TAMSULOSIN HYDROCHLORIDE 0.4 MG: 0.4 CAPSULE ORAL at 16:39

## 2021-03-21 RX ADMIN — ENOXAPARIN SODIUM 30 MG: 30 INJECTION SUBCUTANEOUS at 16:39

## 2021-03-21 RX ADMIN — OXYCODONE HYDROCHLORIDE AND ACETAMINOPHEN 1000 MG: 500 TABLET ORAL at 16:39

## 2021-03-21 RX ADMIN — ENOXAPARIN SODIUM 30 MG: 30 INJECTION SUBCUTANEOUS at 04:51

## 2021-03-21 RX ADMIN — POTASSIUM CHLORIDE: 2 INJECTION, SOLUTION, CONCENTRATE INTRAVENOUS at 16:51

## 2021-03-21 RX ADMIN — GUAIFENESIN 1200 MG: 600 TABLET, EXTENDED RELEASE ORAL at 22:41

## 2021-03-21 RX ADMIN — GUAIFENESIN 1200 MG: 600 TABLET, EXTENDED RELEASE ORAL at 09:01

## 2021-03-21 RX ADMIN — DOXYCYCLINE 100 MG: 100 CAPSULE ORAL at 11:18

## 2021-03-21 RX ADMIN — Medication 2000 UNITS: at 09:01

## 2021-03-21 RX ADMIN — DOXYCYCLINE 100 MG: 100 CAPSULE ORAL at 22:41

## 2021-03-21 RX ADMIN — POTASSIUM CHLORIDE: 2 INJECTION, SOLUTION, CONCENTRATE INTRAVENOUS at 09:13

## 2021-03-21 RX ADMIN — ATORVASTATIN CALCIUM 20 MG: 20 TABLET, FILM COATED ORAL at 16:39

## 2021-03-21 RX ADMIN — FLUTICASONE FUROATE AND VILANTEROL TRIFENATATE 1 PUFF: 100; 25 POWDER RESPIRATORY (INHALATION) at 09:01

## 2021-03-21 RX ADMIN — FINASTERIDE 5 MG: 5 TABLET, FILM COATED ORAL at 09:01

## 2021-03-21 RX ADMIN — CEFTRIAXONE 1000 MG: 2 INJECTION, POWDER, FOR SOLUTION INTRAMUSCULAR; INTRAVENOUS at 11:18

## 2021-03-21 RX ADMIN — DEXAMETHASONE 6 MG: 2 TABLET ORAL at 09:01

## 2021-03-21 RX ADMIN — ZINC SULFATE 220 MG (50 MG) CAPSULE 220 MG: CAPSULE at 09:03

## 2021-03-21 NOTE — PROGRESS NOTES
Trying pt on r/a, at rest he desatted to 90 percent in a few minutes, had to let the o2 on at 2L and laying in the bed next to victoria because pt was very hesitant to remove it and wanted it available if he needed it quickly, at home he uses o2 PRN but literally said he uses it often just to get up to the BR, will continue to monitor

## 2021-03-21 NOTE — PLAN OF CARE
Problem: PAIN - ADULT  Goal: Verbalizes/displays adequate comfort level or baseline comfort level  Description: Interventions:  - Encourage patient to monitor pain and request assistance  - Assess pain using appropriate pain scale  - Administer analgesics based on type and severity of pain and evaluate response  - Implement non-pharmacological measures as appropriate and evaluate response  - Consider cultural and social influences on pain and pain management  - Notify physician/advanced practitioner if interventions unsuccessful or patient reports new pain  Outcome: Progressing     Problem: INFECTION - ADULT  Goal: Absence or prevention of progression during hospitalization  Description: INTERVENTIONS:  - Assess and monitor for signs and symptoms of infection  - Monitor lab/diagnostic results  - Monitor all insertion sites, i e  indwelling lines, tubes, and drains  - Monitor endotracheal if appropriate and nasal secretions for changes in amount and color  - [place] appropriate cooling/warming therapies per order  - Administer medications as ordered  - Instruct and encourage patient and family to use good hand hygiene technique  - Identify and instruct in appropriate isolation precautions for identified infection/condition  Outcome: Progressing     Problem: SAFETY ADULT  Goal: Patient will remain free of falls  Description: INTERVENTIONS:  - Assess patient frequently for physical needs  -  Identify cognitive and physical deficits and behaviors that affect risk of falls    -  [place] fall precautions as indicated by assessment   - Educate patient/family on patient safety including physical limitations  - Instruct patient to call for assistance with activity based on assessment  - Modify environment to reduce risk of injury  - Consider OT/PT consult to assist with strengthening/mobility  Outcome: Progressing  Goal: Maintain or return to baseline ADL function  Description: INTERVENTIONS:  -  Assess patient's ability to carry out ADLs; assess patient's baseline for ADL function and identify physical deficits which impact ability to perform ADLs (bathing, care of mouth/teeth, toileting, grooming, dressing, etc )  - Assess/evaluate cause of self-care deficits   - Assess range of motion  - Assess patient's mobility; develop plan if impaired  - Assess patient's need for assistive devices and provide as appropriate  - Encourage maximum independence but intervene and supervise when necessary  - Involve family in performance of ADLs  - Assess for home care needs following discharge   - Consider OT consult to assist with ADL evaluation and planning for discharge  - Provide patient education as appropriate  Outcome: Progressing  Goal: Maintain or return mobility status to optimal level  Description: INTERVENTIONS:  - Assess patient's baseline mobility status (ambulation, transfers, stairs, etc )    - Identify cognitive and physical deficits and behaviors that affect mobility  - Identify mobility aids required to assist with transfers and/or ambulation (gait belt, sit-to-stand, lift, walker, cane, etc )  - [place] fall precautions as indicated by assessment  - Record patient progress and toleration of activity level on Mobility SBAR; progress patient to next Phase/Stage  - Instruct patient to call for assistance with activity based on assessment  - Consider rehabilitation consult to assist with strengthening/weightbearing, etc   Outcome: Progressing     Problem: DISCHARGE PLANNING  Goal: Discharge to home or other facility with appropriate resources  Description: INTERVENTIONS:  - Identify barriers to discharge w/patient and caregiver  - Arrange for needed discharge resources and transportation as appropriate  - Identify discharge learning needs (meds, wound care, etc )  - Arrange for interpretive services to assist at discharge as needed  - Refer to Case Management Department for coordinating discharge planning if the patient needs post-hospital services based on physician/advanced practitioner order or complex needs related to functional status, cognitive ability, or social support system  Outcome: Progressing     Problem: Knowledge Deficit  Goal: Patient/family/caregiver demonstrates understanding of disease process, treatment plan, medications, and discharge instructions  Description: Complete learning assessment and assess knowledge base  Interventions:  - Provide teaching at level of understanding  - Provide teaching via preferred learning methods  Outcome: Progressing     Problem: Potential for Falls  Goal: Patient will remain free of falls  Description: INTERVENTIONS:  - Assess patient frequently for physical needs  -  Identify cognitive and physical deficits and behaviors that affect risk of falls    -  [place] fall precautions as indicated by assessment   - Educate patient/family on patient safety including physical limitations  - Instruct patient to call for assistance with activity based on assessment  - Modify environment to reduce risk of injury  - Consider OT/PT consult to assist with strengthening/mobility  Outcome: Progressing     Problem: RESPIRATORY - ADULT  Goal: Achieves optimal ventilation and oxygenation  Description: INTERVENTIONS:  - Assess for changes in respiratory status  - Assess for changes in mentation and behavior  - Position to facilitate oxygenation and minimize respiratory effort  - Oxygen administered by appropriate delivery if ordered  - Initiate smoking cessation education as indicated  - Encourage broncho-pulmonary hygiene including cough, deep breathe, Incentive Spirometry  - Assess the need for suctioning and aspirate as needed  - Assess and instruct to report SOB or any respiratory difficulty  - Respiratory Therapy support as indicated  Outcome: Progressing

## 2021-03-21 NOTE — PROGRESS NOTES
INTERNAL MEDICINE RESIDENCY PROGRESS NOTE     Name: Inocencia Velásquez   Age & Sex: 79 y o  male   MRN: [id number]  Unit/Bed#: -01   Encounter: [id number]  Team: SOD Team C     PATIENT INFORMATION     Name: Sara Justice   Age & Sex: 79 y o  male   MRN: [id number]  Hospital Stay Days: 6    ASSESSMENT/PLAN     Principal Problem:    COVID-19  Active Problems:    Hyperlipidemia    Centrilobular emphysema ([address] ) - severe    Bilateral iliac artery aneurysm (HCC)    Prediabetes    BPH with obstruction/lower urinary tract symptoms    Essential hypertension      Essential hypertension  Assessment & Plan  Will hold on hydrochlorothiazide 25 mg daily secondary to relatively low blood pressure from volume depletion  SBP noted to be  while in the ED  Continue to monitor vital signs  BPH with obstruction/lower urinary tract symptoms  Assessment & Plan  Continue finasteride and tamsulosin  Centrilobular emphysema (HCC) - severe  Assessment & Plan  Will place on [address] Ruby in place of trilogy at this time  Patient did not qualify for home oxygen after completing outpatient testing  However, he does by supplemental oxygen from an outside company  He states that he rarely uses this  Continue albuterol and Atrovent as needed  Continue Mucinex 1200 mg b i d  Continue Xopenex as needed  Hyperlipidemia  Assessment & Plan  Continue atorvastatin 20 mg daily  * COVID-19  Assessment & Plan  Noted on outpatient testing 3/12/2021  Symptoms began approximately 03/09/2021 with diarrhea, dyspnea on exertion, cough, fever, and nausea  Mild COVID pathway  Remained on 2 L nasal cannula  CT chest: Small subpleural consolidations and groundglass opacities in the bilateral lower lobes and right middle lobe consistent with COVID pneumonia  Patient had elevated trop and CK on covid protocol labs  Expect both are 2/2 covid and dehydration       Plan:  · Troponin plateaued at 0 73    · Stop trending  · CK downtrending at 688 < 1437 < 1568 < 1746 < 1860 < 1400 <1900    · q8h CK until elevation resolves  · Continue IV hydration  · D-dimer 0 49 < 0 69 < 0 71 < 0 87 < 0 77  · Ferritin 498 < 570 < 826 < 1070 < 1112  · CRP 58 5 < 94 8 < 82 9 < 65 7  · Procalcitonin 0 17 < 0 39 < 0 43 <0 09  · Continue ceftriaxone 1 g Q 24 hours  · Continue doxycycline 100 mg Q 12 hours  · Completed remdesivir  · Continue dexamethasone 6 mg daily (day 4)    KIRK (acute kidney injury) (HCC)-resolved as of 3/17/2021  Assessment & Plan  Likely secondary to volume depletion from diarrhea  KIRK resolved on 03/17  Will replete with IV fluids at this time  Continue to monitor vital signs and BMP  Avoid nephrotoxic agents  Avoid large fluctuations in blood pressure  Hypokalemia-resolved as of 3/17/2021  Assessment & Plan  Likely secondary to diarrhea  Improved after starting Imodium  K 4 2 this morning  Repleted while in the ED  Continue to monitor  Diarrhea-resolved as of 3/20/2021  Assessment & Plan  Likely secondary to COVID-19 and contributing to volume depletion/low blood pressure, hypokalemia, KIRK  Normal electrolytes this a m  Landen Byron Denies diarrhea  Plan:  · Continue Imodium prn  · Continue IV fluids   · Will monitor respiratory status closely given COVID and fluids        Disposition:  Will obtain ambulatory pulse ox    SUBJECTIVE     Patient seen and examined  He is resting in bed comfortably  Heart rate noted to be   The patient is rather anxious  He states that at times he does have palpitations when he walks, however this is more often than usual   This is possibly secondary to desatting when walking  Fourteen point system ROS reviewed and negative unless stated otherwise above      OBJECTIVE     Vitals:    03/19/21 2118 03/20/21 0804 03/20/21 1539 03/20/21 2127   BP: 141/94 145/92 134/89 133/89   BP Location:  Left arm     Pulse: (!) 111 97 84 87   Resp:  18     Temp: (!) 97 4 °F (36 3 °C) 97 8 °F (36 6 °C) 97 6 °F (36 4 °C) 97 5 °F (36 4 °C)   TempSrc:  Oral     SpO2: 94% 95% 98% 96%   Weight:       Height:          Temperature:   Temp (24hrs), Av 6 °F (36 4 °C), Min:97 5 °F (36 4 °C), Max:97 8 °F (36 6 °C)    Temperature: 97 5 °F (36 4 °C)  Intake & Output:  I/O        07 -  0700  07 -  0700  07 -  0700    P  O  480 325     I V  (mL/kg) 2328 3 (32 1)  7 (27 6)     IV Piggyback 50      Total Intake(mL/kg) 2858 3 (39 4) 2326 7 (32)     Net +2858 3 +2326 7            Unmeasured Urine Occurrence 2 x      Unmeasured Stool Occurrence 3 x          Weights:   IBW: 68 4 kg    Body mass index is 24 33 kg/m²  Weight (last 2 days)     None        Physical Exam  Constitutional:       Appearance: Normal appearance  HENT:      Head: Normocephalic and atraumatic  Nose: Nose normal       Mouth/Throat:      Mouth: Mucous membranes are moist       Pharynx: Oropharynx is clear  Eyes:      Extraocular Movements: Extraocular movements intact  Conjunctiva/sclera: Conjunctivae normal    Neck:      Musculoskeletal: Normal range of motion  Cardiovascular:      Rate and Rhythm: Regular rhythm  Tachycardia present  Heart sounds: No murmur  No friction rub  No gallop  Pulmonary:      Effort: Pulmonary effort is normal  No respiratory distress  Breath sounds: Normal breath sounds  No stridor  No wheezing, rhonchi or rales  Comments: On 2 L saturating 94%  Off oxygen, patient is saturating 92% and above  Nasal cannula is for comfort  Chest:      Chest wall: No tenderness  Abdominal:      General: Bowel sounds are normal  There is no distension  Palpations: Abdomen is soft  There is no mass  Tenderness: There is no abdominal tenderness  There is no guarding or rebound  Hernia: No hernia is present  Musculoskeletal: Normal range of motion  Skin:     General: Skin is warm and dry  Neurological:      Mental Status: He is alert and oriented to person, place, and time  Mental status is at baseline  Psychiatric:      Comments: Anxious       LABORATORY DATA     Labs: I have personally reviewed pertinent reports  Results from last 7 days   Lab Units 03/21/21  0502 03/20/21  0617 03/19/21  0945 03/18/21  0557   WBC Thousand/uL 11 17* 8 54 10 98* 8 92   HEMOGLOBIN g/dL 13 8 12 5 12 9 13 3   HEMATOCRIT % 43 7 38 6 40 3 41 8   PLATELETS Thousands/uL 334 294 274 245   NEUTROS PCT %  --  84* 88* 84*   MONOS PCT %  --  10 7 8      Results from last 7 days   Lab Units 03/20/21  0617 03/19/21  0945 03/18/21  0557   POTASSIUM mmol/L 4 4 4 2 3 9   CHLORIDE mmol/L 109* 111* 111*   CO2 mmol/L 28 27 29   BUN mg/dL 18 23 29*   CREATININE mg/dL 0 77 0 93 1 03   CALCIUM mg/dL 8 2* 8 1* 8 0*   ALK PHOS U/L 58 62 58   ALT U/L 36 35 34   AST U/L 49* 63* 84*     Results from last 7 days   Lab Units 03/15/21  1142   MAGNESIUM mg/dL 2 3                  Results from last 7 days   Lab Units 03/17/21  0028 03/16/21  2145 03/16/21  1837   TROPONIN I ng/mL 0 11* 0 11* 0 10*       IMAGING & DIAGNOSTIC TESTING     Radiology Results: I have personally reviewed pertinent reports  Xr Chest Portable    Result Date: 3/15/2021  Impression: No acute cardiopulmonary disease  Findings are stable Workstation performed: [id number]     Ct Chest Wo Contrast    Result Date: 3/16/2021  Impression: Small subpleural consolidations and groundglass opacities in the bilateral lower lobes and right middle lobe  In the setting of clinically suspected/proven COVID-19, the above lung parenchymal findings on CT indicate intermediate confidence level for COVID-19  Unchanged severe emphysema  Workstation performed: [id number]     Other Diagnostic Testing: I have personally reviewed pertinent reports      ACTIVE MEDICATIONS     Current Facility-Administered Medications   Medication Dose Route Frequency    albuterol (PROVENTIL HFA,VENTOLIN HFA) inhaler 2 puff  2 puff Inhalation Q6H PRN    ascorbic acid (VITAMIN C) tablet 1,000 mg 1,000 mg Oral BID    atorvastatin (LIPITOR) tablet 20 mg  20 mg Oral Daily With Dinner    cefTRIAXone (ROCEPHIN) 1,000 mg in dextrose 5 % 50 mL IVPB  1,000 mg Intravenous Q24H    cholecalciferol (VITAMIN D3) tablet 2,000 Units  2,000 Units Oral Daily    dexamethasone (DECADRON) tablet 6 mg  6 mg Oral Daily    doxycycline hyclate (VIBRAMYCIN) capsule 100 mg  100 mg Oral Q12H    enoxaparin (LOVENOX) subcutaneous injection 30 mg  30 mg Subcutaneous Q12H    finasteride (PROSCAR) tablet 5 mg  5 mg Oral Daily    fluticasone-vilanterol (BREO ELLIPTA) 100-25 mcg/inh inhaler 1 puff  1 puff Inhalation Daily    guaiFENesin (MUCINEX) 12 hr tablet 1,200 mg  1,200 mg Oral Q12H CHRISTEN    loperamide (IMODIUM) capsule 2 mg  2 mg Oral TID PRN    potassium chloride 20 mEq in dextrose 5% lactated ringer's 1,000 mL infusion   Intravenous Continuous    tamsulosin (FLOMAX) capsule 0 4 mg  0 4 mg Oral Daily With Dinner    zinc sulfate (ZINCATE) capsule 220 mg  220 mg Oral Daily     Portions of the record may have been created with voice recognition software  Occasional wrong word or "sound a like" substitutions may have occurred due to the inherent limitations of voice recognition software    Read the chart carefully and recognize, using context, where substitutions have occurred   ==  Baldemar Martinez MD  [facility]  Internal Medicine Residency PGY-2

## 2021-03-22 VITALS
WEIGHT: 160 LBS | HEART RATE: 90 BPM | HEIGHT: 68 IN | TEMPERATURE: 97.7 F | RESPIRATION RATE: 18 BRPM | BODY MASS INDEX: 24.25 KG/M2 | OXYGEN SATURATION: 94 % | DIASTOLIC BLOOD PRESSURE: 102 MMHG | SYSTOLIC BLOOD PRESSURE: 159 MMHG

## 2021-03-22 LAB
ERYTHROCYTE [DISTWIDTH] IN BLOOD BY AUTOMATED COUNT: 13.2 % (ref 11.6–15.1)
HCT VFR BLD AUTO: 35.2 % (ref 36.5–49.3)
HGB BLD-MCNC: 11.1 G/DL (ref 12–17)
MCH RBC QN AUTO: 29.1 PG (ref 26.8–34.3)
MCHC RBC AUTO-ENTMCNC: 31.5 G/DL (ref 31.4–37.4)
MCV RBC AUTO: 92 FL (ref 82–98)
PLATELET # BLD AUTO: 248 THOUSANDS/UL (ref 149–390)
PMV BLD AUTO: 10.5 FL (ref 8.9–12.7)
RBC # BLD AUTO: 3.82 MILLION/UL (ref 3.88–5.62)
WBC # BLD AUTO: 7.78 THOUSAND/UL (ref 4.31–10.16)

## 2021-03-22 PROCEDURE — NC001 PR NO CHARGE: Performed by: INTERNAL MEDICINE

## 2021-03-22 PROCEDURE — 85027 COMPLETE CBC AUTOMATED: CPT | Performed by: STUDENT IN AN ORGANIZED HEALTH CARE EDUCATION/TRAINING PROGRAM

## 2021-03-22 PROCEDURE — 94762 N-INVAS EAR/PLS OXIMTRY CONT: CPT

## 2021-03-22 PROCEDURE — 99238 HOSP IP/OBS DSCHRG MGMT 30/<: CPT | Performed by: INTERNAL MEDICINE

## 2021-03-22 RX ORDER — LOPERAMIDE HYDROCHLORIDE 2 MG/1
2 CAPSULE ORAL 3 TIMES DAILY PRN
Qty: 30 CAPSULE | Refills: 0 | Status: SHIPPED | OUTPATIENT
Start: 2021-03-22 | End: 2021-08-25 | Stop reason: ALTCHOICE

## 2021-03-22 RX ORDER — DOXYCYCLINE HYCLATE 100 MG/1
100 CAPSULE ORAL EVERY 12 HOURS
Qty: 14 CAPSULE | Refills: 0 | Status: CANCELLED | OUTPATIENT
Start: 2021-03-22 | End: 2021-03-29

## 2021-03-22 RX ORDER — PREDNISONE 20 MG/1
40 TABLET ORAL DAILY
Qty: 10 TABLET | Refills: 0 | Status: SHIPPED | OUTPATIENT
Start: 2021-03-23 | End: 2021-03-27

## 2021-03-22 RX ORDER — HYDROCHLOROTHIAZIDE 25 MG/1
25 TABLET ORAL DAILY
Status: DISCONTINUED | OUTPATIENT
Start: 2021-03-22 | End: 2021-03-22 | Stop reason: HOSPADM

## 2021-03-22 RX ORDER — ZINC SULFATE 50(220)MG
220 CAPSULE ORAL DAILY
Qty: 2 CAPSULE | Refills: 0 | Status: SHIPPED | OUTPATIENT
Start: 2021-03-22 | End: 2021-04-04 | Stop reason: HOSPADM

## 2021-03-22 RX ORDER — MELATONIN
2000 DAILY
Qty: 4 TABLET | Refills: 0 | Status: SHIPPED | OUTPATIENT
Start: 2021-03-22 | End: 2022-07-23

## 2021-03-22 RX ORDER — CEFDINIR 300 MG/1
300 CAPSULE ORAL EVERY 12 HOURS SCHEDULED
Qty: 14 CAPSULE | Refills: 0 | Status: CANCELLED | OUTPATIENT
Start: 2021-03-22 | End: 2021-03-29

## 2021-03-22 RX ORDER — PREDNISONE 20 MG/1
40 TABLET ORAL DAILY
Qty: 10 TABLET | Refills: 0 | Status: SHIPPED | OUTPATIENT
Start: 2021-03-22 | End: 2021-03-22 | Stop reason: SDUPTHER

## 2021-03-22 RX ADMIN — ENOXAPARIN SODIUM 30 MG: 30 INJECTION SUBCUTANEOUS at 06:19

## 2021-03-22 RX ADMIN — DEXAMETHASONE 6 MG: 2 TABLET ORAL at 08:35

## 2021-03-22 RX ADMIN — POTASSIUM CHLORIDE: 2 INJECTION, SOLUTION, CONCENTRATE INTRAVENOUS at 03:17

## 2021-03-22 RX ADMIN — CEFTRIAXONE 1000 MG: 2 INJECTION, POWDER, FOR SOLUTION INTRAMUSCULAR; INTRAVENOUS at 11:45

## 2021-03-22 RX ADMIN — DOXYCYCLINE 100 MG: 100 CAPSULE ORAL at 08:38

## 2021-03-22 RX ADMIN — FLUTICASONE FUROATE AND VILANTEROL TRIFENATATE 1 PUFF: 100; 25 POWDER RESPIRATORY (INHALATION) at 08:36

## 2021-03-22 RX ADMIN — HYDROCHLOROTHIAZIDE 25 MG: 25 TABLET ORAL at 08:38

## 2021-03-22 RX ADMIN — FINASTERIDE 5 MG: 5 TABLET, FILM COATED ORAL at 08:35

## 2021-03-22 NOTE — PROGRESS NOTES
INTERNAL MEDICINE RESIDENCY PROGRESS NOTE     Name: Cristobal Newman   Age & Sex: 79 y o  male   MRN: [id number]  Unit/Bed#: -01   Encounter: [id number]  Team: SOD Team C     PATIENT INFORMATION     Name: Ellie Justice   Age & Sex: 79 y o  male   MRN: [id number]  Hospital Stay Days: 7    ASSESSMENT/PLAN     Principal Problem:    COVID-19  Active Problems:    Centrilobular emphysema ([address] ) - severe    Essential hypertension    Hyperlipidemia    Bilateral iliac artery aneurysm (HCC)    Prediabetes    BPH with obstruction/lower urinary tract symptoms      * COVID-19  Assessment & Plan  Noted on outpatient testing 3/12/2021  Symptoms began approximately 03/09/2021 with diarrhea, dyspnea on exertion, cough, fever, and nausea  Mild COVID pathway  Remained on 2 L nasal cannula  CT chest: Small subpleural consolidations and groundglass opacities in the bilateral lower lobes and right middle lobe consistent with COVID pneumonia  Patient had elevated trop and CK on covid protocol labs  Expect both are 2/2 covid and dehydration  Plan:  · Troponin plateaued at 3 48    · Stop trending  · CK downtrending at  257 < 688 < 1437 < 1568 < 1746 < 1860 < 1400 <1900    · Resolved  · D-dimer 0 49 < 0 69 < 0 71 < 0 87 < 0 77  · Ferritin  536 <498 < 570 < 826 < 1070 < 1112  · CRP 23 4 < 58 5 < 94 8 < 82 9 < 65 7  · Procalcitonin 0 17 < 0 39 < 0 43 <0 09  · Continue ceftriaxone 1 g Q 24 hours (day 6)  · Continue doxycycline 100 mg Q 12 hours (day 6)  · Completed remdesivir  · Continue dexamethasone 6 mg daily (day 5)    Centrilobular emphysema (HCC) - severe  Assessment & Plan  Plan:  -Will place on [address]ie Walt in place of trilogy at this time   -Patient did not qualify for home oxygen after completing outpatient testing  However, he does by supplemental oxygen from an outside company    He states that he rarely uses this   -Continue albuterol and Atrovent as needed   -Continue Mucinex 1200 mg b i d   -Continue Xopenex as needed  Essential hypertension  Assessment & Plan  Plan:  -Restart home HCTZ 25 mg QD, SBP into the 150s    BPH with obstruction/lower urinary tract symptoms  Assessment & Plan  Continue finasteride and tamsulosin  Prediabetes  Assessment & Plan  Patient with multiple fasting BG >126 since admission  Currently receiving steroids for COVID  Plan:  -Outpatient follow-up    Bilateral iliac artery aneurysm [facility])  Assessment & Plan  Following with Vascular, follow-up screening in 1 year  Hyperlipidemia  Assessment & Plan  Continue atorvastatin 20 mg daily  KIRK (acute kidney injury) (HCC)-resolved as of 3/17/2021  Assessment & Plan  Likely secondary to volume depletion from diarrhea  KIRK resolved on 03/17  Will replete with IV fluids at this time  Continue to monitor vital signs and BMP  Avoid nephrotoxic agents  Avoid large fluctuations in blood pressure  Hypokalemia-resolved as of 3/17/2021  Assessment & Plan  Likely secondary to diarrhea  Improved after starting Imodium  K 4 2 this morning  Repleted while in the ED  Continue to monitor  Diarrhea-resolved as of 3/20/2021  Assessment & Plan  Likely secondary to COVID-19 and contributing to volume depletion/low blood pressure, hypokalemia, KIRK  Normal electrolytes this a mirna Bautista Denies diarrhea  Plan:  · Continue Imodium prn  · Continue IV fluids   · Will monitor respiratory status closely given COVID and fluids      Disposition: Discharge pending home O2 eval     SUBJECTIVE     Patient seen and examined  No acute events overnight  Patient resting comfortably in bed  Denies hearing/vision changes, fevers, chills, night sweats, dysphagia, chest pain, shortness of breath abdominal pain, nausea, vomiting, diarrhea constipation, diarrhea peripheral edema  Patient states that he feels well and is ready to go home      OBJECTIVE     Vitals:    03/21/21 0718 03/21/21 1643 03/21/21 2110 03/22/21 0744   BP: 137/88 (!) 159/104  (!) 159/102   BP Location:       Pulse: 83 96  90   Resp:       Temp: 98 6 °F (37 °C) 97 7 °F (36 5 °C)  97 7 °F (36 5 °C)   TempSrc:       SpO2: 99% 94% 94% 94%   Weight:       Height:          Temperature:   Temp (24hrs), Av 7 °F (36 5 °C), Min:97 7 °F (36 5 °C), Max:97 7 °F (36 5 °C)    Temperature: 97 7 °F (36 5 °C)  Intake & Output:  I/O        07 -  0700  07 -  0700  0700    P  O  325  0    I V  (mL/kg) 2001 (27 6)  (27 2)     IV Piggyback       Total Intake(mL/kg) 232 7 (32)  (27 2) 0 (0)    Net + 7 + 0           Unmeasured Urine Occurrence  3 x     Unmeasured Stool Occurrence  0 x         Weights:   IBW: 68 4 kg    Body mass index is 24 33 kg/m²  Weight (last 2 days)     None        Physical Exam  Vitals signs and nursing note reviewed  Constitutional:       Appearance: Normal appearance  HENT:      Head: Normocephalic and atraumatic  Right Ear: External ear normal       Left Ear: External ear normal       Nose: Nose normal       Mouth/Throat:      Mouth: Mucous membranes are moist       Pharynx: Oropharynx is clear  Eyes:      Extraocular Movements: Extraocular movements intact  Pupils: Pupils are equal, round, and reactive to light  Neck:      Musculoskeletal: Normal range of motion  Cardiovascular:      Rate and Rhythm: Normal rate and regular rhythm  Heart sounds: No murmur  Pulmonary:      Effort: Pulmonary effort is normal       Breath sounds: Normal breath sounds  Abdominal:      General: Abdomen is flat  There is no distension  Palpations: Abdomen is soft  Tenderness: There is no abdominal tenderness  Musculoskeletal: Normal range of motion  General: No swelling  Right lower leg: No edema  Left lower leg: No edema  Skin:     General: Skin is warm and dry  Capillary Refill: Capillary refill takes less than 2 seconds  Neurological:      General: No focal deficit present        Mental Status: He is alert and oriented to person, place, and time  Psychiatric:         Mood and Affect: Mood normal          Behavior: Behavior normal        LABORATORY DATA     Labs: I have personally reviewed pertinent reports  Results from last 7 days   Lab Units 03/22/21  0435 03/21/21  0502 03/20/21  0617 03/19/21  0945 03/18/21  0557   WBC Thousand/uL 7 78 11 17* 8 54 10 98* 8 92   HEMOGLOBIN g/dL 11 1* 13 8 12 5 12 9 13 3   HEMATOCRIT % 35 2* 43 7 38 6 40 3 41 8   PLATELETS Thousands/uL 248 334 294 274 245   NEUTROS PCT %  --   --  84* 88* 84*   MONOS PCT %  --   --  10 7 8   MONO PCT %  --  11  --   --   --       Results from last 7 days   Lab Units 03/21/21  0502 03/20/21  0617 03/19/21  0945 03/18/21  0557   POTASSIUM mmol/L 4 5 4 4 4 2 3 9   CHLORIDE mmol/L 106 109* 111* 111*   CO2 mmol/L 28 28 27 29   BUN mg/dL 16 18 23 29*   CREATININE mg/dL 0 88 0 77 0 93 1 03   CALCIUM mg/dL 8 4 8 2* 8 1* 8 0*   ALK PHOS U/L  --  58 62 58   ALT U/L  --  36 35 34   AST U/L  --  49* 63* 84*     Results from last 7 days   Lab Units 03/15/21  1142   MAGNESIUM mg/dL 2 3                  Results from last 7 days   Lab Units 03/17/21  0028 03/16/21  2145 03/16/21  1837   TROPONIN I ng/mL 0 11* 0 11* 0 10*       IMAGING & DIAGNOSTIC TESTING     Radiology Results: I have personally reviewed pertinent reports  Xr Chest Portable    Result Date: 3/15/2021  Impression: No acute cardiopulmonary disease  Findings are stable Workstation performed: [id number]     Ct Chest Wo Contrast    Result Date: 3/16/2021  Impression: Small subpleural consolidations and groundglass opacities in the bilateral lower lobes and right middle lobe  In the setting of clinically suspected/proven COVID-19, the above lung parenchymal findings on CT indicate intermediate confidence level for COVID-19  Unchanged severe emphysema  Workstation performed: [id number]     Other Diagnostic Testing: I have personally reviewed pertinent reports      ACTIVE MEDICATIONS Current Facility-Administered Medications   Medication Dose Route Frequency    albuterol (PROVENTIL HFA,VENTOLIN HFA) inhaler 2 puff  2 puff Inhalation Q6H PRN    ascorbic acid (VITAMIN C) tablet 1,000 mg  1,000 mg Oral BID    atorvastatin (LIPITOR) tablet 20 mg  20 mg Oral Daily With Dinner    cefTRIAXone (ROCEPHIN) 1,000 mg in dextrose 5 % 50 mL IVPB  1,000 mg Intravenous Q24H    cholecalciferol (VITAMIN D3) tablet 2,000 Units  2,000 Units Oral Daily    dexamethasone (DECADRON) tablet 6 mg  6 mg Oral Daily    doxycycline hyclate (VIBRAMYCIN) capsule 100 mg  100 mg Oral Q12H    enoxaparin (LOVENOX) subcutaneous injection 30 mg  30 mg Subcutaneous Q12H    finasteride (PROSCAR) tablet 5 mg  5 mg Oral Daily    fluticasone-vilanterol (BREO ELLIPTA) 100-25 mcg/inh inhaler 1 puff  1 puff Inhalation Daily    guaiFENesin (MUCINEX) 12 hr tablet 1,200 mg  1,200 mg Oral Q12H CHRISTEN    hydrochlorothiazide (HYDRODIURIL) tablet 25 mg  25 mg Oral Daily    loperamide (IMODIUM) capsule 2 mg  2 mg Oral TID PRN    tamsulosin (FLOMAX) capsule 0 4 mg  0 4 mg Oral Daily With Dinner    zinc sulfate (ZINCATE) capsule 220 mg  220 mg Oral Daily       VTE Pharmacologic Prophylaxis: Enoxaparin (Lovenox)  VTE Mechanical Prophylaxis: sequential compression device    Portions of the record may have been created with voice recognition software  Occasional wrong word or "sound a like" substitutions may have occurred due to the inherent limitations of voice recognition software    Read the chart carefully and recognize, using context, where substitutions have occurred   ==  Clayton Barnett, [facility]  Internal Medicine Residency PGY-1

## 2021-03-22 NOTE — PLAN OF CARE
Health Maintenance Summary     Topic Due On Due Status Completed On    MAMMOGRAM - BREAST CANCER SCREENING Jun 25, 2017 Overdue Oct 16, 2015    Diabetes Eye Exam Jun 25, 1990 Overdue     Glycohemoglobin A1C  (Diabetes Sugar)  Apr 11, 2018 Not Due Oct 11, 2017    GFR  (Kidney Function Test)  Apr 6, 2018 Not Due Apr 6, 2017    Immunization - TDAP Pregnancy  Hidden     Diabetes Foot Exam  May 22, 2018 Not Due May 22, 2017    IMMUNIZATION - DTaP/Tdap/Td Jan 17, 2024 Not Due Jan 17, 2014    Immunization-Influenza  Completed Oct 11, 2017          Patient is due for topics as listed above, she wishes to discuss with provider .     Problem: PAIN - ADULT  Goal: Verbalizes/displays adequate comfort level or baseline comfort level  Description: Interventions:  - Encourage patient to monitor pain and request assistance  - Assess pain using appropriate pain scale  - Administer analgesics based on type and severity of pain and evaluate response  - Implement non-pharmacological measures as appropriate and evaluate response  - Notify physician/advanced practitioner if interventions unsuccessful or patient reports new pain  3/22/2021 1524 by Ngoc Ware RN  Outcome: Completed  3/22/2021 1000 by Ngoc Ware RN  Outcome: Progressing     Problem: INFECTION - ADULT  Goal: Absence or prevention of progression during hospitalization  Description: INTERVENTIONS:  - Assess and monitor for signs and symptoms of infection  - Monitor lab/diagnostic results  - Monitor all insertion sites, i e  indwelling lines, tubes, and drains  - [place] appropriate cooling/warming therapies per order  - Administer medications as ordered  - Instruct and encourage patient and family to use good hand hygiene technique  - Identify and instruct in appropriate isolation precautions for identified infection/condition  3/22/2021 1524 by Ngoc Ware RN  Outcome: Completed  3/22/2021 1000 by Ngoc Waer RN  Outcome: Progressing     Problem: SAFETY ADULT  Goal: Patient will remain free of falls  Description: INTERVENTIONS:  - Assess patient frequently for physical needs  -  Identify cognitive and physical deficits and behaviors that affect risk of falls    -  [place] fall precautions as indicated by assessment   - Educate patient/family on patient safety including physical limitations  - Instruct patient to call for assistance with activity based on assessment  - Modify environment to reduce risk of injury  - Consider OT/PT consult to assist with strengthening/mobility  3/22/2021 1524 by Ngoc Ware RN  Outcome: Completed  3/22/2021 1000 by Leticia Haile RN  Outcome: Progressing  Goal: Maintain or return to baseline ADL function  Description: INTERVENTIONS:  -  Assess patient's ability to carry out ADLs; assess patient's baseline for ADL function and identify physical deficits which impact ability to perform ADLs (bathing, care of mouth/teeth, toileting, grooming, dressing, etc )  - Assess/evaluate cause of self-care deficits   - Assess range of motion  - Assess patient's mobility; develop plan if impaired  - Assess patient's need for assistive devices and provide as appropriate  - Encourage maximum independence but intervene and supervise when necessary  - Involve family in performance of ADLs  - Assess for home care needs following discharge   - Consider OT consult to assist with ADL evaluation and planning for discharge  - Provide patient education as appropriate  3/22/2021 1524 by Leticia Haile RN  Outcome: Completed  3/22/2021 1000 by Leticia Haile RN  Outcome: Progressing  Goal: Maintain or return mobility status to optimal level  Description: INTERVENTIONS:  - Assess patient's baseline mobility status (ambulation, transfers, stairs, etc )    - Identify cognitive and physical deficits and behaviors that affect mobility  - Identify mobility aids required to assist with transfers and/or ambulation (gait belt, sit-to-stand, lift, walker, cane, etc )  - [place] fall precautions as indicated by assessment  - Record patient progress and toleration of activity level on Mobility SBAR; progress patient to next Phase/Stage  - Instruct patient to call for assistance with activity based on assessment  - Consider rehabilitation consult to assist with strengthening/weightbearing, etc   3/22/2021 1524 by Leticia Haile RN  Outcome: Completed  3/22/2021 1000 by Leticia Haile RN  Outcome: Progressing     Problem: DISCHARGE PLANNING  Goal: Discharge to home or other facility with appropriate resources  Description: INTERVENTIONS:  - Identify barriers to discharge w/patient and caregiver  - Arrange for needed discharge resources and transportation as appropriate  - Identify discharge learning needs (meds, wound care, etc )  - Refer to Case Management Department for coordinating discharge planning if the patient needs post-hospital services based on physician/advanced practitioner order or complex needs related to functional status, cognitive ability, or social support system  3/22/2021 1524 by Johnny Ray RN  Outcome: Completed  3/22/2021 1000 by Johnny Ray RN  Outcome: Progressing     Problem: Knowledge Deficit  Goal: Patient/family/caregiver demonstrates understanding of disease process, treatment plan, medications, and discharge instructions  Description: Complete learning assessment and assess knowledge base  Interventions:  - Provide teaching at level of understanding  - Provide teaching via preferred learning methods  3/22/2021 1524 by Johnny Ray RN  Outcome: Completed  3/22/2021 1000 by Johnny Ray RN  Outcome: Progressing     Problem: Potential for Falls  Goal: Patient will remain free of falls  Description: INTERVENTIONS:  - Assess patient frequently for physical needs  -  Identify cognitive and physical deficits and behaviors that affect risk of falls    -  [place] fall precautions as indicated by assessment   - Educate patient/family on patient safety including physical limitations  - Instruct patient to call for assistance with activity based on assessment  - Modify environment to reduce risk of injury  - Consider OT/PT consult to assist with strengthening/mobility  3/22/2021 1524 by Johnny Ray RN  Outcome: Completed  3/22/2021 1000 by Johnny Ray RN  Outcome: Progressing     Problem: RESPIRATORY - ADULT  Goal: Achieves optimal ventilation and oxygenation  Description: INTERVENTIONS:  - Assess for changes in respiratory status  - Assess for changes in mentation and behavior  - Position to facilitate oxygenation and minimize respiratory effort  - Oxygen administered by appropriate delivery if ordered  - Initiate smoking cessation education as indicated  - Encourage broncho-pulmonary hygiene including cough, deep breathe, Incentive Spirometry  - Assess the need for suctioning and aspirate as needed  - Assess and instruct to report SOB or any respiratory difficulty  - Respiratory Therapy support as indicated  3/22/2021 1524 by Isidoro Mcclain, SYL  Outcome: Adequate for Discharge  3/22/2021 1000 by Isidoro Mcclain RN  Outcome: Progressing

## 2021-03-22 NOTE — ASSESSMENT & PLAN NOTE
Patient with multiple fasting BG >126 since admission  Currently receiving steroids for COVID      Plan:  -Outpatient follow-up

## 2021-03-22 NOTE — CASE MANAGEMENT
Pt had pulse ox completed, qualifies for home O2  Pt has some supplies through [employer], but not a concentrator  CM placed referral to [employer] via [place] per request  CM left a message for pt's wife-Yoselin informing her of the above

## 2021-03-22 NOTE — PLAN OF CARE
Problem: PAIN - ADULT  Goal: Verbalizes/displays adequate comfort level or baseline comfort level  Description: Interventions:  - Encourage patient to monitor pain and request assistance  - Assess pain using appropriate pain scale  - Administer analgesics based on type and severity of pain and evaluate response  - Implement non-pharmacological measures as appropriate and evaluate response  - Notify physician/advanced practitioner if interventions unsuccessful or patient reports new pain  Outcome: Progressing     Problem: INFECTION - ADULT  Goal: Absence or prevention of progression during hospitalization  Description: INTERVENTIONS:  - Assess and monitor for signs and symptoms of infection  - Monitor lab/diagnostic results  - Monitor all insertion sites, i e  indwelling lines, tubes, and drains  - [place] appropriate cooling/warming therapies per order  - Administer medications as ordered  - Instruct and encourage patient and family to use good hand hygiene technique  - Identify and instruct in appropriate isolation precautions for identified infection/condition  Outcome: Progressing     Problem: SAFETY ADULT  Goal: Patient will remain free of falls  Description: INTERVENTIONS:  - Assess patient frequently for physical needs  -  Identify cognitive and physical deficits and behaviors that affect risk of falls    -  [place] fall precautions as indicated by assessment   - Educate patient/family on patient safety including physical limitations  - Instruct patient to call for assistance with activity based on assessment  - Modify environment to reduce risk of injury  - Consider OT/PT consult to assist with strengthening/mobility  Outcome: Progressing  Goal: Maintain or return to baseline ADL function  Description: INTERVENTIONS:  -  Assess patient's ability to carry out ADLs; assess patient's baseline for ADL function and identify physical deficits which impact ability to perform ADLs (bathing, care of mouth/teeth, toileting, grooming, dressing, etc )  - Assess/evaluate cause of self-care deficits   - Assess range of motion  - Assess patient's mobility; develop plan if impaired  - Assess patient's need for assistive devices and provide as appropriate  - Encourage maximum independence but intervene and supervise when necessary  - Involve family in performance of ADLs  - Assess for home care needs following discharge   - Consider OT consult to assist with ADL evaluation and planning for discharge  - Provide patient education as appropriate  Outcome: Progressing  Goal: Maintain or return mobility status to optimal level  Description: INTERVENTIONS:  - Assess patient's baseline mobility status (ambulation, transfers, stairs, etc )    - Identify cognitive and physical deficits and behaviors that affect mobility  - Identify mobility aids required to assist with transfers and/or ambulation (gait belt, sit-to-stand, lift, walker, cane, etc )  - [place] fall precautions as indicated by assessment  - Record patient progress and toleration of activity level on Mobility SBAR; progress patient to next Phase/Stage  - Instruct patient to call for assistance with activity based on assessment  - Consider rehabilitation consult to assist with strengthening/weightbearing, etc   Outcome: Progressing     Problem: DISCHARGE PLANNING  Goal: Discharge to home or other facility with appropriate resources  Description: INTERVENTIONS:  - Identify barriers to discharge w/patient and caregiver  - Arrange for needed discharge resources and transportation as appropriate  - Identify discharge learning needs (meds, wound care, etc )  - Refer to Case Management Department for coordinating discharge planning if the patient needs post-hospital services based on physician/advanced practitioner order or complex needs related to functional status, cognitive ability, or social support system  Outcome: Progressing     Problem: Knowledge Deficit  Goal: Patient/family/caregiver demonstrates understanding of disease process, treatment plan, medications, and discharge instructions  Description: Complete learning assessment and assess knowledge base  Interventions:  - Provide teaching at level of understanding  - Provide teaching via preferred learning methods  Outcome: Progressing     Problem: Potential for Falls  Goal: Patient will remain free of falls  Description: INTERVENTIONS:  - Assess patient frequently for physical needs  -  Identify cognitive and physical deficits and behaviors that affect risk of falls    -  [place] fall precautions as indicated by assessment   - Educate patient/family on patient safety including physical limitations  - Instruct patient to call for assistance with activity based on assessment  - Modify environment to reduce risk of injury  - Consider OT/PT consult to assist with strengthening/mobility  Outcome: Progressing     Problem: RESPIRATORY - ADULT  Goal: Achieves optimal ventilation and oxygenation  Description: INTERVENTIONS:  - Assess for changes in respiratory status  - Assess for changes in mentation and behavior  - Position to facilitate oxygenation and minimize respiratory effort  - Oxygen administered by appropriate delivery if ordered  - Initiate smoking cessation education as indicated  - Encourage broncho-pulmonary hygiene including cough, deep breathe, Incentive Spirometry  - Assess the need for suctioning and aspirate as needed  - Assess and instruct to report SOB or any respiratory difficulty  - Respiratory Therapy support as indicated  Outcome: Progressing

## 2021-03-22 NOTE — DISCHARGE SUMMARY
INTERNAL MEDICINE RESIDENCY DISCHARGE SUMMARY     Nathan Justice   79 y o  male  MRN: [id number]  Room/Bed: /MS [address]   Encounter: [id number]    Principal Problem:    COVID-19  Active Problems:    Centrilobular emphysema ([address] ) - severe    Essential hypertension    Hyperlipidemia    Bilateral iliac artery aneurysm (HCC)    Prediabetes    BPH with obstruction/lower urinary tract symptoms      * COVID-19  Assessment & Plan  Noted on outpatient testing 3/12/2021  Symptoms began approximately 03/09/2021 with diarrhea, dyspnea on exertion, cough, fever, and nausea  Mild COVID pathway  Remained on 2 L nasal cannula  CT chest: Small subpleural consolidations and groundglass opacities in the bilateral lower lobes and right middle lobe consistent with COVID pneumonia  Patient had elevated trop and CK on covid protocol labs  Expect both are 2/2 covid and dehydration  Plan:  · Troponin plateaued at 7 36    · Stop trending  · CK downtrending at  257 < 688 < 1437 < 1568 < 1746 < 1860 < 1400 <1900    · Resolved  · D-dimer 0 49 < 0 69 < 0 71 < 0 87 < 0 77  · Ferritin  536 <498 < 570 < 826 < 1070 < 1112  · CRP 23 4 < 58 5 < 94 8 < 82 9 < 65 7  · Procalcitonin 0 17 < 0 39 < 0 43 <0 09  · Continue ceftriaxone 1 g Q 24 hours (day 6)  · Continue doxycycline 100 mg Q 12 hours (day 6)  · Completed remdesivir  · Continue dexamethasone 6 mg daily (day 5)    Centrilobular emphysema (HCC) - severe  Assessment & Plan  Plan:  -Will place on [facility] in place of [facility] at this time   -Patient did not qualify for home oxygen after completing outpatient testing  However, he does by supplemental oxygen from an outside company  He states that he rarely uses this   -Continue albuterol and Atrovent as needed   -Continue Mucinex 1200 mg b i d   -Continue Xopenex as needed      Essential hypertension  Assessment & Plan  Plan:  -Restart home HCTZ 25 mg QD, SBP into the 150s    BPH with obstruction/lower urinary tract symptoms  Assessment & Plan  Continue finasteride and tamsulosin  Prediabetes  Assessment & Plan  Patient with multiple fasting BG >126 since admission  Currently receiving steroids for COVID  Plan:  -Outpatient follow-up    Bilateral iliac artery aneurysm [facility])  Assessment & Plan  Following with Vascular, follow-up screening in 1 year  Hyperlipidemia  Assessment & Plan  Continue atorvastatin 20 mg daily  KIRK (acute kidney injury) (HCC)-resolved as of 3/17/2021  Assessment & Plan  Likely secondary to volume depletion from diarrhea  KIRK resolved on 03/17  Will replete with IV fluids at this time  Continue to monitor vital signs and BMP  Avoid nephrotoxic agents  Avoid large fluctuations in blood pressure  Hypokalemia-resolved as of 3/17/2021  Assessment & Plan  Likely secondary to diarrhea  Improved after starting Imodium  K 4 2 this morning  Repleted while in the ED  Continue to monitor  Diarrhea-resolved as of 3/20/2021  Assessment & Plan  Likely secondary to COVID-19 and contributing to volume depletion/low blood pressure, hypokalemia, KIRK  Normal electrolytes this a mirna Arauz Denies diarrhea  Plan:  · Continue Imodium prn  · Continue IV fluids   · Will monitor respiratory status closely given COVID and fluids        [address]   Patient is a 68-year-old male with past medical history significant for prediabetes, COPD/centrilobular emphysema (patient has O2 at home, but does not require it), hyperlipidemia, hypertension, BPH, bilateral iliac artery aneurysm, and arterial ectasia who presented to [facility] secondary to fever of 103 at home on 3/15  On 03/09/2021 patient began experiencing nausea, cough, and diarrhea  He was later tested on 03/12/2021 for COVID-19 and was found to be positive   Patient was found to have an elevated D-dimer (peak at 0 87), ferritin (1112), CK (1900), CRP (peak at 94 8), and procalcitonin (peak at 0 43)  He was requiring 2 L NC  CT showed signs of COVID PNA  He was initiated on the mild COVID-19 pathway, and started on ceftriaxone and doxycycline in the setting of an elevated procalcitonin  KIRK resolved with fluids  The patient completed an entire course of Remdesivir  He completed 5/10 days of dexamethasone  He was sent home with home O2 and instructed to have close outpatient follow-up  DISCHARGE INFORMATION     PCP at Discharge: Brandan Morcoho PA-C    Admitting Provider: Marko Coon MD  Admission Date: 3/15/2021    Discharge Provider: Marko Coon MD  Discharge Date: 3/22/21    Discharge Disposition: Home/Self Care  Discharge Condition: good  Discharge with Lines: no    Discharge Diet: regular diet  Activity Restrictions: none  Test Results Pending at Discharge: None    Discharge Diagnoses:  Principal Problem:    COVID-19  Active Problems:    Centrilobular emphysema ([address] ) - severe    Essential hypertension    Hyperlipidemia    Bilateral iliac artery aneurysm ([address] )    Prediabetes    BPH with obstruction/lower urinary tract symptoms  Resolved Problems:    Diarrhea    Hypokalemia    KIRK (acute kidney injury) [facility])      Consulting Providers:      Diagnostic & Therapeutic Procedures Performed:  Xr Chest Portable    Result Date: 3/15/2021  Impression: No acute cardiopulmonary disease  Findings are stable Workstation performed: [id number]     Ct Chest Wo Contrast    Result Date: 3/16/2021  Impression: Small subpleural consolidations and groundglass opacities in the bilateral lower lobes and right middle lobe  In the setting of clinically suspected/proven COVID-19, the above lung parenchymal findings on CT indicate intermediate confidence level for COVID-19  Unchanged severe emphysema   Workstation performed: [id number]       Code Status: Level 1 - Full Code  Advance Directive & Living Will: Received  Power of :    POLST:      Medications:  Current Discharge Medication List        Current Discharge Medication List        Current Discharge Medication List      CONTINUE these medications which have NOT CHANGED    Details   fluticasone-umeclidinium-vilanterol (TRELEGY) 100-62 5-25 MCG/INH inhaler Inhale 1 puff daily Rinse mouth after use  Qty: 3 Inhaler, Refills: 3    Associated Diagnoses: Chronic obstructive pulmonary disease, unspecified COPD type ([facility])      albuterol (PROVENTIL HFA,VENTOLIN HFA) 90 mcg/act inhaler Inhale 2 puffs every 6 (six) hours as needed for wheezing  Qty: 3 Inhaler, Refills: 5    Comments: Substitution to a formulary equivalent within the same pharmaceutical class is authorized  Associated Diagnoses: Chronic obstructive pulmonary disease, unspecified COPD type ([facility] )      atorvastatin (LIPITOR) 20 mg tablet Take 1 tablet (20 mg total) by mouth daily  Qty: 90 tablet, Refills: 3    Associated Diagnoses: Mixed hyperlipidemia      EPINEPHrine (EPIPEN) 0 3 mg/0 3 mL SOAJ Inject 0 3 mL (0 3 mg total) into a muscle once for 1 dose  Qty: 0 6 mL, Refills: 0    Associated Diagnoses:  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 sting      finasteride (PROSCAR) 5 mg tablet Take 1 tablet (5 mg total) by mouth daily  Qty: 90 tablet, Refills: 3    Associated Diagnoses: BPH with obstruction/lower urinary tract symptoms      fluticasone (FLONASE) 50 mcg/act nasal spray 1 spray into each nostril daily  Qty: 1 Bottle, Refills: 0    Associated Diagnoses: Seasonal allergies      guaiFENesin (MUCINEX) 600 mg 12 hr tablet Take 1,200 mg by mouth 2 (two) times a day as needed      hydrochlorothiazide (HYDRODIURIL) 25 mg tablet TAKE 1 TABLET DAILY  Qty: 90 tablet, Refills: 4    Associated Diagnoses: Hypertension, essential      ipratropium-albuterol (DUO-NEB) 0 5-2 5 mg/3 mL nebulizer solution Take 1 vial (3 mL total) by nebulization every 6 (six) hours  Qty: 180 vial, Refills: 1    Associated Diagnoses: Pulmonary emphysema, unspecified emphysema type (HCC)      loratadine (CLARITIN REDITABS) 10 MG dissolvable tablet Take 10 mg by mouth daily as needed for allergies  Multiple Vitamins-Minerals (CENTRUM ADULTS PO) Take 1 tablet by mouth daily  tamsulosin (Flomax) 0 4 mg Take 1 capsule (0 4 mg total) by mouth daily with dinner  Qty: 90 capsule, Refills: 3    Associated Diagnoses: BPH with obstruction/lower urinary tract symptoms             Allergies: Allergies   Allergen Reactions    Bee Venom Facial Swelling    Other Rash     Annotation - [id number]: mushrooms    Penicillins Rash       FOLLOW-UP     PCP Outpatient Follow-up:  Please follow-up with PCP within 7-14 days of discharge    Consulting Providers Follow-up:  None    Active Issues Requiring Follow-up:   COPD  COVID-19 PNA    Discharge Statement:   I spent 30 minutes minutes discharging the patient  This time was spent on the day of discharge  I had direct contact with the patient on the day of discharge  Additional documentation is required if more than 30 minutes were spent on discharge  Portions of the record may have been created with voice recognition software  Occasional wrong word or "sound a like" substitutions may have occurred due to the inherent limitations of voice recognition software    Read the chart carefully and recognize, using context, where substitutions have occurred     ==  Ronnie Valdes, [facility]  Internal Medicine Resident PGY-1

## 2021-03-23 ENCOUNTER — TRANSITIONAL CARE MANAGEMENT (OUTPATIENT)
Dept: INTERNAL MEDICINE CLINIC | Age: 71
End: 2021-03-23

## 2021-03-23 LAB

## 2021-03-25 ENCOUNTER — TELEPHONE (OUTPATIENT)
Dept: INTERNAL MEDICINE CLINIC | Facility: CLINIC | Age: 71
End: 2021-03-25

## 2021-03-25 NOTE — TELEPHONE ENCOUNTER
Virtual TCM scheduled 3/26 with Lexi Gilbert  Pt denies any sxs, denies any post hospital issues, was able to get all of his prescribed medications and manages his own medications  Please update TCM call  Thanks so much

## 2021-03-26 ENCOUNTER — OFFICE VISIT (OUTPATIENT)
Dept: INTERNAL MEDICINE CLINIC | Facility: CLINIC | Age: 71
End: 2021-03-26
Payer: MEDICARE

## 2021-03-26 VITALS — WEIGHT: 160 LBS | HEIGHT: 68 IN | TEMPERATURE: 97.7 F | OXYGEN SATURATION: 96 % | BODY MASS INDEX: 24.25 KG/M2

## 2021-03-26 DIAGNOSIS — U07.1 COVID-19: ICD-10-CM

## 2021-03-26 DIAGNOSIS — N13.8 BPH WITH OBSTRUCTION/LOWER URINARY TRACT SYMPTOMS: ICD-10-CM

## 2021-03-26 DIAGNOSIS — J96.11 CHRONIC HYPOXEMIC RESPIRATORY FAILURE (HCC): ICD-10-CM

## 2021-03-26 DIAGNOSIS — R73.03 PREDIABETES: ICD-10-CM

## 2021-03-26 DIAGNOSIS — J12.82 PNEUMONIA DUE TO COVID-19 VIRUS: ICD-10-CM

## 2021-03-26 DIAGNOSIS — Z76.89 ENCOUNTER FOR SUPPORT AND COORDINATION OF TRANSITION OF CARE: ICD-10-CM

## 2021-03-26 DIAGNOSIS — E78.2 MIXED HYPERLIPIDEMIA: ICD-10-CM

## 2021-03-26 DIAGNOSIS — N40.1 BPH WITH OBSTRUCTION/LOWER URINARY TRACT SYMPTOMS: ICD-10-CM

## 2021-03-26 DIAGNOSIS — U07.1 PNEUMONIA DUE TO COVID-19 VIRUS: ICD-10-CM

## 2021-03-26 DIAGNOSIS — I10 ESSENTIAL HYPERTENSION: ICD-10-CM

## 2021-03-26 DIAGNOSIS — J43.2 CENTRILOBULAR EMPHYSEMA (HCC): Primary | ICD-10-CM

## 2021-03-26 DIAGNOSIS — I77.89 ARTERIAL ECTASIA (HCC): ICD-10-CM

## 2021-03-26 PROCEDURE — 99496 TRANSJ CARE MGMT HIGH F2F 7D: CPT | Performed by: PHYSICIAN ASSISTANT

## 2021-03-26 NOTE — PROGRESS NOTES
Assessment/Plan:        Problem List Items Addressed This Visit        Respiratory    Centrilobular emphysema ([address] ) - severe - Primary     Continue Trelegy and duoneb as discussed below         Chronic hypoxemic respiratory failure ([facility] [address] )     Patient did nto qualify for suppmental O2 while inpatient but does have it at home and has been using PRN  Continue to monitor PO2 closely and report back if any worsening  Pneumonia due to COVID-19 virus     Improving since hospital, will follow            Cardiovascular and Mediastinum    Essential hypertension     HCTZ resumed upon discharge             Genitourinary    BPH with obstruction/lower urinary tract symptoms       Other    Hyperlipidemia     On statin, will follow  Arterial ectasia (HCC)    Prediabetes     Will continue to follow         Encounter for support and coordination of transition of care     Hospital course reviewed  Patient appears to be clinically improving since hospital   Wife present for virtual encounter  S/p tx with Abx and remdesivir  Currently completing steroid couse  Will continue to follow closely  1 week office follow up  Continue to monitor O2 and discussed importance of reporting back immediately if PO2 <93%  Continue vitamin supplementation  Continue mucinex as directed  Continue Trelegy inhaler as directed  Duoneb inhaler to be used TID and PRN  Continue supportive care with rest, fluids and hydration  [facility] follow up in 1 week  COVID-19     Clinically improving                  Reason for visit is ROSIE from hospital    Encounter provider Dl Brooks PA-C       Provider located at [address] [place] [id number]      Recent Visits  No visits were found meeting these conditions     Showing recent visits within past 7 days and meeting all other requirements     Today's Visits  Date Type Provider Dept   03/26/21 Office Visit Suzanne Arzola PA-C [facility]   Showing today's visits and meeting all other requirements     Future Appointments  No visits were found meeting these conditions  Showing future appointments within next 150 days and meeting all other requirements        After connecting through [employer], the patient was identified by name and date of birth  Seymour Steen was informed that this is a telemedicine visit and that the visit is being conducted through [facility] and patient was informed that this is a secure, HIPAA-compliant platform  He agrees to proceed     My office door was closed  No one else was in the room  He acknowledged consent and understanding of privacy and security of the video platform  The patient has agreed to participate and understands they can discontinue the visit at any time  Patient is aware this is a billable service  Subjective:     Patient ID: Seymour Steen is a 79 y o  male  78 yo male with hx of prediabetes, COPD/emphysema (on home O2), dyslipidemia, HTN, BPH, iliac artery aneurysm and arterial ectasia presents for hospital follow up  Admitted 3/15 with COVID PND  Prior to hospital had cough, nausea and diarrhea  While inpatient tx with supplemental O2 via NC, ceftriaxone, doxycyline, remdesivir (5 day course) and dexamethazone  He was d/c to home on steroid course and supplemental O2  Since being d/c to home he is feeling better each day  Continues to be tired but strength improving  Wife is COVID positive as well and her sx are improving  His appetite is starting to return to normal   He is taking all of his medications as directed  Cough is improving since hospital   He is not getting formal PT but is trying to do things around the home to keep him active  Using supplemental O2 at home  O2 93-96%  He monitors this closely  He reports that he is feeling better each day    Taking mucinex as directed  Pneumonia  He complains of cough (improving since hospital, dry, only cough very little since being home   ), difficulty breathing (improving since hospital) and shortness of breath (improving each day since hospital)  There is no chest tightness, frequent throat clearing, sputum production or wheezing  The current episode started in the past 7 days  The problem occurs rarely  The problem has been gradually improving  The cough is non-productive  Associated symptoms include appetite change (improving since hospital) and malaise/fatigue (improving)  Pertinent negatives include no chest pain, fever, headaches, postnasal drip, rhinorrhea or sore throat  His symptoms are aggravated by nothing  His symptoms are alleviated by beta-agonist  He reports moderate improvement on treatment  His symptoms are not alleviated by beta-agonist, steroid inhaler and ipratropium  Risk factors for lung disease include occupational exposure and smoking/tobacco exposure  His past medical history is significant for COPD and pneumonia  Review of Systems   Constitutional: Positive for activity change (improving since hospital), appetite change (improving since hospital) and malaise/fatigue (improving)  Negative for chills, fever and unexpected weight change  HENT: Negative for postnasal drip, rhinorrhea, sinus pressure, sinus pain and sore throat  Respiratory: Positive for cough (improving since hospital, dry, only cough very little since being home   ) and shortness of breath (improving each day since hospital)  Negative for sputum production and wheezing  Cardiovascular: Negative for chest pain, palpitations and leg swelling (no leg pain or swelling)  Gastrointestinal: Negative for abdominal pain, diarrhea (improving with immodium   ), nausea and vomiting  Genitourinary: Negative for dysuria  Musculoskeletal: Negative for arthralgias and back pain  Skin: Negative for rash          A few black and blue marks form IV sites  No other bruising   Neurological: Negative for dizziness, light-headedness and headaches  Objective:    Vitals:    03/26/21 1021   Temp: 97 7 °F (36 5 °C)   TempSrc: Oral   SpO2: 96%   Weight: 72 6 kg (160 lb)   Height: 5' 8" (1 727 m)     PE limited as it was a virtual visit, poor lighting  Physical Exam  Constitutional:       General: He is not in acute distress  Appearance: He is ill-appearing  He is not toxic-appearing  Comments: Alert, pleasant, seated in his home for virtual visit in NAD  HENT:      Head: Normocephalic  Nose:      Comments: Nasal canula supplying O2 in place     Mouth/Throat:      Mouth: Mucous membranes are moist       Comments: Oral mucosa appears moist  Eyes:      Comments: EOM   Neck:      Comments: Freely moves head throughout exam  Pulmonary:      Effort: No respiratory distress  Comments: No audible cough or wheeze during exam   No conversational dyspnea, no acute resp distress  Skin:     Comments: No visible rash noted   Neurological:      General: No focal deficit present  Mental Status: He is alert  Comments: No focal neuro deficits noted   Psychiatric:         Mood and Affect: Mood normal          Behavior: Behavior normal              Transitional Care Management Review:  Enrique Amezcua is a 79 y o  male here for TCM follow up  During the TCM phone call patient stated:    TCM Call (since 2/23/2021)     Date and time call was made  3/23/2021 10:38 AM    Hospital care reviewed  Records reviewed    Patient was hospitialized at  [facility]        Date of Admission  03/15/21    Date of discharge  03/22/21    Diagnosis  hypoxia, covid-19, copd, diarrhea    Disposition  Home    Were the patients medications reviewed and updated  Yes    Current Symptoms  None      TCM Call (since 2/23/2021)     Post hospital issues  None    Should patient be enrolled in anticoag monitoring? No    Scheduled for follow up?   Yes    Did you obtain your prescribed medications  Yes    Do you need help managing your prescriptions or medications  No    Is transportation to your appointment needed  No    I have advised the patient to call PCP with any new or worsening symptoms  spoke to [facility]          I spent 20 minutes with the patient during this visit      Ra Sesay PA-C

## 2021-03-27 NOTE — ASSESSMENT & PLAN NOTE
Hospital course reviewed  Patient appears to be clinically improving since hospital   Wife present for virtual encounter  S/p tx with Abx and remdesivir  Currently completing steroid couse  Will continue to follow closely  1 week office follow up  Continue to monitor O2 and discussed importance of reporting back immediately if PO2 <93%  Continue vitamin supplementation  Continue mucinex as directed  Continue Trelegy inhaler as directed  Duoneb inhaler to be used TID and PRN  Continue supportive care with rest, fluids and hydration  [facility] follow up in 1 week

## 2021-03-27 NOTE — PATIENT INSTRUCTIONS
Encounter for support and coordination of transition of care  Hospital course reviewed  Patient appears to be clinically improving since hospital   Wife present for virtual encounter  S/p tx with Abx and remdesivir  Currently completing steroid couse  Will continue to follow closely  1 week office follow up  Continue to monitor O2 and discussed importance of reporting back immediately if PO2 <93%  Continue vitamin supplementation  Continue mucinex as directed  Continue Trelegy inhaler as directed  Duoneb inhaler to be used TID and PRN  Continue supportive care with rest, fluids and hydration  [facility]PC follow up in 1 week  Centrilobular emphysema (HCC) - severe  Continue Trelegy and duoneb as discussed below    Chronic hypoxemic respiratory failure ([facility] [address] )  Patient did nto qualify for suppmental O2 while inpatient but does have it at home and has been using PRN  Continue to monitor PO2 closely and report back if any worsening  Pneumonia due to COVID-19 virus  Improving since hospital, will follow    Essential hypertension  HCTZ resumed upon discharge     COVID-19  Clinically improving    Prediabetes  Will continue to follow    Hyperlipidemia  On statin, will follow

## 2021-03-27 NOTE — ASSESSMENT & PLAN NOTE
Patient did nto qualify for suppmental O2 while inpatient but does have it at home and has been using PRN  Continue to monitor PO2 closely and report back if any worsening

## 2021-03-29 ENCOUNTER — APPOINTMENT (EMERGENCY)
Dept: RADIOLOGY | Facility: HOSPITAL | Age: 71
DRG: 871 | End: 2021-03-29
Payer: MEDICARE

## 2021-03-29 ENCOUNTER — HOSPITAL ENCOUNTER (INPATIENT)
Facility: HOSPITAL | Age: 71
LOS: 6 days | Discharge: HOME | DRG: 871 | End: 2021-04-04
Attending: EMERGENCY MEDICINE | Admitting: ANESTHESIOLOGY
Payer: MEDICARE

## 2021-03-29 DIAGNOSIS — J93.0 TENSION PNEUMOTHORAX: Primary | ICD-10-CM

## 2021-03-29 DIAGNOSIS — E87.2 LACTIC ACIDOSIS: ICD-10-CM

## 2021-03-29 DIAGNOSIS — J96.02 ACUTE RESPIRATORY FAILURE WITH HYPOXIA AND HYPERCAPNIA (HCC): ICD-10-CM

## 2021-03-29 DIAGNOSIS — J93.83 SPONTANEOUS PNEUMOTHORAX: ICD-10-CM

## 2021-03-29 DIAGNOSIS — N17.9 ACUTE KIDNEY INJURY (HCC): ICD-10-CM

## 2021-03-29 DIAGNOSIS — J43.2 CENTRILOBULAR EMPHYSEMA (HCC): ICD-10-CM

## 2021-03-29 DIAGNOSIS — R57.9 SHOCK (HCC): ICD-10-CM

## 2021-03-29 DIAGNOSIS — J44.1 COPD WITH ACUTE EXACERBATION (HCC): ICD-10-CM

## 2021-03-29 DIAGNOSIS — J96.01 ACUTE RESPIRATORY FAILURE WITH HYPOXIA AND HYPERCAPNIA (HCC): ICD-10-CM

## 2021-03-29 PROBLEM — A41.89 SEPSIS DUE TO COVID-19 (HCC): Status: ACTIVE | Noted: 2021-03-29

## 2021-03-29 PROBLEM — U07.1 SEPSIS DUE TO COVID-19 (HCC): Status: ACTIVE | Noted: 2021-03-29

## 2021-03-29 LAB
ALBUMIN SERPL BCP-MCNC: 2.8 G/DL (ref 3.5–5)
ALBUMIN SERPL BCP-MCNC: 3.6 G/DL (ref 3.5–5)
ALP SERPL-CCNC: 101 U/L (ref 46–116)
ALP SERPL-CCNC: 70 U/L (ref 46–116)
ALT SERPL W P-5'-P-CCNC: 141 U/L (ref 12–78)
ALT SERPL W P-5'-P-CCNC: 218 U/L (ref 12–78)
ANION GAP SERPL CALCULATED.3IONS-SCNC: 4 MMOL/L (ref 4–13)
ANION GAP SERPL CALCULATED.3IONS-SCNC: 8 MMOL/L (ref 4–13)
APTT PPP: 24 SECONDS (ref 23–37)
ARTERIAL PATENCY WRIST A: YES
AST SERPL W P-5'-P-CCNC: 43 U/L (ref 5–45)
AST SERPL W P-5'-P-CCNC: 80 U/L (ref 5–45)
ATRIAL RATE: 144 BPM
BACTERIA UR QL AUTO: ABNORMAL /HPF
BASE EX.OXY STD BLDV CALC-SCNC: 55.5 % (ref 60–80)
BASE EXCESS BLDA CALC-SCNC: -2.6 MMOL/L
BASE EXCESS BLDA CALC-SCNC: 2 MMOL/L (ref -2–3)
BASE EXCESS BLDA CALC-SCNC: 3.4 MMOL/L
BASE EXCESS BLDV CALC-SCNC: 5.6 MMOL/L
BASOPHILS # BLD MANUAL: 0 THOUSAND/UL (ref 0–0.1)
BASOPHILS NFR MAR MANUAL: 0 % (ref 0–1)
BILIRUB SERPL-MCNC: 0.49 MG/DL (ref 0.2–1)
BILIRUB SERPL-MCNC: 0.74 MG/DL (ref 0.2–1)
BILIRUB UR QL STRIP: NEGATIVE
BUN SERPL-MCNC: 15 MG/DL (ref 5–25)
BUN SERPL-MCNC: 16 MG/DL (ref 5–25)
CALCIUM ALBUM COR SERPL-MCNC: 8.8 MG/DL (ref 8.3–10.1)
CALCIUM SERPL-MCNC: 7.8 MG/DL (ref 8.3–10.1)
CALCIUM SERPL-MCNC: 8.8 MG/DL (ref 8.3–10.1)
CHLORIDE SERPL-SCNC: 104 MMOL/L (ref 100–108)
CHLORIDE SERPL-SCNC: 98 MMOL/L (ref 100–108)
CK MB SERPL-MCNC: 3 % (ref 0–2.5)
CK MB SERPL-MCNC: 7.2 NG/ML (ref 0–5)
CK SERPL-CCNC: 238 U/L (ref 39–308)
CLARITY UR: ABNORMAL
CO2 SERPL-SCNC: 29 MMOL/L (ref 21–32)
CO2 SERPL-SCNC: 34 MMOL/L (ref 21–32)
COLOR UR: YELLOW
CREAT SERPL-MCNC: 0.99 MG/DL (ref 0.6–1.3)
CREAT SERPL-MCNC: 1.32 MG/DL (ref 0.6–1.3)
D DIMER PPP FEU-MCNC: 2.34 UG/ML FEU
EOSINOPHIL # BLD MANUAL: 0 THOUSAND/UL (ref 0–0.4)
EOSINOPHIL NFR BLD MANUAL: 0 % (ref 0–6)
ERYTHROCYTE [DISTWIDTH] IN BLOOD BY AUTOMATED COUNT: 12.9 % (ref 11.6–15.1)
EST. AVERAGE GLUCOSE BLD GHB EST-MCNC: 126 MG/DL
FERRITIN SERPL-MCNC: 892 NG/ML (ref 8–388)
FIO2 GAS DIL.REBREATH: 50 L
GFR SERPL CREATININE-BSD FRML MDRD: 54 ML/MIN/1.73SQ M
GFR SERPL CREATININE-BSD FRML MDRD: 77 ML/MIN/1.73SQ M
GLUCOSE SERPL-MCNC: 147 MG/DL (ref 65–140)
GLUCOSE SERPL-MCNC: 155 MG/DL (ref 65–140)
GLUCOSE SERPL-MCNC: 175 MG/DL (ref 65–140)
GLUCOSE UR STRIP-MCNC: NEGATIVE MG/DL
HBA1C MFR BLD: 6 %
HCO3 BLDA-SCNC: 23.3 MMOL/L (ref 22–28)
HCO3 BLDA-SCNC: 28.2 MMOL/L (ref 22–28)
HCO3 BLDA-SCNC: 29.7 MMOL/L (ref 22–28)
HCO3 BLDV-SCNC: 42.1 MMOL/L (ref 24–30)
HCT VFR BLD AUTO: 51.8 % (ref 36.5–49.3)
HCT VFR BLD CALC: 38 % (ref 36.5–49.3)
HGB BLD-MCNC: 15.7 G/DL (ref 12–17)
HGB BLDA-MCNC: 12.9 G/DL (ref 12–17)
HGB UR QL STRIP.AUTO: ABNORMAL
HYALINE CASTS #/AREA URNS LPF: ABNORMAL /LPF
INR PPP: 0.9 (ref 0.84–1.19)
KETONES UR STRIP-MCNC: NEGATIVE MG/DL
LACTATE SERPL-SCNC: 4.1 MMOL/L (ref 0.5–2)
LACTATE SERPL-SCNC: 4.1 MMOL/L (ref 0.5–2)
LACTATE SERPL-SCNC: 5.2 MMOL/L (ref 0.5–2)
LACTATE SERPL-SCNC: 6.4 MMOL/L (ref 0.5–2)
LACTATE SERPL-SCNC: 7.8 MMOL/L (ref 0.5–2)
LACTATE SERPL-SCNC: 9.2 MMOL/L (ref 0.5–2)
LEUKOCYTE ESTERASE UR QL STRIP: NEGATIVE
LYMPHOCYTES # BLD AUTO: 2.9 THOUSAND/UL (ref 0.6–4.47)
LYMPHOCYTES # BLD AUTO: 8 % (ref 14–44)
MCH RBC QN AUTO: 28.6 PG (ref 26.8–34.3)
MCHC RBC AUTO-ENTMCNC: 30.3 G/DL (ref 31.4–37.4)
MCV RBC AUTO: 95 FL (ref 82–98)
MONOCYTES # BLD AUTO: 3.63 THOUSAND/UL (ref 0–1.22)
MONOCYTES NFR BLD: 10 % (ref 4–12)
NASAL CANNULA: 2
NASAL CANNULA: 2
NEUTROPHILS # BLD MANUAL: 29.39 THOUSAND/UL (ref 1.85–7.62)
NEUTS BAND NFR BLD MANUAL: 1 % (ref 0–8)
NEUTS SEG NFR BLD AUTO: 80 % (ref 43–75)
NITRITE UR QL STRIP: NEGATIVE
NON-SQ EPI CELLS URNS QL MICRO: ABNORMAL /HPF
NRBC BLD AUTO-RTO: 0 /100 WBCS
NT-PROBNP SERPL-MCNC: 144 PG/ML
O2 CT BLDA-SCNC: 17.8 ML/DL (ref 16–23)
O2 CT BLDA-SCNC: 18.3 ML/DL (ref 16–23)
O2 CT BLDV-SCNC: 13 ML/DL
OXYHGB MFR BLDA: 94.9 % (ref 94–97)
OXYHGB MFR BLDA: 95.5 % (ref 94–97)
P AXIS: 94 DEGREES
PCO2 BLD: 31 MMOL/L (ref 21–32)
PCO2 BLD: 57.5 MM HG (ref 36–44)
PCO2 BLDA: 43.5 MM HG (ref 36–44)
PCO2 BLDA: 44.4 MM HG (ref 36–44)
PCO2 BLDV: 143.8 MM HG (ref 42–50)
PH BLD: 7.32 [PH] (ref 7.35–7.45)
PH BLDA: 7.34 [PH] (ref 7.35–7.45)
PH BLDA: 7.43 [PH] (ref 7.35–7.45)
PH BLDV: 7.08 [PH] (ref 7.3–7.4)
PH UR STRIP.AUTO: 5.5 [PH]
PLATELET # BLD AUTO: 466 THOUSANDS/UL (ref 149–390)
PLATELET BLD QL SMEAR: ABNORMAL
PMV BLD AUTO: 10.5 FL (ref 8.9–12.7)
PO2 BLD: 59 MM HG (ref 75–129)
PO2 BLDA: 78.7 MM HG (ref 75–129)
PO2 BLDA: 80.2 MM HG (ref 75–129)
PO2 BLDV: 39.2 MM HG (ref 35–45)
POTASSIUM BLD-SCNC: 4.2 MMOL/L (ref 3.5–5.3)
POTASSIUM SERPL-SCNC: 3.8 MMOL/L (ref 3.5–5.3)
POTASSIUM SERPL-SCNC: 4.4 MMOL/L (ref 3.5–5.3)
PR INTERVAL: 128 MS
PROCALCITONIN SERPL-MCNC: 0.16 NG/ML
PROT SERPL-MCNC: 5.7 G/DL (ref 6.4–8.2)
PROT SERPL-MCNC: 7.3 G/DL (ref 6.4–8.2)
PROT UR STRIP-MCNC: NEGATIVE MG/DL
PROTHROMBIN TIME: 12.1 SECONDS (ref 11.6–14.5)
QRS AXIS: 83 DEGREES
QRSD INTERVAL: 72 MS
QT INTERVAL: 266 MS
QTC INTERVAL: 411 MS
RBC # BLD AUTO: 5.48 MILLION/UL (ref 3.88–5.62)
RBC #/AREA URNS AUTO: ABNORMAL /HPF
SAO2 % BLD FROM PO2: 87 % (ref 60–85)
SODIUM BLD-SCNC: 136 MMOL/L (ref 136–145)
SODIUM SERPL-SCNC: 136 MMOL/L (ref 136–145)
SODIUM SERPL-SCNC: 141 MMOL/L (ref 136–145)
SP GR UR STRIP.AUTO: 1.01 (ref 1–1.03)
SPECIMEN SOURCE: ABNORMAL
T WAVE AXIS: 92 DEGREES
TOTAL CELLS COUNTED SPEC: 100
TROPONIN I SERPL-MCNC: 0.02 NG/ML
UROBILINOGEN UR QL STRIP.AUTO: 0.2 E.U./DL
VARIANT LYMPHS # BLD AUTO: 1 %
VENTRICULAR RATE: 144 BPM
WBC # BLD AUTO: 36.29 THOUSAND/UL (ref 4.31–10.16)
WBC #/AREA URNS AUTO: ABNORMAL /HPF

## 2021-03-29 PROCEDURE — 83036 HEMOGLOBIN GLYCOSYLATED A1C: CPT | Performed by: STUDENT IN AN ORGANIZED HEALTH CARE EDUCATION/TRAINING PROGRAM

## 2021-03-29 PROCEDURE — 71045 X-RAY EXAM CHEST 1 VIEW: CPT

## 2021-03-29 PROCEDURE — 85027 COMPLETE CBC AUTOMATED: CPT | Performed by: EMERGENCY MEDICINE

## 2021-03-29 PROCEDURE — 85379 FIBRIN DEGRADATION QUANT: CPT | Performed by: EMERGENCY MEDICINE

## 2021-03-29 PROCEDURE — 83605 ASSAY OF LACTIC ACID: CPT | Performed by: EMERGENCY MEDICINE

## 2021-03-29 PROCEDURE — 94644 CONT INHLJ TX 1ST HOUR: CPT

## 2021-03-29 PROCEDURE — 99285 EMERGENCY DEPT VISIT HI MDM: CPT

## 2021-03-29 PROCEDURE — 31500 INSERT EMERGENCY AIRWAY: CPT | Performed by: EMERGENCY MEDICINE

## 2021-03-29 PROCEDURE — 5A1935Z RESPIRATORY VENTILATION, LESS THAN 24 CONSECUTIVE HOURS: ICD-10-PCS | Performed by: STUDENT IN AN ORGANIZED HEALTH CARE EDUCATION/TRAINING PROGRAM

## 2021-03-29 PROCEDURE — 82553 CREATINE MB FRACTION: CPT | Performed by: ANESTHESIOLOGY

## 2021-03-29 PROCEDURE — 83880 ASSAY OF NATRIURETIC PEPTIDE: CPT | Performed by: EMERGENCY MEDICINE

## 2021-03-29 PROCEDURE — 84145 PROCALCITONIN (PCT): CPT | Performed by: EMERGENCY MEDICINE

## 2021-03-29 PROCEDURE — 84132 ASSAY OF SERUM POTASSIUM: CPT

## 2021-03-29 PROCEDURE — 82805 BLOOD GASES W/O2 SATURATION: CPT | Performed by: NURSE PRACTITIONER

## 2021-03-29 PROCEDURE — 85610 PROTHROMBIN TIME: CPT | Performed by: EMERGENCY MEDICINE

## 2021-03-29 PROCEDURE — 4A133B1 MONITORING OF ARTERIAL PRESSURE, PERIPHERAL, PERCUTANEOUS APPROACH: ICD-10-PCS | Performed by: PHYSICIAN ASSISTANT

## 2021-03-29 PROCEDURE — 82805 BLOOD GASES W/O2 SATURATION: CPT | Performed by: PHYSICIAN ASSISTANT

## 2021-03-29 PROCEDURE — 87081 CULTURE SCREEN ONLY: CPT | Performed by: PHYSICIAN ASSISTANT

## 2021-03-29 PROCEDURE — 94760 N-INVAS EAR/PLS OXIMETRY 1: CPT

## 2021-03-29 PROCEDURE — 94645 CONT INHLJ TX EACH ADDL HOUR: CPT

## 2021-03-29 PROCEDURE — 32551 INSERTION OF CHEST TUBE: CPT | Performed by: EMERGENCY MEDICINE

## 2021-03-29 PROCEDURE — 36415 COLL VENOUS BLD VENIPUNCTURE: CPT | Performed by: EMERGENCY MEDICINE

## 2021-03-29 PROCEDURE — 87040 BLOOD CULTURE FOR BACTERIA: CPT | Performed by: EMERGENCY MEDICINE

## 2021-03-29 PROCEDURE — 85730 THROMBOPLASTIN TIME PARTIAL: CPT | Performed by: EMERGENCY MEDICINE

## 2021-03-29 PROCEDURE — 84484 ASSAY OF TROPONIN QUANT: CPT | Performed by: EMERGENCY MEDICINE

## 2021-03-29 PROCEDURE — 85014 HEMATOCRIT: CPT

## 2021-03-29 PROCEDURE — 94640 AIRWAY INHALATION TREATMENT: CPT

## 2021-03-29 PROCEDURE — 0W9900Z DRAINAGE OF RIGHT PLEURAL CAVITY WITH DRAINAGE DEVICE, OPEN APPROACH: ICD-10-PCS | Performed by: EMERGENCY MEDICINE

## 2021-03-29 PROCEDURE — 4A133J1 MONITORING OF ARTERIAL PULSE, PERIPHERAL, PERCUTANEOUS APPROACH: ICD-10-PCS | Performed by: PHYSICIAN ASSISTANT

## 2021-03-29 PROCEDURE — 31500 INSERT EMERGENCY AIRWAY: CPT

## 2021-03-29 PROCEDURE — 96365 THER/PROPH/DIAG IV INF INIT: CPT

## 2021-03-29 PROCEDURE — 99291 CRITICAL CARE FIRST HOUR: CPT | Performed by: EMERGENCY MEDICINE

## 2021-03-29 PROCEDURE — 82550 ASSAY OF CK (CPK): CPT | Performed by: ANESTHESIOLOGY

## 2021-03-29 PROCEDURE — 85007 BL SMEAR W/DIFF WBC COUNT: CPT | Performed by: EMERGENCY MEDICINE

## 2021-03-29 PROCEDURE — 82805 BLOOD GASES W/O2 SATURATION: CPT | Performed by: EMERGENCY MEDICINE

## 2021-03-29 PROCEDURE — 94002 VENT MGMT INPAT INIT DAY: CPT

## 2021-03-29 PROCEDURE — 0BH18EZ INSERTION OF ENDOTRACHEAL AIRWAY INTO TRACHEA, VIA NATURAL OR ARTIFICIAL OPENING ENDOSCOPIC: ICD-10-PCS | Performed by: EMERGENCY MEDICINE

## 2021-03-29 PROCEDURE — 84295 ASSAY OF SERUM SODIUM: CPT

## 2021-03-29 PROCEDURE — 03HY32Z INSERTION OF MONITORING DEVICE INTO UPPER ARTERY, PERCUTANEOUS APPROACH: ICD-10-PCS | Performed by: PHYSICIAN ASSISTANT

## 2021-03-29 PROCEDURE — 93010 ELECTROCARDIOGRAM REPORT: CPT | Performed by: INTERNAL MEDICINE

## 2021-03-29 PROCEDURE — 94664 DEMO&/EVAL PT USE INHALER: CPT

## 2021-03-29 PROCEDURE — 80053 COMPREHEN METABOLIC PANEL: CPT | Performed by: EMERGENCY MEDICINE

## 2021-03-29 PROCEDURE — 82803 BLOOD GASES ANY COMBINATION: CPT

## 2021-03-29 PROCEDURE — 81001 URINALYSIS AUTO W/SCOPE: CPT | Performed by: EMERGENCY MEDICINE

## 2021-03-29 PROCEDURE — 82728 ASSAY OF FERRITIN: CPT | Performed by: EMERGENCY MEDICINE

## 2021-03-29 PROCEDURE — 36620 INSERTION CATHETER ARTERY: CPT | Performed by: PHYSICIAN ASSISTANT

## 2021-03-29 PROCEDURE — 83605 ASSAY OF LACTIC ACID: CPT | Performed by: ANESTHESIOLOGY

## 2021-03-29 PROCEDURE — 80053 COMPREHEN METABOLIC PANEL: CPT | Performed by: NURSE PRACTITIONER

## 2021-03-29 PROCEDURE — 82947 ASSAY GLUCOSE BLOOD QUANT: CPT

## 2021-03-29 PROCEDURE — 99223 1ST HOSP IP/OBS HIGH 75: CPT | Performed by: ANESTHESIOLOGY

## 2021-03-29 PROCEDURE — 93005 ELECTROCARDIOGRAM TRACING: CPT

## 2021-03-29 PROCEDURE — 96375 TX/PRO/DX INJ NEW DRUG ADDON: CPT

## 2021-03-29 RX ORDER — SODIUM CHLORIDE FOR INHALATION 0.9 %
3 VIAL, NEBULIZER (ML) INHALATION ONCE
Status: COMPLETED | OUTPATIENT
Start: 2021-03-29 | End: 2021-03-29

## 2021-03-29 RX ORDER — FENTANYL CITRATE 50 UG/ML
50 INJECTION, SOLUTION INTRAMUSCULAR; INTRAVENOUS
Status: DISCONTINUED | OUTPATIENT
Start: 2021-03-29 | End: 2021-03-29

## 2021-03-29 RX ORDER — METHYLPREDNISOLONE SODIUM SUCCINATE 125 MG/2ML
60 INJECTION, POWDER, LYOPHILIZED, FOR SOLUTION INTRAMUSCULAR; INTRAVENOUS EVERY 6 HOURS SCHEDULED
Status: DISCONTINUED | OUTPATIENT
Start: 2021-03-30 | End: 2021-03-30

## 2021-03-29 RX ORDER — LIDOCAINE HYDROCHLORIDE AND EPINEPHRINE 20; 5 MG/ML; UG/ML
1 INJECTION, SOLUTION EPIDURAL; INFILTRATION; INTRACAUDAL; PERINEURAL ONCE
Status: DISCONTINUED | OUTPATIENT
Start: 2021-03-29 | End: 2021-03-30

## 2021-03-29 RX ORDER — METHYLPREDNISOLONE SOD SUCC 125 MG
1 VIAL (EA) INJECTION ONCE
Status: COMPLETED | OUTPATIENT
Start: 2021-03-29 | End: 2021-03-29

## 2021-03-29 RX ORDER — ZINC SULFATE 50(220)MG
220 CAPSULE ORAL DAILY
Status: COMPLETED | OUTPATIENT
Start: 2021-03-29 | End: 2021-04-04

## 2021-03-29 RX ORDER — KETAMINE HYDROCHLORIDE 50 MG/ML
100 INJECTION, SOLUTION, CONCENTRATE INTRAMUSCULAR; INTRAVENOUS ONCE
Status: DISCONTINUED | OUTPATIENT
Start: 2021-03-29 | End: 2021-03-29

## 2021-03-29 RX ORDER — ASCORBIC ACID 500 MG
1000 TABLET ORAL EVERY 12 HOURS SCHEDULED
Status: DISCONTINUED | OUTPATIENT
Start: 2021-03-29 | End: 2021-04-04 | Stop reason: HOSPADM

## 2021-03-29 RX ORDER — BUDESONIDE 0.5 MG/2ML
0.5 INHALANT ORAL
Status: DISCONTINUED | OUTPATIENT
Start: 2021-03-29 | End: 2021-04-04 | Stop reason: HOSPADM

## 2021-03-29 RX ORDER — LIDOCAINE HYDROCHLORIDE AND EPINEPHRINE 10; 10 MG/ML; UG/ML
1 INJECTION, SOLUTION INFILTRATION; PERINEURAL ONCE
Status: DISCONTINUED | OUTPATIENT
Start: 2021-03-29 | End: 2021-03-29

## 2021-03-29 RX ORDER — MULTIVIT-MIN/FERROUS GLUCONATE 9 MG/15 ML
15 LIQUID (ML) ORAL DAILY
Status: DISCONTINUED | OUTPATIENT
Start: 2021-04-05 | End: 2021-04-04 | Stop reason: HOSPADM

## 2021-03-29 RX ORDER — VANCOMYCIN HYDROCHLORIDE 1 G/200ML
12.5 INJECTION, SOLUTION INTRAVENOUS EVERY 12 HOURS
Status: DISCONTINUED | OUTPATIENT
Start: 2021-03-29 | End: 2021-03-29

## 2021-03-29 RX ORDER — FAMOTIDINE 20 MG/1
20 TABLET, FILM COATED ORAL 2 TIMES DAILY
Status: DISCONTINUED | OUTPATIENT
Start: 2021-03-29 | End: 2021-04-04 | Stop reason: HOSPADM

## 2021-03-29 RX ORDER — SODIUM CHLORIDE, SODIUM GLUCONATE, SODIUM ACETATE, POTASSIUM CHLORIDE, MAGNESIUM CHLORIDE, SODIUM PHOSPHATE, DIBASIC, AND POTASSIUM PHOSPHATE .53; .5; .37; .037; .03; .012; .00082 G/100ML; G/100ML; G/100ML; G/100ML; G/100ML; G/100ML; G/100ML
1000 INJECTION, SOLUTION INTRAVENOUS ONCE
Status: COMPLETED | OUTPATIENT
Start: 2021-03-29 | End: 2021-03-29

## 2021-03-29 RX ORDER — LEVALBUTEROL 1.25 MG/.5ML
1.25 SOLUTION, CONCENTRATE RESPIRATORY (INHALATION)
Status: DISCONTINUED | OUTPATIENT
Start: 2021-03-29 | End: 2021-04-02

## 2021-03-29 RX ORDER — VANCOMYCIN HYDROCHLORIDE 1 G/200ML
15 INJECTION, SOLUTION INTRAVENOUS DAILY PRN
Status: DISCONTINUED | OUTPATIENT
Start: 2021-03-29 | End: 2021-03-30

## 2021-03-29 RX ORDER — LIDOCAINE HYDROCHLORIDE AND EPINEPHRINE BITARTRATE 20; .01 MG/ML; MG/ML
20 INJECTION, SOLUTION SUBCUTANEOUS ONCE
Status: COMPLETED | OUTPATIENT
Start: 2021-03-29 | End: 2021-03-29

## 2021-03-29 RX ORDER — KETAMINE HCL IN NACL, ISO-OSM 100MG/10ML
100 SYRINGE (ML) INJECTION ONCE
Status: DISCONTINUED | OUTPATIENT
Start: 2021-03-29 | End: 2021-03-29

## 2021-03-29 RX ORDER — FENTANYL CITRATE 50 UG/ML
100 INJECTION, SOLUTION INTRAMUSCULAR; INTRAVENOUS ONCE
Status: COMPLETED | OUTPATIENT
Start: 2021-03-29 | End: 2021-03-29

## 2021-03-29 RX ORDER — KETAMINE HYDROCHLORIDE 50 MG/ML
INJECTION, SOLUTION, CONCENTRATE INTRAMUSCULAR; INTRAVENOUS
Status: DISPENSED
Start: 2021-03-29 | End: 2021-03-29

## 2021-03-29 RX ORDER — METHYLPREDNISOLONE SODIUM SUCCINATE 125 MG/2ML
125 INJECTION, POWDER, LYOPHILIZED, FOR SOLUTION INTRAMUSCULAR; INTRAVENOUS ONCE
Status: COMPLETED | OUTPATIENT
Start: 2021-03-29 | End: 2021-03-29

## 2021-03-29 RX ORDER — SODIUM CHLORIDE FOR INHALATION 0.9 %
3 VIAL, NEBULIZER (ML) INHALATION ONCE
Status: DISCONTINUED | OUTPATIENT
Start: 2021-03-29 | End: 2021-03-30

## 2021-03-29 RX ORDER — TERBUTALINE SULFATE 1 MG/ML
1 INJECTION, SOLUTION SUBCUTANEOUS ONCE
Status: COMPLETED | OUTPATIENT
Start: 2021-03-29 | End: 2021-03-29

## 2021-03-29 RX ORDER — SUCCINYLCHOLINE/SOD CL,ISO/PF 100 MG/5ML
100 SYRINGE (ML) INTRAVENOUS ONCE
Status: COMPLETED | OUTPATIENT
Start: 2021-03-29 | End: 2021-03-29

## 2021-03-29 RX ORDER — FENTANYL CITRATE 50 UG/ML
INJECTION, SOLUTION INTRAMUSCULAR; INTRAVENOUS
Status: DISPENSED
Start: 2021-03-29 | End: 2021-03-29

## 2021-03-29 RX ORDER — ALBUTEROL SULFATE 2.5 MG/3ML
SOLUTION RESPIRATORY (INHALATION)
Status: DISPENSED
Start: 2021-03-29 | End: 2021-03-29

## 2021-03-29 RX ORDER — KETAMINE HYDROCHLORIDE 50 MG/ML
100 INJECTION, SOLUTION, CONCENTRATE INTRAMUSCULAR; INTRAVENOUS ONCE
Status: COMPLETED | OUTPATIENT
Start: 2021-03-29 | End: 2021-03-29

## 2021-03-29 RX ORDER — FENTANYL CITRATE-0.9 % NACL/PF 10 MCG/ML
50 PLASTIC BAG, INJECTION (ML) INTRAVENOUS CONTINUOUS
Status: DISCONTINUED | OUTPATIENT
Start: 2021-03-29 | End: 2021-03-29

## 2021-03-29 RX ORDER — MAGNESIUM SULFATE HEPTAHYDRATE 40 MG/ML
2 INJECTION, SOLUTION INTRAVENOUS ONCE
Status: COMPLETED | OUTPATIENT
Start: 2021-03-29 | End: 2021-03-29

## 2021-03-29 RX ORDER — HEPARIN SODIUM 5000 [USP'U]/ML
5000 INJECTION, SOLUTION INTRAVENOUS; SUBCUTANEOUS EVERY 8 HOURS SCHEDULED
Status: DISCONTINUED | OUTPATIENT
Start: 2021-03-29 | End: 2021-04-04 | Stop reason: HOSPADM

## 2021-03-29 RX ORDER — CHLORHEXIDINE GLUCONATE 0.12 MG/ML
15 RINSE ORAL EVERY 12 HOURS SCHEDULED
Status: DISCONTINUED | OUTPATIENT
Start: 2021-03-29 | End: 2021-04-04 | Stop reason: HOSPADM

## 2021-03-29 RX ORDER — CHLORHEXIDINE GLUCONATE 0.12 MG/ML
15 RINSE ORAL EVERY 12 HOURS SCHEDULED
Status: DISCONTINUED | OUTPATIENT
Start: 2021-03-29 | End: 2021-03-29 | Stop reason: SDUPTHER

## 2021-03-29 RX ORDER — SODIUM CHLORIDE FOR INHALATION 0.9 %
VIAL, NEBULIZER (ML) INHALATION
Status: COMPLETED
Start: 2021-03-29 | End: 2021-03-29

## 2021-03-29 RX ADMIN — ALBUTEROL SULFATE 10 MG: 2.5 SOLUTION RESPIRATORY (INHALATION) at 06:42

## 2021-03-29 RX ADMIN — SODIUM CHLORIDE, SODIUM GLUCONATE, SODIUM ACETATE, POTASSIUM CHLORIDE, MAGNESIUM CHLORIDE, SODIUM PHOSPHATE, DIBASIC, AND POTASSIUM PHOSPHATE 1000 ML: .53; .5; .37; .037; .03; .012; .00082 INJECTION, SOLUTION INTRAVENOUS at 11:02

## 2021-03-29 RX ADMIN — CHLORHEXIDINE GLUCONATE 0.12% ORAL RINSE 15 ML: 1.2 LIQUID ORAL at 12:26

## 2021-03-29 RX ADMIN — OXYCODONE HYDROCHLORIDE AND ACETAMINOPHEN 1000 MG: 500 TABLET ORAL at 12:25

## 2021-03-29 RX ADMIN — ALBUTEROL SULFATE 10 MG: 2.5 SOLUTION RESPIRATORY (INHALATION) at 06:45

## 2021-03-29 RX ADMIN — ISODIUM CHLORIDE 3 ML: 0.03 SOLUTION RESPIRATORY (INHALATION) at 06:44

## 2021-03-29 RX ADMIN — FENTANYL CITRATE 100 MCG: 50 INJECTION INTRAMUSCULAR; INTRAVENOUS at 08:24

## 2021-03-29 RX ADMIN — HEPARIN SODIUM 5000 UNITS: 5000 INJECTION INTRAVENOUS; SUBCUTANEOUS at 13:23

## 2021-03-29 RX ADMIN — KETAMINE HYDROCHLORIDE 100 MG: 50 INJECTION INTRAMUSCULAR; INTRAVENOUS at 07:38

## 2021-03-29 RX ADMIN — FAMOTIDINE 20 MG: 20 TABLET ORAL at 20:09

## 2021-03-29 RX ADMIN — MAGNESIUM SULFATE HEPTAHYDRATE 2 G: 40 INJECTION, SOLUTION INTRAVENOUS at 07:40

## 2021-03-29 RX ADMIN — IPRATROPIUM BROMIDE 0.5 MG: 0.5 SOLUTION RESPIRATORY (INHALATION) at 13:24

## 2021-03-29 RX ADMIN — Medication 50 MCG/HR: at 08:18

## 2021-03-29 RX ADMIN — SODIUM CHLORIDE 500 ML: 0.9 INJECTION, SOLUTION INTRAVENOUS at 15:45

## 2021-03-29 RX ADMIN — IPRATROPIUM BROMIDE 0.5 MG: 0.5 SOLUTION RESPIRATORY (INHALATION) at 19:33

## 2021-03-29 RX ADMIN — Medication 1 MG: at 06:43

## 2021-03-29 RX ADMIN — LIDOCAINE HYDROCHLORIDE,EPINEPHRINE BITARTRATE 20 ML: 20; .01 INJECTION, SOLUTION INFILTRATION; PERINEURAL at 08:43

## 2021-03-29 RX ADMIN — SODIUM CHLORIDE 0.5 MCG/KG/HR: 9 INJECTION, SOLUTION INTRAVENOUS at 08:14

## 2021-03-29 RX ADMIN — SODIUM CHLORIDE 1000 ML: 0.9 INJECTION, SOLUTION INTRAVENOUS at 08:00

## 2021-03-29 RX ADMIN — LEVALBUTEROL HYDROCHLORIDE 1.25 MG: 1.25 SOLUTION, CONCENTRATE RESPIRATORY (INHALATION) at 13:24

## 2021-03-29 RX ADMIN — HEPARIN SODIUM 5000 UNITS: 5000 INJECTION INTRAVENOUS; SUBCUTANEOUS at 22:55

## 2021-03-29 RX ADMIN — CHLORHEXIDINE GLUCONATE 0.12% ORAL RINSE 15 ML: 1.2 LIQUID ORAL at 20:10

## 2021-03-29 RX ADMIN — CEFEPIME HYDROCHLORIDE 2000 MG: 2 INJECTION, POWDER, FOR SOLUTION INTRAVENOUS at 08:24

## 2021-03-29 RX ADMIN — IPRATROPIUM BROMIDE 1 MG: 0.5 SOLUTION RESPIRATORY (INHALATION) at 06:43

## 2021-03-29 RX ADMIN — VANCOMYCIN HYDROCHLORIDE 1500 MG: 10 INJECTION, POWDER, LYOPHILIZED, FOR SOLUTION INTRAVENOUS at 08:47

## 2021-03-29 RX ADMIN — ZINC SULFATE 220 MG (50 MG) CAPSULE 220 MG: CAPSULE at 12:26

## 2021-03-29 RX ADMIN — KETAMINE HYDROCHLORIDE 100 MG: 50 INJECTION INTRAMUSCULAR; INTRAVENOUS at 08:13

## 2021-03-29 RX ADMIN — BUDESONIDE 0.5 MG: 0.5 INHALANT ORAL at 19:33

## 2021-03-29 RX ADMIN — ALBUTEROL SULFATE 10 MG: 2.5 SOLUTION RESPIRATORY (INHALATION) at 07:58

## 2021-03-29 RX ADMIN — METHYLPREDNISOLONE SODIUM SUCCINATE 125 MG: 125 INJECTION, POWDER, FOR SOLUTION INTRAMUSCULAR; INTRAVENOUS at 07:49

## 2021-03-29 RX ADMIN — LEVALBUTEROL HYDROCHLORIDE 1.25 MG: 1.25 SOLUTION, CONCENTRATE RESPIRATORY (INHALATION) at 19:33

## 2021-03-29 RX ADMIN — Medication 3 ML: at 06:44

## 2021-03-29 RX ADMIN — NOREPINEPHRINE BITARTRATE 10 MCG/MIN: 1 INJECTION, SOLUTION, CONCENTRATE INTRAVENOUS at 08:40

## 2021-03-29 RX ADMIN — Medication 100 MG: at 07:41

## 2021-03-29 RX ADMIN — OXYCODONE HYDROCHLORIDE AND ACETAMINOPHEN 1000 MG: 500 TABLET ORAL at 20:10

## 2021-03-29 RX ADMIN — CEFEPIME HYDROCHLORIDE 2000 MG: 2 INJECTION, POWDER, FOR SOLUTION INTRAVENOUS at 20:30

## 2021-03-29 NOTE — ED PROCEDURE NOTE
Procedure  Intubation    Date/Time: 3/29/2021 8:00 AM  Performed by: Satya Medina DO  Authorized by: Satya Medina DO     Patient location:  ED  Other Assisting Provider: Yes (comment) (Dr Kane Mullen, Dr Elsa Rosario)    Consent:     Consent obtained:  Verbal and emergent situation    Consent given by:  Patient  Universal protocol:     Required blood products, implants, devices, and special equipment available: yes      Patient identity confirmed:  Arm band  Pre-procedure details:     Patient status:  Awake    Pretreatment medications:  Ketamine    Paralytics:  Succinylcholine  Indications:     Indications for intubation: respiratory distress and hypercapnia    Procedure details:     Preoxygenation:  Bag valve mask    CPR in progress: no      Intubation method:  Oral    Oral intubation technique:  Direct    Laryngoscope blade: Mac 4    Tube size (mm):  7 5    Tube type:  Cuffed    Number of attempts:  1    Cricoid pressure: yes      Tube visualized through cords: yes    Placement assessment:     ETT to lip:  24    Tube secured with:  ETT blackburn    Breath sounds:  Diminished and reduced on left    Placement verification: chest rise, condensation and ETCO2 detector    Post-procedure details:     Patient tolerance of procedure: Tolerated well, no immediate complications  Chest Tube    Date/Time: 3/29/2021 7:40 AM  Performed by: Satya Medina DO  Authorized by: Satya Medina DO     Patient location:  ED  Other Assisting Provider: Yes (comment) (Dr Kane Mullen, Dr Elsa Rosario)    Consent:     Consent obtained:  Verbal and emergent situation    Consent given by:  Patient  [place] protocol:     Radiology Images displayed and confirmed    If images not available, report reviewed: yes      Required blood products, implants, devices, and special equipment available: yes      Patient identity confirmed:  Arm band  Pre-procedure details:     Skin preparation:  ChloraPrep    Preparation: Patient was prepped and draped in the usual sterile fashion    Indications:     Indications: pneumothroax    Anesthesia (see MAR for exact dosages): Anesthesia method:  Local infiltration    Local anesthetic:  Lidocaine 1% WITH epi  Procedure details:     Placement location:  Lateral    Laterality:  Right    Approach:  Open    Scalpel size:  11    Tube size (Fr):  20    Dissection instrument:  Sandi clamp    Ultrasound guidance: no      Needle Decompression: no      Tube connected to:  Suction    Drainage characteristics:  Air only    Dressing:  Petrolatum-impregnated gauze and 4x4 sterile gauze  Post-procedure details:     Post-insertion x-ray findings: tube in good position      Patient tolerance of procedure:   Tolerated well, no immediate complications                     Alba Garcia DO  03/29/21 9515

## 2021-03-29 NOTE — RESPIRATORY THERAPY NOTE
RT Ventilator Management Note  Idania Pandya Held 79 y o  male MRN: [id number]  Unit/Bed#: [facility] 275-51 Encounter: [id number]      Daily Screen       3/29/2021  1325 3/29/2021  1406          Patient safety screen outcome[de-identified]  Passed  --      Spont breathing trial % for 30 min:  Yes  --      Spont breathing trial outcome[de-identified]  --  Passed      Name of Medical Team Notified[de-identified]  --  CCM      Preparing to extubate/ Notify Nurse:  --  Yes      Consider Cuff Test:  --  Yes      Patient extubated:  --  Yes              Physical Exam:   Assessment Type: Assess only  General Appearance: Sedated  Respiratory Pattern: Assisted  Chest Assessment: Chest expansion symmetrical  Bilateral Breath Sounds: Diminished  O2 Device: (P) nc      Resp Comments: (P) pt extubated to 6lnc, 02 sat 96%, no stridor noted, prior to extubation +cuff leak, pt has good verbalization, pt placed on 6lnc, tolerating well, will continue to monitor

## 2021-03-29 NOTE — RESPIRATORY THERAPY NOTE
RT Ventilator Management Note  To Jones Held 79 y o  male MRN: [id number]  Unit/Bed#: [facility] 973-00 Encounter: [id number]      Daily Screen       3/29/2021  0813             Patient safety screen outcome[de-identified]  Failed    Not Ready for Weaning due to[de-identified]  --            Physical Exam:   Assessment Type: Assess only  General Appearance: Sedated  Respiratory Pattern: Assisted  Chest Assessment: Chest expansion symmetrical  Bilateral Breath Sounds: Diminished      Resp Comments: Pt transported from ED to  at this time with no complications via transport vent  Pt placed on the  at this time with previous AC/VC settings  No acute resp distress noted  SPO2 WNL  Pt will continue to be monitored per resp protocol

## 2021-03-29 NOTE — RESPIRATORY THERAPY NOTE
RT Protocol Note  Kulwinder Thomson Held 79 y o  male MRN: [id number]  Unit/Bed#: ED 17 Encounter: [id number]    Assessment    Active Problems:    * No active hospital problems   *      Home Pulmonary Medications:  Albuterol, trelegy       Past Medical History:   Diagnosis Date    BPH (benign prostatic hyperplasia)     COPD (chronic obstructive pulmonary disease) ([facility])     Last Assessed:2016    Diverticulosis     Elevated prostate specific antigen (PSA)     Emphysema (subcutaneous) (surgical) resulting from a procedure     Enlarged prostate with lower urinary tract symptoms (LUTS)     Resolved:2017    Hernia, inguinal, right      Social History     Socioeconomic History    Marital status: /Civil Union     Spouse name: None    Number of children: None    Years of education: None    Highest education level: None   Occupational History    None   Social Needs    Financial resource strain: None    Food insecurity     Worry: None     Inability: None    Transportation needs     Medical: None     Non-medical: None   Tobacco Use    Smoking status: Former Smoker     Packs/day: 1 50     Years: 48 00     Pack years: 72 00     Types: Cigarettes     Start date: 5     Quit date: 2015     Years since quittin 3    Smokeless tobacco: Never Used   Substance and Sexual Activity    Alcohol use: Yes     Frequency: Monthly or less     Drinks per session: 1 or 2     Binge frequency: Never     Comment: rare    Drug use: No    Sexual activity: Yes   Lifestyle    Physical activity     Days per week: None     Minutes per session: None    Stress: None   Relationships    Social connections     Talks on phone: None     Gets together: None     Attends Yarsanism service: None     Active member of club or organization: None     Attends meetings of clubs or organizations: None     Relationship status: None    Intimate partner violence     Fear of current or ex partner: None     Emotionally abused: None Physically abused: None     Forced sexual activity: None   Other Topics Concern    None   Social History Narrative    Advance directive on file       Subjective         Objective    Physical Exam:   Assessment Type: Assess only  General Appearance: Sedated  Respiratory Pattern: Assisted  Chest Assessment: Chest expansion symmetrical  Bilateral Breath Sounds: Diminished    Vitals:  Blood pressure 138/62, pulse (!) (P) 110, temperature 98 2 °F (36 8 °C), temperature source Tympanic, resp  rate (!) 28, SpO2 93 %  Imaging and other studies: I have personally reviewed pertinent reports  Plan    Respiratory Plan: Vent/NIV/HFNC        Resp Comments: (P) Pt intubaed by resident  ETT placed at the 24 from the lip  Possitive color change on ETCO2 noted, bilateral BS heard, CXR reviewed  Pt placed on the vent with AC/VC settings  No acute resp distress noted  Pt aggitated at this time peak pressuring on vent MD aware  Heart neb given through [facility] at this time  Will continue to monitor per resp protocol

## 2021-03-29 NOTE — H&P
H&P Exam - [address]  Held 79 y o  male MRN: [id number]  Unit/Bed#: [facility] 513-01 Encounter: [id number]      -------------------------------------------------------------------------------------------------------------  Chief Complaint: Acute SOB    History of Present Illness   HX and PE limited by: Intubation  Farzana Gorman is a 79 y o  male who past medical history of emphysema, hypertension, recent COVID-19 infection  Patient actually had a recent hospitalization for COVID-19, requiring oxygenation  Was treated on the mild pathway, received course of antibiotic, remdesivir, and 5/10 days of dexamethasone  Was discharged on home O2 on 03/22  Presented via EMS, found to have acute hypoxic hypercapnic respiratory failure  Was placed on BiPAP, eventually requiring intubation secondary to tachypnea and severe hypoxia  Initial lab values remarkable for a lactic acidosis of 4 1, KIRK with creatinine 1 32, leukocytosis at 36,000, D-dimer 2 34  Also noted to be hypotensive with blood pressure 85/56, was started on Levophed drip, IV fluids, broad-spectrum antibiotics, intubated and transferred to the ICU for further management  Patient seen and examined at bedside in critical care unit  Is intubated, but following commands  Able to shake his head no for any pain  Is able to spontaneously move all 4 extremities       History obtained from unobtainable from patient due to mental status and intubation   -------------------------------------------------------------------------------------------------------------  Assessment and Plan:    Neuro:    Diagnosis:  Acute encephalopathy in setting of COVID and respiratory failure/ sepsis  o Plan:  Cam- ICU  o Delirium precaution  o Further management as highlighted below  o Sedation/analgesia  - Continue fentanyl drip      CV:    Diagnosis:  Hypertension  o Plan:  Holding home dose hydrochlorothiazide in setting of hypotension   Diagnosis:  Hypotension setting of sepsis secondary to COVID-19  o Plan:  Continue with IV fluid resuscitation as highlighted below  o Continue with Levophed drip, wean as tolerated to maintain maps greater than 65      Pulm:   Diagnosis:  Acute hypoxic and hypercarbic respiratory failure secondary to COVID-19 pneumonia versus right pneumothorax  o Plan:  COVID-19 positive on 03/12/2021  Recent COVID-19 hospitalization 3/15 to 3/22, discharged on home O2  Status post remdesivir, 5/10 days of Decadron and antibiotics during that admission  o Will initiate on IV Solu-Medrol  o D-dimer 2 34, continue with DVT prophylaxis  o Initiate broad-spectrum antibiotics including cefepime, vancomycin  o Continue with IV fluid resuscitation  o Trend lactic acid until clear  o Follow-up on COVID inflammatory markers  o Current vent setting:  AC/VC 16/450/50/6   Diagnosis:  Right-sided pneumothorax  o Plan:  Status post chest tube placement on right 3/29, draining serosanguineous fluid  o Follow-up chest x-ray showed right chest tube insertion with decrease in right pneumothorax with small apical moderate subpulmonic component  o Follow-up chest x-ray in a m  COPD  · IV steroids as highlighted above  · Continue on Xopenex/Atrovent nebulized  · Respiratory protocol    :    Diagnosis:  KIRK, likely prerenal in nature  o Plan:  Baseline creatinine 0 8, 1 32 on admission  o Continue with IV fluid resuscitation  o Avoid nephrotoxic agents  o Monitor creatinine with a m  BMP   Diagnosis:  Wick catheter in place  o Plan:  Strict intake and output      GI:    Diagnosis:  Transaminitis  o Plan:  AST/ALT 80/218  o Likely in setting of COVID-19  o Acute hepatitis panel pending  o Monitor with a m  LFT  P r n  Bowel regimen      F/E/N:    Plan:  Status post IV fluids for sepsis resuscitation   NPO, consider NG tube placement patient is not extubated tomorrow   P r n   Electrolytes to maintain potassium greater than 4, phosphorus greater than 3, Mag greater than 2      Heme/Onc:    Diagnosis:  Leukocytosis 36,000, likely in setting of acute pneumonia  o Continue with cefepime, vanc, Flagyl  o COVID management as highlighted below  o Monitor fever curve and white blood cell  o  Hemoglobin stable at 15   Diagnosis:  DVT prophylaxis with heparin      Endo:    Diagnosis:  No acute issues  o Plan:  Follow-up on hemoglobin A1c      ID:  COVID-19   COVID-19 positive on 03/12/2021  Recent COVID-19 hospitalization 3/15 to 3/22, discharged on home O2  Status post remdesivir, 5/10 days of Decadron and antibiotics during that admission  o Will initiate on IV Solu-Medrol  o D-dimer 2 34, continue with DVT prophylaxis  o Initiate broad-spectrum antibiotics including cefepime, vancomycin  o F/u blood cultures  o Continue with IV fluid resuscitation  o Trend lactic acid until clear  o Follow-up on COVID inflammatory markers  o Monitor fever curve white blood cell count    MSK/Skin:    Diagnosis:  No acute issues  o Plan:  Frequent repositioning, PT/OT    Disposition: Admit to Critical Care   Code Status: Level 1 - Full Code  --------------------------------------------------------------------------------------------------------------  Review of Systems    A 12-point, complete review of systems was reviewed and negative except as stated above     Physical Exam  Vitals signs reviewed  Constitutional:       Appearance: He is ill-appearing  HENT:      Head: Normocephalic and atraumatic  Eyes:      General: No scleral icterus  Cardiovascular:      Rate and Rhythm: Normal rate and regular rhythm  Heart sounds: No gallop  Pulmonary:      Comments: Decreased breath sounds at bilateral bases  Intubated, right chest tube in place draining serosanguineous fluid  Abdominal:      Palpations: Abdomen is soft  Tenderness: There is no abdominal tenderness     Genitourinary:     Comments: Wick catheter in place draining yellow urine  Musculoskeletal:      Right lower leg: No edema  Left lower leg: No edema  Comments: Purposeful movement of all 4 extremities   Skin:     General: Skin is dry  Neurological:      Comments: Limited assessment setting of intubation  Is able to follow commands, opens eyes  Able to give a thumbs up        --------------------------------------------------------------------------------------------------------------  Vitals:   Vitals:    03/29/21 0915 03/29/21 1017 03/29/21 1100 03/29/21 1123   BP: 107/64  120/76    BP Location: Right arm  Right arm    Pulse: 86 84 76    Resp: 16  16    Temp:       TempSrc:       SpO2: 98% 98% 97% 98%     Temp  Min: 98 2 °F (36 8 °C)  Max: 98 2 °F (36 8 °C)        There is no height or weight on file to calculate BMI    N/A    Laboratory and Diagnostics:  Results from last 7 days   Lab Units 03/29/21  0842 03/29/21  0630   WBC Thousand/uL  --  36 29*   HEMOGLOBIN g/dL  --  15 7   I STAT HEMOGLOBIN g/dl 12 9  --    HEMATOCRIT %  --  51 8*   HEMATOCRIT, ISTAT % 38  --    PLATELETS Thousands/uL  --  466*   BANDS PCT %  --  1   MONO PCT %  --  10     Results from last 7 days   Lab Units 03/29/21  0842 03/29/21  0630   SODIUM mmol/L  --  136   POTASSIUM mmol/L  --  4 4   CHLORIDE mmol/L  --  98*   CO2 mmol/L  --  34*   CO2, I-STAT mmol/L 31  --    ANION GAP mmol/L  --  4   BUN mg/dL  --  15   CREATININE mg/dL  --  1 32*   CALCIUM mg/dL  --  8 8   GLUCOSE RANDOM mg/dL  --  175*   ALT U/L  --  218*   AST U/L  --  80*   ALK PHOS U/L  --  101   ALBUMIN g/dL  --  3 6   TOTAL BILIRUBIN mg/dL  --  0 74          Results from last 7 days   Lab Units 03/29/21  0630   INR  0 90   PTT seconds 24      Results from last 7 days   Lab Units 03/29/21  0631   TROPONIN I ng/mL 0 02     Results from last 7 days   Lab Units 03/29/21  1056 03/29/21  0630   LACTIC ACID mmol/L 4 1* 4 1*     ABG:    VBG:  Results from last 7 days   Lab Units 03/29/21  0630   PH JAY  7 084*   PCO2 JAY mm Hg 143 8*   PO2 JAY mm Hg 39 2   HCO3 JAY mmol/L 42 1* BASE EXC JAY mmol/L 5 6     Results from last 7 days   Lab Units 21  0630   PROCALCITONIN ng/ml 0 16       Micro:  Results from last 7 days   Lab Units 21  7037 21  0631   BLOOD CULTURE  Received in Microbiology Lab  Culture in Progress  Received in Microbiology Lab  Culture in Progress  Imaging: I have personally reviewed pertinent reports        Historical Information   Past Medical History:   Diagnosis Date    BPH (benign prostatic hyperplasia)     COPD (chronic obstructive pulmonary disease) ([facility])     Last Assessed:2016    Diverticulosis     Elevated prostate specific antigen (PSA)     Emphysema (subcutaneous) (surgical) resulting from a procedure     Enlarged prostate with lower urinary tract symptoms (LUTS)     Resolved:2017    Hernia, inguinal, right      Past Surgical History:   Procedure Laterality Date    BICEPS TENDON REPAIR      COLONOSCOPY      HAND SURGERY      Last Assessed:2016    HERNIA REPAIR      Last Assessed:2016    KNEE ARTHROSCOPY      AK REPAIR ING HERNIA,5+Y/O,REDUCIBL Right 2016    Procedure: REPAIR HERNIA INGUINAL with mesh;  Surgeon: Sonu Heaton DO;  Location: [facility];  Service: General    PROSTATE BIOPSY      WISDOM TOOTH EXTRACTION       Social History   Social History     Substance and Sexual Activity   Alcohol Use Yes    Frequency: Monthly or less    Drinks per session: 1 or 2    Binge frequency: Never    Comment: rare     Social History     Substance and Sexual Activity   Drug Use No     Social History     Tobacco Use   Smoking Status Former Smoker    Packs/day: 1 50    Years: 48 00    Pack years: 72 00    Types: Cigarettes    Start date: 5    Quit date: 2015    Years since quittin 3   Smokeless Tobacco Never Used     Family History:   Family History   Problem Relation Age of Onset    Hypertension Mother      I have reviewed this patient's family history and commented on sigificant items within the HPI      Medications:  Current Facility-Administered Medications   Medication Dose Route Frequency    albuterol (2 5 mg/3 mL) 0 083 % inhalation solution **ADS Override Pull**        ascorbic acid (VITAMIN C) tablet 1,000 mg  1,000 mg Per NG Tube Q12H [facility]    budesonide (PULMICORT) inhalation solution 0 5 mg  0 5 mg Nebulization Q12H    cefepime (MAXIPIME) 2,000 mg in dextrose 5 % 50 mL IVPB  2,000 mg Intravenous Q12H    chlorhexidine (PERIDEX) 0 12 % oral rinse 15 mL  15 mL Swish & Spit Q12H [facility]    dexmedetomidine (PRECEDEX) 400 mcg in sodium chloride 0 9 % 100 mL infusion  0 1-0 7 mcg/kg/hr Intravenous Titrated    fentaNYL 1000 mcg in sodium chloride 0 9% 100mL infusion  50 mcg/hr Intravenous Continuous    fentanyl citrate (PF) 100 MCG/2ML 50 mcg  50 mcg Intravenous Q1H PRN    heparin (porcine) subcutaneous injection 5,000 Units  5,000 Units Subcutaneous Q8H [facility]    ipratropium (ATROVENT) 0 02 % inhalation solution 0 5 mg  0 5 mg Nebulization Q6H    levalbuterol (XOPENEX) inhalation solution 1 25 mg  1 25 mg Nebulization Q6H    lidocaine-epinephrine (XYLOCAINE-MPF/EPINEPHRINE) 2 %-1:200,000 injection 1 mL  1 mL Infiltration Once    [START ON 3/30/2021] methylPREDNISolone sodium succinate (Solu-MEDROL) injection 60 mg  60 mg Intravenous Q6H [facility]    zinc sulfate (ZINCATE) capsule 220 mg  220 mg Per NG Tube Daily    Followed by   Delaney Franklin ON 4/5/2021] multivitamin with iron-minerals liquid 15 mL  15 mL Per NG Tube Daily    norepinephrine (LEVOPHED) 4 mg (STANDARD CONCENTRATION) IV in sodium chloride 0 9% 250 mL  1-30 mcg/min Intravenous Titrated    sodium chloride 0 9 % inhalation solution 3 mL  3 mL Nebulization Once    vancomycin (VANCOCIN) IVPB (premix in dextrose) 1,000 mg 200 mL  15 mg/kg Intravenous Daily PRN     Home medications:  Prior to Admission Medications   Prescriptions Last Dose Informant Patient Reported? Taking?    Multiple Vitamins-Minerals (CENTRUM ADULTS PO)  Self Yes No   Sig: Take 1 tablet by mouth daily  ascorbic acid (VITAMIN C) 1000 MG tablet  Self No No   Sig: Take 1 tablet (1,000 mg total) by mouth 2 (two) times a day for 3 days   cholecalciferol (VITAMIN D3) 1,000 units tablet  Self No No   Sig: Take 2 tablets (2,000 Units total) by mouth daily for 2 days   fluticasone-umeclidinium-vilanterol (TRELEGY) 100-62 5-25 MCG/INH inhaler  Self No No   Sig: Inhale 1 puff daily Rinse mouth after use  guaiFENesin (MUCINEX) 600 mg 12 hr tablet  Self Yes No   Sig: Take 1,200 mg by mouth 2 (two) times a day as needed   hydrochlorothiazide (HYDRODIURIL) 25 mg tablet  Self No No   Sig: TAKE 1 TABLET DAILY   ipratropium-albuterol (DUO-NEB) 0 5-2 5 mg/3 mL nebulizer solution  Self No No   Sig: Take 1 vial (3 mL total) by nebulization every 6 (six) hours   Patient taking differently: Take 3 mL by nebulization every 6 (six) hours as needed    loperamide (IMODIUM) 2 mg capsule  Self No No   Sig: Take 1 capsule (2 mg total) by mouth 3 (three) times a day as needed for diarrhea   loratadine (CLARITIN REDITABS) 10 MG dissolvable tablet  Self Yes No   Sig: Take 10 mg by mouth daily as needed for allergies  tamsulosin (Flomax) 0 4 mg  Self No No   Sig: Take 1 capsule (0 4 mg total) by mouth daily with dinner   zinc sulfate (ZINCATE) 220 mg capsule  Self No No   Sig: Take 1 capsule (220 mg total) by mouth daily for 2 days      Facility-Administered Medications: None     Allergies:   Allergies   Allergen Reactions    Bee Venom Facial Swelling    Other Rash     Annotation - [id number]    Penicillins Rash     ------------------------------------------------------------------------------------------------------------  Advance Directive and Living Will: Yes    Power of :    POLST:    ------------------------------------------------------------------------------------------------------------  Anticipated Length of Stay is > 2 midnights    Care Time Delivered:   Upon my evaluation, this patient had a high probability of imminent or life-threatening deterioration due to COVID pneumonia, which required my direct attention, intervention, and personal management  I have personally provided 30 minutes (25 to 30) of critical care time, exclusive of procedures, teaching, family meetings, and any prior time recorded by providers other than myself  Sumeet Ho DO        Portions of the record may have been created with voice recognition software  Occasional wrong word or "sound a like" substitutions may have occurred due to the inherent limitations of voice recognition software    Read the chart carefully and recognize, using context, where substitutions have occurred

## 2021-03-29 NOTE — PROGRESS NOTES
Vancomycin IV Pharmacy-to-Dose Consultation    Gerald Bradley is a 79 y o  male who is currently receiving vancomycin IV with management by the Pharmacy Consult service  Relevant clinical data and objective / subjective history reviewed  Vancomycin Assessment:  Indication: Pneumonia  Status: critically ill, leukocytosis, recent admission 3/15/21-3/22/21  Micro:   3/29 blood cx: in process  3/29: MRSA cx: needs to be collected  Procalcitonin: 0 16 (3/29)  Renal Function: serum creatinine up to 1 32 from 0 88 on 3/21  Potential Nephrotoxicity Factors:  Medications: vasopressors  Patient-Factors: elderly, hypotension, renal dysfunction  Days of Therapy: 1  Current Dose: 1500 mg (20 mg/kg) IV x1 at 0847  Goal Trough: 15-20 (appropriate for most indications)   Goal AUC(24h): 400-600  Last Level: n/a      Vancomycin Plan:  New Dosing: due to renal function, change to 1000 mg (15 mg/kg) IV daily PRN when random vancomycin level is less than 20 (next dose: TBD)  Next Level: random level 3/30 with AM labs  Renal Function Monitoring: daily BMP and UOP assessment      Pharmacy will continue to follow closely for s/sx of nephrotoxicity, infusion reactions and appropriateness of therapy  BMP and CBC will be ordered per protocol  We will continue to follow the patient's culture results and clinical progress daily         Thank you,  Aidee Miller, PharmD, Counts include [facility] beds at the [facility] 6 Pharmacist  [phone]

## 2021-03-30 ENCOUNTER — APPOINTMENT (INPATIENT)
Dept: RADIOLOGY | Facility: HOSPITAL | Age: 71
DRG: 871 | End: 2021-03-30
Payer: MEDICARE

## 2021-03-30 PROBLEM — R57.9 SHOCK (HCC): Status: ACTIVE | Noted: 2021-03-30

## 2021-03-30 PROBLEM — J96.21 ACUTE ON CHRONIC RESPIRATORY FAILURE WITH HYPOXIA (HCC): Status: ACTIVE | Noted: 2021-03-30

## 2021-03-30 PROBLEM — R79.89 ELEVATED LACTIC ACID LEVEL: Status: ACTIVE | Noted: 2021-03-30

## 2021-03-30 PROBLEM — J44.1 COPD WITH EXACERBATION (HCC): Status: ACTIVE | Noted: 2021-03-30

## 2021-03-30 PROBLEM — A41.9 SEPSIS (HCC): Status: ACTIVE | Noted: 2021-03-30

## 2021-03-30 PROBLEM — J93.83 SPONTANEOUS PNEUMOTHORAX: Status: ACTIVE | Noted: 2021-03-30

## 2021-03-30 LAB
ALBUMIN SERPL BCP-MCNC: 2.6 G/DL (ref 3.5–5)
ALP SERPL-CCNC: 72 U/L (ref 46–116)
ALT SERPL W P-5'-P-CCNC: 127 U/L (ref 12–78)
ANION GAP SERPL CALCULATED.3IONS-SCNC: 7 MMOL/L (ref 4–13)
AST SERPL W P-5'-P-CCNC: 40 U/L (ref 5–45)
BILIRUB DIRECT SERPL-MCNC: 0.15 MG/DL (ref 0–0.2)
BILIRUB SERPL-MCNC: 0.6 MG/DL (ref 0.2–1)
BUN SERPL-MCNC: 14 MG/DL (ref 5–25)
CALCIUM SERPL-MCNC: 8 MG/DL (ref 8.3–10.1)
CHLORIDE SERPL-SCNC: 104 MMOL/L (ref 100–108)
CO2 SERPL-SCNC: 31 MMOL/L (ref 21–32)
CREAT SERPL-MCNC: 0.84 MG/DL (ref 0.6–1.3)
ERYTHROCYTE [DISTWIDTH] IN BLOOD BY AUTOMATED COUNT: 12.8 % (ref 11.6–15.1)
GFR SERPL CREATININE-BSD FRML MDRD: 89 ML/MIN/1.73SQ M
GLUCOSE SERPL-MCNC: 138 MG/DL (ref 65–140)
GLUCOSE SERPL-MCNC: 155 MG/DL (ref 65–140)
GLUCOSE SERPL-MCNC: 160 MG/DL (ref 65–140)
HCT VFR BLD AUTO: 37.8 % (ref 36.5–49.3)
HGB BLD-MCNC: 12.1 G/DL (ref 12–17)
L PNEUMO1 AG UR QL IA.RAPID: NEGATIVE
LACTATE SERPL-SCNC: 2.5 MMOL/L (ref 0.5–2)
LACTATE SERPL-SCNC: 2.8 MMOL/L (ref 0.5–2)
LACTATE SERPL-SCNC: 3.4 MMOL/L (ref 0.5–2)
MCH RBC QN AUTO: 28.7 PG (ref 26.8–34.3)
MCHC RBC AUTO-ENTMCNC: 32 G/DL (ref 31.4–37.4)
MCV RBC AUTO: 90 FL (ref 82–98)
MRSA NOSE QL CULT: NORMAL
NRBC BLD AUTO-RTO: 0 /100 WBCS
PLATELET # BLD AUTO: 226 THOUSANDS/UL (ref 149–390)
PMV BLD AUTO: 10.7 FL (ref 8.9–12.7)
POTASSIUM SERPL-SCNC: 3.6 MMOL/L (ref 3.5–5.3)
PROCALCITONIN SERPL-MCNC: 1.5 NG/ML
PROT SERPL-MCNC: 5.8 G/DL (ref 6.4–8.2)
RBC # BLD AUTO: 4.22 MILLION/UL (ref 3.88–5.62)
S PNEUM AG UR QL: NEGATIVE
SODIUM SERPL-SCNC: 142 MMOL/L (ref 136–145)
VANCOMYCIN SERPL-MCNC: 6.7 UG/ML
WBC # BLD AUTO: 22.42 THOUSAND/UL (ref 4.31–10.16)

## 2021-03-30 PROCEDURE — 99222 1ST HOSP IP/OBS MODERATE 55: CPT | Performed by: THORACIC SURGERY (CARDIOTHORACIC VASCULAR SURGERY)

## 2021-03-30 PROCEDURE — 85027 COMPLETE CBC AUTOMATED: CPT | Performed by: PHYSICIAN ASSISTANT

## 2021-03-30 PROCEDURE — 83605 ASSAY OF LACTIC ACID: CPT | Performed by: PHYSICIAN ASSISTANT

## 2021-03-30 PROCEDURE — 71045 X-RAY EXAM CHEST 1 VIEW: CPT

## 2021-03-30 PROCEDURE — 94640 AIRWAY INHALATION TREATMENT: CPT

## 2021-03-30 PROCEDURE — 99233 SBSQ HOSP IP/OBS HIGH 50: CPT | Performed by: PHYSICIAN ASSISTANT

## 2021-03-30 PROCEDURE — 80076 HEPATIC FUNCTION PANEL: CPT | Performed by: STUDENT IN AN ORGANIZED HEALTH CARE EDUCATION/TRAINING PROGRAM

## 2021-03-30 PROCEDURE — C1729 CATH, DRAINAGE: HCPCS

## 2021-03-30 PROCEDURE — 80048 BASIC METABOLIC PNL TOTAL CA: CPT | Performed by: PHYSICIAN ASSISTANT

## 2021-03-30 PROCEDURE — NC001 PR NO CHARGE: Performed by: PHYSICIAN ASSISTANT

## 2021-03-30 PROCEDURE — C1769 GUIDE WIRE: HCPCS

## 2021-03-30 PROCEDURE — 32557 INSERT CATH PLEURA W/ IMAGE: CPT

## 2021-03-30 PROCEDURE — 0W9930Z DRAINAGE OF RIGHT PLEURAL CAVITY WITH DRAINAGE DEVICE, PERCUTANEOUS APPROACH: ICD-10-PCS | Performed by: RADIOLOGY

## 2021-03-30 PROCEDURE — 94760 N-INVAS EAR/PLS OXIMETRY 1: CPT

## 2021-03-30 PROCEDURE — 80202 ASSAY OF VANCOMYCIN: CPT | Performed by: PHYSICIAN ASSISTANT

## 2021-03-30 PROCEDURE — 82948 REAGENT STRIP/BLOOD GLUCOSE: CPT

## 2021-03-30 PROCEDURE — 84145 PROCALCITONIN (PCT): CPT | Performed by: EMERGENCY MEDICINE

## 2021-03-30 PROCEDURE — 87449 NOS EACH ORGANISM AG IA: CPT | Performed by: PHYSICIAN ASSISTANT

## 2021-03-30 PROCEDURE — 32557 INSERT CATH PLEURA W/ IMAGE: CPT | Performed by: RADIOLOGY

## 2021-03-30 RX ORDER — LIDOCAINE HYDROCHLORIDE 10 MG/ML
10 INJECTION, SOLUTION EPIDURAL; INFILTRATION; INTRACAUDAL; PERINEURAL ONCE
Status: COMPLETED | OUTPATIENT
Start: 2021-03-30 | End: 2021-03-30

## 2021-03-30 RX ORDER — FENTANYL CITRATE 50 UG/ML
INJECTION, SOLUTION INTRAMUSCULAR; INTRAVENOUS CODE/TRAUMA/SEDATION MEDICATION
Status: COMPLETED | OUTPATIENT
Start: 2021-03-30 | End: 2021-03-30

## 2021-03-30 RX ORDER — TAMSULOSIN HYDROCHLORIDE 0.4 MG/1
0.4 CAPSULE ORAL
Status: DISCONTINUED | OUTPATIENT
Start: 2021-03-30 | End: 2021-04-04 | Stop reason: HOSPADM

## 2021-03-30 RX ORDER — POTASSIUM CHLORIDE 20 MEQ/1
40 TABLET, EXTENDED RELEASE ORAL ONCE
Status: COMPLETED | OUTPATIENT
Start: 2021-03-30 | End: 2021-03-30

## 2021-03-30 RX ORDER — AMOXICILLIN 250 MG
1 CAPSULE ORAL 2 TIMES DAILY
Status: DISCONTINUED | OUTPATIENT
Start: 2021-03-30 | End: 2021-04-04 | Stop reason: HOSPADM

## 2021-03-30 RX ORDER — VANCOMYCIN HYDROCHLORIDE 1 G/200ML
15 INJECTION, SOLUTION INTRAVENOUS EVERY 12 HOURS
Status: DISCONTINUED | OUTPATIENT
Start: 2021-03-30 | End: 2021-04-02

## 2021-03-30 RX ORDER — NOREPINEPHRINE BITARTRATE 1 MG/ML
INJECTION, SOLUTION INTRAVENOUS
Status: COMPLETED
Start: 2021-03-30 | End: 2021-03-30

## 2021-03-30 RX ORDER — POLYETHYLENE GLYCOL 3350 17 G/17G
17 POWDER, FOR SOLUTION ORAL DAILY PRN
Status: DISCONTINUED | OUTPATIENT
Start: 2021-03-30 | End: 2021-04-04 | Stop reason: HOSPADM

## 2021-03-30 RX ORDER — METHYLPREDNISOLONE SODIUM SUCCINATE 125 MG/2ML
60 INJECTION, POWDER, LYOPHILIZED, FOR SOLUTION INTRAMUSCULAR; INTRAVENOUS EVERY 8 HOURS SCHEDULED
Status: DISCONTINUED | OUTPATIENT
Start: 2021-03-30 | End: 2021-03-31

## 2021-03-30 RX ORDER — ATORVASTATIN CALCIUM 40 MG/1
40 TABLET, FILM COATED ORAL
Status: DISCONTINUED | OUTPATIENT
Start: 2021-03-30 | End: 2021-04-04 | Stop reason: HOSPADM

## 2021-03-30 RX ADMIN — ATORVASTATIN CALCIUM 40 MG: 40 TABLET, FILM COATED ORAL at 21:17

## 2021-03-30 RX ADMIN — FENTANYL CITRATE 50 MCG: 50 INJECTION INTRAMUSCULAR; INTRAVENOUS at 16:33

## 2021-03-30 RX ADMIN — BUDESONIDE 0.5 MG: 0.5 INHALANT ORAL at 20:05

## 2021-03-30 RX ADMIN — TAMSULOSIN HYDROCHLORIDE 0.4 MG: 0.4 CAPSULE ORAL at 17:27

## 2021-03-30 RX ADMIN — METHYLPREDNISOLONE SODIUM SUCCINATE 60 MG: 125 INJECTION, POWDER, FOR SOLUTION INTRAMUSCULAR; INTRAVENOUS at 00:08

## 2021-03-30 RX ADMIN — LEVALBUTEROL HYDROCHLORIDE 1.25 MG: 1.25 SOLUTION, CONCENTRATE RESPIRATORY (INHALATION) at 08:11

## 2021-03-30 RX ADMIN — METHYLPREDNISOLONE SODIUM SUCCINATE 60 MG: 125 INJECTION, POWDER, FOR SOLUTION INTRAMUSCULAR; INTRAVENOUS at 21:07

## 2021-03-30 RX ADMIN — CEFEPIME HYDROCHLORIDE 2000 MG: 2 INJECTION, POWDER, FOR SOLUTION INTRAVENOUS at 08:36

## 2021-03-30 RX ADMIN — FAMOTIDINE 20 MG: 20 TABLET ORAL at 17:27

## 2021-03-30 RX ADMIN — LEVALBUTEROL HYDROCHLORIDE 1.25 MG: 1.25 SOLUTION, CONCENTRATE RESPIRATORY (INHALATION) at 13:54

## 2021-03-30 RX ADMIN — OXYCODONE HYDROCHLORIDE AND ACETAMINOPHEN 1000 MG: 500 TABLET ORAL at 21:04

## 2021-03-30 RX ADMIN — METHYLPREDNISOLONE SODIUM SUCCINATE 60 MG: 125 INJECTION, POWDER, FOR SOLUTION INTRAMUSCULAR; INTRAVENOUS at 14:47

## 2021-03-30 RX ADMIN — HEPARIN SODIUM 5000 UNITS: 5000 INJECTION INTRAVENOUS; SUBCUTANEOUS at 14:46

## 2021-03-30 RX ADMIN — IPRATROPIUM BROMIDE 0.5 MG: 0.5 SOLUTION RESPIRATORY (INHALATION) at 08:12

## 2021-03-30 RX ADMIN — IPRATROPIUM BROMIDE 0.5 MG: 0.5 SOLUTION RESPIRATORY (INHALATION) at 13:54

## 2021-03-30 RX ADMIN — DOCUSATE SODIUM AND SENNOSIDES 1 TABLET: 8.6; 5 TABLET ORAL at 17:27

## 2021-03-30 RX ADMIN — POTASSIUM CHLORIDE 40 MEQ: 1500 TABLET, EXTENDED RELEASE ORAL at 08:48

## 2021-03-30 RX ADMIN — BUDESONIDE 0.5 MG: 0.5 INHALANT ORAL at 08:11

## 2021-03-30 RX ADMIN — FENTANYL CITRATE 25 MCG: 50 INJECTION INTRAMUSCULAR; INTRAVENOUS at 16:16

## 2021-03-30 RX ADMIN — HEPARIN SODIUM 5000 UNITS: 5000 INJECTION INTRAVENOUS; SUBCUTANEOUS at 21:07

## 2021-03-30 RX ADMIN — CHLORHEXIDINE GLUCONATE 0.12% ORAL RINSE 15 ML: 1.2 LIQUID ORAL at 08:50

## 2021-03-30 RX ADMIN — LEVALBUTEROL HYDROCHLORIDE 1.25 MG: 1.25 SOLUTION, CONCENTRATE RESPIRATORY (INHALATION) at 20:05

## 2021-03-30 RX ADMIN — VANCOMYCIN HYDROCHLORIDE 1000 MG: 1 INJECTION, SOLUTION INTRAVENOUS at 21:18

## 2021-03-30 RX ADMIN — OXYCODONE HYDROCHLORIDE AND ACETAMINOPHEN 1000 MG: 500 TABLET ORAL at 08:48

## 2021-03-30 RX ADMIN — DOCUSATE SODIUM AND SENNOSIDES 1 TABLET: 8.6; 5 TABLET ORAL at 10:15

## 2021-03-30 RX ADMIN — FAMOTIDINE 20 MG: 20 TABLET ORAL at 08:49

## 2021-03-30 RX ADMIN — IPRATROPIUM BROMIDE 0.5 MG: 0.5 SOLUTION RESPIRATORY (INHALATION) at 20:05

## 2021-03-30 RX ADMIN — ZINC SULFATE 220 MG (50 MG) CAPSULE 220 MG: CAPSULE at 08:48

## 2021-03-30 RX ADMIN — IPRATROPIUM BROMIDE 0.5 MG: 0.5 SOLUTION RESPIRATORY (INHALATION) at 01:37

## 2021-03-30 RX ADMIN — LIDOCAINE HYDROCHLORIDE 10 ML: 10 INJECTION, SOLUTION EPIDURAL; INFILTRATION; INTRACAUDAL at 12:05

## 2021-03-30 RX ADMIN — CEFEPIME HYDROCHLORIDE 2000 MG: 2 INJECTION, POWDER, FOR SOLUTION INTRAVENOUS at 17:44

## 2021-03-30 RX ADMIN — LEVALBUTEROL HYDROCHLORIDE 1.25 MG: 1.25 SOLUTION, CONCENTRATE RESPIRATORY (INHALATION) at 01:37

## 2021-03-30 RX ADMIN — METHYLPREDNISOLONE SODIUM SUCCINATE 60 MG: 125 INJECTION, POWDER, FOR SOLUTION INTRAMUSCULAR; INTRAVENOUS at 05:21

## 2021-03-30 RX ADMIN — HEPARIN SODIUM 5000 UNITS: 5000 INJECTION INTRAVENOUS; SUBCUTANEOUS at 05:22

## 2021-03-30 RX ADMIN — VANCOMYCIN HYDROCHLORIDE 1000 MG: 1 INJECTION, SOLUTION INTRAVENOUS at 11:36

## 2021-03-30 NOTE — CASE MANAGEMENT
The patient is a 30 day readmission  He is not a Medicare bundle  Patient's readmission risk score is 16   Called patient's wife to complete assessment and explain role of CM  The patient was recently discharged home 3/22/21  He and his wife live in a two floor home with one ASTON  They have first floor set up  The patient has a walker he does not use and home oxygen from [employer]-he uses 2L  The patient has no HHC and she reports no MH and D&A treatment  Wife reports she and patient sharyn had COVID and so she is unable to visit him in ICU and remains home recovering  PCP is Tatiana Walden, [facility]  The patient has prescription coverage and uses [employer], [place]  CM reviewed d/c planning process including the following: identifying help at home, patient preference for d/c planning needs, Discharge Lounge, [facility] Meds to Bed program, availability of treatment team to discuss questions or concerns patient and/or family may have regarding understanding medications and recognizing signs and symptoms once discharged  CM also encouraged patient to follow up with all recommended appointments after discharge  Patient advised of importance for patient and family to participate in managing patients medical well being

## 2021-03-30 NOTE — ASSESSMENT & PLAN NOTE
· With concern for exacerbation in ER, treated with solumedrol, nebs and turbutaline   · Without current signs of exacerbation   · Solumedrol weaned 60 q8h   · Xopanex/Atrovent/Pulmicort nebs - on Trelegy at home   · Respiratory protocol

## 2021-03-30 NOTE — ASSESSMENT & PLAN NOTE
· COVID-19 + 3/12 with admission 3/12 - 3/22  · Discharged at that time on 2 L NC - currently on   · Has completed covid pathway  · Continue vitamins, pepcid, statin   · On solumedrol for COPD   · D-dimer 2 3 - continue to monitor for need for systemic anticoagulation

## 2021-03-30 NOTE — TELEMEDICINE
INTERPROFESSIONAL (PHONE) CONSULTATION - Interventional Radiology  Annie Escalante Held 79 y o  male MRN: [id number]  Unit/Bed#: [facility] 513-01 Encounter: [id number]    IR has been consulted to evaluate the patient, determine the appropriate procedure, and whether or not a procedure can and should be performed regarding the care of Chavez  We were consulted by critical care concerning right spontaneous pneumothorax, and to possibly perform a right sided chest tube if medically appropriate for the patient  IP Consult to IR  Consult performed by: Kalie Gonzalez PA-C  Consult ordered by: Lilia Kelley PA-C        03/30/21      Assessment/Recommendation:     79year old male with pmh of COPD, COVID-19 3/12/21, presenting with respiratory failure, spontaneous right pneumothorax s/p right chest tube placement 20Fr in ED, with continued right moderate pneumothorax    - will plan for right sided CT guided chest tube today  - surgery to determine timing for removal of 20Fr chest tube  - please keep npo      Total time spent in review of data, discussion with requesting provider and rendering advice was 25 minutes       Patient or appropriate family member was verbally informed by critical care of this consultative service on their behalf to provide more timely access to specialty care in lieu of an in person consultation  Verbal consent was obtained  Thank you for allowing Interventional Radiology to participate in the care of Chavez  Please don't hesitate to call or TigerText us with any questions       Kalie Gonzalez PA-C

## 2021-03-30 NOTE — ED ATTENDING ATTESTATION
3/29/2021  ILyle MD, saw and evaluated the patient  I have discussed the patient with the resident and agree with the resident's findings, Plan of Care, and MDM as documented in the resident's note, unless otherwise documented below  All available laboratory and imaging studies were reviewed by myself  I was present for key portions of any procedure(s) performed by the resident and I was immediately available to provide assistance  I agree with the current assessment done in the Emergency Department  I have conducted an independent evaluation of this patient  Pipo Justice (Corky) is a 79year old gentleman arriving via EMS in extremis  Patient unable to provide history of present illness as he is unable to speak due to severity of respiratory distress  Per EMS, patient's wife found him sitting up this morning, tripoding, having difficulty breathing  She called 911  Patient has a history of severe emphysema (not on supplemental O2), HTN, HLD, and a recent Covid-19 infection diagnosed on 3/12/2021 and requiring admission  Patient was discharged to home on 3/22/2021 on doxycycline and oral steroids  He was at baseline until this morning when he developed respiratory distress  Per EMS, patient's sats could not be increased above 70% despite NRB  He has been tachycardic and tachypneic and unable to provide additional HPI  He received terbutaline SC per EMS< as well as solumedrol 125 mg IV  Unable to obtain ROS due to acuity of medical condition, however, patient shakes head "no" when asked about chest pain, abdominal pain, nausea, vomiting, or leg swelling  Rest of ROS not obtained due to severity of respiratory distress      Physical Exam  03/29/21 0628 98 2 °F (36 8 °C) Tympanic -- -- -- -- -- -- --   03/29/21 0617 -- -- 144Abnormal  26Abnormal  111/77 95 % -- -- --     Constitutional:  Gentleman appearing stated age sitting up with eyes closed, very tachypneic with very shallow breathing, in severe respiratory distress  Patient is able to nod yes and no but unable to speak due to distress  He is readily following commands  HEENT:  Normocephalic, atraumatic  Sclera anicteric, conjunctiva not injected  Cardiac: Tachycardic, regular, no appreciable rubs or murmurs  2+ radial pulses  Respiratory: Tachypneic in 40s-50s, very shallow breathing, unable to auscultate breath sounds bilaterally anteriorly, patient unable to sit forward due to respiratory distress  Abdomen:  Nondistended  Bowel sounds present  Nontender  Extremities:  No deformities, no peripheral edema  Integument: Somewhat pale, but cap refill less than 2 seconds  Neurologic: Awake and alert, eyes are closed and patient is focused on breathing, he is moving all extremities, he is following commands  Psychiatric:  Normal affect    Labs  Labs Reviewed   D-DIMER, QUANTITATIVE - Abnormal       Result Value Ref Range Status    D-Dimer, Quant 2 34 (*) <0 50 ug/ml FEU Final    Comment: Reference and upper limits to exclude DVT and PE are the same  Do not use to exclude if clinical symptoms are present  CBC AND DIFFERENTIAL - Abnormal    WBC 36 29 (*) 4 31 - 10 16 Thousand/uL Final    Comment: This result has been called to Soren Monten[place] by Aline Miles on 03 29 2021 at [phone], and has been read back  RBC 5 48  3 88 - 5 62 Million/uL Final    Hemoglobin 15 7  12 0 - 17 0 g/dL Final    Hematocrit 51 8 (*) 36 5 - 49 3 % Final    MCV 95  82 - 98 fL Final    MCH 28 6  26 8 - 34 3 pg Final    MCHC 30 3 (*) 31 4 - 37 4 g/dL Final    RDW 12 9  11 6 - 15 1 % Final    MPV 10 5  8 9 - 12 7 fL Final    Platelets 778 (*) 694 - 390 Thousands/uL Final    nRBC 0  /100 WBCs Final    Narrative: This is an appended report  These results have been appended to a previously verified report     COMPREHENSIVE METABOLIC PANEL - Abnormal    Sodium 136  136 - 145 mmol/L Final    Potassium 4 4  3 5 - 5 3 mmol/L Final    Chloride 98 (*) 100 - 108 mmol/L Final CO2 34 (*) 21 - 32 mmol/L Final    ANION GAP 4  4 - 13 mmol/L Final    BUN 15  5 - 25 mg/dL Final    Creatinine 1 32 (*) 0 60 - 1 30 mg/dL Final    Comment: Standardized to IDMS reference method    Glucose 175 (*) 65 - 140 mg/dL Final    Comment: If the patient is fasting, the ADA then defines impaired fasting glucose as > 100 mg/dL and diabetes as > or equal to 123 mg/dL  Specimen collection should occur prior to Sulfasalazine administration due to the potential for falsely depressed results  Specimen collection should occur prior to Sulfapyridine administration due to the potential for falsely elevated results  Calcium 8 8  8 3 - 10 1 mg/dL Final    AST 80 (*) 5 - 45 U/L Final    Comment: Specimen collection should occur prior to Sulfasalazine administration due to the potential for falsely depressed results   (*) 12 - 78 U/L Final    Comment: Specimen collection should occur prior to Sulfasalazine and/or Sulfapyridine administration due to the potential for falsely depressed results  Alkaline Phosphatase 101  46 - 116 U/L Final    Total Protein 7 3  6 4 - 8 2 g/dL Final    Albumin 3 6  3 5 - 5 0 g/dL Final    Total Bilirubin 0 74  0 20 - 1 00 mg/dL Final    Comment: Use of this assay is not recommended for patients undergoing treatment with eltrombopag due to the potential for falsely elevated results      eGFR 54  ml/min/1 73sq m Final    Narrative:     [place] guidelines for Chronic Kidney Disease (CKD):     Stage 1 with normal or high GFR (GFR > 90 mL/min/1 73 square meters)    Stage 2 Mild CKD (GFR = 60-89 mL/min/1 73 square meters)    Stage 3A Moderate CKD (GFR = 45-59 mL/min/1 73 square meters)    Stage 3B Moderate CKD (GFR = 30-44 mL/min/1 73 square meters)    Stage 4 Severe CKD (GFR = 15-29 mL/min/1 73 square meters)    Stage 5 End Stage CKD (GFR <15 mL/min/1 73 square meters)  Note: GFR calculation is accurate only with a steady state creatinine   LACTIC ACID, PLASMA - Abnormal    LACTIC ACID 4 1 (*) 0 5 - 2 0 mmol/L Final    Narrative:     Result may be elevated if tourniquet was used during collection     FERRITIN - Abnormal    Ferritin 892 (*) 8 - 388 ng/mL Final   NT-BNP PRO (BRAIN NATRIURETIC PEPTIDE) - Abnormal    NT-proBNP 144 (*) <125 pg/mL Final   BLOOD GAS, VENOUS - Abnormal    pH, Grady 7 084 (*) 7 300 - 7 400 Final    pCO2, Grady 143 8 (*) 42 0 - 50 0 mm Hg Final    pO2, Grady 39 2  35 0 - 45 0 mm Hg Final    HCO3, Grady 42 1 (*) 24 - 30 mmol/L Final    Base Excess, Grady 5 6  mmol/L Final    O2 Content, Grady 13 0  ml/dL Final    O2 HGB, VENOUS 55 5 (*) 60 0 - 80 0 % Final   POCT BLOOD GAS (CG8+) - Abnormal    pH, Art i-STAT 7 320 (*) 7 350 - 7 450 Final    pCO2, Art i-STAT 57 5 (*) 36 0 - 44 0 mm HG Final    pO2, ART i-STAT 59 0 (*) 75 0 - 129 0 mm HG Final    BE, i-STAT 2  -2 - 3 mmol/L Final    HCO3, Art i-STAT 29 7 (*) 22 0 - 28 0 mmol/L Final    CO2, i-STAT 31  21 - 32 mmol/L Final    O2 Sat, i-STAT 87 (*) 60 - 85 % Final    SODIUM, I-STAT 136  136 - 145 mmol/l Final    Potassium, i-STAT 4 2  3 5 - 5 3 mmol/L Final    Hct, i-STAT 38  36 5 - 49 3 % Final    Hgb, i-STAT 12 9  12 0 - 17 0 g/dl Final    Glucose, i-STAT 155 (*) 65 - 140 mg/dl Final    POC FIO2 50  L Final    Specimen Type ARTERIAL   Final   MANUAL DIFFERENTIAL(PHLEBS DO NOT ORDER) - Abnormal    Segmented % 80 (*) 43 - 75 % Final    Bands % 1  0 - 8 % Final    Lymphocytes % 8 (*) 14 - 44 % Final    Monocytes % 10  4 - 12 % Final    Eosinophils, % 0  0 - 6 % Final    Basophils % 0  0 - 1 % Final    Atypical Lymphocytes % 1 (*) <=0 % Final    Absolute Neutrophils 29 39 (*) 1 85 - 7 62 Thousand/uL Final    Lymphocytes Absolute 2 90  0 60 - 4 47 Thousand/uL Final    Monocytes Absolute 3 63 (*) 0 00 - 1 22 Thousand/uL Final    Eosinophils Absolute 0 00  0 00 - 0 40 Thousand/uL Final    Basophils Absolute 0 00  0 00 - 0 10 Thousand/uL Final    Total Counted 100   Final    Platelet Estimate Increased (*) Adequate Final   PROCALCITONIN TEST - Normal    Procalcitonin 0 16  <=0 25 ng/ml Final    Comment: Suspected Lower Respiratory Tract Infection (LRTI):  - LESS than or EQUAL to 0 25 ng/mL:   low likelihood for bacterial LRTI; antibiotics DISCOURAGED   - GREATER than 0 25 ng/mL:   increased likelihood for bacterial LRTI; antibiotics ENCOURAGED  Suspected Sepsis:  - Strongly consider initiating antibiotics in ALL UNSTABLE patients  - LESS than or EQUAL to 0 5 ng/mL:   low likelihood for bacterial sepsis; antibiotics DISCOURAGED   - GREATER than 0 5 ng/mL:   increased likelihood for bacterial sepsis; antibiotics ENCOURAGED   - GREATER than 2 ng/mL:   high risk for severe sepsis / septic shock; antibiotics strongly ENCOURAGED  Decisions on antibiotic use should not be based solely on Procalcitonin (PCT) levels  If PCT is low but uncertainty exists with stopping antibiotics, repeat PCT in 6-24 hours to confirm the low level  If antibiotics are administered (regardless if initial PCT was high or low), repeat PCT every 1-2 days to consider early antibiotic cessation (when GREATER than 80% decrease from the peak OR when PCT drops below designated cutoffs, whichever comes first), so long as the infection is NOT one that typically requires prolonged treatment durations (e g , bone/joint infections, endocarditis, Staph  aureus bacteremia)      Situations of FALSE-POSITIVE Procalcitonin values:  1) Newborns < 67 hours old  2) Massive stress from severe trauma / burns, major surgery, acute pancreatitis, cardiogenic / hemorrhagic shock, sickle cell crisis, or other organ perfusion abnormalities  3) Malaria and some Candidal infections  4) Treatment with agents that stimulate cytokines (e g , OKT3, anti-lymphocyte globulins, alemtuzumab, IL-2, granulocyte transfusion [NOT GCSFs])  5) Chronic renal disease causes elevated baseline levels (consider GREATER than 0 75 ng/mL as an abnormal cut-off); initiating HD/CRRT may cause transient decreases  6) Paraneoplastic syndromes from medullary thyroid or SCLC, some forms of vasculitis, and acute lewns-[id number] disease    Situations of FALSE-NEGATIVE Procalcitonin values:  1) Too early in clinical course for PCT to have reached its peak (may repeat in 6-24 hours to confirm low level)  2) Localized infection WITHOUT systemic (SIRS / sepsis) response (e g , an abscess, osteomyelitis, cystitis)  3) Mycobacteria (e g , Tuberculosis, MAC)  4) Cystic fibrosis exacerbations     PROTIME-INR - Normal    Protime 12 1  11 6 - 14 5 seconds Final    INR 0 90  0 84 - 1 19 Final   APTT - Normal    PTT 24  23 - 37 seconds Final    Comment: Therapeutic Heparin Range =  60-90 seconds   TROPONIN I - Normal    Troponin I 0 02  <=0 04 ng/mL Final    Comment: Siemens Chemistry analyzer 99% cutoff is > 0 04 ng/mL in network labs     o cTnI 99% cutoff is useful only when applied to patients in the clinical setting of myocardial ischemia   o cTnI 99% cutoff should be interpreted in the context of clinical history, ECG findings and possibly cardiac imaging to establish correct diagnosis  o cTnI 99% cutoff may be suggestive but clearly not indicative of a coronary event without the clinical setting of myocardial ischemia  PROCALCITONIN REFLEX       Tests  XR chest 1 view portable   Final Result      Right chest tube insertion with decrease in right pneumothorax with small apical and moderate subpulmonic component  Workstation performed: [id number]         XR chest 1 view portable   Final Result      Large right-sided pneumothorax with findings suspicious for tension  Clinical service was aware of the findings at the time of dictation    Subsequent chest tube placement on follow-up exam                   Workstation performed: [id number]         XR chest portable ICU    (Results Pending)       Procedures  ECG 12 Lead Documentation Only    Date/Time: 3/29/2021 6:30 AM  Performed by: Lionel Garcia MD  Authorized by: Lior Thurman MD     Comments:      Sinus tachycardia, , , QRS 72, QTc 411, RSR' of incomplete RBBB, no ST/T wave changes to suggest ischemia, no STEMI, Other than rate, no significant change from prior dated 3/16/2021    CriticalCare Time  Performed by: Lior Thurman MD  Authorized by: Lior Thurmna MD     Critical care provider statement:     Critical care time (minutes):  72    Critical care start time:  3/29/2021 6:17 AM    Critical care end time:  3/29/2021 9:00 AM    Critical care time was exclusive of:  Separately billable procedures and treating other patients and teaching time    Critical care was necessary to treat or prevent imminent or life-threatening deterioration of the following conditions:  Circulatory failure, respiratory failure and sepsis    Critical care was time spent personally by me on the following activities:  Blood draw for specimens, obtaining history from patient or surrogate, development of treatment plan with patient or surrogate, discussions with consultants, evaluation of patient's response to treatment, examination of patient, ordering and performing treatments and interventions, ordering and review of laboratory studies, ordering and review of radiographic studies, re-evaluation of patient's condition, review of old charts and ventilator management                ED Course  Medications   albuterol inhalation solution 10 mg (10 mg Nebulization Given 3/29/21 0758)     And   sodium chloride 0 9 % inhalation solution 3 mL ( Nebulization Canceled Entry 3/29/21 0758)   albuterol (5 mg/mL) 0 5 % inhalation solution **ADS Override Pull** (10 mg  Given 3/29/21 0642)   albuterol inhalation solution 10 mg (10 mg Nebulization Given 3/29/21 0645)     And   ipratropium (ATROVENT) 0 02 % inhalation solution 1 mg (1 mg Nebulization Given 3/29/21 0643)     And   sodium chloride 0 9 % inhalation solution 3 mL ( Nebulization Canceled Entry 3/29/21 0645)   magnesium sulfate 2 g/50 mL IVPB (premix) 2 g (0 g Intravenous Stopped 3/29/21 0811)   methylPREDNISolone sodium succinate (Solu-MEDROL) injection 125 mg (125 mg Intravenous Given 3/29/21 0749)   terbutaline (FOR EMS ONLY) (BRETHINE) injection 1 each (0 each Does not apply Given to EMS 3/29/21 0650)   methylPREDNISolone sodium succinate (FOR EMS ONLY) (Solu-MEDROL) 125 MG injection 125 mg (0 mg Does not apply Given to EMS 3/29/21 0651)   ketamine (KETALAR) 100 mg (100 mg Intramuscular Given by Other 3/29/21 0738)   Succinylcholine Chloride 100 mg/5 mL syringe 100 mg (100 mg Intravenous Given by Other 3/29/21 0741)   cefepime (MAXIPIME) 2 g/50 mL dextrose IVPB (0 mg Intravenous Stopped 3/29/21 0843)   vancomycin (VANCOCIN) 1,500 mg in sodium chloride 0 9 % 250 mL IVPB (0 mg Intravenous Stopped 3/29/21 1201)   sodium chloride 0 9 % bolus 1,000 mL (1,000 mL Intravenous New Bag 3/29/21 0800)   fentanyl citrate (PF) 100 MCG/2ML 100 mcg ( Intravenous Canceled Entry 3/29/21 0830)   ketamine (KETALAR) 100 mg (100 mg Intravenous Given 3/29/21 0813)   lidocaine-epinephrine (XYLOCAINE/EPINEPHRINE) 2 %-1:100,000 injection 20 mL (20 mL Infiltration Given by Other 3/29/21 183)     79year old male with PMH of COPD and recent Covid-19 infection presenting with severe respiratory distress, tachycardia and hypoxia  VS reviewed, afebrile, tachycardic in 140s, normotensive, hypoxic in 70s, tachypneic  DDx includes worsening Covid-19 pneumonia, COPD exacerbation, bacterial pneumonia/superinfection, PE, PNX, ACS, versus another etiology of symptoms  Unable to appreciate breath sound bilaterally raising a question of poor air entry due to severe bronchospasm or due to pneumothorax  Since there were poor to no breath sound bilaterally and since patient's chest appeared to move symmetrically, pneumothorax was initially lower on the list and we proceeded with placing the patient on BiPAP with [facility]    Portable CXR obtained at 3397 reveals a large right sided pneumothorax with cardiomediastinal shift to the left  Combined with persistent tachycardia, this raises suspicion for tension pneumothorax  Right sided 20 Fr open thoracostomy tube placed by resident physician under my direct supervision  Upon entry into pleural space, there was an audible release of air relieving tension  Despite placement of chest tube with good tidaling, patient continues to be tachycardic and is somewhat hypotensive despite IV bolus  Given initial VBG pH of 7 08 with hypercarbia of 143 8, decision made to intubate the patient  Following Ketamine and Succinylcholine administration, patient was uneventfully intubated by resident physician under my direct supervision  There were no camilo-intubation events such as hypoxia or profound hypotension (patient's maps remained above 60)  Repeat CXR to my review reveals appropriate placement of chest tube with a marked improvement in pneumothorax, as well as appropriate placement of ETT  Rest of patient's work up reveals CMP with Cr of 1 32 consistent with KIRK (Cr 0 88 at baseline), mild transaminitis, normal BNP, lactate of 4 1, CBC of 36 29 with neutrophil predominance but no bands, thrombocytosis of 466, elevated ferritin of 892, elevated d-dimer of 2 34  Trop is normal at 0 02  ABG obtained after intubation reveals improvement in pH to 7 32 with pCO2 of 57 5  We did obtain blood cultures and started patient on broad spectrum antibiotics since bacterial pneumonia remains on differential     I updated patient's wife, Mrs Len Boston, via telephone regarding patient's condition  She will not be able to visit him in the hospital today, however, will try to visit with him tomorrow  She requests telephone updates if possible  Patient remains critically ill  Patient admitted to MICU for further care      Clinical Impression  Final diagnoses:   COPD with acute exacerbation ([facility] [address] )   Acute respiratory failure with hypoxia and hypercapnia (HCC)   Tension pneumothorax   Lactic acidosis   Acute kidney injury ([address] )

## 2021-03-30 NOTE — CONSULTS
Consultation - Thoracic Surgery   Fabián Justice 79 y o  male MRN: [id number]  Unit/Bed#: [facility] 513-01 Encounter: [id number]    Assessment/Plan     Assessment:  The patient is here with right sided spontaneous pneumothorax that he presented to the ER with on 3/29 yesterday  He was intubated in the ER for respiratory failure and subsequently underwent placement of a right sided chest tube by the ER team  His Xray at the time showed resolution of the pneumothorax, however, this am his Xray showed a persistent pneumothorax  The chest tube appears to be located in the fissure at the current time and as such, is functioning suboptimally  Plan:  Recommend consult to Interventional Radiology to place a pigtail chest tube in the right pleural space for drainage  Will likely remove the current chest tube after the image guided pigtail is placed, if pigtail is functioning well  Oxygen by nasal cannula as needed  Further care per ICU team      History of Present Illness     HPI:  Amarilis [place] is a 79 y o  male who presented to the ER on 3/29 with respiratory failure and had to be intubated for desaturations despite nonrebreather mask  He was found to have a right sided pneumothorax and underwent placement of a chest tube in the right pleural space  His chest Xray subsequently showed lung expansion with some residual pneumothorax, and the residual pneumothorax was seen to worsen today on Xray that was done this am  At the time of my assessment, his symptoms have improved and he has been extubated  He is on his baseline home oxygen requirement of 2 L  Inpatient consult to Thoracic Surgery     Performed by  Mayra Brennan MD     Authorized by Sara Bedolla PA-C              Review of Systems   Constitutional: Negative for activity change, chills, fatigue and unexpected weight change  HENT: Negative for congestion, drooling, facial swelling, mouth sores, rhinorrhea, sinus pressure, tinnitus and voice change  Eyes: Negative for photophobia, pain, discharge and visual disturbance  Respiratory: Negative for apnea, cough, choking, chest tightness, shortness of breath, wheezing and stridor  Cardiovascular: Negative for chest pain, palpitations and leg swelling  Gastrointestinal: Negative for abdominal distention, abdominal pain, anal bleeding, blood in stool, constipation, diarrhea, nausea, rectal pain and vomiting  Endocrine: Negative for cold intolerance, heat intolerance, polydipsia, polyphagia and polyuria  Genitourinary: Negative for dysuria and flank pain  Musculoskeletal: Negative for arthralgias, gait problem, joint swelling and neck stiffness  Skin: Negative for color change, pallor, rash and wound  Allergic/Immunologic: Negative for environmental allergies and food allergies  Neurological: Negative  Negative for dizziness, seizures, syncope, facial asymmetry, weakness and light-headedness  Psychiatric/Behavioral: Negative for agitation, behavioral problems, confusion, decreased concentration, hallucinations and sleep disturbance  The patient is not nervous/anxious          Historical Information   Past Medical History:   Diagnosis Date    BPH (benign prostatic hyperplasia)     COPD (chronic obstructive pulmonary disease) (HCC)     Last Assessed:11/14/2016    Diverticulosis     Elevated prostate specific antigen (PSA)     Emphysema (subcutaneous) (surgical) resulting from a procedure     Enlarged prostate with lower urinary tract symptoms (LUTS)     Resolved:8/22/2017    Hernia, inguinal, right      Past Surgical History:   Procedure Laterality Date    BICEPS TENDON REPAIR  1999    COLONOSCOPY      HAND SURGERY      Last Assessed:7/11/2016    HERNIA REPAIR      Last Assessed:7/11/2016    KNEE ARTHROSCOPY      AL REPAIR ING HERNIA,5+Y/O,REDUCIBL Right 2/23/2016    Procedure: REPAIR HERNIA INGUINAL with mesh;  Surgeon: Luiisto Walters DO;  Location: [facility];  Service: Gisel Carter PROSTATE BIOPSY      WISDOM TOOTH EXTRACTION       Social History   Social History     Substance and Sexual Activity   Alcohol Use Yes    Frequency: Monthly or less    Drinks per session: 1 or 2    Binge frequency: Never    Comment: rare     Social History     Substance and Sexual Activity   Drug Use No     E-Cigarette/Vaping    E-Cigarette Use Never User      E-Cigarette/Vaping Substances    Nicotine No     THC No     CBD No     Flavoring No     Other No     Unknown No      Social History     Tobacco Use   Smoking Status Former Smoker    Packs/day: 1 50    Years: 48 00    Pack years: 72 00    Types: Cigarettes    Start date: 5    Quit date: 2015    Years since quittin 3   Smokeless Tobacco Never Used     Family History: non-contributory    Meds/Allergies   all current active meds have been reviewed  Allergies   Allergen Reactions    Bee Venom Facial Swelling    Other Rash     Annotation - [id number]: mushrooms    Penicillins Rash       Objective   First Vitals:   Blood Pressure: 111/77 (21 06)  Pulse: (!) 144 (21 0617)  Temperature: 98 2 °F (36 8 °C) (21 0628)  Temp Source: Tympanic (21 0628)  Respirations: (!) 26 (21 0617)  SpO2: 95 % (21 0617)    Current Vitals:   Blood Pressure: 99/72 (21 0841)  Pulse: 94 (21 1200)  Temperature: 98 6 °F (37 °C) (21 1200)  Temp Source: Oral (21 0917)  Respirations: 18 (21 1200)  SpO2: 95 % (21 1200)      Intake/Output Summary (Last 24 hours) at 3/30/2021 1309  Last data filed at 3/30/2021 1201  Gross per 24 hour   Intake 1326 55 ml   Output 3685 ml   Net -2358 45 ml       Invasive Devices     Peripheral Intravenous Line            Peripheral IV 21 Right Antecubital 1 day    Peripheral IV 21 Right Forearm 1 day          Arterial Line            Arterial Line 21 Right Radial less than 1 day          Drain            Chest Tube Right 1 day    Urethral Catheter Temperature probe 16 Fr  1 day                Physical Exam  Constitutional:       General: He is not in acute distress  Appearance: He is well-developed  He is not diaphoretic  HENT:      Head: Normocephalic and atraumatic  Right Ear: External ear normal       Left Ear: External ear normal       Nose: Nose normal       Mouth/Throat:      Pharynx: No oropharyngeal exudate  Eyes:      General: No scleral icterus  Right eye: No discharge  Left eye: No discharge  Conjunctiva/sclera: Conjunctivae normal       Pupils: Pupils are equal, round, and reactive to light  Neck:      Musculoskeletal: Normal range of motion and neck supple  Thyroid: No thyromegaly  Vascular: No JVD  Trachea: No tracheal deviation  Cardiovascular:      Rate and Rhythm: Normal rate and regular rhythm  Heart sounds: Normal heart sounds  No murmur  No friction rub  No gallop  Pulmonary:      Effort: Pulmonary effort is normal  No respiratory distress  Breath sounds: Normal breath sounds  No stridor  No wheezing or rales  Comments: R CT in place to suction -20 on pleurevac with air leak  Minimal sanguineous output in cannister  Chest:      Chest wall: No tenderness  Abdominal:      General: There is no distension  Palpations: Abdomen is soft  There is no mass  Tenderness: There is no abdominal tenderness  There is no guarding or rebound  Hernia: No hernia is present  Musculoskeletal: Normal range of motion  General: No tenderness or deformity  Lymphadenopathy:      Cervical: No cervical adenopathy  Skin:     General: Skin is warm and dry  Capillary Refill: Capillary refill takes less than 2 seconds  Coloration: Skin is not pale  Findings: No erythema or rash  Neurological:      Mental Status: He is alert and oriented to person, place, and time  Cranial Nerves: No cranial nerve deficit  Sensory: No sensory deficit        Motor: No abnormal muscle tone  Coordination: Coordination normal       Deep Tendon Reflexes: Reflexes normal    Psychiatric:         Behavior: Behavior normal          Thought Content: Thought content normal          Judgment: Judgment normal          Lab Results: I have personally reviewed pertinent lab results  Imaging: I have personally reviewed pertinent reports  EKG, Pathology, and Other Studies: I have personally reviewed pertinent reports  Counseling / Coordination of Care  Total floor / unit time spent today 30 minutes  Greater than 50% of total time was spent with the patient and / or family counseling and / or coordination of care  A description of the counseling / coordination of care: New consult

## 2021-03-30 NOTE — PLAN OF CARE
Problem: Potential for Falls  Goal: Patient will remain free of falls  Description: INTERVENTIONS:  - Assess patient frequently for physical needs  -  Identify cognitive and physical deficits and behaviors that affect risk of falls    -  Grandy fall precautions as indicated by assessment   - Educate patient/family on patient safety including physical limitations  - Instruct patient to call for assistance with activity based on assessment  - Modify environment to reduce risk of injury  - Consider OT/PT consult to assist with strengthening/mobility  Outcome: Progressing     Problem: Prexisting or High Potential for Compromised Skin Integrity  Goal: Skin integrity is maintained or improved  Description: INTERVENTIONS:  - Identify patients at risk for skin breakdown  - Assess and monitor skin integrity  - Assess and monitor nutrition and hydration status  - Monitor labs   - Assess for incontinence   - Turn and reposition patient  - Assist with mobility/ambulation  - Relieve pressure over bony prominences  - Avoid friction and shearing  - Provide appropriate hygiene as needed including keeping skin clean and dry  - Evaluate need for skin moisturizer/barrier cream  - Collaborate with interdisciplinary team   - Patient/family teaching  - Consider wound care consult   Outcome: Progressing     Problem: CARDIOVASCULAR - ADULT  Goal: Maintains optimal cardiac output and hemodynamic stability  Description: INTERVENTIONS:  - Monitor I/O, vital signs and rhythm  - Monitor for S/S and trends of decreased cardiac output  - Administer and titrate ordered vasoactive medications to optimize hemodynamic stability  - Assess quality of pulses, skin color and temperature  - Assess for signs of decreased coronary artery perfusion  - Instruct patient to report change in severity of symptoms  Outcome: Progressing  Goal: Absence of cardiac dysrhythmias or at baseline rhythm  Description: INTERVENTIONS:  - Continuous cardiac monitoring, vital signs, obtain 12 lead EKG if ordered  - Administer antiarrhythmic and heart rate control medications as ordered  - Monitor electrolytes and administer replacement therapy as ordered  Outcome: Progressing     Problem: METABOLIC, FLUID AND ELECTROLYTES - ADULT  Goal: Electrolytes maintained within normal limits  Description: INTERVENTIONS:  - Monitor labs and assess patient for signs and symptoms of electrolyte imbalances  - Administer electrolyte replacement as ordered  - Monitor response to electrolyte replacements, including repeat lab results as appropriate  - Instruct patient on fluid and nutrition as appropriate  Outcome: Progressing  Goal: Fluid balance maintained  Description: INTERVENTIONS:  - Monitor labs   - Monitor I/O and WT  - Instruct patient on fluid and nutrition as appropriate  - Assess for signs & symptoms of volume excess or deficit  Outcome: Progressing  Goal: Glucose maintained within target range  Description: INTERVENTIONS:  - Monitor Blood Glucose as ordered  - Assess for signs and symptoms of hyperglycemia and hypoglycemia  - Administer ordered medications to maintain glucose within target range  - Assess nutritional intake and initiate nutrition service referral as needed  Outcome: Progressing

## 2021-03-30 NOTE — PROGRESS NOTES
Vancomycin IV Pharmacy-to-Dose Consultation    Usman Woodson is a 79 y o  male who is currently receiving vancomycin IV with management by the Pharmacy Consult service  Relevant clinical data and objective / subjective history reviewed  Vancomycin Assessment:  Indication: Pneumonia  Status: critically ill  Micro:   3/29 Blood cx: NGTD x24h  3/29 MRSA cx: in process  3/30 legionella and strep pneumo: in process  Procalcitonin: elevated today to 1 5 from 0 16 yesterday  Renal Function: KIRK resolved, serum creatinine 0 84 from 1 32  Potential Nephrotoxicity Factors:  Medications: vasopressors weaned off last night  Patient-Factors: elderly, hypotension (resolved), renal dysfunction (resolved)  Days of Therapy: 2  Current Dose: 1000 mg (15 mg/kg) IV daily PRN when random vancomycin level is less than 20   Goal Trough: 15-20 (appropriate for most indications)   Goal AUC(24h): 400-600  Last Level: 6 7  Pharmacokinetic Analysis: ke 0 0627 / t1/2 11 1      Vancomycin Plan:  New Dosing: change to scheduled regimen 1000 mg IV q12h   Predicted Trough / AUC: 17 3 / 601  Next Level: trough level 4/1 at 0730  Renal Function Monitoring: daily BMP and UOP assessment      Pharmacy will continue to follow closely for s/sx of nephrotoxicity, infusion reactions and appropriateness of therapy  BMP and CBC will be ordered per protocol  We will continue to follow the patient's culture results and clinical progress daily         Thank you,  Maria Teresa Santana, PharmD, [facility] Pharmacist  [phone]

## 2021-03-30 NOTE — PROGRESS NOTES
[facility]  ICU Transfer Note - Lamont Grant Held 1950, 79 y o  male MRN: [id number]  Unit/Bed#: [facility] 513-01 Encounter: [id number]  Primary Care Provider: Tatiana Walden PA-C   Date and time admitted to hospital: 3/29/2021  6:17 AM    * Spontaneous pneumothorax  Assessment & Plan  · R sided spontaneous PTX POA in setting of severe COPD and COVID infection   · R CT placed in ER with initial resolution of PTX   · 3/30 AM CXR with moderate apical PTX   · Thoracics surgery consulted   · IR consult in, pending pigtail catheter placement   · Will need emergent CT if decompensates     Acute on chronic respiratory failure with hypoxia ([facility] [address] )  Assessment & Plan  · Secondary to R spontaneous PTX, COPD, recent COVID infection   · Intubated and subsequently extubated 3/29 to 2 L NC which has been baseline O2 since COVID 3/12-3/22  · Continue to wean O2 for spo2 >88%  · Xopanex/Atrovent/Pulmicort nebs   · Respiratory/airway clearance protocol     Centrilobular emphysema (HCC) - severe  Assessment & Plan  · With concern for exacerbation in ER, treated with solumedrol, nebs and turbutaline   · Without current signs of exacerbation   · Solumedrol weaned 60 q8h   · Xopanex/Atrovent/Pulmicort nebs - on Trelegy at home   · Respiratory protocol     COVID-19  Assessment & Plan  · COVID-19 + 3/12 with admission 3/12 - 3/22  · Discharged at that time on 2 L NC - currently on   · Has completed covid pathway  · Continue vitamins, pepcid, statin   · On solumedrol for COPD   · D-dimer 2 3 - continue to monitor for need for systemic anticoagulation     Shock ([address] )  Assessment & Plan  · Hypotension on admission, septic vs hypovolemic - more likely hypovolemic in setting of recent covid infection and diuresis   · Weaned off levophed with volume resuscitation   · BP now 120-130     Sepsis ([address] )  Assessment & Plan  · Concern for possible sepsis on admission with leukocytosis, lactic acid and hypotension · Given 30 cc/kg IVF, levophed weaned to off   · Cefepime/Vancomycin while cultures pending   · Procal 1 50, continue to trend     Elevated lactic acid level  Assessment & Plan  · Without acidosis  · Downtrending and off vasopressors   · Continue to trend qh4 until cleared      Code Status: Level 1 - Full Code  POA:    POLST:      Reason for ICU admission:   Acute hypoxic respiratory failure     Active problems:   Principal Problem:    Spontaneous pneumothorax  Active Problems:    Acute on chronic respiratory failure with hypoxia (HCC)    Centrilobular emphysema (HCC) - severe    COVID-19    Shock (HCC)    Sepsis (HCC)    Elevated lactic acid level  Resolved Problems:    * No resolved hospital problems  *      Consultants:   Thoracic surgery   IR     History of Present Illness:   Per Dr Sue Kolb "79 y o  male who past medical history of emphysema, hypertension, recent COVID-23 infection  Patient actually had a recent hospitalization for COVID-19, requiring oxygenation  Was treated on the mild pathway, received course of antibiotic, remdesivir, and 5/10 days of dexamethasone  Was discharged on home O2 on 03/22  Presented via EMS, found to have acute hypoxic hypercapnic respiratory failure  Was placed on BiPAP, eventually requiring intubation secondary to tachypnea and severe hypoxia  Initial lab values remarkable for a lactic acidosis of 4 1, KIRK with creatinine 1 32, leukocytosis at 36,000, D-dimer 2 34  Also noted to be hypotensive with blood pressure 85/56, was started on Levophed drip, IV fluids, broad-spectrum antibiotics, intubated and transferred to the ICU for further management      Patient seen and examined at bedside in critical care unit  Is intubated, but following commands  Able to shake his head no for any pain  Is able to spontaneously move all 4 extremities "    Summary of clinical course:   Brought to ICU, awake following commands without hypoxia on minimal ventilator settings   Was transitioned to pressure support and weaned appropriately with low RSBI  Extubated to 2 L NC on which he remains without respiratory distress  Given 30 cc/kg fluid resuscitation and levophed weaned to off  Intermittent air leak present since tube placement larger in size  AM CXR 3/30 with moderate apical pneumothorax which was previously resolved  Thoracic surgery consulted, suture placed around chest tube and redressed without improvement or resolution in air leak  IR consulted for pigtail chest tube placement  Despite moderate PTX patient hemodynamically stable and stable on 2 L NC  Stable for transfer to [place] [address]  Recent or scheduled procedures:   3/29 intubation and extubation, a line    3/29 R CT placed in ER     Outstanding/pending diagnostics:   IR pigtail chest tube placement     Cultures:   Pending        Mobilization Plan:   PT/OT consult pending     Nutrition Plan:   NPO for IR procedure     Invasive Devices Review  Invasive Devices     Peripheral Intravenous Line            Peripheral IV 03/29/21 Right Antecubital 1 day    Peripheral IV 03/29/21 Right Forearm 1 day          Drain            Chest Tube Right 1 day    Chest Tube 2 Right  8 Fr  less than 1 day                Rationale for remaining devices: CT for PTX     VTE Pharmacologic Prophylaxis: Heparin  VTE Mechanical Prophylaxis: sequential compression device      Home medications that are not reordered and reason why:   HCTX - hypotension   Trelegy - on nebulizer     Spoke with Dr Alec Lagos  regarding transfer  Please contact critical care via [employer] with any questions or concerns  Portions of the record may have been created with voice recognition software  Occasional wrong word or "sound a like" substitutions may have occurred due to the inherent limitations of voice recognition software  Read the chart carefully and recognize, using context, where substitutions have occurred

## 2021-03-30 NOTE — ASSESSMENT & PLAN NOTE
· Concern for possible sepsis on admission with leukocytosis, lactic acid and hypotension   · Given 30 cc/kg IVF, levophed weaned to off   · Cefepime/Vancomycin while cultures pending   · Procal 1 50, continue to trend

## 2021-03-30 NOTE — SEDATION DOCUMENTATION
Patient tolerated right chest tube placement without any immediate complications noted at this time  Angelica Romano from p5 aware that we are done  Patient's chest tube hooked to atrium, gravity at this time

## 2021-03-30 NOTE — ASSESSMENT & PLAN NOTE
· R sided spontaneous PTX POA in setting of severe COPD and COVID infection   · R CT placed in ER with initial resolution of PTX   · 3/30 AM CXR with moderate apical PTX   · Thoracics surgery consulted   · IR consult in, pending pigtail catheter placement   · Will need emergent CT if decompensates

## 2021-03-30 NOTE — DISCHARGE INSTRUCTIONS
Chest Tubes   AMBULATORY CARE:   What you need to know about a chest tube:  A chest tube is also known as chest drain or chest drainage tube  It is a plastic tube that is put through the side of your chest  It uses a suction device to remove air, blood, or fluid from around your heart or lung  A chest tube will help you breathe more easily  How to prepare for a chest tube to be inserted: Your healthcare provider will tell you how to prepare  You may be given general anesthesia to keep you asleep and free from pain during the procedure  You may instead be given local anesthesia or a nerve block  You will be able to feel some pressure during the procedure, but you should not feel any pain  You may be given antibiotics to prevent a bacterial infection  Tell your healthcare provider if you have ever had an allergic reaction to anesthesia or an antibiotic  What happens when a chest tube is inserted: Your healthcare provider will make a small incision in your chest  A tool is used to make an opening through the chest muscle  The chest tube is inserted slowly until it reaches the pleural space or chest cavity  Your healthcare provider may use an ultrasound to guide him or her  When the tube is in place, it will be connected to a suction and drainage system  Stitches may be sewn into your chest wall to hold the tube in place  Tape may also be used to secure the tube before it is covered with a bandage  What happens after a chest tube has been inserted:   · Medicines  may be given to relieve pain or prevent a bacterial infection  · An x-ray or a CT scan  may be used to make sure the tube is in the right place  You may also need an x-ray after your chest tube is removed  What you need to know about chest tube removal:   · Your healthcare provider will tell you when the chest tube can be removed  After heart surgery, your chest tube may be removed within 72 hours   For lungs, the chest tube can be taken out when your lung is working normally again  One sign of this is little or no fluid draining into the chest tube  Another sign is no air leaking for 1 to 2 days  You may need a chest x-ray to make sure your lung is working as it should  · You may be given medicine to treat pain before the tube is removed  The tape will be removed  The stitches holding the tube in place will be loosened  You may need to breathe a certain way as the tube is taken out  Your healthcare provider will remove the tube  He or she may tighten the stitches to close the opening  He or she will cover the area with a bandage that will stop air from getting into your chest     Risks of a chest tube:   · You may get an infection in the area where the tube was inserted  The tube may damage organs that are close to your lungs  Your chest tube may move out of place when you move or turn  If this happens, you may need to have another chest tube put in     · You may get a blood clot in your leg or arm  This can cause pain and swelling, and it can stop blood from flowing where it needs to go in your body  The blood clot can break loose and travel to your lungs  A blood clot in your lungs can cause chest pain and trouble breathing  This can be life-threatening  Seek care immediately if:   · Blood or fluid soaks through your bandage  · Your bandage comes off  · Your arm or leg feels warm, tender, and painful  It may look swollen and red  · You suddenly feel lightheaded and have shortness of breath  · You have chest pain  You may have more pain when you take a deep breath or cough  You may cough up blood  Contact your healthcare provider if:   · You have a fever  · You have severe pain and swelling at your wound area  · Your wound is red, draining pus, or has a bad smell coming from it  · You have questions or concerns about your condition or care  Medicines:   You may need any of the following:  · Antibiotics  help prevent or fight an infection caused by bacteria  · Prescription pain medicine  may be given  Ask your healthcare provider how to take this medicine safely  Some prescription pain medicines contain acetaminophen  Do not take other medicines that contain acetaminophen without talking to your healthcare provider  Too much acetaminophen may cause liver damage  Prescription pain medicine may cause constipation  Ask your healthcare provider how to prevent or treat constipation  · Take your medicine as directed  Contact your healthcare provider if you think your medicine is not helping or if you have side effects  Tell him or her if you are allergic to any medicine  Keep a list of the medicines, vitamins, and herbs you take  Include the amounts, and when and why you take them  Bring the list or the pill bottles to follow-up visits  Carry your medicine list with you in case of an emergency  Self-care:   · Find a comfortable position  You may have pain or discomfort while the chest tube is in  Lie in a different position to help decrease your pain  · Cough and breathe deeply as directed  This will decrease your risk for a lung infection  Take deep breaths and cough 10 times each hour  Hold a pillow tightly against your incision when you cough  Take a deep breath and hold it for as long as your can  Then let the air out and cough strongly  Care for your chest tube:   · Check your chest tube for kinks or loops  Keep the tube close to you when you are in bed, but do not lie on it  Do not let loops of tubing hang down the side of your bed  Be sure your tubing is long enough so that you can move and turn in bed without pulling on it  Never clamp the tube yourself  · Keep the suction device below the level of your chest   This will help fluids drain from your chest to the container below  This will also help prevent fluids from flowing back into your chest     · Make sure your chest tube is secure    Make sure your chest tube is securely taped to your body  Your chest tube may also be taped to the suction device to help prevent the tubes from coming apart  · Do not turn knobs or change settings on your device unless a healthcare provider tells you to  If your suction device has water in it, the water should bubble gently, with short periods of no bubbling  A lot of bubbling that does not stop may mean air is leaking  Wound care:  Keep your bandage clean and dry  Ask your healthcare provider when you can bathe  Follow up with your healthcare provider as directed:  Write down your questions so you remember to ask them during your visits  © Copyright [address] Information is for End User's use only and may not be sold, redistributed or otherwise used for commercial purposes  All illustrations and images included in CareNotes® are the copyrighted property of [employer]  or [employer]  The above information is an  only  It is not intended as medical advice for individual conditions or treatments  Talk to your doctor, nurse or pharmacist before following any medical regimen to see if it is safe and effective for you

## 2021-03-30 NOTE — ASSESSMENT & PLAN NOTE
· Hypotension on admission, septic vs hypovolemic - more likely hypovolemic in setting of recent covid infection and diuresis   · Weaned off levophed with volume resuscitation   · BP now 120-130

## 2021-03-30 NOTE — PROGRESS NOTES
Daily Progress Note - Critical Care   Idania Pandya Held 79 y o  male MRN: [id number]  [facility] Encounter: [id number]    ----------------------------------------------------------------------------------------  HPI/24hr events:  79 M PMH severe COPD on home O2 p r n , recent COVID infection 3/12-3/22, hypertension, BPH who presented with KIRK and acute hypoxic respiratory failure 2/2 spontaneous right-sided pneumothorax s/p chest tube and intubation  Concern for septic shock on arrival with leukocytosis, elevated lactic acid and shock requiring vasopressors, received 30 mL/kg fluids  Levophed has weaned to off  Extubated to baseline 2 L nasal cannula  No acute events overnight  Continues to have elevated lactic acid without acidosis  ---------------------------------------------------------------------------------------  SUBJECTIVE  Complaining of fatigue, no other complaints     Review of Systems   Constitutional: Positive for fatigue  Negative for activity change, appetite change, chills, diaphoresis and fever  Respiratory: Negative for cough, chest tightness, shortness of breath and wheezing  Cardiovascular: Negative for chest pain, palpitations and leg swelling  Gastrointestinal: Negative for abdominal distention, abdominal pain, constipation, diarrhea, nausea and vomiting  Genitourinary: Negative for difficulty urinating  Musculoskeletal: Negative for arthralgias  Skin: Negative for color change, pallor, rash and wound  Neurological: Negative for dizziness, weakness, light-headedness and headaches  Psychiatric/Behavioral: Negative for agitation and confusion       Review of systems was reviewed and negative unless stated above in HPI/24-hour events   ---------------------------------------------------------------------------------------  Assessment and Plan:    Neuro:    Diagnosis:  No active issues  o Plan:  Cam ICU daily, delirium precautions  o Regulate sleep-wake cycle      CV:  Diagnosis:  Shock  o Plan:  Concerning for hypovolemia with diuretics versus sepsis in nature in setting of COVID-19, leukocytosis, COPD exacerbation  o Received 30 cc/kg sepsis resuscitation  o Levophed has been weaned to off  o Maintain maps greater than 80   Diagnosis:  Hx Hypertension  o Plan:  Holding home hydrochlorothiazide with hypotension, will restart as BP tolerates      Pulm:   Diagnosis:  Acute on chronic hypoxic respiratory failure, spontaneous right-sided pneumothorax  o Plan:  Extubated to 2 L nasal cannula  o Secondary to pneumothorax and COPD exacerbation  o AM CXR with persistent PTX   - CT with air leak, thoracic surgery consult    Diagnosis:  COPD with exacerbation  o Plan:  Continue methylprednisolone 60 mg q 6 hours  o Scheduled Atrovent, Xopenex, Pulmicort nebs-on home Trelegy inhaler  o Respiratory protocol  o Follow-up cultures   Diagnosis: COVID-19   o Plan: COVID + 3/12  o On COVID vitamins, statin, pepcid   o Soludmedrol wean to 60 q8h   o Ddimer 2 34 - does not meet for systemic AC at this time       GI:    Diagnosis: Transaminits  o Plan: Secondary to low flow state with hypotension   o AST/ALT down trending   o Bowel regimen: senna/colace, miralax PRN       :    Diagnosis: KIRK, BPH  o Plan: Likely prerenal with hypotension   o Cr Downtrending with volume, back to baseline 0 8  o Continue to trend   o Maintain yusuf for accurate I/O   o Home folmax     F/E/N:    F: Bolus PRN    E: Replete PRN    N: NPO, start diet       Heme/Onc:    Diagnosis: Elevated d dimer   o Plan: D dimer 2 34 in setting of covid infection   o On ppx AC   o Start therapeutic lovenox if rise >2 5     Endo:    Diagnosis: Hyperglycemia  o Plan: In setting of steroid use   o Start ISS for BG Goal 140-180      ID:    Diagnosis: Possible sepsis  o Plan: Cefepime/Vancomycin day 2   o Continue pending cultures though no obvious source of infection     MSK/Skin:    Diagnosis: No active issues   o Plan: Frequent turning/repositioning       Disposition: Transfer to Stepdown Level 2  Code Status: Level 1 - Full Code  ---------------------------------------------------------------------------------------  ICU CORE MEASURES    Prophylaxis   VTE Pharmacologic Prophylaxis: Heparin  VTE Mechanical Prophylaxis: sequential compression device  Stress Ulcer Prophylaxis: Famotidine PO    ABCDE Protocol (if indicated)  Plan to perform spontaneous awakening trial today? Not applicable  Plan to perform spontaneous breathing trial today? Not applicable  Obvious barriers to extubation? Not applicable  CAM-ICU: Negative    Invasive Devices Review  Invasive Devices     Peripheral Intravenous Line            Peripheral IV 21 Left Hand 1 day    Peripheral IV 21 Right Antecubital 1 day    Peripheral IV 21 Right Forearm less than 1 day          Drain            Chest Tube Right 1 day    Urethral Catheter Temperature probe 16 Fr  less than 1 day              Can any invasive devices be discontinued today? Yes  ---------------------------------------------------------------------------------------  OBJECTIVE    Vitals   Vitals:    21 0300 21 0400 21 0500 21 0816   BP:       BP Location:       Pulse: 90 88 88    Resp: 21 20 20    Temp:       TempSrc:       SpO2: 95% 95% 94% 93%     Temp (24hrs), Av 1 °F (36 7 °C), Min:98 °F (36 7 °C), Max:98 2 °F (36 8 °C)  Current: Temperature: 98 °F (36 7 °C)    Respiratory:  SpO2: SpO2: 94 %, SpO2 Device: O2 Device: Nasal cannula  Nasal Cannula O2 Flow Rate (L/min): 2 L/min    Invasive/non-invasive ventilation settings   Respiratory    Lab Data (Last 4 hours)    None         O2/Vent Data (Last 4 hours)    None                Physical Exam  Constitutional:       General: He is awake  He is not in acute distress  Appearance: He is normal weight  He is not ill-appearing, toxic-appearing or diaphoretic  Interventions: Nasal cannula in place     Eyes: Pupils: Pupils are equal, round, and reactive to light  Cardiovascular:      Rate and Rhythm: Normal rate and regular rhythm  Heart sounds: Normal heart sounds  Pulmonary:      Effort: Pulmonary effort is normal  No tachypnea or accessory muscle usage  Breath sounds: Decreased breath sounds (R >L) present  No wheezing  Abdominal:      General: There is no distension  Palpations: Abdomen is soft  Tenderness: There is no abdominal tenderness  Musculoskeletal: Normal range of motion  Right lower leg: No edema  Left lower leg: No edema  Skin:     General: Skin is warm and dry  Neurological:      General: No focal deficit present  Mental Status: He is alert and oriented to person, place, and time           Laboratory and Diagnostics:  Results from last 7 days   Lab Units 03/30/21  0528 03/29/21  0842 03/29/21  0630   WBC Thousand/uL 22 42*  --  36 29*   HEMOGLOBIN g/dL 12 1  --  15 7   I STAT HEMOGLOBIN g/dl  --  12 9  --    HEMATOCRIT % 37 8  --  51 8*   HEMATOCRIT, ISTAT %  --  38  --    PLATELETS Thousands/uL 226  --  466*   BANDS PCT %  --   --  1   MONO PCT %  --   --  10     Results from last 7 days   Lab Units 03/30/21  0528 03/29/21  2304 03/29/21  0842 03/29/21  0630   SODIUM mmol/L 142 141  --  136   POTASSIUM mmol/L 3 6 3 8  --  4 4   CHLORIDE mmol/L 104 104  --  98*   CO2 mmol/L 31 29  --  34*   CO2, I-STAT mmol/L  --   --  31  --    ANION GAP mmol/L 7 8  --  4   BUN mg/dL 14 16  --  15   CREATININE mg/dL 0 84 0 99  --  1 32*   CALCIUM mg/dL 8 0* 7 8*  --  8 8   GLUCOSE RANDOM mg/dL 155* 147*  --  175*   ALT U/L 127* 141*  --  218*   AST U/L 40 43  --  80*   ALK PHOS U/L 72 70  --  101   ALBUMIN g/dL 2 6* 2 8*  --  3 6   TOTAL BILIRUBIN mg/dL 0 60 0 49  --  0 74          Results from last 7 days   Lab Units 03/29/21  0630   INR  0 90   PTT seconds 24      Results from last 7 days   Lab Units 03/29/21  0631   TROPONIN I ng/mL 0 02     Results from last 7 days   Lab Units 03/29/21  2304 03/29/21  1944 03/29/21  1720 03/29/21  1523 03/29/21  1056 03/29/21  0630   LACTIC ACID mmol/L 5 2* 6 4* 7 8* 9 2* 4 1* 4 1*     ABG:  Results from last 7 days   Lab Units 03/29/21  2304   PH ART  7 430   PCO2 ART mm Hg 43 5   PO2 ART mm Hg 80 2   HCO3 ART mmol/L 28 2*   BASE EXC ART mmol/L 3 4   ABG SOURCE  Line, Arterial     VBG:  Results from last 7 days   Lab Units 03/29/21  2304  03/29/21  0630   PH JAY   --   --  7 084*   PCO2 JAY mm Hg  --   --  143 8*   PO2 JAY mm Hg  --   --  39 2   HCO3 JAY mmol/L  --   --  42 1*   BASE EXC JAY mmol/L  --   --  5 6   ABG SOURCE  Line, Arterial   < >  --     < > = values in this interval not displayed  Results from last 7 days   Lab Units 03/30/21  0528 03/29/21  0630   PROCALCITONIN ng/ml 1 50* 0 16       Micro  Results from last 7 days   Lab Units 03/29/21  2701 03/29/21  0631   BLOOD CULTURE  Received in Microbiology Lab  Culture in Progress  Received in Microbiology Lab  Culture in Progress  EKG: No new ECG  Imaging: R chest tube in place with moderate apical pneumothorax I have personally reviewed pertinent reports  and I have personally reviewed pertinent films in PACS    Intake and Output  I/O       03/28 0701 - 03/29 0700 03/29 0701 - 03/30 0700 03/30 0701 - 03/31 0700    P  O   150     I V   1336 9     NG/GT  30     IV Piggyback  850     Total Intake  2366 9     Urine  2785     Emesis/NG output  100     Chest Tube  20     Total Output  2905     Net  -538 1                  Height and Weights         There is no height or weight on file to calculate BMI  Weight (last 2 days)     None            Nutrition       Diet Orders   (From admission, onward)             Start     Ordered    03/29/21 0844  Diet NPO  Diet effective now     Question Answer Comment   Diet Type NPO    RD to adjust diet per protocol?  Yes        03/29/21 0844                Active Medications  Scheduled Meds:  Current Facility-Administered Medications   Medication Dose Route Frequency Provider Last Rate    ascorbic acid  1,000 mg Per NG Tube Q12H [address] [facility] ALBAN Sainz      budesonide  0 5 mg Nebulization Q12H [place], [place]      cefepime  2,000 mg Intravenous Q12H Rosa Elena Sainz PA-C 2,000 mg (03/29/21 2030)    chlorhexidine  15 mL Swish & Spit Q12H [address] Sobia Renae PA-C      famotidine  20 mg Oral BID Rosa Elena Sainz PA-C      heparin (porcine)  5,000 Units Subcutaneous [place], [place]      ipratropium  0 5 mg Nebulization Q6H [facility], [place]      levalbuterol  1 25 mg Nebulization Q6H [place], [place]      methylPREDNISolone sodium succinate  60 mg Intravenous [facility] [place], [place]      zinc sulfate  220 mg Per NG Tube Daily Rosa Elena Sainz PA-C      Followed by   Melissa Ribeiro ON 4/5/2021] multivitamin with iron-minerals  15 mL Per NG Tube Daily Rosa Elena Sainz PA-C      potassium chloride  40 mEq Oral Once [facility]      vancomycin  15 mg/kg Intravenous Q12H Yajaira Alvarez MD       Continuous Infusions:     PRN Meds:        Allergies   Allergies   Allergen Reactions    Bee Venom Facial Swelling    Other Rash     Annotation - [id number]: mushrooms    Penicillins Rash     ---------------------------------------------------------------------------------------  Advance Directive and Living Will: Yes    Power of :    POLST:    ---------------------------------------------------------------------------------------  Care Time Delivered:   No Critical Care time spent     Zee Moreno PA-C      Portions of the record may have been created with voice recognition software  Occasional wrong word or "sound a like" substitutions may have occurred due to the inherent limitations of voice recognition software    Read the chart carefully and recognize, using context, where substitutions have occurred

## 2021-03-30 NOTE — ASSESSMENT & PLAN NOTE
· Secondary to R spontaneous PTX, COPD, recent COVID infection   · Intubated and subsequently extubated 3/29 to 2 L NC which has been baseline O2 since COVID 3/12-3/22  · Continue to wean O2 for spo2 >88%  · Xopanex/Atrovent/Pulmicort nebs   · Respiratory/airway clearance protocol

## 2021-03-30 NOTE — BRIEF OP NOTE (RAD/CATH)
INTERVENTIONAL RADIOLOGY PROCEDURE NOTE    Date: 3/30/2021    Procedure: Right chest tube placement    Preoperative diagnosis:   1  Tension pneumothorax    2  COPD with acute exacerbation ([address] )    3  Acute respiratory failure with hypoxia and hypercapnia (HCC)    4  Lactic acidosis    5  Acute kidney injury ([facility] [address] )    6  Spontaneous pneumothorax         Postoperative diagnosis: Same  Surgeon: Ashli Garcia MD     Assistant: None  No qualified resident was available  Blood loss: Minimal    Specimens: None     Findings: [age] F right chest tube placement    Complications: None immediate      Anesthesia: local and IV Fentanyl

## 2021-03-31 ENCOUNTER — APPOINTMENT (INPATIENT)
Dept: RADIOLOGY | Facility: HOSPITAL | Age: 71
DRG: 871 | End: 2021-03-31
Payer: MEDICARE

## 2021-03-31 PROBLEM — R65.10 SIRS (SYSTEMIC INFLAMMATORY RESPONSE SYNDROME) (HCC): Status: ACTIVE | Noted: 2021-03-31

## 2021-03-31 LAB
ANION GAP SERPL CALCULATED.3IONS-SCNC: 4 MMOL/L (ref 4–13)
BUN SERPL-MCNC: 23 MG/DL (ref 5–25)
CALCIUM SERPL-MCNC: 8.3 MG/DL (ref 8.3–10.1)
CHLORIDE SERPL-SCNC: 106 MMOL/L (ref 100–108)
CO2 SERPL-SCNC: 31 MMOL/L (ref 21–32)
CREAT SERPL-MCNC: 0.88 MG/DL (ref 0.6–1.3)
D DIMER PPP FEU-MCNC: 1.06 UG/ML FEU
ERYTHROCYTE [DISTWIDTH] IN BLOOD BY AUTOMATED COUNT: 12.9 % (ref 11.6–15.1)
GFR SERPL CREATININE-BSD FRML MDRD: 87 ML/MIN/1.73SQ M
GLUCOSE SERPL-MCNC: 121 MG/DL (ref 65–140)
GLUCOSE SERPL-MCNC: 147 MG/DL (ref 65–140)
GLUCOSE SERPL-MCNC: 150 MG/DL (ref 65–140)
GLUCOSE SERPL-MCNC: 151 MG/DL (ref 65–140)
GLUCOSE SERPL-MCNC: 163 MG/DL (ref 65–140)
GLUCOSE SERPL-MCNC: 170 MG/DL (ref 65–140)
HCT VFR BLD AUTO: 36.3 % (ref 36.5–49.3)
HGB BLD-MCNC: 11.5 G/DL (ref 12–17)
LACTATE SERPL-SCNC: 2.8 MMOL/L (ref 0.5–2)
LACTATE SERPL-SCNC: 3.2 MMOL/L (ref 0.5–2)
LACTATE SERPL-SCNC: 3.7 MMOL/L (ref 0.5–2)
LACTATE SERPL-SCNC: 4.6 MMOL/L (ref 0.5–2)
MAGNESIUM SERPL-MCNC: 2.8 MG/DL (ref 1.6–2.6)
MCH RBC QN AUTO: 28.7 PG (ref 26.8–34.3)
MCHC RBC AUTO-ENTMCNC: 31.7 G/DL (ref 31.4–37.4)
MCV RBC AUTO: 91 FL (ref 82–98)
PLATELET # BLD AUTO: 213 THOUSANDS/UL (ref 149–390)
PMV BLD AUTO: 10.9 FL (ref 8.9–12.7)
POTASSIUM SERPL-SCNC: 4 MMOL/L (ref 3.5–5.3)
PROCALCITONIN SERPL-MCNC: 0.82 NG/ML
RBC # BLD AUTO: 4.01 MILLION/UL (ref 3.88–5.62)
SODIUM SERPL-SCNC: 141 MMOL/L (ref 136–145)
WBC # BLD AUTO: 17.89 THOUSAND/UL (ref 4.31–10.16)

## 2021-03-31 PROCEDURE — 80048 BASIC METABOLIC PNL TOTAL CA: CPT | Performed by: PHYSICIAN ASSISTANT

## 2021-03-31 PROCEDURE — 94664 DEMO&/EVAL PT USE INHALER: CPT

## 2021-03-31 PROCEDURE — 99232 SBSQ HOSP IP/OBS MODERATE 35: CPT | Performed by: THORACIC SURGERY (CARDIOTHORACIC VASCULAR SURGERY)

## 2021-03-31 PROCEDURE — 97167 OT EVAL HIGH COMPLEX 60 MIN: CPT

## 2021-03-31 PROCEDURE — 71045 X-RAY EXAM CHEST 1 VIEW: CPT

## 2021-03-31 PROCEDURE — 83605 ASSAY OF LACTIC ACID: CPT | Performed by: PHYSICIAN ASSISTANT

## 2021-03-31 PROCEDURE — 99232 SBSQ HOSP IP/OBS MODERATE 35: CPT | Performed by: PHYSICIAN ASSISTANT

## 2021-03-31 PROCEDURE — 85379 FIBRIN DEGRADATION QUANT: CPT | Performed by: PHYSICIAN ASSISTANT

## 2021-03-31 PROCEDURE — 82948 REAGENT STRIP/BLOOD GLUCOSE: CPT

## 2021-03-31 PROCEDURE — 83735 ASSAY OF MAGNESIUM: CPT | Performed by: PHYSICIAN ASSISTANT

## 2021-03-31 PROCEDURE — 85027 COMPLETE CBC AUTOMATED: CPT | Performed by: PHYSICIAN ASSISTANT

## 2021-03-31 PROCEDURE — 94640 AIRWAY INHALATION TREATMENT: CPT

## 2021-03-31 PROCEDURE — 99233 SBSQ HOSP IP/OBS HIGH 50: CPT | Performed by: INTERNAL MEDICINE

## 2021-03-31 PROCEDURE — 97163 PT EVAL HIGH COMPLEX 45 MIN: CPT

## 2021-03-31 PROCEDURE — 94760 N-INVAS EAR/PLS OXIMETRY 1: CPT

## 2021-03-31 PROCEDURE — 84145 PROCALCITONIN (PCT): CPT | Performed by: PHYSICIAN ASSISTANT

## 2021-03-31 RX ORDER — SODIUM CHLORIDE 9 MG/ML
100 INJECTION, SOLUTION INTRAVENOUS CONTINUOUS
Status: DISCONTINUED | OUTPATIENT
Start: 2021-03-31 | End: 2021-04-01

## 2021-03-31 RX ORDER — METHYLPREDNISOLONE SODIUM SUCCINATE 40 MG/ML
40 INJECTION, POWDER, LYOPHILIZED, FOR SOLUTION INTRAMUSCULAR; INTRAVENOUS EVERY 12 HOURS SCHEDULED
Status: DISCONTINUED | OUTPATIENT
Start: 2021-03-31 | End: 2021-04-03

## 2021-03-31 RX ORDER — SODIUM CHLORIDE, SODIUM LACTATE, POTASSIUM CHLORIDE, CALCIUM CHLORIDE 600; 310; 30; 20 MG/100ML; MG/100ML; MG/100ML; MG/100ML
100 INJECTION, SOLUTION INTRAVENOUS CONTINUOUS
Status: DISCONTINUED | OUTPATIENT
Start: 2021-03-31 | End: 2021-03-31

## 2021-03-31 RX ADMIN — VANCOMYCIN HYDROCHLORIDE 1000 MG: 1 INJECTION, SOLUTION INTRAVENOUS at 22:36

## 2021-03-31 RX ADMIN — ATORVASTATIN CALCIUM 40 MG: 40 TABLET, FILM COATED ORAL at 21:46

## 2021-03-31 RX ADMIN — CEFEPIME HYDROCHLORIDE 2000 MG: 2 INJECTION, POWDER, FOR SOLUTION INTRAVENOUS at 09:38

## 2021-03-31 RX ADMIN — LEVALBUTEROL HYDROCHLORIDE 1.25 MG: 1.25 SOLUTION, CONCENTRATE RESPIRATORY (INHALATION) at 19:54

## 2021-03-31 RX ADMIN — CHLORHEXIDINE GLUCONATE 0.12% ORAL RINSE 15 ML: 1.2 LIQUID ORAL at 21:47

## 2021-03-31 RX ADMIN — CEFEPIME HYDROCHLORIDE 2000 MG: 2 INJECTION, POWDER, FOR SOLUTION INTRAVENOUS at 17:11

## 2021-03-31 RX ADMIN — FAMOTIDINE 20 MG: 20 TABLET ORAL at 09:42

## 2021-03-31 RX ADMIN — BUDESONIDE 0.5 MG: 0.5 INHALANT ORAL at 19:54

## 2021-03-31 RX ADMIN — LEVALBUTEROL HYDROCHLORIDE 1.25 MG: 1.25 SOLUTION, CONCENTRATE RESPIRATORY (INHALATION) at 02:22

## 2021-03-31 RX ADMIN — HEPARIN SODIUM 5000 UNITS: 5000 INJECTION INTRAVENOUS; SUBCUTANEOUS at 21:46

## 2021-03-31 RX ADMIN — OXYCODONE HYDROCHLORIDE AND ACETAMINOPHEN 1000 MG: 500 TABLET ORAL at 09:42

## 2021-03-31 RX ADMIN — HEPARIN SODIUM 5000 UNITS: 5000 INJECTION INTRAVENOUS; SUBCUTANEOUS at 06:42

## 2021-03-31 RX ADMIN — TAMSULOSIN HYDROCHLORIDE 0.4 MG: 0.4 CAPSULE ORAL at 17:14

## 2021-03-31 RX ADMIN — HEPARIN SODIUM 5000 UNITS: 5000 INJECTION INTRAVENOUS; SUBCUTANEOUS at 13:14

## 2021-03-31 RX ADMIN — OXYCODONE HYDROCHLORIDE AND ACETAMINOPHEN 1000 MG: 500 TABLET ORAL at 21:46

## 2021-03-31 RX ADMIN — IPRATROPIUM BROMIDE 0.5 MG: 0.5 SOLUTION RESPIRATORY (INHALATION) at 08:11

## 2021-03-31 RX ADMIN — LEVALBUTEROL HYDROCHLORIDE 1.25 MG: 1.25 SOLUTION, CONCENTRATE RESPIRATORY (INHALATION) at 13:25

## 2021-03-31 RX ADMIN — SODIUM CHLORIDE 100 ML/HR: 0.9 INJECTION, SOLUTION INTRAVENOUS at 17:11

## 2021-03-31 RX ADMIN — DOCUSATE SODIUM AND SENNOSIDES 1 TABLET: 8.6; 5 TABLET ORAL at 09:42

## 2021-03-31 RX ADMIN — CEFEPIME HYDROCHLORIDE 2000 MG: 2 INJECTION, POWDER, FOR SOLUTION INTRAVENOUS at 01:08

## 2021-03-31 RX ADMIN — METHYLPREDNISOLONE SODIUM SUCCINATE 40 MG: 40 INJECTION, POWDER, FOR SOLUTION INTRAMUSCULAR; INTRAVENOUS at 21:47

## 2021-03-31 RX ADMIN — INSULIN LISPRO 1 UNITS: 100 INJECTION, SOLUTION INTRAVENOUS; SUBCUTANEOUS at 21:47

## 2021-03-31 RX ADMIN — SODIUM CHLORIDE, SODIUM LACTATE, POTASSIUM CHLORIDE, AND CALCIUM CHLORIDE 100 ML/HR: .6; .31; .03; .02 INJECTION, SOLUTION INTRAVENOUS at 01:49

## 2021-03-31 RX ADMIN — INSULIN LISPRO 1 UNITS: 100 INJECTION, SOLUTION INTRAVENOUS; SUBCUTANEOUS at 00:48

## 2021-03-31 RX ADMIN — BUDESONIDE 0.5 MG: 0.5 INHALANT ORAL at 08:11

## 2021-03-31 RX ADMIN — ZINC SULFATE 220 MG (50 MG) CAPSULE 220 MG: CAPSULE at 09:42

## 2021-03-31 RX ADMIN — VANCOMYCIN HYDROCHLORIDE 1000 MG: 1 INJECTION, SOLUTION INTRAVENOUS at 11:40

## 2021-03-31 RX ADMIN — FAMOTIDINE 20 MG: 20 TABLET ORAL at 17:14

## 2021-03-31 RX ADMIN — LEVALBUTEROL HYDROCHLORIDE 1.25 MG: 1.25 SOLUTION, CONCENTRATE RESPIRATORY (INHALATION) at 08:11

## 2021-03-31 RX ADMIN — CHLORHEXIDINE GLUCONATE 0.12% ORAL RINSE 15 ML: 1.2 LIQUID ORAL at 09:42

## 2021-03-31 RX ADMIN — DOCUSATE SODIUM AND SENNOSIDES 1 TABLET: 8.6; 5 TABLET ORAL at 17:14

## 2021-03-31 RX ADMIN — IPRATROPIUM BROMIDE 0.5 MG: 0.5 SOLUTION RESPIRATORY (INHALATION) at 19:54

## 2021-03-31 RX ADMIN — IPRATROPIUM BROMIDE 0.5 MG: 0.5 SOLUTION RESPIRATORY (INHALATION) at 02:22

## 2021-03-31 RX ADMIN — METHYLPREDNISOLONE SODIUM SUCCINATE 60 MG: 125 INJECTION, POWDER, FOR SOLUTION INTRAMUSCULAR; INTRAVENOUS at 06:43

## 2021-03-31 RX ADMIN — IPRATROPIUM BROMIDE 0.5 MG: 0.5 SOLUTION RESPIRATORY (INHALATION) at 13:25

## 2021-03-31 NOTE — ASSESSMENT & PLAN NOTE
· Severe sepsis, POA  Met SIRs criteria with tachycardia, tachypnea  Concern for possible sepsis on admission with leukocytosis, lactic acid and hypotension  · Possibility of superimposed bacterial pna; cxr 3/30 with R basilar infiltrate  · Cont on cefepime/vancomycin, elevated procalcitonin  · U/a benign; procal trending down, blood cx neg x 1 day   Leukocytosis likely element of steroid induced  · Lactic acidosis persists, IVF hydration, cont trend   · Initially requiring vasopressor support now weaned off

## 2021-03-31 NOTE — ASSESSMENT & PLAN NOTE
· Acute exacerbation    · Solumedrol weaned to 40mg q12  · Xopanex/Atrovent/Pulmicort nebs - on Trelegy at home   · Respiratory protocol

## 2021-03-31 NOTE — ASSESSMENT & PLAN NOTE
· COVID-19 + 3/12 with admission 3/12 - 3/22; discharged then on 2L NC  · s/p completed covid pathway  · Continue vitamins, pepcid, statin   · On solumedrol for COPD   · D-dimer trending down, on hep sq

## 2021-03-31 NOTE — PROGRESS NOTES
[facility]  Progress Note - Yemi Valerio Held 1950, 79 y o  male MRN: [id number]  Unit/Bed#: -01 Encounter: [id number]  Primary Care Provider: Kita Sharpe PA-C   Date and time admitted to hospital: 3/29/2021  6:17 AM    * Spontaneous pneumothorax  Assessment & Plan  · R sided spontaneous PTX POA in setting of severe COPD and COVID infection   · R CT placed in ER with initial resolution of PTX   · Thoracic surgery following  · S/p IR placement of pigtail catheter   · CT removed at bedside per thoracic surgery today  Pigtail to remain in place on suction     Acute on chronic respiratory failure with hypoxia (HCC)  Assessment & Plan  · Secondary to R spontaneous PTX, COPD, recent COVID infection   · Intubated and subsequently extubated 3/29 to 2 L NC which has been baseline O2 since COVID 3/12-3/22  · Continue to wean O2 for spo2 >88%  · Xopanex/Atrovent/Pulmicort nebs   · Respiratory/airway clearance protocol     COVID-19  Assessment & Plan  · COVID-19 + 3/12 with admission 3/12 - 3/22; discharged then on 2L NC  · s/p completed covid pathway  · Continue vitamins, pepcid, statin   · On solumedrol for COPD   · D-dimer trending down, on hep sq    Sepsis ([facility] [address] )  Assessment & Plan  · Severe sepsis, POA  Met SIRs criteria with tachycardia, tachypnea  Concern for possible sepsis on admission with leukocytosis, lactic acid and hypotension  · Possibility of superimposed bacterial pna; cxr 3/30 with R basilar infiltrate  · Cont on cefepime/vancomycin, elevated procalcitonin  · U/a benign; procal trending down, blood cx neg x 1 day   Leukocytosis likely element of steroid induced  · Lactic acidosis persists, IVF hydration, cont trend   · Initially requiring vasopressor support now weaned off      Shock [facility])  Assessment & Plan  Resolved, off levophed  Bp stable    Centrilobular emphysema (HCC) - severe  Assessment & Plan  · Acute exacerbation    · Solumedrol weaned to 40mg q12  · Xopanex/Atrovent/Pulmicort nebs - on Trelegy at home   · Respiratory protocol     Elevated lactic acid level  Assessment & Plan  IVF hydration  Cont to trend     VTE Pharmacologic Prophylaxis:   Pharmacologic: Heparin  Mechanical VTE Prophylaxis in Place: No    Patient Centered Rounds: I have performed bedside rounds with nursing staff today  Discussions with Specialists or Other Care Team Provider:     Education and Discussions with Family / Patient:  Patient and called his wife as well    Time Spent for Care: 30 minutes  More than 50% of total time spent on counseling and coordination of care as described above  Current Length of Stay: 2 day(s)    Current Patient Status: Inpatient   Certification Statement: The patient will continue to require additional inpatient hospital stay due to Severe sepsis, pnx    Discharge Plan:     Code Status: Level 1 - Full Code      Subjective:  Patient seen and examined at bedside  Denies dyspnea  Aferile,, non toxic appearing    Objective:     Vitals:   Temp (24hrs), Av 3 °F (36 8 °C), Min:97 6 °F (36 4 °C), Max:98 8 °F (37 1 °C)    Temp:  [97 6 °F (36 4 °C)-98 8 °F (37 1 °C)] 98 4 °F (36 9 °C)  HR:  [] 100  Resp:  [18-28] 18  BP: (115-140)/(66-79) 123/72  SpO2:  [94 %-97 %] 96 %  Body mass index is 23 59 kg/m²  Input and Output Summary (last 24 hours): Intake/Output Summary (Last 24 hours) at 3/31/2021 1700  Last data filed at 3/31/2021 1630  Gross per 24 hour   Intake 1246 67 ml   Output 560 ml   Net 686 67 ml       Physical Exam:     Physical Exam  Constitutional:       Appearance: Normal appearance  Cardiovascular:      Rate and Rhythm: Normal rate and regular rhythm  Pulses: Normal pulses  Heart sounds: Normal heart sounds  Pulmonary:      Effort: Pulmonary effort is normal  No respiratory distress  Breath sounds: No wheezing or rales  Comments: Diminished bs  Abdominal:      General: Abdomen is flat   Bowel sounds are normal  There is no distension  Palpations: Abdomen is soft  Tenderness: There is no abdominal tenderness  There is no guarding  Musculoskeletal: Normal range of motion  Skin:     General: Skin is warm and dry  Neurological:      General: No focal deficit present  Mental Status: He is alert and oriented to person, place, and time  Mental status is at baseline  Cranial Nerves: No cranial nerve deficit  Motor: No weakness  Additional Data:     Labs:    Results from last 7 days   Lab Units 03/31/21  0544  03/29/21  0630   WBC Thousand/uL 17 89*   < > 36 29*   HEMOGLOBIN g/dL 11 5*   < > 15 7   I STAT HEMOGLOBIN   --    < >  --    HEMATOCRIT % 36 3*   < > 51 8*   HEMATOCRIT, ISTAT   --    < >  --    PLATELETS Thousands/uL 213   < > 466*   BANDS PCT %  --   --  1   LYMPHO PCT %  --   --  8*   MONO PCT %  --   --  10   EOS PCT %  --   --  0    < > = values in this interval not displayed       Results from last 7 days   Lab Units 03/31/21  0544 03/30/21  0528   SODIUM mmol/L 141 142   POTASSIUM mmol/L 4 0 3 6   CHLORIDE mmol/L 106 104   CO2 mmol/L 31 31   BUN mg/dL 23 14   CREATININE mg/dL 0 88 0 84   ANION GAP mmol/L 4 7   CALCIUM mg/dL 8 3 8 0*   ALBUMIN g/dL  --  2 6*   TOTAL BILIRUBIN mg/dL  --  0 60   ALK PHOS U/L  --  72   ALT U/L  --  127*   AST U/L  --  40   GLUCOSE RANDOM mg/dL 147* 155*     Results from last 7 days   Lab Units 03/29/21  0630   INR  0 90     Results from last 7 days   Lab Units 03/31/21  1141 03/31/21  0614 03/31/21  0014 03/30/21  1731 03/30/21  1451   POC GLUCOSE mg/dl 151* 150* 163* 138 160*     Results from last 7 days   Lab Units 03/29/21  0630   HEMOGLOBIN A1C % 6 0*     Results from last 7 days   Lab Units 03/31/21  1341 03/31/21  0952 03/31/21  0544 03/31/21  0543 03/31/21  0028  03/30/21  0528  03/29/21  0630   LACTIC ACID mmol/L 3 7* 3 2*  --  2 8* 4 6*   < >  --    < > 4 1*   PROCALCITONIN ng/ml  --   --  0 82*  --   --   --  1 50*  --  0 16    < > = values in this interval not displayed  * I Have Reviewed All Lab Data Listed Above  * Additional Pertinent Lab Tests Reviewed: All Labs Within Last 24 Hours Reviewed    Imaging:    Imaging Reports Reviewed Today Include:   Imaging Personally Reviewed by Myself Includes:      Recent Cultures (last 7 days):     Results from last 7 days   Lab Units 03/30/21  1027 03/29/21  0632 03/29/21  0631   BLOOD CULTURE   --  No Growth at 48 hrs  No Growth at 48 hrs     LEGIONELLA URINARY ANTIGEN  Negative  --   --        Last 24 Hours Medication List:   Current Facility-Administered Medications   Medication Dose Route Frequency Provider Last Rate    ascorbic acid  1,000 mg Per NG Tube Q12H [facility]      atorvastatin  40 mg Per NG Tube HS James , [place]      budesonide  0 5 mg Nebulization Q12H James , [place]      cefepime  2,000 mg Intravenous Q8H [facility] , [place] Stopped (03/31/21 1100)    chlorhexidine  15 mL [place], [place]      famotidine  20 mg Oral BID [facility], [place]      heparin (porcine)  5,000 Units Subcutaneous [place], [place]      insulin lispro  1-5 Units Subcutaneous TID AC Emy Nicole DO      insulin lispro  1-5 Units Subcutaneous  Ardelmona Nicole DO      ipratropium  0 5 mg Nebulization Q6H [place], [place]      levalbuterol  1 25 mg Nebulization Q6H [place], [place]      methylPREDNISolone sodium succinate  40 mg Intravenous Q12H [facility] Emy Nicole DO      zinc sulfate  220 mg Per NG Tube Daily [facility]      Followed by   Ayala Chaves ON 4/5/2021] multivitamin with iron-minerals  15 mL Per NG Tube Daily James , PA-TERRY      polyethylene glycol  17 g Oral Daily PRN James [place]      senna-docusate sodium  1 tablet Oral BID James , [place]      sodium chloride  100 mL/hr Intravenous Continuous Emy Nicole DO      tamsulosin  0 4 mg Oral After [employer] [place]      vancomycin  15 mg/kg Intravenous Q12H Jelani Wynn [place] Stopped (03/31/21 1500)        Today, Patient Was Seen By: Jackie Forrester DO    ** Please Note: Dictation voice to text software may have been used in the creation of this document   **

## 2021-03-31 NOTE — OCCUPATIONAL THERAPY NOTE
Occupational Therapy Evaluation     Patient Name: Chidi Atkins  RFOTF'V Date: 3/31/2021  Problem List  Principal Problem:    Spontaneous pneumothorax  Active Problems:    Centrilobular emphysema (HCC) - severe    COVID-19    Shock ([facility] [address] )    Acute on chronic respiratory failure with hypoxia (HCC)    Elevated lactic acid level    Sepsis ([facility] [address] )    Past Medical History  Past Medical History:   Diagnosis Date    BPH (benign prostatic hyperplasia)     COPD (chronic obstructive pulmonary disease) (HCC)     Last Assessed:11/14/2016    Diverticulosis     Elevated prostate specific antigen (PSA)     Emphysema (subcutaneous) (surgical) resulting from a procedure     Enlarged prostate with lower urinary tract symptoms (LUTS)     Resolved:8/22/2017    Hernia, inguinal, right      Past Surgical History  Past Surgical History:   Procedure Laterality Date    BICEPS TENDON REPAIR  1999    COLONOSCOPY      HAND SURGERY      Last Assessed:7/11/2016    HERNIA REPAIR      Last Assessed:7/11/2016    IR CHEST TUBE PLACEMENT  3/30/2021    KNEE ARTHROSCOPY      AR REPAIR ING HERNIA,5+Y/O,REDUCIBL Right 2/23/2016    Procedure: REPAIR HERNIA INGUINAL with mesh;  Surgeon: Joellen Diamond DO;  [facility]: [facility];  Service: General    PROSTATE BIOPSY      WISDOM TOOTH EXTRACTION             03/31/21 0947   OT Last Visit   OT Visit Date 03/31/21   Note Type   Note type Evaluation   Restrictions/Precautions   Weight Bearing Precautions Per Order No   Other Precautions Fall Risk;O2;Contact/isolation; Airborne/isolation  (2 L NC; chest tube)   Pain Assessment   Pain Assessment Tool Pain Assessment not indicated - pt denies pain   Pain Score No Pain   Home Living   Type of Home House   Home Layout Two level;Bed/bath upstairs;1/2 bath on main level  (1 ASTON)   Bathroom Shower/Tub Walk-in shower   Bathroom Toilet Standard   Bathroom Equipment Grab bars in shower  (pt may have SC (reports wife recently ordered))   Home Equipment Obie Castleman  (denies use PTA)   Additional Comments Pt reports he has option of 1st floor set up with 1/2 bath  Uses 2 L NC baseline   Prior Function   Level of [place] Independent with ADLs and functional mobility   Lives With Spouse   Receives Help From Family   ADL Assistance Independent   IADLs Independent   Falls in the last 6 months 0   Vocational Retired   Comments Pt reports spouse is home throughout the day and able to assist as needed   Lifestyle   Autonomy At baseline pt was completing all ADLs/IADLs IND, ambulating IND w/o AD, (+) driving  Reciprocal Relationships supportive spouse   Service to Others retired   Intrinsic Gratification pt enjoys watching TV   Psychosocial   Psychosocial (WDL) [address] [facility] [address]  [address]  [address]  4  Minimal Assistance   Bed Mobility   Supine to Sit 5  Supervision   Additional items HOB elevated; Increased time required   Transfers   Sit to Stand 4  Minimal assistance   Additional items Assist x 1; Increased time required;Verbal cues   Stand to Sit 4  Minimal assistance   Additional items Assist x 1; Increased time required;Verbal cues   Additional Comments RW   Functional Mobility   Functional Mobility 4  Minimal assistance   Additional Comments Ax1 bed to bedside recliner   Pt on 2 L NC with SpO2 90-93% throughout; decreased endurance and SOB noted   Additional items Rolling walker   Balance   Static Sitting Fair +   Dynamic Sitting Fair   Static Standing Fair -   Dynamic Standing Fair -   Activity Tolerance   Activity Tolerance Patient limited by fatigue   Medical Staff Made Aware PT   Nurse Made Aware Per RN pt appropriate to be seen   RUE Assessment   RUE Assessment WFL   LUE Assessment   LUE Assessment WFL   Hand Function   Gross Motor Coordination Functional   Fine Motor Coordination Functional   Cognition   Overall Cognitive Status WFL   Arousal/Participation Alert; Cooperative   Attention Within functional limits   Orientation Level Oriented X4   Memory Within functional limits   Following Commands Follows one step commands without difficulty   Assessment   Limitation Decreased ADL status; Decreased endurance;Decreased self-care trans;Decreased high-level ADLs   Prognosis Good   Assessment Pt is a 79 y o  male who was admitted to [facility] on 3/29/2021 with KIRK and acute respiratory failure 2' Spontaneous pneumothorax in setting of recent COVID infection  Pt with recent admission for COVID infection and was discharged home 3/22/2021  Pt  has a past medical history of BPH (benign prostatic hyperplasia), COPD (chronic obstructive pulmonary disease) (HCC), Diverticulosis, Elevated prostate specific antigen (PSA), Emphysema (subcutaneous) (surgical) resulting from a procedure, Enlarged prostate with lower urinary tract symptoms (LUTS), and Hernia, inguinal, right  At baseline pt was completing all ADLs/IADLs IND, ambulating IND w/o AD, (+) driving  Pt lives with his wife in a 2  with 1 [facility]  Pt reports he has option of 1st floor set up with 1/2 bath  Currently pt requires SUP-MIN A for overall ADLS and for functional mobility/transfers  Pt on 2 L NC during session & uses 2 L NC at baseline (pt w/ baseline centrilobular emphysema)  Pt with SpO2 between 90-93% throughout session; however witih significant decreased endurance and SOB; improved within 4 minutes seated rest  Pt currently presents with impairments in the following categories -steps to enter environment, difficulty performing ADLS and difficulty performing IADLS  activity tolerance, endurance, standing balance/tolerance and sitting balance/tolerance   These impairments, as well as pt's fatigue, SOB, SANTIAGO, risk for falls and home environment  limit pt's ability to safely engage in all baseline areas of occupation, includinggrooming, bathing, dressing, toileting, functional mobility/transfers, community mobility, driving, meal prep, cleaning and leisure activities  From OT standpoint, recommend [address] upon D/C  OT will continue to follow to address the below stated goals  Goals   Patient Goals to go home   LTG Time Frame 10-14   Long Term Goal #1 see goals below   Plan   Treatment Interventions ADL retraining;Functional transfer training; Endurance training;Patient/family training;Equipment evaluation/education; Compensatory technique education; Activityengagement; Energy conservation   Goal Expiration Date 04/14/21   OT Frequency 3-5x/wk   Recommendation   OT Discharge Recommendation Return to previous environment with social support   Equipment Recommended Shower/Tub chair with back ($)  (pt unsure if he owns SC)   AM-PAC Daily Activity Inpatient   Lower Body Dressing 3   Bathing 3   Toileting 3   Upper Body Dressing 3   Grooming 4   Eating 4   Daily Activity Raw Score 20   Daily Activity Standardized Score (Calc for Raw Score >=11) 42 03   AM-PAC Applied Cognition Inpatient   Following a Speech/Presentation 4   Understanding Ordinary Conversation 4   Taking Medications 4   Remembering Where Things Are Placed or Put Away 4   Remembering List of 4-5 Errands 4   Taking Care of Complicated Tasks 3   Applied Cognition Raw Score 23   Applied Cognition Standardized Score 53 08   Modified Waverly Scale   Modified Waverly Scale 4       The patient's raw score on the AM-PAC Daily Activity inpatient short form is 20, standardized score is 42 03, greater than 39 4  Patients at this level are likely to benefit from discharge to home  Please refer to the recommendation of the Occupational Therapist for safe discharge planning            GOALS     Pt will improve activity tolerance to G for min 30 min txment sessions     Pt will complete UB ADLs with MOD IND and use of adaptive device and DME as needed     Pt will complete LB ADLs with MOD IND w/ use of AE and DME as needed     Pt will complete toileting w/ MOD IND w/ G hygiene/thoroughness using DME as needed     Pt will improve functional transfers to Mod I on/off all surfaces using DME as needed w/ G balance/safety      Pt will improve functional mobility during ADL/IADL/leisure tasks to Mod I using DME as needed w/ G balance/safety      Pt will demonstrate G carryover of pt/caregiver education and training as appropriate w/ mod I w/o cues w/ good tolerance     Pt to demo % carryover of energy conservation strategies during functional tasks            Shanika Underwood, OT

## 2021-03-31 NOTE — PROGRESS NOTES
Progress Note - Thoracic Surgery   Eulogio Justice 79 y o  male MRN: [id number]  Unit/Bed#: -01 Encounter: [id number]    Assessment:  The patient is a 79year old with h/o recent COVID infection in the setting of baseline COPD on 2 L oxygen at home  He is here with a spontaneous R pneumothorax s/p R CT placement by ER 3/29 and R pigtail CT placement by IR 3/30  R CT to -20 suction, no air leak, minimal serosang output  R IR CT to suction -20 with air leak, no output   On 2 L oxygen  Plan: Will dc ER chest tube later today  Maintain IR pigtail to suction at -20 on pleurevac  Continue antibiotics     Subjective/Objective     Chief Complaint: spontaneous R pneumothorax    Subjective: comfortable this am      Objective:     Vitals: Blood pressure 133/74, pulse 95, temperature 98 4 °F (36 9 °C), resp  rate 18, weight 70 4 kg (155 lb 2 oz), SpO2 95 %  ,Body mass index is 23 59 kg/m²  I/O       03/29 0701 - 03/30 0700 03/30 0701 - 03/31 0700    P  O  150 240    I V  (mL/kg) 1336 9 30 (0 4)    NG/GT 30     IV Piggyback 850 250    Total Intake(mL/kg) 2366 9 520 (7 4)    Urine (mL/kg/hr) 2785 1650 (1)    Emesis/NG output 100     Chest Tube 20 50    Total Output 2905 1700    Net -538 1 -1180              Physical Exam:   GENERAL APPEARANCE: in no acute distress  NEURO: stable  HEENT: normocephalic, atraumatic  CV: regular rate and rhythm, no tachycardia,   LUNGS: clear to auscultation bilaterally, on 2 L  R CT to suction at -20, no air leak, minimal serosang output  R IT CT to suction -20 with air leak, no output  GI: soft, nontender, nondistended  : no abnormality detected  MSK: no abnormality detected   SKIN: no abnormality detected

## 2021-03-31 NOTE — PLAN OF CARE
Problem: OCCUPATIONAL THERAPY ADULT  Goal: Performs self-care activities at highest level of function for planned discharge setting  See evaluation for individualized goals  Description: Treatment Interventions: ADL retraining, Functional transfer training, Endurance training, Patient/family training, Equipment evaluation/education, Compensatory technique education, Activityengagement, Energy conservation  Equipment Recommended: Shower/Tub chair with back ($)(pt unsure if he owns [facility])       See flowsheet documentation for full assessment, interventions and recommendations  Note: Limitation: Decreased ADL status, Decreased endurance, Decreased self-care trans, Decreased high-level ADLs  Prognosis: Good  Assessment: Pt is a 79 y o  male who [facility] with KIRK and acute respiratory failure 2' Spontaneous pneumothorax in setting of recent COVID infection  Pt with recent admission for COVID infection and was discharged home 3/22/2021  Pt  has a past medical history of BPH (benign prostatic hyperplasia), COPD (chronic obstructive pulmonary disease) ([facility]), Diverticulosis, Elevated prostate specific antigen (PSA), Emphysema (subcutaneous) (surgical) resulting from a procedure, Enlarged prostate with lower urinary tract symptoms (LUTS), and Hernia, inguinal, right  At baseline pt was completing all ADLs/IADLs IND, ambulating IND w/o AD, (+) driving  Pt lives with his wife in a 2 SH with 1 [place]  Pt reports he has option of 1st floor set up with 1/2 bath  Currently pt requires SUP-MIN A for overall ADLS and for functional mobility/transfers  Pt on 2 L NC during session & uses 2 L NC at baseline (pt w/ baseline centrilobular emphysema)   Pt with SpO2 between 90-93% throughout session; however witih significant decreased endurance and SOB; improved within 4 minutes seated rest  Pt currently presents with impairments in the following categories -steps to enter environment, difficulty performing ADLS and difficulty performing IADLS  activity tolerance, endurance, standing balance/tolerance and sitting balance/tolerance  These impairments, as well as pt's fatigue, SOB, SANTIAGO, risk for falls and home environment  limit pt's ability to safely engage in all baseline areas of occupation, includinggrooming, bathing, dressing, toileting, functional mobility/transfers, community mobility, driving, meal prep, cleaning and leisure activities  From OT standpoint, recommend [address] upon D/C  OT will continue to follow to address the below stated goals        OT Discharge Recommendation: Return to previous environment with social support

## 2021-03-31 NOTE — PROCEDURES
Arterial Line Insertion    Date/Time: 3/29/2021 4:30 PM  Performed by: Jace Bradley PA-C  Authorized by: Jace Bradley PA-C     Patient location:  Bedside  Other Assisting Provider: No    Consent:     Consent obtained:  Verbal    Consent given by:  Patient    Risks discussed:  Bleeding, infection, ischemia, pain and repeat procedure  Universal protocol:     Procedure explained and questions answered to patient or proxy's satisfaction: yes      Required blood products, implants, devices, and special equipment available: yes      Immediately prior to procedure a time out was called: yes      Patient identity confirmed:  Verbally with patient  Indications:     Indications: hemodynamic monitoring, multiple ABGs and frequent labs / infusion    Pre-procedure details:     Skin preparation:  Chlorhexidine    Preparation: Patient was prepped and draped in sterile fashion    Anesthesia (see MAR for exact dosages): Anesthesia method:  None  Procedure details:     Location / Tip of Catheter:  Radial    Laterality:  Right    Needle gauge:  20 G    Placement technique:  Percutaneous and ultrasound guided    Sterile ultrasound techniques: Sterile gel and sterile probe covers were used      Number of attempts:  1    Successful placement: yes      Transducer: waveform confirmed    Post-procedure details:     Post-procedure:  Sterile dressing applied, sutured and wrist guard applied    CMS:  Normal    Patient tolerance of procedure:   Tolerated well, no immediate complications

## 2021-03-31 NOTE — PHYSICAL THERAPY NOTE
Physical Therapy Evaluation     Patient's Name: Natacha Kilgore    Admitting Diagnosis  Shortness of breath [R06 02]  Pneumothorax on right [J93 9]  COPD with acute exacerbation ([facility] [address] ) [J44 1]  Acute respiratory failure with hypoxia and hypercapnia ([address] ) [J96 01, J96 02]    Problem List  Patient Active Problem List   Diagnosis    Hyperlipidemia    Centrilobular emphysema ([address] ) - severe    Bilateral iliac artery aneurysm (HCC)    Arterial ectasia (HCC)    Diverticulosis, sigmoid    Hearing loss    Hemorrhoids    Lung nodule    Prediabetes    Skin disorder    BPH with obstruction/lower urinary tract symptoms    Essential hypertension    Seasonal allergies    Medicare annual wellness visit, subsequent    Other microscopic hematuria    Encounter for screening for lung cancer    Other fatigue    NSVT (nonsustained ventricular tachycardia) (HCC)    Abnormal ECG during exercise stress test    Encounter for support and coordination of transition of care    Prominent popliteal pulse    Chronic hypoxemic respiratory failure (HCC)    Viral infection, unspecified    Encounter for observation for suspected exposure to other biological agents ruled out    COVID-19    Pneumonia due to COVID-19 virus    Shock ([facility] [address] )    Acute on chronic respiratory failure with hypoxia ([address] )    Elevated lactic acid level    Spontaneous pneumothorax    Sepsis ([address] )    SIRS (systemic inflammatory response syndrome) ([address] )       Past Medical History  Past Medical History:   Diagnosis Date    BPH (benign prostatic hyperplasia)     COPD (chronic obstructive pulmonary disease) (HCC)     Last Assessed:11/14/2016    Diverticulosis     Elevated prostate specific antigen (PSA)     Emphysema (subcutaneous) (surgical) resulting from a procedure     Enlarged prostate with lower urinary tract symptoms (LUTS)     Resolved:8/22/2017    Hernia, inguinal, right        Past Surgical History  Past Surgical History:   Procedure Laterality Date    BICEPS TENDON REPAIR  1999    COLONOSCOPY      HAND SURGERY      Last Assessed:7/11/2016    HERNIA REPAIR      Last Assessed:7/11/2016    IR CHEST TUBE PLACEMENT  3/30/2021    KNEE ARTHROSCOPY      KY REPAIR ING HERNIA,5+Y/O,REDUCIBL Right 2/23/2016    Procedure: REPAIR HERNIA INGUINAL with mesh;  Surgeon: Estuardo Zheng DO;  [facility]: [facility];  Service: General    PROSTATE BIOPSY      WISDOM TOOTH EXTRACTION          03/31/21 0948   PT Last Visit   PT Visit Date 03/31/21   Note Type   Note type Evaluation   Pain Assessment   Pain Assessment Tool Pain Assessment not indicated - pt denies pain   Pain Score No Pain   Home Living   Type of [address] [place] [address] Two level;1/2 bath on main level;Bed/bath upstairs; Able to live on main level with bedroom/bathroom  (2 ASTON)   Home Equipment Walker  (No AD use PTA; home O2)   Prior Function   Level of [place] Independent with ADLs and functional mobility   Lives With Spouse   Receives Help From Family   ADL Assistance Independent   IADLs Independent   Falls in the last 6 months 0   Vocational Retired   Restrictions/Precautions   [facility] Bearing Precautions Per Order No   Other Precautions Multiple lines;O2;Fall Risk; Airborne/isolation;Droplet precautions  (+ Chest tube )   General   Family/Caregiver Present No   Cognition   Overall Cognitive Status WFL   Arousal/Participation Alert   Orientation Level Oriented X4   Memory Within functional limits   Following Commands Follows all commands and directions without difficulty   Comments Pt tired but plesant and agreeable to participate in therapy session this am   RLE Assessment   RLE Assessment   (Functionally 4-/5)   LLE Assessment   LLE Assessment   (Functionally 4-/5)   Bed Mobility   Supine to Sit 5  Supervision   Additional items Increased time required;HOB elevated   Sit to Supine Unable to assess   Additional Comments Pt supine in bed upon arrival  Pt sat EOB at S level   Pt sitting upright in bedside chair with all needs within reach at the end of therapy session  Transfers   Sit to Stand 4  Minimal assistance   Additional items Assist x 1; Increased time required;Verbal cues   Stand to Sit 4  Minimal assistance   Additional items Assist x 1; Increased time required;Verbal cues   Additional Comments Pt transfers with RW; cues for hand placement and sequencing required    Ambulation/Elevation   Gait pattern Excessively slow; Short stride; Foward flexed;Decreased foot clearance   Gait Assistance 4  Minimal assist   Additional items Assist x 1;Verbal cues   Assistive Device Rolling walker   Distance 3ft to bedside chair; further ambulation deferred due to fatigue    Stair Management Assistance Not tested   Balance   Static Sitting Fair +   Dynamic Sitting Fair   Static Standing Fair -   Dynamic Standing Poor +   Ambulatory Poor   Endurance Deficit   Endurance Deficit Yes   Endurance Deficit Description SOB, fatigue, weakness    Activity Tolerance   Activity Tolerance Patient tolerated treatment well;Patient limited by fatigue   Medical Staff [facility]    Nurse Made Aware RN cleared pt to participate in therapy session    Assessment   Prognosis Good   Problem List Decreased strength;Decreased endurance; Impaired balance;Decreased mobility   Assessment Pt seen for high complexity PT evaluation  Pt with active PT eval/treat orders  Pt is a 79 y o  male who was admitted to [facility] on 3/29/2021 with spontaneous R pneumothorax s/p R chest tube placement  Pt intubated/ extubated 3/29/2021  Other active problems include: acute on chronic respiratory failure with hypoxia, sepsis, centrilobular emphysema- severe, recent COVID-19 history (3/12 with previous hospital admission on 3/12-3/22)    Pt  has a past medical history of BPH (benign prostatic hyperplasia), COPD (chronic obstructive pulmonary disease) ([address] ), Diverticulosis, Elevated prostate specific antigen (PSA), Emphysema (subcutaneous) (surgical) resulting from a procedure, Enlarged prostate with lower urinary tract symptoms (LUTS), and Hernia, inguinal, right    Pt resides with wife in a 2 SH with 2 ASTON and was independent prior to hospital admission  Pt is on 2L O2 at home PTA 2* COPD  Pt has limitations in strength, balance, endurance, and overall functional mobility  Pt requires assist of 1 for all mobility performed this date  Pt performed bed mobility tasks at S level  Pt transferred from EOB to RW with minAx1; cues for hand placement and sequencing required  Pt ambulated to bedside chair using RW and min Ax1; pt has c/o of SO- O2 sat 90%- cues for therapeutic breathing given with good understanding; further ambulation deferred  Pt was left sitting upright in bedside chair at the end of PT session with all needs in reach  Pt would benefit from continued PT services while in hospital to address remaining limitations  PT to continue to follow pt and recommends home with increased family support and OPPT  The patient's AM-PAC Basic Mobility Inpatient Short Form Raw Score is 20, Standardized Score is 43 99  A standardized score greater than 42 9 suggests the patient may benefit from discharge to home  Please also refer to the recommendation of the Physical Therapist for safe discharge planning  Goals   Patient Goals To rest    STG Expiration Date 04/14/21   Short Term Goal #1 STG 1  Pt will be able to perform bed mobility with Mod I in order to improve overall functional mobility and assist in safe d/c  STG 2  Pt will be able to perform functional transfer with mod I in order to improve overall functional mobility and assist in safe d/c  STG 3  Pt will be able to ambulate at [id number] feet with least restrictive device with mod I A in order to improve overall functional mobility and assist in safe d/c  STG 4   Pt will improve sitting/standing static/dynamic balance 1 grade in order to improve functional mobility and assist in safe d/c  STG 5  Pt will improve LE strength by one grade in order to improve functional mobility and assist in safe d/c  STG 6  Pt will be able to ascend/ descend at least 2 steps with mod I in order to improve overall mobility and assist in safe d/c     Plan   Treatment/Interventions ADL retraining;Functional transfer training;LE strengthening/ROM; Patient/family training;Equipment eval/education; Bed mobility;Gait training;OT;Spoke to nursing   PT Frequency Other (Comment)  (3-5x/wk)   Recommendation   PT Discharge Recommendation Home with skilled therapy  (OPPT and increased family support )   Equipment Recommended Walker  (RW)   PT - OK to Discharge No  (Pending stair trial and increased ambulation distance )   AM-PAC Basic Mobility Inpatient   Turning in Bed Without Bedrails 4   Lying on Back to Sitting on Edge of Flat Bed 4   Moving Bed to Chair 3   Standing Up From Chair 3   Walk in Room 3   Climb 3-5 Stairs 3   Basic Mobility Inpatient Raw Score 20   Basic Mobility Standardized Score 43 99   Modified Sid Scale   Modified Sid Scale 4           Orly Olguin, PT, DPT

## 2021-03-31 NOTE — PROGRESS NOTES
Progress Note -Interventional Radiology ENMANUEL Thomasredo Solange Held 79 y o  male MRN: [id number]  Unit/Bed#: -01 Encounter: [id number]    Assessment:    79year old male with pmh of Covid, COPD, presenting with spontaneous right pneumothorax, s/p R 20Fr CT placed in ED-now removed, s/p IR R 8Fr CT placed 3/30/21    Chest tube: 0cc output, +AL    Plan:    - will order CXR now to evaluate size of pneumothorax s/p removal of ED chest tube  - if right pneumothorax is larger, may determine to upsize chest tube tomorrow  - continue chest tube on wall suction  Currently with continuous air leak  - on baseline 2L O2  - appreciate thoracic surgery input  - dressing changed to tegaderm over chest tube insertion site    Patient Active Problem List   Diagnosis    Hyperlipidemia    Centrilobular emphysema ([address] ) - severe    Bilateral iliac artery aneurysm (HCC)    Arterial ectasia (HCC)    Diverticulosis, sigmoid    Hearing loss    Hemorrhoids    Lung nodule    Prediabetes    Skin disorder    BPH with obstruction/lower urinary tract symptoms    Essential hypertension    Seasonal allergies    Medicare annual wellness visit, subsequent    Other microscopic hematuria    Encounter for screening for lung cancer    Other fatigue    NSVT (nonsustained ventricular tachycardia) (HCC)    Abnormal ECG during exercise stress test    Encounter for support and coordination of transition of care    Prominent popliteal pulse    Chronic hypoxemic respiratory failure (HCC)    Viral infection, unspecified    Encounter for observation for suspected exposure to other biological agents ruled out    COVID-19    Pneumonia due to COVID-19 virus    Shock ([address] )    Acute on chronic respiratory failure with hypoxia ([address] )    Elevated lactic acid level    Spontaneous pneumothorax    Sepsis ([address] )    SIRS (systemic inflammatory response syndrome) ([address] )          Subjective: patient states he feels well  No SOB  On baseline 2L oxygen  Chest tube with continuous air leak on wall suction  Objective:    Vitals:  /72   Pulse 99   Temp 98 4 °F (36 9 °C)   Resp 18   Wt 70 4 kg (155 lb 2 oz)   SpO2 95%   BMI 23 59 kg/m²   Body mass index is 23 59 kg/m²    Weight (last 2 days)     Date/Time   Weight    03/31/21 0600   70 4 (155 13)              I/Os:    Intake/Output Summary (Last 24 hours) at 3/31/2021 1242  Last data filed at 3/31/2021 1059  Gross per 24 hour   Intake 996 67 ml   Output 525 ml   Net 471 67 ml       Invasive Devices     Peripheral Intravenous Line            Peripheral IV 03/29/21 Right Forearm 2 days          Drain            Chest Tube Right 2 days    Chest Tube 2 Right  8 Fr  less than 1 day                Physical Exam:  General appearance: alert and oriented, in no acute distress  Lungs: clear to auscultation bilaterally and right chest tube with continuous air leak  Abdomen: soft, non-tender; bowel sounds normal; no masses,  no organomegaly  Skin: chest tube insertion site c/d/i   mild crepitus near chest tube insertion site  Heart: RRR                Lab Results and Cultures:   CBC with diff:   Lab Results   Component Value Date    WBC 17 89 (H) 03/31/2021    HGB 11 5 (L) 03/31/2021    HCT 36 3 (L) 03/31/2021    MCV 91 03/31/2021     03/31/2021    MCH 28 7 03/31/2021    MCHC 31 7 03/31/2021    RDW 12 9 03/31/2021    MPV 10 9 03/31/2021    NRBC 0 03/30/2021      BMP/CMP:  Lab Results   Component Value Date    K 4 0 03/31/2021     03/31/2021    CO2 31 03/31/2021    CO2 31 03/29/2021    BUN 23 03/31/2021    CREATININE 0 88 03/31/2021    GLUCOSE 155 (H) 03/29/2021    CALCIUM 8 3 03/31/2021    AST 40 03/30/2021     (H) 03/30/2021    ALKPHOS 72 03/30/2021    EGFR 87 03/31/2021   ,     Coags:   Lab Results   Component Value Date    PTT 24 03/29/2021    INR 0 90 03/29/2021   ,   Results from last 7 days   Lab Units 03/29/21  0630   PTT seconds 24   INR  0 90        Lipid Panel: No results found for: CHOL  Lab Results   Component Value Date    HDL 56 11/10/2020     Lab Results   Component Value Date    HDL 56 11/10/2020     Lab Results   Component Value Date    LDLCALC 114 (H) 11/10/2020     Lab Results   Component Value Date    TRIG 93 11/10/2020       HgbA1c:   Lab Results   Component Value Date    HGBA1C 6 0 (H) 03/29/2021    HGBA1C 5 6 12/13/2019    HGBA1C 5 9 06/21/2019       Blood Culture:   Lab Results   Component Value Date    BLOODCX No Growth at 48 hrs  03/29/2021   ,   Urinalysis:   Lab Results   Component Value Date    COLORU Yellow 03/29/2021    COLORU Yellow 08/25/2017    CLARITYU Cloudy 03/29/2021    CLARITYU Transparent 08/25/2017    SPECGRAV 1 008 03/29/2021    SPECGRAV 1 015 08/25/2017    PHUR 5 5 03/29/2021    PHUR 6 5 08/25/2017    LEUKOCYTESUR Negative 03/29/2021    LEUKOCYTESUR neg 08/25/2017    NITRITE Negative 03/29/2021    NITRITE neg 08/25/2017    PROTEINUA neg 08/25/2017    GLUCOSEU Negative 03/29/2021    KETONESU Negative 03/29/2021    KETONESU neg 08/25/2017    BILIRUBINUR Negative 03/29/2021    BILIRUBINUR neg 08/25/2017    BLOODU Large (A) 03/29/2021    BLOODU neg 08/25/2017   ,   Urine Culture: No results found for: URINECX,   Wound Culure:  No results found for: WOUNDCULT    VTE Pharmacologic Prophylaxis: Heparin      Thank you for allowing me to participate in the [facility]  Please don't hesitate to call, text, email, or TigerText with any questions  This text is generated with voice recognition software  There may be translation, syntax,  or grammatical errors  If you have any questions, please contact the dictating provider      Sweetie Hernandez PA-C

## 2021-03-31 NOTE — PROGRESS NOTES
Vancomycin IV Pharmacy-to-Dose Consultation    Liam Jim is a 79 y o  male who is currently receiving Vancomycin IV with management by the Pharmacy Consult service  Assessment/Plan:  The patient was reviewed  Renal function is stable and no signs or symptoms of nephrotoxicity and/or infusion reactions were documented in the chart  Based on todays assessment, continue current vancomycin (day # 3) dosing of 1000mg iv q12h, with a plan for trough to be drawn at 0730 on 4/1  We will continue to follow the patients culture results and clinical progress daily      Michael Collins, Pharmacist

## 2021-03-31 NOTE — ASSESSMENT & PLAN NOTE
· R sided spontaneous PTX POA in setting of severe COPD and COVID infection   · R CT placed in ER with initial resolution of PTX   · Thoracic surgery following  · S/p IR placement of pigtail catheter   · CT removed at bedside per thoracic surgery today   Pigtail to remain in place on suction

## 2021-03-31 NOTE — QUICK NOTE
The chest tube dressing over the surgical tube was removed and the site prepared with chlorhexidine  The chest tube was taken off suction and clamped and pulled in expiration  The stitch at the site was tied down and a dressing placed  The patient tolerated it well

## 2021-03-31 NOTE — QUICK NOTE
Chest tube stitch was inspected and noted to be loose  After injecting some local anesthetic under sterile conditions, a stitch was placed to secure the tube with 0 Prolene  A U stitch was placed with 0 Prolene to approximate the skin at the site  The dressing was replaced and the patient tolerated it well

## 2021-03-31 NOTE — PROGRESS NOTES
Called IR and spoke to [facility] regarding Chest tube with continuous bubbling   Return call from [facility] per her AP continue to keep at wall suction and they will be up to see patient

## 2021-03-31 NOTE — PLAN OF CARE
Problem: PHYSICAL THERAPY ADULT  Goal: Performs mobility at highest level of function for planned discharge setting  See evaluation for individualized goals  Description: Treatment/Interventions: ADL retraining, Functional transfer training, LE strengthening/ROM, Patient/family training, Equipment eval/education, Bed mobility, Gait training, OT, Spoke to nursing  Equipment Recommended: Walker(RW)       See flowsheet documentation for full assessment, interventions and recommendations  Note: Prognosis: Good  Problem List: Decreased strength, Decreased endurance, Impaired balance, Decreased mobility  Assessment: Pt seen for high complexity PT evaluation  Pt with active PT eval/treat orders  Pt is a 79 y o  male who was admitted to [facility] on 3/29/2021 with spontaneous R pneumothorax s/p R chest tube placement  Pt intubated/ extubated 3/29/2021  Other active problems include: acute on chronic respiratory failure with hypoxia, sepsis, centrilobular emphysema- severe, recent COVID-19 history (3/12 with previous hospital admission on 3/12-3/22)  Pt  has a past medical history of BPH (benign prostatic hyperplasia), COPD (chronic obstructive pulmonary disease) ([facility]), Diverticulosis, Elevated prostate specific antigen (PSA), Emphysema (subcutaneous) (surgical) resulting from a procedure, Enlarged prostate with lower urinary tract symptoms (LUTS), and Hernia, inguinal, right    Pt resides with wife in a 2 SH with 2 ASTON and was independent prior to hospital admission  Pt is on 2L O2 at home PTA 2* COPD  Pt has limitations in strength, balance, endurance, and overall functional mobility  Pt requires assist of 1 for all mobility performed this date  Pt performed bed mobility tasks at S level  Pt transferred from EOB to RW with minAx1; cues for hand placement and sequencing required   Pt ambulated to bedside chair using RW and min Ax1; pt has c/o of SO- O2 sat 90%- cues for therapeutic breathing given with good understanding; further ambulation deferred  Pt was left sitting upright in bedside chair at the end of PT session with all needs in reach  Pt would benefit from continued PT services while in hospital to address remaining limitations  PT to continue to follow pt and recommends home with increased family support and OPPT  The patient's AM-PAC Basic Mobility Inpatient Short Form Raw Score is 20, Standardized Score is 43 99  A standardized score greater than 42 9 suggests the patient may benefit from discharge to home  Please also refer to the recommendation of the Physical Therapist for safe discharge planning  PT Discharge Recommendation: Home with skilled therapy(OPPT and increased family support )     PT - OK to Discharge: No(Pending stair trial and increased ambulation distance )    See flowsheet documentation for full assessment

## 2021-03-31 NOTE — CASE MANAGEMENT
CM was faxed POA paperwork for the pt  CM added pt stickers to POA paperwork and placed document in medical records

## 2021-04-01 ENCOUNTER — APPOINTMENT (INPATIENT)
Dept: RADIOLOGY | Facility: HOSPITAL | Age: 71
DRG: 871 | End: 2021-04-01
Payer: MEDICARE

## 2021-04-01 LAB
ANION GAP SERPL CALCULATED.3IONS-SCNC: 2 MMOL/L (ref 4–13)
BUN SERPL-MCNC: 27 MG/DL (ref 5–25)
CALCIUM SERPL-MCNC: 7.9 MG/DL (ref 8.3–10.1)
CHLORIDE SERPL-SCNC: 108 MMOL/L (ref 100–108)
CO2 SERPL-SCNC: 29 MMOL/L (ref 21–32)
CREAT SERPL-MCNC: 0.76 MG/DL (ref 0.6–1.3)
D DIMER PPP FEU-MCNC: 0.86 UG/ML FEU
ERYTHROCYTE [DISTWIDTH] IN BLOOD BY AUTOMATED COUNT: 13.2 % (ref 11.6–15.1)
GFR SERPL CREATININE-BSD FRML MDRD: 92 ML/MIN/1.73SQ M
GLUCOSE SERPL-MCNC: 124 MG/DL (ref 65–140)
GLUCOSE SERPL-MCNC: 134 MG/DL (ref 65–140)
GLUCOSE SERPL-MCNC: 143 MG/DL (ref 65–140)
GLUCOSE SERPL-MCNC: 149 MG/DL (ref 65–140)
GLUCOSE SERPL-MCNC: 198 MG/DL (ref 65–140)
HCT VFR BLD AUTO: 34 % (ref 36.5–49.3)
HGB BLD-MCNC: 11.8 G/DL (ref 12–17)
LACTATE SERPL-SCNC: 1.5 MMOL/L (ref 0.5–2)
MCH RBC QN AUTO: 32.1 PG (ref 26.8–34.3)
MCHC RBC AUTO-ENTMCNC: 34.7 G/DL (ref 31.4–37.4)
MCV RBC AUTO: 92 FL (ref 82–98)
PLATELET # BLD AUTO: 211 THOUSANDS/UL (ref 149–390)
PMV BLD AUTO: 11.3 FL (ref 8.9–12.7)
POTASSIUM SERPL-SCNC: 4.3 MMOL/L (ref 3.5–5.3)
PROCALCITONIN SERPL-MCNC: 0.37 NG/ML
RBC # BLD AUTO: 3.68 MILLION/UL (ref 3.88–5.62)
SODIUM SERPL-SCNC: 139 MMOL/L (ref 136–145)
VANCOMYCIN TROUGH SERPL-MCNC: 16.7 UG/ML (ref 10–20)
WBC # BLD AUTO: 16.78 THOUSAND/UL (ref 4.31–10.16)

## 2021-04-01 PROCEDURE — 85027 COMPLETE CBC AUTOMATED: CPT | Performed by: INTERNAL MEDICINE

## 2021-04-01 PROCEDURE — 84145 PROCALCITONIN (PCT): CPT | Performed by: PHYSICIAN ASSISTANT

## 2021-04-01 PROCEDURE — 85379 FIBRIN DEGRADATION QUANT: CPT | Performed by: INTERNAL MEDICINE

## 2021-04-01 PROCEDURE — 0B2QX0Z CHANGE DRAINAGE DEVICE IN PLEURA, EXTERNAL APPROACH: ICD-10-PCS | Performed by: RADIOLOGY

## 2021-04-01 PROCEDURE — 82948 REAGENT STRIP/BLOOD GLUCOSE: CPT

## 2021-04-01 PROCEDURE — 94760 N-INVAS EAR/PLS OXIMETRY 1: CPT

## 2021-04-01 PROCEDURE — 80202 ASSAY OF VANCOMYCIN: CPT | Performed by: PHYSICIAN ASSISTANT

## 2021-04-01 PROCEDURE — 32557 INSERT CATH PLEURA W/ IMAGE: CPT | Performed by: RADIOLOGY

## 2021-04-01 PROCEDURE — 99232 SBSQ HOSP IP/OBS MODERATE 35: CPT | Performed by: INTERNAL MEDICINE

## 2021-04-01 PROCEDURE — 99232 SBSQ HOSP IP/OBS MODERATE 35: CPT | Performed by: THORACIC SURGERY (CARDIOTHORACIC VASCULAR SURGERY)

## 2021-04-01 PROCEDURE — C1729 CATH, DRAINAGE: HCPCS

## 2021-04-01 PROCEDURE — 94640 AIRWAY INHALATION TREATMENT: CPT

## 2021-04-01 PROCEDURE — G1004 CDSM NDSC: HCPCS

## 2021-04-01 PROCEDURE — 32999 UNLISTED PX LUNGS & PLEURA: CPT

## 2021-04-01 PROCEDURE — 71250 CT THORAX DX C-: CPT

## 2021-04-01 PROCEDURE — 83605 ASSAY OF LACTIC ACID: CPT | Performed by: INTERNAL MEDICINE

## 2021-04-01 PROCEDURE — C1769 GUIDE WIRE: HCPCS

## 2021-04-01 PROCEDURE — 80048 BASIC METABOLIC PNL TOTAL CA: CPT | Performed by: INTERNAL MEDICINE

## 2021-04-01 RX ORDER — FENTANYL CITRATE 50 UG/ML
INJECTION, SOLUTION INTRAMUSCULAR; INTRAVENOUS CODE/TRAUMA/SEDATION MEDICATION
Status: COMPLETED | OUTPATIENT
Start: 2021-04-01 | End: 2021-04-01

## 2021-04-01 RX ADMIN — IPRATROPIUM BROMIDE 0.5 MG: 0.5 SOLUTION RESPIRATORY (INHALATION) at 15:04

## 2021-04-01 RX ADMIN — FENTANYL CITRATE 50 MCG: 50 INJECTION INTRAMUSCULAR; INTRAVENOUS at 20:26

## 2021-04-01 RX ADMIN — VANCOMYCIN HYDROCHLORIDE 1000 MG: 1 INJECTION, SOLUTION INTRAVENOUS at 11:43

## 2021-04-01 RX ADMIN — SODIUM CHLORIDE 100 ML/HR: 0.9 INJECTION, SOLUTION INTRAVENOUS at 05:47

## 2021-04-01 RX ADMIN — BUDESONIDE 0.5 MG: 0.5 INHALANT ORAL at 19:36

## 2021-04-01 RX ADMIN — ATORVASTATIN CALCIUM 40 MG: 40 TABLET, FILM COATED ORAL at 21:43

## 2021-04-01 RX ADMIN — FAMOTIDINE 20 MG: 20 TABLET ORAL at 21:46

## 2021-04-01 RX ADMIN — CEFEPIME HYDROCHLORIDE 2000 MG: 2 INJECTION, POWDER, FOR SOLUTION INTRAVENOUS at 17:40

## 2021-04-01 RX ADMIN — METHYLPREDNISOLONE SODIUM SUCCINATE 40 MG: 40 INJECTION, POWDER, FOR SOLUTION INTRAMUSCULAR; INTRAVENOUS at 08:08

## 2021-04-01 RX ADMIN — IPRATROPIUM BROMIDE 0.5 MG: 0.5 SOLUTION RESPIRATORY (INHALATION) at 00:45

## 2021-04-01 RX ADMIN — FENTANYL CITRATE 50 MCG: 50 INJECTION INTRAMUSCULAR; INTRAVENOUS at 20:18

## 2021-04-01 RX ADMIN — CEFEPIME HYDROCHLORIDE 2000 MG: 2 INJECTION, POWDER, FOR SOLUTION INTRAVENOUS at 08:03

## 2021-04-01 RX ADMIN — IPRATROPIUM BROMIDE 0.5 MG: 0.5 SOLUTION RESPIRATORY (INHALATION) at 08:29

## 2021-04-01 RX ADMIN — FAMOTIDINE 20 MG: 20 TABLET ORAL at 08:08

## 2021-04-01 RX ADMIN — ZINC SULFATE 220 MG (50 MG) CAPSULE 220 MG: CAPSULE at 08:08

## 2021-04-01 RX ADMIN — OXYCODONE HYDROCHLORIDE AND ACETAMINOPHEN 1000 MG: 500 TABLET ORAL at 08:08

## 2021-04-01 RX ADMIN — DOCUSATE SODIUM AND SENNOSIDES 1 TABLET: 8.6; 5 TABLET ORAL at 08:08

## 2021-04-01 RX ADMIN — METHYLPREDNISOLONE SODIUM SUCCINATE 40 MG: 40 INJECTION, POWDER, FOR SOLUTION INTRAMUSCULAR; INTRAVENOUS at 21:44

## 2021-04-01 RX ADMIN — BUDESONIDE 0.5 MG: 0.5 INHALANT ORAL at 08:29

## 2021-04-01 RX ADMIN — CHLORHEXIDINE GLUCONATE 0.12% ORAL RINSE 15 ML: 1.2 LIQUID ORAL at 21:42

## 2021-04-01 RX ADMIN — LEVALBUTEROL HYDROCHLORIDE 1.25 MG: 1.25 SOLUTION, CONCENTRATE RESPIRATORY (INHALATION) at 08:29

## 2021-04-01 RX ADMIN — CEFEPIME HYDROCHLORIDE 2000 MG: 2 INJECTION, POWDER, FOR SOLUTION INTRAVENOUS at 00:00

## 2021-04-01 RX ADMIN — LEVALBUTEROL HYDROCHLORIDE 1.25 MG: 1.25 SOLUTION, CONCENTRATE RESPIRATORY (INHALATION) at 19:36

## 2021-04-01 RX ADMIN — IPRATROPIUM BROMIDE 0.5 MG: 0.5 SOLUTION RESPIRATORY (INHALATION) at 19:36

## 2021-04-01 RX ADMIN — TAMSULOSIN HYDROCHLORIDE 0.4 MG: 0.4 CAPSULE ORAL at 21:45

## 2021-04-01 RX ADMIN — DOCUSATE SODIUM AND SENNOSIDES 1 TABLET: 8.6; 5 TABLET ORAL at 21:46

## 2021-04-01 RX ADMIN — HEPARIN SODIUM 5000 UNITS: 5000 INJECTION INTRAVENOUS; SUBCUTANEOUS at 05:23

## 2021-04-01 RX ADMIN — LEVALBUTEROL HYDROCHLORIDE 1.25 MG: 1.25 SOLUTION, CONCENTRATE RESPIRATORY (INHALATION) at 00:45

## 2021-04-01 RX ADMIN — LEVALBUTEROL HYDROCHLORIDE 1.25 MG: 1.25 SOLUTION, CONCENTRATE RESPIRATORY (INHALATION) at 15:04

## 2021-04-01 RX ADMIN — HEPARIN SODIUM 5000 UNITS: 5000 INJECTION INTRAVENOUS; SUBCUTANEOUS at 21:42

## 2021-04-01 RX ADMIN — CHLORHEXIDINE GLUCONATE 0.12% ORAL RINSE 15 ML: 1.2 LIQUID ORAL at 08:08

## 2021-04-01 RX ADMIN — HEPARIN SODIUM 5000 UNITS: 5000 INJECTION INTRAVENOUS; SUBCUTANEOUS at 13:51

## 2021-04-01 RX ADMIN — OXYCODONE HYDROCHLORIDE AND ACETAMINOPHEN 1000 MG: 500 TABLET ORAL at 21:43

## 2021-04-01 RX ADMIN — POLYETHYLENE GLYCOL 3350 17 G: 17 POWDER, FOR SOLUTION ORAL at 08:08

## 2021-04-01 NOTE — PROGRESS NOTES
Progress Note - Thoracic Surgery   Eugenia Reddy Held 79 y o  male MRN: [id number]  Unit/Bed#: -01 Encounter: [id number]    Assessment:  The patient is a 79year old with h/o recent COVID infection in the setting of baseline COPD on 2 L oxygen at home  He is here with a spontaneous R pneumothorax s/p R CT placement by ER 3/29 and R pigtail CT placement by IR 3/30  R CTdced 3/31  R IR CT to suction -20 with air leak, no output   On 2 L oxygen, desats to 80 when on room air  Plan:    Maintain IR pigtail to suction at -20 on pleurevac  Continue antibiotics       Subjective/Objective     Chief Complaint: spontaneous R pneumothorax    Subjective: comfortable this am      Objective:     Vitals: Blood pressure 121/74, pulse 94, temperature 98 °F (36 7 °C), resp  rate 18, weight 70 4 kg (155 lb 2 oz), SpO2 95 %  ,Body mass index is 23 59 kg/m²  I/O       03/29 0701 - 03/30 0700 03/30 0701 - 03/31 0700    P  O  150 240    I V  (mL/kg) 1336 9 30 (0 4)    NG/GT 30     IV Piggyback 850 250    Total Intake(mL/kg) 2366 9 520 (7 4)    Urine (mL/kg/hr) 2785 1650 (1)    Emesis/NG output 100     Chest Tube 20 50    Total Output 2905 1700    Net -538 1 -1180              Physical Exam:   GENERAL APPEARANCE: in no acute distress  NEURO: stable  HEENT: normocephalic, atraumatic  CV: regular rate and rhythm, no tachycardia,   LUNGS: clear to auscultation bilaterally, on 2 L oxygen  R IR CT on suction -20 with air leak, no output  GI: soft, nontender, nondistended  : no abnormality detected  MSK: no abnormality detected   SKIN: no abnormality detected

## 2021-04-01 NOTE — PROGRESS NOTES
Vancomycin Assessment    Jermain Frank is a 79 y o  male who is currently receiving vancomycin 1000mg iv q12h for Pneumonia     Relevant clinical data and objective history reviewed:  Creatinine   Date Value Ref Range Status   04/01/2021 0 76 0 60 - 1 30 mg/dL Final     Comment:     Standardized to IDMS reference method   03/31/2021 0 88 0 60 - 1 30 mg/dL Final     Comment:     Standardized to IDMS reference method   03/30/2021 0 84 0 60 - 1 30 mg/dL Final     Comment:     Standardized to IDMS reference method     Vancomycin Rm   Date Value Ref Range Status   03/30/2021 6 7 ug/mL Final     /74   Pulse 94   Temp 98 °F (36 7 °C)   Resp 18   Wt 70 5 kg (155 lb 8 oz)   SpO2 97%   BMI 23 64 kg/m²   I/O last 3 completed shifts: In: 2726 7 [I V :2176 7; IV Piggyback:550]  Out: 5894 [Urine:1025; Chest Tube:15]  Lab Results   Component Value Date/Time    BUN 27 (H) 04/01/2021 06:00 AM    WBC 16 78 (H) 04/01/2021 06:00 AM    HGB 11 8 (L) 04/01/2021 06:00 AM    HCT 34 0 (L) 04/01/2021 06:00 AM    MCV 92 04/01/2021 06:00 AM     04/01/2021 06:00 AM     Temp Readings from Last 3 Encounters:   03/31/21 98 °F (36 7 °C)   03/26/21 97 7 °F (36 5 °C) (Oral)   03/22/21 97 7 °F (36 5 °C)     Vancomycin Days of Therapy: 4    Assessment/Plan  The patient is currently on vancomycin utilizing scheduled dosing  Baseline risks associated with therapy include: advanced age  The patient is receiving 1000mg iv q12h with the most recent vancomycin level being at steady-state and therapeutic based on a goal of 15-20 (appropriate for most indications) ; therefore, is clinically appropriate and dose will be continued   Pharmacy will continue to follow closely for s/sx of nephrotoxicity, infusion reactions, and appropriateness of therapy  BMP and CBC will be ordered per protocol  Plan for trough 4/6 @1030  Pharmacy will continue to follow the patients culture results and clinical progress daily      Go Mccarthy Pharmacist

## 2021-04-01 NOTE — PROGRESS NOTES
[address] [facility]  Progress Note - Osbaldo Herrera Held 1950, 79 y o  male MRN: [id number]  Unit/Bed#: -[facility] Encounter: [id number]  Primary Care Provider: Jay Powers PA-C   Date and time admitted to hospital: 3/29/2021  6:17 AM    * Spontaneous pneumothorax  Assessment & Plan  · R sided spontaneous PTX POA in setting of severe COPD and COVID infection   · R CT placed in ER with initial resolution of PTX   · S/p IR placement of pigtail catheter   · Chest tube in place  · Thoracic surgery , IR following    Elevated lactic acid level  Assessment & Plan  Resolved  Monitor    Acute on chronic respiratory failure with hypoxia (HCC)  Assessment & Plan  · Secondary to R spontaneous PTX, COPD, recent COVID infection   · Intubated and subsequently extubated 3/29 to 2 L NC which has been baseline O2 since COVID 3/12-3/22  · Continue to wean O2 for spo2 >88%  · Xopanex/Atrovent/Pulmicort nebs   · Respiratory/airway clearance protocol     Shock ([facility] [address] )  Assessment & Plan  Resolved, off levophed  Monitor    COVID-19  Assessment & Plan  · COVID-19 + 3/12 with admission 3/12 - 3/22; discharged then on 2L NC  · s/p completed covid pathway  · Continue vitamins, pepcid, statin   · On solumedrol for COPD   · D-dimer trending down, on hep sq    Centrilobular emphysema ([address] ) - severe  Assessment & Plan  Severe COPD with acute exacerbation  Continue IV Solu-Medrol  Respiratory treatments        VTE Pharmacologic Prophylaxis:   Pharmacologic: Heparin  Mechanical VTE Prophylaxis in Place: Yes    Patient Centered Rounds: I have performed bedside rounds with nursing staff today  Discussions with Specialists or Other Care Team Provider:     Education and Discussions with Family / Patient:  Patient, updated spouse Reyes Catherine     Time Spent for Care: 30 minutes  More than 50% of total time spent on counseling and coordination of care as described above      Current Length of Stay: 3 day(s)    Current Patient Status: Inpatient   Certification Statement: The patient will continue to require additional inpatient hospital stay due to as outlined    Discharge Plan: Pneumothorax with chest tube in place     Code Status: Level 1 - Full Code      Subjective:     Comfortably sitting up in bed  Reports feeling okay  History chart labs medications reviewed    Objective:     Vitals:   Temp (24hrs), Av 3 °F (36 8 °C), Min:98 °F (36 7 °C), Max:98 6 °F (37 °C)    Temp:  [98 °F (36 7 °C)-98 6 °F (37 °C)] 98 6 °F (37 °C)  HR:  [] 87  Resp:  [18] 18  BP: (121-124)/(72-75) 124/75  SpO2:  [95 %-99 %] 95 %  Body mass index is 23 64 kg/m²  Input and Output Summary (last 24 hours): Intake/Output Summary (Last 24 hours) at 2021 1610  Last data filed at 2021 1151  Gross per 24 hour   Intake 2416 67 ml   Output 890 ml   Net 1526 67 ml       Physical Exam:     Physical Exam    Comfortably sitting up in bed  Neck supple  Lungs diminished breath sounds bilaterally  Chest tube noted right side  Heart sounds S1-S2 noted  Abdomen soft  Awake obeys simple commands  Pulses noted  No rash    Additional Data:     Labs:    Results from last 7 days   Lab Units 21  0600  21  0630   WBC Thousand/uL 16 78*   < > 36 29*   HEMOGLOBIN g/dL 11 8*   < > 15 7   I STAT HEMOGLOBIN   --    < >  --    HEMATOCRIT % 34 0*   < > 51 8*   HEMATOCRIT, ISTAT   --    < >  --    PLATELETS Thousands/uL 211   < > 466*   BANDS PCT %  --   --  1   LYMPHO PCT %  --   --  8*   MONO PCT %  --   --  10   EOS PCT %  --   --  0    < > = values in this interval not displayed       Results from last 7 days   Lab Units 21  0600  21  0528   SODIUM mmol/L 139   < > 142   POTASSIUM mmol/L 4 3   < > 3 6   CHLORIDE mmol/L 108   < > 104   CO2 mmol/L 29   < > 31   BUN mg/dL 27*   < > 14   CREATININE mg/dL 0 76   < > 0 84   ANION GAP mmol/L 2*   < > 7   CALCIUM mg/dL 7 9*   < > 8 0*   ALBUMIN g/dL  --   --  2 6*   TOTAL BILIRUBIN mg/dL  --   -- 0 60   ALK PHOS U/L  --   --  72   ALT U/L  --   --  127*   AST U/L  --   --  40   GLUCOSE RANDOM mg/dL 143*   < > 155*    < > = values in this interval not displayed  Results from last 7 days   Lab Units 03/29/21  0630   INR  0 90     Results from last 7 days   Lab Units 04/01/21  1045 04/01/21  0558 03/31/21  2109 03/31/21  1659 03/31/21  1141 03/31/21  0614 03/31/21  0014 03/30/21  1731 03/30/21  1451   POC GLUCOSE mg/dl 198* 149* 170* 121 151* 150* 163* 138 160*     Results from last 7 days   Lab Units 03/29/21  0630   HEMOGLOBIN A1C % 6 0*     Results from last 7 days   Lab Units 04/01/21  0600 04/01/21  0559 03/31/21  1341 03/31/21  0952 03/31/21  0544 03/31/21  0543  03/30/21  0528  03/29/21  0630   LACTIC ACID mmol/L  --  1 5 3 7* 3 2*  --  2 8*   < >  --    < > 4 1*   PROCALCITONIN ng/ml 0 37*  --   --   --  0 82*  --   --  1 50*  --  0 16    < > = values in this interval not displayed  * I Have Reviewed All Lab Data Listed Above  * Additional Pertinent Lab Tests Reviewed: All Labs Within Last 24 Hours Reviewed    Imaging:    Imaging Reports Reviewed Today Include:  Imaging studies noted  Imaging Personally Reviewed by Myself Includes:     Recent Cultures (last 7 days):     Results from last 7 days   Lab Units 03/30/21  1027 03/29/21  0632 03/29/21  0631   BLOOD CULTURE   --  No Growth at 72 hrs  No Growth at 72 hrs     LEGIONELLA URINARY ANTIGEN  Negative  --   --        Last 24 Hours Medication List:   Current Facility-Administered Medications   Medication Dose Route Frequency Provider Last Rate    ascorbic acid  1,000 mg Per NG Tube Q12H [address] Donna Connelly PA-C      atorvastatin  40 mg Per NG Tube HS Donna Connelly PA-C      budesonide  0 5 mg Nebulization Q12H Donna Connelly PA-C      cefepime  2,000 mg Intravenous Q8H Marcio Toledo (04/01/21 1106)    chlorhexidine  15 mL [facility] Marcio Toledo      famotidine  20 mg Oral BID Donna Connelly PA-C  heparin (porcine)  5,000 Units Subcutaneous [facility] Corderoann marie RodriguezDuffy [place], [place]      insulin lispro  1-5 Units Subcutaneous TID AC Ethyl Vijay, DO      insulin lispro  1-5 Units Subcutaneous HS Ethyl Vijay, DO      ipratropium  0 5 mg Nebulization Q6H Marcio Barraza      levalbuterol  1 25 mg Nebulization Q6H Marcio Barraza      methylPREDNISolone sodium succinate  40 mg Intravenous Q12H [facility] Lorena Linares DO      zinc sulfate  220 mg Per NG Tube Daily Sara Bedolla PA-C      Followed by   Lars Herzog ON 4/5/2021] multivitamin with iron-minerals  15 mL Per NG Tube Daily Sara Bedolla PA-C      polyethylene glycol  17 g Oral Daily PRN Sara Bedolla PA-C      senna-docusate sodium  1 tablet Oral BID Sara Bedolla PA-C      tamsulosin  0 4 mg Oral After Dinner Sara Bedolla PA-C      vancomycin  15 mg/kg Intravenous Q12H Sara Bedolla PA-C Stopped (04/01/21 1257)        Today, Patient Was Seen By: Milton Tucker MD    ** Please Note: Dictation voice to text software may have been used in the creation of this document   **

## 2021-04-01 NOTE — ED PROVIDER NOTES
History  Chief Complaint   Patient presents with    Shortness of Breath     COVID positive  woke up acutely SOB today  COPD hx     The patient is a 72-year-old male with past medical history of severe COPD, test positive for COVID-19 infection on 03/14, reportedly called EMS for severe respiratory distress  Patient arrives to the ED in severe respiratory distress  Further history and review of systems is unobtainable secondary to respiratory distress  He was given terbutaline by EMS pre-hospital       History provided by:  EMS personnel  History limited by:  Severe respiratory distress   used: No    Shortness of Breath  Severity:  Unable to specify  Onset quality:  Unable to specify  Timing:  Unable to specify      Prior to Admission Medications   Prescriptions Last Dose Informant Patient Reported? Taking? Multiple Vitamins-Minerals (CENTRUM ADULTS PO)  Self Yes No   Sig: Take 1 tablet by mouth daily  ascorbic acid (VITAMIN C) 1000 MG tablet  Self No No   Sig: Take 1 tablet (1,000 mg total) by mouth 2 (two) times a day for 3 days   cholecalciferol (VITAMIN D3) 1,000 units tablet  Self No No   Sig: Take 2 tablets (2,000 Units total) by mouth daily for 2 days   fluticasone-umeclidinium-vilanterol (TRELEGY) 100-62 5-25 MCG/INH inhaler  Self No No   Sig: Inhale 1 puff daily Rinse mouth after use     guaiFENesin (MUCINEX) 600 mg 12 hr tablet  Self Yes No   Sig: Take 1,200 mg by mouth 2 (two) times a day as needed   hydrochlorothiazide (HYDRODIURIL) 25 mg tablet  Self No No   Sig: TAKE 1 TABLET DAILY   ipratropium-albuterol (DUO-NEB) 0 5-2 5 mg/3 mL nebulizer solution  Self No No   Sig: Take 1 vial (3 mL total) by nebulization every 6 (six) hours   Patient taking differently: Take 3 mL by nebulization every 6 (six) hours as needed    loperamide (IMODIUM) 2 mg capsule  Self No No   Sig: Take 1 capsule (2 mg total) by mouth 3 (three) times a day as needed for diarrhea   loratadine (CLARITIN REDITABS) 10 MG dissolvable tablet  Self Yes No   Sig: Take 10 mg by mouth daily as needed for allergies  tamsulosin (Flomax) 0 4 mg  Self No No   Sig: Take 1 capsule (0 4 mg total) by mouth daily with dinner   zinc sulfate (ZINCATE) 220 mg capsule  Self No No   Sig: Take 1 capsule (220 mg total) by mouth daily for 2 days      Facility-Administered Medications: None       Past Medical History:   Diagnosis Date    BPH (benign prostatic hyperplasia)     COPD (chronic obstructive pulmonary disease) (HCC)     Last Assessed:2016    Diverticulosis     Elevated prostate specific antigen (PSA)     Emphysema (subcutaneous) (surgical) resulting from a procedure     Enlarged prostate with lower urinary tract symptoms (LUTS)     Resolved:2017    Hernia, inguinal, right        Past Surgical History:   Procedure Laterality Date    BICEPS TENDON REPAIR      COLONOSCOPY      HAND SURGERY      Last Assessed:2016    HERNIA REPAIR      Last Assessed:2016    IR CHEST TUBE CHECK/CHANGE/REPOSITION/UPSIZE  2021    IR CHEST TUBE PLACEMENT  3/30/2021    KNEE ARTHROSCOPY      MD REPAIR ING HERNIA,5+Y/O,REDUCIBL Right 2016    Procedure: REPAIR HERNIA INGUINAL with mesh;  Surgeon: Katharina Ayon DO;  Location: [facility];  Service: General    PROSTATE BIOPSY      WISDOM TOOTH EXTRACTION         Family History   Problem Relation Age of Onset    Hypertension Mother      I have reviewed and agree with the history as documented      E-Cigarette/Vaping    E-Cigarette Use Never User      E-Cigarette/Vaping Substances    Nicotine No     THC No     CBD No     Flavoring No     Other No     Unknown No      Social History     Tobacco Use    Smoking status: Former Smoker     Packs/day: 1 50     Years: 48 00     Pack years: 72 00     Types: Cigarettes     Start date: 5     Quit date: 2015     Years since quittin 4    Smokeless tobacco: Never Used   Substance Use Topics    Alcohol use: Yes     Frequency: Monthly or less     Drinks per session: 1 or 2     Binge frequency: Never     Comment: rare    Drug use: No        Review of Systems   Unable to perform ROS: Severe respiratory distress   Respiratory: Positive for shortness of breath  Physical Exam  ED Triage Vitals   Temperature Pulse Respirations Blood Pressure SpO2   03/29/21 0628 03/29/21 0617 03/29/21 0617 03/29/21 0617 03/29/21 0617   98 2 °F (36 8 °C) (!) 144 (!) 26 111/77 95 %      Temp Source Heart Rate Source Patient Position - Orthostatic VS BP Location FiO2 (%)   03/29/21 0628 03/29/21 0617 03/29/21 0633 03/29/21 0633 --   Tympanic Monitor Sitting Right arm       Pain Score       03/29/21 0824       Med Not Given for Pain - for MAR use only             Orthostatic Vital Signs  Vitals:    04/03/21 1904 04/04/21 0209 04/04/21 0550 04/04/21 0812   BP: 110/69 113/64 112/65 111/65   Pulse: 86 76 80 82   Patient Position - Orthostatic VS:    Sitting       Physical Exam  Vitals signs reviewed  Constitutional:       Appearance: He is ill-appearing and toxic-appearing  Comments: GCS 8, patient in severe respiratory distress   HENT:      Head: Normocephalic and atraumatic  Right Ear: External ear normal       Left Ear: External ear normal       Nose: Nose normal       Mouth/Throat:      Mouth: Mucous membranes are dry  Pharynx: Oropharynx is clear  Eyes:      General: No scleral icterus  Conjunctiva/sclera: Conjunctivae normal    Neck:      Musculoskeletal: Normal range of motion and neck supple  Vascular: No JVD  Cardiovascular:      Rate and Rhythm: Regular rhythm  Tachycardia present  Heart sounds: No murmur  Pulmonary:      Effort: Tachypnea, accessory muscle usage and respiratory distress present  Breath sounds: Examination of the right-upper field reveals decreased breath sounds  Examination of the left-upper field reveals decreased breath sounds   Examination of the right-middle field reveals decreased breath sounds  Examination of the left-middle field reveals decreased breath sounds  Examination of the right-lower field reveals decreased breath sounds  Examination of the left-lower field reveals decreased breath sounds  Decreased breath sounds present  No wheezing  Chest:      Chest wall: No crepitus  Abdominal:      Palpations: Abdomen is soft  Tenderness: There is no abdominal tenderness  There is no guarding or rebound  Musculoskeletal:      Right lower leg: No edema  Left lower leg: No edema  Skin:     General: Skin is warm and dry  Capillary Refill: Capillary refill takes 2 to 3 seconds  Neurological:      General: No focal deficit present  Motor: No weakness           ED Medications  Medications   albuterol (2 5 mg/3 mL) 0 083 % inhalation solution **ADS Override Pull** (  Canceled Entry 3/29/21 0851)   albuterol (5 mg/mL) 0 5 % inhalation solution **ADS Override Pull** (10 mg  Given 3/29/21 3538)   albuterol inhalation solution 10 mg (10 mg Nebulization Given 3/29/21 0645)     And   ipratropium (ATROVENT) 0 02 % inhalation solution 1 mg (1 mg Nebulization Given 3/29/21 0643)     And   sodium chloride 0 9 % inhalation solution 3 mL ( Nebulization Canceled Entry 3/29/21 0645)   magnesium sulfate 2 g/50 mL IVPB (premix) 2 g (0 g Intravenous Stopped 3/29/21 0811)   methylPREDNISolone sodium succinate (Solu-MEDROL) injection 125 mg (125 mg Intravenous Given 3/29/21 0749)   terbutaline (FOR EMS ONLY) (BRETHINE) injection 1 each (0 each Does not apply Given to EMS 3/29/21 0650)   methylPREDNISolone sodium succinate (FOR EMS ONLY) (Solu-MEDROL) 125 MG injection 125 mg (0 mg Does not apply Given to EMS 3/29/21 0651)   ketamine (KETALAR) 100 mg (100 mg Intramuscular Given by Other 3/29/21 0738)   Succinylcholine Chloride 100 mg/5 mL syringe 100 mg (100 mg Intravenous Given by Other 3/29/21 0741)   albuterol inhalation solution 10 mg (10 mg Nebulization Given 3/29/21 5262)   cefepime (MAXIPIME) 2 g/50 mL dextrose IVPB (0 mg Intravenous Stopped 3/29/21 0843)   vancomycin (VANCOCIN) 1,500 mg in sodium chloride 0 9 % 250 mL IVPB (0 mg Intravenous Stopped 3/29/21 1201)   sodium chloride 0 9 % bolus 1,000 mL (1,000 mL Intravenous New Bag 3/29/21 0800)   fentanyl citrate (PF) 100 MCG/2ML 100 mcg ( Intravenous Canceled Entry 3/29/21 0830)   ketamine (KETALAR) 100 mg (100 mg Intravenous Given 3/29/21 0813)   lidocaine-epinephrine (XYLOCAINE/EPINEPHRINE) 2 %-1:100,000 injection 20 mL (20 mL Infiltration Given by Other 3/29/21 0843)   multi-electrolyte (ISOLYTE-S PH 7 4) bolus 1,000 mL (0 mL Intravenous Stopped 3/29/21 1201)   zinc sulfate (ZINCATE) capsule 220 mg (220 mg Per NG Tube Given 4/4/21 0957)   sodium chloride 0 9 % bolus 500 mL (0 mL Intravenous Stopped 3/29/21 1845)   potassium chloride (K-DUR,KLOR-CON) CR tablet 40 mEq (40 mEq Oral Given 3/30/21 0848)   lidocaine (PF) (XYLOCAINE-MPF) 1 % injection 10 mL (10 mL Infiltration Given 3/30/21 1205)   norepinephrine (LEVOPHED) 1 mg/mL injection **ADS Override Pull** (  Override Pull 3/30/21 1321)   fentanyl citrate (PF) 100 MCG/2ML (50 mcg Intravenous Given 3/30/21 1633)   fentanyl citrate (PF) 100 MCG/2ML (50 mcg Intravenous Given 4/1/21 2026)       Diagnostic Studies  Results Reviewed     Procedure Component Value Units Date/Time    Blood culture #1 [[id number]]  (Abnormal) Collected: 03/29/21 0632    Lab Status: Preliminary result Specimen: Blood from Arm, Left Updated: 04/04/21 1447     Blood Culture Anaerobic Gram Positive Bacilli     Gram Stain Result Gram positive rods    Blood culture #2 [[id number]] Collected: 03/29/21 0631    Lab Status: Final result Specimen: Blood from Arm, Left Updated: 04/03/21 1001     Blood Culture No Growth After 5 Days      Procalcitonin Reflex [[id number]]  (Abnormal) Collected: 03/30/21 0528    Lab Status: Final result Specimen: Blood from Line, Arterial Updated: 03/30/21 0727     Procalcitonin 1 50 ng/ml     Basic metabolic panel [[id number]]  (Abnormal) Collected: 03/30/21 0528    Lab Status: Final result Specimen: Blood from Line, Arterial Updated: 03/30/21 0644     Sodium 142 mmol/L      Potassium 3 6 mmol/L      Chloride 104 mmol/L      CO2 31 mmol/L      ANION GAP 7 mmol/L      BUN 14 mg/dL      Creatinine 0 84 mg/dL      Glucose 155 mg/dL      Calcium 8 0 mg/dL      eGFR 89 ml/min/1 73sq m     Narrative:      [place] guidelines for Chronic Kidney Disease (CKD):     Stage 1 with normal or high GFR (GFR > 90 mL/min/1 73 square meters)    Stage 2 Mild CKD (GFR = 60-89 mL/min/1 73 square meters)    Stage 3A Moderate CKD (GFR = 45-59 mL/min/1 73 square meters)    Stage 3B Moderate CKD (GFR = 30-44 mL/min/1 73 square meters)    Stage 4 Severe CKD (GFR = 15-29 mL/min/1 73 square meters)    Stage 5 End Stage CKD (GFR <15 mL/min/1 73 square meters)  Note: GFR calculation is accurate only with a steady state creatinine    CBC and differential [[id number]]  (Abnormal) Collected: 03/30/21 0528    Lab Status: Final result Specimen: Blood from Line, Arterial Updated: 03/30/21 0617     WBC 22 42 Thousand/uL      RBC 4 22 Million/uL      Hemoglobin 12 1 g/dL      Hematocrit 37 8 %      MCV 90 fL      MCH 28 7 pg      MCHC 32 0 g/dL      RDW 12 8 %      MPV 10 7 fL      Platelets 435 Thousands/uL      nRBC 0 /100 WBCs     Narrative:       See manual diff done within 3 days      Hemoglobin A1C [[id number]]  (Abnormal) Collected: 03/29/21 0630    Lab Status: Final result Specimen: Blood from Arm, Right Updated: 03/29/21 1906     Hemoglobin A1C 6 0 %       mg/dl     UA w Reflex to Microscopic w Reflex to Culture [[id number]]  (Abnormal) Collected: 03/29/21 1523    Lab Status: Final result Specimen: Urine, Indwelling Wick Catheter Updated: 03/29/21 1604     Color, UA Yellow     Clarity, UA Cloudy     Specific Gravity, UA 1 008     pH, UA 5 5     Leukocytes, UA Negative     Nitrite, UA Negative     Protein, UA Negative mg/dl      Glucose, UA Negative mg/dl      Ketones, UA Negative mg/dl      Urobilinogen, UA 0 2 E U /dl      Bilirubin, UA Negative     Blood, UA Large    Lactic acid 2 Hours [[id number]]  (Abnormal) Collected: 03/29/21 1056    Lab Status: Final result Specimen: Blood from Arm, Right Updated: 03/29/21 1201     LACTIC ACID 4 1 mmol/L     Narrative:      Result may be elevated if tourniquet was used during collection  D-dimer, quantitative [[id number]]  (Abnormal) Collected: 03/29/21 0630    Lab Status: Final result Specimen: Blood from Arm, Right Updated: 03/29/21 0851     D-Dimer, Quant 2 34 ug/ml FEU     POCT Blood Gas (CG8+) [[id number]]  (Abnormal) Collected: 03/29/21 0842    Lab Status: Final result Specimen: Arterial Updated: 03/29/21 0850     pH, Art i-STAT 7 320     pCO2, Art i-STAT 57 5 mm HG      pO2, ART i-STAT 59 0 mm HG      BE, i-STAT 2 mmol/L      HCO3, Art i-STAT 29 7 mmol/L      CO2, i-STAT 31 mmol/L      O2 Sat, i-STAT 87 %      SODIUM, I-STAT 136 mmol/l      Potassium, i-STAT 4 2 mmol/L      Hct, i-STAT 38 %      Hgb, i-STAT 12 9 g/dl      Glucose, i-STAT 155 mg/dl      POC FIO2 50 L      Specimen Type ARTERIAL    Procalcitonin with AM Reflex [[id number]]  (Normal) Collected: 03/29/21 0630    Lab Status: Final result Specimen: Blood from Arm, Right Updated: 03/29/21 0801     Procalcitonin 0 16 ng/ml     Lactic acid [[id number]]  (Abnormal) Collected: 03/29/21 0630    Lab Status: Final result Specimen: Blood from Arm, Right Updated: 03/29/21 0757     LACTIC ACID 4 1 mmol/L     Narrative:      Result may be elevated if tourniquet was used during collection      CBC and differential [[id number]]  (Abnormal) Collected: 03/29/21 0630    Lab Status: Final result Specimen: Blood from Arm, Right Updated: 03/29/21 0731     WBC 36 29 Thousand/uL      RBC 5 48 Million/uL      Hemoglobin 15 7 g/dL      Hematocrit 51 8 %      MCV 95 fL      MCH 28 6 pg      MCHC 30 3 g/dL      RDW 12 9 %      MPV 10 5 fL      Platelets 753 Thousands/uL      nRBC 0 /100 WBCs     Narrative: This is an appended report  These results have been appended to a previously verified report      Manual Differential(PHLEBS Do Not Order) [[id number]]  (Abnormal) Collected: 03/29/21 0630    Lab Status: Final result Specimen: Blood from Arm, Right Updated: 03/29/21 0731     Segmented % 80 %      Bands % 1 %      Lymphocytes % 8 %      Monocytes % 10 %      Eosinophils, % 0 %      Basophils % 0 %      Atypical Lymphocytes % 1 %      Absolute Neutrophils 29 39 Thousand/uL      Lymphocytes Absolute 2 90 Thousand/uL      Monocytes Absolute 3 63 Thousand/uL      Eosinophils Absolute 0 00 Thousand/uL      Basophils Absolute 0 00 Thousand/uL      Total Counted 100     Platelet Estimate Increased    Protime-INR [[id number]]  (Normal) Collected: 03/29/21 0630    Lab Status: Final result Specimen: Blood from Arm, Right Updated: 03/29/21 0716     Protime 12 1 seconds      INR 0 90    APTT [[id number]]  (Normal) Collected: 03/29/21 0630    Lab Status: Final result Specimen: Blood from Arm, Right Updated: 03/29/21 0716     PTT 24 seconds     Ferritin [[id number]]  (Abnormal) Collected: 03/29/21 0630    Lab Status: Final result Specimen: Blood from Arm, Right Updated: 03/29/21 0705     Ferritin 892 ng/mL     NT-BNP PRO [[id number]]  (Abnormal) Collected: 03/29/21 0630    Lab Status: Final result Specimen: Blood from Arm, Right Updated: 03/29/21 0705     NT-proBNP 144 pg/mL     Comprehensive metabolic panel [[id number]]  (Abnormal) Collected: 03/29/21 0630    Lab Status: Final result Specimen: Blood from Arm, Right Updated: 03/29/21 0704     Sodium 136 mmol/L      Potassium 4 4 mmol/L      Chloride 98 mmol/L      CO2 34 mmol/L      ANION GAP 4 mmol/L      BUN 15 mg/dL      Creatinine 1 32 mg/dL      Glucose 175 mg/dL      Calcium 8 8 mg/dL      AST 80 U/L       U/L      Alkaline Phosphatase 101 U/L      Total Protein 7 3 g/dL Albumin 3 6 g/dL      Total Bilirubin 0 74 mg/dL      eGFR 54 ml/min/1 73sq m     Narrative:      [place] guidelines for Chronic Kidney Disease (CKD):     Stage 1 with normal or high GFR (GFR > 90 mL/min/1 73 square meters)    Stage 2 Mild CKD (GFR = 60-89 mL/min/1 73 square meters)    Stage 3A Moderate CKD (GFR = 45-59 mL/min/1 73 square meters)    Stage 3B Moderate CKD (GFR = 30-44 mL/min/1 73 square meters)    Stage 4 Severe CKD (GFR = 15-29 mL/min/1 73 square meters)    Stage 5 End Stage CKD (GFR <15 mL/min/1 73 square meters)  Note: GFR calculation is accurate only with a steady state creatinine    Troponin I [[id number]]  (Normal) Collected: 03/29/21 0631    Lab Status: Final result Specimen: Blood from Arm, Right Updated: 03/29/21 0704     Troponin I 0 02 ng/mL     Blood gas, venous [[id number]]  (Abnormal) Collected: 03/29/21 0630    Lab Status: Final result Specimen: Blood from Arm, Right Updated: 03/29/21 0641     pH, Grady 7 084     pCO2, Grady 143 8 mm Hg      pO2, Grady 39 2 mm Hg      HCO3, Grady 42 1 mmol/L      Base Excess, Grady 5 6 mmol/L      O2 Content, Grady 13 0 ml/dL      O2 HGB, VENOUS 55 5 %                  XR chest portable   Final Result by James Dawson DO (04/05 1280)      Probable trace residual loculated right basilar hydropneumothorax with no apical component following chest tube removal                   Workstation performed: [id number]         XR chest portable   Final Result by Ebenezer Merritt MD (04/03 1923)      Similar appearance of small right subpulmonic hydropneumothorax with loculated air collection in the major fissure  Workstation performed: [id number]         XR chest portable   Final Result by Robin Garg MD (04/02 1923)      Persistent subpulmonic right hydropneumothorax with loculated air collection in the major fissure                       Workstation performed: [id number]         IR chest tube check/change/reposition/upsize   Final Result by Charlotte Garcia MD (04/02 2041)   Successful right chest tube exchange/repositioning/upsizing to a 12-Tamazight catheter  Workstation performed: [id number]         CT chest wo contrast   Final Result by Cade Petit MD (04/01 1154)      Persistent right hydropneumothorax with subcutaneous emphysema due to partial dislodgment of the right anterior chest tube  Advanced emphysema  * I personally telephoned this result to Jeannie Townsend, [facility], [address] [facility] on 4/1/2021 11:51 AM                Workstation performed: [id number]         XR chest portable   Final Result by Angie Shoemaker MD (04/01 6871)      Moderate right inferomedial pneumothorax appears to have enlarged with increasing right lower lobe atelectasis  Consider follow-up with noncontrast chest CT if it would alter patient management  Interval removal of one of the right thoracostomy tubes with increased right chest wall soft tissue emphysema  The study was marked in [facility] for immediate notification  Workstation performed: [id number]         XR chest portable ICU   Final Result by Dereje Moreno MD (04/01 1257)      Slight increase in small right pneumothorax  Workstation performed: [id number]         XR chest portable ICU   Final Result by Shyam Fox MD (03/30 7976)      Interval reduction but not resolution of previously seen right-sided pneumothorax  Right basilar infiltrate again noted     No large pleural effusion  Right-sided chest tube remains in place  There is interval insertion of a right pleural drainage catheter with its tip overlying the right upper lobe  Workstation performed: [id number]         IR chest tube placement   Final Result by Alexi Hernandez MD (03/30 2459)   Impression:   1    Successful placement of a therapeutic 8 5 [employer] all-purpose drainage catheter into the right pleural space under CT guidance  Workstation performed: [id number]         XR chest portable ICU   Final Result by Precious Sharpe MD (03/30 2701)      Persistent right chest tube with increased right pneumothorax, moderate  Workstation performed: [id number]         XR chest 1 view portable   Final Result by Precious Sharpe MD (03/29 8982)      Right chest tube insertion with decrease in right pneumothorax with small apical and moderate subpulmonic component  Workstation performed: [id number]         XR chest 1 view portable   Final Result by Char Ash MD (03/29 1131)      Large right-sided pneumothorax with findings suspicious for tension  Clinical service was aware of the findings at the time of dictation  Subsequent chest tube placement on follow-up exam                   Workstation performed: [id number]               Procedures  Procedures  Conscious Sedation Assessment      Classification Score   ASA Scale Assessment  3-Severe systemic disease that results in functional limitation filed at 03/30/2021 1554   Mallampati Classification  Class II: soft palate, uvula, fauces visible - No Difficulty filed at 03/30/2021 1554          ED Course               Identification of Seniors at Risk      Most Recent Value   (ISAR) Identification of Seniors at Risk   Before the illness or injury that brought you to the Emergency, did you need someone to help you on a regular basis? 0 Filed at: 03/29/2021 9611   In the last 24 hours, have you needed more help than usual?  1 Filed at: 03/29/2021 6924   Have you been hospitalized for one or more nights during the past 6 months?  --   In general, do you see well?  0 Filed at: 03/29/2021 8647   In general, do you have serious problems with your memory? 0 Filed at: 03/29/2021 3821   Do you take more than three different medications every day?   1 Filed at: 03/29/2021 6053   ISAR Score  2 Filed at: 03/29/2021 1140                Initial Sepsis Screening     Row Name 04/06/21 0931                Is the patient's history suggestive of a new or worsening infection? (!) Yes (Proceed)  -HB        Suspected source of infection  pneumonia  -HB        Are two or more of the following signs & symptoms of infection both present and new to the patient? (!) Yes (Proceed)  -HB        Indicate SIRS criteria  Tachycardia > 90 bpm;Tachypnea > 20 resp per min;Leukocytosis (WBC > 93108 IJL)  -HB        If the answer is yes to both questions, suspicion of sepsis is present  --        If severe sepsis is present AND tissue hypoperfusion perists in the hour after fluid resuscitation or lactate > 4, the patient meets criteria for SEPTIC SHOCK  --        Are any of the following organ dysfunction criteria present within 6 hours of suspected infection and SIRS criteria that are NOT considered to be chronic conditions? (!) Yes  -HB        Organ dysfunction  Lactate >/equal 4 0 mmol/L  -HB        Date of presentation of severe sepsis  03/29/21  -HB        Time of presentation of severe sepsis  0730  -HB        Tissue hypoperfusion persists in the hour after crystalloid fluid administration, evidenced, by either:  * OR * Lactate level is greater to or equal 4 mmol/dL ( ___ mmol/dL in comment field)  -HB        Was hypotension present within one hour of the conclusion of crystalloid fluid administration?   No  -HB        Date of presentation of septic shock  03/29/21  -HB        Time of presentation of septic shock  0740  -HB          User Key  (r) = Recorded By, (t) = Taken By, (c) = Cosigned By    [address] Name Provider Type    HB Maria Esther Rabago DO Physician           Default Flowsheet Data (last 720 hours)      Sepsis Reassess     [address] Name 04/06/21 0933 03/29/21 0934                Repeat Volume Status and Tissue Perfusion Assessment Performed    Repeat Volume Status and Tissue Perfusion Assessment Performed  --  -HB  Yes  -HB          Volume Status and Tissue Perfusion Post Fluid Resuscitation * Must Document All *    Vital Signs Reviewed (HR, RR, BP, T)  --  -HB  Yes  -HB       Shock Index Reviewed  --  -HB  Yes  -HB       Arterial Oxygen Saturation Reviewed (POx, SaO2 or SpO2)  --  -HB  Yes (comment %)  -HB       Cardio  --  -HB  (!) Normal S1/S2; Tachycardia  -HB       Pulmonary  --  -HB  -- Intubated  -HB       Capillary Refill  --  -HB  Brisk  -HB       Peripheral Pulses  --  -HB  Radial  -HB       Peripheral Pulse  --  -HB  +2  -HB       Skin  --  -HB  Warm;Dry  -HB       Urine output assessed  --  -HB  None  -HB          *OR*   Intensive Monitoring- Must Document One of the Following Four *:    Vital Signs Reviewed  --  -HB  Yes  -HB       * Central Venous Pressure (CVP or RAP)  --  --       * Central Venous Oxygen (SVO2, ScvO2 or Oxygen saturation via central catheter)  --  --       * Bedside Cardiovascular US in IVC diameter and % collapse  --  --       * Passive Leg Raise OR Crystalloid Challenge  --  -HB  Crystalloid fluid challenge completed  -HB       Crystalloid fluid challenge completed  --  -HB  500mL in 15 minutes  -HB         User Key  (r) = Recorded By, (t) = Taken By, (c) = Cosigned By    Initials Name Provider Type    HB Kathy Anaya, DO Physician                  MDM  Number of Diagnoses or Management Options  Acute kidney injury [facility]): new and requires workup  Acute respiratory failure with hypoxia and hypercapnia ([facility] [address] ): new and requires workup  COPD with acute exacerbation ([facility] [address] ): new and requires workup  Lactic acidosis: new and requires workup  Shock [facility]): new and requires workup  Spontaneous pneumothorax: new and requires workup  Tension pneumothorax: new and requires workup  Diagnosis management comments: 70-year-old male with severe COPD, recent COVID-19 infection 2 5 weeks ago who presents the ED in severe respiratory distress  History is unobtainable  Respiratory was called to bedside and patient was placed on BiPAP with heart neb    On exam he is a tachypneic, and has severely diminished breath sounds bilaterally  Is tachycardic, blood pressure stable  He is hypoxic low 80s, this somewhat improved BiPAP    Chest x-ray reveals large pneumothorax on the right, a chest tube was placed followed by RSI with ketamine for acute respiratory failure with hypoxia and hypercapnia, blood gas which w/ CO2 140  Lactic acidosis probably secondary to severe respiratory distress, hypoxia; given IV fluids, broad-spectrum antibiotics  Discussed critical care will admit to the ICU for acute respiratory failure with hypoxia and hypercapnia, spontaneous pneumothorax, shock, lactic acidosis           Amount and/or Complexity of Data Reviewed  Clinical lab tests: ordered and reviewed  Tests in the radiology section of CPT®: ordered and reviewed  Decide to obtain previous medical records or to obtain history from someone other than the patient: yes  Obtain history from someone other than the patient: yes  Review and summarize past medical records: yes  Discuss the patient with other providers: yes  Independent visualization of images, tracings, or specimens: yes        Disposition  Final diagnoses:   COPD with acute exacerbation ([facility] [address] )   Acute respiratory failure with hypoxia and hypercapnia (HCC)   Tension pneumothorax   Lactic acidosis   Acute kidney injury ([facility] [address] )   Spontaneous pneumothorax   Shock ([address] )     Time reflects when diagnosis was documented in both MDM as applicable and the Disposition within this note     Time User Action Codes Description Comment    3/29/2021  7:49 AM Sherial Bodily Add [J44 1] COPD with acute exacerbation ([facility] [address] )     3/29/2021  7:49 AM Sherial Bodily Add [J96 01,  J96 02] Acute respiratory failure with hypoxia and hypercapnia ([address] )     3/29/2021  7:50 AM Sherial Bodily Add [J93 9] Pneumothorax on right     3/30/2021  1:26 AM Stephanie Bending Modify [J44 1] COPD with acute exacerbation ([facility] [address] )     3/30/2021  1:26 AM Stephanie Bending Modify [J93 9] Pneumothorax on right     3/30/2021  1:26 AM Marvetta Sees Add [J93 0] Tension pneumothorax     3/30/2021  1:26 AM Marvetta Sees Modify [J93 9] Pneumothorax on right     3/30/2021  1:26 AM Marvetta Sees Modify [J93 0] Tension pneumothorax     3/30/2021  1:26 AM Marvetta Sees Remove [J93 9] Pneumothorax on right     3/30/2021  1:26 AM Marvetta Sees Add [E87 2] Lactic acidosis     3/30/2021  1:26 AM Marvetta Sees Add [N17 9] Acute kidney injury ([facility] [address] )     3/30/2021  9:22 AM Sonal [place] Add [J93 83] Spontaneous pneumothorax     3/30/2021  9:43 PM Sonal [place] Add [R57 9] Shock ([address] )     4/4/2021 12:26 PM Loi Shackle Add [J43 2] Centrilobular emphysema ([facility] [address] ) - severe       ED Disposition     ED Disposition Condition Date/Time Comment    Admit Stable Mon Mar 29, 2021  8:03 AM Case was discussed with crit care and the patient's admission status was agreed to be Admission Status: inpatient status to the service of Dr Aurea Aponte           Follow-up Information     Follow up With Specialties Details Why Contact Kamran Chandler PA-C Internal Medicine, Physician Assistant Follow up in 1 week(s)  [address] [place] [address]  [phone]            Discharge Medication List as of 4/4/2021 12:38 PM      START taking these medications    Details   predniSONE 20 mg tablet Take 2 tablets (40 mg total) by mouth daily for 4 doses, Starting Mon 4/5/2021, Until Fri 4/9/2021, Normal         CONTINUE these medications which have NOT CHANGED    Details   cholecalciferol (VITAMIN D3) 1,000 units tablet Take 2 tablets (2,000 Units total) by mouth daily for 2 days, Starting Mon 3/22/2021, Until Fri 3/26/2021, Normal      fluticasone-umeclidinium-vilanterol (TRELEGY) 100-62 5-25 MCG/INH inhaler Inhale 1 puff daily Rinse mouth after use , Starting Thu 9/10/2020, Normal      guaiFENesin (MUCINEX) 600 mg 12 hr tablet Take 1,200 mg by mouth 2 (two) times a day as needed, Historical Med      hydrochlorothiazide (HYDRODIURIL) 25 mg tablet TAKE 1 TABLET DAILY, Normal      ipratropium-albuterol (DUO-NEB) 0 5-2 5 mg/3 mL nebulizer solution Take 1 vial (3 mL total) by nebulization every 6 (six) hours, Starting Fri 2/28/2020, Normal      loperamide (IMODIUM) 2 mg capsule Take 1 capsule (2 mg total) by mouth 3 (three) times a day as needed for diarrhea, Starting Mon 3/22/2021, Normal      loratadine (CLARITIN REDITABS) 10 MG dissolvable tablet Take 10 mg by mouth daily as needed for allergies  , Historical Med      Multiple Vitamins-Minerals (CENTRUM ADULTS PO) Take 1 tablet by mouth daily  , Historical Med      tamsulosin (Flomax) 0 4 mg Take 1 capsule (0 4 mg total) by mouth daily with dinner, Starting Fri 9/4/2020, Normal         STOP taking these medications       ascorbic acid (VITAMIN C) 1000 MG tablet Comments:   Reason for Stopping:         zinc sulfate (ZINCATE) 220 mg capsule Comments:   Reason for Stopping:             No discharge procedures on file  PDMP Review     None           ED Provider  Attending physically available and evaluated Chavez MALONE managed the patient along with the ED Attending      Electronically Signed by         Michael Esparza DO  04/06/21 1002

## 2021-04-01 NOTE — ASSESSMENT & PLAN NOTE
· R sided spontaneous PTX POA in setting of severe COPD and COVID infection   · R CT placed in ER with initial resolution of PTX   · S/p IR placement of pigtail catheter   · Chest tube in place  · Thoracic surgery , IR following

## 2021-04-01 NOTE — QUICK NOTE
Chest CT ordered by thoracic surgery today  Reviewed with Dr Krystal Washburn, pulled back into the chest wall, causing sub q emphysema, and likely source of his air leak and unresolved pneumo  Plan for repositioning under fluoro by IR today  Discussed with Dr Blair Lopez and she is agreeable as well       [address] [place] [address] Kyler Okeefeann

## 2021-04-01 NOTE — PLAN OF CARE
Problem: Potential for Falls  Goal: Patient will remain free of falls  Description: INTERVENTIONS:  - Assess patient frequently for physical needs  -  Identify cognitive and physical deficits and behaviors that affect risk of falls    -  [place] fall precautions as indicated by assessment   - Educate patient/family on patient safety including physical limitations  - Instruct patient to call for assistance with activity based on assessment  - Modify environment to reduce risk of injury  - Consider OT/PT consult to assist with strengthening/mobility  Outcome: Progressing     Problem: Prexisting or High Potential for Compromised Skin Integrity  Goal: Skin integrity is maintained or improved  Description: INTERVENTIONS:  - Identify patients at risk for skin breakdown  - Assess and monitor skin integrity  - Assess and monitor nutrition and hydration status  - Monitor labs   - Assess for incontinence   - Turn and reposition patient  - Assist with mobility/ambulation  - Relieve pressure over bony prominences  - Avoid friction and shearing  - Provide appropriate hygiene as needed including keeping skin clean and dry  - Evaluate need for skin moisturizer/barrier cream  - Collaborate with interdisciplinary team   - Patient/family teaching  - Consider wound care consult   Outcome: Progressing     Problem: CARDIOVASCULAR - ADULT  Goal: Maintains optimal cardiac output and hemodynamic stability  Description: INTERVENTIONS:  - Monitor I/O, vital signs and rhythm  - Monitor for S/S and trends of decreased cardiac output  - Administer and titrate ordered vasoactive medications to optimize hemodynamic stability  - Assess quality of pulses, skin color and temperature  - Assess for signs of decreased coronary artery perfusion  - Instruct patient to report change in severity of symptoms  Outcome: Progressing  Goal: Absence of cardiac dysrhythmias or at baseline rhythm  Description: INTERVENTIONS:  - Continuous cardiac monitoring, vital signs, obtain 12 lead EKG if ordered  - Administer antiarrhythmic and heart rate control medications as ordered  - Monitor electrolytes and administer replacement therapy as ordered  Outcome: Progressing     Problem: METABOLIC, FLUID AND ELECTROLYTES - ADULT  Goal: Electrolytes maintained within normal limits  Description: INTERVENTIONS:  - Monitor labs and assess patient for signs and symptoms of electrolyte imbalances  - Administer electrolyte replacement as ordered  - Monitor response to electrolyte replacements, including repeat lab results as appropriate  - Instruct patient on fluid and nutrition as appropriate  Outcome: Progressing  Goal: Fluid balance maintained  Description: INTERVENTIONS:  - Monitor labs   - Monitor I/O and WT  - Instruct patient on fluid and nutrition as appropriate  - Assess for signs & symptoms of volume excess or deficit  Outcome: Progressing  Goal: Glucose maintained within target range  Description: INTERVENTIONS:  - Monitor Blood Glucose as ordered  - Assess for signs and symptoms of hyperglycemia and hypoglycemia  - Administer ordered medications to maintain glucose within target range  - Assess nutritional intake and initiate nutrition service referral as needed  Outcome: Progressing

## 2021-04-02 ENCOUNTER — APPOINTMENT (INPATIENT)
Dept: RADIOLOGY | Facility: HOSPITAL | Age: 71
DRG: 871 | End: 2021-04-02
Payer: MEDICARE

## 2021-04-02 LAB
ANION GAP SERPL CALCULATED.3IONS-SCNC: 2 MMOL/L (ref 4–13)
BASOPHILS # BLD AUTO: 0 THOUSANDS/ΜL (ref 0–0.1)
BASOPHILS NFR BLD AUTO: 0 % (ref 0–1)
BUN SERPL-MCNC: 22 MG/DL (ref 5–25)
CALCIUM SERPL-MCNC: 8.1 MG/DL (ref 8.3–10.1)
CHLORIDE SERPL-SCNC: 107 MMOL/L (ref 100–108)
CO2 SERPL-SCNC: 31 MMOL/L (ref 21–32)
CREAT SERPL-MCNC: 0.68 MG/DL (ref 0.6–1.3)
EOSINOPHIL # BLD AUTO: 0 THOUSAND/ΜL (ref 0–0.61)
EOSINOPHIL NFR BLD AUTO: 0 % (ref 0–6)
ERYTHROCYTE [DISTWIDTH] IN BLOOD BY AUTOMATED COUNT: 13.2 % (ref 11.6–15.1)
GFR SERPL CREATININE-BSD FRML MDRD: 97 ML/MIN/1.73SQ M
GLUCOSE SERPL-MCNC: 129 MG/DL (ref 65–140)
GLUCOSE SERPL-MCNC: 139 MG/DL (ref 65–140)
GLUCOSE SERPL-MCNC: 140 MG/DL (ref 65–140)
GLUCOSE SERPL-MCNC: 151 MG/DL (ref 65–140)
GLUCOSE SERPL-MCNC: 154 MG/DL (ref 65–140)
HCT VFR BLD AUTO: 34.6 % (ref 36.5–49.3)
HGB BLD-MCNC: 11 G/DL (ref 12–17)
IMM GRANULOCYTES # BLD AUTO: 0.12 THOUSAND/UL (ref 0–0.2)
IMM GRANULOCYTES NFR BLD AUTO: 1 % (ref 0–2)
LYMPHOCYTES # BLD AUTO: 0.2 THOUSANDS/ΜL (ref 0.6–4.47)
LYMPHOCYTES NFR BLD AUTO: 2 % (ref 14–44)
MCH RBC QN AUTO: 29.1 PG (ref 26.8–34.3)
MCHC RBC AUTO-ENTMCNC: 31.8 G/DL (ref 31.4–37.4)
MCV RBC AUTO: 92 FL (ref 82–98)
MONOCYTES # BLD AUTO: 0.58 THOUSAND/ΜL (ref 0.17–1.22)
MONOCYTES NFR BLD AUTO: 5 % (ref 4–12)
NEUTROPHILS # BLD AUTO: 10.03 THOUSANDS/ΜL (ref 1.85–7.62)
NEUTS SEG NFR BLD AUTO: 92 % (ref 43–75)
NRBC BLD AUTO-RTO: 0 /100 WBCS
PLATELET # BLD AUTO: 182 THOUSANDS/UL (ref 149–390)
PMV BLD AUTO: 11.2 FL (ref 8.9–12.7)
POTASSIUM SERPL-SCNC: 4.4 MMOL/L (ref 3.5–5.3)
PROCALCITONIN SERPL-MCNC: 0.17 NG/ML
RBC # BLD AUTO: 3.78 MILLION/UL (ref 3.88–5.62)
SODIUM SERPL-SCNC: 140 MMOL/L (ref 136–145)
WBC # BLD AUTO: 10.93 THOUSAND/UL (ref 4.31–10.16)

## 2021-04-02 PROCEDURE — 80048 BASIC METABOLIC PNL TOTAL CA: CPT | Performed by: INTERNAL MEDICINE

## 2021-04-02 PROCEDURE — 97110 THERAPEUTIC EXERCISES: CPT

## 2021-04-02 PROCEDURE — 71045 X-RAY EXAM CHEST 1 VIEW: CPT

## 2021-04-02 PROCEDURE — 85025 COMPLETE CBC W/AUTO DIFF WBC: CPT | Performed by: INTERNAL MEDICINE

## 2021-04-02 PROCEDURE — 82948 REAGENT STRIP/BLOOD GLUCOSE: CPT

## 2021-04-02 PROCEDURE — 84145 PROCALCITONIN (PCT): CPT | Performed by: INTERNAL MEDICINE

## 2021-04-02 PROCEDURE — 99231 SBSQ HOSP IP/OBS SF/LOW 25: CPT | Performed by: THORACIC SURGERY (CARDIOTHORACIC VASCULAR SURGERY)

## 2021-04-02 PROCEDURE — 94640 AIRWAY INHALATION TREATMENT: CPT

## 2021-04-02 PROCEDURE — 94760 N-INVAS EAR/PLS OXIMETRY 1: CPT

## 2021-04-02 PROCEDURE — 87040 BLOOD CULTURE FOR BACTERIA: CPT | Performed by: INTERNAL MEDICINE

## 2021-04-02 PROCEDURE — 97530 THERAPEUTIC ACTIVITIES: CPT

## 2021-04-02 PROCEDURE — 99232 SBSQ HOSP IP/OBS MODERATE 35: CPT | Performed by: INTERNAL MEDICINE

## 2021-04-02 PROCEDURE — 99231 SBSQ HOSP IP/OBS SF/LOW 25: CPT | Performed by: NURSE PRACTITIONER

## 2021-04-02 RX ORDER — ALBUTEROL SULFATE 2.5 MG/3ML
2.5 SOLUTION RESPIRATORY (INHALATION) EVERY 4 HOURS PRN
Status: DISCONTINUED | OUTPATIENT
Start: 2021-04-02 | End: 2021-04-04 | Stop reason: HOSPADM

## 2021-04-02 RX ORDER — LEVALBUTEROL 1.25 MG/.5ML
1.25 SOLUTION, CONCENTRATE RESPIRATORY (INHALATION)
Status: DISCONTINUED | OUTPATIENT
Start: 2021-04-03 | End: 2021-04-04 | Stop reason: HOSPADM

## 2021-04-02 RX ADMIN — IPRATROPIUM BROMIDE 0.5 MG: 0.5 SOLUTION RESPIRATORY (INHALATION) at 02:09

## 2021-04-02 RX ADMIN — CEFEPIME HYDROCHLORIDE 2000 MG: 2 INJECTION, POWDER, FOR SOLUTION INTRAVENOUS at 09:53

## 2021-04-02 RX ADMIN — METHYLPREDNISOLONE SODIUM SUCCINATE 40 MG: 40 INJECTION, POWDER, FOR SOLUTION INTRAMUSCULAR; INTRAVENOUS at 20:15

## 2021-04-02 RX ADMIN — CEFEPIME HYDROCHLORIDE 2000 MG: 2 INJECTION, POWDER, FOR SOLUTION INTRAVENOUS at 00:34

## 2021-04-02 RX ADMIN — FAMOTIDINE 20 MG: 20 TABLET ORAL at 17:41

## 2021-04-02 RX ADMIN — BUDESONIDE 0.5 MG: 0.5 INHALANT ORAL at 20:59

## 2021-04-02 RX ADMIN — METHYLPREDNISOLONE SODIUM SUCCINATE 40 MG: 40 INJECTION, POWDER, FOR SOLUTION INTRAMUSCULAR; INTRAVENOUS at 09:52

## 2021-04-02 RX ADMIN — LEVALBUTEROL HYDROCHLORIDE 1.25 MG: 1.25 SOLUTION, CONCENTRATE RESPIRATORY (INHALATION) at 02:09

## 2021-04-02 RX ADMIN — IPRATROPIUM BROMIDE 0.5 MG: 0.5 SOLUTION RESPIRATORY (INHALATION) at 08:36

## 2021-04-02 RX ADMIN — ZINC SULFATE 220 MG (50 MG) CAPSULE 220 MG: CAPSULE at 09:52

## 2021-04-02 RX ADMIN — OXYCODONE HYDROCHLORIDE AND ACETAMINOPHEN 1000 MG: 500 TABLET ORAL at 20:15

## 2021-04-02 RX ADMIN — LEVALBUTEROL HYDROCHLORIDE 1.25 MG: 1.25 SOLUTION, CONCENTRATE RESPIRATORY (INHALATION) at 13:41

## 2021-04-02 RX ADMIN — VANCOMYCIN HYDROCHLORIDE 1000 MG: 1 INJECTION, SOLUTION INTRAVENOUS at 01:28

## 2021-04-02 RX ADMIN — HEPARIN SODIUM 5000 UNITS: 5000 INJECTION INTRAVENOUS; SUBCUTANEOUS at 06:56

## 2021-04-02 RX ADMIN — HEPARIN SODIUM 5000 UNITS: 5000 INJECTION INTRAVENOUS; SUBCUTANEOUS at 22:08

## 2021-04-02 RX ADMIN — LEVALBUTEROL HYDROCHLORIDE 1.25 MG: 1.25 SOLUTION, CONCENTRATE RESPIRATORY (INHALATION) at 08:36

## 2021-04-02 RX ADMIN — CHLORHEXIDINE GLUCONATE 0.12% ORAL RINSE 15 ML: 1.2 LIQUID ORAL at 20:15

## 2021-04-02 RX ADMIN — DOCUSATE SODIUM AND SENNOSIDES 1 TABLET: 8.6; 5 TABLET ORAL at 09:52

## 2021-04-02 RX ADMIN — ATORVASTATIN CALCIUM 40 MG: 40 TABLET, FILM COATED ORAL at 22:08

## 2021-04-02 RX ADMIN — FAMOTIDINE 20 MG: 20 TABLET ORAL at 09:52

## 2021-04-02 RX ADMIN — IPRATROPIUM BROMIDE 0.5 MG: 0.5 SOLUTION RESPIRATORY (INHALATION) at 13:41

## 2021-04-02 RX ADMIN — BUDESONIDE 0.5 MG: 0.5 INHALANT ORAL at 08:36

## 2021-04-02 RX ADMIN — LEVALBUTEROL HYDROCHLORIDE 1.25 MG: 1.25 SOLUTION, CONCENTRATE RESPIRATORY (INHALATION) at 20:59

## 2021-04-02 RX ADMIN — CHLORHEXIDINE GLUCONATE 0.12% ORAL RINSE 15 ML: 1.2 LIQUID ORAL at 09:52

## 2021-04-02 RX ADMIN — OXYCODONE HYDROCHLORIDE AND ACETAMINOPHEN 1000 MG: 500 TABLET ORAL at 09:52

## 2021-04-02 RX ADMIN — TAMSULOSIN HYDROCHLORIDE 0.4 MG: 0.4 CAPSULE ORAL at 17:41

## 2021-04-02 RX ADMIN — CEFEPIME HYDROCHLORIDE 2000 MG: 2 INJECTION, POWDER, FOR SOLUTION INTRAVENOUS at 20:32

## 2021-04-02 RX ADMIN — IPRATROPIUM BROMIDE 0.5 MG: 0.5 SOLUTION RESPIRATORY (INHALATION) at 20:58

## 2021-04-02 RX ADMIN — DOCUSATE SODIUM AND SENNOSIDES 1 TABLET: 8.6; 5 TABLET ORAL at 17:41

## 2021-04-02 RX ADMIN — HEPARIN SODIUM 5000 UNITS: 5000 INJECTION INTRAVENOUS; SUBCUTANEOUS at 13:09

## 2021-04-02 RX ADMIN — VANCOMYCIN HYDROCHLORIDE 1000 MG: 1 INJECTION, SOLUTION INTRAVENOUS at 13:08

## 2021-04-02 RX ADMIN — INSULIN LISPRO 1 UNITS: 100 INJECTION, SOLUTION INTRAVENOUS; SUBCUTANEOUS at 17:41

## 2021-04-02 NOTE — BRIEF OP NOTE (RAD/CATH)
INTERVENTIONAL RADIOLOGY PROCEDURE NOTE    Date: 4/1/2021    Procedure:  Up sizing and repositioning of right chest tube  Preoperative diagnosis:   1  Tension pneumothorax    2  COPD with acute exacerbation ([facility] [address] )    3  Acute respiratory failure with hypoxia and hypercapnia (HCC)    4  Lactic acidosis    5  Acute kidney injury ([facility] [address] )    6  Spontaneous pneumothorax    7  Shock ([facility] )         Postoperative diagnosis: Same  Surgeon: Donte Jain MD     Assistant: None  No qualified resident was available  Blood loss:  Minimal    Specimens:  None     Findings:  Upsizing and repositioning of right chest tube for pneumothorax  12 Pashto tube placed  Complications: None immediate      Anesthesia: conscious sedation

## 2021-04-02 NOTE — PROGRESS NOTES
Vancomycin IV Pharmacy-to-Dose Consultation    Connor Brock is a 79 y o  male who is currently receiving Vancomycin IV with management by the Pharmacy Consult service  Assessment/Plan:  The patient was reviewed  Renal function is stable and no signs or symptoms of nephrotoxicity and/or infusion reactions were documented in the chart  Based on todays assessment, continue current vancomycin (day # 4) dosing of 1000mg iv q12h, with a plan for trough to be drawn at 1030 on 4/6  We will continue to follow the patients culture results and clinical progress daily      Annemarie Darby, Pharmacist

## 2021-04-02 NOTE — PROGRESS NOTES
Progress Note -Interventional Radiology ENMANUEL Schaferbarak Held 79 y o  male MRN: [id number]  Unit/Bed#: -01 Encounter: [id number]    Assessment/Plan:    79year old male with PMH of COPD, COVID-19 3/12/21, presenting with respiratory failure, spontaneous right pneumothorax s/p right chest tube placement 20Fr in ED, with continued right moderate pneumothorax had IR 8f chest tube placed 3/30, with continuous air leak  Chest CT from 4/1 revealed it was retracted with subcutaneous emphysema, and unresolved hydropneumothorax  Chest tube was replaced with 12f chest tube  - continue chest tube to suction   - no air leak present at this time    Patient Active Problem List   Diagnosis    Hyperlipidemia    Centrilobular emphysema (HCC) - severe    Bilateral iliac artery aneurysm (HCC)    Arterial ectasia (HCC)    Diverticulosis, sigmoid    Hearing loss    Hemorrhoids    Lung nodule    Prediabetes    Skin disorder    BPH with obstruction/lower urinary tract symptoms    Essential hypertension    Seasonal allergies    Medicare annual wellness visit, subsequent    Other microscopic hematuria    Encounter for screening for lung cancer    Other fatigue    NSVT (nonsustained ventricular tachycardia) (HCC)    Abnormal ECG during exercise stress test    Encounter for support and coordination of transition of care    Prominent popliteal pulse    Chronic hypoxemic respiratory failure (HCC)    Viral infection, unspecified    Encounter for observation for suspected exposure to other biological agents ruled out    COVID-19    Pneumonia due to COVID-19 virus    Shock ([address] )    Acute on chronic respiratory failure with hypoxia ([address] )    Elevated lactic acid level    Spontaneous pneumothorax    Sepsis ([address] )    SIRS (systemic inflammatory response syndrome) (HCC)          Subjective: Christina Solomon is a 79 y o  male who presented with spontaneous ptx   Patient states his breathing is better after his tube upsize yesterday  He denies any pain at this time  Objective:    Vitals:  /74   Pulse 92   Temp 98 2 °F (36 8 °C) (Oral)   Resp 18   Wt 67 2 kg (148 lb 1 oz)   SpO2 97%   BMI 22 51 kg/m²   Body mass index is 22 51 kg/m²  Weight (last 2 days)     Date/Time   Weight    04/02/21 0600   67 2 (148 06)    04/01/21 0600   70 5 (155 5)    03/31/21 0600   70 4 (155 13)              I/Os:    Intake/Output Summary (Last 24 hours) at 4/2/2021 1321  Last data filed at 4/2/2021 0600  Gross per 24 hour   Intake 120 ml   Output 881 ml   Net -761 ml       Invasive Devices     Peripheral Intravenous Line            Peripheral IV 04/01/21 Right Hand less than 1 day          Drain            Chest Tube Right 12 Fr  less than 1 day                Physical Exam:  Physical Exam  Vitals signs and nursing note reviewed  Constitutional:       Appearance: Normal appearance  He is well-developed  Interventions: Nasal cannula in place  HENT:      Head: Normocephalic and atraumatic  Eyes:      Pupils: Pupils are equal, round, and reactive to light  Neck:      Musculoskeletal: Normal range of motion  Cardiovascular:      Rate and Rhythm: Normal rate and regular rhythm  Pulses: Normal pulses  Heart sounds: Normal heart sounds  Pulmonary:      Effort: Pulmonary effort is normal       Breath sounds: Decreased air movement present  Abdominal:      General: Bowel sounds are normal       Palpations: Abdomen is soft  Musculoskeletal: Normal range of motion  Skin:     General: Skin is warm and dry  Capillary Refill: Capillary refill takes less than 2 seconds  Neurological:      Mental Status: He is alert and oriented to person, place, and time     Psychiatric:         Behavior: Behavior normal                       Lab Results and Cultures:   CBC with diff:   Lab Results   Component Value Date    WBC 10 93 (H) 04/02/2021    HGB 11 0 (L) 04/02/2021    HCT 34 6 (L) 04/02/2021    MCV 92 04/02/2021     04/02/2021    MCH 29 1 04/02/2021    MCHC 31 8 04/02/2021    RDW 13 2 04/02/2021    MPV 11 2 04/02/2021    NRBC 0 04/02/2021      BMP/CMP:  Lab Results   Component Value Date    K 4 4 04/02/2021     04/02/2021    CO2 31 04/02/2021    CO2 31 03/29/2021    BUN 22 04/02/2021    CREATININE 0 68 04/02/2021    GLUCOSE 155 (H) 03/29/2021    CALCIUM 8 1 (L) 04/02/2021    AST 40 03/30/2021     (H) 03/30/2021    ALKPHOS 72 03/30/2021    EGFR 97 04/02/2021   ,     Coags:   Lab Results   Component Value Date    PTT 24 03/29/2021    INR 0 90 03/29/2021   ,   Results from last 7 days   Lab Units 03/29/21  0630   PTT seconds 24   INR  0 90        Lipid Panel: No results found for: CHOL  Lab Results   Component Value Date    HDL 56 11/10/2020     Lab Results   Component Value Date    HDL 56 11/10/2020     Lab Results   Component Value Date    LDLCALC 114 (H) 11/10/2020     Lab Results   Component Value Date    TRIG 93 11/10/2020       HgbA1c:   Lab Results   Component Value Date    HGBA1C 6 0 (H) 03/29/2021    HGBA1C 5 6 12/13/2019    HGBA1C 5 9 06/21/2019       Blood Culture:   Lab Results   Component Value Date    BLOODCX No Growth After 4 Days   03/29/2021   ,   Urinalysis:   Lab Results   Component Value Date    COLORU Yellow 03/29/2021    COLORU Yellow 08/25/2017    CLARITYU Cloudy 03/29/2021    CLARITYU Transparent 08/25/2017    SPECGRAV 1 008 03/29/2021    SPECGRAV 1 015 08/25/2017    PHUR 5 5 03/29/2021    PHUR 6 5 08/25/2017    LEUKOCYTESUR Negative 03/29/2021    LEUKOCYTESUR neg 08/25/2017    NITRITE Negative 03/29/2021    NITRITE neg 08/25/2017    PROTEINUA neg 08/25/2017    GLUCOSEU Negative 03/29/2021    KETONESU Negative 03/29/2021    KETONESU neg 08/25/2017    BILIRUBINUR Negative 03/29/2021    BILIRUBINUR neg 08/25/2017    BLOODU Large (A) 03/29/2021    BLOODU neg 08/25/2017   ,   Urine Culture: No results found for: URINECX,   Wound Culure:  No results found for: WOUNDCULT    VTE Pharmacologic Prophylaxis: Sequential compression device Tosha Esrtada)       Thank you for allowing me to participate in the care of Chavez  Please don't hesitate to call, text, email, or TigerText with any questions  This text is generated with voice recognition software  There may be translation, syntax,  or grammatical errors  If you have any questions, please contact the dictating provider

## 2021-04-02 NOTE — PROGRESS NOTES
[address] [facility]  Progress Note - Kulwinder Thomson Held 1950, 79 y o  male MRN: [id number]  Unit/Bed#: -01 Encounter: [id number]  Primary Care Provider: Bella Patel PA-C   Date and time admitted to hospital: 3/29/2021  6:17 AM    * Spontaneous pneumothorax  Assessment & Plan  · R sided spontaneous PTX POA in setting of severe COPD and COVID infection   · Chest tube in place  · Thoracic surgery , IR following    [facility] ([facility]  Assessment & Plan  · Severe sepsis, POA  Met SIRs criteria with tachycardia, tachypnea  Concern for possible sepsis on admission with leukocytosis, lactic acid and hypotension  · Blood cultures negative x2  · Procalcitonin levels noted  · Monitor off antibiotics  · Improving      Elevated lactic acid level  Assessment & Plan  Resolved  Monitor    Acute on chronic respiratory failure with hypoxia [facility])  Assessment & Plan  · Secondary to R spontaneous PTX, COPD, recent COVID infection   · Intubated and subsequently extubated 3/29 to 2 L NC which has been baseline O2 since COVID 3/12-3/22  · Continue to wean O2 for spo2 >88%  · Xopanex/Atrovent/Pulmicort nebs   · Respiratory/airway clearance protocol     [facility])  Assessment & Plan  Resolved, off levophed  Monitor    COVID-19  Assessment & Plan  · COVID-19 + 3/12 with admission 3/12 - 3/22; discharged then on 2L NC  · s/p completed covid pathway  · Continue vitamins, pepcid, statin   · On solumedrol for COPD     Centrilobular emphysema ([facility] ) - severe  Assessment & Plan  Severe COPD with acute exacerbation  Continue IV Solu-Medrol  Respiratory treatments      VTE Pharmacologic Prophylaxis:   Pharmacologic: Enoxaparin (Lovenox)  Mechanical VTE Prophylaxis in Place: Yes    Patient Centered Rounds: I have performed bedside rounds with nursing staff today      Discussions with Specialists or Other Care Team Provider:     Education and Discussions with Family / Patient:  Patient, updated spouse Jorje Kidd in detail questions answered    Time Spent for Care: 30 minutes  More than 50% of total time spent on counseling and coordination of care as described above  Current Length of Stay: 4 day(s)    Current Patient Status: Inpatient   Certification Statement: The patient will continue to require additional inpatient hospital stay due to as outlined    Discharge Plan:  Awaiting clinical symptomatic improvement, pneumothorax with chest tube in place thoracic surgery following    Code Status: Level 1 - Full Code      Subjective:     Reports feeling better  Encouraged incentive spirometry  Encouraged out of bed into a chair    Objective:     Vitals:   Temp (24hrs), Av °F (36 7 °C), Min:97 7 °F (36 5 °C), Max:98 4 °F (36 9 °C)    Temp:  [97 7 °F (36 5 °C)-98 4 °F (36 9 °C)] 98 1 °F (36 7 °C)  HR:  [75-92] 92  Resp:  [18-19] 18  BP: (110-129)/(74-82) 111/74  SpO2:  [91 %-100 %] 95 %  Body mass index is 22 51 kg/m²  Input and Output Summary (last 24 hours): Intake/Output Summary (Last 24 hours) at 2021 1644  Last data filed at 2021 1430  Gross per 24 hour   Intake 120 ml   Output 1581 ml   Net -1461 ml       Physical Exam:     Physical Exam    Comfortably sitting up in bed  Neck supple  Lungs emphysematous  Chest tube right side noted  Diminished breath sounds bilateral  Heart sounds S1 and S2 noted  Abdomen soft  Awake obeys simple commands  Pulses noted  No rash    Additional Data:     Labs:    Results from last 7 days   Lab Units 21  0519  21  0630   WBC Thousand/uL 10 93*   < > 36 29*   HEMOGLOBIN g/dL 11 0*   < > 15 7   I STAT HEMOGLOBIN   --    < >  --    HEMATOCRIT % 34 6*   < > 51 8*   HEMATOCRIT, ISTAT   --    < >  --    PLATELETS Thousands/uL 182   < > 466*   BANDS PCT %  --   --  1   NEUTROS PCT % 92*  --   --    LYMPHS PCT % 2*  --   --    LYMPHO PCT %  --   --  8*   MONOS PCT % 5  --   --    MONO PCT %  --   --  10   EOS PCT % 0  --  0    < > = values in this interval not displayed  Results from last 7 days   Lab Units 04/02/21  0519  03/30/21  0528   SODIUM mmol/L 140   < > 142   POTASSIUM mmol/L 4 4   < > 3 6   CHLORIDE mmol/L 107   < > 104   CO2 mmol/L 31   < > 31   BUN mg/dL 22   < > 14   CREATININE mg/dL 0 68   < > 0 84   ANION GAP mmol/L 2*   < > 7   CALCIUM mg/dL 8 1*   < > 8 0*   ALBUMIN g/dL  --   --  2 6*   TOTAL BILIRUBIN mg/dL  --   --  0 60   ALK PHOS U/L  --   --  72   ALT U/L  --   --  127*   AST U/L  --   --  40   GLUCOSE RANDOM mg/dL 154*   < > 155*    < > = values in this interval not displayed  Results from last 7 days   Lab Units 03/29/21  0630   INR  0 90     Results from last 7 days   Lab Units 04/02/21  1130 04/02/21  0629 04/01/21  2058 04/01/21  1725 04/01/21  1045 04/01/21  0558 03/31/21  2109 03/31/21  1659 03/31/21  1141 03/31/21  0614 03/31/21  0014 03/30/21  1731   POC GLUCOSE mg/dl 129 139 124 134 198* 149* 170* 121 151* 150* 163* 138     Results from last 7 days   Lab Units 03/29/21  0630   HEMOGLOBIN A1C % 6 0*     Results from last 7 days   Lab Units 04/02/21  0519 04/01/21  0600 04/01/21  0559 03/31/21  1341 03/31/21  0952 03/31/21  0544 03/31/21  0543  03/30/21  0528  03/29/21  0630   LACTIC ACID mmol/L  --   --  1 5 3 7* 3 2*  --  2 8*   < >  --    < > 4 1*   PROCALCITONIN ng/ml 0 17 0 37*  --   --   --  0 82*  --   --  1 50*  --  0 16    < > = values in this interval not displayed  * I Have Reviewed All Lab Data Listed Above  * Additional Pertinent Lab Tests Reviewed: All Labs Within Last 24 Hours Reviewed    Imaging:    Imaging Reports Reviewed Today Include:  Imaging studies noted  Imaging Personally Reviewed by Myself Includes:     Recent Cultures (last 7 days):     Results from last 7 days   Lab Units 03/30/21  1027 03/29/21  0632 03/29/21  0631   BLOOD CULTURE   --  No Growth After 4 Days  No Growth After 4 Days     LEGIONELLA URINARY ANTIGEN  Negative  --   --        Last 24 Hours Medication List:   Current Facility-Administered Medications   Medication Dose Route Frequency Provider Last Rate    ascorbic acid  1,000 mg Per NG Tube Q12H [address] Mateusz Fulton PA-C      atorvastatin  40 mg Per NG Tube HS Mateusz Fulton PA-C      budesonide  0 5 mg Nebulization Q12H [facility] [place]      chlorhexidine  15 mL Swish & Spit Q12H [address] [place], [place]      famotidine  20 mg Oral BID Mateusz Fulton PA-C      heparin (porcine)  5,000 Units Subcutaneous [place], [place]      insulin lispro  1-5 Units Subcutaneous TID AC Shanika Bridge, DO      insulin lispro  1-5 Units Subcutaneous HS Shanika Bridge, DO      ipratropium  0 5 mg Nebulization Q6H [facility] [place]      levalbuterol  1 25 mg Nebulization Q6H [facility] [place]      methylPREDNISolone sodium succinate  40 mg Intravenous Q12H [address] Shanika Bridge, DO      zinc sulfate  220 mg Per NG Tube Daily Mateusz Fulton PA-C      Followed by   Basim Jolly ON 4/5/2021] multivitamin with iron-minerals  15 mL Per NG Tube Daily Mateusz Fulton PA-C      polyethylene glycol  17 g Oral Daily PRN Mateusz Fulton PA-C      senna-docusate sodium  1 tablet Oral BID Mateusz Fulton PA-C      tamsulosin  0 4 mg Oral After   [facility] [address]          Today, Patient Was Seen By: Gilmar Morin MD    ** Please Note: Dictation voice to text software may have been used in the creation of this document   **

## 2021-04-02 NOTE — PROGRESS NOTES
Progress Note - Thoracic Surgery   Brianna Lerma Held 79 y o  male MRN: [id number]  Unit/Bed#: -01 Encounter: [id number]    Assessment:  79 y o  M who presented with a R sided spontaneous ptx in setting of a recent COVID infection  S/p bedside chest tube placement - into fissure 3/30  4/1 Upsizing/repositioning R CT 12 Fr - IR    R chest tube (sxn, no air leak) with minimal serosanguinous drainage    Afebrile  VSS on 2 L NC    Plan:  Diet as tolerated  Maintain R chest tube to suction  Pulmonary toilet  Wean oxygen as able  OOB/Ambulate  Remainder of care per primary team    Subjective/Objective     Subjective: No acute events overnight  Denies any chest pain or shortness of breath  Tolerating diet without nausea/vomiting  No fevers, chills  Objective:     Blood pressure 120/78, pulse 78, temperature 97 7 °F (36 5 °C), resp  rate 18, weight 70 5 kg (155 lb 8 oz), SpO2 96 %  ,Body mass index is 23 64 kg/m²        Intake/Output Summary (Last 24 hours) at 4/2/2021 0555  Last data filed at 4/2/2021 5692  Gross per 24 hour   Intake 976 67 ml   Output 756 ml   Net 220 67 ml       Invasive Devices     Peripheral Intravenous Line            Peripheral IV 04/01/21 Right Hand less than 1 day          Drain            Chest Tube Right 12 Fr  less than 1 day                Physical Exam:   NAD, alert and oriented x3  Normocephalic, atraumatic  MMM, EOMI, PERRLA  Norm resp effort on 2 L NC  R Chest tube (suction, no air leak) with serosanguinous drainage in canister   1250 on IS  RRR  Abd soft, NT/ND  No calf tenderness or peripheral edema  Motor/sensation intact in distal extremities  CN grossly intact  -rash/lesions      Lab, Imaging and other studies:CBC: No results found for: WBC, HGB, HCT, MCV, PLT, ADJUSTEDWBC, MCH, MCHC, RDW, MPV, NRBC, CMP: No results found for: SODIUM, K, CL, CO2, ANIONGAP, BUN, CREATININE, GLUCOSE, CALCIUM, AST, ALT, ALKPHOS, PROT, BILITOT, EGFR  VTE Pharmacologic Prophylaxis: Heparin  VTE Mechanical Prophylaxis: sequential compression device

## 2021-04-02 NOTE — TREATMENT TEAM
· Contacted by nursing regarding gram positive rods in one bottle from 3/29  Initiate IV Cefepime AFTER repeat blood cultures drawn  Check echocardiogram to r/o endocarditis  Monitor vitals and maintain hemodynamics  Serial procalcitonins have downtrended/normalized today, however

## 2021-04-02 NOTE — ASSESSMENT & PLAN NOTE
· COVID-19 + 3/12 with admission 3/12 - 3/22; discharged then on 2L NC  · s/p completed covid pathway  · Continue vitamins, pepcid, statin   · On solumedrol for COPD

## 2021-04-02 NOTE — PLAN OF CARE
Problem: OCCUPATIONAL THERAPY ADULT  Goal: Performs self-care activities at highest level of function for planned discharge setting  See evaluation for individualized goals  Description: Treatment Interventions: ADL retraining, Functional transfer training, Endurance training, Patient/family training, Equipment evaluation/education, Compensatory technique education, Activityengagement, Energy conservation  Equipment Recommended: Shower/Tub chair with back ($)(pt unsure if he owns [facility])       See flowsheet documentation for full assessment, interventions and recommendations  Outcome: Progressing  Note: Limitation: Decreased ADL status, Decreased endurance, Decreased self-care trans, Decreased high-level ADLs  Prognosis: Good  Assessment: Patient participated in Skilled OT session this date with interventions consisting of ADL re training with the use of correct body mechnaics, Energy Conservation techniques, deep breathing technique, safety awareness and fall prevention techniques, therapeutic exercise to: increase functional use of BUEs, increase BUE muscle strength ,  therapeutic activities to: increase activity tolerance, increase dynamic sit/ stand balance during functional activity  and increase OOB/ sitting tolerance   Upon arrival patient was found supine in bed  Pt demonstrated the following tasks: S sup to sit, CGA STS, CGA fnxl ambulation  Pt performed UE therex x20, as well as fnxl reaching/dynamic sitting balance task  Pt requires frequent rest breaks 2* fatigue, tho no c/o SOB during session  Pt remains between 90-94% t/o session  Pt completed LB dressing with S utilizing cross-legged techniques  Patient continues to be functioning below baseline level, occupational performance remains limited secondary to factors listed above and increased risk for falls and injury  From OT standpoint, recommendation at time of d/c would be home with skilled therapy     Patient to benefit from continued Occupational Therapy treatment while in the hospital to address deficits as defined above and maximize level of functional independence with ADLs and functional mobility  Pt was left in chair after session with all current needs met  The patient's raw score on the AM-PAC Daily Activity inpatient short form is 20, standardized score is 42 03, greater than 39 4  Patients at this level are likely to benefit from discharge to home  Please refer to the recommendation of the Occupational Therapist for safe discharge planning       OT Discharge Recommendation: Home with skilled therapy  OT - OK to Discharge: Yes(when medically stable )

## 2021-04-02 NOTE — ASSESSMENT & PLAN NOTE
· Severe sepsis, POA  Met SIRs criteria with tachycardia, tachypnea   Concern for possible sepsis on admission with leukocytosis, lactic acid and hypotension  · Blood cultures negative x2  · Procalcitonin levels noted  · Monitor off antibiotics  · Improving

## 2021-04-02 NOTE — ASSESSMENT & PLAN NOTE
· R sided spontaneous PTX POA in setting of severe COPD and COVID infection   · Chest tube in place  · Thoracic surgery , IR following

## 2021-04-02 NOTE — OCCUPATIONAL THERAPY NOTE
OccupationalTherapy Progress Note     Patient Name: Shira López Date: 4/2/2021  Problem List  Principal Problem:    Spontaneous pneumothorax  Active Problems:    Centrilobular emphysema ([facility] ) - severe    COVID-19    Shock ([facility] )    Acute on chronic respiratory failure with hypoxia (HCC)    Elevated lactic acid level    Sepsis ([facility] )          04/02/21 1319   OT Last Visit   OT Visit Date 04/02/21   Note Type   Note Type Treatment   Restrictions/Precautions   Weight Bearing Precautions Per Order No   Other Precautions Fall Risk;O2;Multiple lines;Contact/isolation; Airborne/isolation;Droplet precautions  (chest tube)   General   Response to Previous Treatment Patient with no complaints from previous session   Lifestyle   Autonomy At baseline pt was completing all ADLs/IADLs IND, ambulating IND w/o AD, (+) driving  Reciprocal Relationships supportive spouse   Service to Others retired   Intrinsic Gratification pt enjoys watching TV and fishing    Pain Assessment   Pain Assessment Tool 0-10   Pain Score No Pain   ADL   Where Assessed Chair   LB Dressing Assistance 5  Supervision/Setup   LB Dressing Deficit Supervision/safety; Increased time to complete; Don/doff R sock; Don/doff L sock; Verbal cueing   LB Dressing Comments Pt with difficulty completing initally  Educated on cros-legged techniques - S   Bed Mobility   Supine to Sit 5  Supervision   Additional items Assist x 1;HOB elevated; Increased time required   Sit to Supine Unable to assess   Transfers   Sit to Stand   (CGA)   Additional items Assist x 1; Increased time required   Stand to Sit   (CGA)   Additional items Assist x 1; Increased time required   Additional Comments Transfers with RW    Functional Mobility   Functional Mobility   (CGA)   Additional Comments Ax1 bed to chair   Additional items Rolling walker   Therapeutic Exercise - ROM   UE-ROM Yes   ROM- Right Upper Extremities   R Shoulder AROM; Flexion;ABduction; Extension   R Elbow AROM;Elbow flexion;Elbow extension   R Position Seated   R Weight/Reps/Sets x20   ROM - Left Upper Extremities    L Shoulder AROM; Flexion;ABduction; Extension   L Elbow AROM;Elbow flexion;Elbow extension   L Position Seated   L Weight/Reps/Sets x20   Coordination   Gross Motor Pt also engaged in dynamic sitting balance and reaching task  Pt perform unilaterally bilaterally    Cognition   Overall Cognitive Status WFL   Arousal/Participation Alert; Responsive; Cooperative   Attention Within functional limits   Orientation Level Oriented X4   Memory Within functional limits   Following Commands Follows one step commands without difficulty   Comments Pt pleasant and cooperative t/o session    Activity Tolerance   Activity Tolerance Patient limited by fatigue   Medical Staff Made Aware RN clearance for session    Assessment   Assessment Patient participated in Skilled OT session this date with interventions consisting of ADL re training with the use of correct body mechnaics, Energy Conservation techniques, deep breathing technique, safety awareness and fall prevention techniques, therapeutic exercise to: increase functional use of BUEs, increase BUE muscle strength ,  therapeutic activities to: increase activity tolerance, increase dynamic sit/ stand balance during functional activity  and increase OOB/ sitting tolerance   Upon arrival patient was found supine in bed  Pt demonstrated the following tasks: S sup to sit, CGA STS, CGA fnxl ambulation  Pt performed UE therex x20, as well as fnxl reaching/dynamic sitting balance task  Pt requires frequent rest breaks 2* fatigue, tho no c/o SOB during session  Pt remains between 90-94% t/o session  Pt completed LB dressing with S utilizing cross-legged techniques  Patient continues to be functioning below baseline level, occupational performance remains limited secondary to factors listed above and increased risk for falls and injury     From OT standpoint, recommendation at time of d/c would be home with skilled therapy  Patient to benefit from continued Occupational Therapy treatment while in the hospital to address deficits as defined above and maximize level of functional independence with ADLs and functional mobility  Pt was left in chair after session with all current needs met  The patient's raw score on the AM-PAC Daily Activity inpatient short form is 20, standardized score is 42 03, greater than 39 4  Patients at this level are likely to benefit from discharge to home  Please refer to the recommendation of the Occupational Therapist for safe discharge planning  Plan   Treatment Interventions ADL retraining;Functional transfer training;UE strengthening/ROM; Endurance training;Patient/family training;Equipment evaluation/education; Fine motor coordination activities; Compensatory technique education;Continued evaluation; Energy conservation; Activityengagement   Goal Expiration Date 04/14/21   OT Treatment Day 1   OT Frequency 3-5x/wk   Recommendation   OT Discharge Recommendation Home with skilled therapy   Equipment Recommended Shower/Tub chair with back ($)   OT - OK to Discharge Yes  (when medically stable )   AM-[facility] Daily Activity Inpatient   Lower Body Dressing 3   Bathing 3   Toileting 3   Upper Body Dressing 3   Grooming 4   Eating 4   Daily Activity Raw Score 20   Daily Activity Standardized Score (Calc for Raw Score >=11) 42 03   AM-PAC Applied Cognition Inpatient   Following a Speech/Presentation 3   Understanding Ordinary Conversation 4   Taking Medications 4   Remembering Where Things Are Placed or Put Away 4   Remembering List of 4-5 Errands 4   Taking Care of Complicated Tasks 3   Applied Cognition Raw Score 22   Applied Cognition Standardized Score 47 83        Pankaj Santiago MS, OTR/L

## 2021-04-03 ENCOUNTER — APPOINTMENT (INPATIENT)
Dept: NON INVASIVE DIAGNOSTICS | Facility: HOSPITAL | Age: 71
DRG: 871 | End: 2021-04-03
Payer: MEDICARE

## 2021-04-03 ENCOUNTER — APPOINTMENT (INPATIENT)
Dept: RADIOLOGY | Facility: HOSPITAL | Age: 71
DRG: 871 | End: 2021-04-03
Payer: MEDICARE

## 2021-04-03 LAB
BACTERIA BLD CULT: NORMAL
GLUCOSE SERPL-MCNC: 115 MG/DL (ref 65–140)
GLUCOSE SERPL-MCNC: 124 MG/DL (ref 65–140)
GLUCOSE SERPL-MCNC: 142 MG/DL (ref 65–140)
GLUCOSE SERPL-MCNC: 157 MG/DL (ref 65–140)
PROCALCITONIN SERPL-MCNC: 0.09 NG/ML

## 2021-04-03 PROCEDURE — 71045 X-RAY EXAM CHEST 1 VIEW: CPT

## 2021-04-03 PROCEDURE — 82948 REAGENT STRIP/BLOOD GLUCOSE: CPT

## 2021-04-03 PROCEDURE — 94640 AIRWAY INHALATION TREATMENT: CPT

## 2021-04-03 PROCEDURE — 84145 PROCALCITONIN (PCT): CPT | Performed by: INTERNAL MEDICINE

## 2021-04-03 PROCEDURE — 94760 N-INVAS EAR/PLS OXIMETRY 1: CPT

## 2021-04-03 PROCEDURE — 93306 TTE W/DOPPLER COMPLETE: CPT

## 2021-04-03 PROCEDURE — 99231 SBSQ HOSP IP/OBS SF/LOW 25: CPT | Performed by: THORACIC SURGERY (CARDIOTHORACIC VASCULAR SURGERY)

## 2021-04-03 PROCEDURE — 99232 SBSQ HOSP IP/OBS MODERATE 35: CPT | Performed by: INTERNAL MEDICINE

## 2021-04-03 RX ORDER — PREDNISONE 20 MG/1
40 TABLET ORAL DAILY
Status: DISCONTINUED | OUTPATIENT
Start: 2021-04-04 | End: 2021-04-04 | Stop reason: HOSPADM

## 2021-04-03 RX ADMIN — OXYCODONE HYDROCHLORIDE AND ACETAMINOPHEN 1000 MG: 500 TABLET ORAL at 08:09

## 2021-04-03 RX ADMIN — DOCUSATE SODIUM AND SENNOSIDES 1 TABLET: 8.6; 5 TABLET ORAL at 17:11

## 2021-04-03 RX ADMIN — DOCUSATE SODIUM AND SENNOSIDES 1 TABLET: 8.6; 5 TABLET ORAL at 08:09

## 2021-04-03 RX ADMIN — BUDESONIDE 0.5 MG: 0.5 INHALANT ORAL at 08:26

## 2021-04-03 RX ADMIN — LEVALBUTEROL HYDROCHLORIDE 1.25 MG: 1.25 SOLUTION, CONCENTRATE RESPIRATORY (INHALATION) at 19:43

## 2021-04-03 RX ADMIN — OXYCODONE HYDROCHLORIDE AND ACETAMINOPHEN 1000 MG: 500 TABLET ORAL at 20:11

## 2021-04-03 RX ADMIN — IPRATROPIUM BROMIDE 0.5 MG: 0.5 SOLUTION RESPIRATORY (INHALATION) at 15:00

## 2021-04-03 RX ADMIN — TAMSULOSIN HYDROCHLORIDE 0.4 MG: 0.4 CAPSULE ORAL at 17:11

## 2021-04-03 RX ADMIN — HEPARIN SODIUM 5000 UNITS: 5000 INJECTION INTRAVENOUS; SUBCUTANEOUS at 13:06

## 2021-04-03 RX ADMIN — LEVALBUTEROL HYDROCHLORIDE 1.25 MG: 1.25 SOLUTION, CONCENTRATE RESPIRATORY (INHALATION) at 08:26

## 2021-04-03 RX ADMIN — IPRATROPIUM BROMIDE 0.5 MG: 0.5 SOLUTION RESPIRATORY (INHALATION) at 08:26

## 2021-04-03 RX ADMIN — HEPARIN SODIUM 5000 UNITS: 5000 INJECTION INTRAVENOUS; SUBCUTANEOUS at 22:45

## 2021-04-03 RX ADMIN — ZINC SULFATE 220 MG (50 MG) CAPSULE 220 MG: CAPSULE at 08:09

## 2021-04-03 RX ADMIN — HEPARIN SODIUM 5000 UNITS: 5000 INJECTION INTRAVENOUS; SUBCUTANEOUS at 05:06

## 2021-04-03 RX ADMIN — CEFEPIME HYDROCHLORIDE 2000 MG: 2 INJECTION, POWDER, FOR SOLUTION INTRAVENOUS at 08:10

## 2021-04-03 RX ADMIN — BUDESONIDE 0.5 MG: 0.5 INHALANT ORAL at 19:44

## 2021-04-03 RX ADMIN — ATORVASTATIN CALCIUM 40 MG: 40 TABLET, FILM COATED ORAL at 22:45

## 2021-04-03 RX ADMIN — CHLORHEXIDINE GLUCONATE 0.12% ORAL RINSE 15 ML: 1.2 LIQUID ORAL at 20:11

## 2021-04-03 RX ADMIN — CHLORHEXIDINE GLUCONATE 0.12% ORAL RINSE 15 ML: 1.2 LIQUID ORAL at 08:09

## 2021-04-03 RX ADMIN — FAMOTIDINE 20 MG: 20 TABLET ORAL at 17:11

## 2021-04-03 RX ADMIN — INSULIN LISPRO 1 UNITS: 100 INJECTION, SOLUTION INTRAVENOUS; SUBCUTANEOUS at 12:23

## 2021-04-03 RX ADMIN — METHYLPREDNISOLONE SODIUM SUCCINATE 40 MG: 40 INJECTION, POWDER, FOR SOLUTION INTRAMUSCULAR; INTRAVENOUS at 08:09

## 2021-04-03 RX ADMIN — LEVALBUTEROL HYDROCHLORIDE 1.25 MG: 1.25 SOLUTION, CONCENTRATE RESPIRATORY (INHALATION) at 15:00

## 2021-04-03 RX ADMIN — IPRATROPIUM BROMIDE 0.5 MG: 0.5 SOLUTION RESPIRATORY (INHALATION) at 19:44

## 2021-04-03 RX ADMIN — FAMOTIDINE 20 MG: 20 TABLET ORAL at 08:09

## 2021-04-03 RX ADMIN — CEFEPIME HYDROCHLORIDE 2000 MG: 2 INJECTION, POWDER, FOR SOLUTION INTRAVENOUS at 20:11

## 2021-04-03 NOTE — PLAN OF CARE
Problem: Potential for Falls  Goal: Patient will remain free of falls  Description: INTERVENTIONS:  - Assess patient frequently for physical needs  -  Identify cognitive and physical deficits and behaviors that affect risk of falls    -  [place] fall precautions as indicated by assessment   - Educate patient/family on patient safety including physical limitations  - Instruct patient to call for assistance with activity based on assessment  - Modify environment to reduce risk of injury  - Consider OT/PT consult to assist with strengthening/mobility  Outcome: Progressing

## 2021-04-03 NOTE — ASSESSMENT & PLAN NOTE
· COVID-19 + 3/12 with admission 3/12 - 3/22; discharged then on 2L NC  · s/p completed covid pathway  · Continue vitamins, pepcid, statin

## 2021-04-03 NOTE — CONSULTS
Vancomycin Pharmacy Consult      Vancomycin has been discontinued; Pharmacy will sign off now  Thank you for involving us in this patient's care  Please do not hesitate to reach back out to Pharmacy  Corie Godinez, PharmD  Internal Medicine Clinical Pharmacist Specialist  [phone]  [facility]

## 2021-04-03 NOTE — PROGRESS NOTES
Progress Note - Thoracic Surgery   Miguel Ángel Gardner Held 79 y o  male MRN: [id number]  Unit/Bed#: -01 Encounter: [id number]    Assessment:  79 y o  M who presented with a R sided spontaneous ptx in setting of a recent COVID infection  S/p bedside chest tube placement - into fissure 3/30 followed by IR upsizing/repositioning R CT (4/1)    R chest tube (sxn, no air leak) with 10cc serosanguinous drainage    Plan:  · Diet as tolerated  · Maintain R chest tube to suction  · Pulmonary toilet  · Wean oxygen as able  · OOB/Ambulate  · Remainder of care per primary team    Subjective/Objective     Subjective: No acute events  No new complaints  Objective:     Blood pressure 108/71, pulse 82, temperature 98 4 °F (36 9 °C), resp  rate 18, weight 67 2 kg (148 lb 1 oz), SpO2 95 %  ,Body mass index is 22 51 kg/m²  Intake/Output Summary (Last 24 hours) at 4/3/2021 0008  Last data filed at 4/2/2021 1430  Gross per 24 hour   Intake --   Output 1275 ml   Net -1275 ml       Invasive Devices     Peripheral Intravenous Line            Peripheral IV 04/01/21 Right Hand 1 day          Drain            Chest Tube Right 12 Fr  1 day                Physical Exam:   GEN: NAD  HEENT: MMM  CV: warm/well perfused  Lung: normal effort  R CT sxn, -AL  Ab: Soft, NT/ND  Extrem: No CCE  Neuro:  A+Ox3, motor and sensation grossly intact      Lab, Imaging and other studies:CBC:   Lab Results   Component Value Date    WBC 10 93 (H) 04/02/2021    HGB 11 0 (L) 04/02/2021    HCT 34 6 (L) 04/02/2021    MCV 92 04/02/2021     04/02/2021    MCH 29 1 04/02/2021    MCHC 31 8 04/02/2021    RDW 13 2 04/02/2021    MPV 11 2 04/02/2021    NRBC 0 04/02/2021   , CMP:   Lab Results   Component Value Date    SODIUM 140 04/02/2021    K 4 4 04/02/2021     04/02/2021    CO2 31 04/02/2021    BUN 22 04/02/2021    CREATININE 0 68 04/02/2021    CALCIUM 8 1 (L) 04/02/2021    EGFR 97 04/02/2021     VTE Pharmacologic Prophylaxis: Heparin  VTE Mechanical Prophylaxis: sequential compression device

## 2021-04-03 NOTE — ASSESSMENT & PLAN NOTE
Severe COPD with acute exacerbation  Transition to p o   Prednisone 40 mg for 5 days  Respiratory treatments

## 2021-04-03 NOTE — PROGRESS NOTES
[address] [facility]  Progress Note - Maren Aguila Held 1950, 79 y o  male MRN: [id number]  Unit/Bed#: -01 Encounter: [id number]  Primary Care Provider: Kevin Lin PA-C   Date and time admitted to hospital: 3/29/2021  6:17 AM    * Spontaneous pneumothorax  Assessment & Plan  · R sided spontaneous PTX POA in setting of severe COPD and COVID infection   · Chest tube in place  · Thoracic surgery , IR following    Sepsis ([facility] [address] )  Assessment & Plan  · Severe sepsis, POA  Met SIRs criteria with tachycardia, tachypnea  Concern for possible sepsis on admission with leukocytosis, lactic acid and hypotension  · Procalcitonin levels noted  · One of 2 blood cultures positive for Gram-positive rods - patient nontoxic appearing hemodynamically stable possibly contaminant  · Empirically on IV cefepime  · Follow-up for    Elevated lactic acid level  Assessment & Plan  Resolved  Monitor    Acute on chronic respiratory failure with hypoxia [facility])  Assessment & Plan  · Secondary to R spontaneous PTX, COPD, recent COVID infection   · Intubated and subsequently extubated 3/29 to 2 L NC which has been baseline O2 since COVID 3/12-3/22  · Continue to wean O2 for spo2 >88%  · Xopanex/Atrovent/Pulmicort nebs   · Respiratory/airway clearance protocol     [facility])  Assessment & Plan  Resolved, off levophed  Monitor    COVID-19  Assessment & Plan  · COVID-19 + 3/12 with admission 3/12 - 3/22; discharged then on 2L NC  · s/p completed covid pathway  · Continue vitamins, pepcid, statin      Centrilobular emphysema ([facility] [address] ) - severe  Assessment & Plan  Severe COPD with acute exacerbation  Transition to p o  Prednisone 40 mg for 5 days  Respiratory treatments      VTE Pharmacologic Prophylaxis:   Pharmacologic: Heparin  Mechanical VTE Prophylaxis in Place: Yes    Patient Centered Rounds: I have performed bedside rounds with nursing staff today      Discussions with Specialists or Other Care Team Provider: Education and Discussions with Family / Patient:  Patient, updated spouse Silva Painter    Time Spent for Care: 30 minutes  More than 50% of total time spent on counseling and coordination of care as described above  Current Length of Stay: 6 day(s)    Current Patient Status: Inpatient   Certification Statement: The patient will continue to require additional inpatient hospital stay due to as outlined    Discharge Plan:  Pneumothorax a chest tube in place thoracic surgery following    Code Status: Level 1 - Full Code      Subjective:     Comfortably sitting up in bed  Reports feeling better  Encouraged incentive spirometry  Encouraged out of bed into chair    Objective:     Vitals:   Temp (24hrs), Av 4 °F (36 9 °C), Min:98 °F (36 7 °C), Max:98 9 °F (37 2 °C)    Temp:  [98 °F (36 7 °C)-98 9 °F (37 2 °C)] 98 9 °F (37 2 °C)  HR:  [] 82  Resp:  [18] 18  BP: (109-127)/(64-73) 111/65  SpO2:  [93 %-96 %] 96 %  Body mass index is 23 2 kg/m²  Input and Output Summary (last 24 hours):        Intake/Output Summary (Last 24 hours) at 2021 0913  Last data filed at 2021 7836  Gross per 24 hour   Intake 340 ml   Output 1107 ml   Net -767 ml       Physical Exam:     Physical Exam    Comfortably sitting up in bed  Neck supple  Lungs emphysematous  Diminished breath sounds bilateral  Right-sided chest tube noted  Heart sounds S1-S2 noted  Abdomen soft nontender  Awake obeys simple commands  Pulses noted  No rash    Additional Data:     Labs:    Results from last 7 days   Lab Units 21  0519  21  0630   WBC Thousand/uL 10 93*   < > 36 29*   HEMOGLOBIN g/dL 11 0*   < > 15 7   I STAT HEMOGLOBIN   --    < >  --    HEMATOCRIT % 34 6*   < > 51 8*   HEMATOCRIT, ISTAT   --    < >  --    PLATELETS Thousands/uL 182   < > 466*   BANDS PCT %  --   --  1   NEUTROS PCT % 92*  --   --    LYMPHS PCT % 2*  --   --    LYMPHO PCT %  --   --  8*   MONOS PCT % 5  --   --    MONO PCT %  --   --  10   EOS PCT % 0  --  0 < > = values in this interval not displayed  Results from last 7 days   Lab Units 04/02/21  0519  03/30/21  0528   SODIUM mmol/L 140   < > 142   POTASSIUM mmol/L 4 4   < > 3 6   CHLORIDE mmol/L 107   < > 104   CO2 mmol/L 31   < > 31   BUN mg/dL 22   < > 14   CREATININE mg/dL 0 68   < > 0 84   ANION GAP mmol/L 2*   < > 7   CALCIUM mg/dL 8 1*   < > 8 0*   ALBUMIN g/dL  --   --  2 6*   TOTAL BILIRUBIN mg/dL  --   --  0 60   ALK PHOS U/L  --   --  72   ALT U/L  --   --  127*   AST U/L  --   --  40   GLUCOSE RANDOM mg/dL 154*   < > 155*    < > = values in this interval not displayed  Results from last 7 days   Lab Units 03/29/21  0630   INR  0 90     Results from last 7 days   Lab Units 04/04/21  0631 04/03/21  2249 04/03/21  1616 04/03/21  1153 04/03/21  0601 04/02/21  2109 04/02/21  1646 04/02/21  1130 04/02/21  0629 04/01/21  2058 04/01/21  1725 04/01/21  1045   POC GLUCOSE mg/dl 96 115 124 157* 142* 140 151* 129 139 124 134 198*     Results from last 7 days   Lab Units 03/29/21  0630   HEMOGLOBIN A1C % 6 0*     Results from last 7 days   Lab Units 04/03/21  0505 04/02/21  0519 04/01/21  0600 04/01/21  0559 03/31/21  1341 03/31/21  0952 03/31/21  0544 03/31/21  0543  03/30/21  0528   LACTIC ACID mmol/L  --   --   --  1 5 3 7* 3 2*  --  2 8*   < >  --    PROCALCITONIN ng/ml 0 09 0 17 0 37*  --   --   --  0 82*  --   --  1 50*    < > = values in this interval not displayed  * I Have Reviewed All Lab Data Listed Above  * Additional Pertinent Lab Tests Reviewed: All Labs Within Last 24 Hours Reviewed    Imaging:    Imaging Reports Reviewed Today Include:   Imaging Personally Reviewed by Myself Includes:    Recent Cultures (last 7 days):     Results from last 7 days   Lab Units 04/02/21 2029 04/02/21 2023 03/30/21  1027 03/29/21  0632 03/29/21  0631   BLOOD CULTURE  No Growth at 24 hrs  No Growth at 24 hrs   --   --  No Growth After 5 Days     GRAM STAIN RESULT   --   --   --  Gram positive rods*  -- LEGIONELLA URINARY ANTIGEN   --   --  Negative  --   --        Last 24 Hours Medication List:   Current Facility-Administered Medications   Medication Dose Route Frequency Provider Last Rate    albuterol  2 5 mg Nebulization Q4H PRN Stephy Jonas MD      ascorbic acid  1,000 mg Per NG Tube Q12H [address] Laurence Bashir PA-C      atorvastatin  40 mg Per NG Tube HS Laurence Bashir PA-C      budesonide  0 5 mg Nebulization Q12H Laurence Bashir PA-C      cefepime  2,000 mg Intravenous Q12H Gemini Woods MD 2,000 mg (04/03/21 2011)    chlorhexidine  15 mL [place], [place]      famotidine  20 mg Oral BID Laurence Bashir PA-C      heparin (porcine)  5,000 Units Subcutaneous [place], [place]      insulin lispro  1-5 Units Subcutaneous TID AC Pau Mcgowan DO      insulin lispro  1-5 Units Subcutaneous HS Pau Mcgowan DO      ipratropium  0 5 mg Nebulization TID Stephy Jonas MD      levalbuterol  1 25 mg Nebulization TID Stephy Jonas MD      zinc sulfate  220 mg Per NG Tube Daily Laurence Bashir PA-C      Followed by   Omar Francisco ON 4/5/2021] multivitamin with iron-minerals  15 mL Per NG Tube Daily Laurence Bashir PA-C      polyethylene glycol  17 g Oral Daily PRN Laurence Bashir PA-C      predniSONE  40 mg Oral Daily Stephy Jonas MD      senna-docusate sodium  1 tablet Oral BID Laurence Bashir PA-C      tamsulosin  0 4 mg Oral After   [address] [place]          Today, Patient Was Seen By: Stephy Jonas MD    ** Please Note: Dictation voice to text software may have been used in the creation of this document   **

## 2021-04-04 ENCOUNTER — APPOINTMENT (INPATIENT)
Dept: RADIOLOGY | Facility: HOSPITAL | Age: 71
DRG: 871 | End: 2021-04-04
Payer: MEDICARE

## 2021-04-04 VITALS
RESPIRATION RATE: 18 BRPM | HEART RATE: 82 BPM | DIASTOLIC BLOOD PRESSURE: 65 MMHG | SYSTOLIC BLOOD PRESSURE: 111 MMHG | BODY MASS INDEX: 23.2 KG/M2 | OXYGEN SATURATION: 96 % | TEMPERATURE: 98.9 F | WEIGHT: 152.6 LBS

## 2021-04-04 LAB
GLUCOSE SERPL-MCNC: 112 MG/DL (ref 65–140)
GLUCOSE SERPL-MCNC: 96 MG/DL (ref 65–140)

## 2021-04-04 PROCEDURE — NC001 PR NO CHARGE: Performed by: THORACIC SURGERY (CARDIOTHORACIC VASCULAR SURGERY)

## 2021-04-04 PROCEDURE — 82948 REAGENT STRIP/BLOOD GLUCOSE: CPT

## 2021-04-04 PROCEDURE — 93306 TTE W/DOPPLER COMPLETE: CPT | Performed by: INTERNAL MEDICINE

## 2021-04-04 PROCEDURE — 99239 HOSP IP/OBS DSCHRG MGMT >30: CPT | Performed by: INTERNAL MEDICINE

## 2021-04-04 PROCEDURE — 99231 SBSQ HOSP IP/OBS SF/LOW 25: CPT | Performed by: THORACIC SURGERY (CARDIOTHORACIC VASCULAR SURGERY)

## 2021-04-04 PROCEDURE — 71045 X-RAY EXAM CHEST 1 VIEW: CPT

## 2021-04-04 PROCEDURE — 94640 AIRWAY INHALATION TREATMENT: CPT

## 2021-04-04 PROCEDURE — 94760 N-INVAS EAR/PLS OXIMETRY 1: CPT

## 2021-04-04 RX ORDER — PREDNISONE 20 MG/1
40 TABLET ORAL DAILY
Qty: 8 TABLET | Refills: 0 | Status: SHIPPED | OUTPATIENT
Start: 2021-04-05 | End: 2021-04-09

## 2021-04-04 RX ADMIN — HEPARIN SODIUM 5000 UNITS: 5000 INJECTION INTRAVENOUS; SUBCUTANEOUS at 05:42

## 2021-04-04 RX ADMIN — PREDNISONE 40 MG: 20 TABLET ORAL at 09:56

## 2021-04-04 RX ADMIN — OXYCODONE HYDROCHLORIDE AND ACETAMINOPHEN 1000 MG: 500 TABLET ORAL at 09:57

## 2021-04-04 RX ADMIN — DOCUSATE SODIUM AND SENNOSIDES 1 TABLET: 8.6; 5 TABLET ORAL at 09:56

## 2021-04-04 RX ADMIN — ZINC SULFATE 220 MG (50 MG) CAPSULE 220 MG: CAPSULE at 09:57

## 2021-04-04 RX ADMIN — CEFEPIME HYDROCHLORIDE 2000 MG: 2 INJECTION, POWDER, FOR SOLUTION INTRAVENOUS at 09:00

## 2021-04-04 RX ADMIN — BUDESONIDE 0.5 MG: 0.5 INHALANT ORAL at 07:37

## 2021-04-04 RX ADMIN — LEVALBUTEROL HYDROCHLORIDE 1.25 MG: 1.25 SOLUTION, CONCENTRATE RESPIRATORY (INHALATION) at 07:34

## 2021-04-04 RX ADMIN — IPRATROPIUM BROMIDE 0.5 MG: 0.5 SOLUTION RESPIRATORY (INHALATION) at 07:34

## 2021-04-04 RX ADMIN — FAMOTIDINE 20 MG: 20 TABLET ORAL at 09:57

## 2021-04-04 RX ADMIN — CHLORHEXIDINE GLUCONATE 0.12% ORAL RINSE 15 ML: 1.2 LIQUID ORAL at 09:57

## 2021-04-04 NOTE — ASSESSMENT & PLAN NOTE
· R sided spontaneous PTX POA in setting of severe COPD and COVID infection   · Chest tube discontinued  · Discussed with thoracic surgery patient is cleared for discharge with outpatient follow-up

## 2021-04-04 NOTE — DISCHARGE SUMMARY
[address] [facility]  Discharge- Lamont Grant Held 1950, 79 y o  male MRN: [id number]  Unit/Bed#: -01 Encounter: [id number]  Primary Care Provider: Tatiana Walden PA-C   Date and time admitted to hospital: 3/29/2021  6:17 AM    * Spontaneous pneumothorax  Assessment & Plan  · R sided spontaneous PTX POA in setting of severe COPD and COVID infection   · Chest tube discontinued  · Discussed with thoracic surgery patient is cleared for discharge with outpatient follow-up    Sepsis [facility])  Assessment & Plan  · Severe sepsis, POA  Met SIRs criteria with tachycardia, tachypnea  Concern for possible sepsis on admission with leukocytosis, lactic acid and hypotension  · Procalcitonin levels noted    · One of 2 blood cultures positive for Gram-positive rods - patient nontoxic appearing hemodynamically stable likely contaminant  · Discontinue antibiotics      Elevated lactic acid level  Assessment & Plan  Resolved  Monitor    Acute on chronic respiratory failure with hypoxia (HCC)  Assessment & Plan  · Secondary to R spontaneous PTX, COPD, recent COVID infection   · Intubated and subsequently extubated 3/29 to 2 L NC which has been baseline O2 since COVID 3/12-3/22  · Continue to wean O2 for spo2 >88%  · Xopanex/Atrovent/Pulmicort nebs   · Respiratory/airway clearance protocol     Shock [facility])  Assessment & Plan  Resolved, off levophed  Monitor    COVID-19  Assessment & Plan  · COVID-19 + 3/12 with admission 3/12 - 3/22; discharged then on 2L NC  · s/p completed covid pathway  · Continue vitamins, pepcid, statin      Centrilobular emphysema ([address] ) - severe  Assessment & Plan  Severe COPD with acute exacerbation  Transition to p o   Prednisone 40 mg for 5 days  Respiratory treatments        Discharge Summary - [facility]    Patient Information: Lamont Grant Held 79 y o  male MRN: [id number]  Unit/Bed#: -01 Encounter: [id number]    Discharging Physician / Practitioner: Anuradha Mon Robin Freed MD  PCP: Veronica Paris PA-C  Admission Date: 3/29/2021  Discharge Date: 04/04/21    Disposition:     Home    Reason for Admission:  Shortness of breath    Discharge Diagnoses:     Principal Problem:    Spontaneous pneumothorax  Active Problems:    Centrilobular emphysema ([address] ) - severe    COVID-19    Shock ([address] )    Acute on chronic respiratory failure with hypoxia (HCC)    Elevated lactic acid level    Sepsis ([facility] [address] )  Resolved Problems:    * No resolved hospital problems  *      Consultations During Hospital Stay:  · Thoracic surgery    Procedures Performed:     · Chest radiograph large right-sided pneumothorax suspicious for tension  · CT chest persistent right hydropneumothorax subcutaneous emphysema, advanced emphysema      Hospital Course:     Gerald Bradley is a 79 y o  male patient who originally presented to the hospital on 3/29/2021 due to shortness of breath  Patient presented with worsening shortness of breath, history of emphysema hypertension recent COVID-19 infection  He has chronic hypoxic respiratory failure requiring 2 L supplemental oxygen  On admission he was noted to have acute hypoxic and hypercapnic respiratory failure, right-sided pneumothorax likely retention  Patient was admitted to the ICU  Patient was seen in consultation with thoracic surgeon IR and underwent chest tube placement  He has history of severe emphysema COPD, likely with exacerbation he was placed on IV Solu-Medrol which was transition to p o  Prednisone to complete prednisone 40 mg for 5 days  He was closely monitored and is pneumothorax is now resolved  Chest tubes have been discontinued  One of 2 blood cultures reported to be positive for Gram-positive rods  He is hemodynamically stable nontoxic appearing afebrile  This is likely contaminant  Patient is symptomatically improved hemodynamically stable and is deemed ready for discharge today  Kindly review the chart for details      Condition at Discharge: fair     Discharge Day Visit / Exam:     Subjective:      Comfortably sitting up in bed  Reports feeling better  Agreeable to discharge plan    Vitals: Blood Pressure: 111/65 (04/04/21 0812)  Pulse: 82 (04/04/21 0812)  Temperature: 98 9 °F (37 2 °C) (04/04/21 0812)  Temp Source: Oral (04/04/21 9277)  Respirations: 18 (04/04/21 0812)  Weight - Scale: 69 2 kg (152 lb 9 6 oz) (04/04/21 0600)  SpO2: 96 % (04/04/21 0812)  Exam:   Physical Exam    Comfortably sitting up in bed  Neck supple  Lungs emphysematous  Diminished breath sounds bilateral  Heart sounds S1 and S2 noted  Abdomen soft  Awake obeys simple commands  Pulses noted  No rash    Discharge instructions/Information to patient and family:   See after visit summary for information provided to patient and family  Discharge plan discussed with thoracic surgery  Discharge plan discussed the patient, called and updated spouse Silva Painter in detail, questions answered  Outpatient follow-up with thoracic surgery pulmonary primary care physician    Provisions for Follow-Up Care:  See after visit summary for information related to follow-up care and any pertinent home health orders  Planned Readmission: no     Discharge Statement:  I spent 45 minutes discharging the patient  This time was spent on the day of discharge  I had direct contact with the patient on the day of discharge  Greater than 50% of the total time was spent examining patient, answering all patient questions, arranging and discussing plan of care with patient as well as directly providing post-discharge instructions  Additional time then spent on discharge activities  Discharge Medications:  See after visit summary for reconciled discharge medications provided to patient and family        ** Please Note: This note has been constructed using a voice recognition system **

## 2021-04-04 NOTE — PROGRESS NOTES
04/04/21    Procedure: Chest tube removal    R chest tube removed in routine fashion without incident  The patient tolerated the procedure well  A clean, occlusive dressing was placed  Will check a pa/lat chest x-ray       Elsy Ovalles MD

## 2021-04-04 NOTE — ASSESSMENT & PLAN NOTE
· Severe sepsis, POA  Met SIRs criteria with tachycardia, tachypnea   Concern for possible sepsis on admission with leukocytosis, lactic acid and hypotension  · Procalcitonin levels noted    · One of 2 blood cultures positive for Gram-positive rods - patient nontoxic appearing hemodynamically stable likely contaminant  · Discontinue antibiotics

## 2021-04-04 NOTE — PROGRESS NOTES
Progress Note - Thoracic Surgery   Gianna Armenta Held 79 y o  male MRN: [id number]  Unit/Bed#: -01 Encounter: [id number]    Assessment:  79 y o  M who presented with a R sided spontaneous ptx in setting of a recent COVID infection  S/p bedside chest tube placement - into fissure 3/30 followed by IR upsizing/repositioning R CT (4/1)    R chest tube (waterseal, no air leak) with 7cc drainage    Plan:  · Diet as tolerated  · Discontinue R CT and check post pull CXR  · Pulmonary toilet  · Wean oxygen as able  · OOB/Ambulate  · Remainder of care per primary team    Subjective/Objective     Subjective: No acute events overnight  No new complaints  Objective:     Blood pressure 112/65, pulse 80, temperature 98 °F (36 7 °C), resp  rate 18, weight 70 6 kg (155 lb 9 6 oz), SpO2 95 %  ,Body mass index is 23 66 kg/m²  Intake/Output Summary (Last 24 hours) at 4/4/2021 7076  Last data filed at 4/4/2021 0300  Gross per 24 hour   Intake --   Output 1457 ml   Net -1457 ml       Invasive Devices     Peripheral Intravenous Line            Peripheral IV 04/01/21 Right Hand 2 days          Drain            Chest Tube Right 12 Fr  2 days                Physical Exam:   GEN: NAD  HEENT: MMM  CV:   Warm/well perfused  Lung: normal effort  Right chest tube to water seal, no air leak, mostly serous drainage  Ab: Soft, NT/ND  Extrem: No CCE  Neuro:  A+Ox3, motor and sensation grossly intact      Lab, Imaging and other studies:CBC:   No results found for: WBC, HGB, HCT, MCV, PLT, ADJUSTEDWBC, MCH, MCHC, RDW, MPV, NRBC, CMP:   No results found for: SODIUM, K, CL, CO2, ANIONGAP, BUN, CREATININE, GLUCOSE, CALCIUM, AST, ALT, ALKPHOS, PROT, BILITOT, EGFR  VTE Pharmacologic Prophylaxis: Heparin  VTE Mechanical Prophylaxis: sequential compression device

## 2021-04-05 ENCOUNTER — TRANSITIONAL CARE MANAGEMENT (OUTPATIENT)
Dept: INTERNAL MEDICINE CLINIC | Facility: CLINIC | Age: 71
End: 2021-04-05

## 2021-04-06 LAB
BACTERIA BLD CULT: ABNORMAL
GRAM STN SPEC: ABNORMAL

## 2021-04-06 NOTE — SEPSIS NOTE
Sepsis Note   Lamont Justice 79 y o  male MRN: [id number]  Unit/Bed#: -01 Encounter: [id number]        Initial Sepsis Screening     Row Name 04/06/21 2805                Is the patient's history suggestive of a new or worsening infection? (!) Yes (Proceed)  -HB        Suspected source of infection  pneumonia  -HB        Are two or more of the following signs & symptoms of infection both present and new to the patient? (!) Yes (Proceed)  -HB        Indicate SIRS criteria  Tachycardia > 90 bpm;Tachypnea > 20 resp per min;Leukocytosis (WBC > [address] IJL)  -HB        If the answer is yes to both questions, suspicion of sepsis is present  --        If severe sepsis is present AND tissue hypoperfusion perists in the hour after fluid resuscitation or lactate > 4, the patient meets criteria for SEPTIC SHOCK  --        Are any of the following organ dysfunction criteria present within 6 hours of suspected infection and SIRS criteria that are NOT considered to be chronic conditions? (!) Yes  -HB        Organ dysfunction  Lactate >/equal 4 0 mmol/L  -HB        Date of presentation of severe sepsis  03/29/21  -HB        Time of presentation of severe sepsis  0730  -HB        Tissue hypoperfusion persists in the hour after crystalloid fluid administration, evidenced, by either:  * OR * Lactate level is greater to or equal 4 mmol/dL ( ___ mmol/dL in comment field)  -HB        Was hypotension present within one hour of the conclusion of crystalloid fluid administration?   No  -HB        Date of presentation of septic shock  03/29/21  -HB        Time of presentation of septic shock  0740  -HB          User Key  (r) = Recorded By, (t) = Taken By, (c) = Cosigned By    [address] Name Provider Type    HB Janessa Nicole DO Physician               Default Flowsheet Data (last 720 hours)      Sepsis Reassess     Row Name 04/06/21 0933 03/29/21 0934                Repeat Volume Status and Tissue Perfusion Assessment Performed    Repeat Volume Status and Tissue Perfusion Assessment Performed  --  -HB  Yes  -HB          Volume Status and Tissue Perfusion Post Fluid Resuscitation * Must Document All *    Vital Signs Reviewed (HR, RR, BP, T)  --  -HB  Yes  -HB       Shock Index Reviewed  --  -HB  Yes  -HB       Arterial Oxygen Saturation Reviewed (POx, SaO2 or SpO2)  --  -HB  Yes (comment %)  -HB       Cardio  --  -HB  (!) Normal S1/S2; Tachycardia  -HB       Pulmonary  --  -HB  -- Intubated  -HB       Capillary Refill  --  -HB  Brisk  -HB       Peripheral Pulses  --  -HB  Radial  -HB       Peripheral Pulse  --  -HB  +2  -HB       Skin  --  -HB  Warm;Dry  -HB       Urine output assessed  --  -HB  None  -HB          *OR*   Intensive Monitoring- Must Document One of the Following Four *:    Vital Signs Reviewed  --  -HB  Yes  -HB       * Central Venous Pressure (CVP or RAP)  --  --       * Central Venous Oxygen (SVO2, ScvO2 or Oxygen saturation via central catheter)  --  --       * Bedside Cardiovascular US in IVC diameter and % collapse  --  --       * Passive Leg Raise OR Crystalloid Challenge  --  -HB  Crystalloid fluid challenge completed  -HB       Crystalloid fluid challenge completed  --  -HB  500mL in 15 minutes  -HB         User Key  (r) = Recorded By, (t) = Taken By, (c) = Cosigned By    Initials Name Provider Type    HB Tressa Press, DO Physician

## 2021-04-07 LAB
BACTERIA BLD CULT: NORMAL
BACTERIA BLD CULT: NORMAL

## 2021-04-16 ENCOUNTER — HOSPITAL ENCOUNTER (OUTPATIENT)
Dept: RADIOLOGY | Facility: IMAGING CENTER | Age: 71
Discharge: HOME | End: 2021-04-16
Payer: MEDICARE

## 2021-04-16 ENCOUNTER — OFFICE VISIT (OUTPATIENT)
Dept: INTERNAL MEDICINE CLINIC | Facility: CLINIC | Age: 71
End: 2021-04-16
Payer: MEDICARE

## 2021-04-16 VITALS
BODY MASS INDEX: 22.88 KG/M2 | HEART RATE: 131 BPM | WEIGHT: 151 LBS | SYSTOLIC BLOOD PRESSURE: 152 MMHG | HEIGHT: 68 IN | TEMPERATURE: 98.4 F | OXYGEN SATURATION: 97 % | DIASTOLIC BLOOD PRESSURE: 88 MMHG

## 2021-04-16 DIAGNOSIS — R60.0 BILATERAL LEG EDEMA: ICD-10-CM

## 2021-04-16 DIAGNOSIS — J12.82 PNEUMONIA DUE TO COVID-19 VIRUS: ICD-10-CM

## 2021-04-16 DIAGNOSIS — J43.2 CENTRILOBULAR EMPHYSEMA (HCC): ICD-10-CM

## 2021-04-16 DIAGNOSIS — I47.2 NSVT (NONSUSTAINED VENTRICULAR TACHYCARDIA) (HCC): ICD-10-CM

## 2021-04-16 DIAGNOSIS — J12.82 PNEUMONIA DUE TO COVID-19 VIRUS: Primary | ICD-10-CM

## 2021-04-16 DIAGNOSIS — U07.1 PNEUMONIA DUE TO COVID-19 VIRUS: Primary | ICD-10-CM

## 2021-04-16 DIAGNOSIS — U07.1 PNEUMONIA DUE TO COVID-19 VIRUS: ICD-10-CM

## 2021-04-16 DIAGNOSIS — I10 HYPERTENSION, ESSENTIAL: ICD-10-CM

## 2021-04-16 DIAGNOSIS — J93.83 SPONTANEOUS PNEUMOTHORAX: ICD-10-CM

## 2021-04-16 PROCEDURE — 99215 OFFICE O/P EST HI 40 MIN: CPT | Performed by: NURSE PRACTITIONER

## 2021-04-16 PROCEDURE — 71046 X-RAY EXAM CHEST 2 VIEWS: CPT

## 2021-04-16 RX ORDER — ATORVASTATIN CALCIUM 20 MG/1
TABLET, FILM COATED ORAL
COMMUNITY
Start: 2020-11-13 | End: 2021-10-29 | Stop reason: SDUPTHER

## 2021-04-16 RX ORDER — PREDNISONE 20 MG/1
TABLET ORAL
COMMUNITY
Start: 2021-03-22 | End: 2021-04-16 | Stop reason: ALTCHOICE

## 2021-04-16 RX ORDER — HYDROCHLOROTHIAZIDE 25 MG/1
25 TABLET ORAL DAILY
Qty: 90 TABLET | Refills: 1 | Status: SHIPPED | OUTPATIENT
Start: 2021-04-16 | End: 2021-07-23 | Stop reason: SDUPTHER

## 2021-04-16 RX ORDER — POTASSIUM CHLORIDE 750 MG/1
10 TABLET, EXTENDED RELEASE ORAL DAILY
Qty: 4 TABLET | Refills: 0 | Status: SHIPPED | OUTPATIENT
Start: 2021-04-16 | End: 2021-04-20 | Stop reason: ALTCHOICE

## 2021-04-16 RX ORDER — FUROSEMIDE 20 MG/1
20 TABLET ORAL DAILY
Qty: 4 TABLET | Refills: 0 | Status: SHIPPED | OUTPATIENT
Start: 2021-04-16 | End: 2021-04-20 | Stop reason: ALTCHOICE

## 2021-04-16 NOTE — ASSESSMENT & PLAN NOTE
Reviewed with Dr Kerri Powell  Continue hydrochlorothiazide 25 mg daily  Start Lasix 20 mg for the next 4 days with potassium 10 mEq  Check stat CXR  Close follow-up early next week  Advised for any pain in the legs or worsening of swelling or increased shortness of breath patient needs to be seen in the ER over the weekend

## 2021-04-16 NOTE — PROGRESS NOTES
Assessment/Plan:    Problem List Items Addressed This Visit        Respiratory    Centrilobular emphysema (HCC) - severe     Continue 2 L oxygen via nasal cannula  Appointment scheduled with pulmonology for Tuesday  Continue Trelegy and DuoNeb  Continue to monitor SpO2         Relevant Orders    XR chest pa & lateral    Pneumonia due to COVID-19 virus - Primary     Continue 2 L oxygen via nasal cannula  Appointment scheduled with pulmonology for Tuesday  Continue Trelegy and DuoNeb  Continue to monitor SpO2         Relevant Orders    XR chest pa & lateral    Spontaneous pneumothorax     Resolved prior to discharge from hospital  Suture removed from chest tube site today without difficulty - healing well    Pulmonary follow-up Tuesday next week              Cardiovascular and Mediastinum    NSVT (nonsustained ventricular tachycardia) (HCC)       Other    Bilateral leg edema     Reviewed with Dr Joyce Abreu  Continue hydrochlorothiazide 25 mg daily  Start Lasix 20 mg for the next 4 days with potassium 10 mEq  Check stat CXR  Close follow-up early next week  Advised for any pain in the legs or worsening of swelling or increased shortness of breath patient needs to be seen in the ER over the weekend         Relevant Medications    furosemide (LASIX) 20 mg tablet    potassium chloride (K-DUR,KLOR-CON) 10 mEq tablet    Other Relevant Orders    XR chest pa & lateral      Other Visit Diagnoses     Hypertension, essential        Relevant Medications    hydrochlorothiazide (HYDRODIURIL) 25 mg tablet    furosemide (LASIX) 20 mg tablet          BMI Counseling: Body mass index is 22 96 kg/m²  M*Modal software was used to dictate this note  It may contain errors with dictating incorrect words or incorrect spelling  Please contact the provider directly with any questions  Subjective:      Patient ID: Connor Brock is a 79 y o  male  HPI    Patient presents today for hospital follow    Patient contracted COVID in March and was admitted to [facility]  He was treated with antibiotics and remdesivir as well as dexamethasone  Full hospital summary as below  He was seen in our office for hospital follow-up and that time doing well but that following weekend developed worsening symptoms  He was readmitted 3/29-4/4 for a new right-sided pneumothorax and required ICU admission  He had a chest tube placed which was removed prior to discharge  Full hospital summary as below    Details from hospitalization on 3/15 with Covid PNA   Patient is a 72-year-old male with past medical history significant for prediabetes, COPD/centrilobular emphysema (patient has O2 at home, but does not require it), hyperlipidemia, hypertension, BPH, bilateral iliac artery aneurysm, and arterial ectasia who presented to [facility] secondary to fever of 103 at home on 3/15  On 03/09/2021 patient began experiencing nausea, cough, and diarrhea  He was later tested on 03/12/2021 for COVID-19 and was found to be positive  Patient was found to have an elevated D-dimer (peak at 0 87), ferritin (1112), CK (1900), CRP (peak at 94 8), and procalcitonin (peak at 0 43)  He was requiring 2 L NC  CT showed signs of COVID PNA  He was initiated on the mild COVID-19 pathway, and started on ceftriaxone and doxycycline in the setting of an elevated procalcitonin  KIRK resolved with fluids  The patient completed an entire course of Remdesivir  He completed 5/10 days of dexamethasone  He was sent home with home O2 and instructed to have close outpatient follow-up  Details of hospitalization from 3/29-4/4  Hospital Course:      Connor Brock is a 79 y o  male patient who originally presented to the hospital on 3/29/2021 due to shortness of breath  Patient presented with worsening shortness of breath, history of emphysema hypertension recent COVID-19 infection  He has chronic hypoxic respiratory failure requiring 2 L supplemental oxygen    On admission he was noted to have acute hypoxic and hypercapnic respiratory failure, right-sided pneumothorax likely retention  Patient was admitted to the ICU       Patient was seen in consultation with thoracic surgeon IR and underwent chest tube placement  He has history of severe emphysema COPD, likely with exacerbation he was placed on IV Solu-Medrol which was transition to p o  Prednisone to complete prednisone 40 mg for 5 days  He was closely monitored and is pneumothorax is now resolved  Chest tubes have been discontinued      One of 2 blood cultures reported to be positive for Gram-positive rods  He is hemodynamically stable nontoxic appearing afebrile  This is likely contaminant      Patient is symptomatically improved hemodynamically stable and is deemed ready for discharge today  Kindly review the chart for details  He tells me overall he is feeling that he is improving  He is moving around better and going to the bathroom, shaving, dressing himself  He is unable to get up his stairs at home  Him and his wife have been sleeping on the cough  He states they did not recommend any physical therapy for him  He follows with Dr Jami Turner with pulmonology but has not seen him since his hospitalizations  Currently he is on 2L of oxygen which was his baseline since havign COVID  Prior to covid he was not on home oxygen but tells me he had canisters available prn at home  He denies any SOB at rest  At rest he is 95-97% on rest    He is using Trelegy and duo neb  He has cut back on his nebs from tid to bid  He does endorse SOB with exertion but states its manageable if he paces himself  He reports his O2 has remained above 92-93% on the 2L  He is complaining of b/l Le edema  He states it started suddenly this morning  No worsening SOB  He denies any pain in his legs  No hx of CHF  They also reports he still has the dressing on his chest from his chest tube removal   He denies any chest pain    No pain with breathing  The following portions of the patient's history were reviewed and updated as appropriate: allergies, current medications, past family history, past medical history, past social history, past surgical history and problem list     Review of Systems   Constitutional: Positive for fatigue  Negative for appetite change, chills and fever  Activity change: improving  HENT: Positive for postnasal drip  Respiratory: Positive for cough (only when his throat is dry) and shortness of breath (on exertion)  Negative for chest tightness and wheezing  Cardiovascular: Positive for leg swelling (new today)  Negative for chest pain  Musculoskeletal: Positive for neck pain (posterior, thinks its from sleeping on his couch) and neck stiffness  Neurological: Negative for dizziness, light-headedness and headaches           Past Medical History:   Diagnosis Date    BPH (benign prostatic hyperplasia)     COPD (chronic obstructive pulmonary disease) ([facility])     Last Assessed:11/14/2016    Diverticulosis     Elevated prostate specific antigen (PSA)     Emphysema (subcutaneous) (surgical) resulting from a procedure     Enlarged prostate with lower urinary tract symptoms (LUTS)     Resolved:8/22/2017    Hernia, inguinal, right          Current Outpatient Medications:     atorvastatin (LIPITOR) 20 mg tablet, , Disp: , Rfl:     cholecalciferol (VITAMIN D3) 1,000 units tablet, Take 2 tablets (2,000 Units total) by mouth daily for 2 days, Disp: 4 tablet, Rfl: 0    guaiFENesin (MUCINEX) 600 mg 12 hr tablet, Take 1,200 mg by mouth 2 (two) times a day as needed, Disp: , Rfl:     ipratropium-albuterol (DUO-NEB) 0 5-2 5 mg/3 mL nebulizer solution, Take 1 vial (3 mL total) by nebulization every 6 (six) hours (Patient taking differently: Take 3 mL by nebulization every 6 (six) hours as needed ), Disp: 180 vial, Rfl: 1    loperamide (IMODIUM) 2 mg capsule, Take 1 capsule (2 mg total) by mouth 3 (three) times a day as needed for diarrhea, Disp: 30 capsule, Rfl: 0    loratadine (CLARITIN REDITABS) 10 MG dissolvable tablet, Take 10 mg by mouth daily as needed for allergies  , Disp: , Rfl:     Multiple Vitamins-Minerals (CENTRUM ADULTS PO), Take 1 tablet by mouth daily  , Disp: , Rfl:     tamsulosin (Flomax) 0 4 mg, Take 1 capsule (0 4 mg total) by mouth daily with dinner, Disp: 90 capsule, Rfl: 3    fluticasone-umeclidinium-vilanterol (TRELEGY) 100-62 5-25 MCG/INH inhaler, Inhale 1 puff daily Rinse mouth after use   (Patient not taking: Reported on 4/16/2021), Disp: 3 Inhaler, Rfl: 3    furosemide (LASIX) 20 mg tablet, Take 1 tablet (20 mg total) by mouth daily for 4 days, Disp: 4 tablet, Rfl: 0    hydrochlorothiazide (HYDRODIURIL) 25 mg tablet, Take 1 tablet (25 mg total) by mouth daily, Disp: 90 tablet, Rfl: 1    potassium chloride (K-DUR,KLOR-CON) 10 mEq tablet, Take 1 tablet (10 mEq total) by mouth daily for 4 days, Disp: 4 tablet, Rfl: 0    Allergies   Allergen Reactions    Bee Venom Facial Swelling    Other Rash     [place] - [id number]: mushrooms    Penicillins Rash       Social History   Past Surgical History:   Procedure Laterality Date    BICEPS TENDON REPAIR  1999    COLONOSCOPY      HAND SURGERY      Last Assessed:7/11/2016    HERNIA REPAIR      Last Assessed:7/11/2016    IR CHEST TUBE CHECK/CHANGE/REPOSITION/UPSIZE  4/1/2021    IR CHEST TUBE PLACEMENT  3/30/2021    KNEE ARTHROSCOPY      OR REPAIR ING HERNIA,5+Y/O,REDUCIBL Right 2/23/2016    Procedure: REPAIR HERNIA INGUINAL with mesh;  Surgeon: Rodrigo Del Rosario DO;  Location: [facility];  Service: General    PROSTATE BIOPSY      WISDOM TOOTH EXTRACTION       Family History   Problem Relation Age of Onset    Hypertension Mother        Objective:  /88 (BP Location: Left arm, Patient Position: Sitting, Cuff Size: Adult)   Pulse (!) 131   Temp 98 4 °F (36 9 °C)   Ht 5' 8" (1 727 m)   Wt 68 5 kg (151 lb)   SpO2 97%   BMI 22 96 kg/m² Physical Exam  Constitutional:       General: He is not in acute distress  Appearance: He is not diaphoretic  HENT:      Head: Normocephalic and atraumatic  Comments: masked  Eyes:      Extraocular Movements: Extraocular movements intact  Conjunctiva/sclera: Conjunctivae normal       Pupils: Pupils are equal, round, and reactive to light  Cardiovascular:      Rate and Rhythm: Regular rhythm  Tachycardia present  Pulmonary:      Effort: No respiratory distress  Breath sounds: Rales (faint rales RML) present  No wheezing or rhonchi  Comments: O2 nasal cannula 2L  SpO2 97%  Chest:       Musculoskeletal:      Right lower leg: Edema (+2 ankle edema, +1 pretibial) present  Left lower leg: Edema (+2 ankle edema) present  Neurological:      Mental Status: He is alert and oriented to person, place, and time  Mental status is at baseline     Psychiatric:         Mood and Affect: Mood normal          Behavior: Behavior normal

## 2021-04-16 NOTE — ASSESSMENT & PLAN NOTE
Continue 2 L oxygen via nasal cannula  Appointment scheduled with pulmonology for Tuesday  Continue Susana and DuoNeb  Continue to monitor SpO2

## 2021-04-16 NOTE — ASSESSMENT & PLAN NOTE
Resolved prior to discharge from hospital  Suture removed from chest tube site today without difficulty - healing well    Pulmonary follow-up Tuesday next week

## 2021-04-20 ENCOUNTER — OFFICE VISIT (OUTPATIENT)
Dept: PULMONOLOGY | Facility: CLINIC | Age: 71
End: 2021-04-20
Payer: MEDICARE

## 2021-04-20 VITALS
OXYGEN SATURATION: 91 % | BODY MASS INDEX: 22.88 KG/M2 | DIASTOLIC BLOOD PRESSURE: 80 MMHG | WEIGHT: 151 LBS | HEART RATE: 124 BPM | HEIGHT: 68 IN | RESPIRATION RATE: 18 BRPM | TEMPERATURE: 98 F | SYSTOLIC BLOOD PRESSURE: 130 MMHG

## 2021-04-20 DIAGNOSIS — J44.9 CHRONIC OBSTRUCTIVE PULMONARY DISEASE, UNSPECIFIED COPD TYPE (HCC): ICD-10-CM

## 2021-04-20 DIAGNOSIS — R91.1 LUNG NODULE: ICD-10-CM

## 2021-04-20 DIAGNOSIS — U07.1 PNEUMONIA DUE TO COVID-19 VIRUS: ICD-10-CM

## 2021-04-20 DIAGNOSIS — U07.1 COVID-19: ICD-10-CM

## 2021-04-20 DIAGNOSIS — J12.82 PNEUMONIA DUE TO COVID-19 VIRUS: ICD-10-CM

## 2021-04-20 DIAGNOSIS — J43.2 CENTRILOBULAR EMPHYSEMA (HCC): Primary | ICD-10-CM

## 2021-04-20 DIAGNOSIS — J93.83 SPONTANEOUS PNEUMOTHORAX: ICD-10-CM

## 2021-04-20 DIAGNOSIS — J96.11 CHRONIC HYPOXEMIC RESPIRATORY FAILURE (HCC): ICD-10-CM

## 2021-04-20 PROBLEM — J96.21 ACUTE ON CHRONIC RESPIRATORY FAILURE WITH HYPOXIA (HCC): Status: RESOLVED | Noted: 2021-03-30 | Resolved: 2021-04-20

## 2021-04-20 PROCEDURE — 94618 PULMONARY STRESS TESTING: CPT | Performed by: INTERNAL MEDICINE

## 2021-04-20 PROCEDURE — 99214 OFFICE O/P EST MOD 30 MIN: CPT | Performed by: INTERNAL MEDICINE

## 2021-04-20 RX ORDER — FINASTERIDE 5 MG/1
5 TABLET, FILM COATED ORAL DAILY
COMMUNITY
End: 2021-09-09 | Stop reason: SDUPTHER

## 2021-04-20 RX ORDER — ALBUTEROL SULFATE 90 UG/1
2 AEROSOL, METERED RESPIRATORY (INHALATION) EVERY 6 HOURS PRN
COMMUNITY
End: 2021-04-20 | Stop reason: SDUPTHER

## 2021-04-20 RX ORDER — ALBUTEROL SULFATE 90 UG/1
2 AEROSOL, METERED RESPIRATORY (INHALATION) EVERY 6 HOURS PRN
Qty: 3 INHALER | Refills: 3 | Status: SHIPPED | OUTPATIENT
Start: 2021-04-20

## 2021-04-20 NOTE — ASSESSMENT & PLAN NOTE
·  Patient was diagnosed with COVID-19  1 day after receiving COVID-19 vaccination  ·  he did require brief hospitalization and seems to have recovered fully  · He is awaiting  Second vaccination dose

## 2021-04-20 NOTE — ASSESSMENT & PLAN NOTE
·  Nodule recently evaluated in hospitalization in April   · This was at the time of pneumothorax but nodule was visualized and is stable   · Next appropriate interval should be a lung cancer screening CT scan in April 2022

## 2021-04-20 NOTE — ASSESSMENT & PLAN NOTE
·  Resolved radiographically  · Status post multiple chest tubes during the hospitalization  · Fortunately did not require thoracic surgery to manage  · Will remain at fairly high risk for recurrent pneumothorax    Strategies for avoidance reviewed with the patient

## 2021-04-20 NOTE — ASSESSMENT & PLAN NOTE
·  Patient currently in stable outpatient state   · Based on ambulatory oximetry today, he does meet criteria for continued oxygen therapy  This is on the basis of his chronic emphysema    · New prescription submitted for oxygen therapy

## 2021-04-20 NOTE — ASSESSMENT & PLAN NOTE
· Patient has severe baseline emphysema   · Continue Trelegy Ellipta, prescription renewed  · Continue rescue albuterol and nebulizer treatments if needed

## 2021-04-20 NOTE — PROGRESS NOTES
Progress note - Pulmonary Medicine   Corwin Bumpers Held 79 y o  male MRN: [id number]       Impression & Plan:     Centrilobular emphysema ([address] ) - severe  · Patient has severe baseline emphysema   · Continue Trelegy Ellipta, prescription renewed  · Continue rescue albuterol and nebulizer treatments if needed  Chronic hypoxemic respiratory failure (HCC)  ·  Patient currently in stable outpatient state   · Based on ambulatory oximetry today, he does meet criteria for continued oxygen therapy  This is on the basis of his chronic emphysema  · New prescription submitted for oxygen therapy    Lung nodule  ·  Nodule recently evaluated in hospitalization in April   · This was at the time of pneumothorax but nodule was visualized and is stable   · Next appropriate interval should be a lung cancer screening CT scan in April 2022    Spontaneous pneumothorax  ·  Resolved radiographically  · Status post multiple chest tubes during the hospitalization  · Fortunately did not require thoracic surgery to manage  · Will remain at fairly high risk for recurrent pneumothorax  Strategies for avoidance reviewed with the patient    Pneumonia due to COVID-19 virus  ·  Resolved with no residual symptoms  · Fortunately did not have severe illness  He did require hospitalization however  · Appears to be at baseline pulmonary status today     office follow-up recommended for 3 months    He has received his first dose COVID-19 vaccination and is awaiting his second dose    ______________________________________________________________________    HPI:    Lisbet Reaves presents today for follow-up of  Severe emphysema with chronic hypoxemic respiratory failure  He unfortunately had a very busy last couple of months with 2 hospitalizations  Initial hospitalization was for COVID-19 infection which he developed symptoms of1 day after receiving his first dose of vaccination  He believes his exposure was related to his grandson's hockey game   He did wind up hospitalized with shortness of breath fever and typical symptoms  He was able to be discharged however  Subsequent to this however he did want up hospitalized a second time for spontaneous pneumothorax  He had chest tube placed in the emergency department and subsequently a second chest tube placed  That chest tube did have to be replaced with a longer chest tube  Overall he has 3 chest tubes in total and eventually had spontaneous resolution of his pneumothorax  He did not require surgical intervention  He was admitted to the surgical service and did not have pulmonary consultation at the time of his hospitalization     currently he is doing much better  He is using supplemental oxygen  He did require discharge on supplemental oxygen  He does require additional testing now that he is at his baseline  Currently no cough or phlegm  No fever, chills, or sick contacts  No mucus production  No chest pain or chest tightness  No lightheadedness or dizziness  He is using the supplemental oxygen consistently at 2 L both with exertion and at rest   He does not report any other new symptoms or complaints      Current Medications:    Current Outpatient Medications:     albuterol (PROVENTIL HFA,VENTOLIN HFA) 90 mcg/act inhaler, Inhale 2 puffs every 6 (six) hours as needed for shortness of breath, Disp: 3 Inhaler, Rfl: 3    atorvastatin (LIPITOR) 20 mg tablet, , Disp: , Rfl:     cholecalciferol (VITAMIN D3) 1,000 units tablet, Take 2 tablets (2,000 Units total) by mouth daily for 2 days, Disp: 4 tablet, Rfl: 0    finasteride (PROSCAR) 5 mg tablet, Take 5 mg by mouth daily, Disp: , Rfl:     fluticasone-umeclidinium-vilanterol (TRELEGY) 100-62 5-25 MCG/INH inhaler, Inhale 1 puff daily Rinse mouth after use , Disp: 3 Inhaler, Rfl: 3    guaiFENesin (MUCINEX) 600 mg 12 hr tablet, Take 1,200 mg by mouth 2 (two) times a day as needed, Disp: , Rfl:     hydrochlorothiazide (HYDRODIURIL) 25 mg tablet, Take 1 tablet (25 mg total) by mouth daily, Disp: 90 tablet, Rfl: 1    ipratropium-albuterol (DUO-NEB) 0 5-2 5 mg/3 mL nebulizer solution, Take 1 vial (3 mL total) by nebulization every 6 (six) hours (Patient taking differently: Take 3 mL by nebulization every 6 (six) hours as needed ), Disp: 180 vial, Rfl: 1    loperamide (IMODIUM) 2 mg capsule, Take 1 capsule (2 mg total) by mouth 3 (three) times a day as needed for diarrhea, Disp: 30 capsule, Rfl: 0    loratadine (CLARITIN REDITABS) 10 MG dissolvable tablet, Take 10 mg by mouth daily as needed for allergies  , Disp: , Rfl:     tamsulosin (Flomax) 0 4 mg, Take 1 capsule (0 4 mg total) by mouth daily with dinner, Disp: 90 capsule, Rfl: 3    Multiple Vitamins-Minerals (CENTRUM ADULTS PO), Take 1 tablet by mouth daily  , Disp: , Rfl:     Review of Systems:  Aside from what is mentioned in the HPI, the review of systems is otherwise negative    Past medical history, surgical history, and family history were reviewed and updated as appropriate    Social history updates:  Social History     Tobacco Use   Smoking Status Former Smoker    Packs/day: 1 50    Years: 48 00    Pack years: 72 00    Types: Cigarettes    Start date: 5    Quit date: 2015    Years since quittin 4   Smokeless Tobacco Never Used       PhysicalExamination:  Vitals:   /80   Pulse (!) 124   Temp 98 °F (36 7 °C) (Tympanic)   Resp 18   Ht 5' 8" (1 727 m)   Wt 68 5 kg (151 lb)   SpO2 91%   BMI 22 96 kg/m²     Gen:   He does appear well today  No conversational dyspnea  Comfortable on nasal cannula  HEENT:  Conjugate gaze  No scleral icterus  Oropharynx exam deferred due to COVID-19 and face mask  Neck: Supple  There is no JVD, lymphadenopathy or thyromegaly  Trachea is midline  Chest:  Chest excursion symmetric  Lungs are hyperinflated  Breath sounds are distant bilaterally but otherwise clear  Hyper-resonant to percussion  Cardiac: Regular, tachycardic   There are no murmurs  Abdomen:   Benign  Extremities: No  edema  Neurologic:  Normal speech and mentation  Skin: No appreciable rashes  Diagnostic Data:  Labs: I personally reviewed the most recent laboratory data pertinent to today's visit    Lab Results   Component Value Date    WBC 10 93 (H) 04/02/2021    HGB 11 0 (L) 04/02/2021    HCT 34 6 (L) 04/02/2021    MCV 92 04/02/2021     04/02/2021     Lab Results   Component Value Date    SODIUM 140 04/02/2021    K 4 4 04/02/2021    CO2 31 04/02/2021     04/02/2021    BUN 22 04/02/2021    CREATININE 0 68 04/02/2021    CALCIUM 8 1 (L) 04/02/2021        6 minutes walk results:   patient underwent 6 minutes walk testing  Resting room air saturation was 88% with a heart rate of 127  He was therefore placed on supplemental oxygen at 2 L  At rest on 2 L oxygen saturation 94% with heart rate 129  With ambulation patient maintained oxygen saturation on 2 L of 93%  Peak heart rate 141 beats per minute  He did only walk for 3 minutes and was able to walk for 121 m    Imaging:  I personally reviewed the images on the [facility] system pertinent to today's visit   imaging from the recent hospitalization was reviewed  CT imaging demonstrated the pneumothorax was noted  The pulmonary nodule did demonstrate stability at 2 mm  Prior intrapulmonary lymph node was not identified on this study  Repeat chest x-ray demonstrated resolution of the pneumothorax and stable emphysema changes      Sarmad Rojas MD

## 2021-04-20 NOTE — ASSESSMENT & PLAN NOTE
·  Resolved with no residual symptoms  · Fortunately did not have severe illness  He did require hospitalization however    · Appears to be at baseline pulmonary status today

## 2021-04-21 ENCOUNTER — DOCUMENTATION (OUTPATIENT)
Dept: PULMONOLOGY | Facility: CLINIC | Age: 71
End: 2021-04-21

## 2021-04-21 ENCOUNTER — OFFICE VISIT (OUTPATIENT)
Dept: INTERNAL MEDICINE CLINIC | Facility: CLINIC | Age: 71
End: 2021-04-21
Payer: MEDICARE

## 2021-04-21 VITALS
WEIGHT: 151 LBS | OXYGEN SATURATION: 96 % | SYSTOLIC BLOOD PRESSURE: 160 MMHG | DIASTOLIC BLOOD PRESSURE: 80 MMHG | HEIGHT: 68 IN | HEART RATE: 98 BPM | TEMPERATURE: 97.2 F | BODY MASS INDEX: 22.88 KG/M2

## 2021-04-21 DIAGNOSIS — R60.0 BILATERAL LEG EDEMA: Primary | ICD-10-CM

## 2021-04-21 DIAGNOSIS — J93.83 SPONTANEOUS PNEUMOTHORAX: ICD-10-CM

## 2021-04-21 DIAGNOSIS — Z91.030 ALLERGY TO BEE STING: ICD-10-CM

## 2021-04-21 DIAGNOSIS — J43.2 CENTRILOBULAR EMPHYSEMA (HCC): ICD-10-CM

## 2021-04-21 DIAGNOSIS — I10 ESSENTIAL HYPERTENSION: ICD-10-CM

## 2021-04-21 DIAGNOSIS — U07.1 COVID-19: ICD-10-CM

## 2021-04-21 PROCEDURE — 99213 OFFICE O/P EST LOW 20 MIN: CPT | Performed by: NURSE PRACTITIONER

## 2021-04-21 RX ORDER — EPINEPHRINE 0.3 MG/.3ML
0.3 INJECTION SUBCUTANEOUS ONCE
Qty: 0.6 ML | Refills: 0 | Status: SHIPPED | OUTPATIENT
Start: 2021-04-21 | End: 2022-05-23 | Stop reason: SDUPTHER

## 2021-04-21 NOTE — ASSESSMENT & PLAN NOTE
-Reviewed Pulmonary note from 04/20/2021   -patient will continue Trelegy, albuterol and nebulizers   -he continues to qualify for home oxygen and will continue on 2 L O2 nasal cannula

## 2021-04-21 NOTE — ASSESSMENT & PLAN NOTE
Elevated at exam today but low on home readings   patient and his wife report being anxious in the office     advised to monitor blood pressure at home daily,  Reviewed proper use of blood pressure cuff and positioning     patient to call office in 1 week with readings   continue hydrochlorothiazide 25 mg daily

## 2021-04-21 NOTE — ASSESSMENT & PLAN NOTE
Significantly improved   continue hydrochlorothiazide 25 mg daily   recommend elevating legs throughout the day   may try compression stockings   notify office for any worsening edema

## 2021-04-21 NOTE — PROGRESS NOTES
Assessment/Plan:    Problem List Items Addressed This Visit        Respiratory    Centrilobular emphysema ([address] ) - severe      -Reviewed Pulmonary note from 04/20/2021   -patient will continue Trelegy, albuterol and nebulizers   -he continues to qualify for home oxygen and will continue on 2 L O2 nasal cannula         Spontaneous pneumothorax     Resolved             Cardiovascular and Mediastinum    Essential hypertension      Elevated at exam today but low on home readings   patient and his wife report being anxious in the office     advised to monitor blood pressure at home daily,  Reviewed proper use of blood pressure cuff and positioning     patient to call office in 1 week with readings   continue hydrochlorothiazide 25 mg daily         Relevant Medications    EPINEPHrine (EPIPEN) 0 3 mg/0 3 mL SOAJ       Other    COVID-19      resolved         Bilateral leg edema - Primary      Significantly improved   continue hydrochlorothiazide 25 mg daily   recommend elevating legs throughout the day   may try compression stockings   notify office for any worsening edema           Other Visit Diagnoses     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 sting        Relevant Medications    EPINEPHrine (EPIPEN) 0 3 mg/0 3 mL SOAJ          BMI Counseling: Body mass index is 22 96 kg/m²  BMI normal        M*Modal software was used to dictate this note  It may contain errors with dictating incorrect words or incorrect spelling  Please contact the provider directly with any questions  Subjective:      Patient ID: Marcus Roland is a 79 y o  male  HPI    Patient presents today for 5 day follow up of COVID PNA and bilateral LE edema  He completed 4 days of lasix 40mg and his LE is improved  He continues to have some mild dependent edema progressing throughout the day and improved upon waking up in the morning  His edema is also improving as he is becoming more active and ambulating more throughout his home   He notes his energy and activity level are improving  SOB is primarily only with increased activity  He continues on 2L O2 nasal cannula  He followed up with his pulmonologist Dr Israel Rosas yesterday and was given a script to continue home oxygen as he meets criteria with ambulation  He will continue his Trelegy, albuterol and nebs for his severe emphysema  His pneumothorax is resolved  Covid is resolved and he is improving  His BP is elevated in the office today 160/80  He does report being anxious at his appointment  He is monitoring his BP at home and reports it has been running low at home about 100s/70s  He states his BP is typically about 116-130  The following portions of the patient's history were reviewed and updated as appropriate: allergies, current medications, past family history, past medical history, past social history, past surgical history and problem list     Review of Systems   Constitutional: Negative for chills and fever  Respiratory: Positive for shortness of breath  Negative for cough (occasional dryness), chest tightness and wheezing  Cardiovascular: Positive for leg swelling (much improved)  Negative for chest pain  Musculoskeletal: Negative for myalgias           Past Medical History:   Diagnosis Date    BPH (benign prostatic hyperplasia)     COPD [facility]chronic obstructive pulmonary disease) [facility])     Last Assessed:11/14/2016    Diverticulosis     Elevated prostate specific antigen (PSA)     Emphysema (subcutaneous) (surgical) resulting from a procedure     Enlarged prostate with lower urinary tract symptoms (LUTS)     Resolved:8/22/2017    Hernia, inguinal, right     Pneumonia due to COVID-19 virus 3/16/2021         Current Outpatient Medications:     albuterol (PROVENTIL HFA,VENTOLIN HFA) 90 mcg/act inhaler, Inhale 2 puffs every 6 (six) hours as needed for shortness of breath, Disp: 3 Inhaler, Rfl: 3    atorvastatin (LIPITOR) 20 mg tablet, , Disp: , Rfl:     finasteride (PROSCAR) 5 mg tablet, Take 5 mg by mouth daily, Disp: , Rfl:     fluticasone-umeclidinium-vilanterol (TRELEGY) 100-62 5-25 MCG/INH inhaler, Inhale 1 puff daily Rinse mouth after use , Disp: 3 Inhaler, Rfl: 3    guaiFENesin (MUCINEX) 600 mg 12 hr tablet, Take 1,200 mg by mouth 2 (two) times a day as needed, Disp: , Rfl:     hydrochlorothiazide (HYDRODIURIL) 25 mg tablet, Take 1 tablet (25 mg total) by mouth daily, Disp: 90 tablet, Rfl: 1    ipratropium-albuterol (DUO-NEB) 0 5-2 5 mg/3 mL nebulizer solution, Take 1 vial (3 mL total) by nebulization every 6 (six) hours (Patient taking differently: Take 3 mL by nebulization every 6 (six) hours as needed ), Disp: 180 vial, Rfl: 1    loperamide (IMODIUM) 2 mg capsule, Take 1 capsule (2 mg total) by mouth 3 (three) times a day as needed for diarrhea, Disp: 30 capsule, Rfl: 0    loratadine (CLARITIN REDITABS) 10 MG dissolvable tablet, Take 10 mg by mouth daily as needed for allergies  , Disp: , Rfl:     Multiple Vitamins-Minerals (CENTRUM ADULTS PO), Take 1 tablet by mouth daily  , Disp: , Rfl:     tamsulosin (Flomax) 0 4 mg, Take 1 capsule (0 4 mg total) by mouth daily with dinner, Disp: 90 capsule, Rfl: 3    cholecalciferol (VITAMIN D3) 1,000 units tablet, Take 2 tablets (2,000 Units total) by mouth daily for 2 days, Disp: 4 tablet, Rfl: 0    EPINEPHrine (EPIPEN) 0 3 mg/0 3 mL SOAJ, Inject 0 3 mL (0 3 mg total) into a muscle once for 1 dose, Disp: 0 6 mL, Rfl: 0    Allergies   Allergen Reactions    Bee Venom Facial Swelling    Other Rash     Annotation - [id number]: mushrooms    Penicillins Rash       Social History   Past Surgical History:   Procedure Laterality Date    BICEPS TENDON REPAIR  1999    COLONOSCOPY      HAND SURGERY      Last Assessed:7/11/2016    HERNIA REPAIR      Last Assessed:7/11/2016    IR CHEST TUBE CHECK/CHANGE/REPOSITION/UPSIZE  4/1/2021    IR CHEST TUBE PLACEMENT  3/30/2021    KNEE ARTHROSCOPY      TX REPAIR ING HERNIA,5+Y/O,REDUCIBL Right 2/23/2016 Procedure: REPAIR HERNIA INGUINAL with mesh;  Surgeon: Cierra Richardson DO;  Location: [facility];  Service: General    PROSTATE BIOPSY      WISDOM TOOTH EXTRACTION       Family History   Problem Relation Age of Onset    Hypertension Mother        Objective:  /80 (BP Location: Left arm, Patient Position: Sitting, Cuff Size: Adult)   Pulse 98 Comment: apical  Temp (!) 97 2 °F (36 2 °C)   Ht 5' 8" (1 727 m)   Wt 68 5 kg (151 lb)   SpO2 96%   BMI 22 96 kg/m²      Physical Exam  Vitals signs reviewed  Constitutional:       General: He is not in acute distress  Appearance: Normal appearance  He is not diaphoretic  HENT:      Head: Normocephalic and atraumatic  Comments: masked  Eyes:      Extraocular Movements: Extraocular movements intact  Conjunctiva/sclera: Conjunctivae normal       Pupils: Pupils are equal, round, and reactive to light  Cardiovascular:      Rate and Rhythm: Normal rate and regular rhythm  Heart sounds: Normal heart sounds  Pulmonary:      Effort: Pulmonary effort is normal  No respiratory distress  Breath sounds: Normal breath sounds  No wheezing, rhonchi or rales  Comments: 2L nasal cannula   Musculoskeletal:      Right lower leg: Edema (trace ankle edema) present  Left lower leg: Edema (trace ankle edema) present  Neurological:      Mental Status: He is alert and oriented to person, place, and time  Mental status is at baseline     Psychiatric:         Mood and Affect: Mood normal          Behavior: Behavior normal

## 2021-04-21 NOTE — PATIENT INSTRUCTIONS
Monitor blood pressure at home daily and call office with readings in 1 week  Monitor swelling, elevate legs

## 2021-04-28 NOTE — PROGRESS NOTES
Patient calling stating [facility] called and they need a long term diagnosis on the order  She stated she would like a callback when it's finished   Please advise

## 2021-04-29 NOTE — PROGRESS NOTES
Per Polly Munroe proper diagnosis codes were used through [facility]  They will fix it on their end  Patient is aware

## 2021-05-22 DIAGNOSIS — J43.9 PULMONARY EMPHYSEMA, UNSPECIFIED EMPHYSEMA TYPE (HCC): ICD-10-CM

## 2021-05-26 RX ORDER — IPRATROPIUM BROMIDE AND ALBUTEROL SULFATE 2.5; .5 MG/3ML; MG/3ML
SOLUTION RESPIRATORY (INHALATION)
Qty: 90 ML | Refills: 7 | OUTPATIENT
Start: 2021-05-26

## 2021-06-08 DIAGNOSIS — J43.9 PULMONARY EMPHYSEMA, UNSPECIFIED EMPHYSEMA TYPE (HCC): ICD-10-CM

## 2021-06-08 RX ORDER — IPRATROPIUM BROMIDE AND ALBUTEROL SULFATE 2.5; .5 MG/3ML; MG/3ML
3 SOLUTION RESPIRATORY (INHALATION) EVERY 6 HOURS
Qty: 1080 ML | Refills: 1 | Status: SHIPPED | OUTPATIENT
Start: 2021-06-08 | End: 2021-12-05

## 2021-07-16 ENCOUNTER — APPOINTMENT (OUTPATIENT)
Dept: LAB | Facility: IMAGING CENTER | Age: 71
End: 2021-07-16
Payer: MEDICARE

## 2021-07-16 DIAGNOSIS — R73.03 PREDIABETES: ICD-10-CM

## 2021-07-16 DIAGNOSIS — I10 ESSENTIAL HYPERTENSION: ICD-10-CM

## 2021-07-16 DIAGNOSIS — E78.2 MIXED HYPERLIPIDEMIA: ICD-10-CM

## 2021-07-16 LAB
ALBUMIN SERPL BCP-MCNC: 3.8 G/DL (ref 3.5–5)
ALP SERPL-CCNC: 82 U/L (ref 46–116)
ALT SERPL W P-5'-P-CCNC: 19 U/L (ref 12–78)
ANION GAP SERPL CALCULATED.3IONS-SCNC: 4 MMOL/L (ref 4–13)
AST SERPL W P-5'-P-CCNC: 19 U/L (ref 5–45)
BILIRUB SERPL-MCNC: 0.4 MG/DL (ref 0.2–1)
BUN SERPL-MCNC: 13 MG/DL (ref 5–25)
CALCIUM SERPL-MCNC: 9.2 MG/DL (ref 8.3–10.1)
CHLORIDE SERPL-SCNC: 104 MMOL/L (ref 100–108)
CHOLEST SERPL-MCNC: 178 MG/DL (ref 50–200)
CO2 SERPL-SCNC: 32 MMOL/L (ref 21–32)
CREAT SERPL-MCNC: 1.03 MG/DL (ref 0.6–1.3)
EST. AVERAGE GLUCOSE BLD GHB EST-MCNC: 105 MG/DL
GFR SERPL CREATININE-BSD FRML MDRD: 73 ML/MIN/1.73SQ M
GLUCOSE P FAST SERPL-MCNC: 95 MG/DL (ref 65–99)
HBA1C MFR BLD: 5.3 %
HDLC SERPL-MCNC: 65 MG/DL
LDLC SERPL CALC-MCNC: 96 MG/DL (ref 0–100)
POTASSIUM SERPL-SCNC: 4.4 MMOL/L (ref 3.5–5.3)
PROT SERPL-MCNC: 7.5 G/DL (ref 6.4–8.2)
SODIUM SERPL-SCNC: 140 MMOL/L (ref 136–145)
TRIGL SERPL-MCNC: 84 MG/DL

## 2021-07-16 PROCEDURE — 80061 LIPID PANEL: CPT

## 2021-07-16 PROCEDURE — 83036 HEMOGLOBIN GLYCOSYLATED A1C: CPT

## 2021-07-16 PROCEDURE — 36415 COLL VENOUS BLD VENIPUNCTURE: CPT

## 2021-07-16 PROCEDURE — 80053 COMPREHEN METABOLIC PANEL: CPT

## 2021-07-23 ENCOUNTER — OFFICE VISIT (OUTPATIENT)
Dept: INTERNAL MEDICINE CLINIC | Facility: CLINIC | Age: 71
End: 2021-07-23
Payer: MEDICARE

## 2021-07-23 VITALS
HEART RATE: 101 BPM | SYSTOLIC BLOOD PRESSURE: 136 MMHG | TEMPERATURE: 97.8 F | OXYGEN SATURATION: 99 % | BODY MASS INDEX: 26.13 KG/M2 | HEIGHT: 68 IN | WEIGHT: 172.4 LBS | DIASTOLIC BLOOD PRESSURE: 76 MMHG

## 2021-07-23 DIAGNOSIS — I10 ESSENTIAL HYPERTENSION: ICD-10-CM

## 2021-07-23 DIAGNOSIS — J43.2 CENTRILOBULAR EMPHYSEMA (HCC): Primary | ICD-10-CM

## 2021-07-23 DIAGNOSIS — R73.03 PREDIABETES: ICD-10-CM

## 2021-07-23 DIAGNOSIS — I10 HYPERTENSION, ESSENTIAL: ICD-10-CM

## 2021-07-23 DIAGNOSIS — E78.2 MIXED HYPERLIPIDEMIA: ICD-10-CM

## 2021-07-23 DIAGNOSIS — I72.3 BILATERAL ILIAC ARTERY ANEURYSM (HCC): ICD-10-CM

## 2021-07-23 DIAGNOSIS — R91.1 LUNG NODULE: ICD-10-CM

## 2021-07-23 DIAGNOSIS — K57.30 DIVERTICULOSIS, SIGMOID: ICD-10-CM

## 2021-07-23 DIAGNOSIS — J96.11 CHRONIC HYPOXEMIC RESPIRATORY FAILURE (HCC): ICD-10-CM

## 2021-07-23 PROBLEM — R57.9 SHOCK (HCC): Status: RESOLVED | Noted: 2021-03-30 | Resolved: 2021-07-23

## 2021-07-23 PROBLEM — R65.10 SIRS (SYSTEMIC INFLAMMATORY RESPONSE SYNDROME) (HCC): Status: RESOLVED | Noted: 2021-03-31 | Resolved: 2021-07-23

## 2021-07-23 PROBLEM — A41.9 SEPSIS (HCC): Status: RESOLVED | Noted: 2021-03-30 | Resolved: 2021-07-23

## 2021-07-23 PROBLEM — U07.1 COVID-19: Status: RESOLVED | Noted: 2021-03-15 | Resolved: 2021-07-23

## 2021-07-23 PROBLEM — B34.9 VIRAL INFECTION, UNSPECIFIED: Status: RESOLVED | Noted: 2021-03-12 | Resolved: 2021-07-23

## 2021-07-23 PROBLEM — R79.89 ELEVATED LACTIC ACID LEVEL: Status: RESOLVED | Noted: 2021-03-30 | Resolved: 2021-07-23

## 2021-07-23 PROBLEM — R60.0 BILATERAL LEG EDEMA: Status: RESOLVED | Noted: 2021-04-16 | Resolved: 2021-07-23

## 2021-07-23 PROCEDURE — 99214 OFFICE O/P EST MOD 30 MIN: CPT | Performed by: PHYSICIAN ASSISTANT

## 2021-07-23 RX ORDER — HYDROCHLOROTHIAZIDE 25 MG/1
25 TABLET ORAL DAILY
Qty: 90 TABLET | Refills: 1 | Status: SHIPPED | OUTPATIENT
Start: 2021-07-23 | End: 2021-10-29 | Stop reason: SDUPTHER

## 2021-07-23 NOTE — ASSESSMENT & PLAN NOTE
Stable at this time and following with pulmonology  Continue supplemental O2  Report back immediatly if any worsening of pulm sx develop  Continue to monitor PO2 at home  CT scan needed in 2022 to follow up lung nodules  39M w/ PMH of obesity, ?CHRISTINE, chronic cholecystitis and cholelithiasis, s/p lap patrick 12/3/18, was okay until later that night, had severe abdominal pain, took a Percocet at 9PM, them 1 AM, without relief. Pain worsened until this early morning, feeling bloated, nauseous, and pt felt feverish so decided to go to the ED. Temp was 99.9. Abd xray was neg.  Pt had hx of chronic cholecystitis, with cholelithiasis x about 3 years, until in 08/13/2018, he had a severe bout of acute cholecystitis, and had enterobacter bacteremia, tx with IV antibx.  He went to the OR on 08/13/18 to have cholecystectomy but gallbladder was too inflamed and adherent so had a cholecystostomy tube placed at that time. Pt had improved thereafter and had lap patrick performed 12/3/18.    12/5: Pt seen and examined at bedside. Pt is not feeling well, complaining of abdominal pain that makes breathing difficult. No improvement in pain from yesterday. Rapid response called for SOB @~9:40 AM - pt hypertensive SBP in 170s, tachycardic in 110s, RR in 20s, AOx3 at that time. Pt has had same problem since yesterday - abdominal pain making breathing difficult leading to tachypnea, hypertensive due to pain.    REVIEW OF SYSTEMS:    CONSTITUTIONAL: + weakness, + feverish, no chills  EYES/ENT: No visual changes;  No vertigo or throat pain   NECK: No pain or stiffness  RESPIRATORY: No cough, wheezing, hemoptysis; some SOB associated w/ abdominal pain  CARDIOVASCULAR: No chest pain or palpitations  GASTROINTESTINAL: + abd pain, + distended  GENITOURINARY: No dysuria, frequency or hematuria   All other review of systems is negative unless indicated above.    Vital Signs Last 24 Hrs  T(C): 38.9 (12-05-18 @ 09:24)  T(F): 102 (12-05-18 @ 09:24), Max: 102 (12-05-18 @ 09:24)  HR: 105 (12-05-18 @ 08:03) (80 - 105)  BP: 148/94 (12-05-18 @ 08:03)  BP(mean): --  RR: 16 (12-05-18 @ 08:03) (15 - 16)  SpO2: 96% (12-05-18 @ 08:03) (94% - 98%)  Wt(kg): --    12-04 @ 07:01  -  12-05 @ 07:00  --------------------------------------------------------  IN: 360 mL / OUT: 1500 mL / NET: -1140 mL    PHYSICAL EXAM:    GENERAL: AOx3, NAD, well-groomed, well-developed, mild distress due to pain  HEAD:  NC/AT  EYES: EOMI, PERRLA, no scleral icterus  HEENT: Moist mucous membranes  NECK: Supple, No JVD  LUNG: Clear to auscultation bilaterally; No rales, rhonchi, wheezing, or rubs  HEART: RRR; No murmurs, rubs, or gallops  ABDOMEN: soft, + distended. Diffusely tender, worst at RUQ with guarding but no rigidity/rebound.  EXTREMITIES:  2+ Peripheral Pulses, No clubbing, cyanosis, or edema    Lab Results:                                            14.4                  Neurophils% (auto):   85.7   (12-05 @ 06:05):    16.7 )-----------(313          Lymphocytes% (auto):  6.6                                           43.5                   Eosinphils% (auto):   0.0      Manual%: Neutrophils x    ; Lymphocytes x    ; Eosinophils x    ; Bands%: x    ; Blasts x         Differential:	[] Automated		[] Manual    12-05    132<L>  |  98  |  9   ----------------------------<  139<H>  4.1   |  24  |  0.60    Ca    8.8      05 Dec 2018 06:05    TPro  8.3  /  Alb  3.3  /  TBili  0.2  /  DBili  x   /  AST  21  /  ALT  47<H>  /  AlkPhos  106  12-04    IMAGING    < from: Xray Abdomen 1 View Portable, IMMEDIATE (12.04.18 @ 02:58)   Slight increased fecal content is seen in the colon but there is no sign   of bowel obstruction. Clips in the right upper quadrant noted.    No gross organomegaly is seen.    Bones are grossly intact.    IMPRESSION: As above.    < end of copied text >

## 2021-07-23 NOTE — ASSESSMENT & PLAN NOTE
Currently stable at this time on Trelegy inhaler with regularly scheduled DuoNeb treatments    Continue to follow with pulmonology and keep our office informed of any change in treatment plan

## 2021-07-23 NOTE — ASSESSMENT & PLAN NOTE
Blood pressure improved upon repeat blood pressure check today in the office  Upon reviewing patient's home blood pressure logs blood pressure appears well-controlled at home  No change in medications at time  Continue hydrochlorothiazide 25 mg daily  Continue to check blood pressure at home with a goal of less than 120 over less than 80   Report back sooner if blood pressure is not at goal

## 2021-07-23 NOTE — PATIENT INSTRUCTIONS
COVID-19  Sx resolved  Patient has been since vaccinated  Shock ([facility] [address] )  Resolved  Bilateral leg edema  Resolved  SIRS (systemic inflammatory response syndrome) ([facility])  Resolved     Sepsis ([facility] [address] )  Resolved     Elevated lactic acid level  Resolved on 4/2021 labs  Prediabetes  A1c improving  6 0 from 5 3  Will continue to follow      Diverticulosis, sigmoid  Patient aware he is due for colonoscopy follow up and will call and schedule  Hyperlipidemia  Stable at this time on lipitor  Reviewed lipid panel  Essential hypertension  Blood pressure improved upon repeat blood pressure check today in the office  Upon reviewing patient's home blood pressure logs blood pressure appears well-controlled at home  No change in medications at time  Continue hydrochlorothiazide 25 mg daily  Continue to check blood pressure at home with a goal of less than 120 over less than 80  Report back sooner if blood pressure is not at goal     Bilateral iliac artery aneurysm (HCC)  Continue to follow with vascular  Patient follows regularly and has yearly follow-up studies    Chronic hypoxemic respiratory failure ([facility] [address] )  Stable at this time and following with pulmonology  Continue supplemental O2  Report back immediatly if any worsening of pulm sx develop  Continue to monitor PO2 at home  CT scan needed in 2022 to follow up lung nodules

## 2021-07-23 NOTE — PROGRESS NOTES
[facility]  Standard Office Visit  Patient ID: Latasha Justice    : 1950  Age/Gender: 79 y o  male     DATE: 2021    Assessment/Plan:    COVID-19  Sx resolved  Patient has been since vaccinated  Shock ([facility] [address] )  Resolved  Bilateral leg edema  Resolved  SIRS (systemic inflammatory response syndrome) (HCC)  Resolved     Sepsis ([facility] [address] )  Resolved     Elevated lactic acid level  Resolved on 2021 labs  Prediabetes  A1c improving  6 0 from 5 3  Will continue to follow      Diverticulosis, sigmoid  Patient aware he is due for colonoscopy follow up and will call and schedule  Hyperlipidemia  Stable at this time on lipitor  Reviewed lipid panel  Essential hypertension  Blood pressure improved upon repeat blood pressure check today in the office  Upon reviewing patient's home blood pressure logs blood pressure appears well-controlled at home  No change in medications at time  Continue hydrochlorothiazide 25 mg daily  Continue to check blood pressure at home with a goal of less than 120 over less than 80  Report back sooner if blood pressure is not at goal     Bilateral iliac artery aneurysm (HCC)  Continue to follow with vascular  Patient follows regularly and has yearly follow-up studies    Chronic hypoxemic respiratory failure ([facility] [address] )  Stable at this time and following with pulmonology  Continue supplemental O2  Report back immediatly if any worsening of pulm sx develop  Continue to monitor PO2 at home  CT scan needed in  to follow up lung nodules  Centrilobular emphysema (HCC) - severe  Currently stable at this time on Trelegy inhaler with regularly scheduled DuoNeb treatments    Continue to follow with pulmonology and keep our office informed of any change in treatment plan       Diagnoses and all orders for this visit:    Centrilobular emphysema ([facility] [address] ) - severe    Hypertension, essential  - hydrochlorothiazide (HYDRODIURIL) 25 mg tablet; Take 1 tablet (25 mg total) by mouth daily  -     CBC and differential; Future  -     Comprehensive metabolic panel; Future    Chronic hypoxemic respiratory failure (HCC)  Comments:  Handicap placard form filled out for permant placard  O2 dependent and limited to SOB with 200 feet  Orders:  -     CBC and differential; Future    Bilateral iliac artery aneurysm (HCC)    Essential hypertension  -     CBC and differential; Future  -     Comprehensive metabolic panel; Future    Prediabetes  -     Comprehensive metabolic panel; Future    Lung nodule    Diverticulosis, sigmoid    Mixed hyperlipidemia          BMI Counseling: Body mass index is 26 21 kg/m²  The BMI is above normal  Nutrition recommendations include encouraging healthy choices of fruits and vegetables, increasing intake of lean protein and reducing intake of cholesterol  No pharmacotherapy was ordered  Patient referred to PCP due to patient being overweight  Subjective:   Chief Complaint   Patient presents with    Follow-up     review lasrussell 7/16/21    HM     will call to mirna simmons for colonoscopy          Erin Castaneda is a 79 y o  male who presents to the office on 7/23/2021 for     70-year-old male with a history of hypertension, bilateral iliac aneurysm, emphysema, COPD, chronic ambulatory supplemental oxygen use presents to the office for chronic condition follow-up  Earlier this year patient was admitted to the hospital after being diagnosed with COVID-19  He was discharged home and then later readmitted with collapse along and worsen respiratory failure  He was discharged to home and is doing well since his hospitalization  He has followed with pulmonology since last visit and has not had any changes made to his medications  He does use 2 L supplemental oxygen throughout the day which has improved his functional capacity  He has portable oxygen as well as a home oxygen concentrator  He has extended length of tubing throughout the home which has given him a great deal of freedom throughout the home  He continues to be active at home but has been trying to remain inside due to current COVID-19 pandemic and his underlying respiratory illness  Following his COVID-19 diagnosis he later did go on to have his COVID vaccination series  He gets regular nebulizer treatments  He has only had 2 COPD flare ups requiring urgent use of p r n  Albuterol since his hospitalization several months ago  He had labs prior to today's visit and is here to review  He is trying to be mindful of his diet  He is experimenting with small hand weights for exercises at home because he wants to stay active  He does have a shower chair to make showering more easy at home and his wife does assist with some of the bathing but otherwise patient is quite self-sufficient  He does check his blood pressure at home and reports that his systolic blood pressure is between 130 and 138 at home  His diastolic ranges 76 to 80 at home with his own home blood pressure cuff  He is taking all of his medications as directed  He does drive on occasion but he and his wife downsized only having 1 vehicle  He would like to know if he could have a permanent handicap placard to help with parking close to buildings   COVID series completed through [address] [place] [address]  Breathing Problem  He complains of cough (occasional cough wiht dry throat   ), difficulty breathing and shortness of breath  There is no sputum production or wheezing  This is a chronic problem  The problem has been unchanged  The cough is non-productive and dry  Associated symptoms include dyspnea on exertion (Chronic unchanged)  Pertinent negatives include no chest pain, fever, orthopnea, rhinorrhea or sore throat  Exacerbated by: Hot weather  Poor air quality  Humidity, exertion  His past medical history is significant for COPD     Hypertension  This is a chronic problem  The problem has been waxing and waning since onset  Associated symptoms include shortness of breath  Pertinent negatives include no chest pain, palpitations or peripheral edema ( patient reports he did have leg swelling following his hospitalization however this improved with leg elevation  No longer with leg swelling  No calf pain  No calf swelling )  Past treatments include diuretics  Hyperlipidemia  This is a chronic problem  He has no history of obesity  Associated symptoms include shortness of breath  Pertinent negatives include no chest pain  The following portions of the patient's history were reviewed and updated as appropriate: allergies, current medications, past family history, past medical history, past social history, past surgical history and problem list     Review of Systems   Constitutional: Negative for chills and fever  HENT: Negative for rhinorrhea and sore throat  Respiratory: Positive for cough (occasional cough wiht dry throat   ) and shortness of breath  Negative for sputum production and wheezing  Cardiovascular: Positive for dyspnea on exertion (Chronic unchanged)  Negative for chest pain, palpitations and leg swelling  Gastrointestinal: Negative for abdominal pain, diarrhea, nausea and vomiting  Genitourinary:        Patient has urology follow-up scheduled in September   Skin: Negative for rash  Neurological: Negative for dizziness           Patient Active Problem List   Diagnosis    Hyperlipidemia    Centrilobular emphysema (HCC) - severe    Bilateral iliac artery aneurysm (HCC)    Arterial ectasia (HCC)    Diverticulosis, sigmoid    Hearing loss    Hemorrhoids    Lung nodule    Prediabetes    Skin disorder    BPH with obstruction/lower urinary tract symptoms    Essential hypertension    Seasonal allergies    Medicare annual wellness visit, subsequent    Other microscopic hematuria    Encounter for screening for lung cancer    Other fatigue    NSVT (nonsustained ventricular tachycardia) ([facility])    Abnormal ECG during exercise stress test    Encounter for support and coordination of transition of care    Prominent popliteal pulse    Chronic hypoxemic respiratory failure ([address] )    Encounter for observation for suspected exposure to other biological agents ruled out    Spontaneous pneumothorax       Past Medical History:   Diagnosis Date    Allergic 1/1/1990    Bee stings and penicillin    BPH (benign prostatic hyperplasia)     COPD (chronic obstructive pulmonary disease) ([facility])     Last Assessed:11/14/2016    Diverticulosis     Elevated prostate specific antigen (PSA)     Emphysema (subcutaneous) (surgical) resulting from a procedure     Enlarged prostate with lower urinary tract symptoms (LUTS)     Resolved:8/22/2017    Hernia, inguinal, right     Hypertension 5/1/2019    Visit with Brenton Yusuf showed slightly elevated bp   Pneumonia due to COVID-19 virus 3/16/2021       Past Surgical History:   Procedure Laterality Date    BICEPS TENDON REPAIR  1999    COLONOSCOPY      FRACTURE SURGERY  5/15/1988    Approx date  Rt index finger fractured      HAND SURGERY      Last Assessed:7/11/2016    HERNIA REPAIR      Last Assessed:7/11/2016    IR CHEST TUBE CHECK/CHANGE/REPOSITION/UPSIZE  4/1/2021    IR CHEST TUBE PLACEMENT  3/30/2021    KNEE ARTHROSCOPY      IA REPAIR ING HERNIA,5+Y/O,REDUCIBL Right 2/23/2016    Procedure: REPAIR HERNIA INGUINAL with mesh;  Surgeon: Cory Downey DO;  [facility]: [facility];  Service: General    PROSTATE BIOPSY      WISDOM TOOTH EXTRACTION           Current Outpatient Medications:     albuterol (PROVENTIL HFA,VENTOLIN HFA) 90 mcg/act inhaler, Inhale 2 puffs every 6 (six) hours as needed for shortness of breath, Disp: 3 Inhaler, Rfl: 3    atorvastatin (LIPITOR) 20 mg tablet, , Disp: , Rfl:     cholecalciferol (VITAMIN D3) 1,000 units tablet, Take 2 tablets (2,000 Units total) by mouth daily for 2 days, Disp: 4 tablet, Rfl: 0    finasteride (PROSCAR) 5 mg tablet, Take 5 mg by mouth daily, Disp: , Rfl:     fluticasone-umeclidinium-vilanterol (TRELEGY) 100-62 5-25 MCG/INH inhaler, Inhale 1 puff daily Rinse mouth after use , Disp: 3 Inhaler, Rfl: 3    guaiFENesin (MUCINEX) 600 mg 12 hr tablet, Take 1,200 mg by mouth 2 (two) times a day as needed, Disp: , Rfl:     hydrochlorothiazide (HYDRODIURIL) 25 mg tablet, Take 1 tablet (25 mg total) by mouth daily, Disp: 90 tablet, Rfl: 1    ipratropium-albuterol (DUO-NEB) 0 5-2 5 mg/3 mL nebulizer solution, Take 1 vial (3 mL total) by nebulization every 6 (six) hours, Disp: 1080 mL, Rfl: 1    loratadine (CLARITIN REDITABS) 10 MG dissolvable tablet, Take 10 mg by mouth daily as needed for allergies  , Disp: , Rfl:     tamsulosin (Flomax) 0 4 mg, Take 1 capsule (0 4 mg total) by mouth daily with dinner, Disp: 90 capsule, Rfl: 3    EPINEPHrine (EPIPEN) 0 3 mg/0 3 mL SOAJ, Inject 0 3 mL (0 3 mg total) into a muscle once for 1 dose, Disp: 0 6 mL, Rfl: 0    loperamide (IMODIUM) 2 mg capsule, Take 1 capsule (2 mg total) by mouth 3 (three) times a day as needed for diarrhea (Patient not taking: Reported on 2021), Disp: 30 capsule, Rfl: 0    Multiple Vitamins-Minerals (CENTRUM ADULTS PO), Take 1 tablet by mouth daily   (Patient not taking: Reported on 2021), Disp: , Rfl:     Allergies   Allergen Reactions    Bee Venom Facial Swelling    Other Rash     Annotation - [id number]: mushrooms    Penicillins Rash       Social History     Socioeconomic History    Marital status: /Civil Union     Spouse name: None    Number of children: None    Years of education: None    Highest education level: None   Occupational History    None   Tobacco Use    Smoking status: Former Smoker     Packs/day: 1 50     Years: 49 00     Pack years: 73 50     Types: Cigarettes     Start date: 5     Quit date: 2015     Years since quittin 7    Smokeless tobacco: Never Used   Vaping Use    Vaping Use: Never used   Substance and Sexual Activity    Alcohol use: Not Currently     Alcohol/week: 0 0 standard drinks     Comment: rare    Drug use: No    Sexual activity: Yes     Partners: Female     Birth control/protection: Male Sterilization, Female Sterilization   Other Topics Concern    None   Social History Narrative    Advance directive on file     Social Determinants of Health     Financial Resource Strain:     Difficulty of Paying Living Expenses:    Food Insecurity:     Worried About Running Out of Food in the Last Year:     [address] in the Last Year:    Transportation Needs:     Lack of Transportation (Medical):      Lack of Transportation (Non-Medical):    Physical Activity:     Days of Exercise per Week:     Minutes of Exercise per Session:    Stress:     Feeling of Stress :    Social Connections:     Frequency of Communication with Friends and Family:     Frequency of Social Gatherings with Friends and Family:     Attends Congregational Services:     Active Member of Clubs or Organizations:     Attends Club or Organization Meetings:     Marital Status:    Intimate Partner Violence:     Fear of Current or Ex-Partner:     Emotionally Abused:     Physically Abused:     Sexually Abused:        Family History   Problem Relation Age of Onset    Hypertension Mother        PHQ-9 Depression Screening    PHQ-9:   Frequency of the following problems over the past two weeks:             Health Maintenance   Topic Date Due    SLP PLAN OF CARE  Never done    COVID-19 Vaccine (1) Never done    BMI: Followup Plan  01/10/2021    Colorectal Cancer Screening  07/25/2021    Influenza Vaccine (1) 09/01/2021    Fall Risk  11/13/2021    Medicare Annual Wellness Visit (AWV)  11/13/2021    Lung Cancer Screening  04/01/2022    Depression Screening PHQ  04/21/2022    BMI: Adult  07/23/2022    DTaP,Tdap,and Td Vaccines (3 - Td or Tdap) 09/29/2030    Hepatitis C Screening  Completed    Pneumococcal Vaccine: 65+ Years  Completed    HIB Vaccine  Aged Out    Hepatitis B Vaccine  Aged Out    IPV Vaccine  Aged Out    Hepatitis A Vaccine  Aged Out    Meningococcal ACWY Vaccine  Aged Out    HPV Vaccine  Aged Out       Immunization History   Administered Date(s) Administered    DTaP 09/29/2020    INFLUENZA 11/01/2006, 09/28/2007, 11/01/2016, 10/10/2017, 09/28/2020    Influenza Split High Dose Preservative Free IM 11/01/2016, 10/10/2017    Influenza, high dose seasonal 0 7 mL 10/19/2018, 10/03/2019, 09/28/2020    Pneumococcal Conjugate 13-Valent 07/11/2016, 06/12/2019    Pneumococcal Polysaccharide PPV23 10/10/2017    Tdap 09/11/2020    Zoster Vaccine Recombinant 03/28/2019, 06/12/2019        Objective:  Vitals:    07/23/21 1421 07/23/21 1509   BP: 150/98 136/76   BP Location: Left arm Left arm   Patient Position: Lying right side Sitting   Cuff Size: Adult Standard   Pulse: 101    Temp: 97 8 °F (36 6 °C)    TempSrc: Temporal    SpO2: 99%    Weight: 78 2 kg (172 lb 6 4 oz)    Height: 5' 8" (1 727 m)      Wt Readings from Last 3 Encounters:   07/23/21 78 2 kg (172 lb 6 4 oz)   04/21/21 68 5 kg (151 lb)   04/20/21 68 5 kg (151 lb)     Body mass index is 26 21 kg/m²  No exam data present       Physical Exam  Vitals and nursing note reviewed  Constitutional:       General: He is not in acute distress  Appearance: He is well-developed  He is not toxic-appearing or diaphoretic  Comments: Alert  Seated in room with supplemental oxygen on his nose  In no acute distress  Nontoxic  Conversational   Making jokes  HENT:      Head: Normocephalic and atraumatic  Right Ear: Tympanic membrane normal       Left Ear: Tympanic membrane normal       Mouth/Throat:      Mouth: Mucous membranes are moist       Pharynx: No posterior oropharyngeal erythema  Eyes:      General: No scleral icterus  Right eye: No discharge  Left eye: No discharge  Pupils: Pupils are equal, round, and reactive to light  Cardiovascular:      Rate and Rhythm: Normal rate and regular rhythm  Heart sounds: Normal heart sounds  No murmur heard  Pulmonary:      Effort: Pulmonary effort is normal  No respiratory distress  Breath sounds: Normal breath sounds  No wheezing or rales  Comments: Barrel chested  Decreased breath sounds heard throughout  No crackles no rhonchi  No wheeze  Right anterior lateral chest chest wall site x2  Well-healed  Nontender  Abdominal:      General: Bowel sounds are normal  There is no distension  Palpations: Abdomen is soft  Tenderness: There is no abdominal tenderness  There is no guarding  Comments: Soft nontender   Musculoskeletal:      Cervical back: Neck supple  Right lower leg: No edema  Left lower leg: No edema  Skin:     General: Skin is warm and dry  Neurological:      General: No focal deficit present  Mental Status: He is alert  Psychiatric:         Mood and Affect: Mood normal          Behavior: Behavior normal          Thought Content: Thought content normal              Future Appointments   Date Time Provider Isacc Capellan   8/25/2021  1:40 PM [facility]   9/9/2021 11:30 AM EVAN Easley [facility]   10/29/2021  1:00 PM Juan Schulz PA-C [address] [place] [address]   3/2/2022  1:00 PM [facility] [address]       Juan Schulz PA-C  [address]    Patient Care Team:  Juan Schulz PA-C as PCP - General (Internal Medicine)  MD Fredi Andino MD (Urology)  Reginaldo Cox (Vascular Surgery)  Tanya Trent MD as Consulting Physician (Pulmonary Disease)  Mark Gandara MD (Colon and Rectal Surgery)    This note was completed in part utilizing [employer] Direct Software    Grammatical errors, random word insertions, spelling mistakes, and incomplete sentences can be an occasional consequence of this system secondary to software limitations, ambient noise, and hardware issues  If you have any questions or concerns about the content, text, or information contained within the body of this dictation, please contact the provider for clarification

## 2021-08-25 ENCOUNTER — OFFICE VISIT (OUTPATIENT)
Dept: PULMONOLOGY | Facility: CLINIC | Age: 71
End: 2021-08-25
Payer: MEDICARE

## 2021-08-25 VITALS
DIASTOLIC BLOOD PRESSURE: 82 MMHG | OXYGEN SATURATION: 94 % | SYSTOLIC BLOOD PRESSURE: 140 MMHG | WEIGHT: 172.6 LBS | HEIGHT: 68 IN | HEART RATE: 94 BPM | TEMPERATURE: 97.4 F | BODY MASS INDEX: 26.16 KG/M2

## 2021-08-25 DIAGNOSIS — J96.11 CHRONIC HYPOXEMIC RESPIRATORY FAILURE (HCC): ICD-10-CM

## 2021-08-25 DIAGNOSIS — J43.2 CENTRILOBULAR EMPHYSEMA (HCC): Primary | ICD-10-CM

## 2021-08-25 PROBLEM — J93.83 SPONTANEOUS PNEUMOTHORAX: Status: RESOLVED | Noted: 2021-03-30 | Resolved: 2021-08-25

## 2021-08-25 PROBLEM — Z12.2 ENCOUNTER FOR SCREENING FOR LUNG CANCER: Status: RESOLVED | Noted: 2019-10-07 | Resolved: 2021-08-25

## 2021-08-25 PROBLEM — Z76.89 ENCOUNTER FOR SUPPORT AND COORDINATION OF TRANSITION OF CARE: Status: RESOLVED | Noted: 2020-02-14 | Resolved: 2021-08-25

## 2021-08-25 PROBLEM — F17.211 CIGARETTE NICOTINE DEPENDENCE IN REMISSION: Status: ACTIVE | Noted: 2021-08-25

## 2021-08-25 PROBLEM — Z03.818 ENCOUNTER FOR OBSERVATION FOR SUSPECTED EXPOSURE TO OTHER BIOLOGICAL AGENTS RULED OUT: Status: RESOLVED | Noted: 2021-03-12 | Resolved: 2021-08-25

## 2021-08-25 PROCEDURE — 99213 OFFICE O/P EST LOW 20 MIN: CPT | Performed by: INTERNAL MEDICINE

## 2021-08-25 RX ORDER — FUROSEMIDE 20 MG/1
20 TABLET ORAL DAILY
COMMUNITY
Start: 2021-04-16 | End: 2021-09-09 | Stop reason: ALTCHOICE

## 2021-08-25 RX ORDER — PREDNISONE 20 MG/1
TABLET ORAL
COMMUNITY
Start: 2021-03-22 | End: 2021-08-25 | Stop reason: ALTCHOICE

## 2021-08-25 NOTE — PROGRESS NOTES
Progress note - Pulmonary Medicine   Jeimy Justice 79 y o  male MRN: [id number]       Impression & Plan:     Centrilobular emphysema ([address] ) - severe  ·  Continues on supplemental oxygen and trilogy Ellipta   · Doing well with Trelegy, no new symptoms   · Has had pneumothorax in the recent past but has not had any recurrence  Doing well since resolution of this pneumothorax  · He is using his nebulizer twice a day and rarely requires rescue albuterol inhaler  · Continue present therapy    Chronic hypoxemic respiratory failure (HCC)  ·  Using oxygen at 2 L continuously  · Has a portable oxygen concentrator   · Doing well with supplemental oxygen    Cigarette nicotine dependence in remission  ·  Has had CT imaging for follow-up of his emphysema and pulmonary nodule  Most recent CT scan in April without any change   · Continue screening surveillance given tobacco cessation only 5 years prior  He is next due for imaging April 2022 which I will order at his next follow-up visit     follow-up in 6 months  ______________________________________________________________________    HPI:    Peter Ferreira presents today for follow-up of  Emphysema, chronic hypoxemic respiratory failure, and hospitalization in April for hydropneumothorax  He denies any new symptoms  He is doing well on trilogy Ellipta  He uses nebulizer twice a day  He has a rescue inhaler which he does not require frequently  He is using his supplemental oxygen appropriately and has a portable oxygen concentrator  He uses 2 L continuously  He denies any fever or chills  No sick contacts  No change in weight or appetite  He tries to remain active and has not had any decline in his activity level  No chest pain or cardiac complaints  No abdominal pain or GERD symptoms  No arthralgias or myalgias       Current Medications:    Current Outpatient Medications:     albuterol (PROVENTIL HFA,VENTOLIN HFA) 90 mcg/act inhaler, Inhale 2 puffs every 6 (six) hours as needed for shortness of breath, Disp: 3 Inhaler, Rfl: 3    atorvastatin (LIPITOR) 20 mg tablet, , Disp: , Rfl:     cholecalciferol (VITAMIN D3) 1,000 units tablet, Take 2 tablets (2,000 Units total) by mouth daily for 2 days, Disp: 4 tablet, Rfl: 0    finasteride (PROSCAR) 5 mg tablet, Take 5 mg by mouth daily, Disp: , Rfl:     fluticasone-umeclidinium-vilanterol (TRELEGY) 100-62 5-25 MCG/INH inhaler, Inhale 1 puff daily Rinse mouth after use , Disp: 3 Inhaler, Rfl: 3    furosemide (LASIX) 20 mg tablet, Take 20 mg by mouth daily, Disp: , Rfl:     guaiFENesin (MUCINEX) 600 mg 12 hr tablet, Take 1,200 mg by mouth 2 (two) times a day as needed, Disp: , Rfl:     hydrochlorothiazide (HYDRODIURIL) 25 mg tablet, Take 1 tablet (25 mg total) by mouth daily, Disp: 90 tablet, Rfl: 1    ipratropium-albuterol (DUO-NEB) 0 5-2 5 mg/3 mL nebulizer solution, Take 1 vial (3 mL total) by nebulization every 6 (six) hours, Disp: 1080 mL, Rfl: 1    loratadine (CLARITIN REDITABS) 10 MG dissolvable tablet, Take 10 mg by mouth daily as needed for allergies  , Disp: , Rfl:     tamsulosin (Flomax) 0 4 mg, Take 1 capsule (0 4 mg total) by mouth daily with dinner, Disp: 90 capsule, Rfl: 3    EPINEPHrine (EPIPEN) 0 3 mg/0 3 mL SOAJ, Inject 0 3 mL (0 3 mg total) into a muscle once for 1 dose, Disp: 0 6 mL, Rfl: 0    Review of Systems:  Aside from what is mentioned in the HPI, the review of systems is otherwise negative    Past medical history, surgical history, and family history were reviewed and updated as appropriate    Social history updates:  Social History     Tobacco Use   Smoking Status Former Smoker    Packs/day: 1 50    Years: 48 00    Pack years: 72 00    Types: Cigarettes    Start date: 5    Quit date: 2015    Years since quittin 7   Smokeless Tobacco Never Used       PhysicalExamination:  Vitals:   /82   Pulse 94   Temp (!) 97 4 °F (36 3 °C)   Ht 5' 8" (1 727 m)   Wt 78 3 kg (172 lb 9 6 oz)   SpO2 94% Comment: with 2l of o2  BMI 26 24 kg/m²    Gen:  He appears well today  He is in good spirits  He is accompanied by his wife  HEENT: PERRL  Oropharynx is clear, moist  Neck: Supple  There is no JVD, lymphadenopathy or thyromegaly  Trachea is midline  Chest:  Chest excursion symmetric  Lungs are hyperinflated  Breath sounds are distant bilaterally but otherwise clear  Hyper-resonant to percussion  Cardiac:  regular  There are no murmurs  Abdomen: Soft and nontender  Benign  Extremities: No clubbing, cyanosis or edema  Neurologic: No focal deficits  Normal affect  Skin: No appreciable rashes  Diagnostic Data:  Labs: I personally reviewed the most recent laboratory data pertinent to today's visit    Lab Results   Component Value Date    WBC 10 93 (H) 04/02/2021    HGB 11 0 (L) 04/02/2021    HCT 34 6 (L) 04/02/2021    MCV 92 04/02/2021     04/02/2021     Lab Results   Component Value Date    SODIUM 140 07/16/2021    K 4 4 07/16/2021    CO2 32 07/16/2021     07/16/2021    BUN 13 07/16/2021    CREATININE 1 03 07/16/2021    CALCIUM 9 2 07/16/2021     Imaging:  I personally reviewed the images on the [facility] system pertinent to today's visit   last chest x-ray was in April and shows improvement in his hydropneumothorax  There is a  Small component of loculated hydropneumothorax which is clinically insignificant      Megha Casillas MD

## 2021-08-25 NOTE — ASSESSMENT & PLAN NOTE
·  Using oxygen at 2 L continuously      · Has a portable oxygen concentrator   · Doing well with supplemental oxygen

## 2021-08-25 NOTE — ASSESSMENT & PLAN NOTE
·  Continues on supplemental oxygen and trilogy Ellipta   · Doing well with Trelegy, no new symptoms   · Has had pneumothorax in the recent past but has not had any recurrence    Doing well since resolution of this pneumothorax  · He is using his nebulizer twice a day and rarely requires rescue albuterol inhaler  · Continue present therapy

## 2021-08-25 NOTE — ASSESSMENT & PLAN NOTE
·  Has had CT imaging for follow-up of his emphysema and pulmonary nodule  Most recent CT scan in April without any change   · Continue screening surveillance given tobacco cessation only 5 years prior    He is next due for imaging April 2022 which I will order at his next follow-up visit

## 2021-08-31 ENCOUNTER — APPOINTMENT (OUTPATIENT)
Dept: LAB | Facility: IMAGING CENTER | Age: 71
End: 2021-08-31
Payer: MEDICARE

## 2021-08-31 DIAGNOSIS — N40.1 BPH WITH OBSTRUCTION/LOWER URINARY TRACT SYMPTOMS: ICD-10-CM

## 2021-08-31 DIAGNOSIS — R31.29 OTHER MICROSCOPIC HEMATURIA: ICD-10-CM

## 2021-08-31 DIAGNOSIS — N13.8 BPH WITH OBSTRUCTION/LOWER URINARY TRACT SYMPTOMS: ICD-10-CM

## 2021-08-31 LAB
BACTERIA UR QL AUTO: ABNORMAL /HPF
BILIRUB UR QL STRIP: NEGATIVE
CLARITY UR: CLEAR
COLOR UR: ABNORMAL
GLUCOSE UR STRIP-MCNC: NEGATIVE MG/DL
HGB UR QL STRIP.AUTO: NEGATIVE
KETONES UR STRIP-MCNC: NEGATIVE MG/DL
LEUKOCYTE ESTERASE UR QL STRIP: NEGATIVE
NITRITE UR QL STRIP: NEGATIVE
NON-SQ EPI CELLS URNS QL MICRO: ABNORMAL /HPF
PH UR STRIP.AUTO: 7.5 [PH]
PROT UR STRIP-MCNC: ABNORMAL MG/DL
PSA SERPL-MCNC: 1.2 NG/ML (ref 0–4)
RBC #/AREA URNS AUTO: ABNORMAL /HPF
SP GR UR STRIP.AUTO: 1.02 (ref 1–1.03)
UROBILINOGEN UR QL STRIP.AUTO: 0.2 E.U./DL
WBC #/AREA URNS AUTO: ABNORMAL /HPF

## 2021-08-31 PROCEDURE — 36415 COLL VENOUS BLD VENIPUNCTURE: CPT

## 2021-08-31 PROCEDURE — 84153 ASSAY OF PSA TOTAL: CPT

## 2021-08-31 PROCEDURE — 81001 URINALYSIS AUTO W/SCOPE: CPT

## 2021-09-09 ENCOUNTER — OFFICE VISIT (OUTPATIENT)
Dept: UROLOGY | Facility: MEDICAL CENTER | Age: 71
End: 2021-09-09
Payer: MEDICARE

## 2021-09-09 VITALS
BODY MASS INDEX: 26.07 KG/M2 | DIASTOLIC BLOOD PRESSURE: 76 MMHG | SYSTOLIC BLOOD PRESSURE: 140 MMHG | WEIGHT: 172 LBS | HEART RATE: 115 BPM | HEIGHT: 68 IN

## 2021-09-09 DIAGNOSIS — R97.20 ELEVATED PSA: ICD-10-CM

## 2021-09-09 DIAGNOSIS — N13.8 BPH WITH OBSTRUCTION/LOWER URINARY TRACT SYMPTOMS: Primary | ICD-10-CM

## 2021-09-09 DIAGNOSIS — N40.1 BPH WITH OBSTRUCTION/LOWER URINARY TRACT SYMPTOMS: Primary | ICD-10-CM

## 2021-09-09 LAB — POST-VOID RESIDUAL VOLUME, ML POC: 29 ML

## 2021-09-09 PROCEDURE — 99214 OFFICE O/P EST MOD 30 MIN: CPT | Performed by: NURSE PRACTITIONER

## 2021-09-09 PROCEDURE — 51798 US URINE CAPACITY MEASURE: CPT | Performed by: NURSE PRACTITIONER

## 2021-09-09 RX ORDER — TAMSULOSIN HYDROCHLORIDE 0.4 MG/1
0.4 CAPSULE ORAL
Qty: 90 CAPSULE | Refills: 3 | Status: SHIPPED | OUTPATIENT
Start: 2021-09-09 | End: 2021-12-10 | Stop reason: SDUPTHER

## 2021-09-09 RX ORDER — FINASTERIDE 5 MG/1
5 TABLET, FILM COATED ORAL DAILY
Qty: 90 TABLET | Refills: 3 | Status: SHIPPED | OUTPATIENT
Start: 2021-09-09 | End: 2021-12-10 | Stop reason: SDUPTHER

## 2021-09-09 NOTE — PROGRESS NOTES
9/9/2021    Assessment and Plan    79 y o  male managed by Dr Sosa Manner    1  Benign prostatic hyperplasia with lower urinary tract symptoms  · Bladder Scan PVR-= 29  ml   · urinalysis with microscopy performed 08/31/2021 negative for microscopic blood   · continue with tamsulosin and finasteride  ·  maintain adequate hydration upwards to 40-60 oz of water intake per day  ·  Ensure complete bladder emptying with urination  ·  discussed the need to avoid /limit bladder irritating foods and beverages  ·  continue to monitor for worsening/progression of urinary symptoms  ·  bladder scan PVR at next office visit  ·  follow up in the office in 1 year    2  Prostate cancer screening/ elevated PSA  ·  PSA performed 08/31/2021 resulted 1 2  ·  JOSE DANIEL-Prostate approximately 40 g with no nodules noted  Smooth symmetrical   ·  we discussed possible causes for elevation to PSA  ·  repeat PSA in 3 months  ·  if repeat PSA in 3 months continues to be elevated consider MRI of prostate  ·  follow up in the office in 3 months    History of Present Illness  Nicholas Guillory is a 79 y o  male here for follow up evaluation of urinary symptoms secondary benign prostatic hyperplasia  Patient also here for routine prostate cancer screening  He is currently managed on tamsulosin and finasteride with good management of urinary symptoms  He currently satisfied with urinary status  He denies changes to his urinary status since his last office visit  Patient reports being diagnosed with COVID 03/2021  He was hospitalized for 6 days and was discharged home  After being home he was found collapse on his floor was noted to have a pneumothorax with bacteria pneumonia and was in the ICU with bilateral chest tubes and intubated for approximately 1 week        Component       PSA, Total   Latest Ref Rng & Units       0 0 - 4 0 ng/mL   8/11/2017      4:14 PM 1 0   8/20/2018      8:29 AM 1 2   8/24/2019      11:03 AM 0 7   11/29/2019      11:36 AM 0 9 8/25/2020      11:27 AM 0 6   8/31/2021      12:14 PM 1 2       Review of Systems   Constitutional: Negative for chills and fever  Respiratory: Positive for shortness of breath (baseline)  Negative for cough  Cardiovascular: Negative for chest pain  Gastrointestinal: Negative for abdominal distention, abdominal pain, blood in stool, nausea and vomiting  Genitourinary: Negative for difficulty urinating, dysuria, enuresis, flank pain, frequency, hematuria and urgency  Skin: Negative for rash  Past Medical History  Past Medical History:   Diagnosis Date    Allergic 1/1/1990    Bee stings and penicillin    BPH (benign prostatic hyperplasia)     COPD (chronic obstructive pulmonary disease) (HCC)     Last Assessed:11/14/2016    Diverticulosis     Elevated prostate specific antigen (PSA)     Emphysema (subcutaneous) (surgical) resulting from a procedure     Enlarged prostate with lower urinary tract symptoms (LUTS)     Resolved:8/22/2017    Hernia, inguinal, right     Hypertension 5/1/2019    Visit with Adelso Warren showed slightly elevated bp   Pneumonia due to COVID-19 virus 3/16/2021       Past Social History  Past Surgical History:   Procedure Laterality Date    BICEPS TENDON REPAIR  1999    COLONOSCOPY      FRACTURE SURGERY  5/15/1988    Approx date  Rt index finger fractured      HAND SURGERY      Last Assessed:7/11/2016    HERNIA REPAIR      Last Assessed:7/11/2016    IR CHEST TUBE CHECK/CHANGE/REPOSITION/UPSIZE  4/1/2021    IR CHEST TUBE PLACEMENT  3/30/2021    KNEE ARTHROSCOPY      MT REPAIR ING HERNIA,5+Y/O,REDUCIBL Right 2/23/2016    Procedure: REPAIR HERNIA INGUINAL with mesh;  Surgeon: Toribio Curling, DO;  Location: [facility];  Service: General    PROSTATE BIOPSY      WISDOM TOOTH EXTRACTION       Social History     Tobacco Use   Smoking Status Former Smoker    Packs/day: 1 50    Years: 48 00    Pack years: 72 00    Types: Cigarettes    Start date: 1967    Quit date: 2015    Years since quittin 8   Smokeless Tobacco Never Used       Past Family History  Family History   Problem Relation Age of Onset    Hypertension Mother        Past Social history  Social History     Socioeconomic History    Marital status: /Civil Union     Spouse name: Not on file    Number of children: Not on file    Years of education: Not on file    Highest education level: Not on file   Occupational History    Not on file   Tobacco Use    Smoking status: Former Smoker     Packs/day: 1 50     Years: 48 00     Pack years: 72 00     Types: Cigarettes     Start date: 5     Quit date: 2015     Years since quittin 8    Smokeless tobacco: Never Used   Vaping Use    Vaping Use: Never used   Substance and Sexual Activity    Alcohol use: Not Currently     Alcohol/week: 0 0 standard drinks     Comment: rare    Drug use: No    Sexual activity: Yes     Partners: Female     Birth control/protection: Male Sterilization, Female Sterilization   Other Topics Concern    Not on file   Social History Narrative    Advance directive on file     Social Determinants of Health     Financial Resource Strain:     Difficulty of Paying Living Expenses:    Food Insecurity:     Worried About Running Out of Food in the Last Year:     [address] in the Last Year:    Transportation Needs:     Lack of Transportation (Medical):      Lack of Transportation (Non-Medical):    Physical Activity:     Days of Exercise per Week:     Minutes of Exercise per Session:    Stress:     Feeling of Stress :    Social Connections:     Frequency of Communication with Friends and Family:     Frequency of Social Gatherings with Friends and Family:     Attends [employer] Services:     Active Member of Clubs or Organizations:     Attends Club or Organization Meetings:     Marital Status:    Intimate Partner Violence:     Fear of Current or Ex-Partner:     Emotionally Abused:     Physically Abused:     Sexually Abused:        Current Medications  Current Outpatient Medications   Medication Sig Dispense Refill    albuterol (PROVENTIL HFA,VENTOLIN HFA) 90 mcg/act inhaler Inhale 2 puffs every 6 (six) hours as needed for shortness of breath 3 Inhaler 3    atorvastatin (LIPITOR) 20 mg tablet       cholecalciferol (VITAMIN D3) 1,000 units tablet Take 2 tablets (2,000 Units total) by mouth daily for 2 days 4 tablet 0    finasteride (PROSCAR) 5 mg tablet Take 1 tablet (5 mg total) by mouth daily 90 tablet 3    fluticasone-umeclidinium-vilanterol (TRELEGY) 100-62 5-25 MCG/INH inhaler Inhale 1 puff daily Rinse mouth after use  3 Inhaler 3    guaiFENesin (MUCINEX) 600 mg 12 hr tablet Take 1,200 mg by mouth 2 (two) times a day as needed      hydrochlorothiazide (HYDRODIURIL) 25 mg tablet Take 1 tablet (25 mg total) by mouth daily 90 tablet 1    ipratropium-albuterol (DUO-NEB) 0 5-2 5 mg/3 mL nebulizer solution Take 1 vial (3 mL total) by nebulization every 6 (six) hours 1080 mL 1    loratadine (CLARITIN REDITABS) 10 MG dissolvable tablet Take 10 mg by mouth daily as needed for allergies   tamsulosin (Flomax) 0 4 mg Take 1 capsule (0 4 mg total) by mouth daily with dinner 90 capsule 3    EPINEPHrine (EPIPEN) 0 3 mg/0 3 mL SOAJ Inject 0 3 mL (0 3 mg total) into a muscle once for 1 dose 0 6 mL 0     No current facility-administered medications for this visit  Allergies  Allergies   Allergen Reactions    Bee Venom Facial Swelling    Other Rash     [facility] [id number]: mushrooms    Penicillins Rash         The following portions of the patient's history were reviewed and updated as appropriate: allergies, current medications, past medical history, past social history, past surgical history and problem list       Vitals  Vitals:    09/09/21 1121   BP: 140/76   Pulse: (!) 115   Weight: 78 kg (172 lb)   Height: 5' 8" (1 727 m)           Physical Exam  Physical Exam  Vitals reviewed  Constitutional:       General: He is not in acute distress  Appearance: Normal appearance  He is normal weight  HENT:      Head: Normocephalic  Eyes:      Pupils: Pupils are equal, round, and reactive to light  Cardiovascular:      Rate and Rhythm: Normal rate  Pulmonary:      Effort: Respiratory distress ( exertional) present  Breath sounds: Normal breath sounds  Comments:  Oxygen via nasal cannula  Genitourinary:     Comments:   JOSE DANIEL -prostate 40 g smooth nontender  No nodules noted  Skin:     General: Skin is warm and dry  Neurological:      General: No focal deficit present  Mental Status: He is alert and oriented to person, place, and time     Psychiatric:         Mood and Affect: Mood normal          Behavior: Behavior normal        Results  Recent Results (from the past 1 hour(s))   POCT Measure PVR    Collection Time: 09/09/21 11:29 AM   Result Value Ref Range    POST-VOID RESIDUAL VOLUME, ML POC 29 mL   ]  Lab Results   Component Value Date    PSA 1 2 08/31/2021    PSA 0 6 08/25/2020    PSA 0 9 11/29/2019     Lab Results   Component Value Date    GLUCOSE 155 (H) 03/29/2021    CALCIUM 9 2 07/16/2021    K 4 4 07/16/2021    CO2 32 07/16/2021     07/16/2021    BUN 13 07/16/2021    CREATININE 1 03 07/16/2021     Lab Results   Component Value Date    WBC 10 93 (H) 04/02/2021    HGB 11 0 (L) 04/02/2021    HCT 34 6 (L) 04/02/2021    MCV 92 04/02/2021     04/02/2021     Orders  Orders Placed This Encounter   Procedures    PSA Total, Diagnostic     Standing Status:   Future     Standing Expiration Date:   9/9/2022    POCT Measure PVR       EVAN Milner

## 2021-10-25 ENCOUNTER — LAB (OUTPATIENT)
Dept: LAB | Facility: IMAGING CENTER | Age: 71
End: 2021-10-25
Payer: MEDICARE

## 2021-10-25 DIAGNOSIS — J96.11 CHRONIC HYPOXEMIC RESPIRATORY FAILURE (HCC): ICD-10-CM

## 2021-10-25 DIAGNOSIS — R73.03 PREDIABETES: ICD-10-CM

## 2021-10-25 DIAGNOSIS — I10 HYPERTENSION, ESSENTIAL: ICD-10-CM

## 2021-10-25 DIAGNOSIS — I10 ESSENTIAL HYPERTENSION: ICD-10-CM

## 2021-10-25 LAB
ALBUMIN SERPL BCP-MCNC: 3.9 G/DL (ref 3.5–5)
ALP SERPL-CCNC: 88 U/L (ref 46–116)
ALT SERPL W P-5'-P-CCNC: 21 U/L (ref 12–78)
ANION GAP SERPL CALCULATED.3IONS-SCNC: 2 MMOL/L (ref 4–13)
AST SERPL W P-5'-P-CCNC: 17 U/L (ref 5–45)
BASOPHILS # BLD AUTO: 0.05 THOUSANDS/ΜL (ref 0–0.1)
BASOPHILS NFR BLD AUTO: 1 % (ref 0–1)
BILIRUB SERPL-MCNC: 0.67 MG/DL (ref 0.2–1)
BUN SERPL-MCNC: 12 MG/DL (ref 5–25)
CALCIUM SERPL-MCNC: 9.5 MG/DL (ref 8.3–10.1)
CHLORIDE SERPL-SCNC: 101 MMOL/L (ref 100–108)
CO2 SERPL-SCNC: 34 MMOL/L (ref 21–32)
CREAT SERPL-MCNC: 1.1 MG/DL (ref 0.6–1.3)
EOSINOPHIL # BLD AUTO: 0.24 THOUSAND/ΜL (ref 0–0.61)
EOSINOPHIL NFR BLD AUTO: 3 % (ref 0–6)
ERYTHROCYTE [DISTWIDTH] IN BLOOD BY AUTOMATED COUNT: 13.2 % (ref 11.6–15.1)
GFR SERPL CREATININE-BSD FRML MDRD: 68 ML/MIN/1.73SQ M
GLUCOSE P FAST SERPL-MCNC: 108 MG/DL (ref 65–99)
HCT VFR BLD AUTO: 45.9 % (ref 36.5–49.3)
HGB BLD-MCNC: 14.3 G/DL (ref 12–17)
IMM GRANULOCYTES # BLD AUTO: 0.05 THOUSAND/UL (ref 0–0.2)
IMM GRANULOCYTES NFR BLD AUTO: 1 % (ref 0–2)
LYMPHOCYTES # BLD AUTO: 1.39 THOUSANDS/ΜL (ref 0.6–4.47)
LYMPHOCYTES NFR BLD AUTO: 18 % (ref 14–44)
MCH RBC QN AUTO: 28.5 PG (ref 26.8–34.3)
MCHC RBC AUTO-ENTMCNC: 31.2 G/DL (ref 31.4–37.4)
MCV RBC AUTO: 92 FL (ref 82–98)
MONOCYTES # BLD AUTO: 0.56 THOUSAND/ΜL (ref 0.17–1.22)
MONOCYTES NFR BLD AUTO: 7 % (ref 4–12)
NEUTROPHILS # BLD AUTO: 5.45 THOUSANDS/ΜL (ref 1.85–7.62)
NEUTS SEG NFR BLD AUTO: 70 % (ref 43–75)
NRBC BLD AUTO-RTO: 0 /100 WBCS
PLATELET # BLD AUTO: 281 THOUSANDS/UL (ref 149–390)
PMV BLD AUTO: 11.1 FL (ref 8.9–12.7)
POTASSIUM SERPL-SCNC: 4.1 MMOL/L (ref 3.5–5.3)
PROT SERPL-MCNC: 7.3 G/DL (ref 6.4–8.2)
RBC # BLD AUTO: 5.01 MILLION/UL (ref 3.88–5.62)
SODIUM SERPL-SCNC: 137 MMOL/L (ref 136–145)
WBC # BLD AUTO: 7.74 THOUSAND/UL (ref 4.31–10.16)

## 2021-10-25 PROCEDURE — 36415 COLL VENOUS BLD VENIPUNCTURE: CPT

## 2021-10-25 PROCEDURE — 80053 COMPREHEN METABOLIC PANEL: CPT

## 2021-10-25 PROCEDURE — 85025 COMPLETE CBC W/AUTO DIFF WBC: CPT

## 2021-10-29 ENCOUNTER — OFFICE VISIT (OUTPATIENT)
Dept: INTERNAL MEDICINE CLINIC | Facility: CLINIC | Age: 71
End: 2021-10-29
Payer: MEDICARE

## 2021-10-29 VITALS
TEMPERATURE: 97.8 F | HEIGHT: 68 IN | BODY MASS INDEX: 27.25 KG/M2 | SYSTOLIC BLOOD PRESSURE: 130 MMHG | HEART RATE: 99 BPM | OXYGEN SATURATION: 97 % | DIASTOLIC BLOOD PRESSURE: 80 MMHG | WEIGHT: 179.8 LBS

## 2021-10-29 DIAGNOSIS — R73.03 PREDIABETES: ICD-10-CM

## 2021-10-29 DIAGNOSIS — J96.11 CHRONIC HYPOXEMIC RESPIRATORY FAILURE (HCC): Primary | ICD-10-CM

## 2021-10-29 DIAGNOSIS — E78.2 MIXED HYPERLIPIDEMIA: ICD-10-CM

## 2021-10-29 DIAGNOSIS — I10 ESSENTIAL HYPERTENSION: ICD-10-CM

## 2021-10-29 DIAGNOSIS — I72.3 BILATERAL ILIAC ARTERY ANEURYSM (HCC): ICD-10-CM

## 2021-10-29 DIAGNOSIS — I10 HYPERTENSION, ESSENTIAL: ICD-10-CM

## 2021-10-29 DIAGNOSIS — Z91.030 ALLERGY TO BEE STING: ICD-10-CM

## 2021-10-29 DIAGNOSIS — I77.89 ARTERIAL ECTASIA (HCC): ICD-10-CM

## 2021-10-29 DIAGNOSIS — N13.8 BPH WITH OBSTRUCTION/LOWER URINARY TRACT SYMPTOMS: ICD-10-CM

## 2021-10-29 DIAGNOSIS — R91.1 LUNG NODULE: ICD-10-CM

## 2021-10-29 DIAGNOSIS — N40.1 BPH WITH OBSTRUCTION/LOWER URINARY TRACT SYMPTOMS: ICD-10-CM

## 2021-10-29 PROCEDURE — 99214 OFFICE O/P EST MOD 30 MIN: CPT | Performed by: PHYSICIAN ASSISTANT

## 2021-10-29 RX ORDER — HYDROCHLOROTHIAZIDE 25 MG/1
25 TABLET ORAL DAILY
Qty: 90 TABLET | Refills: 1 | Status: SHIPPED | OUTPATIENT
Start: 2021-10-29 | End: 2022-03-04 | Stop reason: SDUPTHER

## 2021-10-29 RX ORDER — ATORVASTATIN CALCIUM 20 MG/1
20 TABLET, FILM COATED ORAL DAILY
Qty: 90 TABLET | Refills: 1 | Status: SHIPPED | OUTPATIENT
Start: 2021-10-29 | End: 2022-03-04 | Stop reason: SDUPTHER

## 2021-11-16 ENCOUNTER — TELEPHONE (OUTPATIENT)
Dept: GASTROENTEROLOGY | Facility: HOSPITAL | Age: 71
End: 2021-11-16

## 2021-11-17 ENCOUNTER — ANESTHESIA EVENT (OUTPATIENT)
Dept: GASTROENTEROLOGY | Facility: HOSPITAL | Age: 71
End: 2021-11-17

## 2021-11-17 ENCOUNTER — HOSPITAL ENCOUNTER (OUTPATIENT)
Dept: GASTROENTEROLOGY | Facility: HOSPITAL | Age: 71
Setting detail: OUTPATIENT SURGERY
Discharge: HOME | End: 2021-11-17
Attending: COLON & RECTAL SURGERY | Admitting: COLON & RECTAL SURGERY
Payer: MEDICARE

## 2021-11-17 ENCOUNTER — ANESTHESIA (OUTPATIENT)
Dept: GASTROENTEROLOGY | Facility: HOSPITAL | Age: 71
End: 2021-11-17

## 2021-11-17 VITALS
RESPIRATION RATE: 18 BRPM | SYSTOLIC BLOOD PRESSURE: 113 MMHG | DIASTOLIC BLOOD PRESSURE: 82 MMHG | HEART RATE: 91 BPM | TEMPERATURE: 96.9 F | OXYGEN SATURATION: 98 %

## 2021-11-17 DIAGNOSIS — Z87.19 PERSONAL HISTORY OF UNSPECIFIED DIGESTIVE DISEASE: ICD-10-CM

## 2021-11-17 PROCEDURE — 45378 DIAGNOSTIC COLONOSCOPY: CPT | Performed by: COLON & RECTAL SURGERY

## 2021-11-17 PROCEDURE — 99213 OFFICE O/P EST LOW 20 MIN: CPT | Performed by: COLON & RECTAL SURGERY

## 2021-11-17 RX ORDER — LIDOCAINE HYDROCHLORIDE 10 MG/ML
INJECTION, SOLUTION EPIDURAL; INFILTRATION; INTRACAUDAL; PERINEURAL AS NEEDED
Status: DISCONTINUED | OUTPATIENT
Start: 2021-11-17 | End: 2021-11-17

## 2021-11-17 RX ORDER — SODIUM CHLORIDE 9 MG/ML
125 INJECTION, SOLUTION INTRAVENOUS CONTINUOUS
Status: DISCONTINUED | OUTPATIENT
Start: 2021-11-17 | End: 2021-11-21 | Stop reason: HOSPADM

## 2021-11-17 RX ORDER — PROPOFOL 10 MG/ML
INJECTION, EMULSION INTRAVENOUS CONTINUOUS PRN
Status: DISCONTINUED | OUTPATIENT
Start: 2021-11-17 | End: 2021-11-17

## 2021-11-17 RX ORDER — PROPOFOL 10 MG/ML
INJECTION, EMULSION INTRAVENOUS AS NEEDED
Status: DISCONTINUED | OUTPATIENT
Start: 2021-11-17 | End: 2021-11-17

## 2021-11-17 RX ORDER — ONDANSETRON 2 MG/ML
4 INJECTION INTRAMUSCULAR; INTRAVENOUS ONCE AS NEEDED
Status: CANCELLED | OUTPATIENT
Start: 2021-11-17

## 2021-11-17 RX ORDER — SODIUM CHLORIDE 9 MG/ML
INJECTION, SOLUTION INTRAVENOUS CONTINUOUS PRN
Status: DISCONTINUED | OUTPATIENT
Start: 2021-11-17 | End: 2021-11-17

## 2021-11-17 RX ADMIN — SODIUM CHLORIDE 125 ML/HR: 0.9 INJECTION, SOLUTION INTRAVENOUS at 10:09

## 2021-11-17 RX ADMIN — LIDOCAINE HYDROCHLORIDE 70 MG: 10 INJECTION, SOLUTION EPIDURAL; INFILTRATION; INTRACAUDAL; PERINEURAL at 10:13

## 2021-11-17 RX ADMIN — PROPOFOL 110 MCG/KG/MIN: 10 INJECTION, EMULSION INTRAVENOUS at 10:13

## 2021-11-17 RX ADMIN — PROPOFOL 70 MG: 10 INJECTION, EMULSION INTRAVENOUS at 10:13

## 2021-11-17 RX ADMIN — SODIUM CHLORIDE: 0.9 INJECTION, SOLUTION INTRAVENOUS at 10:11

## 2021-12-06 ENCOUNTER — LAB (OUTPATIENT)
Dept: LAB | Facility: IMAGING CENTER | Age: 71
End: 2021-12-06
Payer: MEDICARE

## 2021-12-06 DIAGNOSIS — I10 ESSENTIAL HYPERTENSION: ICD-10-CM

## 2021-12-06 DIAGNOSIS — R97.20 ELEVATED PSA: ICD-10-CM

## 2021-12-06 DIAGNOSIS — R73.03 PREDIABETES: ICD-10-CM

## 2021-12-06 DIAGNOSIS — E78.2 MIXED HYPERLIPIDEMIA: ICD-10-CM

## 2021-12-06 LAB
ALBUMIN SERPL BCP-MCNC: 4.1 G/DL (ref 3.5–5)
ALP SERPL-CCNC: 81 U/L (ref 46–116)
ALT SERPL W P-5'-P-CCNC: 26 U/L (ref 12–78)
ANION GAP SERPL CALCULATED.3IONS-SCNC: 5 MMOL/L (ref 4–13)
AST SERPL W P-5'-P-CCNC: 22 U/L (ref 5–45)
BILIRUB SERPL-MCNC: 0.82 MG/DL (ref 0.2–1)
BUN SERPL-MCNC: 13 MG/DL (ref 5–25)
CALCIUM SERPL-MCNC: 9.7 MG/DL (ref 8.3–10.1)
CHLORIDE SERPL-SCNC: 101 MMOL/L (ref 100–108)
CHOLEST SERPL-MCNC: 175 MG/DL
CO2 SERPL-SCNC: 32 MMOL/L (ref 21–32)
CREAT SERPL-MCNC: 1.11 MG/DL (ref 0.6–1.3)
GFR SERPL CREATININE-BSD FRML MDRD: 66 ML/MIN/1.73SQ M
GLUCOSE P FAST SERPL-MCNC: 105 MG/DL (ref 65–99)
HDLC SERPL-MCNC: 61 MG/DL
LDLC SERPL CALC-MCNC: 95 MG/DL (ref 0–100)
POTASSIUM SERPL-SCNC: 3.8 MMOL/L (ref 3.5–5.3)
PROT SERPL-MCNC: 7.4 G/DL (ref 6.4–8.2)
PSA SERPL-MCNC: 0.9 NG/ML (ref 0–4)
SODIUM SERPL-SCNC: 138 MMOL/L (ref 136–145)
TRIGL SERPL-MCNC: 93 MG/DL

## 2021-12-06 PROCEDURE — 84153 ASSAY OF PSA TOTAL: CPT

## 2021-12-06 PROCEDURE — 80053 COMPREHEN METABOLIC PANEL: CPT

## 2021-12-06 PROCEDURE — 80061 LIPID PANEL: CPT

## 2021-12-06 PROCEDURE — 36415 COLL VENOUS BLD VENIPUNCTURE: CPT

## 2021-12-10 ENCOUNTER — OFFICE VISIT (OUTPATIENT)
Dept: UROLOGY | Facility: MEDICAL CENTER | Age: 71
End: 2021-12-10
Payer: MEDICARE

## 2021-12-10 VITALS
DIASTOLIC BLOOD PRESSURE: 78 MMHG | WEIGHT: 179 LBS | HEIGHT: 68 IN | BODY MASS INDEX: 27.13 KG/M2 | SYSTOLIC BLOOD PRESSURE: 122 MMHG

## 2021-12-10 DIAGNOSIS — R97.20 ELEVATED PSA: Primary | ICD-10-CM

## 2021-12-10 DIAGNOSIS — N40.1 BPH WITH OBSTRUCTION/LOWER URINARY TRACT SYMPTOMS: ICD-10-CM

## 2021-12-10 DIAGNOSIS — N13.8 BPH WITH OBSTRUCTION/LOWER URINARY TRACT SYMPTOMS: ICD-10-CM

## 2021-12-10 PROCEDURE — 99213 OFFICE O/P EST LOW 20 MIN: CPT | Performed by: NURSE PRACTITIONER

## 2021-12-10 RX ORDER — TAMSULOSIN HYDROCHLORIDE 0.4 MG/1
0.4 CAPSULE ORAL
Qty: 90 CAPSULE | Refills: 3 | Status: SHIPPED | OUTPATIENT
Start: 2021-12-10

## 2021-12-10 RX ORDER — FINASTERIDE 5 MG/1
5 TABLET, FILM COATED ORAL DAILY
Qty: 90 TABLET | Refills: 3 | Status: SHIPPED | OUTPATIENT
Start: 2021-12-10

## 2022-02-15 ENCOUNTER — OFFICE VISIT (OUTPATIENT)
Dept: PULMONOLOGY | Facility: CLINIC | Age: 72
End: 2022-02-15
Payer: MEDICARE

## 2022-02-15 VITALS
OXYGEN SATURATION: 93 % | HEIGHT: 68 IN | TEMPERATURE: 97 F | SYSTOLIC BLOOD PRESSURE: 138 MMHG | WEIGHT: 180 LBS | DIASTOLIC BLOOD PRESSURE: 78 MMHG | RESPIRATION RATE: 18 BRPM | HEART RATE: 102 BPM | BODY MASS INDEX: 27.28 KG/M2

## 2022-02-15 DIAGNOSIS — J43.2 CENTRILOBULAR EMPHYSEMA (HCC): Primary | ICD-10-CM

## 2022-02-15 DIAGNOSIS — J96.11 CHRONIC HYPOXEMIC RESPIRATORY FAILURE (HCC): ICD-10-CM

## 2022-02-15 DIAGNOSIS — F17.211 CIGARETTE NICOTINE DEPENDENCE IN REMISSION: ICD-10-CM

## 2022-02-15 PROCEDURE — 99214 OFFICE O/P EST MOD 30 MIN: CPT | Performed by: INTERNAL MEDICINE

## 2022-02-15 NOTE — ASSESSMENT & PLAN NOTE
· Quit smoking in 2015   · Has over 70 pack year smoking history   · Continues to meet lung cancer screening criteria and is agreeable to ongoing screening      · Last CT scan was in April 2021 and will be due for lung cancer screening in April 2022

## 2022-02-15 NOTE — PROGRESS NOTES
Progress note - Pulmonary Medicine   Carlton Narvaez Held 70 y o  male MRN: [id number]       Impression & Plan:     Centrilobular emphysema ([address] ) - severe  · Continues on Trelegy Ellipta 1/100 strength with good response to therapy   · Continues with intermittent rescue inhaler use  · Continue present therapy    Chronic hypoxemic respiratory failure (HCC)  · He is using supplemental oxygen appropriately   · Continue current oxygen at 2 L   · Has a portable oxygen concentrator for portability  · Discuss strategies to reduce humidification in his oxygen tubing  He may not need humidification at all at nighttime  This is when the fluid accumulates  Cigarette nicotine dependence in remission  · Quit smoking in 2015   · Has over 70 pack year smoking history   · Continues to meet lung cancer screening criteria and is agreeable to ongoing screening  · Last CT scan was in April 2021 and will be due for lung cancer screening in April 2022    I will be in contact with him with the results of the CT scan and otherwise see him back in the office in 6 months  ______________________________________________________________________    HPI:    Lisa Pagerobert presents today for follow-up of emphysema and chronic hypoxemic respiratory failure  He also had hospitalization for COVID 19 last year with a hydropneumothorax  He had complete resolution of symptoms but still had a very tiny residual air fluid collection after his chest tubes had been removed  He has not had any follow-up imaging since that time but also has not been symptomatic  He denies any increasing cough or phlegm  No increased wheezing  He is using trilogy Ellipta daily  He also uses the albuterol sporadically  He does not need this frequently however  He is on supplemental oxygen at 2 L  He does use this regularly  His home concentrator does have humidification and recently has had a lot of condensation in his nasal cannula tubing    Some of this has to do with the different zones in his house being of significantly different temperatures  No chest pain or cardiac complaints  No lightheadedness or dizziness  No fever, chills, or sick contacts  Immunizations are up-to-date    Current Medications:    Current Outpatient Medications:     albuterol (PROVENTIL HFA,VENTOLIN HFA) 90 mcg/act inhaler, Inhale 2 puffs every 6 (six) hours as needed for shortness of breath, Disp: 3 Inhaler, Rfl: 3    atorvastatin (LIPITOR) 20 mg tablet, Take 1 tablet (20 mg total) by mouth daily, Disp: 90 tablet, Rfl: 1    cholecalciferol (VITAMIN D3) 1,000 units tablet, Take 2 tablets (2,000 Units total) by mouth daily for 2 days, Disp: 4 tablet, Rfl: 0    finasteride (PROSCAR) 5 mg tablet, Take 1 tablet (5 mg total) by mouth daily, Disp: 90 tablet, Rfl: 3    fluticasone-umeclidinium-vilanterol (TRELEGY) 100-62 5-25 MCG/INH inhaler, Inhale 1 puff daily Rinse mouth after use , Disp: 180 blister, Rfl: 3    guaiFENesin (MUCINEX) 600 mg 12 hr tablet, Take 1,200 mg by mouth 2 (two) times a day as needed, Disp: , Rfl:     hydrochlorothiazide (HYDRODIURIL) 25 mg tablet, Take 1 tablet (25 mg total) by mouth daily, Disp: 90 tablet, Rfl: 1    loratadine (CLARITIN REDITABS) 10 MG dissolvable tablet, Take 10 mg by mouth daily as needed for allergies  , Disp: , Rfl:     tamsulosin (Flomax) 0 4 mg, Take 1 capsule (0 4 mg total) by mouth daily with dinner, Disp: 90 capsule, Rfl: 3    EPINEPHrine (EPIPEN) 0 3 mg/0 3 mL SOAJ, Inject 0 3 mL (0 3 mg total) into a muscle once for 1 dose (Patient taking differently: Inject 0 3 mg into a muscle as needed  ), Disp: 0 6 mL, Rfl: 0    Review of Systems:  Answers for HPI/ROS submitted by the patient on 2/10/2022  Do you have shortness of breath?: Yes  Chronicity: recurrent  When did you first notice your symptoms?: more than 1 year ago  How often do your symptoms occur?: intermittently  Since you first noticed this problem, how has it changed?: unchanged  Have you had a change in appetite?: No  Do you have chest pain?: No  Do you have shortness of breath that occurs with effort or exertion?: Yes  Do you have ear congestion?: No  Do you have ear pain?: No  Do you have a fever?: No  Do you have headaches?: No  Do you have heartburn?: No  Do you have fatigue?: Yes  Do you have muscle pain?: No  Do you have nasal congestion?: Yes  Do you have shortness of breath when lying flat?: No  Do you have shortness of breath when you wake up?: No  Do you have post-nasal drip?: Yes  Do you have a runny nose?: Yes  Do you have sneezing?: No  Do you have a sore throat?: No  Do you have sweats?: No  Do you have trouble swallowing?: No  Have you experienced weight loss?: No  Which of the following makes your symptoms worse?: change in weather, climbing stairs, exercise, pollen, strenuous activity  Which of the following makes your symptoms better?: ipratropium, steroid inhaler    Aside from what is mentioned in the HPI, the review of systems is otherwise negative    Past medical history, surgical history, and family history were reviewed and updated as appropriate    Social history updates:  Social History     Tobacco Use   Smoking Status Former Smoker    Packs/day: 1 50    Years: 48 00    Pack years: 72 00    Types: Cigarettes    Start date: 5    Quit date: 2015    Years since quittin 2   Smokeless Tobacco Never Used       PhysicalExamination:  Vitals:   /78   Pulse 102   Temp (!) 97 °F (36 1 °C)   Resp 18   Ht 5' 8" (1 727 m)   Wt 81 6 kg (180 lb)   SpO2 93%   BMI 27 37 kg/m²   Gen:  Appears comfortable on room air  HEENT:  Conjugate gaze  Sclera anicteric   Oropharynx is clear, moist  Neck: Supple  There is no JVD, lymphadenopathy or thyromegaly  Trachea is midline  Chest:  Chest excursion symmetric  Lungs are hyperinflated  Breath sounds are distant bilaterally but otherwise clear  Hyper-resonant to percussion  Cardiac:  Regular  There are no murmurs  Abdomen:  Benign  Extremities: No clubbing, cyanosis or edema  Neurologic:  Normal speech and mentation  Skin: No appreciable rashes  Diagnostic Data:  Labs: I personally reviewed the most recent laboratory data pertinent to today's visit    Lab Results   Component Value Date    WBC 7 74 10/25/2021    HGB 14 3 10/25/2021    HCT 45 9 10/25/2021    MCV 92 10/25/2021     10/25/2021     Lab Results   Component Value Date    SODIUM 138 12/06/2021    K 3 8 12/06/2021    CO2 32 12/06/2021     12/06/2021    BUN 13 12/06/2021    CREATININE 1 11 12/06/2021    CALCIUM 9 7 12/06/2021       PFT results:  Pulmonary function testing is remote but showed severe obstruction    Imaging:  I personally reviewed the images on the [facility] system pertinent to today's visit  Last chest x-ray imaging in April 2021 showed minimal residual hydropneumothorax        Shelba Meigs, MD

## 2022-02-15 NOTE — ASSESSMENT & PLAN NOTE
· Continues on Trelegy Ellipta 1/100 strength with good response to therapy   · Continues with intermittent rescue inhaler use  · Continue present therapy

## 2022-02-15 NOTE — ASSESSMENT & PLAN NOTE
· He is using supplemental oxygen appropriately   · Continue current oxygen at 2 L   · Has a portable oxygen concentrator for portability  · Discuss strategies to reduce humidification in his oxygen tubing  He may not need humidification at all at nighttime  This is when the fluid accumulates

## 2022-02-18 ENCOUNTER — DOCUMENTATION (OUTPATIENT)
Dept: PULMONOLOGY | Facility: CLINIC | Age: 72
End: 2022-02-18

## 2022-02-28 DIAGNOSIS — R09.89 PROMINENT POPLITEAL PULSE: Primary | ICD-10-CM

## 2022-03-02 ENCOUNTER — HOSPITAL ENCOUNTER (OUTPATIENT)
Dept: NON INVASIVE DIAGNOSTICS | Facility: CLINIC | Age: 72
Discharge: HOME | End: 2022-03-02
Payer: MEDICARE

## 2022-03-02 DIAGNOSIS — I72.3 BILATERAL ILIAC ARTERY ANEURYSM (HCC): ICD-10-CM

## 2022-03-02 PROCEDURE — 93978 VASCULAR STUDY: CPT

## 2022-03-03 PROCEDURE — 93978 VASCULAR STUDY: CPT | Performed by: SURGERY

## 2022-03-04 ENCOUNTER — OFFICE VISIT (OUTPATIENT)
Dept: INTERNAL MEDICINE CLINIC | Facility: OTHER | Age: 72
End: 2022-03-04
Payer: MEDICARE

## 2022-03-04 VITALS
DIASTOLIC BLOOD PRESSURE: 82 MMHG | HEIGHT: 68 IN | BODY MASS INDEX: 27.13 KG/M2 | WEIGHT: 179 LBS | HEART RATE: 98 BPM | SYSTOLIC BLOOD PRESSURE: 138 MMHG | OXYGEN SATURATION: 99 % | TEMPERATURE: 97.8 F

## 2022-03-04 DIAGNOSIS — R73.03 PREDIABETES: ICD-10-CM

## 2022-03-04 DIAGNOSIS — J43.2 CENTRILOBULAR EMPHYSEMA (HCC): ICD-10-CM

## 2022-03-04 DIAGNOSIS — E78.2 MIXED HYPERLIPIDEMIA: ICD-10-CM

## 2022-03-04 DIAGNOSIS — N40.0 BENIGN PROSTATIC HYPERPLASIA WITHOUT LOWER URINARY TRACT SYMPTOMS: ICD-10-CM

## 2022-03-04 DIAGNOSIS — F17.211 CIGARETTE NICOTINE DEPENDENCE IN REMISSION: ICD-10-CM

## 2022-03-04 DIAGNOSIS — I10 HYPERTENSION, ESSENTIAL: ICD-10-CM

## 2022-03-04 DIAGNOSIS — Z00.00 MEDICARE ANNUAL WELLNESS VISIT, SUBSEQUENT: ICD-10-CM

## 2022-03-04 DIAGNOSIS — I10 ESSENTIAL HYPERTENSION: ICD-10-CM

## 2022-03-04 DIAGNOSIS — J96.11 CHRONIC HYPOXEMIC RESPIRATORY FAILURE (HCC): Primary | ICD-10-CM

## 2022-03-04 PROCEDURE — 99214 OFFICE O/P EST MOD 30 MIN: CPT | Performed by: PHYSICIAN ASSISTANT

## 2022-03-04 PROCEDURE — G0439 PPPS, SUBSEQ VISIT: HCPCS | Performed by: PHYSICIAN ASSISTANT

## 2022-03-04 PROCEDURE — 1123F ACP DISCUSS/DSCN MKR DOCD: CPT | Performed by: PHYSICIAN ASSISTANT

## 2022-03-04 RX ORDER — LISINOPRIL 10 MG/1
10 TABLET ORAL DAILY
Qty: 30 TABLET | Refills: 2 | Status: SHIPPED | OUTPATIENT
Start: 2022-03-04 | End: 2022-04-01 | Stop reason: SDUPTHER

## 2022-03-04 RX ORDER — HYDROCHLOROTHIAZIDE 25 MG/1
25 TABLET ORAL DAILY
Qty: 90 TABLET | Refills: 1 | Status: SHIPPED | OUTPATIENT
Start: 2022-03-04 | End: 2022-07-22 | Stop reason: SDUPTHER

## 2022-03-04 RX ORDER — ATORVASTATIN CALCIUM 20 MG/1
20 TABLET, FILM COATED ORAL DAILY
Qty: 90 TABLET | Refills: 1 | Status: SHIPPED | OUTPATIENT
Start: 2022-03-04 | End: 2022-07-22 | Stop reason: SDUPTHER

## 2022-03-04 NOTE — ASSESSMENT & PLAN NOTE
Following with urology who monitor PSA  PSA elevated in 8/2021 but decreased on repeat check  Continue to follow with urology

## 2022-03-04 NOTE — PROGRESS NOTES
[facility]  Standard Office Visit  Patient ID: Devin Irwin Held    : 1950  Age/Gender: 70 y o  male     DATE: 3/4/2022      Assessment/Plan:    Benign prostatic hyperplasia without lower urinary tract symptoms  Following with urology who monitor PSA  PSA elevated in 2021 but decreased on repeat check  Continue to follow with urology  Essential hypertension  Discussed Home BP as well as office BP above goal   Start lisinopril 10 mg daily  Continue HCTZ 25 mg daily  Check BP at home and bring log to follow-up  Discussed blood pressure goal less than 120 over less than 80  Medicare annual wellness visit, subsequent  Continue with regular eye doctor and dental follow up  Continue to follow with urology and pulmonology  Patient is to continue with vascular follow up and has already had imaging  BP is not at goal, start lisinopril as above  Recommend labs prior to 4 month follow up  Check BP at home and keep goal   COVID vaccine initial series and booster complete, flu and shingles current  Pneumonia (prevnar/pnemomvax) and Tdap current  Continue with lung screening CTs which are scheduled  Colonoscopy   Discussed advanced directed and living will  5 wishes given  Centrilobular emphysema (HCC) - severe  Currently following with pulmonology  Continue Trelegy  Continue albuterol as needed  Continue with supplemental oxygen  Keep our office informed of any change in treatment plan    Chronic hypoxemic respiratory failure (HCC)  Currently on supplemental oxygen  Symptoms relatively stable at this time    Hyperlipidemia  Continue statin  Will check lipid panel with next lab set    Prediabetes  Will check hemoglobin A1c  Encourage healthy diet    Low-carbohydrate low-cholesterol diet    Cigarette nicotine dependence in remission  Commended patient on remaining free of tobacco   Continue with low-dose CT lung screening  We ordered by pulmonology  Diagnoses and all orders for this visit:    Chronic hypoxemic respiratory failure (HCC)  -     CBC and differential; Future    Centrilobular emphysema (HCC) - severe  -     CBC and differential; Future    Benign prostatic hyperplasia without lower urinary tract symptoms  -     CBC and differential; Future    Essential hypertension  -     CBC and differential; Future  -     lisinopril (ZESTRIL) 10 mg tablet; Take 1 tablet (10 mg total) by mouth daily    Hypertension, essential  -     hydrochlorothiazide (HYDRODIURIL) 25 mg tablet; Take 1 tablet (25 mg total) by mouth daily  -     Lipid Panel with Direct LDL reflex; Future  -     Comprehensive metabolic panel; Future  -     CBC and differential; Future    Mixed hyperlipidemia  -     atorvastatin (LIPITOR) 20 mg tablet; Take 1 tablet (20 mg total) by mouth daily  -     Lipid Panel with Direct LDL reflex; Future  -     Comprehensive metabolic panel; Future  -     CBC and differential; Future    Prediabetes  -     HEMOGLOBIN A1C W/ EAG ESTIMATION; Future  -     CBC and differential; Future    Medicare annual wellness visit, subsequent    Cigarette nicotine dependence in remission                Subjective:   Chief Complaint   Patient presents with    Medicare Wellness Visit    Follow-up     4 month f/u         Chidi Atkins is a 70 y o  male who presents to the office on 3/4/2022 for     70-year-old male with a history of emphysema, hypoxic respiratory failure dependent on portable oxygen, hypertension, dyslipidemia presents to the office for chronic condition follow-up as well as annual wellness visit  Patient follows regularly with Urology  I will resume medication changes  He did have an elevated PSA however his urologist repeated value and his PSA returned back to normal   Denies any urinary symptoms  Recently had vascular study and has follow-up vascular appointment scheduled  Follows regularly with pulmonology  Respiratory symptoms have been well controlled  Feels that he is getting around fairly well with portable oxygen  Uses 2 L at night  Monitors his pulse ox at home  He also monitors his blood pressure at home and feels his blood pressure has been pretty well controlled  Taking hydrochlorothiazide as directed  Taking cholesterol medications directed  Reports that home blood pressure readings are normally in the 910Z systolic  He continues to be active and does light exercise at home with hand weights  Enjoys going fishing  Hypertension  This is a chronic problem  The current episode started more than 1 year ago  The problem is uncontrolled  Associated symptoms include shortness of breath (Chronic unchanged shortness of breath)  Pertinent negatives include no chest pain, headaches, malaise/fatigue, palpitations or peripheral edema  Risk factors for coronary artery disease include male gender and dyslipidemia  Past treatments include diuretics  Hyperlipidemia  This is a chronic problem  The problem is controlled  He has no history of obesity  Associated symptoms include shortness of breath (Chronic unchanged shortness of breath)  Pertinent negatives include no chest pain  Current antihyperlipidemic treatment includes statins  The following portions of the patient's history were reviewed and updated as appropriate: allergies, current medications, past family history, past medical history, past social history, past surgical history and problem list     Review of Systems   Constitutional: Negative for chills, fever, malaise/fatigue and unexpected weight change  Respiratory: Positive for shortness of breath (Chronic unchanged shortness of breath)  Negative for cough and wheezing  Cardiovascular: Negative for chest pain and palpitations  Gastrointestinal: Negative for abdominal pain, diarrhea, nausea and vomiting  Genitourinary:        Currently following with Urology    Denies urinary symptoms Neurological: Negative for dizziness, light-headedness and headaches  Patient Active Problem List   Diagnosis    Hyperlipidemia    Centrilobular emphysema (HCC) - severe    Bilateral iliac artery aneurysm (HCC)    Arterial ectasia (HCC)    Diverticulosis, sigmoid    Hearing loss    Hemorrhoids    Lung nodule    Prediabetes    Skin disorder    Benign prostatic hyperplasia without lower urinary tract symptoms    Essential hypertension    Seasonal allergies    Medicare annual wellness visit, subsequent    Other microscopic hematuria    Other fatigue    NSVT (nonsustained ventricular tachycardia) (HCC)    Abnormal ECG during exercise stress test    Prominent popliteal pulse    Chronic hypoxemic respiratory failure (HCC)    Cigarette nicotine dependence in remission       Past Medical History:   Diagnosis Date    Allergic 1/1/1990    Bee stings and penicillin    BPH (benign prostatic hyperplasia)     COPD (chronic obstructive pulmonary disease) (HCC)     Last Assessed:11/14/2016    Diverticulosis     Elevated prostate specific antigen (PSA)     Emphysema (subcutaneous) (surgical) resulting from a procedure     Enlarged prostate with lower urinary tract symptoms (LUTS)     Resolved:8/22/2017    Hernia, inguinal, right     Hypertension 5/1/2019    Visit with Arnaldo Ingram showed slightly elevated bp   Pneumonia due to COVID-19 virus 3/16/2021       Past Surgical History:   Procedure Laterality Date    BICEPS TENDON REPAIR  1999    COLONOSCOPY      FRACTURE SURGERY  5/15/1988    Approx date  Rt index finger fractured      HAND SURGERY      Last Assessed:7/11/2016    HERNIA REPAIR      Last Assessed:7/11/2016    IR CHEST TUBE CHECK/CHANGE/REPOSITION/UPSIZE  4/1/2021    IR CHEST TUBE PLACEMENT  3/30/2021    KNEE ARTHROSCOPY      NH REPAIR ING HERNIA,5+Y/O,REDUCIBL Right 2/23/2016    Procedure: REPAIR HERNIA INGUINAL with mesh;  Surgeon: Chapis Rivera DO;  Location: [facility]; Service: General    PROSTATE BIOPSY      WISDOM TOOTH EXTRACTION           Current Outpatient Medications:     albuterol (PROVENTIL HFA,VENTOLIN HFA) 90 mcg/act inhaler, Inhale 2 puffs every 6 (six) hours as needed for shortness of breath, Disp: 3 Inhaler, Rfl: 3    atorvastatin (LIPITOR) 20 mg tablet, Take 1 tablet (20 mg total) by mouth daily, Disp: 90 tablet, Rfl: 1    cholecalciferol (VITAMIN D3) 1,000 units tablet, Take 2 tablets (2,000 Units total) by mouth daily for 2 days, Disp: 4 tablet, Rfl: 0    finasteride (PROSCAR) 5 mg tablet, Take 1 tablet (5 mg total) by mouth daily, Disp: 90 tablet, Rfl: 3    fluticasone-umeclidinium-vilanterol (TRELEGY) 100-62 5-25 MCG/INH inhaler, Inhale 1 puff daily Rinse mouth after use , Disp: 180 blister, Rfl: 3    guaiFENesin (MUCINEX) 600 mg 12 hr tablet, Take 1,200 mg by mouth 2 (two) times a day as needed, Disp: , Rfl:     hydrochlorothiazide (HYDRODIURIL) 25 mg tablet, Take 1 tablet (25 mg total) by mouth daily, Disp: 90 tablet, Rfl: 1    loratadine (CLARITIN REDITABS) 10 MG dissolvable tablet, Take 10 mg by mouth daily as needed for allergies  , Disp: , Rfl:     tamsulosin (Flomax) 0 4 mg, Take 1 capsule (0 4 mg total) by mouth daily with dinner, Disp: 90 capsule, Rfl: 3    EPINEPHrine (EPIPEN) 0 3 mg/0 3 mL SOAJ, Inject 0 3 mL (0 3 mg total) into a muscle once for 1 dose (Patient taking differently: Inject 0 3 mg into a muscle as needed  ), Disp: 0 6 mL, Rfl: 0    lisinopril (ZESTRIL) 10 mg tablet, Take 1 tablet (10 mg total) by mouth daily, Disp: 30 tablet, Rfl: 2    Allergies   Allergen Reactions    Bee Venom Facial Swelling    Other Rash     Annotation - [id number]: mushrooms    Penicillins Rash       Social History     Socioeconomic History    Marital status: /Civil Union     Spouse name: None    Number of children: None    Years of education: None    Highest education level: None   Occupational History    None   Tobacco Use    Smoking status: Former Smoker     Packs/day: 1 50     Years: 48 00     Pack years: 72 00     Types: Cigarettes     Start date:      Quit date: 2015     Years since quittin 3    Smokeless tobacco: Never Used   Vaping Use    Vaping Use: Never used   Substance and Sexual Activity    Alcohol use:  Yes     Alcohol/week: 0 0 standard drinks     Comment: rare    Drug use: No    Sexual activity: Yes     Partners: Female     Birth control/protection: Male Sterilization, Female Sterilization   Other Topics Concern    None   Social History Narrative    Advance directive on file     Social Determinants of Health     Financial Resource Strain: Not on file   Food Insecurity: Not on file   Transportation Needs: Not on file   Physical Activity: Not on file   Stress: Not on file   Social Connections: Not on file   Intimate Partner Violence: Not on file   Housing Stability: Not on file       Family History   Problem Relation Age of Onset    Hypertension Mother        PHQ-2/9 Depression Screening    Little interest or pleasure in doing things: 0 - not at all  Feeling down, depressed, or hopeless: 0 - not at all  PHQ-2 Score: 0  PHQ-2 Interpretation: Negative depression screen         Health Maintenance   Topic Date Due    SLP PLAN OF CARE  Never done    Lung Cancer Screening  2022    Fall Risk  2023    Depression Screening  2023    Medicare Annual Wellness Visit (AWV)  2023    BMI: Followup Plan  2023    BMI: Adult  2023    DTaP,Tdap,and Td Vaccines (3 - Td or Tdap) 2030    Hepatitis C Screening  Completed    Pneumococcal Vaccine: 65+ Years  Completed    Influenza Vaccine  Completed    COVID-19 Vaccine  Completed    HIB Vaccine  Aged Out    Hepatitis B Vaccine  Aged Out    IPV Vaccine  Aged Out    Hepatitis A Vaccine  Aged Out    Meningococcal ACWY Vaccine  Aged Out    HPV Vaccine  Aged Out    Colorectal Cancer Screening  Discontinued       Immunization History Administered Date(s) Administered    COVID-19 PFIZER VACCINE 0 3 ML IM 04/25/2021, 05/16/2021, 11/19/2021    DTaP 09/29/2020    INFLUENZA 11/01/2006, 09/28/2007, 11/01/2016, 10/10/2017, 09/28/2020, 10/25/2021    Influenza Split High Dose Preservative Free IM 11/01/2016, 10/10/2017    Influenza, high dose seasonal 0 7 mL 10/19/2018, 10/03/2019, 09/28/2020    Pneumococcal Conjugate 13-Valent 07/11/2016, 06/12/2019    Pneumococcal Polysaccharide PPV23 10/10/2017    Tdap 09/11/2020    Zoster Vaccine Recombinant 03/28/2019, 06/12/2019        Objective:  Vitals:    03/04/22 1048   BP: 138/82   BP Location: Left arm   Patient Position: Sitting   Cuff Size: Standard   Pulse: 98   Temp: 97 8 °F (36 6 °C)   TempSrc: Temporal   SpO2: 99%   Weight: 81 2 kg (179 lb)   Height: 5' 8" (1 727 m)     Wt Readings from Last 3 Encounters:   03/04/22 81 2 kg (179 lb)   02/15/22 81 6 kg (180 lb)   12/10/21 81 2 kg (179 lb)     Body mass index is 27 22 kg/m²  No exam data present       Physical Exam  Vitals and nursing note reviewed  Constitutional:       General: He is not in acute distress  Appearance: He is well-developed  He is not diaphoretic  Comments: Alert pleasant, cooperative, seated in room in no acute distress   HENT:      Head: Normocephalic and atraumatic  Right Ear: Tympanic membrane normal       Left Ear: Tympanic membrane normal       Mouth/Throat:      Mouth: Mucous membranes are moist       Pharynx: No oropharyngeal exudate or posterior oropharyngeal erythema  Eyes:      General: No scleral icterus  Right eye: No discharge  Left eye: No discharge  Pupils: Pupils are equal, round, and reactive to light  Cardiovascular:      Rate and Rhythm: Normal rate and regular rhythm  Heart sounds: Normal heart sounds  No murmur heard  Pulmonary:      Effort: Pulmonary effort is normal  No respiratory distress  Breath sounds: Normal breath sounds  No wheezing or rales  Comments: Decreased air exchange bilaterally however symmetric  No crackles  No rhonchi  Barrel chest   No wheeze  Abdominal:      General: Bowel sounds are normal  There is no distension  Palpations: Abdomen is soft  Tenderness: There is no abdominal tenderness  There is no guarding  Comments: Positive bowel sounds soft nontender   Musculoskeletal:      Cervical back: Neck supple  Right lower leg: No edema  Left lower leg: No edema  Skin:     General: Skin is warm and dry  Neurological:      General: No focal deficit present  Mental Status: He is alert  Psychiatric:         Mood and Affect: Mood normal          Behavior: Behavior normal          Thought Content: Thought content normal              Future Appointments   Date Time Provider Isacc Capellan   3/11/2022  1:30 PM [facility] VASCULAR 1 [facility] Car [address]   4/5/2022  1:30 PM EVAN Collier VAS CTR [facility]   4/6/2022 12:30 PM SH CT NOR 1 SH NOR CT [facility]   7/8/2022 11:30 AM Cory Luciano PA-C [facility]       Cory Luciano[address]    Patient Care Team:  Cory Luciano PA-C as PCP - General (Internal Medicine)  Salomon Dow, MD Ferrel Boxer, MD (Urology)  Laura Corey (Vascular Surgery)  Chance Ace MD as Consulting Physician (Pulmonary Disease)  Irwin Jay MD (Colon and Rectal Surgery)    This note was completed in part utilizing M-Modal Fluency Direct Software  Grammatical errors, random word insertions, spelling mistakes, and incomplete sentences can be an occasional consequence of this system secondary to software limitations, ambient noise, and hardware issues  If you have any questions or concerns about the content, text, or information contained within the body of this dictation, please contact the provider for clarification

## 2022-03-04 NOTE — ASSESSMENT & PLAN NOTE
Commended patient on remaining free of tobacco   Continue with low-dose CT lung screening  We ordered by pulmonology

## 2022-03-04 NOTE — ASSESSMENT & PLAN NOTE
Continue with regular eye doctor and dental follow up  Continue to follow with urology and pulmonology  Patient is to continue with vascular follow up and has already had imaging  BP is not at goal, start lisinopril as above  Recommend labs prior to 4 month follow up  Check BP at home and keep goal   COVID vaccine initial series and booster complete, flu and shingles current  Pneumonia (prevnar/pnemomvax) and Tdap current  Continue with lung screening CTs which are scheduled  Colonoscopy 2021  Discussed advanced directed and living will  5 wishes given

## 2022-03-04 NOTE — PROGRESS NOTES
Assessment and Plan:     Problem List Items Addressed This Visit        Respiratory    Centrilobular emphysema ([facility] [address] ) - severe    Relevant Orders    CBC and differential    Chronic hypoxemic respiratory failure ([facility] [address] ) - Primary    Relevant Orders    CBC and differential       Cardiovascular and Mediastinum    Essential hypertension     Home BP as well as office BP above goal   Start lisinopril 10 mg daily  Continue HCTZ 25 mg daily  Check BP at home  Relevant Medications    hydrochlorothiazide (HYDRODIURIL) 25 mg tablet    lisinopril (ZESTRIL) 10 mg tablet    Other Relevant Orders    CBC and differential    NSVT (nonsustained ventricular tachycardia) (HCC)       Genitourinary    Benign prostatic hyperplasia without lower urinary tract symptoms     Following with urology who monitor PSA  PSA elevated in 8/2021 but decreased on repeat check  Continue to follow with urology  Relevant Orders    CBC and differential       Other    Hyperlipidemia    Relevant Medications    atorvastatin (LIPITOR) 20 mg tablet    Other Relevant Orders    Lipid Panel with Direct LDL reflex    Comprehensive metabolic panel    CBC and differential    Prediabetes    Relevant Orders    HEMOGLOBIN A1C W/ EAG ESTIMATION    CBC and differential    Medicare annual wellness visit, subsequent     Continue with regular eye doctor and dental follow up  Continue to follow with urology and pulmonology  Patient is to continue with vascular follow up and has already had imaging  BP is not at goal, start lisinopril as above  Recommend labs prior to 4 month follow up  Check BP at home and keep goal   COVID vaccine initial series and booster complete, flu and shingles current  Pneumonia (prevnar/pnemomvax) and Tdap current  Continue with lung screening CTs which are scheduled  Colonoscopy 2021  Discussed advanced directed and living will  5 wishes given             Cigarette nicotine dependence in remission      Other Visit Diagnoses     Hypertension, essential        Relevant Medications    hydrochlorothiazide (HYDRODIURIL) 25 mg tablet    lisinopril (ZESTRIL) 10 mg tablet    Other Relevant Orders    Lipid Panel with Direct LDL reflex    Comprehensive metabolic panel    CBC and differential        BMI Counseling: Body mass index is 27 22 kg/m²  The BMI is above normal  Nutrition recommendations include encouraging healthy choices of fruits and vegetables, moderation in carbohydrate intake, increasing intake of lean protein, reducing intake of saturated and trans fat and reducing intake of cholesterol  No pharmacotherapy was ordered  Patient referred to PCP  Rationale for BMI follow-up plan is due to patient being overweight or obese  Depression Screening and Follow-up Plan: Patient was screened for depression during today's encounter  They screened negative with a PHQ-2 score of 0  Falls Plan of Care: balance, strength, and gait training instructions were provided  Medications that increase falls were reviewed  Assessed feet and footwear  Home safety education provided  Preventive health issues were discussed with patient, and age appropriate screening tests were ordered as noted in patient's After Visit Summary  Personalized health advice and appropriate referrals for health education or preventive services given if needed, as noted in patient's After Visit Summary  History of Present Illness:     Patient presents for Medicare Annual Wellness visit and 4 month follow up  Notes he has been following with his specialists  Recently saw pulm  Followed with urology, no med changes there  Sx controlled  Has vascular follow up scheduled  Recently had ultrasound scan  Has CT chest screening scheduled  Using potable oxygen which has helped his ability to get around at home and outside the home  Using 2 L at home  Monitors PO2 at home  Monitors his BP at home and per patient has been doing pretty well  Notes he needs refill on HCTZ and cholesterol med  No other complaints or concerns at this time  See same day visit for chornic condition follow up  Patient Care Team:  Joellen Luke PA-C as PCP - General (Internal Medicine)  Claudetta Harry, MD Sharrell Pion, MD (Urology)  Zbigniew Khan (Vascular Surgery)  Dorota Santana MD as Consulting Physician (Pulmonary Disease)  Kelly Pa MD (Colon and Rectal Surgery)     Problem List:     Patient Active Problem List   Diagnosis    Hyperlipidemia    Centrilobular emphysema ([address] ) - severe    Bilateral iliac artery aneurysm ([address] )    Arterial ectasia ([address] )    Diverticulosis, sigmoid    Hearing loss    Hemorrhoids    Lung nodule    Prediabetes    Skin disorder    Benign prostatic hyperplasia without lower urinary tract symptoms    Essential hypertension    Seasonal allergies    Medicare annual wellness visit, subsequent    Other microscopic hematuria    Other fatigue    NSVT (nonsustained ventricular tachycardia) (HCC)    Abnormal ECG during exercise stress test    Prominent popliteal pulse    Chronic hypoxemic respiratory failure (HCC)    Cigarette nicotine dependence in remission      Past Medical and Surgical History:     Past Medical History:   Diagnosis Date    Allergic 1/1/1990    Bee stings and penicillin    BPH (benign prostatic hyperplasia)     COPD (chronic obstructive pulmonary disease) (HCC)     Last Assessed:11/14/2016    Diverticulosis     Elevated prostate specific antigen (PSA)     Emphysema (subcutaneous) (surgical) resulting from a procedure     Enlarged prostate with lower urinary tract symptoms (LUTS)     Resolved:8/22/2017    Hernia, inguinal, right     Hypertension 5/1/2019    Visit with Rigo Wylie showed slightly elevated bp      Pneumonia due to COVID-19 virus 3/16/2021     Past Surgical History:   Procedure Laterality Date    BICEPS TENDON REPAIR  1999    COLONOSCOPY      FRACTURE SURGERY 5/15/1988    Approx date  Rt index finger fractured   HAND SURGERY      Last Assessed:2016    HERNIA REPAIR      Last Assessed:2016    IR CHEST TUBE CHECK/CHANGE/REPOSITION/UPSIZE  2021    IR CHEST TUBE PLACEMENT  3/30/2021    KNEE ARTHROSCOPY      ID REPAIR ING HERNIA,5+Y/O,REDUCIBL Right 2016    Procedure: REPAIR HERNIA INGUINAL with mesh;  Surgeon: Katharina Ayno DO;  Location: [facility];  Service: General    PROSTATE BIOPSY      WISDOM TOOTH EXTRACTION        Family History:     Family History   Problem Relation Age of Onset    Hypertension Mother       Social History:     Social History     Socioeconomic History    Marital status: /Civil Union     Spouse name: None    Number of children: None    Years of education: None    Highest education level: None   Occupational History    None   Tobacco Use    Smoking status: Former Smoker     Packs/day: 1 50     Years: 48 00     Pack years: 72 00     Types: Cigarettes     Start date: 5     Quit date: 2015     Years since quittin 3    Smokeless tobacco: Never Used   Vaping Use    Vaping Use: Never used   Substance and Sexual Activity    Alcohol use:  Yes     Alcohol/week: 0 0 standard drinks     Comment: rare    Drug use: No    Sexual activity: Yes     Partners: Female     Birth control/protection: Male Sterilization, Female Sterilization   Other Topics Concern    None   Social History Narrative    Advance directive on file     Social Determinants of Health     Financial Resource Strain: Not on file   Food Insecurity: Not on file   Transportation Needs: Not on file   Physical Activity: Not on file   Stress: Not on file   Social Connections: Not on file   Intimate Partner Violence: Not on file   Housing Stability: Not on file      Medications and Allergies:     Current Outpatient Medications   Medication Sig Dispense Refill    albuterol (PROVENTIL HFA,VENTOLIN HFA) 90 mcg/act inhaler Inhale 2 puffs every 6 (six) hours as needed for shortness of breath 3 Inhaler 3    atorvastatin (LIPITOR) 20 mg tablet Take 1 tablet (20 mg total) by mouth daily 90 tablet 1    cholecalciferol (VITAMIN D3) 1,000 units tablet Take 2 tablets (2,000 Units total) by mouth daily for 2 days 4 tablet 0    finasteride (PROSCAR) 5 mg tablet Take 1 tablet (5 mg total) by mouth daily 90 tablet 3    fluticasone-umeclidinium-vilanterol (TRELEGY) 100-62 5-25 MCG/INH inhaler Inhale 1 puff daily Rinse mouth after use  180 blister 3    guaiFENesin (MUCINEX) 600 mg 12 hr tablet Take 1,200 mg by mouth 2 (two) times a day as needed      hydrochlorothiazide (HYDRODIURIL) 25 mg tablet Take 1 tablet (25 mg total) by mouth daily 90 tablet 1    loratadine (CLARITIN REDITABS) 10 MG dissolvable tablet Take 10 mg by mouth daily as needed for allergies   tamsulosin (Flomax) 0 4 mg Take 1 capsule (0 4 mg total) by mouth daily with dinner 90 capsule 3    EPINEPHrine (EPIPEN) 0 3 mg/0 3 mL SOAJ Inject 0 3 mL (0 3 mg total) into a muscle once for 1 dose (Patient taking differently: Inject 0 3 mg into a muscle as needed  ) 0 6 mL 0    lisinopril (ZESTRIL) 10 mg tablet Take 1 tablet (10 mg total) by mouth daily 30 tablet 2     No current facility-administered medications for this visit       Allergies   Allergen Reactions    Bee Venom Facial Swelling    Other Rash     Annotation - [id number]: mushrooms    Penicillins Rash      Immunizations:     Immunization History   Administered Date(s) Administered    COVID-19 PFIZER VACCINE 0 3 ML IM 04/25/2021, 05/16/2021, 11/19/2021    DTaP 09/29/2020    INFLUENZA 11/01/2006, 09/28/2007, 11/01/2016, 10/10/2017, 09/28/2020, 10/25/2021    Influenza Split High Dose Preservative Free IM 11/01/2016, 10/10/2017    Influenza, high dose seasonal 0 7 mL 10/19/2018, 10/03/2019, 09/28/2020    Pneumococcal Conjugate 13-Valent 07/11/2016, 06/12/2019    Pneumococcal Polysaccharide PPV23 10/10/2017    Tdap 09/11/2020    Zoster Vaccine Recombinant 03/28/2019, 06/12/2019      Health Maintenance:         Topic Date Due    Lung Cancer Screening  04/01/2022    Hepatitis C Screening  Completed    Colorectal Cancer Screening  Discontinued     There are no preventive care reminders to display for this patient  Medicare Health Risk Assessment:     /82 (BP Location: Left arm, Patient Position: Sitting, Cuff Size: Standard)   Pulse 98   Temp 97 8 °F (36 6 °C) (Temporal)   Ht 5' 8" (1 727 m)   Wt 81 2 kg (179 lb)   SpO2 99% Comment: with 02  BMI 27 22 kg/m²      Lali Graham is here for his Subsequent Wellness visit  Last Medicare Wellness visit information reviewed, patient interviewed and updates made to the record today  Health Risk Assessment:   Patient rates overall health as good  Patient feels that their physical health rating is same  Patient is satisfied with their life  Eyesight was rated as same  Hearing was rated as same  Patient feels that their emotional and mental health rating is same  Patients states they are never, rarely angry  Patient states they are sometimes unusually tired/fatigued  Pain experienced in the last 7 days has been none  Patient states that he has experienced no weight loss or gain in last 6 months  Depression Screening:   PHQ-2 Score: 0      Fall Risk Screening: In the past year, patient has experienced: no history of falling in past year      Home Safety:  Patient has trouble with stairs inside or outside of their home  Patient has working smoke alarms and has working carbon monoxide detector  Home safety hazards include: none  Has grab bars in bathroom  Denies recent falls  Nutrition:   Current diet is Low Saturated Fat  Medications:   Patient is currently taking over-the-counter supplements  OTC medications include: see medication list  Patient is able to manage medications  Patient manages his own medications at home      Activities of Daily Living (ADLs)/Instrumental Activities of Daily Living (IADLs):   Walk and transfer into and out of bed and chair?: Yes  Dress and groom yourself?: Yes    Bathe or shower yourself?: Yes    Feed yourself? Yes  Do your laundry/housekeeping?: Yes  Manage your money, pay your bills and track your expenses?: Yes  Make your own meals?: Yes    Do your own shopping?: Yes    Previous Hospitalizations:   Any hospitalizations or ED visits within the last 12 months?: Yes    How many hospitalizations have you had in the last year?: 1-2    Hospitalization Comments: Related to Mayram Valles improved  Doing much better  Advance Care Planning:   Living will: No    Durable POA for healthcare:  Yes    Advanced directive: No    Five wishes given: Yes      PREVENTIVE SCREENINGS      Cardiovascular Screening:    General: Screening Not Indicated, History Lipid Disorder and Screening Current      Diabetes Screening:     General: Screening Current      Colorectal Cancer Screening:     General: Screening Current      Prostate Cancer Screening:    General: Screening Current      Osteoporosis Screening:    General: Screening Not Indicated      Abdominal Aortic Aneurysm (AAA) Screening:    Risk factors include: age between 73-67 yo and tobacco use        General: Screening Not Indicated and History AAA      Lung Cancer Screening:     General: Screening Current    Due for: Low Dose CT (LDCT)      Hepatitis C Screening:    General: Screening Current    Screening, Brief Intervention, and Referral to Treatment (SBIRT)    Screening      Single Item Drug Screening:  How often have you used an illegal drug (including marijuana) or a prescription medication for non-medical reasons in the past year? never    Single Item Drug Screen Score: 0  Interpretation: Negative screen for possible drug use disorder      Tabatha Agosto PA-C

## 2022-03-04 NOTE — ASSESSMENT & PLAN NOTE
Discussed Home BP as well as office BP above goal   Start lisinopril 10 mg daily  Continue HCTZ 25 mg daily  Check BP at home and bring log to follow-up  Discussed blood pressure goal less than 120 over less than 80

## 2022-03-04 NOTE — PATIENT INSTRUCTIONS
Medicare Preventive Visit Patient Instructions  Thank you for completing your Welcome to Medicare Visit or Medicare Annual Wellness Visit today  Your next wellness visit will be due in one year (3/5/2023)  The screening/preventive services that you may require over the next 5-10 years are detailed below  Some tests may not apply to you based off risk factors and/or age  Screening tests ordered at today's visit but not completed yet may show as past due  Also, please note that scanned in results may not display below  Preventive Screenings:  Service Recommendations Previous Testing/Comments   Colorectal Cancer Screening  · Colonoscopy    · Fecal Occult Blood Test (FOBT)/Fecal Immunochemical Test (FIT)  · Fecal DNA/Cologuard Test  · Flexible Sigmoidoscopy Age: 54-65 years old   Colonoscopy: every 10 years (May be performed more frequently if at higher risk)  OR  FOBT/FIT: every 1 year  OR  Cologuard: every 3 years  OR  Sigmoidoscopy: every 5 years  Screening may be recommended earlier than age 48 if at higher risk for colorectal cancer  Also, an individualized decision between you and your healthcare provider will decide whether screening between the ages of 74-80 would be appropriate   Colonoscopy: 11/17/2021  FOBT/FIT: Not on file  Cologuard: Not on file  Sigmoidoscopy: Not on file    Screening Current     Prostate Cancer Screening Individualized decision between patient and health care provider in men between ages of 53-78   Medicare will cover every 12 months beginning on the day after your 50th birthday PSA: 0 9 ng/mL     Screening Current     Hepatitis C Screening Once for adults born between 1945 and 1965  More frequently in patients at high risk for Hepatitis C Hep C Antibody: 05/25/2018    Screening Current   Diabetes Screening 1-2 times per year if you're at risk for diabetes or have pre-diabetes Fasting glucose: 105 mg/dL   A1C: 5 3 %    Screening Current   Cholesterol Screening Once every 5 years if you don't have a lipid disorder  May order more often based on risk factors  Lipid panel: 12/06/2021    Screening Not Indicated  History Lipid Disorder      Other Preventive Screenings Covered by Medicare:  1  Abdominal Aortic Aneurysm (AAA) Screening: covered once if your at risk  You're considered to be at risk if you have a family history of AAA or a male between the age of 73-68 who smoking at least 100 cigarettes in your lifetime  2  Lung Cancer Screening: covers low dose CT scan once per year if you meet all of the following conditions: (1) Age 50-69; (2) No signs or symptoms of lung cancer; (3) Current smoker or have quit smoking within the last 15 years; (4) You have a tobacco smoking history of at least 30 pack years (packs per day x number of years you smoked); (5) You get a written order from a healthcare provider  3  Glaucoma Screening: covered annually if you're considered high risk: (1) You have diabetes OR (2) Family history of glaucoma OR (3)  aged 48 and older OR (3)  American aged 72 and older  3  Osteoporosis Screening: covered every 2 years if you meet one of the following conditions: (1) Have a vertebral abnormality; (2) On glucocorticoid therapy for more than 3 months; (3) Have primary hyperparathyroidism; (4) On osteoporosis medications and need to assess response to drug therapy  5  HIV Screening: covered annually if you're between the age of 12-76  Also covered annually if you are younger than 13 and older than 72 with risk factors for HIV infection  For pregnant patients, it is covered up to 3 times per pregnancy      Immunizations:  Immunization Recommendations   Influenza Vaccine Annual influenza vaccination during flu season is recommended for all persons aged >= 6 months who do not have contraindications   Pneumococcal Vaccine (Prevnar and Pneumovax)  * Prevnar = PCV13  * Pneumovax = PPSV23 Adults 25-60 years old: 1-3 doses may be recommended based on certain risk factors  Adults 72 years old: Prevnar (PCV13) vaccine recommended followed by Pneumovax (PPSV23) vaccine  If already received PPSV23 since turning 65, then PCV13 recommended at least one year after PPSV23 dose  Hepatitis B Vaccine 3 dose series if at intermediate or high risk (ex: diabetes, end stage renal disease, liver disease)   Tetanus (Td) Vaccine - COST NOT COVERED BY MEDICARE PART B Following completion of primary series, a booster dose should be given every 10 years to maintain immunity against tetanus  Td may also be given as tetanus wound prophylaxis  Tdap Vaccine - COST NOT COVERED BY MEDICARE PART B Recommended at least once for all adults  For pregnant patients, recommended with each pregnancy  Shingles Vaccine (Shingrix) - COST NOT COVERED BY MEDICARE PART B  2 shot series recommended in those aged 48 and above     Health Maintenance Due:      Topic Date Due    Lung Cancer Screening  04/01/2022    Hepatitis C Screening  Completed    Colorectal Cancer Screening  Discontinued     Immunizations Due:  There are no preventive care reminders to display for this patient  Advance Directives   What are advance directives? Advance directives are legal documents that state your wishes and plans for medical care  These plans are made ahead of time in case you lose your ability to make decisions for yourself  Advance directives can apply to any medical decision, such as the treatments you want, and if you want to donate organs  What are the types of advance directives? There are many types of advance directives, and each state has rules about how to use them  You may choose a combination of any of the following:  · Living will: This is a written record of the treatment you want  You can also choose which treatments you do not want, which to limit, and which to stop at a certain time  This includes surgery, medicine, IV fluid, and tube feedings     · Durable power of  for healthcare [facility]): This is a written record that states who you want to make healthcare choices for you when you are unable to make them for yourself  This person, called a proxy, is usually a family member or a friend  You may choose more than 1 proxy  · Do not resuscitate (DNR) order:  A DNR order is used in case your heart stops beating or you stop breathing  It is a request not to have certain forms of treatment, such as CPR  A DNR order may be included in other types of advance directives  · Medical directive: This covers the care that you want if you are in a coma, near death, or unable to make decisions for yourself  You can list the treatments you want for each condition  Treatment may include pain medicine, surgery, blood transfusions, dialysis, IV or tube feedings, and a ventilator (breathing machine)  · Values history: This document has questions about your views, beliefs, and how you feel and think about life  This information can help others choose the care that you would choose  Why are advance directives important? An advance directive helps you control your care  Although spoken wishes may be used, it is better to have your wishes written down  Spoken wishes can be misunderstood, or not followed  Treatments may be given even if you do not want them  An advance directive may make it easier for your family to make difficult choices about your care  Weight Management   Why it is important to manage your weight:  Being overweight increases your risk of health conditions such as heart disease, high blood pressure, type 2 diabetes, and certain types of cancer  It can also increase your risk for osteoarthritis, sleep apnea, and other respiratory problems  Aim for a slow, steady weight loss  Even a small amount of weight loss can lower your risk of health problems  How to lose weight safely:  A safe and healthy way to lose weight is to eat fewer calories and get regular exercise   You can lose up about 1 pound a week by decreasing the number of calories you eat by 500 calories each day  Healthy meal plan for weight management:  A healthy meal plan includes a variety of foods, contains fewer calories, and helps you stay healthy  A healthy meal plan includes the following:  · Eat whole-grain foods more often  A healthy meal plan should contain fiber  Fiber is the part of grains, fruits, and vegetables that is not broken down by your body  Whole-grain foods are healthy and provide extra fiber in your diet  Some examples of whole-grain foods are whole-wheat breads and pastas, oatmeal, brown rice, and bulgur  · Eat a variety of vegetables every day  Include dark, leafy greens such as spinach, kale, kyle greens, and mustard greens  Eat yellow and orange vegetables such as carrots, sweet potatoes, and winter squash  · Eat a variety of fruits every day  Choose fresh or canned fruit (canned in its own juice or light syrup) instead of juice  Fruit juice has very little or no fiber  · Eat low-fat dairy foods  Drink fat-free (skim) milk or 1% milk  Eat fat-free yogurt and low-fat cottage cheese  Try low-fat cheeses such as mozzarella and other reduced-fat cheeses  · Choose meat and other protein foods that are low in fat  Choose beans or other legumes such as split peas or lentils  Choose fish, skinless poultry (chicken or turkey), or lean cuts of red meat (beef or pork)  Before you cook meat or poultry, cut off any visible fat  · Use less fat and oil  Try baking foods instead of frying them  Add less fat, such as margarine, sour cream, regular salad dressing and mayonnaise to foods  Eat fewer high-fat foods  Some examples of high-fat foods include french fries, doughnuts, ice cream, and cakes  · Eat fewer sweets  Limit foods and drinks that are high in sugar  This includes candy, cookies, regular soda, and sweetened drinks  Exercise:  Exercise at least 30 minutes per day on most days of the week   Some examples of exercise include walking, biking, dancing, and swimming  You can also fit in more physical activity by taking the stairs instead of the elevator or parking farther away from stores  Ask your healthcare provider about the best exercise plan for you  © Copyright [employer] 2018 Information is for End User's use only and may not be sold, redistributed or otherwise used for commercial purposes  All illustrations and images included in CareNotes® are the copyrighted property of [employer]  or [address] [place] [address]    Benign prostatic hyperplasia without lower urinary tract symptoms  Following with urology who monitor PSA  PSA elevated in 8/2021 but decreased on repeat check  Continue to follow with urology  Essential hypertension  Discussed Home BP as well as office BP above goal   Start lisinopril 10 mg daily  Continue HCTZ 25 mg daily  Check BP at home and bring log to follow-up  Discussed blood pressure goal less than 120 over less than 80  Medicare annual wellness visit, subsequent  Continue with regular eye doctor and dental follow up  Continue to follow with urology and pulmonology  Patient is to continue with vascular follow up and has already had imaging  BP is not at goal, start lisinopril as above  Recommend labs prior to 4 month follow up  Check BP at home and keep goal   COVID vaccine initial series and booster complete, flu and shingles current  Pneumonia (prevnar/pnemomvax) and Tdap current  Continue with lung screening CTs which are scheduled  Colonoscopy 2021  Discussed advanced directed and living will  5 wishes given  Centrilobular emphysema (HCC) - severe  Currently following with pulmonology  Continue Trelegy  Continue albuterol as needed  Continue with supplemental oxygen  Keep our office informed of any change in treatment plan    Chronic hypoxemic respiratory failure (HCC)  Currently on supplemental oxygen    Symptoms relatively stable at this time    Hyperlipidemia  Continue statin  Will check lipid panel with next lab set    Prediabetes  Will check hemoglobin A1c  Encourage healthy diet  Low-carbohydrate low-cholesterol diet    Cigarette nicotine dependence in remission  Commended patient on remaining free of tobacco   Continue with low-dose CT lung screening  We ordered by pulmonology

## 2022-03-04 NOTE — ASSESSMENT & PLAN NOTE
Currently following with pulmonology  Continue Trelegy  Continue albuterol as needed  Continue with supplemental oxygen    Keep our office informed of any change in treatment plan

## 2022-03-11 ENCOUNTER — HOSPITAL ENCOUNTER (OUTPATIENT)
Dept: NON INVASIVE DIAGNOSTICS | Facility: CLINIC | Age: 72
Discharge: HOME | End: 2022-03-11
Payer: MEDICARE

## 2022-03-11 DIAGNOSIS — R09.89 PROMINENT POPLITEAL PULSE: ICD-10-CM

## 2022-03-11 PROCEDURE — 93925 LOWER EXTREMITY STUDY: CPT

## 2022-03-11 PROCEDURE — 93923 UPR/LXTR ART STDY 3+ LVLS: CPT

## 2022-03-12 PROCEDURE — 93922 UPR/L XTREMITY ART 2 LEVELS: CPT | Performed by: SURGERY

## 2022-03-12 PROCEDURE — 93925 LOWER EXTREMITY STUDY: CPT | Performed by: SURGERY

## 2022-04-01 ENCOUNTER — TELEPHONE (OUTPATIENT)
Dept: INTERNAL MEDICINE CLINIC | Facility: OTHER | Age: 72
End: 2022-04-01

## 2022-04-01 DIAGNOSIS — I10 ESSENTIAL HYPERTENSION: ICD-10-CM

## 2022-04-01 DIAGNOSIS — I10 HTN (HYPERTENSION), BENIGN: Primary | ICD-10-CM

## 2022-04-01 RX ORDER — LISINOPRIL 10 MG/1
10 TABLET ORAL DAILY
Qty: 90 TABLET | Refills: 0 | Status: SHIPPED | OUTPATIENT
Start: 2022-04-01 | End: 2022-07-22 | Stop reason: SDUPTHER

## 2022-04-01 NOTE — TELEPHONE ENCOUNTER
Spoke with patient  Taking lisinopril 10mg daily along with HCTZ 25mg and BP much improved  Advised him to go for BMP in 2 weeks  Keep regular scheduled follow up in July with labs ordered last visit (1 week before)  BMP order placed  [facility]opril [facility]  Continue to check BP at home and discussed BP goal with patient and wife  No questions       ----- Message from Rupali Zepeda sent at 3/28/2022  8:48 AM EDT -----  Regarding: FW: Lisinopril     ----- Message -----  From: Barrie Walter Held  Sent: 3/26/2022   1:29 PM EDT  To: [facility]  Subject: Lisinopril                                       The Lisinopril has not caused any side effects  My b/p for the last two weeks has been consistent between 122/72 to 126/74 in that range

## 2022-04-04 NOTE — PROGRESS NOTES
Assessment/Plan:    Bilateral iliac artery aneurysm [facility])  77-year-old male with HTN, HLD, COPD on continuous O2 2L, abdominal aortic and popliteal ectasia, bilateral common iliac artery aneurysms who returns to the office for yearly visit to review AOIL/LEAD   -AOIL 3/2/22 R ALHAJI 1 9 cm L CIA1 6 cm  -LEAD 3/11/22 showed no significant arterial occlusive disease, right proximal popliteal 1 1 cm and left proximal popliteal 1 1 cm, right ROMINA 1 17/136/67 and left ROMINA 1 13/96/76  -Repeat duplex in 1 year   -Follow up in the office in 1 year       Diagnoses and all orders for this visit:    Bilateral iliac artery aneurysm (HCC)  -     VAS abdominal aorta/iliacs; complete study; Future  -     VAS lower limb arterial duplex, complete bilateral; Future    Arterial ectasia (HCC)  -     VAS abdominal aorta/iliacs; complete study; Future  -     VAS lower limb arterial duplex, complete bilateral; Future          Subjective:      Patient ID: Milton Arceo is a 70 y o  male  Patient present to review AOIL and MARY  Patient denies any pain,  Cramping or changes on appetite  Patient is currently taking ASA and Atorvastatin  HPI  77-year-old male with HTN, HLD, COPD on continuous O2 2L, abdominal aortic and popliteal ectasia, bilateral common iliac artery aneurysms who returns to the office for yearly visit to review AOIL/LEAD   Patient returns to the office for yearly visit  Last seen in the office last year by me  He denies any lower extremity complaints  Denies any back pain  Blood pressure 1 teens to 082 systolic at home  Low normal today 100/76  Denies any lightheadedness or weakness    Aortoiliac duplex showed right common iliac artery measures 1 9 cm and left common iliac artery measures 1 6 cm, bilateral proximal popliteal artery measures 1 1 cm, normal ROMINA  The following portions of the patient's history were reviewed and updated as appropriate: allergies, current medications, past family history, past medical history, past social history, past surgical history and problem list   Review of systems reviewed    Review of Systems   Constitutional: Negative  HENT: Negative  Eyes: Negative  Respiratory: Negative  Cardiovascular: Negative  Gastrointestinal: Negative  Endocrine: Negative  Genitourinary: Negative  Musculoskeletal: Negative  Skin: Negative  Allergic/Immunologic: Negative  Neurological: Negative  Hematological: Negative  Psychiatric/Behavioral: Negative  Objective:    I have reviewed and made appropriate changes to the review of systems input by the medical assistant  Vitals:    04/05/22 1326   BP: 100/76   BP Location: Left arm   Patient Position: Sitting   Cuff Size: Adult   Pulse: (!) 107   Weight: 79 9 kg (176 lb 3 2 oz)       Patient Active Problem List   Diagnosis    Hyperlipidemia    Centrilobular emphysema (HCC) - severe    Bilateral iliac artery aneurysm (HCC)    Arterial ectasia (HCC)    Diverticulosis, sigmoid    Hearing loss    Hemorrhoids    Lung nodule    Prediabetes    Skin disorder    Benign prostatic hyperplasia without lower urinary tract symptoms    Essential hypertension    Seasonal allergies    Medicare annual wellness visit, subsequent    Other microscopic hematuria    Other fatigue    NSVT (nonsustained ventricular tachycardia) (HCC)    Abnormal ECG during exercise stress test    Prominent popliteal pulse    Chronic hypoxemic respiratory failure (HCC)    Cigarette nicotine dependence in remission       Past Surgical History:   Procedure Laterality Date    BICEPS TENDON REPAIR  1999    COLONOSCOPY      FRACTURE SURGERY  5/15/1988    Approx date  Rt index finger fractured      HAND SURGERY      Last Assessed:7/11/2016    HERNIA REPAIR      Last Assessed:7/11/2016    IR CHEST TUBE CHECK/CHANGE/REPOSITION/UPSIZE  4/1/2021    IR CHEST TUBE PLACEMENT  3/30/2021    KNEE ARTHROSCOPY      OH REPAIR ING HERNIA,5+Y/O,REDUCIBL Right 2016    Procedure: REPAIR HERNIA INGUINAL with mesh;  Surgeon: Eduardo Giordano DO;  [facility]: [facility];  Service: General    PROSTATE BIOPSY      WISDOM TOOTH EXTRACTION         Family History   Problem Relation Age of Onset    Hypertension Mother        Social History     Socioeconomic History    Marital status: /Civil Union     Spouse name: Not on file    Number of children: Not on file    Years of education: Not on file    Highest education level: Not on file   Occupational History    Not on file   Tobacco Use    Smoking status: Former Smoker     Packs/day: 1 50     Years: 48 00     Pack years: 72 00     Types: Cigarettes     Start date: 5     Quit date: 2015     Years since quittin 4    Smokeless tobacco: Never Used   Vaping Use    Vaping Use: Never used   Substance and Sexual Activity    Alcohol use:  Yes     Alcohol/week: 0 0 standard drinks     Comment: rare    Drug use: No    Sexual activity: Yes     Partners: Female     Birth control/protection: Male Sterilization, Female Sterilization   Other Topics Concern    Not on file   Social History Narrative    Advance directive on file     Social Determinants of Health     Financial Resource Strain: Not on file   Food Insecurity: Not on file   Transportation Needs: Not on file   Physical Activity: Not on file   Stress: Not on file   Social Connections: Not on file   Intimate Partner Violence: Not on file   Housing Stability: Not on file       Allergies   Allergen Reactions    Bee Venom Facial Swelling    Other Rash     Annotation - [id number]: mushrooms    Penicillins Rash         Current Outpatient Medications:     albuterol (PROVENTIL HFA,VENTOLIN HFA) 90 mcg/act inhaler, Inhale 2 puffs every 6 (six) hours as needed for shortness of breath, Disp: 3 Inhaler, Rfl: 3    atorvastatin (LIPITOR) 20 mg tablet, Take 1 tablet (20 mg total) by mouth daily, Disp: 90 tablet, Rfl: 1    cholecalciferol (VITAMIN D3) 1,000 units tablet, Take 2 tablets (2,000 Units total) by mouth daily for 2 days, Disp: 4 tablet, Rfl: 0    finasteride (PROSCAR) 5 mg tablet, Take 1 tablet (5 mg total) by mouth daily, Disp: 90 tablet, Rfl: 3    fluticasone-umeclidinium-vilanterol (TRELEGY) 100-62 5-25 MCG/INH inhaler, Inhale 1 puff daily Rinse mouth after use , Disp: 180 blister, Rfl: 3    guaiFENesin (MUCINEX) 600 mg 12 hr tablet, Take 1,200 mg by mouth 2 (two) times a day as needed, Disp: , Rfl:     hydrochlorothiazide (HYDRODIURIL) 25 mg tablet, Take 1 tablet (25 mg total) by mouth daily, Disp: 90 tablet, Rfl: 1    lisinopril (ZESTRIL) 10 mg tablet, Take 1 tablet (10 mg total) by mouth daily, Disp: 90 tablet, Rfl: 0    loratadine (CLARITIN REDITABS) 10 MG dissolvable tablet, Take 10 mg by mouth daily as needed for allergies  , Disp: , Rfl:     tamsulosin (Flomax) 0 4 mg, Take 1 capsule (0 4 mg total) by mouth daily with dinner, Disp: 90 capsule, Rfl: 3    EPINEPHrine (EPIPEN) 0 3 mg/0 3 mL SOAJ, Inject 0 3 mL (0 3 mg total) into a muscle once for 1 dose (Patient taking differently: Inject 0 3 mg into a muscle as needed  ), Disp: 0 6 mL, Rfl: 0    /76 (BP Location: Left arm, Patient Position: Sitting, Cuff Size: Adult)   Pulse (!) 107   Wt 79 9 kg (176 lb 3 2 oz)   BMI 26 79 kg/m²          Physical Exam  Vitals and nursing note reviewed  Exam conducted with a chaperone present  Constitutional:       Appearance: Normal appearance  HENT:      Head: Normocephalic and atraumatic  Eyes:      Extraocular Movements: Extraocular movements intact  Cardiovascular:      Pulses:           Femoral pulses are 2+ on the right side and 2+ on the left side  Popliteal pulses are 2+ on the right side and 2+ on the left side  Posterior tibial pulses are 2+ on the right side and 2+ on the left side  Heart sounds: Normal heart sounds  Pulmonary:      Effort: Pulmonary effort is normal  No respiratory distress  Comments: Oxygen 2 L nasal cannula  Abdominal:      Palpations: Abdomen is soft  Musculoskeletal:         General: No swelling  Normal range of motion  Skin:     General: Skin is warm  Neurological:      General: No focal deficit present  Mental Status: He is oriented to person, place, and time

## 2022-04-05 ENCOUNTER — OFFICE VISIT (OUTPATIENT)
Dept: VASCULAR SURGERY | Facility: CLINIC | Age: 72
End: 2022-04-05
Payer: MEDICARE

## 2022-04-05 VITALS
WEIGHT: 176.2 LBS | SYSTOLIC BLOOD PRESSURE: 100 MMHG | HEART RATE: 107 BPM | DIASTOLIC BLOOD PRESSURE: 76 MMHG | BODY MASS INDEX: 26.79 KG/M2

## 2022-04-05 DIAGNOSIS — I72.3 BILATERAL ILIAC ARTERY ANEURYSM (HCC): Primary | ICD-10-CM

## 2022-04-05 DIAGNOSIS — I77.89 ARTERIAL ECTASIA (HCC): ICD-10-CM

## 2022-04-05 PROCEDURE — 99213 OFFICE O/P EST LOW 20 MIN: CPT | Performed by: NURSE PRACTITIONER

## 2022-04-05 NOTE — ASSESSMENT & PLAN NOTE
77-year-old male with HTN, HLD, COPD on continuous O2 2L, abdominal aortic and popliteal ectasia, bilateral common iliac artery aneurysms who returns to the office for yearly visit to review AOIL/LEAD   -AOIL 3/2/22 R ALHAJI 1 9 cm L CIA1 6 cm  -LEAD 3/11/22 showed no significant arterial occlusive disease, right proximal popliteal 1 1 cm and left proximal popliteal 1 1 cm, right ROMINA 1 17/136/67 and left ROMINA 1 13/96/76  -Repeat duplex in 1 year   -Follow up in the office in 1 year

## 2022-04-05 NOTE — PATIENT INSTRUCTIONS
Repeat aortoiliac and lower extremity arterial duplex in 1 year    Follow-up in the office in 1 year

## 2022-04-06 ENCOUNTER — HOSPITAL ENCOUNTER (OUTPATIENT)
Dept: RADIOLOGY | Facility: IMAGING CENTER | Age: 72
Discharge: HOME | End: 2022-04-06
Payer: MEDICARE

## 2022-04-06 DIAGNOSIS — Z12.2 SCREENING FOR MALIGNANT NEOPLASM OF RESPIRATORY ORGAN: ICD-10-CM

## 2022-04-06 DIAGNOSIS — F17.211 CIGARETTE NICOTINE DEPENDENCE IN REMISSION: ICD-10-CM

## 2022-04-06 PROCEDURE — 71271 CT THORAX LUNG CANCER SCR C-: CPT

## 2022-04-13 ENCOUNTER — APPOINTMENT (OUTPATIENT)
Dept: LAB | Facility: IMAGING CENTER | Age: 72
End: 2022-04-13
Payer: MEDICARE

## 2022-04-13 DIAGNOSIS — J96.11 CHRONIC HYPOXEMIC RESPIRATORY FAILURE (HCC): ICD-10-CM

## 2022-04-13 DIAGNOSIS — E78.2 MIXED HYPERLIPIDEMIA: ICD-10-CM

## 2022-04-13 DIAGNOSIS — R73.03 PREDIABETES: ICD-10-CM

## 2022-04-13 DIAGNOSIS — I10 ESSENTIAL HYPERTENSION: ICD-10-CM

## 2022-04-13 DIAGNOSIS — J43.2 CENTRILOBULAR EMPHYSEMA (HCC): ICD-10-CM

## 2022-04-13 DIAGNOSIS — I10 HTN (HYPERTENSION), BENIGN: ICD-10-CM

## 2022-04-13 DIAGNOSIS — I10 HYPERTENSION, ESSENTIAL: ICD-10-CM

## 2022-04-13 DIAGNOSIS — N40.0 BENIGN PROSTATIC HYPERPLASIA WITHOUT LOWER URINARY TRACT SYMPTOMS: ICD-10-CM

## 2022-04-13 LAB
ALBUMIN SERPL BCP-MCNC: 4.4 G/DL (ref 3.5–5)
ALP SERPL-CCNC: 84 U/L (ref 46–116)
ALT SERPL W P-5'-P-CCNC: 27 U/L (ref 12–78)
ANION GAP SERPL CALCULATED.3IONS-SCNC: 0 MMOL/L (ref 4–13)
AST SERPL W P-5'-P-CCNC: 19 U/L (ref 5–45)
BASOPHILS # BLD AUTO: 0.05 THOUSANDS/ΜL (ref 0–0.1)
BASOPHILS NFR BLD AUTO: 1 % (ref 0–1)
BILIRUB SERPL-MCNC: 0.48 MG/DL (ref 0.2–1)
BUN SERPL-MCNC: 15 MG/DL (ref 5–25)
CALCIUM SERPL-MCNC: 9.8 MG/DL (ref 8.3–10.1)
CHLORIDE SERPL-SCNC: 104 MMOL/L (ref 100–108)
CHOLEST SERPL-MCNC: 144 MG/DL
CO2 SERPL-SCNC: 35 MMOL/L (ref 21–32)
CREAT SERPL-MCNC: 1.25 MG/DL (ref 0.6–1.3)
EOSINOPHIL # BLD AUTO: 0.3 THOUSAND/ΜL (ref 0–0.61)
EOSINOPHIL NFR BLD AUTO: 3 % (ref 0–6)
ERYTHROCYTE [DISTWIDTH] IN BLOOD BY AUTOMATED COUNT: 12.8 % (ref 11.6–15.1)
EST. AVERAGE GLUCOSE BLD GHB EST-MCNC: 117 MG/DL
GFR SERPL CREATININE-BSD FRML MDRD: 57 ML/MIN/1.73SQ M
GLUCOSE P FAST SERPL-MCNC: 115 MG/DL (ref 65–99)
HBA1C MFR BLD: 5.7 %
HCT VFR BLD AUTO: 43.9 % (ref 36.5–49.3)
HDLC SERPL-MCNC: 50 MG/DL
HGB BLD-MCNC: 13.7 G/DL (ref 12–17)
IMM GRANULOCYTES # BLD AUTO: 0.04 THOUSAND/UL (ref 0–0.2)
IMM GRANULOCYTES NFR BLD AUTO: 0 % (ref 0–2)
LDLC SERPL CALC-MCNC: 74 MG/DL (ref 0–100)
LYMPHOCYTES # BLD AUTO: 1.66 THOUSANDS/ΜL (ref 0.6–4.47)
LYMPHOCYTES NFR BLD AUTO: 18 % (ref 14–44)
MCH RBC QN AUTO: 28.8 PG (ref 26.8–34.3)
MCHC RBC AUTO-ENTMCNC: 31.2 G/DL (ref 31.4–37.4)
MCV RBC AUTO: 92 FL (ref 82–98)
MONOCYTES # BLD AUTO: 0.67 THOUSAND/ΜL (ref 0.17–1.22)
MONOCYTES NFR BLD AUTO: 7 % (ref 4–12)
NEUTROPHILS # BLD AUTO: 6.54 THOUSANDS/ΜL (ref 1.85–7.62)
NEUTS SEG NFR BLD AUTO: 71 % (ref 43–75)
NRBC BLD AUTO-RTO: 0 /100 WBCS
PLATELET # BLD AUTO: 267 THOUSANDS/UL (ref 149–390)
PMV BLD AUTO: 11 FL (ref 8.9–12.7)
POTASSIUM SERPL-SCNC: 4.6 MMOL/L (ref 3.5–5.3)
PROT SERPL-MCNC: 7.3 G/DL (ref 6.4–8.2)
RBC # BLD AUTO: 4.75 MILLION/UL (ref 3.88–5.62)
SODIUM SERPL-SCNC: 139 MMOL/L (ref 136–145)
TRIGL SERPL-MCNC: 98 MG/DL
WBC # BLD AUTO: 9.26 THOUSAND/UL (ref 4.31–10.16)

## 2022-04-13 PROCEDURE — 36415 COLL VENOUS BLD VENIPUNCTURE: CPT

## 2022-04-13 PROCEDURE — 80053 COMPREHEN METABOLIC PANEL: CPT

## 2022-04-13 PROCEDURE — 85025 COMPLETE CBC W/AUTO DIFF WBC: CPT

## 2022-04-13 PROCEDURE — 83036 HEMOGLOBIN GLYCOSYLATED A1C: CPT

## 2022-04-13 PROCEDURE — 80061 LIPID PANEL: CPT

## 2022-05-23 ENCOUNTER — TELEPHONE (OUTPATIENT)
Dept: INTERNAL MEDICINE CLINIC | Age: 72
End: 2022-05-23

## 2022-05-23 DIAGNOSIS — Z91.030 ALLERGY TO BEE STING: ICD-10-CM

## 2022-05-23 RX ORDER — EPINEPHRINE 0.3 MG/.3ML
0.3 INJECTION SUBCUTANEOUS ONCE
Qty: 0.6 ML | Refills: 1 | Status: SHIPPED | OUTPATIENT
Start: 2022-05-23 | End: 2022-07-22

## 2022-05-23 NOTE — TELEPHONE ENCOUNTER
----- Message from Ragini 634 sent at 2022  6:29 PM EDT -----  Regarding: [facility]  I will need a new prescription sent to [facility] [place] for new epipens  Mine are    Thank you

## 2022-07-07 ENCOUNTER — TELEPHONE (OUTPATIENT)
Dept: INTERNAL MEDICINE CLINIC | Facility: OTHER | Age: 72
End: 2022-07-07

## 2022-07-07 NOTE — TELEPHONE ENCOUNTER
Was given CMN form for oxygen to complete for pt on 7/6/2022 by [facility] . Called [employer] and was told by two different people the Certification of Medical Necessity department that all I had to do was fax OV note to them and they needed to review it to substantiate medical necessity . Wife said she spoke to someone in [address] [place] [address] and was informed CMN needed to be completed. Wife began yelling at me even though [facility] was the one providing conflicting information to both of us. I tried to conference Otis Hogue, myself and wife so wife heard exactly what Otis Hogue was instructing me to do to ensure patient received everything he needed but conference call was unsuccessful. This morning I was given a fax on another CMN stating it needed to be completed to repair oxygen. Completed form, had Dr Radha Huynh sign form, and gave to [facility] who faxed form to number on coversheet.

## 2022-07-22 ENCOUNTER — OFFICE VISIT (OUTPATIENT)
Dept: INTERNAL MEDICINE CLINIC | Facility: OTHER | Age: 72
End: 2022-07-22
Payer: MEDICARE

## 2022-07-22 VITALS
WEIGHT: 173.2 LBS | TEMPERATURE: 97.7 F | OXYGEN SATURATION: 96 % | HEART RATE: 58 BPM | HEIGHT: 68 IN | DIASTOLIC BLOOD PRESSURE: 70 MMHG | BODY MASS INDEX: 26.25 KG/M2 | SYSTOLIC BLOOD PRESSURE: 114 MMHG

## 2022-07-22 DIAGNOSIS — J96.11 CHRONIC HYPOXEMIC RESPIRATORY FAILURE (HCC): Primary | ICD-10-CM

## 2022-07-22 DIAGNOSIS — I10 HYPERTENSION, ESSENTIAL: ICD-10-CM

## 2022-07-22 DIAGNOSIS — N18.2 CKD (CHRONIC KIDNEY DISEASE) STAGE 2, GFR 60-89 ML/MIN: ICD-10-CM

## 2022-07-22 DIAGNOSIS — E78.2 MIXED HYPERLIPIDEMIA: ICD-10-CM

## 2022-07-22 DIAGNOSIS — R73.03 PREDIABETES: ICD-10-CM

## 2022-07-22 DIAGNOSIS — I10 ESSENTIAL HYPERTENSION: ICD-10-CM

## 2022-07-22 DIAGNOSIS — J43.2 CENTRILOBULAR EMPHYSEMA (HCC): ICD-10-CM

## 2022-07-22 DIAGNOSIS — N40.0 BENIGN PROSTATIC HYPERPLASIA WITHOUT LOWER URINARY TRACT SYMPTOMS: ICD-10-CM

## 2022-07-22 DIAGNOSIS — Z71.85 VACCINE COUNSELING: ICD-10-CM

## 2022-07-22 PROCEDURE — 99214 OFFICE O/P EST MOD 30 MIN: CPT | Performed by: PHYSICIAN ASSISTANT

## 2022-07-22 RX ORDER — HYDROCHLOROTHIAZIDE 25 MG/1
25 TABLET ORAL DAILY
Qty: 90 TABLET | Refills: 1 | Status: SHIPPED | OUTPATIENT
Start: 2022-07-22

## 2022-07-22 RX ORDER — ATORVASTATIN CALCIUM 20 MG/1
20 TABLET, FILM COATED ORAL DAILY
Qty: 90 TABLET | Refills: 1 | Status: SHIPPED | OUTPATIENT
Start: 2022-07-22

## 2022-07-22 RX ORDER — LISINOPRIL 10 MG/1
10 TABLET ORAL DAILY
Qty: 90 TABLET | Refills: 1 | Status: SHIPPED | OUTPATIENT
Start: 2022-07-22

## 2022-07-22 NOTE — ASSESSMENT & PLAN NOTE
Continue follow with Urology    Patient has urology follow-up and PSA to be drawn prior to his follow-up

## 2022-07-22 NOTE — ASSESSMENT & PLAN NOTE
Well controlled at this time on medications  Continue with supplemental oxygen  Continue with Trelegy  Continue to follow follow with pulmonology  COVID vaccine booster #2 scheduled tomorrow

## 2022-07-22 NOTE — PATIENT INSTRUCTIONS
Centrilobular emphysema (HCC) - severe  Well controlled at this time on medications  Continue with supplemental oxygen  Continue with Trelegy  Continue to follow follow with pulmonology  COVID vaccine booster #2 scheduled tomorrow  Essential hypertension  BP much improved with addition of lisinopril to HCTZ  No change in medications  Hyperlipidemia  Taking Lipitor as directed, check CMP and lipid panel with next lab set 4 month follow up  Prediabetes  Go for fasting CMP, will follow glucose  A1c checked with previous lab set  Chronic hypoxemic respiratory failure (HCC)  Continue with supplemental oxygen  Continue to follow with pulmonology  Benign prostatic hyperplasia without lower urinary tract symptoms  Continue follow with Urology  Patient has urology follow-up and PSA to be drawn prior to his follow-up    CKD (chronic kidney disease) stage 2, GFR 60-89 ml/min  Lab Results   Component Value Date    EGFR 57 04/13/2022    EGFR 66 12/06/2021    EGFR 68 10/25/2021    CREATININE 1 25 04/13/2022    CREATININE 1 11 12/06/2021    CREATININE 1 10 10/25/2021       Renal function stable at this time  Will continue to follow

## 2022-07-22 NOTE — PROGRESS NOTES
[facility] [address] [place] [address]  Standard Office Visit  Patient ID: Waqas Reza Held    : 1950  Age/Gender: 70 y o  male     DATE: 2022      Assessment/Plan:    Centrilobular emphysema ([address] ) - severe  Well controlled at this time on medications  Continue with supplemental oxygen  Continue with Trelegy  Continue to follow follow with pulmonology  COVID vaccine booster #2 scheduled tomorrow  Essential hypertension  BP much improved with addition of lisinopril to HCTZ  No change in medications  Hyperlipidemia  Taking Lipitor as directed, check CMP and lipid panel with next lab set 4 month follow up  Prediabetes  Go for fasting CMP, will follow glucose  A1c checked with previous lab set  Chronic hypoxemic respiratory failure (HCC)  Continue with supplemental oxygen  Continue to follow with pulmonology  Benign prostatic hyperplasia without lower urinary tract symptoms  Continue follow with Urology  Patient has urology follow-up and PSA to be drawn prior to his follow-up    CKD (chronic kidney disease) stage 2, GFR 60-89 ml/min  Lab Results   Component Value Date    EGFR 57 2022    EGFR 66 2021    EGFR 68 10/25/2021    CREATININE 1 25 2022    CREATININE 1 11 2021    CREATININE 1 10 10/25/2021       Renal function stable at this time  Will continue to follow  Diagnoses and all orders for this visit:    Chronic hypoxemic respiratory failure ([address] )    Essential hypertension  -     lisinopril (ZESTRIL) 10 mg tablet; Take 1 tablet (10 mg total) by mouth daily    Hypertension, essential  -     hydrochlorothiazide (HYDRODIURIL) 25 mg tablet; Take 1 tablet (25 mg total) by mouth daily  -     Comprehensive metabolic panel; Future  -     Lipid Panel with Direct LDL reflex; Future  -     CBC and differential; Future    Mixed hyperlipidemia  -     atorvastatin (LIPITOR) 20 mg tablet;  Take 1 tablet (20 mg total) by mouth daily  -     Comprehensive metabolic panel; Future  -     Lipid Panel with Direct LDL reflex; Future    Centrilobular emphysema (HCC) - severe    Prediabetes    CKD (chronic kidney disease) stage 2, GFR 60-89 ml/min    Benign prostatic hyperplasia without lower urinary tract symptoms    Vaccine counseling  Comments:  Recommend flu shot in the fall                Subjective:   Chief Complaint   Patient presents with    Follow-up     4 month follow up review labs done 4/13         Farzana Gorman is a 70 y o  male who presents to the office on 7/22/2022 for     28-year-old male with a history of emphysema, hypertension, hyperlipidemia, prediabetes, CKD to presents to the office for chronic condition follow-up  Blood pressure improved since starting new blood pressure medications  He does check his blood pressure at home and found his home readings to be in a similar range to wears blood pressure is today  At this time is feeling okay  No current complaints or concerns  He has been very mindful of the temperatures outside and due to the current he wave has been remaining indoors that is climate control  Notes breathing has been "not too bad" but for the most part gets around pretty well  Uses portable O2 at 2L, Has O2 accessible through the home  Follows annually with Urology and has laboratory studies to be done prior to his next urology follow-up  Denies any urologic symptoms at this time  No straining to void or urinary frequency  Patient does follow with vascular  No longer following with cardiology  Patient has 2nd booster scheduled tomorrow for COVID [employer])    Has h/o hospital admit for Covid in the past    Patient had labs and CT of chest since last visit      The following portions of the patient's history were reviewed and updated as appropriate: allergies, current medications, past family history, past medical history, past social history, past surgical history and problem list     Review of Systems   Constitutional: Negative for chills and fever  HENT: Negative for rhinorrhea and sore throat  Respiratory: Positive for cough  Negative for shortness of breath and wheezing (Has not required increased p r n  albuterol use)  Episodic coughs throughout the day, at times dry throat with cough  Taking telergy as directed  Cardiovascular: Negative for chest pain, palpitations and leg swelling  Gastrointestinal: Negative for abdominal pain, constipation, diarrhea, nausea and vomiting  Genitourinary: Negative for dysuria, frequency and hematuria  Musculoskeletal: Negative for back pain  Skin: Negative for rash  Neurological: Negative for dizziness and light-headedness  All other systems reviewed and are negative          Patient Active Problem List   Diagnosis    Hyperlipidemia    Centrilobular emphysema (HCC) - severe    Bilateral iliac artery aneurysm (HCC)    Arterial ectasia (HCC)    Diverticulosis, sigmoid    Hearing loss    Hemorrhoids    Lung nodule    Prediabetes    Skin disorder    Benign prostatic hyperplasia without lower urinary tract symptoms    Essential hypertension    Seasonal allergies    Medicare annual wellness visit, subsequent    Other microscopic hematuria    Other fatigue    NSVT (nonsustained ventricular tachycardia) ([facility]    Abnormal ECG during exercise stress test    Prominent popliteal pulse    Chronic hypoxemic respiratory failure (HCC)    Cigarette nicotine dependence in remission    CKD (chronic kidney disease) stage 2, GFR 60-89 ml/min       Past Medical History:   Diagnosis Date    Allergic 1/1/1990    Bee stings and penicillin    BPH (benign prostatic hyperplasia)     COPD (chronic obstructive pulmonary disease) ([facility]     Last Assessed:11/14/2016    Diverticulosis     Elevated prostate specific antigen (PSA)     Emphysema (subcutaneous) (surgical) resulting from a procedure     Enlarged prostate with lower urinary tract symptoms (LUTS)     Resolved:8/22/2017    Hernia, inguinal, right     Hypertension 5/1/2019    Visit with Valeria Painter showed slightly elevated bp   Pneumonia due to COVID-19 virus 3/16/2021       Past Surgical History:   Procedure Laterality Date    BICEPS TENDON REPAIR  1999    COLONOSCOPY      FRACTURE SURGERY  5/15/1988    Approx date  Rt index finger fractured      HAND SURGERY      Last Assessed:7/11/2016    HERNIA REPAIR      Last Assessed:7/11/2016    IR CHEST TUBE CHECK/CHANGE/REPOSITION/UPSIZE  4/1/2021    IR CHEST TUBE PLACEMENT  3/30/2021    KNEE ARTHROSCOPY      FL REPAIR ING HERNIA,5+Y/O,REDUCIBL Right 2/23/2016    Procedure: REPAIR HERNIA INGUINAL with mesh;  Surgeon: Glendy Hilliard DO;  [facility];  Service: General    PROSTATE BIOPSY      WISDOM TOOTH EXTRACTION           Current Outpatient Medications:     albuterol (PROVENTIL HFA,VENTOLIN HFA) 90 mcg/act inhaler, Inhale 2 puffs every 6 (six) hours as needed for shortness of breath, Disp: 3 Inhaler, Rfl: 3    atorvastatin (LIPITOR) 20 mg tablet, Take 1 tablet (20 mg total) by mouth daily, Disp: 90 tablet, Rfl: 1    cholecalciferol (VITAMIN D3) 1,000 units tablet, Take 2 tablets (2,000 Units total) by mouth daily for 2 days, Disp: 4 tablet, Rfl: 0    EPINEPHrine (EPIPEN) 0 3 mg/0 3 mL SOAJ, Inject 0 3 mL (0 3 mg total) into a muscle once for 1 dose, Disp: 0 6 mL, Rfl: 1    finasteride (PROSCAR) 5 mg tablet, Take 1 tablet (5 mg total) by mouth daily, Disp: 90 tablet, Rfl: 3    fluticasone-umeclidinium-vilanterol (TRELEGY) 100-62 5-25 MCG/INH inhaler, Inhale 1 puff daily Rinse mouth after use , Disp: 180 blister, Rfl: 3    guaiFENesin (MUCINEX) 600 mg 12 hr tablet, Take 1,200 mg by mouth 2 (two) times a day as needed, Disp: , Rfl:     hydrochlorothiazide (HYDRODIURIL) 25 mg tablet, Take 1 tablet (25 mg total) by mouth daily, Disp: 90 tablet, Rfl: 1    lisinopril (ZESTRIL) 10 mg tablet, Take 1 tablet (10 mg total) by mouth daily, Disp: 90 tablet, Rfl: 1    loratadine (CLARITIN REDITABS) 10 MG dissolvable tablet, Take 10 mg by mouth daily as needed for allergies  , Disp: , Rfl:     tamsulosin (Flomax) 0 4 mg, Take 1 capsule (0 4 mg total) by mouth daily with dinner, Disp: 90 capsule, Rfl: 3    Allergies   Allergen Reactions    Bee Venom Facial Swelling    Other Rash     [id number]: mushrooms    Penicillins Rash       Social History     Socioeconomic History    Marital status: /Civil Union     Spouse name: None    Number of children: None    Years of education: None    Highest education level: None   Occupational History    None   Tobacco Use    Smoking status: Former Smoker     Packs/day: 1 50     Years: 48 00     Pack years: 72 00     Types: Cigarettes     Start date: 5     Quit date: 2015     Years since quittin 6    Smokeless tobacco: Never Used   Vaping Use    Vaping Use: Never used   Substance and Sexual Activity    Alcohol use:  Yes     Alcohol/week: 0 0 standard drinks     Comment: rare    Drug use: No    Sexual activity: Yes     Partners: Female     Birth control/protection: Male Sterilization, Female Sterilization   Other Topics Concern    None   Social History Narrative    Advance directive on file     Social Determinants of Health     Financial Resource Strain: Not on file   Food Insecurity: Not on file   Transportation Needs: Not on file   Physical Activity: Not on file   Stress: Not on file   Social Connections: Not on file   Intimate Partner Violence: Not on file   Housing Stability: Not on file       Family History   Problem Relation Age of Onset    Hypertension Mother        PHQ-2/9 Depression Screening           Health Maintenance   Topic Date Due    SLP PLAN OF CARE  Never done    COVID-19 Vaccine (4 - Booster for Osborn Peter series) 2022    Influenza Vaccine (1) 2022    Fall Risk  2023    Depression Screening  2023    Medicare Annual Wellness Visit (AWV)  03/04/2023    BMI: Followup Plan  03/04/2023    Lung Cancer Screening  04/06/2023    BMI: Adult  07/22/2023    Hepatitis C Screening  Completed    Pneumococcal Vaccine: 65+ Years  Completed    HIB Vaccine  Aged Out    Hepatitis B Vaccine  Aged Out    IPV Vaccine  Aged Out    Hepatitis A Vaccine  Aged Out    Meningococcal ACWY Vaccine  Aged Out    HPV Vaccine  Aged Out    Colorectal Cancer Screening  Discontinued       Immunization History   Administered Date(s) Administered    COVID-19 PFIZER VACCINE 0 3 ML IM 04/25/2021, 05/16/2021, 11/19/2021    DTaP 09/29/2020    INFLUENZA 11/01/2006, 09/28/2007, 11/01/2016, 10/10/2017, 09/28/2020, 10/25/2021    Influenza Split High Dose Preservative Free IM 11/01/2016, 10/10/2017    Influenza, high dose seasonal 0 7 mL 10/19/2018, 10/03/2019, 09/28/2020    Pneumococcal Conjugate 13-Valent 07/11/2016, 06/12/2019    Pneumococcal Polysaccharide PPV23 10/10/2017    Tdap 09/11/2020    Zoster Vaccine Recombinant 03/28/2019, 06/12/2019        Objective:  Vitals:    07/22/22 1255   BP: 114/70   BP Location: Left arm   Patient Position: Sitting   Cuff Size: Standard   Pulse: 58   Temp: 97 7 °F (36 5 °C)   TempSrc: Temporal   SpO2: 96%   Weight: 78 6 kg (173 lb 3 2 oz)   Height: 5' 8" (1 727 m)     Wt Readings from Last 3 Encounters:   07/22/22 78 6 kg (173 lb 3 2 oz)   04/05/22 79 9 kg (176 lb 3 2 oz)   03/04/22 81 2 kg (179 lb)     Body mass index is 26 33 kg/m²  No exam data present       Physical Exam  Vitals and nursing note reviewed  Constitutional:       General: He is not in acute distress  Appearance: He is well-developed and normal weight  He is not ill-appearing or diaphoretic  Comments: Alert pleasant cooperative  Seated in room in no acute distress  Wife present  HENT:      Head: Normocephalic and atraumatic        Right Ear: Tympanic membrane normal       Left Ear: Tympanic membrane normal       Nose:      Comments: Supplemental oxygen via nasal cannula     Mouth/Throat:      Mouth: Mucous membranes are moist       Pharynx: No oropharyngeal exudate or posterior oropharyngeal erythema  Eyes:      General: No scleral icterus  Right eye: No discharge  Left eye: No discharge  Pupils: Pupils are equal, round, and reactive to light  Cardiovascular:      Rate and Rhythm: Normal rate and regular rhythm  Heart sounds: Normal heart sounds  No murmur heard  Pulmonary:      Effort: Pulmonary effort is normal  No respiratory distress  Breath sounds: Normal breath sounds  No wheezing or rales  Comments: Symmetric air exchange  Slightly decreased breath sounds heard throughout bilaterally  No crackles no rhonchi no wheeze  No respiratory distress  No conversational dyspnea  Abdominal:      General: Bowel sounds are normal  There is no distension  Palpations: Abdomen is soft  Tenderness: There is no abdominal tenderness  There is no guarding  Comments: Positive bowel sounds soft nontender   Musculoskeletal:      Cervical back: Neck supple  Right lower leg: No edema  Left lower leg: No edema  Skin:     General: Skin is warm and dry  Neurological:      General: No focal deficit present  Mental Status: He is alert  Psychiatric:         Mood and Affect: Mood normal          Behavior: Behavior normal          Thought Content:  Thought content normal              Future Appointments   Date Time Provider Isacc Marilia   8/16/2022  1:20 PM Sarmad Rojas MD [facility]   11/18/2022  1:30 PM Kevin Lin PA-C [address] [place] [address]   4/5/2023 10:00 AM [facility] [address]   4/5/2023 11:00 AM [facility] [address]       Kevin Lin PA-C  [address]    Patient Care Team:  Kevin Lin PA-C as PCP - General (Internal Medicine)  MD Bc Elliott MD (Urology)  Kourtney Sheffield (Vascular Surgery)  Cynthia Ji MD as Consulting Physician (Pulmonary Disease)  Flash Webb MD (Colon and Rectal Surgery)    This note was completed in part utilizing M-Modal Fluency Direct Software  Grammatical errors, random word insertions, spelling mistakes, and incomplete sentences can be an occasional consequence of this system secondary to software limitations, ambient noise, and hardware issues  If you have any questions or concerns about the content, text, or information contained within the body of this dictation, please contact the provider for clarification

## 2022-08-16 ENCOUNTER — OFFICE VISIT (OUTPATIENT)
Dept: PULMONOLOGY | Facility: CLINIC | Age: 72
End: 2022-08-16
Payer: MEDICARE

## 2022-08-16 VITALS
TEMPERATURE: 98.3 F | SYSTOLIC BLOOD PRESSURE: 116 MMHG | BODY MASS INDEX: 26.22 KG/M2 | HEART RATE: 113 BPM | WEIGHT: 173 LBS | DIASTOLIC BLOOD PRESSURE: 70 MMHG | OXYGEN SATURATION: 96 % | HEIGHT: 68 IN

## 2022-08-16 DIAGNOSIS — F17.211 CIGARETTE NICOTINE DEPENDENCE IN REMISSION: ICD-10-CM

## 2022-08-16 DIAGNOSIS — J43.2 CENTRILOBULAR EMPHYSEMA (HCC): Primary | ICD-10-CM

## 2022-08-16 DIAGNOSIS — J96.11 CHRONIC HYPOXEMIC RESPIRATORY FAILURE (HCC): ICD-10-CM

## 2022-08-16 PROCEDURE — 99214 OFFICE O/P EST MOD 30 MIN: CPT | Performed by: INTERNAL MEDICINE

## 2022-08-16 RX ORDER — ALBUTEROL SULFATE 90 UG/1
2 AEROSOL, METERED RESPIRATORY (INHALATION) EVERY 6 HOURS PRN
Qty: 54 G | Refills: 3 | Status: SHIPPED | OUTPATIENT
Start: 2022-08-16

## 2022-08-16 NOTE — ASSESSMENT & PLAN NOTE
· Continues to appropriately use supplemental oxygen  · Last CMN was completed by primary care provider's office    Will ask [facility] to send future documentation request to me since I am managing his supplemental oxygen

## 2022-08-16 NOTE — PROGRESS NOTES
Progress note - Pulmonary Medicine   Waqas Reza Held 70 y o  male MRN: [id number]       Impression & Plan:     Centrilobular emphysema ([address] ) - severe  · Has had stable symptoms but certainly is affected by the heat and humidity   · Continues on  Trelegy Ellipta with good response to therapy  · Uses rescue albuterol when necessary  · Continue with present therapy    Chronic hypoxemic respiratory failure ([address] )  · Continues to appropriately use supplemental oxygen  · Last CMN was completed by primary care provider's office  Will ask [facility] to send future documentation request to me since I am managing his supplemental oxygen    Former smoker  Quit 2015  · Continues to meet lung cancer screening criteria with a greater than 70 pack year smoking history  · Quit smoking in 2015   · Next due for lung cancer screening surveillance in April 2023    Continue six-month follow-up  ______________________________________________________________________    HPI:    Sonu Doanmarco presents today for follow-up of emphysema is and chronic hypoxemic respiratory failure  He has been doing well  He reports no new symptoms  He takes Trelegy Ellipta daily and has good response to therapy  He does occasionally use the rescue albuterol if he is going to be doing anything strenuous  He is on supplemental oxygen he uses this continuously  He has a portable concentrator which he is using regularly  He has not had any issues with his oxygen therapy other than obtaining a certificate of medical necessity  I am managing his oxygen but his request for medical necessity documentation for was being sent to his primary care provider  He reports no other new symptoms  Appetite and weight are stable  No recent sick contacts or respiratory illness  Denies headache or visual change  No chest pain or cardiac complaints  He has not had leg swelling    No abdominal pain or GERD symptoms    Current Medications:    Current Outpatient Medications:     albuterol (PROVENTIL HFA,VENTOLIN HFA) 90 mcg/act inhaler, Inhale 2 puffs every 6 (six) hours as needed for shortness of breath, Disp: 54 g, Rfl: 3    atorvastatin (LIPITOR) 20 mg tablet, Take 1 tablet (20 mg total) by mouth daily, Disp: 90 tablet, Rfl: 1    cholecalciferol (VITAMIN D3) 1,000 units tablet, Take 2 tablets (2,000 Units total) by mouth daily for 2 days, Disp: 4 tablet, Rfl: 0    EPINEPHrine (EPIPEN) 0 3 mg/0 3 mL SOAJ, Inject 0 3 mL (0 3 mg total) into a muscle once for 1 dose, Disp: 0 6 mL, Rfl: 1    finasteride (PROSCAR) 5 mg tablet, Take 1 tablet (5 mg total) by mouth daily, Disp: 90 tablet, Rfl: 3    fluticasone-umeclidinium-vilanterol (TRELEGY) 100-62 5-25 MCG/INH inhaler, Inhale 1 puff daily Rinse mouth after use , Disp: 180 blister, Rfl: 3    guaiFENesin (MUCINEX) 600 mg 12 hr tablet, Take 1,200 mg by mouth 2 (two) times a day as needed, Disp: , Rfl:     hydrochlorothiazide (HYDRODIURIL) 25 mg tablet, Take 1 tablet (25 mg total) by mouth daily, Disp: 90 tablet, Rfl: 1    lisinopril (ZESTRIL) 10 mg tablet, Take 1 tablet (10 mg total) by mouth daily, Disp: 90 tablet, Rfl: 1    loratadine (CLARITIN REDITABS) 10 MG dissolvable tablet, Take 10 mg by mouth daily as needed for allergies  , Disp: , Rfl:     tamsulosin (Flomax) 0 4 mg, Take 1 capsule (0 4 mg total) by mouth daily with dinner, Disp: 90 capsule, Rfl: 3    Review of Systems:  Aside from what is mentioned in the HPI, the review of systems is otherwise negative    Past medical history, surgical history, and family history were reviewed and updated as appropriate    Social history updates:  Social History     Tobacco Use   Smoking Status Former Smoker    Packs/day: 1 50    Years: 48 00    Pack years: 72 00    Types: Cigarettes    Start date: 5    Quit date: 2015    Years since quittin 7   Smokeless Tobacco Never Used       PhysicalExamination:  Vitals:   /70 (BP Location: Left arm, Patient Position: Sitting, Cuff Size: Standard)   Pulse (!) 113   Temp 98 3 °F (36 8 °C) (Tympanic)   Ht 5' 8" (1 727 m)   Wt 78 5 kg (173 lb)   SpO2 96%   BMI 26 30 kg/m²     Gen:  Appears comfortable on nasal cannula  HEENT:  Conjugate gaze  Sclera anicteric   Oropharynx is clear, moist  Neck: Supple  There is no JVD, lymphadenopathy or thyromegaly  Trachea is midline  Chest:  Chest excursion symmetric  Lungs are hyperinflated  Breath sounds are distant bilaterally but otherwise clear  Hyper-resonant to percussion  Cardiac:  Regular  There are no murmurs  Abdomen:  Benign  Extremities: No clubbing, cyanosis or edema  Neurologic:  Normal speech and mentation  Skin: No appreciable rashes  Diagnostic Data:  Labs: I personally reviewed the most recent laboratory data pertinent to today's visit    Lab Results   Component Value Date    WBC 9 26 04/13/2022    HGB 13 7 04/13/2022    HCT 43 9 04/13/2022    MCV 92 04/13/2022     04/13/2022     Lab Results   Component Value Date    SODIUM 139 04/13/2022    K 4 6 04/13/2022    CO2 35 (H) 04/13/2022     04/13/2022    BUN 15 04/13/2022    CREATININE 1 25 04/13/2022    CALCIUM 9 8 04/13/2022       Imaging:  I personally reviewed the images on the [facility] system pertinent to today's visit  Lung cancer screening CT scan from April shows a few small pulmonary nodules which are unchanged from prior evaluations    He can continue with lung cancer screening with next CT due in April 2023    Sam Sales MD

## 2022-08-16 NOTE — ASSESSMENT & PLAN NOTE
· Has had stable symptoms but certainly is affected by the heat and humidity   · Continues on  Trelegy Ellipta with good response to therapy  · Uses rescue albuterol when necessary  · Continue with present therapy

## 2022-08-16 NOTE — ASSESSMENT & PLAN NOTE
· Continues to meet lung cancer screening criteria with a greater than 70 pack year smoking history    · Quit smoking in 2015   · Next due for lung cancer screening surveillance in April 2023

## 2022-11-11 ENCOUNTER — RA CDI HCC (OUTPATIENT)
Dept: OTHER | Facility: HOSPITAL | Age: 72
End: 2022-11-11

## 2022-11-11 NOTE — PROGRESS NOTES
[address]  coding opportunities       Chart reviewed, no opportunity found: CHART REVIEWED, NO OPPORTUNITY FOUND        Patients Insurance     Medicare Insurance: Medicare

## 2022-11-15 ENCOUNTER — APPOINTMENT (OUTPATIENT)
Dept: LAB | Facility: IMAGING CENTER | Age: 72
End: 2022-11-15

## 2022-11-15 DIAGNOSIS — E78.2 MIXED HYPERLIPIDEMIA: ICD-10-CM

## 2022-11-15 DIAGNOSIS — I10 HYPERTENSION, ESSENTIAL: ICD-10-CM

## 2022-11-15 DIAGNOSIS — R97.20 ELEVATED PSA: ICD-10-CM

## 2022-11-15 LAB
ALBUMIN SERPL BCP-MCNC: 3.9 G/DL (ref 3.5–5)
ALP SERPL-CCNC: 86 U/L (ref 46–116)
ALT SERPL W P-5'-P-CCNC: 20 U/L (ref 12–78)
ANION GAP SERPL CALCULATED.3IONS-SCNC: 5 MMOL/L (ref 4–13)
AST SERPL W P-5'-P-CCNC: 16 U/L (ref 5–45)
BASOPHILS # BLD AUTO: 0.06 THOUSANDS/ÂΜL (ref 0–0.1)
BASOPHILS NFR BLD AUTO: 1 % (ref 0–1)
BILIRUB SERPL-MCNC: 0.53 MG/DL (ref 0.2–1)
BUN SERPL-MCNC: 18 MG/DL (ref 5–25)
CALCIUM SERPL-MCNC: 9.5 MG/DL (ref 8.3–10.1)
CHLORIDE SERPL-SCNC: 102 MMOL/L (ref 96–108)
CHOLEST SERPL-MCNC: 140 MG/DL
CO2 SERPL-SCNC: 32 MMOL/L (ref 21–32)
CREAT SERPL-MCNC: 1.21 MG/DL (ref 0.6–1.3)
EOSINOPHIL # BLD AUTO: 0.26 THOUSAND/ÂΜL (ref 0–0.61)
EOSINOPHIL NFR BLD AUTO: 3 % (ref 0–6)
ERYTHROCYTE [DISTWIDTH] IN BLOOD BY AUTOMATED COUNT: 12.2 % (ref 11.6–15.1)
GFR SERPL CREATININE-BSD FRML MDRD: 59 ML/MIN/1.73SQ M
GLUCOSE P FAST SERPL-MCNC: 113 MG/DL (ref 65–99)
HCT VFR BLD AUTO: 42.6 % (ref 36.5–49.3)
HDLC SERPL-MCNC: 59 MG/DL
HGB BLD-MCNC: 12.9 G/DL (ref 12–17)
IMM GRANULOCYTES # BLD AUTO: 0.04 THOUSAND/UL (ref 0–0.2)
IMM GRANULOCYTES NFR BLD AUTO: 0 % (ref 0–2)
LDLC SERPL CALC-MCNC: 66 MG/DL (ref 0–100)
LYMPHOCYTES # BLD AUTO: 1.77 THOUSANDS/ÂΜL (ref 0.6–4.47)
LYMPHOCYTES NFR BLD AUTO: 20 % (ref 14–44)
MCH RBC QN AUTO: 28.9 PG (ref 26.8–34.3)
MCHC RBC AUTO-ENTMCNC: 30.3 G/DL (ref 31.4–37.4)
MCV RBC AUTO: 96 FL (ref 82–98)
MONOCYTES # BLD AUTO: 0.66 THOUSAND/ÂΜL (ref 0.17–1.22)
MONOCYTES NFR BLD AUTO: 7 % (ref 4–12)
NEUTROPHILS # BLD AUTO: 6.17 THOUSANDS/ÂΜL (ref 1.85–7.62)
NEUTS SEG NFR BLD AUTO: 69 % (ref 43–75)
NRBC BLD AUTO-RTO: 0 /100 WBCS
PLATELET # BLD AUTO: 308 THOUSANDS/UL (ref 149–390)
PMV BLD AUTO: 11.1 FL (ref 8.9–12.7)
POTASSIUM SERPL-SCNC: 4.7 MMOL/L (ref 3.5–5.3)
PROT SERPL-MCNC: 7.7 G/DL (ref 6.4–8.4)
PSA SERPL-MCNC: 0.5 NG/ML (ref 0–4)
RBC # BLD AUTO: 4.46 MILLION/UL (ref 3.88–5.62)
SODIUM SERPL-SCNC: 139 MMOL/L (ref 135–147)
TRIGL SERPL-MCNC: 75 MG/DL
WBC # BLD AUTO: 8.96 THOUSAND/UL (ref 4.31–10.16)

## 2022-11-18 ENCOUNTER — OFFICE VISIT (OUTPATIENT)
Dept: INTERNAL MEDICINE CLINIC | Facility: OTHER | Age: 72
End: 2022-11-18

## 2022-11-18 VITALS
WEIGHT: 176.8 LBS | BODY MASS INDEX: 26.8 KG/M2 | HEART RATE: 80 BPM | HEIGHT: 68 IN | DIASTOLIC BLOOD PRESSURE: 70 MMHG | OXYGEN SATURATION: 98 % | SYSTOLIC BLOOD PRESSURE: 112 MMHG | TEMPERATURE: 97.3 F

## 2022-11-18 DIAGNOSIS — I10 ESSENTIAL HYPERTENSION: ICD-10-CM

## 2022-11-18 DIAGNOSIS — R91.1 LUNG NODULE: ICD-10-CM

## 2022-11-18 DIAGNOSIS — I10 HYPERTENSION, ESSENTIAL: ICD-10-CM

## 2022-11-18 DIAGNOSIS — Z87.891 HISTORY OF TOBACCO ABUSE: ICD-10-CM

## 2022-11-18 DIAGNOSIS — Z71.85 VACCINE COUNSELING: ICD-10-CM

## 2022-11-18 DIAGNOSIS — J96.11 CHRONIC HYPOXEMIC RESPIRATORY FAILURE (HCC): Primary | ICD-10-CM

## 2022-11-18 DIAGNOSIS — I72.3 BILATERAL ILIAC ARTERY ANEURYSM (HCC): ICD-10-CM

## 2022-11-18 DIAGNOSIS — E78.2 MIXED HYPERLIPIDEMIA: ICD-10-CM

## 2022-11-18 DIAGNOSIS — N18.2 CKD (CHRONIC KIDNEY DISEASE) STAGE 2, GFR 60-89 ML/MIN: ICD-10-CM

## 2022-11-18 RX ORDER — HYDROCHLOROTHIAZIDE 25 MG/1
25 TABLET ORAL DAILY
Qty: 90 TABLET | Refills: 1 | Status: SHIPPED | OUTPATIENT
Start: 2022-11-18

## 2022-11-18 RX ORDER — ATORVASTATIN CALCIUM 20 MG/1
20 TABLET, FILM COATED ORAL DAILY
Qty: 90 TABLET | Refills: 1 | Status: SHIPPED | OUTPATIENT
Start: 2022-11-18

## 2022-11-18 RX ORDER — LISINOPRIL 10 MG/1
10 TABLET ORAL DAILY
Qty: 90 TABLET | Refills: 1 | Status: SHIPPED | OUTPATIENT
Start: 2022-11-18

## 2022-11-18 NOTE — PATIENT INSTRUCTIONS
Chronic hypoxemic respiratory failure (HCC)  Breathing stable at this time  Following with pulmonology  No change in breathing medications at this time  Continue with supplemental oxygen  Recommend COVID booster as discussed below  Although patient does have a history of tobacco abuse which he stop smoking in 2015, he does have extensive environmental exposure which stems from tours in both [facility] as well as [place]   Patient currently following with pulmonology through [address] and is not currently following with the [facility] recommend he consider seeking evaluation had [facility] due to his previous [facility] service and underlying lung disease and decreased hearing  Patient is somewhat apprehensive but will consider  Continue to follow with pulmonology  Bilateral iliac artery aneurysm [facility])  Following with vascular has follow-up vascular studies scheduled  Keep our office informed of any change in treatment plan with vascular    Essential hypertension  Blood pressure well controlled at this time on medications  No dose change  Continue hydrochlorothiazide and lisinopril  CKD (chronic kidney disease) stage 2, GFR 60-89 ml/min  Lab Results   Component Value Date    EGFR 59 11/15/2022    EGFR 57 04/13/2022    EGFR 66 12/06/2021    CREATININE 1 21 11/15/2022    CREATININE 1 25 04/13/2022    CREATININE 1 11 12/06/2021       Stable at this time  Hyperlipidemia  Stable at this time on lipitor  Lipids show improvement  Will continue to follow  Prediabetes  Continue to follow glucose  Discussed low card diet

## 2022-11-18 NOTE — ASSESSMENT & PLAN NOTE
Lab Results   Component Value Date    EGFR 59 11/15/2022    EGFR 57 04/13/2022    EGFR 66 12/06/2021    CREATININE 1 21 11/15/2022    CREATININE 1 25 04/13/2022    CREATININE 1 11 12/06/2021       Stable at this time

## 2022-11-18 NOTE — ASSESSMENT & PLAN NOTE
Following with vascular has follow-up vascular studies scheduled    Keep our office informed of any change in treatment plan with vascular

## 2022-11-18 NOTE — PROGRESS NOTES
[address] [facility]  Standard Office Visit  Patient ID: Waqas Reza Held    : 1950  Age/Gender: 70 y o  male     DATE: 2022      Assessment/Plan:    Chronic hypoxemic respiratory failure (HCC)  Breathing stable at this time  Following with pulmonology  No change in breathing medications at this time  Continue with supplemental oxygen  Recommend COVID booster as discussed below  Although patient does have a history of tobacco abuse which he stop smoking in , he does have extensive environmental exposure which stems from tours in both [facility] as well as [place]   Patient currently following with pulmonology through [facility] and is not currently following with the  [address]  Did recommend he consider seeking evaluation had [facility] due to his previous [employer] service and underlying lung disease and decreased hearing  Patient is somewhat apprehensive but will consider  Continue to follow with pulmonology  Bilateral iliac artery aneurysm [facility])  Following with vascular has follow-up vascular studies scheduled  Keep our office informed of any change in treatment plan with vascular    Essential hypertension  Blood pressure well controlled at this time on medications  No dose change  Continue hydrochlorothiazide and lisinopril  CKD (chronic kidney disease) stage 2, GFR 60-89 ml/min  Lab Results   Component Value Date    EGFR 59 11/15/2022    EGFR 57 2022    EGFR 66 2021    CREATININE 1 21 11/15/2022    CREATININE 1 25 2022    CREATININE 1 11 2021       Stable at this time  Hyperlipidemia  Stable at this time on lipitor  Lipids show improvement  Will continue to follow  Prediabetes  Continue to follow glucose  Discussed low card diet           Diagnoses and all orders for this visit:    Chronic hypoxemic respiratory failure (HCC)    Hypertension, essential  -     hydrochlorothiazide (HYDRODIURIL) 25 mg tablet; Take 1 tablet (25 mg total) by mouth daily  -     Comprehensive metabolic panel; Future    Mixed hyperlipidemia  -     atorvastatin (LIPITOR) 20 mg tablet; Take 1 tablet (20 mg total) by mouth daily  -     Comprehensive metabolic panel; Future  -     Lipid Panel with Direct LDL reflex; Future    Essential hypertension  -     lisinopril (ZESTRIL) 10 mg tablet; Take 1 tablet (10 mg total) by mouth daily    Bilateral iliac artery aneurysm (HCC)    CKD (chronic kidney disease) stage 2, GFR 60-89 ml/min    Vaccine counseling  Comments:  Recommend bivalent COVID booster  Lung nodule  -     CT lung screening program; Future    History of tobacco abuse  -     CT lung screening program; Future          BMI Counseling: Body mass index is 26 88 kg/m²  The BMI is above normal  Nutrition recommendations include encouraging healthy choices of fruits and vegetables, moderation in carbohydrate intake, increasing intake of lean protein and reducing intake of cholesterol  Rationale for BMI follow-up plan is due to patient being overweight or obese  Depression Screening and Follow-up Plan: Patient was screened for depression during today's encounter  They screened negative with a PHQ-2 score of 0  Lung Cancer Screening Shared Decision Making: I discussed with him that he is a candidate for lung cancer CT screening  The following Shared Decision-Making points were covered:  1  Benefits of screening were discussed, including the rates of reduction in death from lung cancer and other causes  Harms of screening were reviewed, including false positive tests, radiation exposure levels, risks of invasive procedures, risks of complications of screening, and risk of overdiagnosis  2  I counseled on the importance of adherence to annual lung cancer LDCT screening, impact of co-morbidities, and ability or willingness to undergo diagnosis and treatment    3  I counseled on the importance of maintaining abstinence as a former smoker or was counseled on the importance of smoking cessation if a current smoker    Review of Eligibility Criteria: He meets all of the criteria for Lung Cancer Screening    - He is 70 y o    - He has 20 pack year tobacco history and is a current smoker or has quit within the past 15 years  - He presents no signs or symptoms of lung cancer    After discussion, the patient decided to elect lung cancer screening  Subjective:   Chief Complaint   Patient presents with   • Follow-up     Pt is here for a follow up   Review labs  Sara Reynolds is a 70 y o  male who presents to the office on 11/18/2022 for       70-year-old male with a history of tobacco abuse, iliac artery aneurysm, chronic hypoxic respiratory failure, hypertension, supplemental oxygen dependent, CKD, dyslipidemia presents to the office for chronic condition follow-up  At this time patient has no complaints or concerns other than a slight runny nose which he attributes to his supplemental oxygen  Using 2 L of supplemental oxygen at home  Has a concentrator that he uses in the household on a portable oxygen concentrator when he goes out of the home  Taking all medications as directed  This time feeling well  No current complaints or concerns  Recently had laboratory studies and is here today to review the lab results  Patient's wife reports he has been drinking more soda as of late and he is trying to cut back on drinking soda  Patient does report he does have some frustration with some of his limitations secondary to his pulmonary symptoms  Not able to do as many things as he used to in the past   Has limitations to some of his activities as well as fishing  Reports that he is starting to get used to this  Wife and family are supportive  Patient denies any aches or pains at this time  Patient does not follow with [facility]    He does have a significant history for time spent both in [facility] during the [place] as well as in the [place] (both while serving in [employer])      The following portions of the patient's history were reviewed and updated as appropriate: allergies, current medications, past family history, past medical history, past social history, past surgical history and problem list     Review of Systems   Constitutional: Negative for chills, fever and unexpected weight change  HENT: Negative for ear pain and sore throat  Eyes: Negative for visual disturbance  Uses glasses  Respiratory: Positive for cough (Patient does have some a m  Cough  Improved since started Mucinex  Chronic  Unchanged  )  Negative for shortness of breath and wheezing ( has not needed rescue inhaler  )  Chronic stable unchanged  Follow-up with pulmonology  Cardiovascular: Negative for chest pain and palpitations  Gastrointestinal: Negative for abdominal pain, blood in stool, constipation, diarrhea, nausea and vomiting  Patient has had colonoscopy in the past   Denies GI symptoms at this time  Genitourinary: Negative for dysuria, frequency and hematuria  Follows with Urology  Recent PSA  Musculoskeletal: Negative for arthralgias and back pain  Neurological: Negative for dizziness and light-headedness  All other systems reviewed and are negative          Patient Active Problem List   Diagnosis   • Hyperlipidemia   • Centrilobular emphysema (HCC) - severe   • Bilateral iliac artery aneurysm (HCC)   • Arterial ectasia (HCC)   • Diverticulosis, sigmoid   • Hearing loss   • Hemorrhoids   • Lung nodule   • Prediabetes   • Skin disorder   • Benign prostatic hyperplasia without lower urinary tract symptoms   • Essential hypertension   • Seasonal allergies   • Medicare annual wellness visit, subsequent   • Other microscopic hematuria   • Other fatigue   • NSVT (nonsustained ventricular tachycardia)   • Abnormal ECG during exercise stress test   • Prominent popliteal pulse   • Chronic hypoxemic respiratory failure ([facility] [address] )   • Former smoker  Quit 2015  • CKD (chronic kidney disease) stage 2, GFR 60-89 ml/min       Past Medical History:   Diagnosis Date   • Allergic 1/1/1990    Bee stings and penicillin   • BPH (benign prostatic hyperplasia)    • COPD (chronic obstructive pulmonary disease) ([facility])     Last Assessed:11/14/2016   • Diverticulosis    • Elevated prostate specific antigen (PSA)    • Emphysema (subcutaneous) (surgical) resulting from a procedure    • Enlarged prostate with lower urinary tract symptoms (LUTS)     Resolved:8/22/2017   • Hernia, inguinal, right    • Hypertension 5/1/2019    Visit with Dash Torres showed slightly elevated bp  • Pneumonia due to COVID-19 virus 3/16/2021       Past Surgical History:   Procedure Laterality Date   • BICEPS TENDON REPAIR  1999   • COLONOSCOPY     • FRACTURE SURGERY  5/15/1988    Approx date  Rt index finger fractured     • HAND SURGERY      Last Assessed:7/11/2016   • HERNIA REPAIR      Last Assessed:7/11/2016   • IR CHEST TUBE CHECK/CHANGE/REPOSITION/UPSIZE  4/1/2021   • IR CHEST TUBE PLACEMENT  3/30/2021   • KNEE ARTHROSCOPY     • WA REPAIR ING HERNIA,5+Y/O,REDUCIBL Right 2/23/2016    Procedure: REPAIR HERNIA INGUINAL with mesh;  Surgeon: Tere Novoa DO;  Location: [facility];  Service: General   • PROSTATE BIOPSY     • WISDOM TOOTH EXTRACTION           Current Outpatient Medications:   •  albuterol (PROVENTIL HFA,VENTOLIN HFA) 90 mcg/act inhaler, Inhale 2 puffs every 6 (six) hours as needed for shortness of breath, Disp: 54 g, Rfl: 3  •  atorvastatin (LIPITOR) 20 mg tablet, Take 1 tablet (20 mg total) by mouth daily, Disp: 90 tablet, Rfl: 1  •  cholecalciferol (VITAMIN D3) 1,000 units tablet, Take 2 tablets (2,000 Units total) by mouth daily for 2 days, Disp: 4 tablet, Rfl: 0  •  EPINEPHrine (EPIPEN) 0 3 mg/0 3 mL SOAJ, Inject 0 3 mL (0 3 mg total) into a muscle once for 1 dose, Disp: 0 6 mL, Rfl: 1  •  finasteride (PROSCAR) 5 mg tablet, Take 1 tablet (5 mg total) by mouth daily, Disp: 90 tablet, Rfl: 3  •  fluticasone-umeclidinium-vilanterol (TRELEGY) 100-62 5-25 MCG/INH inhaler, Inhale 1 puff daily Rinse mouth after use , Disp: 180 blister, Rfl: 3  •  guaiFENesin (MUCINEX) 600 mg 12 hr tablet, Take 1,200 mg by mouth in the morning, Disp: , Rfl:   •  hydrochlorothiazide (HYDRODIURIL) 25 mg tablet, Take 1 tablet (25 mg total) by mouth daily, Disp: 90 tablet, Rfl: 1  •  lisinopril (ZESTRIL) 10 mg tablet, Take 1 tablet (10 mg total) by mouth daily, Disp: 90 tablet, Rfl: 1  •  loratadine (CLARITIN REDITABS) 10 MG dissolvable tablet, Take 10 mg by mouth daily as needed for allergies  , Disp: , Rfl:   •  tamsulosin (Flomax) 0 4 mg, Take 1 capsule (0 4 mg total) by mouth daily with dinner, Disp: 90 capsule, Rfl: 3    Allergies   Allergen Reactions   • Bee Venom Facial Swelling   • Other Rash     [facility] - [id number]: mushrooms   • Penicillins Rash       Social History     Socioeconomic History   • Marital status: /Civil Union     Spouse name: None   • Number of children: None   • Years of education: None   • Highest education level: None   Occupational History   • None   Tobacco Use   • Smoking status: Former     Packs/day: 1 50     Years: 48 00     Pack years: 72 00     Types: Cigarettes     Start date: 5     Quit date: 2015     Years since quittin 0   • Smokeless tobacco: Never   Vaping Use   • Vaping Use: Never used   Substance and Sexual Activity   • Alcohol use:  Yes     Alcohol/week: 0 0 standard drinks     Comment: rare   • Drug use: No   • Sexual activity: Yes     Partners: Female     Birth control/protection: Male Sterilization, Female Sterilization   Other Topics Concern   • None   Social History Narrative    Advance directive on file     Social Determinants of Health     Financial Resource Strain: Not on file   Food Insecurity: Not on file   Transportation Needs: Not on file   Physical Activity: Not on file   Stress: Not on file Social Connections: Not on file   Intimate Partner Violence: Not on file   Housing Stability: Not on file       Family History   Problem Relation Age of Onset   • Hypertension Mother        PHQ-2/9 Depression Screening    Little interest or pleasure in doing things: 0 - not at all  Feeling down, depressed, or hopeless: 0 - not at all  PHQ-2 Score: 0  PHQ-2 Interpretation: Negative depression screen         Health Maintenance   Topic Date Due   • SLP PLAN OF CARE  Never done   • Hepatitis A Vaccine (1 of 2 - Risk 2-dose series) Never done   • Hepatitis B Vaccine (1 of 3 - Risk 3-dose series) Never done   • BMI: Followup Plan  03/04/2023   • Fall Risk  03/04/2023   • Medicare Annual Wellness Visit (AWV)  03/04/2023   • Lung Cancer Screening  04/06/2023   • Depression Screening  11/18/2023   • BMI: Adult  11/18/2023   • Hepatitis C Screening  Completed   • Pneumococcal Vaccine: 65+ Years  Completed   • Influenza Vaccine  Completed   • COVID-19 Vaccine  Completed   • HIB Vaccine  Aged Out   • IPV Vaccine  Aged Out   • Meningococcal ACWY Vaccine  Aged Out   • HPV Vaccine  Aged Out   • Colorectal Cancer Screening  Discontinued       Immunization History   Administered Date(s) Administered   • COVID-19 PFIZER VACCINE 0 3 ML IM 04/25/2021, 05/16/2021, 11/19/2021   • COVID-19 Pfizer vac (Jorge-sucrose, gray cap) 12 yr+ IM 07/23/2022   • DTaP 09/29/2020   • INFLUENZA 11/01/2006, 09/28/2007, 11/01/2016, 10/10/2017, 09/28/2020, 10/25/2021   • Influenza Split High Dose Preservative Free IM 11/01/2016, 10/10/2017   • Influenza, high dose seasonal 0 7 mL 10/19/2018, 10/03/2019, 09/28/2020   • Pneumococcal Conjugate 13-Valent 07/11/2016, 06/12/2019   • Pneumococcal Polysaccharide PPV23 10/10/2017   • Tdap 09/11/2020   • Zoster Vaccine Recombinant 03/28/2019, 06/12/2019        Objective:  Vitals:    11/18/22 1318   BP: 112/70   BP Location: Left arm   Patient Position: Sitting   Cuff Size: Adult   Pulse: 80   Temp: (!) 97 3 °F (36 3 °C)   TempSrc: Tympanic   SpO2: 98%   Weight: 80 2 kg (176 lb 12 8 oz)   Height: 5' 8" (1 727 m)     Wt Readings from Last 3 Encounters:   11/18/22 80 2 kg (176 lb 12 8 oz)   08/16/22 78 5 kg (173 lb)   07/22/22 78 6 kg (173 lb 3 2 oz)     Body mass index is 26 88 kg/m²  No results found  Physical Exam  Vitals and nursing note reviewed  Constitutional:       General: He is not in acute distress  Appearance: He is well-developed, normal weight and well-nourished  He is not toxic-appearing or diaphoretic  Comments: Alert pleasant cooperative  Seated in room in no acute distress  Wife present  Supplemental oxygen nasal cannula in place with portable oxygen concentrator   HENT:      Head: Normocephalic and atraumatic  Mouth/Throat:      Mouth: Mucous membranes are moist       Pharynx: No oropharyngeal exudate or posterior oropharyngeal erythema  Eyes:      General: No scleral icterus  Right eye: No discharge  Left eye: No discharge  Pupils: Pupils are equal, round, and reactive to light  Cardiovascular:      Rate and Rhythm: Normal rate and regular rhythm  Heart sounds: Normal heart sounds  No murmur heard  Pulmonary:      Effort: Pulmonary effort is normal  No respiratory distress  Breath sounds: Normal breath sounds  No wheezing or rales  Comments: Slight decreased breath sounds bilaterally  Symmetric  No crackles no rhonchi no wheeze  No respiratory distress  Abdominal:      General: Bowel sounds are normal  There is no distension  Palpations: Abdomen is soft  Tenderness: There is no abdominal tenderness  There is no guarding  Musculoskeletal:         General: No edema  Cervical back: Neck supple  Right lower leg: No edema  Left lower leg: No edema  Skin:     General: Skin is warm and dry  Neurological:      General: No focal deficit present  Mental Status: He is alert     Psychiatric:         Mood and Affect: Mood and affect and mood normal          Behavior: Behavior normal          Thought Content: Thought content normal              Future Appointments   Date Time Provider Isacc Marilia   1/31/2023  2:30 PM EVAN Guerra [facility]   3/17/2023  1:00 PM Seng Benz PA-C [address] [place] [address]   4/5/2023 10:00 AM [facility] [address]   4/5/2023 11:00 AM [facility] [address]       Segn Benz PA-C   [address]    Patient Care Team:  Seng Benz PA-C as PCP - General (Internal Medicine)  MD Yareli Loaiza MD (Urology)  Vin Griffin (Vascular Surgery)  Tino Glass MD as Consulting Physician (Pulmonary Disease)  Anshu Frey MD (Colon and Rectal Surgery)    This note was completed in part utilizing M-Modal Fluency Direct Software  Grammatical errors, random word insertions, spelling mistakes, and incomplete sentences can be an occasional consequence of this system secondary to software limitations, ambient noise, and hardware issues  If you have any questions or concerns about the content, text, or information contained within the body of this dictation, please contact the provider for clarification

## 2022-11-18 NOTE — ASSESSMENT & PLAN NOTE
Blood pressure well controlled at this time on medications  No dose change  Continue hydrochlorothiazide and lisinopril

## 2022-11-18 NOTE — ASSESSMENT & PLAN NOTE
Breathing stable at this time  Following with pulmonology  No change in breathing medications at this time  Continue with supplemental oxygen  Recommend COVID booster as discussed below  Although patient does have a history of tobacco abuse which he stop smoking in 2015, he does have extensive environmental exposure which stems from tours in both [facility] as well as [place]   Patient currently following with pulmonology through [facility]  and is not currently following with the [facility] recommend he consider seeking evaluation had VA due to his previous [employer] service and underlying lung disease and decreased hearing  Patient is somewhat apprehensive but will consider  Continue to follow with pulmonology

## 2023-02-16 DIAGNOSIS — J43.2 CENTRILOBULAR EMPHYSEMA (HCC): ICD-10-CM

## 2023-02-16 RX ORDER — FLUTICASONE FUROATE, UMECLIDINIUM BROMIDE AND VILANTEROL TRIFENATATE 100; 62.5; 25 UG/1; UG/1; UG/1
POWDER RESPIRATORY (INHALATION)
Qty: 360 BLISTER | Refills: 3 | Status: SHIPPED | OUTPATIENT
Start: 2023-02-16

## 2023-02-23 ENCOUNTER — TELEMEDICINE (OUTPATIENT)
Dept: INTERNAL MEDICINE CLINIC | Facility: OTHER | Age: 73
End: 2023-02-23

## 2023-02-23 VITALS
HEIGHT: 68 IN | OXYGEN SATURATION: 96 % | SYSTOLIC BLOOD PRESSURE: 104 MMHG | BODY MASS INDEX: 26.67 KG/M2 | HEART RATE: 102 BPM | WEIGHT: 176 LBS | DIASTOLIC BLOOD PRESSURE: 65 MMHG

## 2023-02-23 DIAGNOSIS — J44.1 COPD EXACERBATION (HCC): Primary | ICD-10-CM

## 2023-02-23 RX ORDER — PREDNISONE 20 MG/1
40 TABLET ORAL DAILY
Qty: 10 TABLET | Refills: 0 | Status: SHIPPED | OUTPATIENT
Start: 2023-02-23 | End: 2023-02-28

## 2023-02-23 NOTE — PROGRESS NOTES
Virtual Regular Visit    Verification of patient location:    Patient is located in the following state in which I hold an active license [place]      Assessment/Plan:    Problem List Items Addressed This Visit        Respiratory    COPD exacerbation ([facility] [address] ) - Primary     - onset 1 week ago, symptoms improving  - start prednisone 40mg daily x 5 days   - will hold off on abx as symptoms are improving   - continue mucinex bid   - continue trelegy and albuterol   - follow up in office for any worsening symptoms          Relevant Medications    predniSONE 20 mg tablet            Reason for visit is   Chief Complaint   Patient presents with   • Virtual Brief Visit     Acute codp, cough and sinus  No fever  Feels sick since last Friday  Patient states he had an upset stomach and very dry throat which made his cough- cough is productive  Has been using a decongestant (claritan) and mucinex  Patient has not gotten his lung cancer screening done as of yet  • COPD   • Hypertension   • Virtual Regular Visit        Encounter provider EVAN Donato    Provider located at [address] [place] [phone]      Recent Visits  No visits were found meeting these conditions  Showing recent visits within past 7 days and meeting all other requirements  Today's Visits  Date Type Provider Dept   02/23/23 Telemedicine EVAN Donato [facility]   Showing today's visits and meeting all other requirements  Future Appointments  No visits were found meeting these conditions  Showing future appointments within next 150 days and meeting all other requirements       The patient was identified by name and date of birth  Linette Lorenzo was informed that this is a telemedicine visit and that the visit is being conducted through the [facility]  He agrees to proceed     My office door was closed  No one else was in the room  He acknowledged consent and understanding of privacy and security of the video platform  The patient has agreed to participate and understands they can discontinue the visit at any time  Patient is aware this is a billable service  Subjective  Lisa Trevizo is a 67 y o  male presents today with COPD symptoms  Onset was 1 week ago  He started with upset stomach which since resolved  He states he had a very dry throat which was causing him to have a frequent, productive cough  He states he is able to clear the mucous without difficulty  He continues with a mild dry throat which is improved  Cough is improving, it is productive  Sputum is yellow in color  He has chronic SOB, at baseline  He denies any wheezing  He denies any chest tightness  No fevers  His home covid test was negative yesterday  He is using his trelegy and has been using his albuterol 2 times a day  He is on 2L supplemental O2 which is at baseline for him  HPI     Past Medical History:   Diagnosis Date   • Allergic 1/1/1990    Bee stings and penicillin   • BPH (benign prostatic hyperplasia)    • COPD (chronic obstructive pulmonary disease) ([facility])     Last Assessed:11/14/2016   • Diverticulosis    • Elevated prostate specific antigen (PSA)    • Emphysema (subcutaneous) (surgical) resulting from a procedure    • Enlarged prostate with lower urinary tract symptoms (LUTS)     Resolved:8/22/2017   • Hernia, inguinal, right    • Hypertension 5/1/2019    Visit with Marie Mass showed slightly elevated bp  • Pneumonia due to COVID-19 virus 3/16/2021       Past Surgical History:   Procedure Laterality Date   • BICEPS TENDON REPAIR  1999   • COLONOSCOPY     • FRACTURE SURGERY  5/15/1988    Approx date  Rt index finger fractured     • HAND SURGERY      Last Assessed:7/11/2016   • HERNIA REPAIR      Last Assessed:7/11/2016   • IR CHEST TUBE CHECK/CHANGE/REPOSITION/UPSIZE  4/1/2021   • IR CHEST TUBE PLACEMENT 3/30/2021   • KNEE ARTHROSCOPY     • [place] RPR 1ST INGUN HRNA AGE 5 YRS/> REDUCIBLE Right 2/23/2016    Procedure: REPAIR HERNIA INGUINAL with mesh;  Surgeon: Chapis Rivera DO;  Location: [facility];  Service: General   • PROSTATE BIOPSY     • WISDOM TOOTH EXTRACTION         Current Outpatient Medications   Medication Sig Dispense Refill   • albuterol (PROVENTIL HFA,VENTOLIN HFA) 90 mcg/act inhaler Inhale 2 puffs every 6 (six) hours as needed for shortness of breath 54 g 3   • atorvastatin (LIPITOR) 20 mg tablet Take 1 tablet (20 mg total) by mouth daily 90 tablet 1   • cholecalciferol (VITAMIN D3) 1,000 units tablet Take 2 tablets (2,000 Units total) by mouth daily for 2 days 4 tablet 0   • EPINEPHrine (EPIPEN) 0 3 mg/0 3 mL SOAJ Inject 0 3 mL (0 3 mg total) into a muscle once for 1 dose 0 6 mL 1   • finasteride (PROSCAR) 5 mg tablet Take 1 tablet (5 mg total) by mouth daily 90 tablet 3   • guaiFENesin (MUCINEX) 600 mg 12 hr tablet Take 1,200 mg by mouth in the morning     • hydrochlorothiazide (HYDRODIURIL) 25 mg tablet Take 1 tablet (25 mg total) by mouth daily 90 tablet 1   • lisinopril (ZESTRIL) 10 mg tablet Take 1 tablet (10 mg total) by mouth daily 90 tablet 1   • loratadine (CLARITIN REDITABS) 10 MG dissolvable tablet Take 10 mg by mouth daily as needed for allergies  • predniSONE 20 mg tablet Take 2 tablets (40 mg total) by mouth daily for 5 days 10 tablet 0   • tamsulosin (Flomax) 0 4 mg Take 1 capsule (0 4 mg total) by mouth daily with dinner 90 capsule 3   • Trelegy Ellipta 100-62 5-25 MCG/ACT inhaler USE 1 INHALATION DAILY  RINSE MOUTH AFTER  blister 3     No current facility-administered medications for this visit  Allergies   Allergen Reactions   • Bee Venom Facial Swelling   • Other Rash     Annotation - [id number]: mushrooms   • Penicillins Rash       Review of Systems   Constitutional: Negative for chills and fever  HENT: Negative for sore throat (dry throat)      Respiratory: Positive for cough and shortness of breath (at baseline)  Negative for chest tightness and wheezing  Video Exam    Vitals:    02/23/23 1141   BP: 104/65   BP Location: Left arm   Patient Position: Sitting   Cuff Size: Adult   Pulse: 102   SpO2: 96%   Weight: 79 8 kg (176 lb)   Height: 5' 8" (1 727 m)       Physical Exam  Vitals reviewed  Constitutional:       General: He is not in acute distress  Appearance: Normal appearance  He is not diaphoretic  HENT:      Head: Normocephalic and atraumatic  Pulmonary:      Effort: No respiratory distress  Comments: - 2 L nasal cannula   - occasional cough  - no conversational dyspnea   Neurological:      Mental Status: He is alert  Mental status is at baseline     Psychiatric:         Mood and Affect: Mood normal          Behavior: Behavior normal           I spent 12 minutes directly with the patient during this visit

## 2023-02-23 NOTE — ASSESSMENT & PLAN NOTE
- onset 1 week ago, symptoms improving  - start prednisone 40mg daily x 5 days   - will hold off on abx as symptoms are improving   - continue mucinex bid   - continue trelegy and albuterol   - follow up in office for any worsening symptoms

## 2023-03-02 ENCOUNTER — OFFICE VISIT (OUTPATIENT)
Dept: PULMONOLOGY | Facility: CLINIC | Age: 73
End: 2023-03-02

## 2023-03-02 VITALS
OXYGEN SATURATION: 94 % | HEART RATE: 114 BPM | BODY MASS INDEX: 25.16 KG/M2 | SYSTOLIC BLOOD PRESSURE: 106 MMHG | WEIGHT: 166 LBS | HEIGHT: 68 IN | DIASTOLIC BLOOD PRESSURE: 64 MMHG

## 2023-03-02 DIAGNOSIS — J96.11 CHRONIC HYPOXEMIC RESPIRATORY FAILURE (HCC): ICD-10-CM

## 2023-03-02 DIAGNOSIS — F17.211 CIGARETTE NICOTINE DEPENDENCE IN REMISSION: ICD-10-CM

## 2023-03-02 DIAGNOSIS — J43.2 CENTRILOBULAR EMPHYSEMA (HCC): Primary | ICD-10-CM

## 2023-03-02 DIAGNOSIS — R91.1 LUNG NODULE: ICD-10-CM

## 2023-03-02 PROBLEM — J44.1 COPD EXACERBATION (HCC): Status: RESOLVED | Noted: 2023-02-23 | Resolved: 2023-03-02

## 2023-03-03 NOTE — ASSESSMENT & PLAN NOTE
· Controlled on Trelegy Ellipta  · I did give him samples of Breztri  He is concerned that the dry powder inhaler, Trelegy, may be causing more dry mouth  If Rachel Walker is better in this regard and provides similar symptom control, will have him switch to this medication    It is covered on his formulary  · Continue rescue albuterol

## 2023-03-03 NOTE — ASSESSMENT & PLAN NOTE
· 2 mm left lower lobe nodule remained stable on most recent imaging April 2022  · Needs to continue with lung cancer screening, next screening study should be performed April 2023    He is agreeable to continued surveillance

## 2023-03-03 NOTE — PROGRESS NOTES
Progress note - Pulmonary Medicine   Raheem Jj Held 67 y o  male MRN: [id number]       Impression & Plan:     Centrilobular emphysema ([address] ) - severe  · Controlled on Trelegy Ellipta  · I did give him samples of Breztri  He is concerned that the dry powder inhaler, Trelegy, may be causing more dry mouth  If Radha Caper is better in this regard and provides similar symptom control, will have him switch to this medication  It is covered on his formulary  · Continue rescue albuterol    Chronic hypoxemic respiratory failure ([address] )  · He is on supplemental oxygen and uses this regularly at 2 L  · Continue supplemental oxygen    Lung nodule  · 2 mm left lower lobe nodule remained stable on most recent imaging April 2022  · Needs to continue with lung cancer screening, next screening study should be performed April 2023  He is agreeable to continued surveillance    Former smoker  Quit 2015  · Has maintain tobacco abstinence  · Continue lung cancer screening annually      Follow-up in 6 months  I will be in contact with him with results of his CAT scan in April    I recommended he discuss the atypical neurologic episodes with his primary care provider and may need further work-up  ______________________________________________________________________    HPI:    Brigid Crow presents today for follow-up of emphysema with chronic hypoxemic respiratory failure  He has been doing well from a pulmonary standpoint  No increased cough, shortness of breath, wheezing, or signs or symptoms of COPD exacerbation  He continues to use Trelegy Ellipta but does report some dryness  He did start using the humidification bottle on his oxygen again during the winter months and this seems to have helped a little bit    He had stopped using humidifier during the summer months as it led to significant condensation in the tubing    He did also describe a recent witnessed episode by his wife where he had some facial sensation of coldness and had some shaking in his arms bilaterally  His wife reported that he was not answering her during this episode and it is entirely unclear what this represented  He does not carry history of seizure or stroke like episodes  Presenting symptoms seem very atypical   He reports that he has had this episode up to 3 times  Current Medications:    Current Outpatient Medications:   •  albuterol (PROVENTIL HFA,VENTOLIN HFA) 90 mcg/act inhaler, Inhale 2 puffs every 6 (six) hours as needed for shortness of breath, Disp: 54 g, Rfl: 3  •  atorvastatin (LIPITOR) 20 mg tablet, Take 1 tablet (20 mg total) by mouth daily, Disp: 90 tablet, Rfl: 1  •  finasteride (PROSCAR) 5 mg tablet, Take 1 tablet (5 mg total) by mouth daily, Disp: 90 tablet, Rfl: 3  •  guaiFENesin (MUCINEX) 600 mg 12 hr tablet, Take 1,200 mg by mouth in the morning, Disp: , Rfl:   •  hydrochlorothiazide (HYDRODIURIL) 25 mg tablet, Take 1 tablet (25 mg total) by mouth daily, Disp: 90 tablet, Rfl: 1  •  lisinopril (ZESTRIL) 10 mg tablet, Take 1 tablet (10 mg total) by mouth daily, Disp: 90 tablet, Rfl: 1  •  loratadine (CLARITIN REDITABS) 10 MG dissolvable tablet, Take 10 mg by mouth daily as needed for allergies  , Disp: , Rfl:   •  tamsulosin (Flomax) 0 4 mg, Take 1 capsule (0 4 mg total) by mouth daily with dinner, Disp: 90 capsule, Rfl: 3  •  Trelegy Ellipta 100-62 5-25 MCG/ACT inhaler, USE 1 INHALATION DAILY   RINSE MOUTH AFTER USE, Disp: 360 blister, Rfl: 3  •  cholecalciferol (VITAMIN D3) 1,000 units tablet, Take 2 tablets (2,000 Units total) by mouth daily for 2 days, Disp: 4 tablet, Rfl: 0  •  EPINEPHrine (EPIPEN) 0 3 mg/0 3 mL SOAJ, Inject 0 3 mL (0 3 mg total) into a muscle once for 1 dose, Disp: 0 6 mL, Rfl: 1    Review of Systems:  Aside from what is mentioned in the HPI, the review of systems is otherwise negative    Past medical history, surgical history, and family history were reviewed and updated as appropriate    Social history updates:  Social History     Tobacco Use   Smoking Status Former   • Packs/day: 1 50   • Years: 49 00   • Pack years: 73 50   • Types: Cigarettes   • Start date: 5   • Quit date: 2015   • Years since quittin 3   Smokeless Tobacco Never       PhysicalExamination:  Vitals:   /64 (BP Location: Left arm, Patient Position: Sitting, Cuff Size: Adult)   Pulse (!) 114   Ht 5' 8" (1 727 m)   Wt 75 3 kg (166 lb)   SpO2 94%   BMI 25 24 kg/m²   Gen:  Comfortable on room air  No conversational dyspnea  HEENT:  Conjugate gaze  sclerae anicteric  Oropharynx moist  Neck: Trachea is midline  No JVD  No adenopathy  Chest: Breath sounds are very distant  No rales or crackles are appreciated  Cardiac: Heart tones are distant, regular,  no murmur  Abdomen:  benign  Extremities: No edema  Neuro:  Normal speech and mentation    Diagnostic Data:  Labs:   I personally reviewed the most recent laboratory data pertinent to today's visit    Lab Results   Component Value Date    WBC 8 96 11/15/2022    HGB 12 9 11/15/2022    HCT 42 6 11/15/2022    MCV 96 11/15/2022     11/15/2022     Lab Results   Component Value Date    SODIUM 139 11/15/2022    K 4 7 11/15/2022    CO2 32 11/15/2022     11/15/2022    BUN 18 11/15/2022    CREATININE 1 21 11/15/2022    CALCIUM 9 5 11/15/2022       No new pulmonary diagnostic testing    Yinka Gramajo MD

## 2023-03-10 ENCOUNTER — RA CDI HCC (OUTPATIENT)
Dept: OTHER | Facility: HOSPITAL | Age: 73
End: 2023-03-10

## 2023-03-15 ENCOUNTER — APPOINTMENT (OUTPATIENT)
Dept: LAB | Facility: IMAGING CENTER | Age: 73
End: 2023-03-15

## 2023-03-15 DIAGNOSIS — E78.2 MIXED HYPERLIPIDEMIA: ICD-10-CM

## 2023-03-15 DIAGNOSIS — I10 HYPERTENSION, ESSENTIAL: ICD-10-CM

## 2023-03-15 LAB
ALBUMIN SERPL BCP-MCNC: 3.8 G/DL (ref 3.5–5)
ALP SERPL-CCNC: 66 U/L (ref 46–116)
ALT SERPL W P-5'-P-CCNC: 20 U/L (ref 12–78)
ANION GAP SERPL CALCULATED.3IONS-SCNC: 3 MMOL/L (ref 4–13)
AST SERPL W P-5'-P-CCNC: 18 U/L (ref 5–45)
BILIRUB SERPL-MCNC: 0.48 MG/DL (ref 0.2–1)
BUN SERPL-MCNC: 20 MG/DL (ref 5–25)
CALCIUM SERPL-MCNC: 9.5 MG/DL (ref 8.3–10.1)
CHLORIDE SERPL-SCNC: 104 MMOL/L (ref 96–108)
CHOLEST SERPL-MCNC: 155 MG/DL
CO2 SERPL-SCNC: 33 MMOL/L (ref 21–32)
CREAT SERPL-MCNC: 1.06 MG/DL (ref 0.6–1.3)
GFR SERPL CREATININE-BSD FRML MDRD: 69 ML/MIN/1.73SQ M
GLUCOSE P FAST SERPL-MCNC: 107 MG/DL (ref 65–99)
HDLC SERPL-MCNC: 56 MG/DL
LDLC SERPL CALC-MCNC: 80 MG/DL (ref 0–100)
POTASSIUM SERPL-SCNC: 4.5 MMOL/L (ref 3.5–5.3)
PROT SERPL-MCNC: 6.9 G/DL (ref 6.4–8.4)
SODIUM SERPL-SCNC: 140 MMOL/L (ref 135–147)
TRIGL SERPL-MCNC: 95 MG/DL

## 2023-04-04 ENCOUNTER — OFFICE VISIT (OUTPATIENT)
Dept: UROLOGY | Facility: AMBULATORY SURGERY CENTER | Age: 73
End: 2023-04-04

## 2023-04-04 VITALS
HEIGHT: 68 IN | BODY MASS INDEX: 25.31 KG/M2 | OXYGEN SATURATION: 96 % | SYSTOLIC BLOOD PRESSURE: 110 MMHG | HEART RATE: 103 BPM | DIASTOLIC BLOOD PRESSURE: 68 MMHG | WEIGHT: 167 LBS

## 2023-04-04 DIAGNOSIS — N40.1 BPH WITH OBSTRUCTION/LOWER URINARY TRACT SYMPTOMS: Primary | ICD-10-CM

## 2023-04-04 DIAGNOSIS — Z12.5 PROSTATE CANCER SCREENING: ICD-10-CM

## 2023-04-04 DIAGNOSIS — N13.8 BPH WITH OBSTRUCTION/LOWER URINARY TRACT SYMPTOMS: Primary | ICD-10-CM

## 2023-04-04 LAB — POST-VOID RESIDUAL VOLUME, ML POC: 15 ML

## 2023-04-04 RX ORDER — FINASTERIDE 5 MG/1
5 TABLET, FILM COATED ORAL DAILY
Qty: 90 TABLET | Refills: 3 | Status: SHIPPED | OUTPATIENT
Start: 2023-04-04

## 2023-04-04 RX ORDER — TAMSULOSIN HYDROCHLORIDE 0.4 MG/1
0.4 CAPSULE ORAL
Qty: 90 CAPSULE | Refills: 3 | Status: SHIPPED | OUTPATIENT
Start: 2023-04-04

## 2023-04-04 NOTE — PROGRESS NOTES
4/4/2023      Chief Complaint   Patient presents with   • Benign Prostatic Hypertrophy   • Elevated PSA       Assessment and Plan    67 y o  male managed by Dr Jonathan Giang    1  Benign Prostatic Hyperplasia  ? continue with alfuzosin and finasteride   -Prescription refill provided  ? Will continue to monitor for worsening/progression of urinary symptoms  ? bladder scan PVR at next office visit  ?  follow up the office in 1 year     2  Elevated PSA/Prostate Cancer Screening  ? PSA performed  ? JOSE DANIEL-smooth prostate approximate 40-45 g in size with no nodules  ? Repeat PSA and JOSE DANIEL in 1 year        History of Present Illness  Barb Lennox is a 67 y o  male here for follow up evaluation of urinary symptoms secondary benign prostatic hyperplasia   Patient also here for routine prostate cancer screening  [facility] is currently managed on tamsulosin and finasteride with good management of urinary symptoms    He currently satisfied with urinary status   He denies changes to his urinary status since his last office visit         Component       PSA, Total   Latest Ref Rng & Units       0 0 - 4 0 ng/mL   8/20/2018      8:29 AM 1 2   8/24/2019      11:03 AM 0 7   11/29/2019      11:36 AM 0 9   8/25/2020      11:27 AM 0 6   8/31/2021      12:14 PM 1 2   12/6/2021      11:26 AM 0 9   11/15/2022      12:51 PM 0 5       Review of Systems   Constitutional: Negative for chills and fever  Respiratory: Negative for cough and shortness of breath  Cardiovascular: Negative for chest pain  Gastrointestinal: Negative for abdominal distention, abdominal pain, blood in stool, nausea and vomiting  Genitourinary: Negative for difficulty urinating, dysuria, enuresis, flank pain, frequency, hematuria and urgency  Skin: Negative for rash  Urinary Incontinence Screening    Flowsheet Row Most Recent Value   Urinary Incontinence    Urinary Incontinence? No   Incomplete emptying? No   Urinary frequency? No   Urinary urgency?  Yes  [when he wakes up at night to go]   Urinary hesitancy? No   Dysuria (painful difficult urination)? No   Nocturia (waking up to use the bathroom)? Yes  [1-2 times / 2-3 times a week (not every night)]   Straining (having to push to go)? No   Weak stream? No   Intermittent stream? No   Post void dribbling? Yes          AUA SYMPTOM SCORE    Flowsheet Row Most Recent Value   AUA SYMPTOM SCORE    How often have you had a sensation of not emptying your bladder completely after you finished urinating? 1 (P)     How often have you had to urinate again less than two hours after you finished urinating? 1 (P)     How often have you found you stopped and started again several times when you urinate? 1 (P)     How often have you found it difficult to postpone urination? 1 (P)     How often have you had a weak urinary stream? 1 (P)     How often have you had to push or strain to begin urination? 0 (P)     How many times did you most typically get up to urinate from the time you went to bed at night until the time you got up in the morning? 1 (P)     Quality of Life: If you were to spend the rest of your life with your urinary condition just the way it is now, how would you feel about that? 1 (P)     AUA SYMPTOM SCORE 6 (P)              Past Medical History  Past Medical History:   Diagnosis Date   • Allergic 1/1/1990    Bee stings and penicillin   • BPH (benign prostatic hyperplasia)    • [facility] )     Last Assessed:11/14/2016   • Diverticulosis    • Elevated prostate specific antigen (PSA)    • Emphysema (subcutaneous) (surgical) resulting from a procedure    • Enlarged prostate with lower urinary tract symptoms (LUTS)     Resolved:8/22/2017   • Hernia, inguinal, right    • Hypertension 5/1/2019    Visit with Nelda Marroquin showed slightly elevated bp     • Pneumonia due to COVID-19 virus 3/16/2021       Past Social History  Past Surgical History:   Procedure Laterality Date   • BICEPS TENDON REPAIR    • COLONOSCOPY     • FRACTURE SURGERY  5/15/1988    Approx date  Rt index finger fractured  • HAND SURGERY      Last Assessed:2016   • HERNIA REPAIR      Last Assessed:2016   • IR CHEST TUBE CHECK/CHANGE/REPOSITION/UPSIZE  2021   • IR CHEST TUBE PLACEMENT  3/30/2021   • KNEE ARTHROSCOPY     • [place] RPR 1ST INGUN HRNA AGE 5 YRS/> REDUCIBLE Right 2016    Procedure: REPAIR HERNIA INGUINAL with mesh;  Surgeon: Dennis Gallegos DO;  Location[facility];  Service: General   • PROSTATE BIOPSY     • WISDOM TOOTH EXTRACTION       Social History     Tobacco Use   Smoking Status Former   • Packs/day: 1 50   • Years: 49 00   • Pack years: 73 50   • Types: Cigarettes   • Start date: 5   • Quit date: 2015   • Years since quittin 4   Smokeless Tobacco Never       Past Family History  Family History   Problem Relation Age of Onset   • Hypertension Mother        Past Social history  Social History     Socioeconomic History   • Marital status: /Civil Union     Spouse name: Not on file   • Number of children: Not on file   • Years of education: Not on file   • Highest education level: Not on file   Occupational History   • Not on file   Tobacco Use   • Smoking status: Former     Packs/day: 1 50     Years: 49 00     Pack years: 73 50     Types: Cigarettes     Start date: 5     Quit date: 2015     Years since quittin 4   • Smokeless tobacco: Never   Vaping Use   • Vaping Use: Never used   Substance and Sexual Activity   • Alcohol use:  Yes     Alcohol/week: 0 0 standard drinks     Comment: rare   • Drug use: No   • Sexual activity: Yes     Partners: Female     Birth control/protection: Male Sterilization, Female Sterilization   Other Topics Concern   • Not on file   Social History Narrative    Advance directive on file     Social Determinants of Health     Financial Resource Strain: Low Risk    • Difficulty of Paying Living Expenses: Not hard at all   Food Insecurity: Not on file Transportation Needs: No Transportation Needs   • Lack of Transportation (Medical): No   • Lack of Transportation (Non-Medical): No   Physical Activity: Not on file   Stress: Not on file   Social Connections: Not on file   Intimate Partner Violence: Not on file   Housing Stability: Not on file       Current Medications  Current Outpatient Medications   Medication Sig Dispense Refill   • albuterol (PROVENTIL HFA,VENTOLIN HFA) 90 mcg/act inhaler Inhale 2 puffs every 6 (six) hours as needed for shortness of breath 54 g 3   • atorvastatin (LIPITOR) 20 mg tablet Take 1 tablet (20 mg total) by mouth daily 90 tablet 1   • finasteride (PROSCAR) 5 mg tablet Take 1 tablet (5 mg total) by mouth daily 90 tablet 3   • guaiFENesin (MUCINEX) 600 mg 12 hr tablet Take 1,200 mg by mouth in the morning     • hydrochlorothiazide (HYDRODIURIL) 25 mg tablet Take 1 tablet (25 mg total) by mouth daily 90 tablet 1   • lisinopril (ZESTRIL) 10 mg tablet Take 1 tablet (10 mg total) by mouth daily 90 tablet 1   • loratadine (CLARITIN REDITABS) 10 MG dissolvable tablet Take 10 mg by mouth daily as needed for allergies  • tamsulosin (Flomax) 0 4 mg Take 1 capsule (0 4 mg total) by mouth daily with dinner 90 capsule 3   • Trelegy Ellipta 100-62 5-25 MCG/ACT inhaler USE 1 INHALATION DAILY  RINSE MOUTH AFTER  blister 3   • cholecalciferol (VITAMIN D3) 1,000 units tablet Take 2 tablets (2,000 Units total) by mouth daily for 2 days 4 tablet 0   • EPINEPHrine (EPIPEN) 0 3 mg/0 3 mL SOAJ Inject 0 3 mL (0 3 mg total) into a muscle once for 1 dose 0 6 mL 1     No current facility-administered medications for this visit         Allergies  Allergies   Allergen Reactions   • Bee Venom Facial Swelling   • Other Rash     Annotation - [id number]: mushrooms   • Penicillins Rash         The following portions of the patient's history were reviewed and updated as appropriate: allergies, current medications, past medical history, past social history, "past surgical history and problem list       Vitals  Vitals:    04/04/23 1351   BP: 110/68   BP Location: Right arm   Patient Position: Sitting   Cuff Size: Adult   Pulse: 103   SpO2: 96%   Weight: 75 8 kg (167 lb)   Height: 5' 8\" (1 727 m)           Physical Exam  Physical Exam  Vitals reviewed  Constitutional:       General: He is not in acute distress  Appearance: Normal appearance  He is normal weight  HENT:      Head: Normocephalic  Eyes:      Pupils: Pupils are equal, round, and reactive to light  Cardiovascular:      Rate and Rhythm: Normal rate  Pulmonary:      Effort: No respiratory distress  Breath sounds: Normal breath sounds  Comments: Oxygen via nasal cannula  Genitourinary:     Comments: JOSE DANIEL-prostate 40 to 45 g with no nodules  Skin:     General: Skin is warm and dry  Neurological:      General: No focal deficit present  Mental Status: He is alert and oriented to person, place, and time  Psychiatric:         Mood and Affect: Mood normal          Behavior: Behavior normal            Results  Recent Results (from the past 1 hour(s))   POCT Measure PVR    Collection Time: 04/04/23  1:54 PM   Result Value Ref Range    POST-VOID RESIDUAL VOLUME, ML POC 15 mL   ]  Lab Results   Component Value Date    PSA 0 5 11/15/2022    PSA 0 9 12/06/2021    PSA 1 2 08/31/2021     Lab Results   Component Value Date    GLUCOSE 155 (H) 03/29/2021    CALCIUM 9 5 03/15/2023    K 4 5 03/15/2023    CO2 33 (H) 03/15/2023     03/15/2023    BUN 20 03/15/2023    CREATININE 1 06 03/15/2023     Lab Results   Component Value Date    WBC 8 96 11/15/2022    HGB 12 9 11/15/2022    HCT 42 6 11/15/2022    MCV 96 11/15/2022     11/15/2022           Orders  Orders Placed This Encounter   Procedures   • PSA, Total Screen     This is a patient instruction: This test is non-fasting  Please drink two glasses of water morning of bloodwork          Standing Status:   Future     Standing Expiration Date: " 10/4/2024   • POCT Measure PVR       EVAN Peters

## 2023-04-05 ENCOUNTER — HOSPITAL ENCOUNTER (OUTPATIENT)
Dept: NON INVASIVE DIAGNOSTICS | Facility: CLINIC | Age: 73
Discharge: HOME | End: 2023-04-05

## 2023-04-05 DIAGNOSIS — I77.89 ARTERIAL ECTASIA (HCC): ICD-10-CM

## 2023-04-05 DIAGNOSIS — I72.3 BILATERAL ILIAC ARTERY ANEURYSM (HCC): ICD-10-CM

## 2023-04-06 ENCOUNTER — TELEPHONE (OUTPATIENT)
Dept: VASCULAR SURGERY | Facility: CLINIC | Age: 73
End: 2023-04-06

## 2023-04-06 NOTE — TELEPHONE ENCOUNTER
Attempted to contact patient to schedule appointment(s) listed below  Requested patient call [phone] option 3 to schedule appointment(s)  Patient's appointment(s) are due now      Dopplers  [] Abdominal Aorta Iliac (AOIL)  [] Carotid (CV)   [] Celiac and/or Mesenteric  [] Endovascular Aortic Repair (EVAR)   [] Hemodialysis Access (HD)   [] Lower Limb Arterial (MARY)  [] Lower Limb Venous (LEV)  [] Lower Limb Venous Duplex with Reflux (LEVDR)  [] Renal Artery  [] Upper Limb Arterial (UEA)    [] Upper Limb Venous (UEV)              [] ROMINA and Waveform analysis     Advanced Imaging   [] CTA head/neck    [] CTA abdomen    [] CTA abdomen & pelvis    [] CT abdomen with/ without contrast  [] CT abdomen with contrast  [] CT abdomen without contrast    [] CT abdomen & pelvis with/ without contrast  [] CT abdomen & pelvis with contrast  [] CT abdomen & pelvis without contrast    Office Visit   [] New patient, patient last seen over 3 years ago  [] Risk factor modification (RFM)   [] Follow up   [] Lost to follow up (LTFU)  Called patient & LMOM to schedule 1 yr follow up/Rev AOIL/MARY 4/5/23

## 2023-04-07 ENCOUNTER — HOSPITAL ENCOUNTER (OUTPATIENT)
Dept: RADIOLOGY | Facility: IMAGING CENTER | Age: 73
End: 2023-04-07

## 2023-04-07 DIAGNOSIS — Z12.2 SCREENING FOR MALIGNANT NEOPLASM OF RESPIRATORY ORGAN: ICD-10-CM

## 2023-04-07 DIAGNOSIS — F17.211 CIGARETTE NICOTINE DEPENDENCE IN REMISSION: ICD-10-CM

## 2023-04-28 ENCOUNTER — OFFICE VISIT (OUTPATIENT)
Dept: INTERNAL MEDICINE CLINIC | Facility: OTHER | Age: 73
End: 2023-04-28

## 2023-04-28 VITALS
DIASTOLIC BLOOD PRESSURE: 70 MMHG | HEART RATE: 88 BPM | TEMPERATURE: 97.8 F | HEIGHT: 68 IN | SYSTOLIC BLOOD PRESSURE: 110 MMHG | WEIGHT: 170 LBS | BODY MASS INDEX: 25.76 KG/M2 | OXYGEN SATURATION: 98 %

## 2023-04-28 DIAGNOSIS — N18.2 CKD (CHRONIC KIDNEY DISEASE) STAGE 2, GFR 60-89 ML/MIN: ICD-10-CM

## 2023-04-28 DIAGNOSIS — I10 ESSENTIAL HYPERTENSION: ICD-10-CM

## 2023-04-28 DIAGNOSIS — Z91.030 ALLERGY TO BEE STING: ICD-10-CM

## 2023-04-28 DIAGNOSIS — I10 HYPERTENSION, ESSENTIAL: ICD-10-CM

## 2023-04-28 DIAGNOSIS — Z00.00 MEDICARE ANNUAL WELLNESS VISIT, SUBSEQUENT: ICD-10-CM

## 2023-04-28 DIAGNOSIS — E78.2 MIXED HYPERLIPIDEMIA: Primary | ICD-10-CM

## 2023-04-28 DIAGNOSIS — R73.03 PREDIABETES: ICD-10-CM

## 2023-04-28 DIAGNOSIS — J43.2 CENTRILOBULAR EMPHYSEMA (HCC): ICD-10-CM

## 2023-04-28 RX ORDER — EPINEPHRINE 0.3 MG/.3ML
0.3 INJECTION SUBCUTANEOUS ONCE
Qty: 0.6 ML | Refills: 1 | Status: SHIPPED | OUTPATIENT
Start: 2023-04-28 | End: 2023-04-28

## 2023-04-28 RX ORDER — LISINOPRIL 10 MG/1
10 TABLET ORAL DAILY
Qty: 90 TABLET | Refills: 1 | Status: SHIPPED | OUTPATIENT
Start: 2023-04-28

## 2023-04-28 RX ORDER — ATORVASTATIN CALCIUM 20 MG/1
20 TABLET, FILM COATED ORAL DAILY
Qty: 90 TABLET | Refills: 1 | Status: SHIPPED | OUTPATIENT
Start: 2023-04-28

## 2023-04-28 RX ORDER — HYDROCHLOROTHIAZIDE 25 MG/1
25 TABLET ORAL DAILY
Qty: 90 TABLET | Refills: 1 | Status: SHIPPED | OUTPATIENT
Start: 2023-04-28

## 2023-04-28 NOTE — PROGRESS NOTES
Assessment and Plan:     Problem List Items Addressed This Visit        Respiratory    Centrilobular emphysema ([address] ) - severe     - Controlled on 2 L supplemental oxygen  -Trelegy  -Managed by pulmonology            Cardiovascular and Mediastinum    Essential hypertension     - Blood pressure well controlled on lisinopril 10 mg and hydrochlorothiazide 25 mg daily  -Advised to continue monitoring blood pressure at home and notify office of blood pressures consistently running less than 110/60  -Advised adequate oral hydration         Relevant Medications    hydrochlorothiazide (HYDRODIURIL) 25 mg tablet    lisinopril (ZESTRIL) 10 mg tablet    EPINEPHrine (EPIPEN) 0 3 mg/0 3 mL SOAJ       Genitourinary    CKD (chronic kidney disease) stage 2, GFR 60-89 ml/min     Lab Results   Component Value Date    EGFR 69 03/15/2023    EGFR 59 11/15/2022    EGFR 57 04/13/2022    CREATININE 1 06 03/15/2023    CREATININE 1 21 11/15/2022    CREATININE 1 25 04/13/2022     -Renal function stable  -Continue adequate hydration  -Avoid nephrotoxins when possible         Relevant Medications    hydrochlorothiazide (HYDRODIURIL) 25 mg tablet    Other Relevant Orders    CBC and differential       Other    Hyperlipidemia - Primary     - Lipid panel at goal  -Continue atorvastatin 20 mg daily         Relevant Medications    atorvastatin (LIPITOR) 20 mg tablet    Other Relevant Orders    Lipid Panel with Direct LDL reflex    Comprehensive metabolic panel    Prediabetes     - Fasting glucose improved 107 but above goal  -Update hemoglobin A1c with follow-up labs  -Discussed diet modifications         Relevant Orders    Hemoglobin A1C    Medicare annual wellness visit, subsequent     - Up-to-date on preventative screenings and vaccinations        Other Visit Diagnoses     Hypertension, essential        Relevant Medications    hydrochlorothiazide (HYDRODIURIL) 25 mg tablet    lisinopril (ZESTRIL) 10 mg tablet    EPINEPHrine (EPIPEN) 0 3 mg/0 3 mL SOAJ    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 sting        Relevant Medications    EPINEPHrine (EPIPEN) 0 3 mg/0 3 mL SOAJ           Preventive health issues were discussed with patient, and age appropriate screening tests were ordered as noted in patient's After Visit Summary  Personalized health advice and appropriate referrals for health education or preventive services given if needed, as noted in patient's After Visit Summary  History of Present Illness:     Patient presents for a Medicare Wellness Visit and 6 month follow up  Labs completed 3/15/2023  CO2 elevated at 33  Glucose elevated 107  gfr 69, Cr 1 06    He follows with pulmonology for his severe COPD  Currently he is controlled on 2 L of supplemental oxygen via nasal cannula, he uses this overnight as well  He does have some allergy symptoms and I recommend him increase his Claritin to daily use  HLD - total cholesterol 155, triglycerides 95, HDL 56, LDL 80  He is on atorvastatin 20mg daily     HTN - he does not monitor his blood pressure at home  He is currently on lisinopril 10mg daily, HCTZ 25mg daily     HPI   Patient Care Team:  Brett Wick PA-C as PCP - General (Internal Medicine)  MD Jayda Wilson MD (Urology)  Allie Bates (Vascular Surgery)  Blaire Wilson MD as Consulting Physician (Pulmonary Disease)  Evgeny Lamb MD (Colon and Rectal Surgery)     Review of Systems:     Review of Systems   Constitutional: Negative for unexpected weight change  HENT: Positive for congestion  Respiratory: Positive for cough  Negative for shortness of breath  Cardiovascular: Negative for chest pain, palpitations and leg swelling          Problem List:     Patient Active Problem List   Diagnosis   • Hyperlipidemia   • Centrilobular emphysema (HCC) - severe   • Bilateral iliac artery aneurysm (HCC)   • Arterial ectasia (HCC)   • Diverticulosis, sigmoid   • Hearing loss   • Hemorrhoids   • Lung nodule   • Prediabetes   • Skin disorder   • Benign prostatic hyperplasia without lower urinary tract symptoms   • Essential hypertension   • Seasonal allergies   • Medicare annual wellness visit, subsequent   • Other microscopic hematuria   • Other fatigue   • NSVT (nonsustained ventricular tachycardia) ([facility])   • Abnormal ECG during exercise stress test   • Prominent popliteal pulse   • Chronic hypoxemic respiratory failure ([facility])   • Former smoker  Quit 2015  • CKD (chronic kidney disease) stage 2, GFR 60-89 ml/min      Past Medical and Surgical History:     Past Medical History:   Diagnosis Date   • Allergic 1/1/1990    Bee stings and penicillin   • BPH (benign prostatic hyperplasia)    • COPD (chronic obstructive pulmonary disease) ([facility])     Last Assessed:11/14/2016   • Diverticulosis    • Elevated prostate specific antigen (PSA)    • Emphysema (subcutaneous) (surgical) resulting from a procedure    • Enlarged prostate with lower urinary tract symptoms (LUTS)     Resolved:8/22/2017   • Hernia, inguinal, right    • Hypertension 5/1/2019    Visit with Dee Alvarez showed slightly elevated bp  • Pneumonia due to COVID-19 virus 3/16/2021     Past Surgical History:   Procedure Laterality Date   • BICEPS TENDON REPAIR  1999   • COLONOSCOPY     • FRACTURE SURGERY  5/15/1988    Approx date  Rt index finger fractured     • HAND SURGERY      Last Assessed:7/11/2016   • HERNIA REPAIR      Last Assessed:7/11/2016   • IR CHEST TUBE CHECK/CHANGE/REPOSITION/UPSIZE  4/1/2021   • IR CHEST TUBE PLACEMENT  3/30/2021   • KNEE ARTHROSCOPY     • ID RPR 1ST INGUN HRNA AGE 5 YRS/> REDUCIBLE Right 2/23/2016    Procedure: REPAIR HERNIA INGUINAL with mesh;  Surgeon: Michael Robles DO;  [facility]: [facility];  Service: General   • PROSTATE BIOPSY     • WISDOM TOOTH EXTRACTION        Family History:     Family History   Problem Relation Age of Onset   • Hypertension Mother       Social History:     Social History     Socioeconomic History   • Marital status: /Civil [employer]     Spouse name: None   • Number of children: None   • Years of education: None   • Highest education level: None   Occupational History   • None   Tobacco Use   • Smoking status: Former     Packs/day: 1 50     Years: 49 00     Pack years: 73 50     Types: Cigarettes     Start date: 5     Quit date: 2015     Years since quittin 4   • Smokeless tobacco: Never   Vaping Use   • Vaping Use: Never used   Substance and Sexual Activity   • Alcohol use: Yes     Alcohol/week: 0 0 standard drinks     Comment: rare   • Drug use: No   • Sexual activity: Yes     Partners: Female     Birth control/protection: Male Sterilization, Female Sterilization   Other Topics Concern   • None   Social History Narrative    Advance directive on file     Social Determinants of Health     Financial Resource Strain: Low Risk    • Difficulty of Paying Living Expenses: Not hard at all   Food Insecurity: Not on file   Transportation Needs: No Transportation Needs   • Lack of Transportation (Medical): No   • Lack of Transportation (Non-Medical):  No   Physical Activity: Not on file   Stress: Not on file   Social Connections: Not on file   Intimate Partner Violence: Not on file   Housing Stability: Not on file      Medications and Allergies:     Current Outpatient Medications   Medication Sig Dispense Refill   • albuterol (PROVENTIL HFA,VENTOLIN HFA) 90 mcg/act inhaler Inhale 2 puffs every 6 (six) hours as needed for shortness of breath 54 g 3   • atorvastatin (LIPITOR) 20 mg tablet Take 1 tablet (20 mg total) by mouth daily 90 tablet 1   • cholecalciferol (VITAMIN D3) 1,000 units tablet Take 2 tablets (2,000 Units total) by mouth daily for 2 days 4 tablet 0   • EPINEPHrine (EPIPEN) 0 3 mg/0 3 mL SOAJ Inject 0 3 mL (0 3 mg total) into a muscle once for 1 dose 0 6 mL 1   • finasteride (PROSCAR) 5 mg tablet Take 1 tablet (5 mg total) by mouth daily 90 tablet 3   • guaiFENesin (MUCINEX) 600 mg 12 hr tablet Take 1,200 mg by mouth if needed     • hydrochlorothiazide (HYDRODIURIL) 25 mg tablet Take 1 tablet (25 mg total) by mouth daily 90 tablet 1   • lisinopril (ZESTRIL) 10 mg tablet Take 1 tablet (10 mg total) by mouth daily 90 tablet 1   • loratadine (CLARITIN REDITABS) 10 MG dissolvable tablet Take 10 mg by mouth daily as needed for allergies  • tamsulosin (Flomax) 0 4 mg Take 1 capsule (0 4 mg total) by mouth daily with dinner 90 capsule 3   • Trelegy Ellipta 100-62 5-25 MCG/ACT inhaler USE 1 INHALATION DAILY  RINSE MOUTH AFTER  blister 3     No current facility-administered medications for this visit  Allergies   Allergen Reactions   • Bee Venom Facial Swelling   • Other Rash     Annotation - [id number]: mushrooms   • Penicillins Rash      Immunizations:     Immunization History   Administered Date(s) Administered   • COVID-19 PFIZER VACCINE 0 3 ML IM 04/25/2021, 05/16/2021, 11/19/2021   • COVID-19 Pfizer vac (Jorge-sucrose, gray cap) 12 yr+ IM 07/23/2022   • DTaP 09/29/2020   • INFLUENZA 11/01/2006, 09/28/2007, 11/01/2016, 10/10/2017, 09/28/2020, 10/25/2021, 10/03/2022   • Influenza Split High Dose Preservative Free IM 11/01/2016, 10/10/2017   • Influenza, high dose seasonal 0 7 mL 10/19/2018, 10/03/2019, 09/28/2020   • Pneumococcal Conjugate 13-Valent 07/11/2016, 06/12/2019   • Pneumococcal Polysaccharide PPV23 10/10/2017   • Tdap 09/11/2020   • Zoster Vaccine Recombinant 03/28/2019, 06/12/2019      Health Maintenance:         Topic Date Due   • Lung Cancer Screening  04/07/2024   • Hepatitis C Screening  Completed   • Colorectal Cancer Screening  Discontinued         Topic Date Due   • Hepatitis A Vaccine (1 of 2 - Risk 2-dose series) Never done   • Hepatitis B Vaccine (1 of 3 - Risk 3-dose series) Never done   • COVID-19 Vaccine (5 - Booster for Pfizer series) 09/17/2022      Medicare Screening Tests and Risk Assessments:     Chiara Ray is here for his Subsequent Wellness visit   Last Medicare Wellness visit information reviewed, patient interviewed and updates made to the record today  Health Risk Assessment:   Patient rates overall health as good  Patient feels that their physical health rating is same  Patient is satisfied with their life  Eyesight was rated as same  Hearing was rated as same  Patient feels that their emotional and mental health rating is same  Patients states they are never, rarely angry  Patient states they are sometimes unusually tired/fatigued  Pain experienced in the last 7 days has been none  Patient states that he has experienced no weight loss or gain in last 6 months  Depression Screening:   PHQ-2 Score: 0      Fall Risk Screening: In the past year, patient has experienced: no history of falling in past year      Home Safety:  Patient has trouble with stairs inside or outside of their home  Patient has working smoke alarms and has working carbon monoxide detector  Home safety hazards include: none  Nutrition:   Current diet is Regular  Medications:   Patient is not currently taking any over-the-counter supplements  Patient is able to manage medications  Activities of Daily Living (ADLs)/Instrumental Activities of Daily Living (IADLs):   Walk and transfer into and out of bed and chair?: Yes  Dress and groom yourself?: Yes    Bathe or shower yourself?: No    Feed yourself? Yes  Do your laundry/housekeeping?: Yes  Manage your money, pay your bills and track your expenses?: Yes  Make your own meals?: Yes    Do your own shopping?: Yes    [employer]    Previous Hospitalizations:   Any hospitalizations or ED visits within the last 12 months?: No      Advance Care Planning:   Living will: Yes    Durable POA for healthcare:  Yes    Advanced directive: Yes      PREVENTIVE SCREENINGS      Cardiovascular Screening:    General: Screening Not Indicated and History Lipid Disorder      Diabetes Screening:     General: Screening "Current      Colorectal Cancer Screening:     General: Screening Current      Prostate Cancer Screening:    General: Screening Current      Osteoporosis Screening:    General: Screening Not Indicated      Abdominal Aortic Aneurysm (AAA) Screening:    Risk factors include: age between 73-69 yo and tobacco use        Lung Cancer Screening:     General: Screening Current      Hepatitis C Screening:    General: Screening Current    Screening, Brief Intervention, and Referral to Treatment (SBIRT)    Screening  Typical number of drinks in a day: 0  Typical number of drinks in a week: 0  Interpretation: Low risk drinking behavior  AUDIT-C Screenin) How often did you have a drink containing alcohol in the past year? monthly or less  2) How many drinks did you have on a typical day when you were drinking in the past year? 1 to 2  3) How often did you have 6 or more drinks on one occasion in the past year? never    AUDIT-C Score: 1  Interpretation: Score 0-3 (male): Negative screen for alcohol misuse    Single Item Drug Screening:  How often have you used an illegal drug (including marijuana) or a prescription medication for non-medical reasons in the past year? never    Single Item Drug Screen Score: 0  Interpretation: Negative screen for possible drug use disorder    No results found  Physical Exam:     /70 (BP Location: Left arm, Patient Position: Sitting, Cuff Size: Standard)   Pulse 88   Temp 97 8 °F (36 6 °C) (Temporal)   Ht 5' 8\" (1 727 m)   Wt 77 1 kg (170 lb)   SpO2 98% Comment: using 02  BMI 25 85 kg/m²     Physical Exam  Vitals reviewed  Constitutional:       General: He is not in acute distress  Appearance: Normal appearance  He is normal weight  He is not diaphoretic  HENT:      Head: Normocephalic and atraumatic  Eyes:      Conjunctiva/sclera: Conjunctivae normal    Cardiovascular:      Rate and Rhythm: Normal rate and regular rhythm  Heart sounds: Normal heart sounds   " Pulmonary:      Effort: Pulmonary effort is normal  No respiratory distress  Breath sounds: Normal breath sounds  No wheezing, rhonchi or rales  Comments: 2 L nasal cannula  Neurological:      Mental Status: He is alert and oriented to person, place, and time  Mental status is at baseline  Psychiatric:         Mood and Affect: Mood normal          Behavior: Behavior normal          Thought Content:  Thought content normal          Judgment: Judgment normal           EVAN Velasco

## 2023-04-28 NOTE — ASSESSMENT & PLAN NOTE
- Fasting glucose improved 107 but above goal  -Update hemoglobin A1c with follow-up labs  -Discussed diet modifications

## 2023-04-28 NOTE — ASSESSMENT & PLAN NOTE
- Blood pressure well controlled on lisinopril 10 mg and hydrochlorothiazide 25 mg daily  -Advised to continue monitoring blood pressure at home and notify office of blood pressures consistently running less than 110/60  -Advised adequate oral hydration

## 2023-04-28 NOTE — ASSESSMENT & PLAN NOTE
Lab Results   Component Value Date    EGFR 69 03/15/2023    EGFR 59 11/15/2022    EGFR 57 04/13/2022    CREATININE 1 06 03/15/2023    CREATININE 1 21 11/15/2022    CREATININE 1 25 04/13/2022     -Renal function stable  -Continue adequate hydration  -Avoid nephrotoxins when possible

## 2023-05-09 ENCOUNTER — OFFICE VISIT (OUTPATIENT)
Dept: VASCULAR SURGERY | Facility: CLINIC | Age: 73
End: 2023-05-09

## 2023-05-09 VITALS
DIASTOLIC BLOOD PRESSURE: 62 MMHG | HEIGHT: 68 IN | WEIGHT: 170 LBS | HEART RATE: 99 BPM | BODY MASS INDEX: 25.76 KG/M2 | SYSTOLIC BLOOD PRESSURE: 100 MMHG

## 2023-05-09 DIAGNOSIS — I77.89 ARTERIAL ECTASIA (HCC): ICD-10-CM

## 2023-05-09 DIAGNOSIS — I72.3 BILATERAL ILIAC ARTERY ANEURYSM (HCC): Primary | ICD-10-CM

## 2023-05-09 NOTE — ASSESSMENT & PLAN NOTE
77-year-old male former smoker with HTN, HLD, COPD on continuous O2 2L, CKD, abdominal aortic and popliteal ectasia, bilateral common iliac artery aneurysms who returns to the office for yearly visit to review AOIL/LEAD 4/5/23    -AOIL - right ALHAJI measured 1 7 cm and left ALHAJI 1 5 cm  -LEAD - no significant arterial occlusive disease, right ROMINA 1 1 8/120/62 and left ROMINA 1 1 0/82/55, bilateral popliteal ectasia 0 9 cm  -Repeat duplex in 1 year  -Follow-up in the office in 1 year

## 2023-05-09 NOTE — PROGRESS NOTES
Assessment/Plan:    Arterial ectasia [facility])  68-year-old male former smoker with HTN, HLD, COPD on continuous O2 2L, CKD, abdominal aortic and popliteal ectasia, bilateral common iliac artery aneurysms who returns to the office for yearly visit to review AOIL/LEAD 4/5/23    -AOIL - right ALHAJI measured 1 7 cm and left ALHAJI 1 5 cm  -LEAD - no significant arterial occlusive disease, right ROMINA 1 1 8/120/62 and left ROMINA 1 1 0/82/55, bilateral popliteal ectasia 0 9 cm  -Repeat duplex in 1 year  -Follow-up in the office in 1 year       Diagnoses and all orders for this visit:    Bilateral iliac artery aneurysm (HCC)  -     VAS abdominal aorta/iliacs; complete study; Future  -     VAS lower limb arterial duplex, complete bilateral; Future    Arterial ectasia (HCC)  -     VAS abdominal aorta/iliacs; complete study; Future  -     VAS lower limb arterial duplex, complete bilateral; Future          Subjective:      Patient ID: Juan Carlos Mcallister is a 67 y o  male  Patient present to review MARY and AOIL done on 4/5/23  Patient denies any complaints or concerns at this time  Patient uses oxygen due to COPD  Patient is currently taking Atorvasting  HPI  68-year-old male former smoker with HTN, HLD, COPD on continuous O2 2L, CKD, abdominal aortic and popliteal ectasia, bilateral common iliac artery aneurysms who returns to the office for yearly visit to review AOIL/LEAD 4/5/23  Patient last seen in office last year by me  He is companied by his wife for visit today to review studies    AOIL showed right ALHAJI measured 1 7 cm and left ALHAJI 1 5 cm  LEAD showed no significant arterial occlusive disease, right ROMINA 1 1 8/120/62 and left ROMINA 1 1 0/82/55, bilateral popliteal ectasia 0 9 cm  The following portions of the patient's history were reviewed and updated as appropriate: allergies, current medications, past family history, past medical history, past social history, past surgical history and problem list   ROS reviewed     Review of "Systems   Constitutional: Negative  HENT: Negative  Eyes: Negative  Respiratory: Positive for shortness of breath (COPD)  Cardiovascular: Negative  Gastrointestinal: Negative  Endocrine: Negative  Genitourinary: Negative  Musculoskeletal: Negative  Skin: Negative  Allergic/Immunologic: Negative  Neurological: Negative  Hematological: Negative  Psychiatric/Behavioral: Negative  Objective:    I have reviewed and made appropriate changes to the review of systems input by the medical assistant  Vitals:    05/09/23 1331   BP: 100/62   BP Location: Right arm   Patient Position: Sitting   Cuff Size: Adult   Pulse: 99   Weight: 77 1 kg (170 lb)   Height: 5' 8\" (1 727 m)       Patient Active Problem List   Diagnosis   • Hyperlipidemia   • Centrilobular emphysema (HCC) - severe   • Bilateral iliac artery aneurysm (HCC)   • Arterial ectasia (HCC)   • Diverticulosis, sigmoid   • Hearing loss   • Hemorrhoids   • Lung nodule   • Prediabetes   • Skin disorder   • Benign prostatic hyperplasia without lower urinary tract symptoms   • Essential hypertension   • Seasonal allergies   • Medicare annual wellness visit, subsequent   • Other microscopic hematuria   • Other fatigue   • NSVT (nonsustained ventricular tachycardia) ([facility])   • Abnormal ECG during exercise stress test   • Prominent popliteal pulse   • Chronic hypoxemic respiratory failure (HCC)   • Former smoker  Quit 2015  • CKD (chronic kidney disease) stage 2, GFR 60-89 ml/min       Past Surgical History:   Procedure Laterality Date   • BICEPS TENDON REPAIR  1999   • COLONOSCOPY     • FRACTURE SURGERY  5/15/1988    Approx date  Rt index finger fractured     • HAND SURGERY      Last Assessed:7/11/2016   • HERNIA REPAIR      Last Assessed:7/11/2016   • IR CHEST TUBE CHECK/CHANGE/REPOSITION/UPSIZE  4/1/2021   • IR CHEST TUBE PLACEMENT  3/30/2021   • KNEE ARTHROSCOPY     • TN RPR 1ST INGUN HRNA AGE 5 YRS/> REDUCIBLE Right " 2016    Procedure: REPAIR HERNIA INGUINAL with mesh;  Surgeon: Herbie Henry DO;  Location: [facility];  Service: General   • PROSTATE BIOPSY     • WISDOM TOOTH EXTRACTION         Family History   Problem Relation Age of Onset   • Hypertension Mother        Social History     Socioeconomic History   • Marital status: /Civil [employer]     Spouse name: Not on file   • Number of children: Not on file   • Years of education: Not on file   • Highest education level: Not on file   Occupational History   • Not on file   Tobacco Use   • Smoking status: Former     Packs/day: 1 50     Years: 49 00     Pack years: 73 50     Types: Cigarettes     Start date: 5     Quit date: 2015     Years since quittin 4   • Smokeless tobacco: Never   Vaping Use   • Vaping Use: Never used   Substance and Sexual Activity   • Alcohol use: Yes     Alcohol/week: 0 0 standard drinks     Comment: rare   • Drug use: No   • Sexual activity: Yes     Partners: Female     Birth control/protection: Male Sterilization, Female Sterilization   Other Topics Concern   • Not on file   Social History Narrative    Advance directive on file     Social Determinants of Health     Financial Resource Strain: Low Risk    • Difficulty of Paying Living Expenses: Not hard at all   Food Insecurity: Not on file   Transportation Needs: No Transportation Needs   • Lack of Transportation (Medical): No   • Lack of Transportation (Non-Medical):  No   Physical Activity: Not on file   Stress: Not on file   Social Connections: Not on file   Intimate Partner Violence: Not on file   Housing Stability: Not on file       Allergies   Allergen Reactions   • Bee Venom Facial Swelling   • Other Rash     Annotation - 2016: mushrooms   • Penicillins Rash         Current Outpatient Medications:   •  albuterol (PROVENTIL HFA,VENTOLIN HFA) 90 mcg/act inhaler, Inhale 2 puffs every 6 (six) hours as needed for shortness of breath, Disp: 54 g, Rfl: 3  •  atorvastatin "(LIPITOR) 20 mg tablet, Take 1 tablet (20 mg total) by mouth daily, Disp: 90 tablet, Rfl: 1  •  finasteride (PROSCAR) 5 mg tablet, Take 1 tablet (5 mg total) by mouth daily, Disp: 90 tablet, Rfl: 3  •  guaiFENesin (MUCINEX) 600 mg 12 hr tablet, Take 1,200 mg by mouth if needed, Disp: , Rfl:   •  hydrochlorothiazide (HYDRODIURIL) 25 mg tablet, Take 1 tablet (25 mg total) by mouth daily, Disp: 90 tablet, Rfl: 1  •  lisinopril (ZESTRIL) 10 mg tablet, Take 1 tablet (10 mg total) by mouth daily, Disp: 90 tablet, Rfl: 1  •  loratadine (CLARITIN REDITABS) 10 MG dissolvable tablet, Take 10 mg by mouth daily as needed for allergies  , Disp: , Rfl:   •  tamsulosin (Flomax) 0 4 mg, Take 1 capsule (0 4 mg total) by mouth daily with dinner, Disp: 90 capsule, Rfl: 3  •  Trelegy Ellipta 100-62 5-25 MCG/ACT inhaler, USE 1 INHALATION DAILY  RINSE MOUTH AFTER USE, Disp: 360 blister, Rfl: 3  •  cholecalciferol (VITAMIN D3) 1,000 units tablet, Take 2 tablets (2,000 Units total) by mouth daily for 2 days, Disp: 4 tablet, Rfl: 0  •  EPINEPHrine (EPIPEN) 0 3 mg/0 3 mL SOAJ, Inject 0 3 mL (0 3 mg total) into a muscle once for 1 dose, Disp: 0 6 mL, Rfl: 1    /62 (BP Location: Right arm, Patient Position: Sitting, Cuff Size: Adult)   Pulse 99   Ht 5' 8\" (1 727 m)   Wt 77 1 kg (170 lb)   BMI 25 85 kg/m²          Physical Exam  Vitals and nursing note reviewed  Constitutional:       Appearance: He is well-developed  HENT:      Head: Normocephalic and atraumatic  Eyes:      Extraocular Movements: Extraocular movements intact  Neck:      Vascular: No carotid bruit  Cardiovascular:      Pulses:           Femoral pulses are 2+ on the right side and 2+ on the left side  Popliteal pulses are 2+ on the right side and 2+ on the left side  Heart sounds: Normal heart sounds  Pulmonary:      Comments: No wheezing  Supplemental oxygen 2 L  Abdominal:      General: Bowel sounds are normal       Palpations: Abdomen is soft   " Musculoskeletal:         General: Normal range of motion  Cervical back: Neck supple  Comments: Trace bilateral ankle swelling   Skin:     General: Skin is warm  Neurological:      General: No focal deficit present  Mental Status: He is alert and oriented to person, place, and time     Psychiatric:         Behavior: Behavior normal

## 2023-05-11 ENCOUNTER — OFFICE VISIT (OUTPATIENT)
Dept: INTERNAL MEDICINE CLINIC | Facility: CLINIC | Age: 73
End: 2023-05-11

## 2023-05-11 VITALS
SYSTOLIC BLOOD PRESSURE: 88 MMHG | HEART RATE: 99 BPM | BODY MASS INDEX: 25.34 KG/M2 | OXYGEN SATURATION: 98 % | TEMPERATURE: 97.8 F | HEIGHT: 68 IN | WEIGHT: 167.2 LBS | DIASTOLIC BLOOD PRESSURE: 58 MMHG

## 2023-05-11 DIAGNOSIS — J06.9 UPPER RESPIRATORY TRACT INFECTION, UNSPECIFIED TYPE: Primary | ICD-10-CM

## 2023-05-11 DIAGNOSIS — I10 ESSENTIAL HYPERTENSION: ICD-10-CM

## 2023-05-11 RX ORDER — LISINOPRIL 10 MG/1
5 TABLET ORAL DAILY
Qty: 90 TABLET | Refills: 1
Start: 2023-05-11

## 2023-05-11 RX ORDER — BENZONATATE 100 MG/1
100 CAPSULE ORAL 3 TIMES DAILY PRN
Qty: 20 CAPSULE | Refills: 0 | Status: SHIPPED | OUTPATIENT
Start: 2023-05-11 | End: 2023-05-18 | Stop reason: SDUPTHER

## 2023-05-11 RX ORDER — AZITHROMYCIN 250 MG/1
TABLET, FILM COATED ORAL
Qty: 6 TABLET | Refills: 0 | Status: SHIPPED | OUTPATIENT
Start: 2023-05-11 | End: 2023-05-16

## 2023-05-11 NOTE — PATIENT INSTRUCTIONS
Stop hydrochlorothiazide   Cut lisinopril in 1/2 to 5mg daily  Monitor blood pressure at home        Start flonase 2 sprays each nostril once daily   Start tessalon perles as needed for cough  Continue mucinex and claritin  If symptoms continue to worsen over the weekend, start zpak as prescribed and follow up in office

## 2023-05-11 NOTE — PROGRESS NOTES
Assessment/Plan:    Problem List Items Addressed This Visit        Respiratory    Upper respiratory tract infection - Primary     - onset of symptoms 2 days ago  - no concerning findings on exam to suggest a bacterial infection  - resp exam at baseline, continue 2L NC for severe COPD  - continue mucinex and claritin  - recommend starting flonase  - he is a very fragile patient with his oxygen dependence and severe copd  I gave him a printed script for zpak to start if his symptoms worsen over the weekend  - close follow up 1 week         Relevant Medications    azithromycin (Zithromax) 250 mg tablet    benzonatate (TESSALON PERLES) 100 mg capsule       Cardiovascular and Mediastinum    Essential hypertension     - hypotensive in the office  His BP has been soft the last few visits  - advised to discontinue HCTZ and decrease lisinopril to 5mg   - recommend adequate hydration, changing positions slowly  - follow up 1 week          Relevant Medications    lisinopril (ZESTRIL) 10 mg tablet       BMI Counseling: Body mass index is 25 11 kg/m²  M*[employer] software was used to dictate this note  It may contain errors with dictating incorrect words or incorrect spelling  Please contact the provider directly with any questions  Subjective:      Patient ID: Juan Carlos Mcallister is a 67 y o  male  HPI     Patient presents today accompanied by his wife with concern for URI  Symptoms started 2 days ago  Symptoms include significant nasal congestion, PND, rhinorrhea and sore throat  He has a productive cough but from a  resp standpoint does not feel much worse than his baseline   He continues to use 2L NC  He has tried claritin and mucinex without any significant relief    The following portions of the patient's history were reviewed and updated as appropriate: allergies, current medications, past family history, past medical history, past social history, past surgical history and problem list     Review of Systems Constitutional: Positive for fatigue  Negative for chills, diaphoresis and fever  HENT: Positive for congestion, postnasal drip, rhinorrhea (chronic and unchanged), sinus pressure (at night when laying down), sneezing and sore throat  Negative for ear discharge, ear pain and sinus pain  Respiratory: Positive for cough (chronic, more productive than usual) and shortness of breath (slightly worse than his baseline)  Negative for chest tightness and wheezing  Cardiovascular: Negative for chest pain  Musculoskeletal: Negative for myalgias  Neurological: Positive for dizziness (1 episode over the last 2 weeks  not currently)  Negative for headaches  Past Medical History:   Diagnosis Date   • Allergic 1/1/1990    Bee stings and penicillin   • BPH (benign prostatic hyperplasia)    • COPD (chronic obstructive pulmonary disease) ([facility])     Last Assessed:11/14/2016   • Diverticulosis    • Elevated prostate specific antigen (PSA)    • Emphysema (subcutaneous) (surgical) resulting from a procedure    • Enlarged prostate with lower urinary tract symptoms (LUTS)     Resolved:8/22/2017   • Hernia, inguinal, right    • Hypertension 5/1/2019    Visit with Loreta Moritz showed slightly elevated bp  • Pneumonia due to COVID-19 virus 3/16/2021         Current Outpatient Medications:   •  albuterol (PROVENTIL HFA,VENTOLIN HFA) 90 mcg/act inhaler, Inhale 2 puffs every 6 (six) hours as needed for shortness of breath, Disp: 54 g, Rfl: 3  •  atorvastatin (LIPITOR) 20 mg tablet, Take 1 tablet (20 mg total) by mouth daily, Disp: 90 tablet, Rfl: 1  •  azithromycin (Zithromax) 250 mg tablet, Take 2 tablets (500 mg total) by mouth daily for 1 day, THEN 1 tablet (250 mg total) daily for 4 days  , Disp: 6 tablet, Rfl: 0  •  benzonatate (TESSALON PERLES) 100 mg capsule, Take 1 capsule (100 mg total) by mouth 3 (three) times a day as needed for cough, Disp: 20 capsule, Rfl: 0  •  cholecalciferol (VITAMIN D3) 1,000 units tablet, "Take 2 tablets (2,000 Units total) by mouth daily for 2 days, Disp: 4 tablet, Rfl: 0  •  EPINEPHrine (EPIPEN) 0 3 mg/0 3 mL SOAJ, Inject 0 3 mL (0 3 mg total) into a muscle once for 1 dose, Disp: 0 6 mL, Rfl: 1  •  finasteride (PROSCAR) 5 mg tablet, Take 1 tablet (5 mg total) by mouth daily, Disp: 90 tablet, Rfl: 3  •  guaiFENesin (MUCINEX) 600 mg 12 hr tablet, Take 1,200 mg by mouth if needed, Disp: , Rfl:   •  lisinopril (ZESTRIL) 10 mg tablet, Take 0 5 tablets (5 mg total) by mouth daily, Disp: 90 tablet, Rfl: 1  •  loratadine (CLARITIN REDITABS) 10 MG dissolvable tablet, Take 10 mg by mouth daily as needed for allergies  , Disp: , Rfl:   •  tamsulosin (Flomax) 0 4 mg, Take 1 capsule (0 4 mg total) by mouth daily with dinner, Disp: 90 capsule, Rfl: 3  •  Trelegy Ellipta 100-62 5-25 MCG/ACT inhaler, USE 1 INHALATION DAILY  RINSE MOUTH AFTER USE, Disp: 360 blister, Rfl: 3    Allergies   Allergen Reactions   • Bee Venom Facial Swelling   • Other Rash     [facility] - [id number]: mushrooms   • Penicillins Rash       Social History   Past Surgical History:   Procedure Laterality Date   • BICEPS TENDON REPAIR  1999   • COLONOSCOPY     • FRACTURE SURGERY  5/15/1988    Approx date  Rt index finger fractured     • HAND SURGERY      Last Assessed:7/11/2016   • HERNIA REPAIR      Last Assessed:7/11/2016   • IR CHEST TUBE CHECK/CHANGE/REPOSITION/UPSIZE  4/1/2021   • IR CHEST TUBE PLACEMENT  3/30/2021   • KNEE ARTHROSCOPY     • AL RPR 1ST INGUN HRNA AGE 5 YRS/> REDUCIBLE Right 2/23/2016    Procedure: REPAIR HERNIA INGUINAL with mesh;  Surgeon: Corey De La O, DO;  [facility];  Service: General   • PROSTATE BIOPSY     • WISDOM TOOTH EXTRACTION       Family History   Problem Relation Age of Onset   • Hypertension Mother        Objective:  BP (!) 88/58 (BP Location: Left arm, Patient Position: Sitting, Cuff Size: Standard)   Pulse 99   Temp 97 8 °F (36 6 °C) (Temporal)   Ht 5' 8 43\" (1 738 m)   Wt 75 8 kg (167 lb 3 2 " oz)   SpO2 98% Comment: 2 liters nc  BMI 25 11 kg/m²      Physical Exam  Vitals reviewed  Constitutional:       General: He is not in acute distress  Appearance: Normal appearance  He is normal weight  He is not diaphoretic  HENT:      Head: Normocephalic and atraumatic  Right Ear: Tympanic membrane and external ear normal       Left Ear: Tympanic membrane and external ear normal       Nose: Rhinorrhea present  Right Turbinates: Swollen and pale  Left Turbinates: Swollen  Right Sinus: No maxillary sinus tenderness or frontal sinus tenderness  Left Sinus: No maxillary sinus tenderness or frontal sinus tenderness  Mouth/Throat:      Mouth: Mucous membranes are moist       Pharynx: Oropharynx is clear  No oropharyngeal exudate or posterior oropharyngeal erythema  Eyes:      Extraocular Movements: Extraocular movements intact  Conjunctiva/sclera: Conjunctivae normal       Pupils: Pupils are equal, round, and reactive to light  Cardiovascular:      Rate and Rhythm: Normal rate and regular rhythm  Heart sounds: Normal heart sounds  Pulmonary:      Effort: Pulmonary effort is normal  No respiratory distress  Breath sounds: No wheezing, rhonchi or rales  Comments: Breath sounds diminished chronically due to severe COPD  - o2 2L NC  Lymphadenopathy:      Cervical: No cervical adenopathy  Skin:     General: Skin is warm and dry  Neurological:      Mental Status: He is alert  Mental status is at baseline  Psychiatric:         Mood and Affect: Mood normal          Behavior: Behavior normal          Thought Content:  Thought content normal          Judgment: Judgment normal

## 2023-05-18 ENCOUNTER — OFFICE VISIT (OUTPATIENT)
Dept: INTERNAL MEDICINE CLINIC | Facility: OTHER | Age: 73
End: 2023-05-18

## 2023-05-18 VITALS
HEART RATE: 91 BPM | TEMPERATURE: 97.3 F | BODY MASS INDEX: 26.67 KG/M2 | WEIGHT: 176 LBS | OXYGEN SATURATION: 97 % | HEIGHT: 68 IN | SYSTOLIC BLOOD PRESSURE: 106 MMHG | DIASTOLIC BLOOD PRESSURE: 62 MMHG

## 2023-05-18 DIAGNOSIS — I10 ESSENTIAL HYPERTENSION: Primary | ICD-10-CM

## 2023-05-18 DIAGNOSIS — J43.2 CENTRILOBULAR EMPHYSEMA (HCC): ICD-10-CM

## 2023-05-18 DIAGNOSIS — J06.9 UPPER RESPIRATORY TRACT INFECTION, UNSPECIFIED TYPE: ICD-10-CM

## 2023-05-18 RX ORDER — BENZONATATE 100 MG/1
100 CAPSULE ORAL 3 TIMES DAILY PRN
Qty: 20 CAPSULE | Refills: 0 | Status: SHIPPED | OUTPATIENT
Start: 2023-05-18

## 2023-05-18 NOTE — PROGRESS NOTES
Assessment/Plan:    Problem List Items Addressed This Visit        Respiratory    Centrilobular emphysema (HCC) - severe     - continue Trelegy and albuterol  - continue supplemental O2 2L  - resp status at baseline          Relevant Medications    benzonatate (TESSALON PERLES) 100 mg capsule    Upper respiratory tract infection     - resolved s/p treatment with zpak and tessalon perles  - can continue tesslon perles prn           Relevant Medications    benzonatate (TESSALON PERLES) 100 mg capsule       Cardiovascular and Mediastinum    Essential hypertension - Primary     - BP improved and stable on lisinopril 5mg   - continue off of HCTZ  - monitor BP regularly at home, notify office if consistently < 110/60, would then recommend trying patient off of lisinopril                     M*[employer] software was used to dictate this note  It may contain errors with dictating incorrect words or incorrect spelling  Please contact the provider directly with any questions  Subjective:      Patient ID: Francisco Schmitz is a 67 y o  male  HPI    Patient presents today for 1 week follow up HTN and URI  He states he did take zpak which he started on 5/12, he states his symptoms are significantly improved  He continues to use the tesslon perles which he states are working well  He continues with rhinorrhea occasionally, otherwise him symptoms are resolved  He stopped his HCTZ and cut his lisinopril down to 5mg  His BP is improved and stable  The following portions of the patient's history were reviewed and updated as appropriate: allergies, current medications, past family history, past medical history, past social history, past surgical history and problem list     Review of Systems   Constitutional: Negative for chills and fever  HENT: Positive for rhinorrhea  Negative for congestion, postnasal drip, sinus pressure and sinus pain      Respiratory: Positive for cough (improved) and shortness of breath (with activity)  Negative for chest tightness and wheezing  Cardiovascular: Negative for chest pain  Neurological: Negative for dizziness, light-headedness and headaches  Past Medical History:   Diagnosis Date   • Allergic 1/1/1990    Bee stings and penicillin   • BPH (benign prostatic hyperplasia)    • COPD (chronic obstructive pulmonary disease) (HCC)     Last Assessed:11/14/2016   • Diverticulosis    • Elevated prostate specific antigen (PSA)    • Emphysema (subcutaneous) (surgical) resulting from a procedure    • Enlarged prostate with lower urinary tract symptoms (LUTS)     Resolved:8/22/2017   • Hernia, inguinal, right    • Hypertension 5/1/2019    Visit with [facility] showed slightly elevated bp  • Pneumonia due to COVID-19 virus 3/16/2021         Current Outpatient Medications:   •  albuterol (PROVENTIL HFA,VENTOLIN HFA) 90 mcg/act inhaler, Inhale 2 puffs every 6 (six) hours as needed for shortness of breath, Disp: 54 g, Rfl: 3  •  atorvastatin (LIPITOR) 20 mg tablet, Take 1 tablet (20 mg total) by mouth daily, Disp: 90 tablet, Rfl: 1  •  benzonatate (TESSALON PERLES) 100 mg capsule, Take 1 capsule (100 mg total) by mouth 3 (three) times a day as needed for cough, Disp: 20 capsule, Rfl: 0  •  cholecalciferol (VITAMIN D3) 1,000 units tablet, Take 2 tablets (2,000 Units total) by mouth daily for 2 days, Disp: 4 tablet, Rfl: 0  •  EPINEPHrine (EPIPEN) 0 3 mg/0 3 mL SOAJ, Inject 0 3 mL (0 3 mg total) into a muscle once for 1 dose, Disp: 0 6 mL, Rfl: 1  •  finasteride (PROSCAR) 5 mg tablet, Take 1 tablet (5 mg total) by mouth daily, Disp: 90 tablet, Rfl: 3  •  guaiFENesin (MUCINEX) 600 mg 12 hr tablet, Take 1,200 mg by mouth if needed, Disp: , Rfl:   •  lisinopril (ZESTRIL) 10 mg tablet, Take 0 5 tablets (5 mg total) by mouth daily, Disp: 90 tablet, Rfl: 1  •  loratadine (CLARITIN REDITABS) 10 MG dissolvable tablet, Take 10 mg by mouth daily as needed for allergies  , Disp: , Rfl:   •  tamsulosin "(Flomax) 0 4 mg, Take 1 capsule (0 4 mg total) by mouth daily with dinner, Disp: 90 capsule, Rfl: 3  •  Trelegy Ellipta 100-62 5-25 MCG/ACT inhaler, USE 1 INHALATION DAILY  RINSE MOUTH AFTER USE, Disp: 360 blister, Rfl: 3    Allergies   Allergen Reactions   • Bee Venom Facial Swelling   • Other Rash     [facility] - [id number]: mushrooms   • Penicillins Rash       Social History   Past Surgical History:   Procedure Laterality Date   • BICEPS TENDON REPAIR  1999   • COLONOSCOPY     • FRACTURE SURGERY  5/15/1988    Approx date  Rt index finger fractured  • HAND SURGERY      Last Assessed:7/11/2016   • HERNIA REPAIR      Last Assessed:7/11/2016   • IR CHEST TUBE CHECK/CHANGE/REPOSITION/UPSIZE  4/1/2021   • IR CHEST TUBE PLACEMENT  3/30/2021   • KNEE ARTHROSCOPY     • CT RPR 1ST INGUN HRNA AGE 5 YRS/> REDUCIBLE Right 2/23/2016    Procedure: REPAIR HERNIA INGUINAL with mesh;  Surgeon: Edel Lopez DO;  [facility];  Service: General   • PROSTATE BIOPSY     • WISDOM TOOTH EXTRACTION       Family History   Problem Relation Age of Onset   • Hypertension Mother        Objective:  /62 (BP Location: Left arm, Patient Position: Sitting, Cuff Size: Adult)   Pulse 91   Temp (!) 97 3 °F (36 3 °C) (Temporal)   Ht 5' 8\" (1 727 m)   Wt 79 8 kg (176 lb)   SpO2 97%   BMI 26 76 kg/m²      Physical Exam  Constitutional:       General: He is not in acute distress  Appearance: Normal appearance  He is normal weight  He is not diaphoretic  HENT:      Head: Normocephalic and atraumatic  Right Ear: Tympanic membrane and external ear normal       Left Ear: Tympanic membrane and external ear normal       Mouth/Throat:      Mouth: Mucous membranes are moist       Pharynx: Oropharynx is clear  No oropharyngeal exudate or posterior oropharyngeal erythema  Eyes:      Extraocular Movements: Extraocular movements intact        Conjunctiva/sclera: Conjunctivae normal       Pupils: Pupils are equal, round, and " reactive to light  Cardiovascular:      Rate and Rhythm: Normal rate and regular rhythm  Heart sounds: Normal heart sounds  Pulmonary:      Effort: Pulmonary effort is normal  No respiratory distress  Breath sounds: Normal breath sounds  No wheezing, rhonchi or rales  Comments: Slightly diminished throughout but at baseline  O2 2L nasal cannula   Musculoskeletal:      Right lower leg: No edema  Left lower leg: No edema  Skin:     General: Skin is warm and dry  Neurological:      Mental Status: He is alert and oriented to person, place, and time  Mental status is at baseline  Psychiatric:         Mood and Affect: Mood normal          Behavior: Behavior normal          Thought Content:  Thought content normal          Judgment: Judgment normal

## 2023-05-18 NOTE — ASSESSMENT & PLAN NOTE
- BP improved and stable on lisinopril 5mg   - continue off of HCTZ  - monitor BP regularly at home, notify office if consistently < 110/60, would then recommend trying patient off of lisinopril

## 2023-06-22 ENCOUNTER — TELEPHONE (OUTPATIENT)
Dept: PULMONOLOGY | Facility: CLINIC | Age: 73
End: 2023-06-22

## 2023-06-22 NOTE — TELEPHONE ENCOUNTER
Patients wife lvm stating that Marcos Alatorre has papers for the Dr Shahrzad Valdez to fill out because he is applying for  disability  The wife wants to know if Marcos Alatorre needs to make an appointment to get these papers filled out or can they be dropped off  Please advise

## 2023-06-28 ENCOUNTER — OFFICE VISIT (OUTPATIENT)
Dept: INTERNAL MEDICINE CLINIC | Facility: OTHER | Age: 73
End: 2023-06-28
Payer: MEDICARE

## 2023-06-28 VITALS
TEMPERATURE: 97.5 F | HEIGHT: 68 IN | SYSTOLIC BLOOD PRESSURE: 110 MMHG | DIASTOLIC BLOOD PRESSURE: 70 MMHG | HEART RATE: 102 BPM | WEIGHT: 168.6 LBS | BODY MASS INDEX: 25.55 KG/M2 | OXYGEN SATURATION: 97 %

## 2023-06-28 DIAGNOSIS — I10 ESSENTIAL HYPERTENSION: Primary | ICD-10-CM

## 2023-06-28 PROBLEM — J06.9 UPPER RESPIRATORY TRACT INFECTION: Status: RESOLVED | Noted: 2023-05-11 | Resolved: 2023-06-28

## 2023-06-28 PROCEDURE — 99213 OFFICE O/P EST LOW 20 MIN: CPT | Performed by: NURSE PRACTITIONER

## 2023-06-28 NOTE — PROGRESS NOTES
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             [employer] software was used to dictate this note  It may contain errors with dictating incorrect words or incorrect spelling  Please contact the provider directly with any questions  Subjective:      Patient ID: Jaycee Melendez is a 67 y o  male  HPI    Patient presents today to discuss disability claims for his time in [employer]  He is eligible for disability due to his HTN  He has hypertension  He is currently on lisinopril 5 mg daily  He has forms for me to complete for him  He does not follow with the [place]  He served in the [address] [place] [address] in [facility] Feb 1971-Feb 1972 and in the [employer] in [facility] June 2005-June 2006  The following portions of the patient's history were reviewed and updated as appropriate: allergies, current medications, past family history, past medical history, past social history, past surgical history and problem list     Review of Systems   Constitutional: Negative for unexpected weight change  Cardiovascular: Negative for chest pain  Neurological: Negative for headaches  Past Medical History:   Diagnosis Date   • Allergic 1/1/1990    Bee stings and penicillin   • BPH (benign prostatic hyperplasia)    • COPD (chronic obstructive pulmonary disease) (HCC)     Last Assessed:11/14/2016   • Diverticulosis    • Elevated prostate specific antigen (PSA)    • Emphysema (subcutaneous) (surgical) resulting from a procedure    • Enlarged prostate with lower urinary tract symptoms (LUTS)     Resolved:8/22/2017   • Hernia, inguinal, right    • Hypertension 5/1/2019    Visit with Ulysses Cost showed slightly elevated bp     • Pneumonia due to COVID-19 virus 3/16/2021         Current Outpatient Medications:   •  albuterol (PROVENTIL HFA,VENTOLIN HFA) 90 mcg/act inhaler, Inhale 2 puffs every 6 (six) hours as needed for shortness of breath, Disp: 54 g, Rfl: 3  •  atorvastatin (LIPITOR) 20 mg tablet, Take 1 tablet (20 mg total) by mouth daily, Disp: 90 tablet, Rfl: 1  •  benzonatate (TESSALON PERLES) 100 mg capsule, Take 1 capsule (100 mg total) by mouth 3 (three) times a day as needed for cough, Disp: 20 capsule, Rfl: 0  •  cholecalciferol (VITAMIN D3) 1,000 units tablet, Take 2 tablets (2,000 Units total) by mouth daily for 2 days, Disp: 4 tablet, Rfl: 0  •  EPINEPHrine (EPIPEN) 0 3 mg/0 3 mL SOAJ, Inject 0 3 mL (0 3 mg total) into a muscle once for 1 dose, Disp: 0 6 mL, Rfl: 1  •  finasteride (PROSCAR) 5 mg tablet, Take 1 tablet (5 mg total) by mouth daily, Disp: 90 tablet, Rfl: 3  •  guaiFENesin (MUCINEX) 600 mg 12 hr tablet, Take 1,200 mg by mouth if needed, Disp: , Rfl:   •  lisinopril (ZESTRIL) 10 mg tablet, Take 0 5 tablets (5 mg total) by mouth daily, Disp: 90 tablet, Rfl: 1  •  loratadine (CLARITIN REDITABS) 10 MG dissolvable tablet, Take 10 mg by mouth daily as needed for allergies  , Disp: , Rfl:   •  tamsulosin (Flomax) 0 4 mg, Take 1 capsule (0 4 mg total) by mouth daily with dinner, Disp: 90 capsule, Rfl: 3  •  Trelegy Ellipta 100-62 5-25 MCG/ACT inhaler, USE 1 INHALATION DAILY  RINSE MOUTH AFTER USE, Disp: 360 blister, Rfl: 3    Allergies   Allergen Reactions   • Bee Venom Facial Swelling   • Other Rash     [employer] - [id number]: mushrooms   • Penicillins Rash       Social History   Past Surgical History:   Procedure Laterality Date   • BICEPS TENDON REPAIR  1999   • COLONOSCOPY     • FRACTURE SURGERY  5/15/1988    Approx date  Rt index finger fractured     • HAND SURGERY      Last Assessed:7/11/2016   • HERNIA REPAIR      Last Assessed:7/11/2016   • IR CHEST TUBE CHECK/CHANGE/REPOSITION/UPSIZE  4/1/2021   • IR CHEST TUBE PLACEMENT  3/30/2021   • KNEE ARTHROSCOPY     • IA RPR 1ST "Raeann Vasquez AGE 5 YRS/> REDUCIBLE Right 2/23/2016    Procedure: REPAIR HERNIA INGUINAL with mesh;  Surgeon: Rosa M Raymond DO;  [facility];  Service: General   • PROSTATE BIOPSY     • WISDOM TOOTH EXTRACTION       Family History   Problem Relation Age of Onset   • Hypertension Mother        Objective:  /70 (BP Location: Left arm, Patient Position: Sitting, Cuff Size: Standard)   Pulse 102   Temp 97 5 °F (36 4 °C) (Temporal)   Ht 5' 8\" (1 727 m)   Wt 76 5 kg (168 lb 9 6 oz)   SpO2 97% Comment: 2 liters nc  BMI 25 64 kg/m²      Physical Exam  Constitutional:       General: He is not in acute distress  Appearance: Normal appearance  He is normal weight  He is not diaphoretic  HENT:      Head: Normocephalic and atraumatic  Cardiovascular:      Rate and Rhythm: Normal rate and regular rhythm  Heart sounds: Normal heart sounds  Pulmonary:      Effort: No respiratory distress  Breath sounds: Normal breath sounds  No wheezing, rhonchi or rales  Comments: Supplemental O2 Nasal cannula 2L  Neurological:      Mental Status: He is alert  Mental status is at baseline     Psychiatric:         Mood and Affect: Mood normal          Behavior: Behavior normal            "

## 2023-06-28 NOTE — ASSESSMENT & PLAN NOTE
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

## 2023-08-10 DIAGNOSIS — J43.2 CENTRILOBULAR EMPHYSEMA (HCC): ICD-10-CM

## 2023-08-10 RX ORDER — ALBUTEROL SULFATE 90 UG/1
AEROSOL, METERED RESPIRATORY (INHALATION)
Qty: 51 G | Refills: 3 | Status: SHIPPED | OUTPATIENT
Start: 2023-08-10

## 2023-09-12 ENCOUNTER — OFFICE VISIT (OUTPATIENT)
Dept: INTERNAL MEDICINE CLINIC | Facility: OTHER | Age: 73
End: 2023-09-12
Payer: MEDICARE

## 2023-09-12 VITALS
DIASTOLIC BLOOD PRESSURE: 64 MMHG | RESPIRATION RATE: 22 BRPM | SYSTOLIC BLOOD PRESSURE: 110 MMHG | WEIGHT: 169.4 LBS | HEIGHT: 68 IN | HEART RATE: 96 BPM | OXYGEN SATURATION: 96 % | BODY MASS INDEX: 25.67 KG/M2 | TEMPERATURE: 98.1 F

## 2023-09-12 DIAGNOSIS — R73.03 PREDIABETES: ICD-10-CM

## 2023-09-12 DIAGNOSIS — E78.2 MIXED HYPERLIPIDEMIA: Primary | ICD-10-CM

## 2023-09-12 DIAGNOSIS — N18.2 CKD (CHRONIC KIDNEY DISEASE) STAGE 2, GFR 60-89 ML/MIN: ICD-10-CM

## 2023-09-12 DIAGNOSIS — J96.11 CHRONIC HYPOXEMIC RESPIRATORY FAILURE (HCC): ICD-10-CM

## 2023-09-12 DIAGNOSIS — M77.8 RIGHT SHOULDER TENDONITIS: ICD-10-CM

## 2023-09-12 DIAGNOSIS — Z12.5 PROSTATE CANCER SCREENING: ICD-10-CM

## 2023-09-12 DIAGNOSIS — I10 ESSENTIAL HYPERTENSION: ICD-10-CM

## 2023-09-12 DIAGNOSIS — N40.0 BENIGN PROSTATIC HYPERPLASIA WITHOUT LOWER URINARY TRACT SYMPTOMS: ICD-10-CM

## 2023-09-12 DIAGNOSIS — I47.29 NSVT (NONSUSTAINED VENTRICULAR TACHYCARDIA) (HCC): ICD-10-CM

## 2023-09-12 DIAGNOSIS — J43.2 CENTRILOBULAR EMPHYSEMA (HCC): ICD-10-CM

## 2023-09-12 PROCEDURE — 99214 OFFICE O/P EST MOD 30 MIN: CPT | Performed by: INTERNAL MEDICINE

## 2023-09-12 RX ORDER — METHYLPREDNISOLONE 4 MG/1
TABLET ORAL
Qty: 21 EACH | Refills: 0 | Status: SHIPPED | OUTPATIENT
Start: 2023-09-12 | End: 2023-09-20 | Stop reason: ALTCHOICE

## 2023-09-12 RX ORDER — LISINOPRIL 5 MG/1
5 TABLET ORAL DAILY
Qty: 90 TABLET | Refills: 1 | Status: SHIPPED | OUTPATIENT
Start: 2023-09-12

## 2023-09-12 RX ORDER — ATORVASTATIN CALCIUM 20 MG/1
20 TABLET, FILM COATED ORAL DAILY
Qty: 90 TABLET | Refills: 1 | Status: SHIPPED | OUTPATIENT
Start: 2023-09-12

## 2023-09-12 NOTE — ASSESSMENT & PLAN NOTE
Continue follow-up with pulmonology. Continue current inhaler regimen and oxygen supplementation. Currently on 2 L nasal cannula, oxygenating well.

## 2023-09-12 NOTE — PROGRESS NOTES
Assessment/Plan:    Centrilobular emphysema (HCC) - severe  Continue follow-up with pulmonology. Continue current inhaler regimen and oxygen supplementation. Currently on 2 L nasal cannula, oxygenating well. Essential hypertension  Controlled. Continue lisinopril 5 mg daily. CKD (chronic kidney disease) stage 2, GFR 60-89 ml/min  Continue to trend kidney function. Avoid nephrotoxic agents. Benign prostatic hyperplasia without lower urinary tract symptoms  Symptoms controlled. Continue finasteride and Flomax. Hyperlipidemia  Continue atorvastatin 20 mg daily. Right shoulder tendonitis  Will prescribe Medrol Dosepak. Discussed range of motion and stretching exercises. Diagnoses and all orders for this visit:    Mixed hyperlipidemia  -     atorvastatin (LIPITOR) 20 mg tablet; Take 1 tablet (20 mg total) by mouth daily  -     CBC; Future  -     Comprehensive metabolic panel; Future  -     Hemoglobin A1C; Future  -     Lipid panel; Future    Essential hypertension  -     lisinopril (ZESTRIL) 5 mg tablet; Take 1 tablet (5 mg total) by mouth daily  -     CBC; Future  -     Comprehensive metabolic panel; Future  -     Hemoglobin A1C; Future  -     Lipid panel; Future    NSVT (nonsustained ventricular tachycardia) (HCC)    Right shoulder tendonitis  -     methylPREDNISolone 4 MG tablet therapy pack; Use as directed on package    Prediabetes  -     Hemoglobin A1C; Future    Prostate cancer screening  -     PSA, Total Screen; Future    Centrilobular emphysema (HCC) - severe    Chronic hypoxemic respiratory failure (HCC)    CKD (chronic kidney disease) stage 2, GFR 60-89 ml/min    Benign prostatic hyperplasia without lower urinary tract symptoms          BMI Counseling: Body mass index is 25.76 kg/m².  The BMI is above normal. Nutrition recommendations include encouraging healthy choices of fruits and vegetables, decreasing fast food intake, consuming healthier snacks, limiting drinks that contain sugar, moderation in carbohydrate intake, increasing intake of lean protein, reducing intake of saturated and trans fat and reducing intake of cholesterol. Exercise recommendations include moderate physical activity 150 minutes/week and exercising 3-5 times per week. No pharmacotherapy was ordered. Patient referred to PCP. Rationale for BMI follow-up plan is due to patient being overweight or obese. Subjective:      Patient ID: Isadora Lou is a 67 y.o. male. Chief Complaint   Patient presents with   • Follow-up     4 month - labs from April not done -    •      Bmi follow up plan   • Shoulder Pain     Right        67year old male is seen today for follow up ofd chronic conditions. Recent labs reviewed today. He has been compliant with his medication regimen. He has been experiencing right shoulder pain since 2 weeks. He denies any recent injury or exertional activity. He denies any decreased ROM. Pain is exacerbated by cold temperatures or change in weather. He has no other concerns at this time. He follows up with his pulmonologist reguilarly. Shoulder Pain   The pain is present in the right shoulder. This is a new problem. The current episode started 1 to 4 weeks ago. The problem occurs daily. The problem has been unchanged. The quality of the pain is described as dull. Pertinent negatives include no fever or numbness. He has tried acetaminophen and NSAIDS for the symptoms. The treatment provided moderate relief. Hypertension  This is a chronic problem. The current episode started more than 1 year ago. The problem is unchanged. The problem is controlled. Pertinent negatives include no chest pain, headaches, palpitations or shortness of breath. Past treatments include ACE inhibitors. The current treatment provides moderate improvement. There are no compliance problems. Hyperlipidemia  This is a chronic problem. The current episode started more than 1 year ago.  The problem is controlled. Recent lipid tests were reviewed and are normal. Pertinent negatives include no chest pain or shortness of breath. Current antihyperlipidemic treatment includes statins. The current treatment provides moderate improvement of lipids. There are no compliance problems. The following portions of the patient's history were reviewed and updated as appropriate: allergies, current medications, past family history, past medical history, past social history, past surgical history and problem list.    Review of Systems   Constitutional: Negative for activity change, appetite change, chills, diaphoresis, fatigue and fever. HENT: Negative for congestion, postnasal drip, rhinorrhea, sinus pressure, sinus pain, sneezing and sore throat. Eyes: Negative for visual disturbance. Respiratory: Negative for apnea, cough, choking, chest tightness, shortness of breath and wheezing. Cardiovascular: Negative for chest pain, palpitations and leg swelling. Gastrointestinal: Negative for abdominal distention, abdominal pain, anal bleeding, blood in stool, constipation, diarrhea, nausea and vomiting. Endocrine: Negative for cold intolerance and heat intolerance. Genitourinary: Negative for difficulty urinating, dysuria and hematuria. Musculoskeletal: Negative. Skin: Negative. Neurological: Negative for dizziness, weakness, light-headedness, numbness and headaches. Hematological: Negative for adenopathy. Psychiatric/Behavioral: Negative for agitation, sleep disturbance and suicidal ideas. All other systems reviewed and are negative.         Past Medical History:   Diagnosis Date   • Allergic 1/1/1990    Bee stings and penicillin   • BPH (benign prostatic hyperplasia)    • COPD (chronic obstructive pulmonary disease) (HCC)     Last Assessed:11/14/2016   • Diverticulosis    • Elevated prostate specific antigen (PSA)    • Emphysema (subcutaneous) (surgical) resulting from a procedure    • Enlarged prostate with lower urinary tract symptoms (LUTS)     Resolved:8/22/2017   • Hernia, inguinal, right    • Hypertension 5/1/2019    Visit with Nessa Earl showed slightly elevated bp. • Pneumonia due to COVID-19 virus 3/16/2021         Current Outpatient Medications:   •  albuterol (PROVENTIL HFA,VENTOLIN HFA) 90 mcg/act inhaler, USE 2 INHALATIONS EVERY 6 HOURS AS NEEDED FOR SHORTNESS OF BREATH, Disp: 51 g, Rfl: 3  •  atorvastatin (LIPITOR) 20 mg tablet, Take 1 tablet (20 mg total) by mouth daily, Disp: 90 tablet, Rfl: 1  •  benzonatate (TESSALON PERLES) 100 mg capsule, Take 1 capsule (100 mg total) by mouth 3 (three) times a day as needed for cough, Disp: 20 capsule, Rfl: 0  •  cholecalciferol (VITAMIN D3) 1,000 units tablet, Take 2 tablets (2,000 Units total) by mouth daily for 2 days, Disp: 4 tablet, Rfl: 0  •  EPINEPHrine (EPIPEN) 0.3 mg/0.3 mL SOAJ, Inject 0.3 mL (0.3 mg total) into a muscle once for 1 dose, Disp: 0.6 mL, Rfl: 1  •  finasteride (PROSCAR) 5 mg tablet, Take 1 tablet (5 mg total) by mouth daily, Disp: 90 tablet, Rfl: 3  •  guaiFENesin (MUCINEX) 600 mg 12 hr tablet, Take 1,200 mg by mouth if needed, Disp: , Rfl:   •  lisinopril (ZESTRIL) 5 mg tablet, Take 1 tablet (5 mg total) by mouth daily, Disp: 90 tablet, Rfl: 1  •  loratadine (CLARITIN REDITABS) 10 MG dissolvable tablet, Take 10 mg by mouth daily as needed for allergies. , Disp: , Rfl:   •  methylPREDNISolone 4 MG tablet therapy pack, Use as directed on package, Disp: 21 each, Rfl: 0  •  tamsulosin (Flomax) 0.4 mg, Take 1 capsule (0.4 mg total) by mouth daily with dinner, Disp: 90 capsule, Rfl: 3  •  Trelegy Ellipta 100-62.5-25 MCG/ACT inhaler, USE 1 INHALATION DAILY.  RINSE MOUTH AFTER USE, Disp: 360 blister, Rfl: 3    Allergies   Allergen Reactions   • Bee Venom Facial Swelling   • Other Rash     Annotation - [id number]: mushrooms   • Penicillins Rash       Social History   Past Surgical History:   Procedure Laterality Date   • BICEPS TENDON REPAIR  1999   • COLONOSCOPY     • FRACTURE SURGERY  5/15/1988    Approx date. Rt index finger fractured. • HAND SURGERY      Last Assessed:7/11/2016   • HERNIA REPAIR      Last Assessed:7/11/2016   • IR CHEST TUBE CHECK/CHANGE/REPOSITION/UPSIZE  4/1/2021   • IR CHEST TUBE PLACEMENT  3/30/2021   • KNEE ARTHROSCOPY     • NM RPR 1ST INGUN HRNA AGE 5 YRS/> REDUCIBLE Right 2/23/2016    Procedure: REPAIR HERNIA INGUINAL with mesh;  Surgeon: Jessica Azul DO;  Location: [facility];  Service: General   • PROSTATE BIOPSY     • WISDOM TOOTH EXTRACTION       Family History   Problem Relation Age of Onset   • Hypertension Mother        Objective:  /64 (BP Location: Left arm, Patient Position: Sitting, Cuff Size: Standard)   Pulse 96   Temp 98.1 °F (36.7 °C) (Temporal)   Resp 22   Ht 5' 8" (1.727 m)   Wt 76.8 kg (169 lb 6.4 oz)   SpO2 96% Comment: @ 2 liters  BMI 25.76 kg/m²     No results found for this or any previous visit (from the past 1344 hour(s)). Physical Exam  Vitals and nursing note reviewed. Constitutional:       General: He is not in acute distress. Appearance: He is well-developed. He is not diaphoretic. HENT:      Head: Normocephalic and atraumatic. Eyes:      General: No scleral icterus. Right eye: No discharge. Left eye: No discharge. Conjunctiva/sclera: Conjunctivae normal.      Pupils: Pupils are equal, round, and reactive to light. Neck:      Thyroid: No thyromegaly. Vascular: No JVD. Cardiovascular:      Rate and Rhythm: Normal rate and regular rhythm. Heart sounds: Normal heart sounds. No murmur heard. No friction rub. No gallop. Pulmonary:      Effort: Pulmonary effort is normal. No respiratory distress. Breath sounds: Normal breath sounds. No wheezing or rales. Chest:      Chest wall: No tenderness. Abdominal:      General: Bowel sounds are normal. There is no distension. Palpations: Abdomen is soft.  There is no mass.      Tenderness: There is no abdominal tenderness. There is no guarding or rebound. Musculoskeletal:         General: No tenderness or deformity. Normal range of motion. Cervical back: Normal range of motion and neck supple. Lymphadenopathy:      Cervical: No cervical adenopathy. Skin:     General: Skin is warm and dry. Coloration: Skin is not pale. Findings: No erythema or rash. Neurological:      Mental Status: He is alert and oriented to person, place, and time. Cranial Nerves: No cranial nerve deficit. Coordination: Coordination normal.      Deep Tendon Reflexes: Reflexes are normal and symmetric. Psychiatric:         Behavior: Behavior normal.         Thought Content:  Thought content normal.         Judgment: Judgment normal.

## 2023-09-20 ENCOUNTER — OFFICE VISIT (OUTPATIENT)
Dept: PULMONOLOGY | Facility: CLINIC | Age: 73
End: 2023-09-20
Payer: MEDICARE

## 2023-09-20 VITALS
WEIGHT: 166 LBS | HEART RATE: 115 BPM | SYSTOLIC BLOOD PRESSURE: 98 MMHG | HEIGHT: 68 IN | DIASTOLIC BLOOD PRESSURE: 64 MMHG | BODY MASS INDEX: 25.16 KG/M2 | TEMPERATURE: 97.9 F | OXYGEN SATURATION: 92 %

## 2023-09-20 DIAGNOSIS — Z23 NEEDS FLU SHOT: ICD-10-CM

## 2023-09-20 DIAGNOSIS — J96.11 CHRONIC HYPOXEMIC RESPIRATORY FAILURE (HCC): ICD-10-CM

## 2023-09-20 DIAGNOSIS — F17.211 CIGARETTE NICOTINE DEPENDENCE IN REMISSION: ICD-10-CM

## 2023-09-20 DIAGNOSIS — J43.2 CENTRILOBULAR EMPHYSEMA (HCC): Primary | ICD-10-CM

## 2023-09-20 PROCEDURE — 99214 OFFICE O/P EST MOD 30 MIN: CPT | Performed by: INTERNAL MEDICINE

## 2023-09-20 PROCEDURE — 90662 IIV NO PRSV INCREASED AG IM: CPT

## 2023-09-20 PROCEDURE — G0008 ADMIN INFLUENZA VIRUS VAC: HCPCS

## 2023-09-20 RX ORDER — FLUTICASONE FUROATE, UMECLIDINIUM BROMIDE AND VILANTEROL TRIFENATATE 100; 62.5; 25 UG/1; UG/1; UG/1
1 POWDER RESPIRATORY (INHALATION) DAILY
Qty: 360 BLISTER | Refills: 3 | Status: SHIPPED | OUTPATIENT
Start: 2023-09-20

## 2023-09-20 NOTE — PROGRESS NOTES
Progress note - Pulmonary Medicine   Tanvir Karimi Held 67 y.o. male MRN: [id number]       Impression & Plan:     Centrilobular emphysema ([address]) - severe  · Stable and doing well on Trelegy  · Does use rescue inhaler, slightly more frequently lately but still within reasonable limits  · Prescriptions renewed as appropriate    Chronic hypoxemic respiratory failure ([address])  · Continues to gain benefit from supplemental oxygen using at 2 L continuously  · Continue present therapy    Former smoker. Quit 2015. · Meet lung cancer screening criteria, next scan will be due in April. He will see me in the office prior to this and we will order at next follow-up  · Remains agreeable to ongoing lung cancer screening surveillance      Return in about 6 months (around 3/20/2024). ______________________________________________________________________    HPI:    Marly Poster presents today for follow-up of emphysema and chronic hypoxemic respiratory failure. He is doing well on Trelegy. He reports no new symptoms. He is using albuterol intermittently when he needs it but still uses it sparingly. He does not have chest pain or cardiac complaints. No lightheadedness or dizziness. He is fairly sedentary because of his exercise intolerance. He did recently complete a Medrol Dosepak for shoulder. No other new symptoms to report. No abdominal pain or GERD. No fever or chills. No significant weight loss.      Current Medications:    Current Outpatient Medications:   •  albuterol (PROVENTIL HFA,VENTOLIN HFA) 90 mcg/act inhaler, USE 2 INHALATIONS EVERY 6 HOURS AS NEEDED FOR SHORTNESS OF BREATH, Disp: 51 g, Rfl: 3  •  atorvastatin (LIPITOR) 20 mg tablet, Take 1 tablet (20 mg total) by mouth daily, Disp: 90 tablet, Rfl: 1  •  benzonatate (TESSALON PERLES) 100 mg capsule, Take 1 capsule (100 mg total) by mouth 3 (three) times a day as needed for cough, Disp: 20 capsule, Rfl: 0  •  cholecalciferol (VITAMIN D3) 1,000 units tablet, Take 2 tablets (2,000 Units total) by mouth daily for 2 days, Disp: 4 tablet, Rfl: 0  •  EPINEPHrine (EPIPEN) 0.3 mg/0.3 mL SOAJ, Inject 0.3 mL (0.3 mg total) into a muscle once for 1 dose, Disp: 0.6 mL, Rfl: 1  •  finasteride (PROSCAR) 5 mg tablet, Take 1 tablet (5 mg total) by mouth daily, Disp: 90 tablet, Rfl: 3  •  fluticasone-umeclidinium-vilanterol (Trelegy Ellipta) 100-62.5-25 mcg/actuation inhaler, Inhale 1 puff daily Rinse mouth after use, Disp: 360 blister, Rfl: 3  •  guaiFENesin (MUCINEX) 600 mg 12 hr tablet, Take 1,200 mg by mouth if needed, Disp: , Rfl:   •  lisinopril (ZESTRIL) 5 mg tablet, Take 1 tablet (5 mg total) by mouth daily, Disp: 90 tablet, Rfl: 1  •  loratadine (CLARITIN REDITABS) 10 MG dissolvable tablet, Take 10 mg by mouth daily as needed for allergies. , Disp: , Rfl:   •  tamsulosin (Flomax) 0.4 mg, Take 1 capsule (0.4 mg total) by mouth daily with dinner, Disp: 90 capsule, Rfl: 3    Review of Systems:  Answers for HPI/ROS submitted by the patient on 9/13/2023  Do you have shortness of breath?: Yes  Chronicity: chronic  When did you first notice your symptoms?: more than 1 year ago  How often do your symptoms occur?: intermittently  Since you first noticed this problem, how has it changed?: unchanged  Have you had a change in appetite?: No  Do you have chest pain?: No  Do you have shortness of breath that occurs with effort or exertion?: Yes  Do you have ear congestion?: No  Do you have ear pain?: No  Do you have a fever?: No  Do you have headaches?: No  Do you have heartburn?: No  Do you have fatigue?: No  Do you have muscle pain?: No  Do you have nasal congestion?: Yes  Do you have shortness of breath when lying flat?: No  Do you have shortness of breath when you wake up?: No  Do you have post-nasal drip?: Yes  Do you have a runny nose?: Yes  Do you have sneezing?: No  Do you have a sore throat?: No  Do you have sweats?: No  Do you have trouble swallowing?: No  Have you experienced weight loss?: No  Which of the following makes your symptoms worse?: change in weather, climbing stairs, exercise, strenuous activity, URI  Which of the following makes your symptoms better?: steroid inhaler    Aside from what is mentioned in the HPI, the review of systems is otherwise negative    Past medical history, surgical history, and family history were reviewed and updated as appropriate    Social history updates:  Social History     Tobacco Use   Smoking Status Former   • Packs/day: 1.50   • Years: 49.00   • Total pack years: 73.50   • Types: Cigarettes   • Start date: 5   • Quit date: 2015   • Years since quittin.8   Smokeless Tobacco Never       PhysicalExamination:  Vitals:   BP 98/64 (BP Location: Left arm, Patient Position: Sitting, Cuff Size: Standard)   Pulse (!) 115   Temp 97.9 °F (36.6 °C) (Tympanic)   Ht 5' 8" (1.727 m)   Wt 75.3 kg (166 lb)   SpO2 92% Comment: O2 2L  BMI 25.24 kg/m²   Gen:  Comfortable on nasal cannula at 2 L. No conversational dyspnea  HEENT:  Conjugate gaze. sclerae anicteric. Oropharynx moist  Neck: Trachea is midline. No JVD. No adenopathy  Chest: Hyperinflated and hyperresonant. Lung sounds are very distant but no wheezes or crackles  Cardiac: Regular. no murmur  Abdomen:  benign  Extremities: No edema  Neuro:  Normal speech and mentation    Diagnostic Data:  Labs:   I personally reviewed the most recent laboratory data pertinent to today's visit    Lab Results   Component Value Date    WBC 8.96 11/15/2022    HGB 12.9 11/15/2022    HCT 42.6 11/15/2022    MCV 96 11/15/2022     11/15/2022     Lab Results   Component Value Date    SODIUM 140 03/15/2023    K 4.5 03/15/2023    CO2 33 (H) 03/15/2023     03/15/2023    BUN 20 03/15/2023    CREATININE 1.06 03/15/2023    GLUCOSE 155 (H) 2021    CALCIUM 9.5 03/15/2023       Imaging:  I personally reviewed the images on the [facility] system pertinent to today's visit  Last CAT scan was in April and did not show any significant new nodules of concern.   Background emphysema identified    Andrei Goldberg MD

## 2023-09-20 NOTE — ASSESSMENT & PLAN NOTE
· Meet lung cancer screening criteria, next scan will be due in April.   He will see me in the office prior to this and we will order at next follow-up  · Remains agreeable to ongoing lung cancer screening surveillance

## 2023-09-20 NOTE — ASSESSMENT & PLAN NOTE
· Continues to gain benefit from supplemental oxygen using at 2 L continuously  · Continue present therapy

## 2023-09-20 NOTE — ASSESSMENT & PLAN NOTE
· Stable and doing well on Trelegy  · Does use rescue inhaler, slightly more frequently lately but still within reasonable limits  · Prescriptions renewed as appropriate

## 2023-10-27 NOTE — CASE MANAGEMENT
Pt's wife is on her way to transport pt  CM confirmed pt has enough O2 at home pending his new delivery  Wife bringing portable tank  Pt feels comfortable d/c without new order being delivered and expresses understanding of his situation  Was able to talk to patient/her daughter  Discussed potassium  Has had a mild elevation in the past at 5.3

## 2023-12-28 DIAGNOSIS — N40.1 BPH WITH OBSTRUCTION/LOWER URINARY TRACT SYMPTOMS: ICD-10-CM

## 2023-12-28 DIAGNOSIS — N13.8 BPH WITH OBSTRUCTION/LOWER URINARY TRACT SYMPTOMS: ICD-10-CM

## 2023-12-29 RX ORDER — TAMSULOSIN HYDROCHLORIDE 0.4 MG/1
CAPSULE ORAL
Qty: 90 CAPSULE | Refills: 1 | Status: SHIPPED | OUTPATIENT
Start: 2023-12-29

## 2024-02-21 PROBLEM — Z00.00 MEDICARE ANNUAL WELLNESS VISIT, SUBSEQUENT: Status: RESOLVED | Noted: 2019-08-12 | Resolved: 2024-02-21

## 2024-04-19 NOTE — ASSESSMENT & PLAN NOTE
"    [facility] Medicine Services  HISTORY AND PHYSICAL    Patient Name: Ahmet Mariee  : 1951  MRN: [id number]  Primary Care Physician: Evaristo Gaffney MD  Date of admission: 2024    Subjective   Subjective     Chief Complaint:  Facial droop    HPI:  Ahmet Mariee is a 72 y.o. male with PMHx prostate cancer, pancreatic insufficiency, irritable bowel syndrome, acid reflux and hypertension who presents to the ED for evaluation of right sided facial droop. Pt reports he was at his grand-daughter's softball game and his daughter noticed the right side of his face was drooping - this was ~ 7pm. He reports he noticed when he was brushing his teeth ~ 5pm he had some trouble spitting out the water. Tonight on exam he has difficulty closing his right eye tightly and can't puff out his right cheek. He also noticed last night when watching TV, his right eye did not seem as \"sharp\" as usual - he has lens implants and uses right eye for distance and left eye for close vision. Denies history of stroke, no unilateral weakness, no speech difficulty. He has some mild pain behind his right ear, otherwise no acute complaints. No recent medication changes, no recent illness or surgery. He has been evaluated by stroke neurology in the ED and is awaiting an MRI brain study.    Review of Systems   Eyes:  Positive for visual disturbance.   Neurological:  Positive for facial asymmetry.   All other systems reviewed and are negative.         Personal History     Past Medical History:   Diagnosis Date    Benign prostatic hyperplasia     Cancer     prostate, slow growing, monitored by urology, Naveed Spencer MD    Exocrine pancreatic insufficiency     Monitored by GI Dr. Daniel Sanchez    GERD (gastroesophageal reflux disease)     Hypertension     Injury of left rotator cuff     partial thickness rotator cuff tear    Irritable bowel syndrome          Oncology Problem List:  Prostate cancer " Due to patient's symptoms I recommend she go for COVID testing at one of our central test sites  Order for COVID testing place  Discussed importance of rest fluids hydration and supportive care  Okay to use Tylenol as needed for pain  Discussed importance of avoiding exposure to others while we are waiting on your test results  Okay to start vitamin-C 500 mg by mouth twice daily  Zinc 50 mg by mouth once daily x 7 days  Continue with vitamin-D supplementation  Patient with underlying lung conditions  Discussed importance of continued monitoring of pulse ox  Report back immediately if pulse ox <94%  Discussed red flag sx and ER precautions which need immediate eval and tx should they develop  Follow up next week with our office for test results  (10/06/2021; Status: Active)  Malignant tumor of prostate (02/24/2018; Status: Active)    Oncology/Hematology History       Past Surgical History:   Procedure Laterality Date    CYST REMOVAL      corner of right eye    EYE SURGERY Bilateral     HERNIA REPAIR  05/2013    hiatal    INGUINAL HERNIA REPAIR  05/2021    bilateral    KNEE CARTILAGE SURGERY  2008    left knee meniscus repair    PARTIAL GASTRECTOMY  05/2013    severe hiatal hernia, emergency surgery     UMBILICAL HERNIA REPAIR  12/2013    VASECTOMY  1990       Family History:  family history includes Cancer in his father, mother, and paternal grandfather; Diabetes in his father; Hypertension in his mother; Other in his sister.     Social History:  reports that he has never smoked. He has never used smokeless tobacco. He reports that he does not drink alcohol and does not use drugs.  Social History     Social History Narrative    Not on file       Medications:  esomeprazole, fish oil, hydroCHLOROthiazide, hyoscyamine, lisinopril, loperamide, saccharomyces boulardii, tadalafil, traZODone, triamcinolone, and uribel    Allergies   Allergen Reactions    Sulfa Antibiotics Rash       Objective   Objective     Vital Signs:   Temp:  [97.6 °F (36.4 °C)-97.9 °F (36.6 °C)] 97.6 °F (36.4 °C)  Heart Rate:  [55-72] 60  Resp:  [18] 18  BP: (143-162)/(82-99) 162/82    Physical Exam  Constitutional:       General: He is not in acute distress.     Appearance: He is well-developed.   HENT:      Head: Normocephalic and atraumatic.      Nose: Nose normal.      Mouth/Throat:      Pharynx: Oropharynx is clear.   Eyes:      Extraocular Movements: Extraocular movements intact.      Conjunctiva/sclera: Conjunctivae normal.      Pupils: Pupils are equal, round, and reactive to light.   Cardiovascular:      Rate and Rhythm: Regular rhythm. Bradycardia present.      Pulses: Normal pulses.      Heart sounds: Normal heart sounds. No murmur heard.  Pulmonary:      Effort: Pulmonary effort  is normal. No respiratory distress.      Breath sounds: Normal breath sounds.   Abdominal:      General: Bowel sounds are normal. There is no distension.      Palpations: Abdomen is soft.      Tenderness: There is no abdominal tenderness.   Musculoskeletal:         General: No swelling. Normal range of motion.      Cervical back: Normal range of motion and neck supple.   Skin:     General: Skin is warm and dry.      Capillary Refill: Capillary refill takes less than 2 seconds.      Findings: No rash.   Neurological:      Mental Status: He is alert and oriented to person, place, and time.      Cranial Nerves: Facial asymmetry present.   Psychiatric:         Mood and Affect: Mood normal.         Behavior: Behavior normal.            Result Review:  I have personally reviewed the results from the time of this admission to 4/18/2024 23:59 EDT and agree with these findings:  [x]  Laboratory list / accordion  []  Microbiology  [x]  Radiology  [x]  EKG/Telemetry   []  Cardiology/Vascular   []  Pathology  []  Old records  []  Other:  Most notable findings include:     LAB RESULTS:      Lab 04/18/24 2016 04/18/24 2010   WBC  --  7.68   HEMOGLOBIN  --  17.6   HEMOGLOBIN, POC 17.7*  --    HEMATOCRIT  --  52.4*   HEMATOCRIT POC 52*  --    PLATELETS  --  166   NEUTROS ABS  --  5.06   IMMATURE GRANS (ABS)  --  0.02   LYMPHS ABS  --  1.48   MONOS ABS  --  0.83   EOS ABS  --  0.24   MCV  --  94.9   APTT  --  31.1         Lab 04/18/24 2016 04/18/24 2010   SODIUM  --  137   POTASSIUM  --  3.8   CHLORIDE  --  101   CO2  --  21.0*   ANION GAP  --  15.0   BUN  --  26*   CREATININE 1.50* 1.29*   EGFR 49.2* 58.9*   GLUCOSE  --  105*   CALCIUM  --  9.9   MAGNESIUM  --  2.1   PHOSPHORUS  --  3.7         Lab 04/18/24 2010   TOTAL PROTEIN 7.8   ALBUMIN 4.5   GLOBULIN 3.3   ALT (SGPT) 32  32   AST (SGOT) 24  24   BILIRUBIN 0.5   ALK PHOS 58         Lab 04/18/24 2010   HSTROP T 9                 Brief Urine Lab Results  (Last result in  the past 365 days)        Color   Clarity   Blood   Leuk Est   Nitrite   Protein   CREAT   Urine HCG        06/09/23 0916 Green  Comment: Any Substance that causes an abnormal urine color can alter the accuracy of the chemical reactions.   Clear   Trace   Negative   Negative   30 mg/dL (1+)                 Microbiology Results (last 10 days)       ** No results found for the last 240 hours. **            XR Chest 1 View    Result Date: 4/18/2024  XR CHEST 1 VW Date of Exam: 4/18/2024 8:43 PM EDT Indication: Acute Stroke Protocol (onset < 12 hrs) Comparison: Chest x-ray 5/27/2013 Findings: Normal cardiomediastinal silhouette. The lungs are clear. No pleural effusion or pneumothorax. No acute osseous findings.     Impression: Impression: No acute cardiopulmonary findings. Electronically Signed: Roni Tan MD  4/18/2024 9:09 PM EDT  Workstation ID: [id number]    CT Angiogram Head w AI Analysis of LVO    Result Date: 4/18/2024  CT ANGIOGRAM HEAD W AI ANALYSIS OF LVO, CT ANGIOGRAM NECK Date of Exam: 4/18/2024 8:18 PM EDT Indication: Neuro deficit, acute stroke suspected Neuro deficit, acute stroke suspected. Comparison: Same day CT head and CT perfusion Technique: CTA of the head was performed after the uneventful intravenous administration of 115 cc Isovue-370. Reconstructed coronal and sagittal images were also obtained. In addition, a 3-D volume rendered image was created for interpretation. Automated exposure control and iterative reconstruction methods were used. Findings: Contrast opacification: Adequate Aortic Arch: Unremarkable Right: Innominate: Patent Subclavian: Patent Common Carotid: Patent External Carotid:Patent Internal Carotid: Calcified and noncalcified plaques noted proximally causing approximately 40% stenosis by NASCET criteria. Intracranial ICA: Patent MCA:Patent SG: Patent PCA: Patent Vertebral: Patent Left: Subclavian: Patent Common Carotid: Vascular calcifications noted at the origin with  possible mild stenosis. Otherwise unremarkable External Carotid:Patent Internal Carotid: Patent Intracranial ICA: Patent MCA:Patent SG: Patent PCA: Patent Vertebral: Patent Basilar: Patent Soft Tissue: Unremarkable Lungs: Unremarkable Bones: Small calcified granuloma within the left lung apex.     Impression: Impression: No evidence of vessel occlusion, severe stenosis, aneurysm or dissection of the arterial vessels of the head and neck. Mild, approximately 40%, stenosis of the proximal right cervical internal carotid artery secondary to calcified and noncalcified plaques. Possible mild stenosis at the origin of the left common carotid artery secondary to predominately calcified plaques. Electronically Signed: Jose Spencer DO  4/18/2024 8:47 PM EDT  Workstation ID: [id number]    CT Angiogram Neck    Result Date: 4/18/2024  CT ANGIOGRAM HEAD W AI ANALYSIS OF LVO, CT ANGIOGRAM NECK Date of Exam: 4/18/2024 8:18 PM EDT Indication: Neuro deficit, acute stroke suspected Neuro deficit, acute stroke suspected. Comparison: Same day CT head and CT perfusion Technique: CTA of the head was performed after the uneventful intravenous administration of 115 cc Isovue-370. Reconstructed coronal and sagittal images were also obtained. In addition, a 3-D volume rendered image was created for interpretation. Automated exposure control and iterative reconstruction methods were used. Findings: Contrast opacification: Adequate Aortic Arch: Unremarkable Right: Innominate: Patent Subclavian: Patent Common Carotid: Patent External Carotid:Patent Internal Carotid: Calcified and noncalcified plaques noted proximally causing approximately 40% stenosis by NASCET criteria. Intracranial ICA: Patent MCA:Patent SG: Patent PCA: Patent Vertebral: Patent Left: Subclavian: Patent Common Carotid: Vascular calcifications noted at the origin with possible mild stenosis. Otherwise unremarkable External Carotid:Patent Internal Carotid: Patent  Intracranial ICA: Patent MCA:Patent SG: Patent PCA: Patent Vertebral: Patent Basilar: Patent Soft Tissue: Unremarkable Lungs: Unremarkable Bones: Small calcified granuloma within the left lung apex.     Impression: Impression: No evidence of vessel occlusion, severe stenosis, aneurysm or dissection of the arterial vessels of the head and neck. Mild, approximately 40%, stenosis of the proximal right cervical internal carotid artery secondary to calcified and noncalcified plaques. Possible mild stenosis at the origin of the left common carotid artery secondary to predominately calcified plaques. Electronically Signed: Jose Spencer DO  4/18/2024 8:47 PM EDT  Workstation ID: [id number]    CT CEREBRAL PERFUSION WITH & WITHOUT CONTRAST    Result Date: 4/18/2024  CT CEREBRAL PERFUSION W WO CONTRAST Date of Exam: 4/18/2024 8:18 PM EDT Indication: Neuro deficit, acute stroke suspected.  Comparison: Same day head CT Technique: Axial CT images of the brain were obtained prior to and after the administration of 115 cc Isovue-370. Core blood volume, core blood flow, mean transit time, and Tmax images were obtained utilizing the Rapid software protocol. A limited CT angiogram of the head was also performed to measure the blood vessel density. The radiation dose reduction device was turned on for each scan per the ALARA (As Low as Reasonably Achievable) protocol. Findings: Adequate perfusion images. Adequate ROIs. No significant motion artifact. CBF less than 30%: 0 mL Tmax greater than 6 seconds: 3 mL Mismatch volume: 3 mL Mismatch ratio: Infinite An area of Tmax greater than 6 seconds is noted within the left temporal lobe with an approximate volume of 3 mL.     Impression: Impression: No core infarct. No definite ischemia. A small area of Tmax greater than 6 seconds is noted within the left temporal lobe with an approximate volume of 3 mL, which is presumed to be artifactual. If further evaluation of this region is  warranted, please consider follow-up with dedicated MRI Electronically Signed: Joseke Spencer DO  4/18/2024 8:37 PM EDT  Workstation ID: [id number]    CT Head Without Contrast Stroke Protocol    Result Date: 4/18/2024  CT HEAD WO CONTRAST STROKE PROTOCOL Date of Exam: 4/18/2024 8:08 PM EDT Indication: Neuro deficit, acute, stroke suspected Neuro deficit, acute stroke suspected. Comparison: None available. Technique: Axial CT images were obtained of the head without contrast administration.  Reconstructed coronal images were also obtained. Automated exposure control and iterative construction methods were used. Scan Time: 2015 Results discussed with Dr. Haro at 8:19 a.m. on 4/18/2024. Findings: No intracranial hemorrhage. Gray-white matter differentiation is maintained without evidence of an acute infarction. Multiple foci of decreased attenuation are present within the subcortical, deep cerebral, and periventricular white matter consistent with chronic small vessel/microangiopathic ischemic changes. No extra-axial mass or collection. The ventricles and sulci are prominent commensurate with involutional changes. The posterior fossa appears grossly normal. Sellar and suprasellar structures are normal. Lens replacements. Mucosal thickening in the inferior maxillary sinuses.. The bony calvarium is intact.     Impression: Impression: No acute intracranial pathology. Electronically Signed: Alvarez Wilkerson MD  4/18/2024 8:22 PM EDT  Workstation ID: [id number]         Assessment & Plan   Assessment & Plan       Stroke-like symptoms    Primary hypertension    Prostate cancer    Gastroesophageal reflux disease without esophagitis    Irritable bowel syndrome with diarrhea    Insomnia    Facial droop    PELON (acute kidney injury)    Right facial droop  - evaluated by stroke navigator in ED  - consult stroke neurology  - CTH, CTP, CTA H/N complete  - MRI brain pending  - stroke orders per protocol    PELON  - creatinine 1.50, was  1.38 12/11/23 and previously 1.14 2/9/23  - fluids  - avoid nephrotoxic agents  - hold hctz  - bladder scan  - acute urinary retention protocol    HTN  - allowing for permissive HTN, hold BP meds  - on lisinopril / hctz, hold    Prostate cancer  - f/w Dr. Spencer  - continue tadalafil    GERD  - continue PPI    Insomnia  - hold trazodone for now  - melatonin prn sleep    IBS  - uses imodium ~ every other day at home, hold for now  - levsin prn      DVT prophylaxis:  SCDS    CODE STATUS:    Code Status (Patient has no pulse and is not breathing): CPR (Attempt to Resuscitate)  Medical Interventions (Patient has pulse or is breathing): Full Support      Expected Discharge    Expected Discharge Date: 4/19/2024; Expected Discharge Time:       Signature: Electronically signed by VAL Page, 04/18/24, 11:57 PM EDT    Total APC time: 60 minutes

## 2024-04-26 ENCOUNTER — APPOINTMENT (OUTPATIENT)
Dept: LAB | Facility: IMAGING CENTER | Age: 74
End: 2024-04-26
Payer: MEDICARE

## 2024-04-26 DIAGNOSIS — R73.03 PREDIABETES: ICD-10-CM

## 2024-04-26 DIAGNOSIS — I10 ESSENTIAL HYPERTENSION: ICD-10-CM

## 2024-04-26 DIAGNOSIS — Z12.5 PROSTATE CANCER SCREENING: ICD-10-CM

## 2024-04-26 DIAGNOSIS — E78.2 MIXED HYPERLIPIDEMIA: ICD-10-CM

## 2024-04-26 LAB
ALBUMIN SERPL BCP-MCNC: 4.4 G/DL (ref 3.5–5)
ALP SERPL-CCNC: 73 U/L (ref 34–104)
ALT SERPL W P-5'-P-CCNC: 14 U/L (ref 7–52)
ANION GAP SERPL CALCULATED.3IONS-SCNC: 8 MMOL/L (ref 4–13)
AST SERPL W P-5'-P-CCNC: 19 U/L (ref 13–39)
BILIRUB SERPL-MCNC: 0.5 MG/DL (ref 0.2–1)
BUN SERPL-MCNC: 13 MG/DL (ref 5–25)
CALCIUM SERPL-MCNC: 9.4 MG/DL (ref 8.4–10.2)
CHLORIDE SERPL-SCNC: 101 MMOL/L (ref 96–108)
CHOLEST SERPL-MCNC: 149 MG/DL
CO2 SERPL-SCNC: 36 MMOL/L (ref 21–32)
CREAT SERPL-MCNC: 1.03 MG/DL (ref 0.6–1.3)
ERYTHROCYTE [DISTWIDTH] IN BLOOD BY AUTOMATED COUNT: 12.6 % (ref 11.6–15.1)
GFR SERPL CREATININE-BSD FRML MDRD: 71 ML/MIN/1.73SQ M
GLUCOSE P FAST SERPL-MCNC: 92 MG/DL (ref 65–99)
HCT VFR BLD AUTO: 44.7 % (ref 36.5–49.3)
HDLC SERPL-MCNC: 59 MG/DL
HGB BLD-MCNC: 13.2 G/DL (ref 12–17)
LDLC SERPL CALC-MCNC: 73 MG/DL (ref 0–100)
MCH RBC QN AUTO: 28.8 PG (ref 26.8–34.3)
MCHC RBC AUTO-ENTMCNC: 29.5 G/DL (ref 31.4–37.4)
MCV RBC AUTO: 97 FL (ref 82–98)
NONHDLC SERPL-MCNC: 90 MG/DL
PLATELET # BLD AUTO: 257 THOUSANDS/UL (ref 149–390)
PMV BLD AUTO: 11.2 FL (ref 8.9–12.7)
POTASSIUM SERPL-SCNC: 4.6 MMOL/L (ref 3.5–5.3)
PROT SERPL-MCNC: 6.7 G/DL (ref 6.4–8.4)
PSA SERPL-MCNC: 0.72 NG/ML (ref 0–4)
RBC # BLD AUTO: 4.59 MILLION/UL (ref 3.88–5.62)
SODIUM SERPL-SCNC: 145 MMOL/L (ref 135–147)
TRIGL SERPL-MCNC: 83 MG/DL
WBC # BLD AUTO: 7.55 THOUSAND/UL (ref 4.31–10.16)

## 2024-04-26 PROCEDURE — 36415 COLL VENOUS BLD VENIPUNCTURE: CPT

## 2024-04-26 PROCEDURE — 85027 COMPLETE CBC AUTOMATED: CPT

## 2024-04-26 PROCEDURE — 80053 COMPREHEN METABOLIC PANEL: CPT

## 2024-04-26 PROCEDURE — 83036 HEMOGLOBIN GLYCOSYLATED A1C: CPT

## 2024-04-26 PROCEDURE — G0103 PSA SCREENING: HCPCS

## 2024-04-26 PROCEDURE — 80061 LIPID PANEL: CPT

## 2024-04-27 LAB
EST. AVERAGE GLUCOSE BLD GHB EST-MCNC: 108 MG/DL
HBA1C MFR BLD: 5.4 %

## 2024-05-01 ENCOUNTER — OFFICE VISIT (OUTPATIENT)
Dept: INTERNAL MEDICINE CLINIC | Facility: OTHER | Age: 74
End: 2024-05-01
Payer: MEDICARE

## 2024-05-01 VITALS
WEIGHT: 168.2 LBS | DIASTOLIC BLOOD PRESSURE: 70 MMHG | RESPIRATION RATE: 18 BRPM | TEMPERATURE: 98 F | SYSTOLIC BLOOD PRESSURE: 102 MMHG | HEART RATE: 87 BPM | BODY MASS INDEX: 25.49 KG/M2 | OXYGEN SATURATION: 96 % | HEIGHT: 68 IN

## 2024-05-01 DIAGNOSIS — N18.2 CKD (CHRONIC KIDNEY DISEASE) STAGE 2, GFR 60-89 ML/MIN: ICD-10-CM

## 2024-05-01 DIAGNOSIS — I74.09 OTHER ARTERIAL EMBOLISM AND THROMBOSIS OF ABDOMINAL AORTA (HCC): ICD-10-CM

## 2024-05-01 DIAGNOSIS — Z00.00 MEDICARE ANNUAL WELLNESS VISIT, SUBSEQUENT: ICD-10-CM

## 2024-05-01 DIAGNOSIS — I10 ESSENTIAL HYPERTENSION: ICD-10-CM

## 2024-05-01 DIAGNOSIS — I47.29 NSVT (NONSUSTAINED VENTRICULAR TACHYCARDIA) (HCC): ICD-10-CM

## 2024-05-01 DIAGNOSIS — N40.0 BENIGN PROSTATIC HYPERPLASIA WITHOUT LOWER URINARY TRACT SYMPTOMS: ICD-10-CM

## 2024-05-01 DIAGNOSIS — J43.2 CENTRILOBULAR EMPHYSEMA (HCC): ICD-10-CM

## 2024-05-01 DIAGNOSIS — E78.2 MIXED HYPERLIPIDEMIA: Primary | ICD-10-CM

## 2024-05-01 DIAGNOSIS — Z13.1 SCREENING FOR DIABETES MELLITUS: ICD-10-CM

## 2024-05-01 DIAGNOSIS — Z12.5 SCREENING FOR PROSTATE CANCER: ICD-10-CM

## 2024-05-01 DIAGNOSIS — Z12.12 SCREENING FOR COLORECTAL CANCER: ICD-10-CM

## 2024-05-01 DIAGNOSIS — R91.1 LUNG NODULE: ICD-10-CM

## 2024-05-01 DIAGNOSIS — Z87.891 PERSONAL HISTORY OF NICOTINE DEPENDENCE: ICD-10-CM

## 2024-05-01 DIAGNOSIS — Z12.11 SCREENING FOR COLORECTAL CANCER: ICD-10-CM

## 2024-05-01 DIAGNOSIS — J96.11 CHRONIC HYPOXEMIC RESPIRATORY FAILURE (HCC): ICD-10-CM

## 2024-05-01 DIAGNOSIS — Z13.6 SCREENING FOR CARDIOVASCULAR CONDITION: ICD-10-CM

## 2024-05-01 DIAGNOSIS — Z12.2 SCREENING FOR LUNG CANCER: ICD-10-CM

## 2024-05-01 DIAGNOSIS — Z71.89 ADVANCED CARE PLANNING/COUNSELING DISCUSSION: ICD-10-CM

## 2024-05-01 PROBLEM — R53.83 OTHER FATIGUE: Status: RESOLVED | Noted: 2019-12-13 | Resolved: 2024-05-01

## 2024-05-01 PROCEDURE — 99214 OFFICE O/P EST MOD 30 MIN: CPT | Performed by: INTERNAL MEDICINE

## 2024-05-01 PROCEDURE — G0439 PPPS, SUBSEQ VISIT: HCPCS | Performed by: INTERNAL MEDICINE

## 2024-05-01 PROCEDURE — 99497 ADVNCD CARE PLAN 30 MIN: CPT | Performed by: INTERNAL MEDICINE

## 2024-05-01 PROCEDURE — G0444 DEPRESSION SCREEN ANNUAL: HCPCS | Performed by: INTERNAL MEDICINE

## 2024-05-01 RX ORDER — LISINOPRIL 5 MG/1
5 TABLET ORAL DAILY
Qty: 90 TABLET | Refills: 1 | Status: SHIPPED | OUTPATIENT
Start: 2024-05-01

## 2024-05-01 RX ORDER — ATORVASTATIN CALCIUM 20 MG/1
20 TABLET, FILM COATED ORAL DAILY
Qty: 90 TABLET | Refills: 1 | Status: SHIPPED | OUTPATIENT
Start: 2024-05-01

## 2024-05-01 NOTE — ASSESSMENT & PLAN NOTE
Chuck OLMOS Held and I had a thorough discussion regarding advance care planning.  he does not have a living Will.    ACP documentation provided to patient today.  he  understands that today's visit is a billable service.  Refer to ACP note.

## 2024-05-01 NOTE — PATIENT INSTRUCTIONS
Medicare Preventive Visit Patient Instructions  Thank you for completing your Welcome to Medicare Visit or Medicare Annual Wellness Visit today. Your next wellness visit will be due in one year (5/2/2025).  The screening/preventive services that you may require over the next 5-10 years are detailed below. Some tests may not apply to you based off risk factors and/or age. Screening tests ordered at today's visit but not completed yet may show as past due. Also, please note that scanned in results may not display below.  Preventive Screenings:  Service Recommendations Previous Testing/Comments   Colorectal Cancer Screening  Colonoscopy    Fecal Occult Blood Test (FOBT)/Fecal Immunochemical Test (FIT)  Fecal DNA/Cologuard Test  Flexible Sigmoidoscopy Age: 45-75 years old   Colonoscopy: every 10 years (May be performed more frequently if at higher risk)  OR  FOBT/FIT: every 1 year  OR  Cologuard: every 3 years  OR  Sigmoidoscopy: every 5 years  Screening may be recommended earlier than age 45 if at higher risk for colorectal cancer. Also, an individualized decision between you and your healthcare provider will decide whether screening between the ages of 76-85 would be appropriate. Colonoscopy: 11/17/2021  FOBT/FIT: Not on file  Cologuard: Not on file  Sigmoidoscopy: Not on file    Screening Current     Prostate Cancer Screening Individualized decision between patient and health care provider in men between ages of 55-69   Medicare will cover every 12 months beginning on the day after your 50th birthday PSA: 0.72 ng/mL     Screening Current     Hepatitis C Screening Once for adults born between 1945 and 1965  More frequently in patients at high risk for Hepatitis C Hep C Antibody: 05/25/2018    Screening Current   Diabetes Screening 1-2 times per year if you're at risk for diabetes or have pre-diabetes Fasting glucose: 92 mg/dL (4/26/2024)  A1C: 5.4 % (4/26/2024)  Screening Current   Cholesterol Screening Once every 5  years if you don't have a lipid disorder. May order more often based on risk factors. Lipid panel: 04/26/2024  Screening Not Indicated  History Lipid Disorder      Other Preventive Screenings Covered by Medicare:  Abdominal Aortic Aneurysm (AAA) Screening: covered once if your at risk. You're considered to be at risk if you have a family history of AAA or a male between the age of 65-75 who smoking at least 100 cigarettes in your lifetime.  Lung Cancer Screening: covers low dose CT scan once per year if you meet all of the following conditions: (1) Age 55-77; (2) No signs or symptoms of lung cancer; (3) Current smoker or have quit smoking within the last 15 years; (4) You have a tobacco smoking history of at least 20 pack years (packs per day x number of years you smoked); (5) You get a written order from a healthcare provider.  Glaucoma Screening: covered annually if you're considered high risk: (1) You have diabetes OR (2) Family history of glaucoma OR (3)  aged 50 and older OR (4)  American aged 65 and older  Osteoporosis Screening: covered every 2 years if you meet one of the following conditions: (1) Have a vertebral abnormality; (2) On glucocorticoid therapy for more than 3 months; (3) Have primary hyperparathyroidism; (4) On osteoporosis medications and need to assess response to drug therapy.  HIV Screening: covered annually if you're between the age of 15-65. Also covered annually if you are younger than 15 and older than 65 with risk factors for HIV infection. For pregnant patients, it is covered up to 3 times per pregnancy.    Immunizations:  Immunization Recommendations   Influenza Vaccine Annual influenza vaccination during flu season is recommended for all persons aged >= 6 months who do not have contraindications   Pneumococcal Vaccine   * Pneumococcal conjugate vaccine = PCV13 (Prevnar 13), PCV15 (Vaxneuvance), PCV20 (Prevnar 20)  * Pneumococcal polysaccharide vaccine = PPSV23  (Pneumovax) Adults 19-63 yo with certain risk factors or if 65+ yo  If never received any pneumonia vaccine: recommend Prevnar 20 (PCV20)  Give PCV20 if previously received 1 dose of PCV13 or PPSV23   Hepatitis B Vaccine 3 dose series if at intermediate or high risk (ex: diabetes, end stage renal disease, liver disease)   Respiratory syncytial virus (RSV) Vaccine - COVERED BY MEDICARE PART D  * RSVPreF3 (Arexvy) CDC recommends that adults 60 years of age and older may receive a single dose of RSV vaccine using shared clinical decision-making (SCDM)   Tetanus (Td) Vaccine - COST NOT COVERED BY MEDICARE PART B Following completion of primary series, a booster dose should be given every 10 years to maintain immunity against tetanus. Td may also be given as tetanus wound prophylaxis.   Tdap Vaccine - COST NOT COVERED BY MEDICARE PART B Recommended at least once for all adults. For pregnant patients, recommended with each pregnancy.   Shingles Vaccine (Shingrix) - COST NOT COVERED BY MEDICARE PART B  2 shot series recommended in those 19 years and older who have or will have weakened immune systems or those 50 years and older     Health Maintenance Due:      Topic Date Due   • Lung Cancer Screening  04/07/2024   • Hepatitis C Screening  Completed   • Colorectal Cancer Screening  Discontinued     Immunizations Due:      Topic Date Due   • Hepatitis A Vaccine (1 of 2 - Risk 2-dose series) Never done   • COVID-19 Vaccine (5 - 2023-24 season) 09/01/2023     Advance Directives   What are advance directives?  Advance directives are legal documents that state your wishes and plans for medical care. These plans are made ahead of time in case you lose your ability to make decisions for yourself. Advance directives can apply to any medical decision, such as the treatments you want, and if you want to donate organs.   What are the types of advance directives?  There are many types of advance directives, and each state has rules  about how to use them. You may choose a combination of any of the following:  Living will:  This is a written record of the treatment you want. You can also choose which treatments you do not want, which to limit, and which to stop at a certain time. This includes surgery, medicine, IV fluid, and tube feedings.   Durable power of  for healthcare (DPAHC):  This is a written record that states who you want to make healthcare choices for you when you are unable to make them for yourself. This person, called a proxy, is usually a family member or a friend. You may choose more than 1 proxy.  Do not resuscitate (DNR) order:  A DNR order is used in case your heart stops beating or you stop breathing. It is a request not to have certain forms of treatment, such as CPR. A DNR order may be included in other types of advance directives.  Medical directive:  This covers the care that you want if you are in a coma, near death, or unable to make decisions for yourself. You can list the treatments you want for each condition. Treatment may include pain medicine, surgery, blood transfusions, dialysis, IV or tube feedings, and a ventilator (breathing machine).  Values history:  This document has questions about your views, beliefs, and how you feel and think about life. This information can help others choose the care that you would choose.  Why are advance directives important?  An advance directive helps you control your care. Although spoken wishes may be used, it is better to have your wishes written down. Spoken wishes can be misunderstood, or not followed. Treatments may be given even if you do not want them. An advance directive may make it easier for your family to make difficult choices about your care.   Weight Management   Why it is important to manage your weight:  Being overweight increases your risk of health conditions such as heart disease, high blood pressure, type 2 diabetes, and certain types of cancer. It  can also increase your risk for osteoarthritis, sleep apnea, and other respiratory problems. Aim for a slow, steady weight loss. Even a small amount of weight loss can lower your risk of health problems.  How to lose weight safely:  A safe and healthy way to lose weight is to eat fewer calories and get regular exercise. You can lose up about 1 pound a week by decreasing the number of calories you eat by 500 calories each day.   Healthy meal plan for weight management:  A healthy meal plan includes a variety of foods, contains fewer calories, and helps you stay healthy. A healthy meal plan includes the following:  Eat whole-grain foods more often.  A healthy meal plan should contain fiber. Fiber is the part of grains, fruits, and vegetables that is not broken down by your body. Whole-grain foods are healthy and provide extra fiber in your diet. Some examples of whole-grain foods are whole-wheat breads and pastas, oatmeal, brown rice, and bulgur.  Eat a variety of vegetables every day.  Include dark, leafy greens such as spinach, kale, kyle greens, and mustard greens. Eat yellow and orange vegetables such as carrots, sweet potatoes, and winter squash.   Eat a variety of fruits every day.  Choose fresh or canned fruit (canned in its own juice or light syrup) instead of juice. Fruit juice has very little or no fiber.  Eat low-fat dairy foods.  Drink fat-free (skim) milk or 1% milk. Eat fat-free yogurt and low-fat cottage cheese. Try low-fat cheeses such as mozzarella and other reduced-fat cheeses.  Choose meat and other protein foods that are low in fat.  Choose beans or other legumes such as split peas or lentils. Choose fish, skinless poultry (chicken or turkey), or lean cuts of red meat (beef or pork). Before you cook meat or poultry, cut off any visible fat.   Use less fat and oil.  Try baking foods instead of frying them. Add less fat, such as margarine, sour cream, regular salad dressing and mayonnaise to  foods. Eat fewer high-fat foods. Some examples of high-fat foods include french fries, doughnuts, ice cream, and cakes.  Eat fewer sweets.  Limit foods and drinks that are high in sugar. This includes candy, cookies, regular soda, and sweetened drinks.  Exercise:  Exercise at least 30 minutes per day on most days of the week. Some examples of exercise include walking, biking, dancing, and swimming. You can also fit in more physical activity by taking the stairs instead of the elevator or parking farther away from stores. Ask your healthcare provider about the best exercise plan for you.      © Copyright [employer] 2018 Information is for End User's use only and may not be sold, redistributed or otherwise used for commercial purposes. All illustrations and images included in CareNotes® are the copyrighted property of [employer], [employer]. or [employer]    Medicare Preventive Visit Patient Instructions  Thank you for completing your Welcome to Medicare Visit or Medicare Annual Wellness Visit today. Your next wellness visit will be due in one year (5/2/2025).  The screening/preventive services that you may require over the next 5-10 years are detailed below. Some tests may not apply to you based off risk factors and/or age. Screening tests ordered at today's visit but not completed yet may show as past due. Also, please note that scanned in results may not display below.  Preventive Screenings:  Service Recommendations Previous Testing/Comments   Colorectal Cancer Screening  Colonoscopy    Fecal Occult Blood Test (FOBT)/Fecal Immunochemical Test (FIT)  Fecal DNA/Cologuard Test  Flexible Sigmoidoscopy Age: 45-75 years old   Colonoscopy: every 10 years (May be performed more frequently if at higher risk)  OR  FOBT/FIT: every 1 year  OR  Cologuard: every 3 years  OR  Sigmoidoscopy: every 5 years  Screening may be recommended earlier than age 45 if at higher risk for colorectal cancer. Also, an individualized decision  between you and your healthcare provider will decide whether screening between the ages of 76-85 would be appropriate. Colonoscopy: 11/17/2021  FOBT/FIT: Not on file  Cologuard: Not on file  Sigmoidoscopy: Not on file    Screening Current     Prostate Cancer Screening Individualized decision between patient and health care provider in men between ages of 55-69   Medicare will cover every 12 months beginning on the day after your 50th birthday PSA: 0.72 ng/mL     Screening Current     Hepatitis C Screening Once for adults born between 1945 and 1965  More frequently in patients at high risk for Hepatitis C Hep C Antibody: 05/25/2018    Screening Current   Diabetes Screening 1-2 times per year if you're at risk for diabetes or have pre-diabetes Fasting glucose: 92 mg/dL (4/26/2024)  A1C: 5.4 % (4/26/2024)  Screening Current   Cholesterol Screening Once every 5 years if you don't have a lipid disorder. May order more often based on risk factors. Lipid panel: 04/26/2024  Screening Not Indicated  History Lipid Disorder      Other Preventive Screenings Covered by Medicare:  Abdominal Aortic Aneurysm (AAA) Screening: covered once if your at risk. You're considered to be at risk if you have a family history of AAA or a male between the age of 65-75 who smoking at least 100 cigarettes in your lifetime.  Lung Cancer Screening: covers low dose CT scan once per year if you meet all of the following conditions: (1) Age 55-77; (2) No signs or symptoms of lung cancer; (3) Current smoker or have quit smoking within the last 15 years; (4) You have a tobacco smoking history of at least 20 pack years (packs per day x number of years you smoked); (5) You get a written order from a healthcare provider.  Glaucoma Screening: covered annually if you're considered high risk: (1) You have diabetes OR (2) Family history of glaucoma OR (3)  aged 50 and older OR (4)  American aged 65 and older  Osteoporosis Screening:  covered every 2 years if you meet one of the following conditions: (1) Have a vertebral abnormality; (2) On glucocorticoid therapy for more than 3 months; (3) Have primary hyperparathyroidism; (4) On osteoporosis medications and need to assess response to drug therapy.  HIV Screening: covered annually if you're between the age of 15-65. Also covered annually if you are younger than 15 and older than 65 with risk factors for HIV infection. For pregnant patients, it is covered up to 3 times per pregnancy.    Immunizations:  Immunization Recommendations   Influenza Vaccine Annual influenza vaccination during flu season is recommended for all persons aged >= 6 months who do not have contraindications   Pneumococcal Vaccine   * Pneumococcal conjugate vaccine = PCV13 (Prevnar 13), PCV15 (Vaxneuvance), PCV20 (Prevnar 20)  * Pneumococcal polysaccharide vaccine = PPSV23 (Pneumovax) Adults 19-63 yo with certain risk factors or if 65+ yo  If never received any pneumonia vaccine: recommend Prevnar 20 (PCV20)  Give PCV20 if previously received 1 dose of PCV13 or PPSV23   Hepatitis B Vaccine 3 dose series if at intermediate or high risk (ex: diabetes, end stage renal disease, liver disease)   Respiratory syncytial virus (RSV) Vaccine - COVERED BY MEDICARE PART D  * RSVPreF3 (Arexvy) CDC recommends that adults 60 years of age and older may receive a single dose of RSV vaccine using shared clinical decision-making (SCDM)   Tetanus (Td) Vaccine - COST NOT COVERED BY MEDICARE PART B Following completion of primary series, a booster dose should be given every 10 years to maintain immunity against tetanus. Td may also be given as tetanus wound prophylaxis.   Tdap Vaccine - COST NOT COVERED BY MEDICARE PART B Recommended at least once for all adults. For pregnant patients, recommended with each pregnancy.   Shingles Vaccine (Shingrix) - COST NOT COVERED BY MEDICARE PART B  2 shot series recommended in those 19 years and older who have  or will have weakened immune systems or those 50 years and older     Health Maintenance Due:      Topic Date Due   • Lung Cancer Screening  04/07/2024   • Hepatitis C Screening  Completed   • Colorectal Cancer Screening  Discontinued     Immunizations Due:      Topic Date Due   • Hepatitis A Vaccine (1 of 2 - Risk 2-dose series) Never done   • COVID-19 Vaccine (5 - 2023-24 season) 09/01/2023     Advance Directives   What are advance directives?  Advance directives are legal documents that state your wishes and plans for medical care. These plans are made ahead of time in case you lose your ability to make decisions for yourself. Advance directives can apply to any medical decision, such as the treatments you want, and if you want to donate organs.   What are the types of advance directives?  There are many types of advance directives, and each state has rules about how to use them. You may choose a combination of any of the following:  Living will:  This is a written record of the treatment you want. You can also choose which treatments you do not want, which to limit, and which to stop at a certain time. This includes surgery, medicine, IV fluid, and tube feedings.   Durable power of  for healthcare (DPAHC):  This is a written record that states who you want to make healthcare choices for you when you are unable to make them for yourself. This person, called a proxy, is usually a family member or a friend. You may choose more than 1 proxy.  Do not resuscitate (DNR) order:  A DNR order is used in case your heart stops beating or you stop breathing. It is a request not to have certain forms of treatment, such as CPR. A DNR order may be included in other types of advance directives.  Medical directive:  This covers the care that you want if you are in a coma, near death, or unable to make decisions for yourself. You can list the treatments you want for each condition. Treatment may include pain medicine,  surgery, blood transfusions, dialysis, IV or tube feedings, and a ventilator (breathing machine).  Values history:  This document has questions about your views, beliefs, and how you feel and think about life. This information can help others choose the care that you would choose.  Why are advance directives important?  An advance directive helps you control your care. Although spoken wishes may be used, it is better to have your wishes written down. Spoken wishes can be misunderstood, or not followed. Treatments may be given even if you do not want them. An advance directive may make it easier for your family to make difficult choices about your care.   Weight Management   Why it is important to manage your weight:  Being overweight increases your risk of health conditions such as heart disease, high blood pressure, type 2 diabetes, and certain types of cancer. It can also increase your risk for osteoarthritis, sleep apnea, and other respiratory problems. Aim for a slow, steady weight loss. Even a small amount of weight loss can lower your risk of health problems.  How to lose weight safely:  A safe and healthy way to lose weight is to eat fewer calories and get regular exercise. You can lose up about 1 pound a week by decreasing the number of calories you eat by 500 calories each day.   Healthy meal plan for weight management:  A healthy meal plan includes a variety of foods, contains fewer calories, and helps you stay healthy. A healthy meal plan includes the following:  Eat whole-grain foods more often.  A healthy meal plan should contain fiber. Fiber is the part of grains, fruits, and vegetables that is not broken down by your body. Whole-grain foods are healthy and provide extra fiber in your diet. Some examples of whole-grain foods are whole-wheat breads and pastas, oatmeal, brown rice, and bulgur.  Eat a variety of vegetables every day.  Include dark, leafy greens such as spinach, kale, kyle greens, and  mustard greens. Eat yellow and orange vegetables such as carrots, sweet potatoes, and winter squash.   Eat a variety of fruits every day.  Choose fresh or canned fruit (canned in its own juice or light syrup) instead of juice. Fruit juice has very little or no fiber.  Eat low-fat dairy foods.  Drink fat-free (skim) milk or 1% milk. Eat fat-free yogurt and low-fat cottage cheese. Try low-fat cheeses such as mozzarella and other reduced-fat cheeses.  Choose meat and other protein foods that are low in fat.  Choose beans or other legumes such as split peas or lentils. Choose fish, skinless poultry (chicken or turkey), or lean cuts of red meat (beef or pork). Before you cook meat or poultry, cut off any visible fat.   Use less fat and oil.  Try baking foods instead of frying them. Add less fat, such as margarine, sour cream, regular salad dressing and mayonnaise to foods. Eat fewer high-fat foods. Some examples of high-fat foods include french fries, doughnuts, ice cream, and cakes.  Eat fewer sweets.  Limit foods and drinks that are high in sugar. This includes candy, cookies, regular soda, and sweetened drinks.  Exercise:  Exercise at least 30 minutes per day on most days of the week. Some examples of exercise include walking, biking, dancing, and swimming. You can also fit in more physical activity by taking the stairs instead of the elevator or parking farther away from stores. Ask your healthcare provider about the best exercise plan for you.      © Copyright [employer] 2018 Information is for End User's use only and may not be sold, redistributed or otherwise used for commercial purposes. All illustrations and images included in CareNotes® are the copyrighted property of [employer] or [employer]

## 2024-05-01 NOTE — ASSESSMENT & PLAN NOTE
Lab Results   Component Value Date    EGFR 71 04/26/2024    EGFR 69 03/15/2023    EGFR 59 11/15/2022    CREATININE 1.03 04/26/2024    CREATININE 1.06 03/15/2023    CREATININE 1.21 11/15/2022   Stable.  Continue to trend kidney function.

## 2024-05-01 NOTE — PROGRESS NOTES
Assessment and Plan:     Problem List Items Addressed This Visit        Cardiovascular and Mediastinum    Essential hypertension     Controlled.  Continue lisinopril 5 mg daily.         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         Relevant Orders    CT lung screening program    Chronic hypoxemic respiratory failure (HCC)       Genitourinary    Benign prostatic hyperplasia without lower urinary tract symptoms     Symptoms controlled with Flomax.         CKD (chronic kidney disease) stage 2, GFR 60-89 ml/min     Lab Results   Component Value Date    EGFR 71 04/26/2024    EGFR 69 03/15/2023    EGFR 59 11/15/2022    CREATININE 1.03 04/26/2024    CREATININE 1.06 03/15/2023    CREATININE 1.21 11/15/2022   Stable.  Continue to trend kidney function.            Care Coordination    Advanced care planning/counseling discussion     Chuck OLMOS Held and I had a thorough discussion regarding advance care planning.  he does not have a living Will.    ACP documentation provided to patient today.  he  understands that today's visit is a billable service.  Refer to ACP note.              Other    Hyperlipidemia - Primary     Controlled with atorvastatin 20 mg daily.         Relevant Medications    atorvastatin (LIPITOR) 20 mg tablet   Other Visit Diagnoses     Screening for lung cancer        Relevant Orders    CT lung screening program    Medicare annual wellness visit, subsequent        Personal history of nicotine dependence        Relevant Orders    CT lung screening program    Screening for diabetes mellitus        Screening for cardiovascular condition        Screening for colorectal cancer        Screening for prostate cancer            Serious Illness Conversation    1. What is your understanding  now of where you are with your illness?  Prognostic Understanding: appropriate understanding of prognosis     2. How much information about what is likely to be ahead with your illness would you like to have?  Information: patient wants to be fully informed     3. What did you (clinician) communicate to the patient?  Prognostic Communication: Uncertain - It can be difficult to predict what will happen with your illness. I hope you will continue to live well for a long time but I’m worried that you could get sick quickly, and I think it is important to prepare for that possibility.     4. If your health situation worsens, what are your most important goals?  Goals: have my medical decisions respected, be mentally aware, be at home, be emotionally at peace, be spiritually and emotionally at peace, be physically comfortable, not be a burden     5. What are the biggest fears and worries about the future and your health?  Fears/Worries: loss of control, being a financial burden, being dependent, burdening others, being a physical burden     6. What abilities are so critical to your life that you cannot imagine living without them?  Unacceptable Function: being unconscious     7. What gives you strength as you think about the future with your illness?     8. If you become sicker, how much are you willing to go through for the possibility of gaining more time?  Be in the hospital: Yes Have a feeding tube: Yes   Be in the ICU: Yes Live in a nursing home: Yes   Be on a ventilator: Yes Be uncomfortable: Yes   Be on dialysis: Yes Undergo aggressive test and/or procedures: Yes   9. How much does your proxy and family know about your priorities and wishes?  Discussion Discussion: extensive discussion with family about goals and wishes        How does this plan sound to you? I will do everything I can to help you through this.  Patient verbalized understanding of the plan     I have spent 16 minutes speaking with my patient on  advanced care planning today or during this visit     Advanced directives  Five Wishes: Patient does not have Five Wishes- would like information            Preventive health issues were discussed with patient, and age appropriate screening tests were ordered as noted in patient's After Visit Summary.  Personalized health advice and appropriate referrals for health education or preventive services given if needed, as noted in patient's After Visit Summary.     History of Present Illness:     Patient presents for a Medicare Wellness Visit    Chuck Justice is seen today for follow up of chronic conditions.   Recent laboratory studies reviewed today with the patient.   he has been compliant with his medication regimen.   He follows up with his pulmonologist regularly.  He is currently on 2 L nasal cannula.  He denies any recent exacerbation of COPD.  he has no complaints or concerns at this time.       Hyperlipidemia  This is a chronic problem. The current episode started more than 1 year ago. The problem is controlled. Recent lipid tests were reviewed and are normal. Pertinent negatives include no chest pain or shortness of breath. Current antihyperlipidemic treatment includes statins. The current treatment provides moderate improvement of lipids. There are no compliance problems.    Hypertension  This is a chronic problem. The current episode started more than 1 year ago. The problem is unchanged. The problem is controlled. Pertinent negatives include no chest pain, palpitations or shortness of breath. Past treatments include ACE inhibitors. The current treatment provides moderate improvement. There are no compliance problems.       Patient Care Team:  EVAN Harrington as PCP - General (Internal Medicine)  MD Rodo Armstrong MD (Urology)  EVAN Collier (Vascular Surgery)  Anastacio Pimentel MD as Consulting Physician (Pulmonary Disease)  Jostin Hood MD (Colon and Rectal  Surgery)     Review of Systems:     Review of Systems   Constitutional: Negative.  Negative for chills, fatigue and fever.   HENT:  Negative for congestion, ear pain, postnasal drip, rhinorrhea and sore throat.    Eyes: Negative.    Respiratory:  Negative for cough, chest tightness, shortness of breath and wheezing.    Cardiovascular:  Negative for chest pain and palpitations.   Gastrointestinal:  Negative for abdominal distention, abdominal pain, blood in stool, constipation, diarrhea and nausea.   Endocrine: Negative.    Genitourinary:  Negative for difficulty urinating, dysuria and hematuria.   Musculoskeletal: Negative.    Skin: Negative.    Allergic/Immunologic: Negative for environmental allergies and food allergies.   Neurological: Negative.    Hematological:  Negative for adenopathy.   Psychiatric/Behavioral:  Negative for agitation, behavioral problems, confusion and sleep disturbance.    All other systems reviewed and are negative.       Problem List:     Patient Active Problem List   Diagnosis   • Hyperlipidemia   • Centrilobular emphysema (HCC) - severe   • Bilateral iliac artery aneurysm (HCC)   • Arterial ectasia (HCC)   • Hearing loss   • Hemorrhoids   • Lung nodule   • Skin disorder   • Benign prostatic hyperplasia without lower urinary tract symptoms   • Essential hypertension   • Seasonal allergies   • Other microscopic hematuria   • NSVT (nonsustained ventricular tachycardia) ([facility])   • Abnormal ECG during exercise stress test   • Advanced care planning/counseling discussion   • Prominent popliteal pulse   • Chronic hypoxemic respiratory failure (HCC)   • Former smoker.  Quit 2015.    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)      Past Medical and Surgical History:     Past Medical History:   Diagnosis Date   • Allergic 01/01/1990    Bee stings and penicillin   • BPH (benign prostatic  hyperplasia)    • COPD (chronic obstructive pulmonary disease) (HCC)     Last Assessed:2016   • Diverticulosis    • Diverticulosis, sigmoid 2016   • Elevated prostate specific antigen (PSA)    • Emphysema (subcutaneous) (surgical) resulting from a procedure    • Enlarged prostate with lower urinary tract symptoms (LUTS)     Resolved:2017   • Hernia, inguinal, right    • Hypertension 2019    Visit with Ramirez Sandoval showed slightly elevated bp.   • Pneumonia due to COVID-19 virus 2021     Past Surgical History:   Procedure Laterality Date   • BICEPS TENDON REPAIR     • COLONOSCOPY     • FRACTURE SURGERY  5/15/1988    Approx date. Rt index finger fractured.   • HAND SURGERY      Last Assessed:2016   • HERNIA REPAIR      Last Assessed:2016   • IR CHEST TUBE CHECK/CHANGE/REPOSITION/UPSIZE  2021   • IR CHEST TUBE PLACEMENT  3/30/2021   • KNEE ARTHROSCOPY     • TN RPR 1ST INGUN HRNA AGE 5 YRS/> REDUCIBLE Right 2016    Procedure: REPAIR HERNIA INGUINAL with mesh;  Surgeon: Carson Haines DO;  Location: [facility];  Service: General   • PROSTATE BIOPSY     • WISDOM TOOTH EXTRACTION        Family History:     Family History   Problem Relation Age of Onset   • Hypertension Mother       Social History:     Social History     Socioeconomic History   • Marital status: /Civil Union     Spouse name: None   • Number of children: None   • Years of education: None   • Highest education level: None   Occupational History   • None   Tobacco Use   • Smoking status: Former     Current packs/day: 0.00     Average packs/day: 1.5 packs/day for 49.0 years (73.4 ttl pk-yrs)     Types: Cigarettes     Start date:      Quit date: 2015     Years since quittin.4   • Smokeless tobacco: Never   Vaping Use   • Vaping status: Never Used   Substance and Sexual Activity   • Alcohol use: Yes     Alcohol/week: 0.0 standard drinks of alcohol     Comment: rare   • Drug use: No   • Sexual  activity: Yes     Partners: Female     Birth control/protection: Male Sterilization, Female Sterilization   Other Topics Concern   • None   Social History Narrative    Advance directive on file     Social Determinants of Health     Financial Resource Strain: Low Risk  (4/26/2023)    Overall Financial Resource Strain (CARDIA)    • Difficulty of Paying Living Expenses: Not hard at all   Food Insecurity: No Food Insecurity (4/24/2024)    Hunger Vital Sign    • Worried About Running Out of Food in the Last Year: Never true    • Ran Out of Food in the Last Year: Never true   Transportation Needs: No Transportation Needs (4/24/2024)    PRAPARE - Transportation    • Lack of Transportation (Medical): No    • Lack of Transportation (Non-Medical): No   Physical Activity: Not on file   Stress: Not on file   Social Connections: Not on file   Intimate Partner Violence: Not on file   Housing Stability: Low Risk  (4/24/2024)    Housing Stability Vital Sign    • Unable to Pay for Housing in the Last Year: No    • Number of Places Lived in the Last Year: 1    • Unstable Housing in the Last Year: No      Medications and Allergies:     Current Outpatient Medications   Medication Sig Dispense Refill   • albuterol (PROVENTIL HFA,VENTOLIN HFA) 90 mcg/act inhaler USE 2 INHALATIONS EVERY 6 HOURS AS NEEDED FOR SHORTNESS OF BREATH 51 g 3   • atorvastatin (LIPITOR) 20 mg tablet Take 1 tablet (20 mg total) by mouth daily 90 tablet 1   • benzonatate (TESSALON PERLES) 100 mg capsule Take 1 capsule (100 mg total) by mouth 3 (three) times a day as needed for cough 20 capsule 0   • cholecalciferol (VITAMIN D3) 1,000 units tablet Take 2 tablets (2,000 Units total) by mouth daily for 2 days 4 tablet 0   • EPINEPHrine (EPIPEN) 0.3 mg/0.3 mL SOAJ Inject 0.3 mL (0.3 mg total) into a muscle once for 1 dose 0.6 mL 1   • finasteride (PROSCAR) 5 mg tablet Take 1 tablet (5 mg total) by mouth daily 90 tablet 3   • fluticasone-umeclidinium-vilanterol (Trelegy  Ellipta) 100-62.5-25 mcg/actuation inhaler Inhale 1 puff daily Rinse mouth after use 360 blister 3   • guaiFENesin (MUCINEX) 600 mg 12 hr tablet Take 1,200 mg by mouth if needed     • lisinopril (ZESTRIL) 5 mg tablet Take 1 tablet (5 mg total) by mouth daily 90 tablet 1   • loratadine (CLARITIN REDITABS) 10 MG dissolvable tablet Take 10 mg by mouth daily as needed for allergies.     • tamsulosin (FLOMAX) 0.4 mg TAKE 1 CAPSULE DAILY WITH DINNER 90 capsule 1     No current facility-administered medications for this visit.     Allergies   Allergen Reactions   • Bee Venom Facial Swelling   • Other Rash     Annotation - [id number]: mushrooms   • Penicillins Rash      Immunizations:     Immunization History   Administered Date(s) Administered   • COVID-19 PFIZER VACCINE 0.3 ML IM 04/25/2021, 05/16/2021, 11/19/2021   • COVID-19 Pfizer vac (Jorge-sucrose, gray cap) 12 yr+ IM 07/23/2022   • DTaP 09/29/2020   • INFLUENZA 11/01/2006, 11/01/2006, 09/28/2007, 11/01/2016, 10/10/2017, 09/28/2020, 10/25/2021, 10/03/2022   • Influenza Split High Dose Preservative Free IM 11/01/2016, 10/10/2017   • Influenza, high dose seasonal 0.7 mL 10/19/2018, 10/03/2019, 09/28/2020, 09/20/2023   • Pneumococcal Conjugate 13-Valent 07/11/2016, 06/12/2019   • Pneumococcal Polysaccharide PPV23 10/10/2017   • Tdap 09/11/2020   • Zoster Vaccine Recombinant 03/28/2019, 03/28/2019, 06/12/2019      Health Maintenance:         Topic Date Due   • Lung Cancer Screening  04/07/2024   • Hepatitis C Screening  Completed   • Colorectal Cancer Screening  Discontinued         Topic Date Due   • Hepatitis A Vaccine (1 of 2 - Risk 2-dose series) Never done   • COVID-19 Vaccine (5 - 2023-24 season) 09/01/2023      Medicare Screening Tests and Risk Assessments:     Chuck is here for his Subsequent Wellness visit. Last Medicare Wellness visit information reviewed, patient interviewed and updates made to the record today.      Health Risk Assessment:   Patient rates  overall health as good. Patient feels that their physical health rating is same. Patient is satisfied with their life. Eyesight was rated as same. Hearing was rated as same. Patient feels that their emotional and mental health rating is same. Patients states they are never, rarely angry. Patient states they are sometimes unusually tired/fatigued. Pain experienced in the last 7 days has been some. Patient's pain rating has been 2/10. Patient states that he has experienced no weight loss or gain in last 6 months.     Depression Screening:   PHQ-2 Score: 0      Fall Risk Screening:   In the past year, patient has experienced: no history of falling in past year      Home Safety:  Patient has trouble with stairs inside or outside of their home. Patient has working smoke alarms and has working carbon monoxide detector. Home safety hazards include: none.     Nutrition:   Current diet is Regular, No Added Salt and Limited junk food.     Medications:   Patient is currently taking over-the-counter supplements. OTC medications include: see medication list. Patient is able to manage medications.     Activities of Daily Living (ADLs)/Instrumental Activities of Daily Living (IADLs):   Walk and transfer into and out of bed and chair?: Yes  Dress and groom yourself?: Yes    Bathe or shower yourself?: No    Feed yourself? Yes  Do your laundry/housekeeping?: Yes  Manage your money, pay your bills and track your expenses?: Yes  Make your own meals?: Yes    Do your own shopping?: No    Durable Medical Equipment Suppliers  [employer]    Previous Hospitalizations:   Any hospitalizations or ED visits within the last 12 months?: No      Advance Care Planning:   Living will: No    Durable POA for healthcare: Yes    Advanced directive: No    Advanced directive counseling given: Yes    End of Life Decisions reviewed with patient: Yes    Provider agrees with end of life decisions: Yes      Cognitive Screening:   Provider or family/friend/caregiver  concerned regarding cognition?: No    PREVENTIVE SCREENINGS      Cardiovascular Screening:    General: Screening Not Indicated, History Lipid Disorder and Risks and Benefits Discussed      Diabetes Screening:     General: Screening Current and Risks and Benefits Discussed      Colorectal Cancer Screening:     General: Screening Current      Prostate Cancer Screening:    General: Screening Current and Risks and Benefits Discussed      Osteoporosis Screening:    General: Screening Not Indicated      Abdominal Aortic Aneurysm (AAA) Screening:    Risk factors include: age between 65-74 yo and tobacco use        General: Screening Not Indicated      Lung Cancer Screening:     General: Risks and Benefits Discussed and Screening Current      Hepatitis C Screening:    General: Screening Current and Risks and Benefits Discussed    Screening, Brief Intervention, and Referral to Treatment (SBIRT)    Screening  Typical number of drinks in a day: 0  Typical number of drinks in a week: 0  Interpretation: Low risk drinking behavior.    AUDIT-C Screenin) How often did you have a drink containing alcohol in the past year? monthly or less  2) How many drinks did you have on a typical day when you were drinking in the past year? 0  3) How often did you have 6 or more drinks on one occasion in the past year? never    AUDIT-C Score: 1  Interpretation: Score 0-3 (male): Negative screen for alcohol misuse    Single Item Drug Screening:  How often have you used an illegal drug (including marijuana) or a prescription medication for non-medical reasons in the past year? never    Single Item Drug Screen Score: 0  Interpretation: Negative screen for possible drug use disorder    Brief Intervention  Alcohol & drug use screenings were reviewed. No concerns regarding substance use disorder identified.     Annual Depression Screening  Time spent screening and evaluating the patient for depression during today's encounter was 5  "minutes.    Other Counseling Topics:   Car/seat belt/driving safety, skin self-exam, sunscreen and calcium and vitamin D intake and regular weightbearing exercise.     No results found.     Physical Exam:     /70 (BP Location: Left arm, Patient Position: Sitting, Cuff Size: Standard)   Pulse 87   Temp 98 °F (36.7 °C) (Temporal)   Resp 18   Ht 5' 8\" (1.727 m)   Wt 76.3 kg (168 lb 3.2 oz)   SpO2 96% Comment: on 2 liters  BMI 25.57 kg/m²     Physical Exam  Vitals and nursing note reviewed.   Constitutional:       General: He is not in acute distress.     Appearance: Normal appearance. He is normal weight. He is not ill-appearing, toxic-appearing or diaphoretic.   HENT:      Head: Normocephalic and atraumatic.      Right Ear: Tympanic membrane, ear canal and external ear normal. There is no impacted cerumen.      Left Ear: Tympanic membrane, ear canal and external ear normal. There is no impacted cerumen.      Nose: Nose normal. No congestion or rhinorrhea.      Mouth/Throat:      Mouth: Mucous membranes are moist.      Pharynx: Oropharynx is clear. No oropharyngeal exudate or posterior oropharyngeal erythema.   Eyes:      General: No scleral icterus.        Right eye: No discharge.         Left eye: No discharge.      Extraocular Movements: Extraocular movements intact.      Conjunctiva/sclera: Conjunctivae normal.      Pupils: Pupils are equal, round, and reactive to light.   Neck:      Vascular: No carotid bruit.   Cardiovascular:      Rate and Rhythm: Normal rate and regular rhythm.      Pulses: Normal pulses.      Heart sounds: Normal heart sounds. No murmur heard.     No friction rub. No gallop.   Pulmonary:      Effort: Pulmonary effort is normal. No respiratory distress.      Breath sounds: Normal breath sounds. No wheezing or rales.      Comments: On 2L O2.   Abdominal:      General: Abdomen is flat. Bowel sounds are normal. There is no distension.      Palpations: Abdomen is soft. There is no " mass.      Tenderness: There is no abdominal tenderness. There is no guarding.      Hernia: No hernia is present.   Musculoskeletal:         General: No swelling, tenderness, deformity or signs of injury. Normal range of motion.      Cervical back: Normal range of motion and neck supple. No rigidity. No muscular tenderness.      Right lower leg: No edema.      Left lower leg: No edema.   Lymphadenopathy:      Cervical: No cervical adenopathy.   Skin:     General: Skin is warm and dry.      Capillary Refill: Capillary refill takes less than 2 seconds.      Coloration: Skin is not jaundiced or pale.      Findings: No bruising, erythema, lesion or rash.   Neurological:      General: No focal deficit present.      Mental Status: He is alert and oriented to person, place, and time. Mental status is at baseline.      Cranial Nerves: No cranial nerve deficit.      Sensory: No sensory deficit.      Motor: No weakness.      Coordination: Coordination normal.      Gait: Gait normal.      Deep Tendon Reflexes: Reflexes normal.   Psychiatric:         Mood and Affect: Mood normal.         Behavior: Behavior normal.         Thought Content: Thought content normal.         Judgment: Judgment normal.          Reyes Michaud MD

## 2024-05-09 ENCOUNTER — OFFICE VISIT (OUTPATIENT)
Dept: PULMONOLOGY | Facility: CLINIC | Age: 74
End: 2024-05-09
Payer: MEDICARE

## 2024-05-09 VITALS
DIASTOLIC BLOOD PRESSURE: 82 MMHG | OXYGEN SATURATION: 92 % | HEART RATE: 96 BPM | BODY MASS INDEX: 25.46 KG/M2 | HEIGHT: 68 IN | WEIGHT: 168 LBS | SYSTOLIC BLOOD PRESSURE: 120 MMHG

## 2024-05-09 DIAGNOSIS — J96.11 CHRONIC HYPOXEMIC RESPIRATORY FAILURE (HCC): ICD-10-CM

## 2024-05-09 DIAGNOSIS — J43.2 CENTRILOBULAR EMPHYSEMA (HCC): Primary | ICD-10-CM

## 2024-05-09 DIAGNOSIS — F17.211 CIGARETTE NICOTINE DEPENDENCE IN REMISSION: ICD-10-CM

## 2024-05-09 PROCEDURE — 99214 OFFICE O/P EST MOD 30 MIN: CPT | Performed by: INTERNAL MEDICINE

## 2024-05-09 RX ORDER — FLUTICASONE FUROATE, UMECLIDINIUM BROMIDE AND VILANTEROL TRIFENATATE 100; 62.5; 25 UG/1; UG/1; UG/1
1 POWDER RESPIRATORY (INHALATION) DAILY
Qty: 180 BLISTER | Refills: 3 | Status: SHIPPED | OUTPATIENT
Start: 2024-05-09 | End: 2025-05-04

## 2024-05-09 RX ORDER — ALBUTEROL SULFATE 2.5 MG/3ML
2.5 SOLUTION RESPIRATORY (INHALATION) 2 TIMES DAILY
Qty: 540 ML | Refills: 3 | Status: SHIPPED | OUTPATIENT
Start: 2024-05-09 | End: 2025-05-04

## 2024-05-09 RX ORDER — ALBUTEROL SULFATE 90 UG/1
2 AEROSOL, METERED RESPIRATORY (INHALATION) EVERY 6 HOURS PRN
Qty: 51 G | Refills: 3 | Status: SHIPPED | OUTPATIENT
Start: 2024-05-09

## 2024-05-09 NOTE — ASSESSMENT & PLAN NOTE
Continues to use Trelegy effectively  Still has shortness of breath with exertion and likely a bit worse than it had been previously  Offered referral to pulmonary rehab but does wish to try to increase his activity on his own throughout the summer months.  If he is still having difficulty in the fall, we will make the referral to pulmonary rehab  Prescriptions for Trelegy 100, albuterol, and albuterol nebulizer reordered

## 2024-05-09 NOTE — ASSESSMENT & PLAN NOTE
Due for lung cancer screening.  Last study was in April 2023  Agreeable to ongoing screening surveillance and this has been ordered by his primary care physician.  He does need to schedule this testing  Has continued to maintain tobacco abstinence now for more than 8 years but has over a 70-pack-year history in total

## 2024-05-09 NOTE — PROGRESS NOTES
Progress note - Pulmonary Medicine   Chuck Justice 73 y.o. male MRN: [id number]       Impression & Plan:     Centrilobular emphysema (HCC) - severe  Continues to use Trelegy effectively  Still has shortness of breath with exertion and likely a bit worse than it had been previously  Offered referral to pulmonary rehab but does wish to try to increase his activity on his own throughout the summer months.  If he is still having difficulty in the fall, we will make the referral to pulmonary rehab  Prescriptions for Trelegy 100, albuterol, and albuterol nebulizer reordered    Chronic hypoxemic respiratory failure (HCC)  He is using supplemental oxygen effectively at 3 L  Has portable oxygen concentrator for portability  Continue current supplemental oxygen    Former smoker.  Quit 2015.   Due for lung cancer screening.  Last study was in April 2023  Agreeable to ongoing screening surveillance and this has been ordered by his primary care physician.  He does need to schedule this testing  Has continued to maintain tobacco abstinence now for more than 8 years but has over a 70-pack-year history in total    Follow-up in approximately 6 months  ______________________________________________________________________    HPI:    Chuck Justice presents today for follow-up of emphysema, chronic hypoxemic respiratory failure and history of tobacco use.  He reports no significant changes.  He continues to do well on Trelegy.  He has supplemental oxygen which he is using appropriately and continuously.  He does not report any worsening respiratory symptoms on a daily basis but has been slightly less active and does note some declining functional capacity associated with his shortness of breath.  He has not been getting a lot of regular exercise and has been fairly sedentary.    No chest pain.  No significant appetite or weight change.  No worsening arthralgias or myalgias.  No lightheadedness or dizziness.  Denies any abdominal pain or  GERD.  He does have some allergies and does take loratadine.    Current Medications:    Current Outpatient Medications:     albuterol (2.5 mg/3 mL) 0.083 % nebulizer solution, Take 3 mL (2.5 mg total) by nebulization 2 (two) times a day, Disp: 540 mL, Rfl: 3    albuterol (PROVENTIL HFA,VENTOLIN HFA) 90 mcg/act inhaler, Inhale 2 puffs every 6 (six) hours as needed for wheezing, Disp: 51 g, Rfl: 3    atorvastatin (LIPITOR) 20 mg tablet, Take 1 tablet (20 mg total) by mouth daily, Disp: 90 tablet, Rfl: 1    benzonatate (TESSALON PERLES) 100 mg capsule, Take 1 capsule (100 mg total) by mouth 3 (three) times a day as needed for cough, Disp: 20 capsule, Rfl: 0    cholecalciferol (VITAMIN D3) 1,000 units tablet, Take 2 tablets (2,000 Units total) by mouth daily for 2 days, Disp: 4 tablet, Rfl: 0    finasteride (PROSCAR) 5 mg tablet, Take 1 tablet (5 mg total) by mouth daily, Disp: 90 tablet, Rfl: 3    fluticasone-umeclidinium-vilanterol (Trelegy Ellipta) 100-62.5-25 mcg/actuation inhaler, Inhale 1 puff daily Rinse mouth after use, Disp: 180 blister, Rfl: 3    guaiFENesin (MUCINEX) 600 mg 12 hr tablet, Take 1,200 mg by mouth if needed, Disp: , Rfl:     lisinopril (ZESTRIL) 5 mg tablet, Take 1 tablet (5 mg total) by mouth daily, Disp: 90 tablet, Rfl: 1    loratadine (CLARITIN REDITABS) 10 MG dissolvable tablet, Take 10 mg by mouth daily as needed for allergies., Disp: , Rfl:     tamsulosin (FLOMAX) 0.4 mg, TAKE 1 CAPSULE DAILY WITH DINNER, Disp: 90 capsule, Rfl: 1    EPINEPHrine (EPIPEN) 0.3 mg/0.3 mL SOAJ, Inject 0.3 mL (0.3 mg total) into a muscle once for 1 dose, Disp: 0.6 mL, Rfl: 1    Review of Systems:  Aside from what is mentioned in the HPI, the review of systems is otherwise negative    Past medical history, surgical history, and family history were reviewed and updated as appropriate    Social history updates:  Social History     Tobacco Use   Smoking Status Former    Current packs/day: 0.00    Average packs/day:  "1.5 packs/day for 49.0 years (73.4 ttl pk-yrs)    Types: Cigarettes    Start date:     Quit date: 2015    Years since quittin.4   Smokeless Tobacco Never       PhysicalExamination:  Vitals:   /82 (BP Location: Left arm, Patient Position: Sitting, Cuff Size: Standard)   Pulse 96   Ht 5' 8\" (1.727 m)   Wt 76.2 kg (168 lb)   SpO2 92%   BMI 25.54 kg/m²     Gen:  Comfortable on room air.  No conversational dyspnea  HEENT:  Conjugate gaze.  sclerae anicteric.  Oropharynx moist  Neck: Trachea is midline. No JVD. No adenopathy  Chest: Hyperinflated and hyperresonant.  Breath sounds distant.  No wheezes or crackles  Cardiac: Heart tones.  There is no appreciable murmur  Abdomen:  benign  Extremities: No edema  Neuro:  Normal speech and mentation    Diagnostic Data:      No new pertinent diagnostic pulmonary studies    Anastacio Pimentel MD  "

## 2024-05-09 NOTE — ASSESSMENT & PLAN NOTE
He is using supplemental oxygen effectively at 3 L  Has portable oxygen concentrator for portability  Continue current supplemental oxygen

## 2024-05-10 ENCOUNTER — HOSPITAL ENCOUNTER (OUTPATIENT)
Dept: NON INVASIVE DIAGNOSTICS | Facility: CLINIC | Age: 74
Discharge: HOME | End: 2024-05-10
Payer: MEDICARE

## 2024-05-10 DIAGNOSIS — I72.3 BILATERAL ILIAC ARTERY ANEURYSM (HCC): ICD-10-CM

## 2024-05-10 DIAGNOSIS — I77.89 ARTERIAL ECTASIA (HCC): ICD-10-CM

## 2024-05-10 PROCEDURE — 93925 LOWER EXTREMITY STUDY: CPT | Performed by: SURGERY

## 2024-05-10 PROCEDURE — 93978 VASCULAR STUDY: CPT | Performed by: SURGERY

## 2024-05-10 PROCEDURE — 93923 UPR/LXTR ART STDY 3+ LVLS: CPT

## 2024-05-10 PROCEDURE — 93925 LOWER EXTREMITY STUDY: CPT

## 2024-05-10 PROCEDURE — 93922 UPR/L XTREMITY ART 2 LEVELS: CPT | Performed by: SURGERY

## 2024-05-10 PROCEDURE — 93978 VASCULAR STUDY: CPT

## 2024-05-13 ENCOUNTER — TELEPHONE (OUTPATIENT)
Dept: VASCULAR SURGERY | Facility: CLINIC | Age: 74
End: 2024-05-13

## 2024-05-13 NOTE — TELEPHONE ENCOUNTER
Attempted to contact patient to schedule appointment(s) listed below.  Requested patient call [phone] option 3 to schedule appointment(s).    Patient's appointment(s) are due now.    Dopplers  [] Abdominal Aorta Iliac (AOIL)  [] Carotid (CV)   [] Celiac and/or Mesenteric  [] Endovascular Aortic Repair (EVAR)   [] Hemodialysis Access (HD)   [] Lower Limb Arterial (MARY)  [] Lower Limb Venous (LEV)  [] Lower Limb Venous Duplex with Reflux (LEVDR)  [] Renal Artery  [] Upper Limb Arterial (UEA)    [] Upper Limb Venous (UEV)              [] ROMINA and Waveform analysis     Advanced Imaging   [] CTA head/neck    [] CTA abdomen    [] CTA abdomen & pelvis    [] CT abdomen with/ without contrast  [] CT abdomen with contrast  [] CT abdomen without contrast    [] CT abdomen & pelvis with/ without contrast  [] CT abdomen & pelvis with contrast  [] CT abdomen & pelvis without contrast    Office Visit   [] New patient, patient last seen over 3 years ago  [] Risk factor modification (RFM)   [x] Follow up   [] Lost to follow up (LTFU)    Called patient & LMOM to schedule 1 yr RFM OV/RR AOIL 5/10/24

## 2024-05-16 ENCOUNTER — HOSPITAL ENCOUNTER (OUTPATIENT)
Dept: RADIOLOGY | Facility: IMAGING CENTER | Age: 74
End: 2024-05-16
Payer: MEDICARE

## 2024-05-16 DIAGNOSIS — Z12.2 SCREENING FOR LUNG CANCER: ICD-10-CM

## 2024-05-16 DIAGNOSIS — Z87.891 PERSONAL HISTORY OF NICOTINE DEPENDENCE: ICD-10-CM

## 2024-05-16 DIAGNOSIS — R91.1 LUNG NODULE: ICD-10-CM

## 2024-05-16 PROCEDURE — 71271 CT THORAX LUNG CANCER SCR C-: CPT

## 2024-06-06 DIAGNOSIS — N13.8 BPH WITH OBSTRUCTION/LOWER URINARY TRACT SYMPTOMS: ICD-10-CM

## 2024-06-06 DIAGNOSIS — N40.1 BPH WITH OBSTRUCTION/LOWER URINARY TRACT SYMPTOMS: ICD-10-CM

## 2024-06-06 RX ORDER — TAMSULOSIN HYDROCHLORIDE 0.4 MG/1
CAPSULE ORAL
Qty: 90 CAPSULE | Refills: 0 | Status: SHIPPED | OUTPATIENT
Start: 2024-06-06

## 2024-06-24 ENCOUNTER — TELEPHONE (OUTPATIENT)
Age: 74
End: 2024-06-24

## 2024-06-24 NOTE — TELEPHONE ENCOUNTER
PT scheduled yearly FU 9/16/24.  Pt had PSA done on 4/26/24. Pt was wondering if he needs to get another PSA done or if the one from April is ok.    Pt req  [phone]

## 2024-06-25 ENCOUNTER — OFFICE VISIT (OUTPATIENT)
Dept: VASCULAR SURGERY | Facility: CLINIC | Age: 74
End: 2024-06-25
Payer: MEDICARE

## 2024-06-25 VITALS
DIASTOLIC BLOOD PRESSURE: 90 MMHG | HEART RATE: 92 BPM | OXYGEN SATURATION: 96 % | WEIGHT: 165 LBS | HEIGHT: 68 IN | RESPIRATION RATE: 18 BRPM | BODY MASS INDEX: 25.01 KG/M2 | SYSTOLIC BLOOD PRESSURE: 140 MMHG

## 2024-06-25 DIAGNOSIS — I72.3 BILATERAL ILIAC ARTERY ANEURYSM (HCC): Primary | ICD-10-CM

## 2024-06-25 PROCEDURE — 99213 OFFICE O/P EST LOW 20 MIN: CPT | Performed by: NURSE PRACTITIONER

## 2024-06-25 NOTE — PROGRESS NOTES
Ambulatory Visit  Name: Chuck Justice      : 1950      MRN: [id number]  Encounter Provider: EVAN Payne  Encounter Date: 2024   Encounter department: [facility] VASCULAR [facility]    Assessment & Plan   1. Bilateral iliac artery aneurysm (HCC)  Assessment & Plan:  73-year-old male former smoker with HTN, HLD, COPD on continuous O2 2L, CKD, abdominal aortic and popliteal ectasia, 2.1cm right common iliac artery aneurysm, PAD     Patient returns to the office to review AOIL/LEAD 5/10/24    -AOIL showed patent normal sized abdominal aorta, right common iliac artery measures 2.1 cm and left common iliac artery measures 1.5 cm  -LEAD showed mild diffuse bilateral femoral-popliteal and tibial peroneal disease without focal stenosis, right ROMINA 1.2 9/120/99 and left ROMINA 1.2 6/141/98.  Bilateral popliteal ectasia 1.0 cm  -Does not meet size criteria for repair  -Blood pressure well-controlled  -No heavy lifting  -Repeat aortoiliac duplex and lower extremity arterial duplex in 1 year  -Follow-up in the office in 1 year  Orders:  -     VAS abdominal aorta/iliac duplex; Future; Expected date: 2025  -     VAS ARTERIAL DUPLEX- LOWER LIMB BILATERAL; Future; Expected date: 2025      History of Present Illness     Chuck Justice is a 73 y.o. male who presents for yearly visit to review AOIL/LEAD 5/10/24.  Last seen in office last year by me.  He is accompanied by his wife for office visit today.  He offers no significant complaints.  He continues with supplemental oxygen therapy.  He is able to walk on a treadmill for 3 minutes at a time, several times a day.  He denies any lower extremity claudication symptoms.  No back pain.  No abdominal pain.  He does have occasional left lower back spasms secondary to old injury    Review of Systems  Medical History Reviewed by provider this encounter:  Tobacco  Allergies  Meds  Problems  Med Hx  Surg Hx  Fam Hx       Objective     /90 (BP  "Location: Left arm, Patient Position: Sitting)   Pulse 92   Resp 18   Ht 5' 8\" (1.727 m)   Wt 74.8 kg (165 lb)   SpO2 96% Comment: on 2 L of O2  BMI 25.09 kg/m²     Physical Exam  Vitals and nursing note reviewed.   Constitutional:       Appearance: He is well-developed.   HENT:      Head: Normocephalic and atraumatic.   Eyes:      Extraocular Movements: Extraocular movements intact.   Cardiovascular:      Pulses:           Popliteal pulses are 2+ on the right side and 2+ on the left side.        Dorsalis pedis pulses are 1+ on the right side and 1+ on the left side.        Posterior tibial pulses are 1+ on the right side and 1+ on the left side.      Heart sounds: Normal heart sounds.   Pulmonary:      Effort: Pulmonary effort is normal.      Comments: Supplemental oxygen via nasal cannula.  No acute respiratory distress  Musculoskeletal:         General: Normal range of motion.      Cervical back: Neck supple.   Skin:     General: Skin is warm.   Neurological:      General: No focal deficit present.      Mental Status: He is alert and oriented to person, place, and time.   Psychiatric:         Mood and Affect: Mood normal.         Behavior: Behavior normal.             "

## 2024-06-25 NOTE — ASSESSMENT & PLAN NOTE
73-year-old male former smoker with HTN, HLD, COPD on continuous O2 2L, CKD, abdominal aortic and popliteal ectasia, 2.1cm right common iliac artery aneurysm, PAD     Patient returns to the office to review AOIL/LEAD 5/10/24    -AOIL showed patent normal sized abdominal aorta, right common iliac artery measures 2.1 cm and left common iliac artery measures 1.5 cm  -LEAD showed mild diffuse bilateral femoral-popliteal and tibial peroneal disease without focal stenosis, right ROMINA 1.2 9/120/99 and left ROMINA 1.2 6/141/98.  Bilateral popliteal ectasia 1.0 cm  -Does not meet size criteria for repair  -Blood pressure well-controlled  -No heavy lifting  -Repeat aortoiliac duplex and lower extremity arterial duplex in 1 year  -Follow-up in the office in 1 year

## 2024-08-21 ENCOUNTER — TELEPHONE (OUTPATIENT)
Age: 74
End: 2024-08-21

## 2024-08-21 NOTE — ASSESSMENT & PLAN NOTE
Caring for your injured foot or ankle    1-3 days after injury  Rest and immobilize the injured foot or ankle.  Ice injured area 20/hour during the day if possible.  Compression, such as an ace wrap, will help reduce swelling.  Elevate the injured foot/ankle as much as possible  4 days to 4 weeks after injury  Contrast soaking  Get two 5-gallon buckets, one ice water the other warm water  Start soaking the injured foot/ankle in the ice water for up to 5 minutes then switch to the warm water for 5 minutes  Repeat this once then finish with one more soak in the ice water  Perform simple range of motion exercises while soaking  Massage the injured tissue gently with a topical muscle rub  Weight-bear as tolerated depending upon pain (this is not the type of pain that you should push through)  4+ weeks from injury  Increase activity as tolerated  Wear supportive shoes to  offload the tension forces and prevent future tissue damage.    Please notify the office if at any point there is increased pain, swelling or redness.     Lab Results   Component Value Date    EGFR 57 04/13/2022    EGFR 66 12/06/2021    EGFR 68 10/25/2021    CREATININE 1 25 04/13/2022    CREATININE 1 11 12/06/2021    CREATININE 1 10 10/25/2021       Renal function stable at this time  Will continue to follow

## 2024-09-02 DIAGNOSIS — N40.1 BPH WITH OBSTRUCTION/LOWER URINARY TRACT SYMPTOMS: ICD-10-CM

## 2024-09-02 DIAGNOSIS — N13.8 BPH WITH OBSTRUCTION/LOWER URINARY TRACT SYMPTOMS: ICD-10-CM

## 2024-09-03 RX ORDER — TAMSULOSIN HYDROCHLORIDE 0.4 MG/1
CAPSULE ORAL
Qty: 90 CAPSULE | Refills: 3 | Status: SHIPPED | OUTPATIENT
Start: 2024-09-03

## 2024-09-16 ENCOUNTER — OFFICE VISIT (OUTPATIENT)
Dept: UROLOGY | Facility: AMBULATORY SURGERY CENTER | Age: 74
End: 2024-09-16
Payer: MEDICARE

## 2024-09-16 VITALS
WEIGHT: 164 LBS | HEART RATE: 106 BPM | SYSTOLIC BLOOD PRESSURE: 116 MMHG | BODY MASS INDEX: 24.86 KG/M2 | HEIGHT: 68 IN | DIASTOLIC BLOOD PRESSURE: 62 MMHG | OXYGEN SATURATION: 95 %

## 2024-09-16 DIAGNOSIS — N40.1 BPH WITH OBSTRUCTION/LOWER URINARY TRACT SYMPTOMS: ICD-10-CM

## 2024-09-16 DIAGNOSIS — Z12.5 PROSTATE CANCER SCREENING: Primary | ICD-10-CM

## 2024-09-16 DIAGNOSIS — N13.8 BPH WITH OBSTRUCTION/LOWER URINARY TRACT SYMPTOMS: ICD-10-CM

## 2024-09-16 PROCEDURE — 99213 OFFICE O/P EST LOW 20 MIN: CPT

## 2024-09-16 RX ORDER — FINASTERIDE 5 MG/1
5 TABLET, FILM COATED ORAL DAILY
Qty: 90 TABLET | Refills: 3 | Status: SHIPPED | OUTPATIENT
Start: 2024-09-16

## 2024-09-16 RX ORDER — TAMSULOSIN HYDROCHLORIDE 0.4 MG/1
0.4 CAPSULE ORAL
Qty: 90 CAPSULE | Refills: 3 | Status: SHIPPED | OUTPATIENT
Start: 2024-09-16

## 2024-09-16 NOTE — ASSESSMENT & PLAN NOTE
Patient's most recent PSA was performed 4/26/2024 and found to be 0.72 corrected to be 1.44 on finasteride.  JOSE DANIEL performed today.  Palpably benign prostate.  Refer to physical exam findings.  We discussed that the patient will be due for a repeat PSA in April 2025.  New PSA order placed at today's office visit.

## 2024-09-16 NOTE — PROGRESS NOTES
9/16/2024      Assessment and Plan    73 y.o. male managed by our office    Prostate cancer screening  Patient's most recent PSA was performed 4/26/2024 and found to be 0.72 corrected to be 1.44 on finasteride.  JOSE DANIEL performed today.  Palpably benign prostate.  Refer to physical exam findings.  We discussed that the patient will be due for a repeat PSA in April 2025.  New PSA order placed at today's office visit.    BPH with obstruction/lower urinary tract symptoms  AUA symptom score 5  Patient currently controlled well on a combination of Flomax and finasteride.  Refills provided today.  We will continue to monitor for worsening/progression of lower urinary tract symptoms and the patient will return to the office in 1 year.        History of Present Illness  Chuck Justice is a 73 y.o. male here for evaluation of prostate cancer screening and BPH with obstruction/lower urinary tract symptoms.  Patient's lower urinary tract symptoms are currently controlled on a combination of Flomax and finasteride with good control.  Patient is currently content with his voiding pattern.  Patient's most recent PSA was performed 4/26/2024 and found to be 0.72 corrected to be 1.44 on finasteride.  Patient denies any worsening urinary frequency/urgency, postvoid dribbling, feelings of incomplete bladder emptying, dysuria, hematuria, or flank pain.        Review of Systems   Constitutional:  Negative for chills and fever.   HENT:  Negative for ear pain and sore throat.    Eyes:  Negative for pain and visual disturbance.   Respiratory:  Negative for cough and shortness of breath.    Cardiovascular:  Negative for chest pain and palpitations.   Gastrointestinal:  Negative for abdominal pain and vomiting.   Genitourinary:  Negative for decreased urine volume, difficulty urinating, dysuria, flank pain, frequency, hematuria and urgency.   Musculoskeletal:  Negative for arthralgias and back pain.   Skin:  Negative for color change and rash.  "  Neurological:  Negative for seizures and syncope.   All other systems reviewed and are negative.          AUA SYMPTOM SCORE      Flowsheet Row Most Recent Value   AUA SYMPTOM SCORE    How often have you had a sensation of not emptying your bladder completely after you finished urinating? 1 (P)     How often have you had to urinate again less than two hours after you finished urinating? 0 (P)     How often have you found you stopped and started again several times when you urinate? 1 (P)     How often have you found it difficult to postpone urination? 1 (P)     How often have you had a weak urinary stream? 1 (P)     How often have you had to push or strain to begin urination? 0 (P)     How many times did you most typically get up to urinate from the time you went to bed at night until the time you got up in the morning? 1 (P)     Quality of Life: If you were to spend the rest of your life with your urinary condition just the way it is now, how would you feel about that? 2 (P)     AUA SYMPTOM SCORE 5 (P)               Vitals  Vitals:    09/16/24 1449   BP: 116/62   BP Location: Left arm   Patient Position: Sitting   Cuff Size: Standard   Pulse: (!) 106   SpO2: 95%   Weight: 74.4 kg (164 lb)   Height: 5' 8\" (1.727 m)       Physical Exam  Vitals reviewed.   Constitutional:       General: He is not in acute distress.     Appearance: Normal appearance. He is not ill-appearing.   HENT:      Nose: Nose normal.   Eyes:      General: No scleral icterus.  Pulmonary:      Effort: No respiratory distress.   Abdominal:      General: Abdomen is flat. There is no distension.      Palpations: Abdomen is soft.      Tenderness: There is no abdominal tenderness.   Genitourinary:     Comments: JOSE DANIEL performed today.  Prostate estimated to be about 50 g and smooth bilaterally without nodularity or induration.  Musculoskeletal:         General: Normal range of motion.      Cervical back: Normal range of motion.   Skin:     General: Skin " is warm.   Neurological:      Mental Status: He is alert and oriented to person, place, and time.      Gait: Gait normal.   Psychiatric:         Mood and Affect: Mood normal.         Behavior: Behavior normal.           Past History  Past Medical History:   Diagnosis Date    Allergic 1990    Bee stings and penicillin    BPH (benign prostatic hyperplasia)     COPD (chronic obstructive pulmonary disease) ([facility])     Last Assessed:2016    Diverticulosis     Diverticulosis, sigmoid 2016    Elevated prostate specific antigen (PSA)     Emphysema (subcutaneous) (surgical) resulting from a procedure     Enlarged prostate with lower urinary tract symptoms (LUTS)     Resolved:2017    Hernia, inguinal, right     Hypertension 2019    Visit with Ramirez Jaime showed slightly elevated bp.    Pneumonia due to COVID-19 virus 2021     Social History     Socioeconomic History    Marital status: /Civil Union     Spouse name: None    Number of children: None    Years of education: None    Highest education level: None   Occupational History    None   Tobacco Use    Smoking status: Former     Current packs/day: 0.00     Average packs/day: 1.5 packs/day for 49.0 years (73.4 ttl pk-yrs)     Types: Cigarettes     Start date:      Quit date: 2015     Years since quittin.8    Smokeless tobacco: Never   Vaping Use    Vaping status: Never Used   Substance and Sexual Activity    Alcohol use: Yes     Alcohol/week: 0.0 standard drinks of alcohol     Comment: rare    Drug use: No    Sexual activity: Yes     Partners: Female     Birth control/protection: Male Sterilization, Female Sterilization   Other Topics Concern    None   Social History Narrative    Advance directive on file     Social Determinants of Health     Financial Resource Strain: Low Risk  (2023)    Overall Financial Resource Strain (CARDIA)     Difficulty of Paying Living Expenses: Not hard at all   Food Insecurity: No Food  Insecurity (2024)    Hunger Vital Sign     Worried About Running Out of Food in the Last Year: Never true     Ran Out of Food in the Last Year: Never true   Transportation Needs: No Transportation Needs (2024)    PRAPARE - Transportation     Lack of Transportation (Medical): No     Lack of Transportation (Non-Medical): No   Physical Activity: Not on file   Stress: Not on file   Social Connections: Not on file   Intimate Partner Violence: Not on file   Housing Stability: Low Risk  (2024)    Housing Stability Vital Sign     Unable to Pay for Housing in the Last Year: No     Number of Times Moved in the Last Year: 1     Homeless in the Last Year: No     Social History     Tobacco Use   Smoking Status Former    Current packs/day: 0.00    Average packs/day: 1.5 packs/day for 49.0 years (73.4 ttl pk-yrs)    Types: Cigarettes    Start date:     Quit date: 2015    Years since quittin.8   Smokeless Tobacco Never     Family History   Problem Relation Age of Onset    Hypertension Mother        The following portions of the patient's history were reviewed and updated as appropriate: allergies, current medications, past medical history, past social history, past surgical history and problem list.    Results  No results found for this or any previous visit (from the past 1 hour(s)).]  Lab Results   Component Value Date    PSA 0.72 2024    PSA 0.5 11/15/2022    PSA 0.9 2021    PSA 1.2 2021     Lab Results   Component Value Date    GLUCOSE 155 (H) 2021    CALCIUM 9.4 2024    K 4.6 2024    CO2 36 (H) 2024     2024    BUN 13 2024    CREATININE 1.03 2024     Lab Results   Component Value Date    WBC 7.55 2024    HGB 13.2 2024    HCT 44.7 2024    MCV 97 2024     2024

## 2024-09-16 NOTE — ASSESSMENT & PLAN NOTE
AUA symptom score 5  Patient currently controlled well on a combination of Flomax and finasteride.  Refills provided today.  We will continue to monitor for worsening/progression of lower urinary tract symptoms and the patient will return to the office in 1 year.

## 2024-10-14 DIAGNOSIS — I10 ESSENTIAL HYPERTENSION: ICD-10-CM

## 2024-10-14 DIAGNOSIS — E78.2 MIXED HYPERLIPIDEMIA: ICD-10-CM

## 2024-10-15 RX ORDER — ATORVASTATIN CALCIUM 20 MG/1
20 TABLET, FILM COATED ORAL DAILY
Qty: 90 TABLET | Refills: 1 | Status: SHIPPED | OUTPATIENT
Start: 2024-10-15

## 2024-10-15 RX ORDER — LISINOPRIL 5 MG/1
5 TABLET ORAL DAILY
Qty: 90 TABLET | Refills: 1 | Status: SHIPPED | OUTPATIENT
Start: 2024-10-15

## 2024-10-16 PROBLEM — Z12.5 PROSTATE CANCER SCREENING: Status: RESOLVED | Noted: 2024-09-16 | Resolved: 2024-10-16

## 2024-11-06 ENCOUNTER — OFFICE VISIT (OUTPATIENT)
Dept: INTERNAL MEDICINE CLINIC | Facility: OTHER | Age: 74
End: 2024-11-06
Payer: MEDICARE

## 2024-11-06 VITALS
OXYGEN SATURATION: 96 % | WEIGHT: 163.6 LBS | HEIGHT: 68 IN | TEMPERATURE: 97.8 F | RESPIRATION RATE: 18 BRPM | HEART RATE: 74 BPM | BODY MASS INDEX: 24.8 KG/M2 | DIASTOLIC BLOOD PRESSURE: 64 MMHG | SYSTOLIC BLOOD PRESSURE: 114 MMHG

## 2024-11-06 DIAGNOSIS — J43.2 CENTRILOBULAR EMPHYSEMA (HCC): ICD-10-CM

## 2024-11-06 DIAGNOSIS — N40.1 BPH WITH OBSTRUCTION/LOWER URINARY TRACT SYMPTOMS: ICD-10-CM

## 2024-11-06 DIAGNOSIS — I72.3 BILATERAL ILIAC ARTERY ANEURYSM (HCC): ICD-10-CM

## 2024-11-06 DIAGNOSIS — J96.11 CHRONIC HYPOXEMIC RESPIRATORY FAILURE (HCC): ICD-10-CM

## 2024-11-06 DIAGNOSIS — E78.2 MIXED HYPERLIPIDEMIA: ICD-10-CM

## 2024-11-06 DIAGNOSIS — N13.8 BPH WITH OBSTRUCTION/LOWER URINARY TRACT SYMPTOMS: ICD-10-CM

## 2024-11-06 DIAGNOSIS — I10 ESSENTIAL HYPERTENSION: Primary | ICD-10-CM

## 2024-11-06 DIAGNOSIS — R73.01 IFG (IMPAIRED FASTING GLUCOSE): ICD-10-CM

## 2024-11-06 PROCEDURE — G2211 COMPLEX E/M VISIT ADD ON: HCPCS | Performed by: INTERNAL MEDICINE

## 2024-11-06 PROCEDURE — 99214 OFFICE O/P EST MOD 30 MIN: CPT | Performed by: INTERNAL MEDICINE

## 2024-11-06 RX ORDER — LISINOPRIL 5 MG/1
5 TABLET ORAL DAILY
Qty: 100 TABLET | Refills: 1 | Status: SHIPPED | OUTPATIENT
Start: 2024-11-06

## 2024-11-06 RX ORDER — ATORVASTATIN CALCIUM 20 MG/1
20 TABLET, FILM COATED ORAL DAILY
Qty: 100 TABLET | Refills: 1 | Status: SHIPPED | OUTPATIENT
Start: 2024-11-06

## 2024-11-06 NOTE — ASSESSMENT & PLAN NOTE
Controlled on Lipitor.  Trend lipid panel.  Orders:    atorvastatin (LIPITOR) 20 mg tablet; Take 1 tablet (20 mg total) by mouth daily    CBC; Future    Comprehensive metabolic panel; Future    Hemoglobin A1C; Future    Lipid panel; Future

## 2024-11-06 NOTE — ASSESSMENT & PLAN NOTE
Controlled.  Continue current antihypertensive regimen.  Orders:    lisinopril (ZESTRIL) 5 mg tablet; Take 1 tablet (5 mg total) by mouth daily    CBC; Future    Comprehensive metabolic panel; Future    Hemoglobin A1C; Future    Lipid panel; Future

## 2024-11-06 NOTE — PROGRESS NOTES
Ambulatory Visit  Name: Chuck Justice      : 1950      MRN: [id number]  Encounter Provider: Reyes Michaud MD  Encounter Date: 2024   Encounter department: [facility] [place]    Assessment & Plan  Essential hypertension  Controlled.  Continue current antihypertensive regimen.  Orders:  •  lisinopril (ZESTRIL) 5 mg tablet; Take 1 tablet (5 mg total) by mouth daily  •  CBC; Future  •  Comprehensive metabolic panel; Future  •  Hemoglobin A1C; Future  •  Lipid panel; Future    Mixed hyperlipidemia  Controlled on Lipitor.  Trend lipid panel.  Orders:  •  atorvastatin (LIPITOR) 20 mg tablet; Take 1 tablet (20 mg total) by mouth daily  •  CBC; Future  •  Comprehensive metabolic panel; Future  •  Hemoglobin A1C; Future  •  Lipid panel; Future    IFG (impaired fasting glucose)    Orders:  •  CBC; Future  •  Comprehensive metabolic panel; Future  •  Hemoglobin A1C; Future  •  Lipid panel; Future    Bilateral iliac artery aneurysm (HCC)  Continue to monitor clinically.  Asymptomatic.         Centrilobular emphysema (HCC) - severe  Stable without recent exacerbation.  Continue current inhaler regimen.  Continue follow-up with pulmonology.         Chronic hypoxemic respiratory failure (HCC)  Stable.  Continue oxygen supplementation.         BPH with obstruction/lower urinary tract symptoms  Continue follow-up with urology.  Continue Flomax and finasteride.  Symptoms controlled.            History of Present Illness     Chuck Justice is seen today for follow up of chronic conditions.   Recent laboratory studies reviewed today with the patient.   he has been compliant with his medication regimen.     he has no complaints or concerns at this time.       Hypertension  This is a chronic problem. The current episode started more than 1 year ago. The problem is unchanged. The problem is controlled. Pertinent negatives include no chest pain, headaches, palpitations or shortness of breath. Past  treatments include ACE inhibitors. The current treatment provides moderate improvement. There are no compliance problems.    Hyperlipidemia  This is a chronic problem. The current episode started more than 1 year ago. The problem is controlled. Recent lipid tests were reviewed and are normal. Pertinent negatives include no chest pain or shortness of breath. Current antihyperlipidemic treatment includes statins. The current treatment provides moderate improvement of lipids. There are no compliance problems.        History obtained from : patient  Review of Systems   Constitutional:  Negative for activity change, appetite change, chills, diaphoresis, fatigue and fever.   HENT:  Negative for congestion, postnasal drip, rhinorrhea, sinus pressure, sinus pain, sneezing and sore throat.    Eyes:  Negative for visual disturbance.   Respiratory:  Negative for apnea, cough, choking, chest tightness, shortness of breath and wheezing.    Cardiovascular:  Negative for chest pain, palpitations and leg swelling.   Gastrointestinal:  Negative for abdominal distention, abdominal pain, anal bleeding, blood in stool, constipation, diarrhea, nausea and vomiting.   Endocrine: Negative for cold intolerance and heat intolerance.   Genitourinary:  Negative for difficulty urinating, dysuria and hematuria.   Musculoskeletal: Negative.    Skin: Negative.    Neurological:  Negative for dizziness, weakness, light-headedness, numbness and headaches.   Hematological:  Negative for adenopathy.   Psychiatric/Behavioral:  Negative for agitation, sleep disturbance and suicidal ideas.    All other systems reviewed and are negative.    Medical History Reviewed by provider this encounter:  Tobacco  Allergies  Meds  Problems  Med Hx  Surg Hx  Fam Hx       Current Outpatient Medications on File Prior to Visit   Medication Sig Dispense Refill   • albuterol (2.5 mg/3 mL) 0.083 % nebulizer solution Take 3 mL (2.5 mg total) by nebulization 2 (two)  times a day 540 mL 3   • albuterol (PROVENTIL HFA,VENTOLIN HFA) 90 mcg/act inhaler Inhale 2 puffs every 6 (six) hours as needed for wheezing 51 g 3   • EPINEPHrine (EPIPEN) 0.3 mg/0.3 mL SOAJ Inject 0.3 mL (0.3 mg total) into a muscle once for 1 dose 0.6 mL 1   • finasteride (PROSCAR) 5 mg tablet Take 1 tablet (5 mg total) by mouth daily 90 tablet 3   • fluticasone-umeclidinium-vilanterol (Trelegy Ellipta) 100-62.5-25 mcg/actuation inhaler Inhale 1 puff daily Rinse mouth after use 180 blister 3   • guaiFENesin (MUCINEX) 600 mg 12 hr tablet Take 1,200 mg by mouth if needed     • loratadine (CLARITIN REDITABS) 10 MG dissolvable tablet Take 10 mg by mouth daily as needed for allergies.     • tamsulosin (FLOMAX) 0.4 mg Take 1 capsule (0.4 mg total) by mouth daily with dinner 90 capsule 3   • [DISCONTINUED] atorvastatin (LIPITOR) 20 mg tablet TAKE 1 TABLET DAILY 90 tablet 1   • [DISCONTINUED] lisinopril (ZESTRIL) 5 mg tablet TAKE 1 TABLET DAILY 90 tablet 1   • cholecalciferol (VITAMIN D3) 1,000 units tablet Take 2 tablets (2,000 Units total) by mouth daily for 2 days 4 tablet 0   • [DISCONTINUED] benzonatate (TESSALON PERLES) 100 mg capsule Take 1 capsule (100 mg total) by mouth 3 (three) times a day as needed for cough (Patient not taking: Reported on 2024) 20 capsule 0     No current facility-administered medications on file prior to visit.      Social History     Tobacco Use   • Smoking status: Former     Current packs/day: 0.00     Average packs/day: 1.5 packs/day for 49.0 years (73.4 ttl pk-yrs)     Types: Cigarettes     Start date:      Quit date: 2015     Years since quittin.9   • Smokeless tobacco: Never   Vaping Use   • Vaping status: Never Used   Substance and Sexual Activity   • Alcohol use: Yes     Alcohol/week: 0.0 standard drinks of alcohol     Comment: rare   • Drug use: No   • Sexual activity: Yes     Partners: Female     Birth control/protection: Male Sterilization, Female  "Sterilization         Objective     /64 (BP Location: Left arm, Patient Position: Sitting, Cuff Size: Standard)   Pulse 74   Temp 97.8 °F (36.6 °C) (Temporal)   Resp 18   Ht 5' 8\" (1.727 m)   Wt 74.2 kg (163 lb 9.6 oz)   SpO2 96% Comment: 02 2 liters  BMI 24.88 kg/m²     Physical Exam  Vitals and nursing note reviewed.   Constitutional:       General: He is not in acute distress.     Appearance: Normal appearance. He is normal weight. He is not ill-appearing, toxic-appearing or diaphoretic.   HENT:      Head: Normocephalic and atraumatic.      Right Ear: Tympanic membrane, ear canal and external ear normal. There is no impacted cerumen.      Left Ear: Tympanic membrane, ear canal and external ear normal. There is no impacted cerumen.      Nose: Nose normal. No congestion or rhinorrhea.      Mouth/Throat:      Mouth: Mucous membranes are moist.      Pharynx: Oropharynx is clear. No oropharyngeal exudate or posterior oropharyngeal erythema.   Eyes:      General: No scleral icterus.        Right eye: No discharge.         Left eye: No discharge.      Extraocular Movements: Extraocular movements intact.      Conjunctiva/sclera: Conjunctivae normal.      Pupils: Pupils are equal, round, and reactive to light.   Neck:      Vascular: No carotid bruit.   Cardiovascular:      Rate and Rhythm: Normal rate and regular rhythm.      Pulses: Normal pulses.      Heart sounds: Normal heart sounds. No murmur heard.     No friction rub. No gallop.   Pulmonary:      Effort: Pulmonary effort is normal. No respiratory distress.      Breath sounds: Normal breath sounds. No wheezing or rales.      Comments: On 2L NC.   Abdominal:      General: Abdomen is flat. Bowel sounds are normal. There is no distension.      Palpations: Abdomen is soft. There is no mass.      Tenderness: There is no abdominal tenderness. There is no guarding.      Hernia: No hernia is present.   Musculoskeletal:         General: No swelling, tenderness, " deformity or signs of injury. Normal range of motion.      Cervical back: Normal range of motion and neck supple. No rigidity. No muscular tenderness.      Right lower leg: No edema.      Left lower leg: No edema.   Lymphadenopathy:      Cervical: No cervical adenopathy.   Skin:     General: Skin is warm and dry.      Capillary Refill: Capillary refill takes less than 2 seconds.      Coloration: Skin is not jaundiced or pale.      Findings: No bruising, erythema, lesion or rash.   Neurological:      General: No focal deficit present.      Mental Status: He is alert and oriented to person, place, and time. Mental status is at baseline.      Cranial Nerves: No cranial nerve deficit.      Sensory: No sensory deficit.      Motor: No weakness.      Coordination: Coordination normal.      Gait: Gait normal.      Deep Tendon Reflexes: Reflexes normal.   Psychiatric:         Mood and Affect: Mood normal.         Behavior: Behavior normal.         Thought Content: Thought content normal.         Judgment: Judgment normal.     Administrative Statements   I have spent a total time of 25 minutes in caring for this patient on the day of the visit/encounter including Diagnostic results, Prognosis, Risks and benefits of tx options, Instructions for management, Patient and family education, Importance of tx compliance, Risk factor reductions, Impressions, Counseling / Coordination of care, Documenting in the medical record, Reviewing / ordering tests, medicine, procedures  , and Obtaining or reviewing history  .

## 2024-11-06 NOTE — ASSESSMENT & PLAN NOTE
Stable without recent exacerbation.  Continue current inhaler regimen.  Continue follow-up with pulmonology.

## 2024-11-25 ENCOUNTER — OFFICE VISIT (OUTPATIENT)
Dept: INTERNAL MEDICINE CLINIC | Facility: OTHER | Age: 74
End: 2024-11-25
Payer: MEDICARE

## 2024-11-25 VITALS
TEMPERATURE: 98.1 F | DIASTOLIC BLOOD PRESSURE: 66 MMHG | OXYGEN SATURATION: 93 % | HEART RATE: 89 BPM | SYSTOLIC BLOOD PRESSURE: 110 MMHG | WEIGHT: 159.4 LBS | BODY MASS INDEX: 24.16 KG/M2 | HEIGHT: 68 IN

## 2024-11-25 DIAGNOSIS — I77.89 ARTERIAL ECTASIA (HCC): ICD-10-CM

## 2024-11-25 DIAGNOSIS — J96.11 CHRONIC HYPOXEMIC RESPIRATORY FAILURE (HCC): ICD-10-CM

## 2024-11-25 DIAGNOSIS — I72.3 BILATERAL ILIAC ARTERY ANEURYSM (HCC): ICD-10-CM

## 2024-11-25 DIAGNOSIS — J20.9 ACUTE BRONCHITIS, UNSPECIFIED ORGANISM: ICD-10-CM

## 2024-11-25 DIAGNOSIS — I74.09 OTHER ARTERIAL EMBOLISM AND THROMBOSIS OF ABDOMINAL AORTA (HCC): Primary | ICD-10-CM

## 2024-11-25 DIAGNOSIS — J43.2 CENTRILOBULAR EMPHYSEMA (HCC): ICD-10-CM

## 2024-11-25 DIAGNOSIS — I47.29 NSVT (NONSUSTAINED VENTRICULAR TACHYCARDIA) (HCC): ICD-10-CM

## 2024-11-25 PROCEDURE — 99214 OFFICE O/P EST MOD 30 MIN: CPT | Performed by: INTERNAL MEDICINE

## 2024-11-25 PROCEDURE — G2211 COMPLEX E/M VISIT ADD ON: HCPCS | Performed by: INTERNAL MEDICINE

## 2024-11-25 RX ORDER — AZITHROMYCIN 250 MG/1
TABLET, FILM COATED ORAL
Qty: 6 TABLET | Refills: 0 | Status: SHIPPED | OUTPATIENT
Start: 2024-11-25 | End: 2024-11-30

## 2024-11-25 NOTE — ASSESSMENT & PLAN NOTE
Will treat with Z-Damon for 5 days.  Can use over-the-counter Robitussin cough syrup as needed.  Continue with present inhalers and nebulizer treatment

## 2024-11-25 NOTE — PROGRESS NOTES
Assessment/Plan:    No problem-specific Assessment & Plan notes found for this encounter.       There are no diagnoses linked to this encounter.           Subjective:          Patient ID: Chuck Justice is a 74 y.o. male.    Patient came to the office with a complaint of cough with yellowish-green phlegm.  Started about 3 days ago with cold-like symptoms.  Denied any fever or chills.        The following portions of the patient's history were reviewed and updated as appropriate: allergies, current medications, past family history, past medical history, past social history, past surgical history, and problem list.    Review of Systems   Constitutional:  Negative for fatigue and fever.   HENT:  Positive for congestion. Negative for ear discharge, ear pain, postnasal drip, sinus pressure, sore throat, tinnitus and trouble swallowing.    Eyes:  Negative for discharge, itching and visual disturbance.   Respiratory:  Positive for cough and shortness of breath.    Cardiovascular:  Negative for chest pain and palpitations.   Gastrointestinal:  Negative for abdominal pain, diarrhea, nausea and vomiting.   Endocrine: Negative for cold intolerance and polyuria.   Genitourinary:  Negative for difficulty urinating, dysuria and urgency.   Musculoskeletal:  Negative for arthralgias and neck pain.   Skin:  Negative for rash.   Allergic/Immunologic: Negative for environmental allergies.   Neurological:  Negative for dizziness, weakness and headaches.   Psychiatric/Behavioral:  Negative for agitation. The patient is not nervous/anxious.          Past Medical History:   Diagnosis Date    Allergic 01/01/1990    Bee stings and penicillin    BPH (benign prostatic hyperplasia)     COPD (chronic obstructive pulmonary disease) ([facility])     Last Assessed:11/14/2016    Diverticulosis     Diverticulosis, sigmoid 07/29/2016    Elevated prostate specific antigen (PSA)     Emphysema (subcutaneous) (surgical) resulting from a procedure     Enlarged  prostate with lower urinary tract symptoms (LUTS)     Resolved:8/22/2017    Hernia, inguinal, right     Hypertension 05/01/2019    Visit with Ramirez Sandoval showed slightly elevated bp.    Pneumonia due to COVID-19 virus 03/16/2021         Current Outpatient Medications:     albuterol (2.5 mg/3 mL) 0.083 % nebulizer solution, Take 3 mL (2.5 mg total) by nebulization 2 (two) times a day, Disp: 540 mL, Rfl: 3    albuterol (PROVENTIL HFA,VENTOLIN HFA) 90 mcg/act inhaler, Inhale 2 puffs every 6 (six) hours as needed for wheezing, Disp: 51 g, Rfl: 3    atorvastatin (LIPITOR) 20 mg tablet, Take 1 tablet (20 mg total) by mouth daily, Disp: 100 tablet, Rfl: 1    cholecalciferol (VITAMIN D3) 1,000 units tablet, Take 2 tablets (2,000 Units total) by mouth daily for 2 days, Disp: 4 tablet, Rfl: 0    EPINEPHrine (EPIPEN) 0.3 mg/0.3 mL SOAJ, Inject 0.3 mL (0.3 mg total) into a muscle once for 1 dose, Disp: 0.6 mL, Rfl: 1    finasteride (PROSCAR) 5 mg tablet, Take 1 tablet (5 mg total) by mouth daily, Disp: 90 tablet, Rfl: 3    fluticasone-umeclidinium-vilanterol (Trelegy Ellipta) 100-62.5-25 mcg/actuation inhaler, Inhale 1 puff daily Rinse mouth after use, Disp: 180 blister, Rfl: 3    guaiFENesin (MUCINEX) 600 mg 12 hr tablet, Take 1,200 mg by mouth if needed, Disp: , Rfl:     lisinopril (ZESTRIL) 5 mg tablet, Take 1 tablet (5 mg total) by mouth daily, Disp: 100 tablet, Rfl: 1    loratadine (CLARITIN REDITABS) 10 MG dissolvable tablet, Take 10 mg by mouth daily as needed for allergies., Disp: , Rfl:     tamsulosin (FLOMAX) 0.4 mg, Take 1 capsule (0.4 mg total) by mouth daily with dinner, Disp: 90 capsule, Rfl: 3    Allergies   Allergen Reactions    Bee Venom Facial Swelling    Other Rash     Annotation - [id number]: mushrooms    Penicillins Rash       Social History   Past Surgical History:   Procedure Laterality Date    BICEPS TENDON REPAIR  1999    COLONOSCOPY      FRACTURE SURGERY  5/15/1988    Approx date. Rt index finger  "fractured.    HAND SURGERY      Last Assessed:7/11/2016    HERNIA REPAIR      Last Assessed:7/11/2016    IR CHEST TUBE CHECK/CHANGE/REPOSITION/UPSIZE  4/1/2021    IR CHEST TUBE PLACEMENT  3/30/2021    KNEE ARTHROSCOPY      MD RPR 1ST INGUN HRNA AGE 5 YRS/> REDUCIBLE Right 2/23/2016    Procedure: REPAIR HERNIA INGUINAL with mesh;  Surgeon: Carson Haines DO;  Location: [facility];  Service: General    PROSTATE BIOPSY      WISDOM TOOTH EXTRACTION       Family History   Problem Relation Age of Onset    Hypertension Mother        Objective:  /66 (BP Location: Left arm, Patient Position: Sitting, Cuff Size: Standard)   Pulse 89   Temp 98.1 °F (36.7 °C) (Temporal)   Ht 5' 8\" (1.727 m)   Wt 72.3 kg (159 lb 6.4 oz)   SpO2 93%   BMI 24.24 kg/m²   Body mass index is 24.24 kg/m².     Physical Exam  Constitutional:       General: He is not in acute distress.     Appearance: He is well-developed.   HENT:      Head: Normocephalic.      Right Ear: External ear normal. There is no impacted cerumen.      Left Ear: External ear normal. There is no impacted cerumen.      Mouth/Throat:      Pharynx: No posterior oropharyngeal erythema.   Eyes:      General: No scleral icterus.     Pupils: Pupils are equal, round, and reactive to light.   Neck:      Thyroid: No thyromegaly.      Trachea: No tracheal deviation.   Cardiovascular:      Rate and Rhythm: Normal rate and regular rhythm.      Heart sounds: Normal heart sounds. No murmur heard.  Pulmonary:      Effort: Pulmonary effort is normal. No respiratory distress.      Comments: Decreased breath sounds throughout lung field  Chest:      Chest wall: No tenderness.   Abdominal:      General: Bowel sounds are normal.      Palpations: Abdomen is soft. There is no mass.      Tenderness: There is no abdominal tenderness.   Musculoskeletal:         General: Normal range of motion.      Cervical back: Normal range of motion and neck supple. No rigidity.      Right lower leg: No " edema.      Left lower leg: No edema.   Lymphadenopathy:      Cervical: No cervical adenopathy.   Skin:     General: Skin is warm.      Findings: No erythema, lesion or rash.   Neurological:      Mental Status: He is alert and oriented to person, place, and time.      Cranial Nerves: No cranial nerve deficit.

## 2025-02-04 ENCOUNTER — OFFICE VISIT (OUTPATIENT)
Dept: PULMONOLOGY | Facility: CLINIC | Age: 75
End: 2025-02-04
Payer: MEDICARE

## 2025-02-04 VITALS
TEMPERATURE: 96.9 F | HEIGHT: 68 IN | HEART RATE: 70 BPM | BODY MASS INDEX: 24.4 KG/M2 | DIASTOLIC BLOOD PRESSURE: 82 MMHG | SYSTOLIC BLOOD PRESSURE: 126 MMHG | OXYGEN SATURATION: 94 % | WEIGHT: 161 LBS

## 2025-02-04 DIAGNOSIS — F17.211 CIGARETTE NICOTINE DEPENDENCE IN REMISSION: ICD-10-CM

## 2025-02-04 DIAGNOSIS — J43.2 CENTRILOBULAR EMPHYSEMA (HCC): Primary | ICD-10-CM

## 2025-02-04 DIAGNOSIS — J96.11 CHRONIC HYPOXEMIC RESPIRATORY FAILURE (HCC): ICD-10-CM

## 2025-02-04 PROBLEM — J20.9 ACUTE BRONCHITIS: Status: RESOLVED | Noted: 2024-11-25 | Resolved: 2025-02-04

## 2025-02-04 PROCEDURE — G2211 COMPLEX E/M VISIT ADD ON: HCPCS | Performed by: INTERNAL MEDICINE

## 2025-02-04 PROCEDURE — 99214 OFFICE O/P EST MOD 30 MIN: CPT | Performed by: INTERNAL MEDICINE

## 2025-02-04 RX ORDER — FLUTICASONE FUROATE, UMECLIDINIUM BROMIDE AND VILANTEROL TRIFENATATE 100; 62.5; 25 UG/1; UG/1; UG/1
1 POWDER RESPIRATORY (INHALATION) DAILY
Qty: 180 BLISTER | Refills: 3 | Status: SHIPPED | OUTPATIENT
Start: 2025-02-04 | End: 2026-01-30

## 2025-02-04 NOTE — ASSESSMENT & PLAN NOTE
Has severe emphysema.  Doing well on Trelegy  Continues with albuterol nebulizer and inhaler when needed  Does not report any new or progressive symptoms  Continue present therapy    Orders:    fluticasone-umeclidinium-vilanterol (Trelegy Ellipta) 100-62.5-25 mcg/actuation inhaler; Inhale 1 puff daily Rinse mouth after use

## 2025-02-04 NOTE — PROGRESS NOTES
Progress Note - Pulmonary Medicine     Name: Chuck Justice      : 1950      MRN: [id number]  Encounter Provider: Anastacio Pimentel MD  Encounter Date: 2025   Encounter department: [facility] [place]    :  Assessment & Plan  Centrilobular emphysema (HCC) - severe  Has severe emphysema.  Doing well on Trelegy  Continues with albuterol nebulizer and inhaler when needed  Does not report any new or progressive symptoms  Continue present therapy    Orders:    fluticasone-umeclidinium-vilanterol (Trelegy Ellipta) 100-62.5-25 mcg/actuation inhaler; Inhale 1 puff daily Rinse mouth after use    Chronic hypoxemic respiratory failure (HCC)  Continues to use 2 L of supplemental oxygen.  No new symptoms         Cigarette nicotine dependence in remission  History of smoking with 74-pack-year history.  He quit smoking about 9 years ago  Continues to meet lung cancer screening criteria and agreeable to ongoing screening surveillance.  Next scan due in May 2025    Orders:    CT lung screening program; Future      Return in about 6 months (around 2025).    History of Present Illness   Chuck Justice is a 74 y.o. male who presents for follow-up of severe emphysema with chronic hypoxemic respiratory failure.  He is doing well.  He reports no new symptoms.  He has had a dry throat and a dry cough intermittently.  He uses his nebulizer when needed.  Not have any new symptoms otherwise.    No appetite or weight change.  No cardiac complaints.  Denies abdominal pain.  No lightheadedness or dizziness.  He does get allergies during allergy season and will use Claritin when needed.  No headaches or neurologic symptoms    Review of Systems:    Aside from what is mentioned in the HPI, ROS is otherwise negative         Medications:  Medication list reviewed and updated as appropriate    PMH, Social history, family history and tobacco history  Reviewed and updated as appropriate    Objective   /82 (BP  "Location: Left arm, Patient Position: Sitting, Cuff Size: Standard)   Pulse 70   Temp (!) 96.9 °F (36.1 °C) (Tympanic)   Ht 5' 8\" (1.727 m)   Wt 73 kg (161 lb)   SpO2 94%   BMI 24.48 kg/m²     Physical Exam:  Gen:  Comfortable on 2 L nasal cannula.  No conversational dyspnea  HEENT:  Conjugate gaze.  sclerae anicteric.  Oropharynx moist  Neck: Trachea is midline. No JVD. No adenopathy  Chest: Distant breath sounds.  Hyperresonant to percussion.  No wheezes or crackles on auscultation  Cardiac: Distant heart tones, regular. no murmur  Abdomen:  benign  Extremities: No edema  Neuro:  Normal speech and mentation    Diagnostic Data:    No new pertinent pulmonary diagnostic testing     Anastacio Pimentel MD    "

## 2025-02-04 NOTE — ASSESSMENT & PLAN NOTE
History of smoking with 74-pack-year history.  He quit smoking about 9 years ago  Continues to meet lung cancer screening criteria and agreeable to ongoing screening surveillance.  Next scan due in May 2025    Orders:    CT lung screening program; Future

## 2025-02-19 ENCOUNTER — TELEPHONE (OUTPATIENT)
Age: 75
End: 2025-02-19

## 2025-02-19 NOTE — TELEPHONE ENCOUNTER
Pt calling as he was contacted by [facility] regarding O2 recert. States they had faxed over requests and did not receive response. Reviewed chart, do not see any faxes from them.     Called and spoke with Michelle at [facility]. They had tried to fax over SWO. Asked that they refax to [place] direct office fax number as we did not receive [facility] fax.

## 2025-03-05 ENCOUNTER — DOCUMENTATION (OUTPATIENT)
Dept: PULMONOLOGY | Facility: CLINIC | Age: 75
End: 2025-03-05

## 2025-03-05 NOTE — PROGRESS NOTES
Called [employer] today and spoke with Caity, she stated they received the SWO form signed by Dr. Pimentel on 02/19/2025.

## 2025-05-05 ENCOUNTER — APPOINTMENT (OUTPATIENT)
Dept: LAB | Facility: IMAGING CENTER | Age: 75
End: 2025-05-05
Payer: MEDICARE

## 2025-05-05 DIAGNOSIS — I10 ESSENTIAL HYPERTENSION: ICD-10-CM

## 2025-05-05 DIAGNOSIS — R73.01 IFG (IMPAIRED FASTING GLUCOSE): ICD-10-CM

## 2025-05-05 DIAGNOSIS — E78.2 MIXED HYPERLIPIDEMIA: ICD-10-CM

## 2025-05-05 DIAGNOSIS — Z12.5 PROSTATE CANCER SCREENING: ICD-10-CM

## 2025-05-05 LAB
ALBUMIN SERPL BCG-MCNC: 4.3 G/DL (ref 3.5–5)
ALP SERPL-CCNC: 73 U/L (ref 34–104)
ALT SERPL W P-5'-P-CCNC: 14 U/L (ref 7–52)
ANION GAP SERPL CALCULATED.3IONS-SCNC: 7 MMOL/L (ref 4–13)
AST SERPL W P-5'-P-CCNC: 19 U/L (ref 13–39)
BILIRUB SERPL-MCNC: 0.42 MG/DL (ref 0.2–1)
BUN SERPL-MCNC: 14 MG/DL (ref 5–25)
CALCIUM SERPL-MCNC: 9 MG/DL (ref 8.4–10.2)
CHLORIDE SERPL-SCNC: 103 MMOL/L (ref 96–108)
CHOLEST SERPL-MCNC: 156 MG/DL (ref ?–200)
CO2 SERPL-SCNC: 34 MMOL/L (ref 21–32)
CREAT SERPL-MCNC: 1.07 MG/DL (ref 0.6–1.3)
ERYTHROCYTE [DISTWIDTH] IN BLOOD BY AUTOMATED COUNT: 12.7 % (ref 11.6–15.1)
EST. AVERAGE GLUCOSE BLD GHB EST-MCNC: 111 MG/DL
GFR SERPL CREATININE-BSD FRML MDRD: 68 ML/MIN/1.73SQ M
GLUCOSE P FAST SERPL-MCNC: 104 MG/DL (ref 65–99)
HBA1C MFR BLD: 5.5 %
HCT VFR BLD AUTO: 43.2 % (ref 36.5–49.3)
HDLC SERPL-MCNC: 62 MG/DL
HGB BLD-MCNC: 13.1 G/DL (ref 12–17)
LDLC SERPL CALC-MCNC: 83 MG/DL (ref 0–100)
MCH RBC QN AUTO: 29.1 PG (ref 26.8–34.3)
MCHC RBC AUTO-ENTMCNC: 30.3 G/DL (ref 31.4–37.4)
MCV RBC AUTO: 96 FL (ref 82–98)
NONHDLC SERPL-MCNC: 94 MG/DL
PLATELET # BLD AUTO: 264 THOUSANDS/UL (ref 149–390)
PMV BLD AUTO: 11.1 FL (ref 8.9–12.7)
POTASSIUM SERPL-SCNC: 5 MMOL/L (ref 3.5–5.3)
PROT SERPL-MCNC: 6.7 G/DL (ref 6.4–8.4)
PSA SERPL-MCNC: 0.94 NG/ML (ref 0–4)
RBC # BLD AUTO: 4.5 MILLION/UL (ref 3.88–5.62)
SODIUM SERPL-SCNC: 144 MMOL/L (ref 135–147)
TRIGL SERPL-MCNC: 57 MG/DL (ref ?–150)
WBC # BLD AUTO: 6.47 THOUSAND/UL (ref 4.31–10.16)

## 2025-05-05 PROCEDURE — 80061 LIPID PANEL: CPT

## 2025-05-05 PROCEDURE — 36415 COLL VENOUS BLD VENIPUNCTURE: CPT

## 2025-05-05 PROCEDURE — 80053 COMPREHEN METABOLIC PANEL: CPT

## 2025-05-05 PROCEDURE — G0103 PSA SCREENING: HCPCS

## 2025-05-05 PROCEDURE — 85027 COMPLETE CBC AUTOMATED: CPT

## 2025-05-05 PROCEDURE — 83036 HEMOGLOBIN GLYCOSYLATED A1C: CPT

## 2025-05-06 ENCOUNTER — RESULTS FOLLOW-UP (OUTPATIENT)
Dept: UROLOGY | Facility: AMBULATORY SURGERY CENTER | Age: 75
End: 2025-05-06

## 2025-05-06 ENCOUNTER — RESULTS FOLLOW-UP (OUTPATIENT)
Dept: INTERNAL MEDICINE CLINIC | Facility: OTHER | Age: 75
End: 2025-05-06

## 2025-05-07 ENCOUNTER — OFFICE VISIT (OUTPATIENT)
Dept: INTERNAL MEDICINE CLINIC | Facility: OTHER | Age: 75
End: 2025-05-07
Payer: MEDICARE

## 2025-05-07 VITALS
BODY MASS INDEX: 24.55 KG/M2 | WEIGHT: 162 LBS | OXYGEN SATURATION: 95 % | SYSTOLIC BLOOD PRESSURE: 124 MMHG | HEIGHT: 68 IN | HEART RATE: 103 BPM | DIASTOLIC BLOOD PRESSURE: 68 MMHG | TEMPERATURE: 97.4 F

## 2025-05-07 DIAGNOSIS — J96.11 CHRONIC HYPOXEMIC RESPIRATORY FAILURE (HCC): ICD-10-CM

## 2025-05-07 DIAGNOSIS — J43.2 CENTRILOBULAR EMPHYSEMA (HCC): ICD-10-CM

## 2025-05-07 DIAGNOSIS — N40.1 BPH WITH OBSTRUCTION/LOWER URINARY TRACT SYMPTOMS: ICD-10-CM

## 2025-05-07 DIAGNOSIS — Z13.1 SCREENING FOR DIABETES MELLITUS: ICD-10-CM

## 2025-05-07 DIAGNOSIS — Z12.5 SCREENING FOR PROSTATE CANCER: ICD-10-CM

## 2025-05-07 DIAGNOSIS — E78.2 MIXED HYPERLIPIDEMIA: Primary | ICD-10-CM

## 2025-05-07 DIAGNOSIS — Z12.12 SCREENING FOR COLORECTAL CANCER: ICD-10-CM

## 2025-05-07 DIAGNOSIS — N18.2 CKD (CHRONIC KIDNEY DISEASE) STAGE 2, GFR 60-89 ML/MIN: ICD-10-CM

## 2025-05-07 DIAGNOSIS — N13.8 BPH WITH OBSTRUCTION/LOWER URINARY TRACT SYMPTOMS: ICD-10-CM

## 2025-05-07 DIAGNOSIS — Z13.6 SCREENING FOR CARDIOVASCULAR CONDITION: ICD-10-CM

## 2025-05-07 DIAGNOSIS — Z12.11 SCREENING FOR COLORECTAL CANCER: ICD-10-CM

## 2025-05-07 DIAGNOSIS — I10 ESSENTIAL HYPERTENSION: ICD-10-CM

## 2025-05-07 DIAGNOSIS — Z71.89 ADVANCED CARE PLANNING/COUNSELING DISCUSSION: ICD-10-CM

## 2025-05-07 DIAGNOSIS — Z00.00 MEDICARE ANNUAL WELLNESS VISIT, SUBSEQUENT: ICD-10-CM

## 2025-05-07 DIAGNOSIS — R91.1 LUNG NODULE: ICD-10-CM

## 2025-05-07 DIAGNOSIS — I47.29 NSVT (NONSUSTAINED VENTRICULAR TACHYCARDIA) (HCC): ICD-10-CM

## 2025-05-07 PROBLEM — I74.09 OTHER ARTERIAL EMBOLISM AND THROMBOSIS OF ABDOMINAL AORTA (HCC): Status: RESOLVED | Noted: 2024-05-01 | Resolved: 2025-05-07

## 2025-05-07 PROBLEM — R31.29 OTHER MICROSCOPIC HEMATURIA: Status: RESOLVED | Noted: 2019-08-30 | Resolved: 2025-05-07

## 2025-05-07 PROCEDURE — 99214 OFFICE O/P EST MOD 30 MIN: CPT | Performed by: INTERNAL MEDICINE

## 2025-05-07 PROCEDURE — 99497 ADVNCD CARE PLAN 30 MIN: CPT | Performed by: INTERNAL MEDICINE

## 2025-05-07 PROCEDURE — G2211 COMPLEX E/M VISIT ADD ON: HCPCS | Performed by: INTERNAL MEDICINE

## 2025-05-07 PROCEDURE — G0439 PPPS, SUBSEQ VISIT: HCPCS | Performed by: INTERNAL MEDICINE

## 2025-05-07 RX ORDER — ATORVASTATIN CALCIUM 20 MG/1
20 TABLET, FILM COATED ORAL DAILY
Qty: 100 TABLET | Refills: 1 | Status: SHIPPED | OUTPATIENT
Start: 2025-05-07

## 2025-05-07 RX ORDER — LISINOPRIL 5 MG/1
5 TABLET ORAL DAILY
Qty: 100 TABLET | Refills: 1 | Status: SHIPPED | OUTPATIENT
Start: 2025-05-07

## 2025-05-07 NOTE — ASSESSMENT & PLAN NOTE
Stable without recent exacerbation.  Continue current inhaler regimen.  Continue follow-up with pulmonology.    SmartLink not supported outside of the Encounter Diagnoses SmartSection.

## 2025-05-07 NOTE — ASSESSMENT & PLAN NOTE
Chuck OLMOS Held and I had a thorough discussion regarding advance care planning.  Current advance directives on file state DNR/DNI.  He would like full code.  ACP documentation provided to patient today.  he  understands that today's visit is a billable service.  Refer to ACP note.    Serious Illness Conversation    1. What is your understanding now of where you are with your illness?  Prognostic Understanding: appropriate understanding of prognosis     2. How much information about what is likely to be ahead with your illness would you like to have?  Information: patient wants to be fully informed     3. What did you (clinician) communicate to the patient?     4. If your health situation worsens, what are your most important goals?  Goals: be mentally aware, have my medical decisions respected, be at home, be physically comfortable, be emotionally at peace, be spiritually and emotionally at peace, not be a burden     5. What are the biggest fears and worries about the future and your health?  Fears/Worries: loss of control, being a financial burden, being a physical burden, burdening others, being dependent     6. What abilities are so critical to your life that you cannot imagine living without them?  Unacceptable Function: being unconscious     7. What gives you strength as you think about the future with your illness?     8. If you become sicker, how much are you willing to go through for the possibility of gaining more time?  Be in the hospital: Yes Have a feeding tube: Yes   Be in the ICU: Yes Live in a nursing home: Yes   Be on a ventilator: Yes Be uncomfortable: Yes   Be on dialysis: Yes Undergo aggressive test and/or procedures: Yes   9. How much does your proxy and family know about your priorities and wishes?  Discussion Discussion: extensive discussion with family about goals and wishes        How does this plan sound to you? I will do everything I can to help you through this.  Patient verbalized  understanding of the plan     I have spent 16 minutes speaking with my patient on advanced care planning today or during this visit     Advanced directives  Five Wishes: Patient does not have Five Wishes- would like information

## 2025-05-07 NOTE — ASSESSMENT & PLAN NOTE
Stable.  Continue oxygen supplementation.  With pulmonology.    SmartLink not supported outside of the Encounter Diagnoses SmartSection.

## 2025-05-07 NOTE — ASSESSMENT & PLAN NOTE
Controlled on Lipitor.  Trend lipid panel.  SmartLink not supported outside of the Encounter Diagnoses SmartSection.

## 2025-05-07 NOTE — ASSESSMENT & PLAN NOTE
Controlled.  Continue current antihypertensive regimen.  Orders:  •  lisinopril (ZESTRIL) 5 mg tablet; Take 1 tablet (5 mg total) by mouth daily

## 2025-05-07 NOTE — ASSESSMENT & PLAN NOTE
Controlled on Lipitor.  Trend lipid panel.  Orders:  •  atorvastatin (LIPITOR) 20 mg tablet; Take 1 tablet (20 mg total) by mouth daily

## 2025-05-07 NOTE — ASSESSMENT & PLAN NOTE
Lab Results   Component Value Date    EGFR 68 05/05/2025    EGFR 71 04/26/2024    EGFR 69 03/15/2023    CREATININE 1.07 05/05/2025    CREATININE 1.03 04/26/2024    CREATININE 1.06 03/15/2023   Stable.  Continue to trend kidney function.

## 2025-05-07 NOTE — PROGRESS NOTES
Name: Chuck Justice      : 1950      MRN: [id number]  Encounter Provider: Reyes Michaud MD  Encounter Date: 2025   Encounter department: [facility] CARE [place]  :  Assessment & Plan  Medicare annual wellness visit, subsequent  Medicare wellness visit completed today.       Screening for diabetes mellitus         Screening for cardiovascular condition         Screening for colorectal cancer         Screening for prostate cancer         Mixed hyperlipidemia  Controlled on Lipitor.  Trend lipid panel.  Orders:  •  atorvastatin (LIPITOR) 20 mg tablet; Take 1 tablet (20 mg total) by mouth daily    Essential hypertension  Controlled.  Continue current antihypertensive regimen.  Orders:  •  lisinopril (ZESTRIL) 5 mg tablet; Take 1 tablet (5 mg total) by mouth daily    NSVT (nonsustained ventricular tachycardia) (HCC)  Stable, asymptomatic without recurrence.  Continue to monitor clinically.       Lung nodule  CT lung scheduled.  Continue surveillance.       Chronic hypoxemic respiratory failure (HCC)  Stable.  Continue oxygen supplementation.  With pulmonology.         Centrilobular emphysema (HCC) - severe  Stable without recent exacerbation.  Continue current inhaler regimen.  Continue follow-up with pulmonology.         CKD (chronic kidney disease) stage 2, GFR 60-89 ml/min  Lab Results   Component Value Date    EGFR 68 2025    EGFR 71 2024    EGFR 69 03/15/2023    CREATININE 1.07 2025    CREATININE 1.03 2024    CREATININE 1.06 03/15/2023   Stable.  Continue to trend kidney function.       BPH with obstruction/lower urinary tract symptoms  Continue follow-up with urology.  Continue Flomax and finasteride.  Symptoms controlled.         Advanced care planning/counseling discussion  Chuck OLMOS Held and I had a thorough discussion regarding advance care planning.  Current advance directives on file state DNR/DNI.  He would like full code.  ACP documentation provided  to patient today.  he  understands that today's visit is a billable service.  Refer to ACP note.    Serious Illness Conversation    1. What is your understanding now of where you are with your illness?  Prognostic Understanding: appropriate understanding of prognosis     2. How much information about what is likely to be ahead with your illness would you like to have?  Information: patient wants to be fully informed     3. What did you (clinician) communicate to the patient?     4. If your health situation worsens, what are your most important goals?  Goals: be mentally aware, have my medical decisions respected, be at home, be physically comfortable, be emotionally at peace, be spiritually and emotionally at peace, not be a burden     5. What are the biggest fears and worries about the future and your health?  Fears/Worries: loss of control, being a financial burden, being a physical burden, burdening others, being dependent     6. What abilities are so critical to your life that you cannot imagine living without them?  Unacceptable Function: being unconscious     7. What gives you strength as you think about the future with your illness?     8. If you become sicker, how much are you willing to go through for the possibility of gaining more time?  Be in the hospital: Yes Have a feeding tube: Yes   Be in the ICU: Yes Live in a nursing home: Yes   Be on a ventilator: Yes Be uncomfortable: Yes   Be on dialysis: Yes Undergo aggressive test and/or procedures: Yes   9. How much does your proxy and family know about your priorities and wishes?  Discussion Discussion: extensive discussion with family about goals and wishes        How does this plan sound to you? I will do everything I can to help you through this.  Patient verbalized understanding of the plan     I have spent 16 minutes speaking with my patient on advanced care planning today or during this visit     Advanced directives  Five Wishes: Patient does not have  Five Wishes- would like information                 Preventive health issues were discussed with patient, and age appropriate screening tests were ordered as noted in patient's After Visit Summary. Personalized health advice and appropriate referrals for health education or preventive services given if needed, as noted in patient's After Visit Summary.    History of Present Illness     Chuck Justice is seen today for follow up of chronic conditions.   Recent laboratory studies reviewed today with the patient.   he has been compliant with his medication regimen.   BPH: Controlled with Flomax and finasteride.  He follows up with his pulmonologist regularly regarding emphysema.  He is on 2 L nasal cannula chronically.  he has no complaints or concerns at this time.       Hyperlipidemia  This is a chronic problem. The current episode started more than 1 year ago. The problem is controlled. Recent lipid tests were reviewed and are normal. Pertinent negatives include no chest pain or shortness of breath. Current antihyperlipidemic treatment includes statins. The current treatment provides moderate improvement of lipids. There are no compliance problems.    Hypertension  This is a chronic problem. The current episode started more than 1 year ago. The problem is unchanged. The problem is controlled. Pertinent negatives include no chest pain, headaches, palpitations or shortness of breath. Past treatments include ACE inhibitors. The current treatment provides moderate improvement. There are no compliance problems.       Patient Care Team:  EVAN Harrington as PCP - General (Internal Medicine)  MD Rodo Armstrong MD (Urology)  EVAN Collier (Vascular Surgery)  Anastacio Pimentel MD as Consulting Physician (Pulmonary Disease)  Jostin Hood MD (Colon and Rectal Surgery)    Review of Systems   Constitutional:  Negative for activity change, appetite change, chills, diaphoresis, fatigue and  fever.   HENT:  Negative for congestion, postnasal drip, rhinorrhea, sinus pressure, sinus pain, sneezing and sore throat.    Eyes:  Negative for visual disturbance.   Respiratory:  Negative for apnea, cough, choking, chest tightness, shortness of breath and wheezing.    Cardiovascular:  Negative for chest pain, palpitations and leg swelling.   Gastrointestinal:  Negative for abdominal distention, abdominal pain, anal bleeding, blood in stool, constipation, diarrhea, nausea and vomiting.   Endocrine: Negative for cold intolerance and heat intolerance.   Genitourinary:  Negative for difficulty urinating, dysuria and hematuria.   Musculoskeletal: Negative.    Skin: Negative.    Neurological:  Negative for dizziness, weakness, light-headedness, numbness and headaches.   Hematological:  Negative for adenopathy.   Psychiatric/Behavioral:  Negative for agitation, sleep disturbance and suicidal ideas.    All other systems reviewed and are negative.    Medical History Reviewed by provider this encounter:  Tobacco  Allergies  Meds  Problems  Med Hx  Surg Hx  Fam Hx       Annual Wellness Visit Questionnaire   Chuck is here for his Subsequent Wellness visit. Last Medicare Wellness visit information reviewed, patient interviewed, no change since last AWV.     Health Risk Assessment:   Patient rates overall health as good. Patient feels that their physical health rating is same. Patient is satisfied with their life. Eyesight was rated as same. Hearing was rated as same. Patient feels that their emotional and mental health rating is same. Patients states they are never, rarely angry. Patient states they are sometimes unusually tired/fatigued. Pain experienced in the last 7 days has been none. Patient states that he has experienced no weight loss or gain in last 6 months.     Depression Screening:   PHQ-2 Score: 0      Fall Risk Screening:   In the past year, patient has experienced: no history of falling in past year       Home Safety:  Patient has trouble with stairs inside or outside of their home. Patient has working smoke alarms and has working carbon monoxide detector. Home safety hazards include: none.     Nutrition:   Current diet is Regular.     Medications:   Patient is not currently taking any over-the-counter supplements. Patient is able to manage medications.     Activities of Daily Living (ADLs)/Instrumental Activities of Daily Living (IADLs):   Walk and transfer into and out of bed and chair?: Yes  Dress and groom yourself?: Yes    Bathe or shower yourself?: No    Feed yourself? Yes  Do your laundry/housekeeping?: Yes  Manage your money, pay your bills and track your expenses?: Yes  Make your own meals?: Yes    Do your own shopping?: No    Durable Medical Equipment Suppliers  [employer]    Previous Hospitalizations:   Any hospitalizations or ED visits within the last 12 months?: No      Advance Care Planning:   Living will: Yes    Durable POA for healthcare: No    Advanced directive: Yes    Advanced directive counseling given: Yes    End of Life Decisions reviewed with patient: Yes    Provider agrees with end of life decisions: Yes      Comments: CODE STATUS: Full code    Cognitive Screening:   Provider or family/friend/caregiver concerned regarding cognition?: No    Preventive Screenings      Cardiovascular Screening:    General: Screening Not Indicated, History Lipid Disorder, Screening Current and Risks and Benefits Discussed      Diabetes Screening:     General: Screening Current and Risks and Benefits Discussed      Colorectal Cancer Screening:     General: Screening Current and Risks and Benefits Discussed      Prostate Cancer Screening:    General: Screening Current and Risks and Benefits Discussed      Abdominal Aortic Aneurysm (AAA) Screening:    Risk factors include: age between 65-74 yo and tobacco use        Lung Cancer Screening:     General: Screening Current and Risks and Benefits Discussed    Due  for: Low Dose CT (LDCT)      Hepatitis C Screening:    General: Screening Current and Risks and Benefits Discussed    Immunizations:    - Risks/benefits immunizations discussed      Screening, Brief Intervention, and Referral to Treatment (SBIRT)     Screening  Typical number of drinks in a day: 0  Typical number of drinks in a week: 0  Interpretation: Low risk drinking behavior.    AUDIT-C Screenin) How often did you have a drink containing alcohol in the past year? monthly or less  2) How many drinks did you have on a typical day when you were drinking in the past year? 0  3) How often did you have 6 or more drinks on one occasion in the past year? never    AUDIT-C Score: 1  Interpretation: Score 0-3 (male): Negative screen for alcohol misuse    Single Item Drug Screening:  How often have you used an illegal drug (including marijuana) or a prescription medication for non-medical reasons in the past year? never    Single Item Drug Screen Score: 0  Interpretation: Negative screen for possible drug use disorder    Brief Intervention  Alcohol & drug use screenings were reviewed. No concerns regarding substance use disorder identified.     Other Counseling Topics:   Car/seat belt/driving safety, skin self-exam, sunscreen and calcium and vitamin D intake and regular weightbearing exercise.     Social Drivers of Health     Financial Resource Strain: Low Risk  (2023)    Overall Financial Resource Strain (CARDIA)    • Difficulty of Paying Living Expenses: Not hard at all   Food Insecurity: No Food Insecurity (2025)    Hunger Vital Sign    • Worried About Running Out of Food in the Last Year: Never true    • Ran Out of Food in the Last Year: Never true   Transportation Needs: No Transportation Needs (2025)    PRAPARE - Transportation    • Lack of Transportation (Medical): No    • Lack of Transportation (Non-Medical): No   Housing Stability: Low Risk  (2025)    Housing Stability Vital Sign    • Unable  "to Pay for Housing in the Last Year: No    • Number of Times Moved in the Last Year: 0    • Homeless in the Last Year: No   Utilities: Not At Risk (5/4/2025)    [facility] Utilities    • Threatened with loss of utilities: No     No results found.    Objective   /68 (BP Location: Left arm, Patient Position: Sitting, Cuff Size: Adult)   Pulse 103   Temp (!) 97.4 °F (36.3 °C) (Temporal)   Ht 5' 8\" (1.727 m)   Wt 73.5 kg (162 lb)   SpO2 95%   BMI 24.63 kg/m²     Physical Exam  Vitals and nursing note reviewed.   Constitutional:       General: He is not in acute distress.     Appearance: He is well-developed. He is not diaphoretic.   HENT:      Head: Normocephalic and atraumatic.   Eyes:      General: No scleral icterus.        Right eye: No discharge.         Left eye: No discharge.      Conjunctiva/sclera: Conjunctivae normal.      Pupils: Pupils are equal, round, and reactive to light.   Neck:      Thyroid: No thyromegaly.      Vascular: No JVD.   Cardiovascular:      Rate and Rhythm: Normal rate and regular rhythm.      Heart sounds: Normal heart sounds. No murmur heard.     No friction rub. No gallop.   Pulmonary:      Effort: Pulmonary effort is normal. No respiratory distress.      Breath sounds: Examination of the right-upper field reveals decreased breath sounds. Examination of the left-upper field reveals decreased breath sounds. Examination of the right-middle field reveals decreased breath sounds. Examination of the left-middle field reveals decreased breath sounds. Examination of the right-lower field reveals decreased breath sounds. Examination of the left-lower field reveals decreased breath sounds. Decreased breath sounds present. No wheezing or rales.      Comments: On 2L NC.   Chest:      Chest wall: No tenderness.   Abdominal:      General: Bowel sounds are normal. There is no distension.      Palpations: Abdomen is soft. There is no mass.      Tenderness: There is no abdominal tenderness. There " is no guarding or rebound.   Musculoskeletal:         General: No tenderness or deformity. Normal range of motion.      Cervical back: Normal range of motion and neck supple.   Lymphadenopathy:      Cervical: No cervical adenopathy.   Skin:     General: Skin is warm and dry.      Coloration: Skin is not pale.      Findings: No erythema or rash.   Neurological:      Mental Status: He is alert and oriented to person, place, and time.      Cranial Nerves: No cranial nerve deficit.      Coordination: Coordination normal.      Deep Tendon Reflexes: Reflexes are normal and symmetric.   Psychiatric:         Behavior: Behavior normal.         Thought Content: Thought content normal.         Judgment: Judgment normal.

## 2025-05-07 NOTE — PATIENT INSTRUCTIONS
Medicare Preventive Visit Patient Instructions  Thank you for completing your Welcome to Medicare Visit or Medicare Annual Wellness Visit today. Your next wellness visit will be due in one year (5/8/2026).  The screening/preventive services that you may require over the next 5-10 years are detailed below. Some tests may not apply to you based off risk factors and/or age. Screening tests ordered at today's visit but not completed yet may show as past due. Also, please note that scanned in results may not display below.  Preventive Screenings:  Service Recommendations Previous Testing/Comments   Colorectal Cancer Screening  Colonoscopy    Fecal Occult Blood Test (FOBT)/Fecal Immunochemical Test (FIT)  Fecal DNA/Cologuard Test  Flexible Sigmoidoscopy Age: 45-75 years old   Colonoscopy: every 10 years (May be performed more frequently if at higher risk)  OR  FOBT/FIT: every 1 year  OR  Cologuard: every 3 years  OR  Sigmoidoscopy: every 5 years  Screening may be recommended earlier than age 45 if at higher risk for colorectal cancer. Also, an individualized decision between you and your healthcare provider will decide whether screening between the ages of 76-85 would be appropriate. Colonoscopy: 11/17/2021  FOBT/FIT: Not on file  Cologuard: Not on file  Sigmoidoscopy: Not on file    Screening Current     Prostate Cancer Screening Individualized decision between patient and health care provider in men between ages of 55-69   Medicare will cover every 12 months beginning on the day after your 50th birthday PSA: 0.941 ng/mL     Screening Current     Hepatitis C Screening Once for adults born between 1945 and 1965  More frequently in patients at high risk for Hepatitis C Hep C Antibody: 05/25/2018    Screening Current   Diabetes Screening 1-2 times per year if you're at risk for diabetes or have pre-diabetes Fasting glucose: 104 mg/dL (5/5/2025)  A1C: 5.5 % (5/5/2025)  Screening Current   Cholesterol Screening Once every 5  years if you don't have a lipid disorder. May order more often based on risk factors. Lipid panel: 05/05/2025  Screening Not Indicated  History Lipid Disorder      Other Preventive Screenings Covered by Medicare:  Abdominal Aortic Aneurysm (AAA) Screening: covered once if your at risk. You're considered to be at risk if you have a family history of AAA or a male between the age of 65-75 who smoking at least 100 cigarettes in your lifetime.  Lung Cancer Screening: covers low dose CT scan once per year if you meet all of the following conditions: (1) Age 55-77; (2) No signs or symptoms of lung cancer; (3) Current smoker or have quit smoking within the last 15 years; (4) You have a tobacco smoking history of at least 20 pack years (packs per day x number of years you smoked); (5) You get a written order from a healthcare provider.  Glaucoma Screening: covered annually if you're considered high risk: (1) You have diabetes OR (2) Family history of glaucoma OR (3)  aged 50 and older OR (4)  American aged 65 and older  Osteoporosis Screening: covered every 2 years if you meet one of the following conditions: (1) Have a vertebral abnormality; (2) On glucocorticoid therapy for more than 3 months; (3) Have primary hyperparathyroidism; (4) On osteoporosis medications and need to assess response to drug therapy.  HIV Screening: covered annually if you're between the age of 15-65. Also covered annually if you are younger than 15 and older than 65 with risk factors for HIV infection. For pregnant patients, it is covered up to 3 times per pregnancy.    Immunizations:  Immunization Recommendations   Influenza Vaccine Annual influenza vaccination during flu season is recommended for all persons aged >= 6 months who do not have contraindications   Pneumococcal Vaccine   * Pneumococcal conjugate vaccine = PCV13 (Prevnar 13), PCV15 (Vaxneuvance), PCV20 (Prevnar 20)  * Pneumococcal polysaccharide vaccine = PPSV23  (Pneumovax) Adults 19-63 yo with certain risk factors or if 65+ yo  If never received any pneumonia vaccine: recommend Prevnar 20 (PCV20)  Give PCV20 if previously received 1 dose of PCV13 or PPSV23   Hepatitis B Vaccine 3 dose series if at intermediate or high risk (ex: diabetes, end stage renal disease, liver disease)   Respiratory syncytial virus (RSV) Vaccine - COVERED BY MEDICARE PART D  * RSVPreF3 (Arexvy) CDC recommends that adults 60 years of age and older may receive a single dose of RSV vaccine using shared clinical decision-making (SCDM)   Tetanus (Td) Vaccine - COST NOT COVERED BY MEDICARE PART B Following completion of primary series, a booster dose should be given every 10 years to maintain immunity against tetanus. Td may also be given as tetanus wound prophylaxis.   Tdap Vaccine - COST NOT COVERED BY MEDICARE PART B Recommended at least once for all adults. For pregnant patients, recommended with each pregnancy.   Shingles Vaccine (Shingrix) - COST NOT COVERED BY MEDICARE PART B  2 shot series recommended in those 19 years and older who have or will have weakened immune systems or those 50 years and older     Health Maintenance Due:      Topic Date Due   • Lung Cancer Screening  05/16/2025   • Hepatitis C Screening  Completed   • Colorectal Cancer Screening  Discontinued     Immunizations Due:      Topic Date Due   • COVID-19 Vaccine (5 - 2024-25 season) 09/01/2024     Advance Directives   What are advance directives?  Advance directives are legal documents that state your wishes and plans for medical care. These plans are made ahead of time in case you lose your ability to make decisions for yourself. Advance directives can apply to any medical decision, such as the treatments you want, and if you want to donate organs.   What are the types of advance directives?  There are many types of advance directives, and each state has rules about how to use them. You may choose a combination of any of the  following:  Living will:  This is a written record of the treatment you want. You can also choose which treatments you do not want, which to limit, and which to stop at a certain time. This includes surgery, medicine, IV fluid, and tube feedings.   Durable power of  for healthcare (DPAHC):  This is a written record that states who you want to make healthcare choices for you when you are unable to make them for yourself. This person, called a proxy, is usually a family member or a friend. You may choose more than 1 proxy.  Do not resuscitate (DNR) order:  A DNR order is used in case your heart stops beating or you stop breathing. It is a request not to have certain forms of treatment, such as CPR. A DNR order may be included in other types of advance directives.  Medical directive:  This covers the care that you want if you are in a coma, near death, or unable to make decisions for yourself. You can list the treatments you want for each condition. Treatment may include pain medicine, surgery, blood transfusions, dialysis, IV or tube feedings, and a ventilator (breathing machine).  Values history:  This document has questions about your views, beliefs, and how you feel and think about life. This information can help others choose the care that you would choose.  Why are advance directives important?  An advance directive helps you control your care. Although spoken wishes may be used, it is better to have your wishes written down. Spoken wishes can be misunderstood, or not followed. Treatments may be given even if you do not want them. An advance directive may make it easier for your family to make difficult choices about your care.       © Copyright [employer] 2018 Information is for End User's use only and may not be sold, redistributed or otherwise used for commercial purposes. All illustrations and images included in CareNotes® are the copyrighted property of A.D.A.M., Inc. or [employer]

## 2025-05-07 NOTE — ASSESSMENT & PLAN NOTE
Controlled.  Continue current antihypertensive regimen.  SmartLink not supported outside of the Encounter Diagnoses SmartSection.

## 2025-05-07 NOTE — ASSESSMENT & PLAN NOTE
Continue follow-up with urology.  Continue Flomax and finasteride.  Symptoms controlled.    SmartLink not supported outside of the Encounter Diagnoses SmartSection.

## 2025-05-13 ENCOUNTER — HOSPITAL ENCOUNTER (OUTPATIENT)
Dept: NON INVASIVE DIAGNOSTICS | Facility: CLINIC | Age: 75
Discharge: HOME | End: 2025-05-13
Payer: MEDICARE

## 2025-05-13 ENCOUNTER — RESULTS FOLLOW-UP (OUTPATIENT)
Dept: VASCULAR SURGERY | Facility: CLINIC | Age: 75
End: 2025-05-13

## 2025-05-13 DIAGNOSIS — I72.3 BILATERAL ILIAC ARTERY ANEURYSM (HCC): ICD-10-CM

## 2025-05-13 PROCEDURE — 93925 LOWER EXTREMITY STUDY: CPT

## 2025-05-13 PROCEDURE — 93925 LOWER EXTREMITY STUDY: CPT | Performed by: SURGERY

## 2025-05-13 PROCEDURE — 93978 VASCULAR STUDY: CPT | Performed by: SURGERY

## 2025-05-13 PROCEDURE — 93922 UPR/L XTREMITY ART 2 LEVELS: CPT | Performed by: SURGERY

## 2025-05-13 PROCEDURE — 93978 VASCULAR STUDY: CPT

## 2025-05-13 PROCEDURE — 93923 UPR/LXTR ART STDY 3+ LVLS: CPT

## 2025-05-14 ENCOUNTER — TELEPHONE (OUTPATIENT)
Dept: VASCULAR SURGERY | Facility: CLINIC | Age: 75
End: 2025-05-14

## 2025-05-14 NOTE — TELEPHONE ENCOUNTER
Attempted to contact patient to schedule appointment(s) listed below.  Requested patient call [phone] to schedule appointment(s).    Patient's appointment(s) are due now.    Dopplers  [x] Abdominal Aorta Iliac (AOIL)  [] Carotid (CV)   [] Celiac and/or Mesenteric  [] Endovascular Aortic Repair (EVAR)   [] Hemodialysis Access (HD)   [] Lower Limb Arterial (MARY)  [] Lower Limb Venous (LEV)  [] Lower Limb Venous Duplex with Reflux (LEVDR)  [] Renal Artery  [] Upper Limb Arterial (UEA)    [] Upper Limb Venous (UEV)              [] ROMINA and Waveform analysis     Advanced Imaging   [] CTA head/neck    [] CTA abdomen    [] CTA abdomen & pelvis    [] CT abdomen with/ without contrast  [] CT abdomen with contrast  [] CT abdomen without contrast    [] CT abdomen & pelvis with/ without contrast  [] CT abdomen & pelvis with contrast  [] CT abdomen & pelvis without contrast    Office Visit   [] New patient, patient last seen over 3 years ago  [x] Risk factor modification (RFM)   [x] Follow up   [] Lost to follow up (LTFU)   Called patient & LMOM & schedule 1YR RFM RR AOIL & MARY 5/13/25 l/s 6/25/24 MBM

## 2025-05-21 ENCOUNTER — HOSPITAL ENCOUNTER (OUTPATIENT)
Dept: RADIOLOGY | Facility: IMAGING CENTER | Age: 75
Discharge: HOME | End: 2025-05-21

## 2025-05-21 DIAGNOSIS — F17.211 CIGARETTE NICOTINE DEPENDENCE IN REMISSION: ICD-10-CM

## 2025-06-16 ENCOUNTER — RESULTS FOLLOW-UP (OUTPATIENT)
Dept: PULMONOLOGY | Facility: CLINIC | Age: 75
End: 2025-06-16

## 2025-06-30 DIAGNOSIS — J43.2 CENTRILOBULAR EMPHYSEMA (HCC): ICD-10-CM

## 2025-06-30 RX ORDER — ALBUTEROL SULFATE 90 UG/1
2 INHALANT RESPIRATORY (INHALATION) EVERY 6 HOURS PRN
Qty: 54 G | Refills: 1 | Status: SHIPPED | OUTPATIENT
Start: 2025-06-30

## 2025-07-29 ENCOUNTER — OFFICE VISIT (OUTPATIENT)
Dept: VASCULAR SURGERY | Facility: CLINIC | Age: 75
End: 2025-07-29
Payer: MEDICARE

## 2025-07-29 VITALS
HEART RATE: 96 BPM | BODY MASS INDEX: 23.4 KG/M2 | WEIGHT: 154.4 LBS | SYSTOLIC BLOOD PRESSURE: 110 MMHG | HEIGHT: 68 IN | OXYGEN SATURATION: 98 % | DIASTOLIC BLOOD PRESSURE: 72 MMHG

## 2025-07-29 DIAGNOSIS — R09.89 PROMINENT POPLITEAL PULSE: ICD-10-CM

## 2025-07-29 DIAGNOSIS — I72.3 BILATERAL ILIAC ARTERY ANEURYSM (HCC): Primary | ICD-10-CM

## 2025-07-29 PROCEDURE — 99213 OFFICE O/P EST LOW 20 MIN: CPT | Performed by: NURSE PRACTITIONER
